# Patient Record
Sex: FEMALE | Race: WHITE | NOT HISPANIC OR LATINO | Employment: OTHER | URBAN - METROPOLITAN AREA
[De-identification: names, ages, dates, MRNs, and addresses within clinical notes are randomized per-mention and may not be internally consistent; named-entity substitution may affect disease eponyms.]

---

## 2017-01-03 DIAGNOSIS — I25.119 CORONARY ARTERY DISEASE INVOLVING NATIVE CORONARY ARTERY WITH ANGINA PECTORIS, UNSPECIFIED WHETHER NATIVE OR TRANSPLANTED HEART (HCC): ICD-10-CM

## 2017-01-03 RX ORDER — DOXEPIN HYDROCHLORIDE 50 MG/1
CAPSULE ORAL
Qty: 90 CAPSULE | Refills: 1 | Status: SHIPPED | OUTPATIENT
Start: 2017-01-03 | End: 2017-06-28 | Stop reason: SDUPTHER

## 2017-01-24 ENCOUNTER — OFFICE VISIT (OUTPATIENT)
Dept: INTERNAL MEDICINE | Facility: CLINIC | Age: 77
End: 2017-01-24

## 2017-01-24 VITALS
HEART RATE: 74 BPM | BODY MASS INDEX: 28 KG/M2 | SYSTOLIC BLOOD PRESSURE: 112 MMHG | HEIGHT: 64 IN | DIASTOLIC BLOOD PRESSURE: 82 MMHG | OXYGEN SATURATION: 95 % | WEIGHT: 164 LBS

## 2017-01-24 DIAGNOSIS — M54.42 ACUTE LEFT-SIDED LOW BACK PAIN WITH LEFT-SIDED SCIATICA: Primary | ICD-10-CM

## 2017-01-24 DIAGNOSIS — M25.552 LEFT HIP PAIN: ICD-10-CM

## 2017-01-24 PROCEDURE — 99213 OFFICE O/P EST LOW 20 MIN: CPT | Performed by: NURSE PRACTITIONER

## 2017-01-24 NOTE — MR AVS SNAPSHOT
Dior Pettit   1/24/2017 2:00 PM   Office Visit    Dept Phone:  [phone]   Encounter #:  [id number]    Provider:  TOSHA Coto   Department:  [facility] INTERNAL MEDICINE                Your Full Care Plan              Today's Medication Changes          These changes are accurate as of: 1/24/17  2:44 PM.  If you have any questions, ask your nurse or doctor.               Stop taking medication(s)listed here:     aspirin 325 MG tablet   Stopped by:  TOSHA Coto                      Your Updated Medication List          This list is accurate as of: 1/24/17  2:44 PM.  Always use your most recent med list.                clonazePAM 2 MG tablet   Commonly known as:  KlonoPIN   Take 1 tablet by mouth At Night As Needed for seizures.       [facility] PO       doxepin 50 MG capsule   Commonly known as:  SINEquan   TAKE ONE CAPSULE BY MOUTH ONCE NIGHTLY       fenofibrate micronized 134 MG capsule   Commonly known as:  LOFIBRA       fish oil 1000 MG capsule capsule       fluticasone-salmeterol 250-50 MCG/DOSE DISKUS   Commonly known as:  ADVAIR DISKUS   Inhale 1 puff 2 (Two) Times a Day. Rinse mouth afterwards       glimepiride 2 MG tablet   Commonly known as:  AMARYL   TAKE ONE TABLET BY MOUTH EVERY MORNING BEFORE BREAKFAST       glucosamine-chondroitin 500-400 MG capsule capsule       * levothyroxine 75 MCG tablet   Commonly known as:  SYNTHROID, LEVOTHROID   Take 1 tablet by mouth every other day.       * levothyroxine 50 MCG tablet   Commonly known as:  SYNTHROID, LEVOTHROID   TAKE ONE TABLET BY MOUTH EVERY OTHER DAY       melatonin 5 MG tablet tablet       metFORMIN  MG 24 hr tablet   Commonly known as:  GLUCOPHAGE-XR   TAKE ONE TABLET BY MOUTH TWICE A DAY       metoprolol tartrate 25 MG tablet   Commonly known as:  LOPRESSOR   TAKE ONE TABLET BY MOUTH TWICE A DAY       montelukast 10 MG tablet   Commonly known as:  SINGULAIR      Take 1 tablet by mouth Every Night.       omeprazole 40 MG capsule   Commonly known as:  priLOSEC   take 1 capsule by mouth once daily       simvastatin 40 MG tablet   Commonly known as:  ZOCOR   TAKE ONE TABLET BY MOUTH EVERY NIGHT       traMADol 50 MG tablet   Commonly known as:  ULTRAM   TAKE ONE TO TWO TABLETS BY MOUTH THREE TIMES A DAY AS NEEDED FOR PAIN       venlafaxine  MG 24 hr capsule   Commonly known as:  EFFEXOR-XR   TAKE ONE CAPSULE BY MOUTH DAILY       * Notice:  This list has 2 medication(s) that are the same as other medications prescribed for you. Read the directions carefully, and ask your doctor or other care provider to review them with you.            You Were Diagnosed With        Codes Comments    Acute left-sided low back pain with left-sided sciatica    -  Primary ICD-10-CM: M54.42  ICD-9-CM: 724.2, 724.3 continue chiro    Left hip pain     ICD-10-CM: M25.552  ICD-9-CM: 719.45       Instructions     None    Patient Instructions History      Upcoming Appointments     Visit Type Date Time Department    ACUTE           2017  2:00 PM [facility]    FOLLOW UP 3/2/2017 10:00 AM [facility]    FOLLOW UP 3/13/2017 10:00 AM [facility] Signup     [facility] [employer] allows you to send messages to your doctor, view your test results, renew your prescriptions, schedule appointments, and more. To sign up, go to [employer] and click on the Sign Up Now link in the New User? box. Enter your [employer] Activation Code exactly as it appears below along with the last four digits of your Social Security Number and your Date of Birth () to complete the sign-up process. If you do not sign up before the expiration date, you must request a new code.    [employer] Activation Code: [email or web address]  Expires: 2017  2:44 PM    If you have questions, you can email [email or web address] or call [phone] to talk to our [employer] staff. Remember, [employer]  "is NOT to be used for urgent needs. For medical emergencies, dial 911.               Other Info from Your Visit           Your Appointments     Mar 02, 2017 10:00 AM EST   Follow Up with Abbi Mitchell MD   [facility] CARDIOLOGY (--)    [address]  [facility] [address] [phone]           Arrive 15 minutes prior to appointment.            Mar 13, 2017 10:00 AM EDT   Follow Up with Magdalena Joy MD   [facility] INTERNAL MEDICINE (--)    [address]  [facility] [address] [phone]           Arrive 15 minutes prior to appointment.              Allergies     Lisinopril      Metoprolol        Reason for Visit     Fall Hit her L hip      Vital Signs     Blood Pressure Pulse Height Weight Oxygen Saturation Body Mass Index    112/82 (BP Location: Left arm) 74 63.75\" (161.9 cm) 164 lb (74.4 kg) 95% 28.37 kg/m2    Smoking Status                   Never Smoker           Problems and Diagnoses Noted     Acute left-sided low back pain with left-sided sciatica    -  Primary    Left hip pain            "

## 2017-01-24 NOTE — PROGRESS NOTES
Subjective   Dior Pettit is a 76 y.o. female who presents for f/u regarding left hip pain.    HPI Comments: She was turning [place] 1 month ago when she lost her balance and fell, landing on her left hip. She c/o pain in the left low back radiating into the left leg since then. She saw her chiro (has had 3 visits), has had xray of lumbar spine. She denies numbness/tingling in lower extremities.     Hip Pain    The incident occurred more than 1 week ago. The injury mechanism was a fall. The pain is present in the left hip. The pain is moderate.        The following portions of the patient's history were reviewed and updated as appropriate: allergies, current medications, past social history and problem list.    Past Medical History   Diagnosis Date   • Allergic rhinitis    • Anxiety    • Asthma    • Atypical chest pain    • Diabetes    • GERD (gastroesophageal reflux disease)    • H/O chest pain    • H/O echocardiogram 02/26/2015   • Health care maintenance    • Heart murmur    • History of EKG 12/01/2015   • Hyperlipidemia    • Hypertension    • Hypothyroidism    • Insomnia    • Mood disorder in conditions classified elsewhere    • Neuropathy    • Pulmonary hypertension    • RLS (restless legs syndrome)    • Sleep apnea          Current Outpatient Prescriptions:   •  clonazePAM (KlonoPIN) 2 MG tablet, Take 1 tablet by mouth At Night As Needed for seizures., Disp: 90 tablet, Rfl: 1  •  doxepin (SINEquan) 50 MG capsule, TAKE ONE CAPSULE BY MOUTH ONCE NIGHTLY, Disp: 90 capsule, Rfl: 1  •  fenofibrate micronized (LOFIBRA) 134 MG capsule, 134 mg every morning before breakfast., Disp: , Rfl:   •  fluticasone-salmeterol (ADVAIR DISKUS) 250-50 MCG/DOSE DISKUS, Inhale 1 puff 2 (Two) Times a Day. Rinse mouth afterwards, Disp: 1 each, Rfl: 12  •  glimepiride (AMARYL) 2 MG tablet, TAKE ONE TABLET BY MOUTH EVERY MORNING BEFORE BREAKFAST, Disp: 30 tablet, Rfl: 2  •  glucosamine-chondroitin 500-400 MG capsule capsule, Take 1  capsule by mouth 2 (Two) Times a Day With Meals., Disp: , Rfl:   •  Lactobacillus-Inulin (CULTURELLPosmetrics ), Take 1 tablet by mouth Daily., Disp: , Rfl:   •  levothyroxine (SYNTHROID, LEVOTHROID) 50 MCG tablet, TAKE ONE TABLET BY MOUTH EVERY OTHER DAY, Disp: 45 tablet, Rfl: 0  •  levothyroxine (SYNTHROID, LEVOTHROID) 75 MCG tablet, Take 1 tablet by mouth every other day., Disp: 30 tablet, Rfl: 3  •  melatonin 5 MG tablet tablet, Take 5 mg by mouth Every Night., Disp: , Rfl:   •  metFORMIN XR (GLUCOPHAGE-XR) 750 MG 24 hr tablet, TAKE ONE TABLET BY MOUTH TWICE A DAY, Disp: 60 tablet, Rfl: 2  •  metoprolol tartrate (LOPRESSOR) 25 MG tablet, TAKE ONE TABLET BY MOUTH TWICE A DAY, Disp: 60 tablet, Rfl: 3  •  montelukast (SINGULAIR) 10 MG tablet, Take 1 tablet by mouth Every Night., Disp: 90 tablet, Rfl: 3  •  Omega-3 Fatty Acids (FISH OIL) 1000 MG capsule capsule, Take 1,000 mg by mouth Daily With Breakfast., Disp: , Rfl:   •  omeprazole (PriLOSEC) 40 MG capsule, take 1 capsule by mouth once daily (Patient taking differently: take 1 capsule by mouth twice a day), Disp: 90 capsule, Rfl: 1  •  simvastatin (ZOCOR) 40 MG tablet, TAKE ONE TABLET BY MOUTH EVERY NIGHT, Disp: 30 tablet, Rfl: 2  •  traMADol (ULTRAM) 50 MG tablet, TAKE ONE TO TWO TABLETS BY MOUTH THREE TIMES A DAY AS NEEDED FOR PAIN, Disp: 120 tablet, Rfl: 1  •  venlafaxine XR (EFFEXOR-XR) 150 MG 24 hr capsule, TAKE ONE CAPSULE BY MOUTH DAILY, Disp: 90 capsule, Rfl: 1    Allergies   Allergen Reactions   • Lisinopril    • Metoprolol        Review of Systems   Constitutional: Positive for activity change. Negative for appetite change, chills, fever and unexpected weight change.   HENT: Negative for ear pain, sinus pressure and sore throat.    Eyes: Negative for visual disturbance.   Respiratory: Negative for cough, shortness of breath and wheezing.    Cardiovascular: Negative for chest pain, palpitations and leg swelling.   Gastrointestinal: Negative  "for abdominal pain (no change in bowel function), blood in stool, constipation, diarrhea, nausea and vomiting.   Genitourinary: Negative for decreased urine volume, difficulty urinating (no change in bladder function), frequency, genital sores, hematuria and urgency.   Musculoskeletal: Positive for arthralgias, back pain and gait problem.   Skin: Negative for rash.   Neurological: Negative for dizziness, syncope, weakness, light-headedness and headaches.   Psychiatric/Behavioral: Negative for dysphoric mood.       Objective   Vitals:    01/24/17 1405   BP: 112/82   BP Location: Left arm   Pulse: 74   SpO2: 95%   Weight: 164 lb (74.4 kg)   Height: 63.75\" (161.9 cm)     Physical Exam   Constitutional: She is oriented to person, place, and time. She appears well-developed and well-nourished. No distress (appears uncomfortable).   HENT:   Head: Normocephalic.   Eyes: Conjunctivae are normal.   Neck: Normal range of motion.   Cardiovascular: Normal rate, regular rhythm and normal heart sounds.    Pulmonary/Chest: Effort normal and breath sounds normal.   Abdominal: Soft. She exhibits no distension. There is no tenderness.   Musculoskeletal:        Lumbar back: She exhibits decreased range of motion, tenderness, pain and spasm. She exhibits no swelling.   Moderate tenderness to palpation of paravertebral muscles of lumbar area     Neurological: She is alert and oriented to person, place, and time. She has normal strength. No sensory deficit. She exhibits normal muscle tone (5/5 normal muscle strength bilateral lower extremities. Strength and tone 5/5 in lower extremities (right quadriceps, left quadriceps, right tibialis anterior, left tibialis anterior)). Gait (Antalgic. Is able to do heel and toe walking bilaterally.) abnormal. Coordination normal.   Reflex Scores:       Patellar reflexes are 2+ on the right side and 2+ on the left side.       Achilles reflexes are 2+ on the right side and 2+ on the left side.  Negative " straight leg raising test   Skin: Skin is warm and dry. No erythema.   Psychiatric: She has a normal mood and affect. Her behavior is normal.   Nursing note and vitals reviewed.      Assessment/Plan   Dior was seen today for fall.    Diagnoses and all orders for this visit:    Acute left-sided low back pain with left-sided sciatica  Comments:  cont chiro treatments but discussed PT referral if sx do not improve, ok to take Tramadol as needed for breakthrough pain (discussed import of avoiding NSAIDs    Left hip pain

## 2017-02-20 DIAGNOSIS — E03.8 OTHER SPECIFIED HYPOTHYROIDISM: ICD-10-CM

## 2017-02-20 DIAGNOSIS — E78.5 HYPERLIPIDEMIA, ACQUIRED: Primary | ICD-10-CM

## 2017-02-20 RX ORDER — LEVOTHYROXINE SODIUM 0.05 MG/1
TABLET ORAL
Qty: 45 TABLET | Refills: 0 | Status: SHIPPED | OUTPATIENT
Start: 2017-02-20 | End: 2017-05-30 | Stop reason: SDUPTHER

## 2017-02-20 RX ORDER — FENOFIBRATE 134 MG/1
134 CAPSULE ORAL
Qty: 90 CAPSULE | Refills: 1 | Status: SHIPPED | OUTPATIENT
Start: 2017-02-20 | End: 2017-08-22 | Stop reason: SDUPTHER

## 2017-02-20 NOTE — PROGRESS NOTES
Fax request received from [facility] for a refill on Fenofibrate capsules 134 mg. A 90 day supply w/ 1 refill was sent in electronically.

## 2017-03-02 ENCOUNTER — OFFICE VISIT (OUTPATIENT)
Dept: CARDIOLOGY | Facility: CLINIC | Age: 77
End: 2017-03-02

## 2017-03-02 VITALS
BODY MASS INDEX: 26.37 KG/M2 | HEIGHT: 67 IN | WEIGHT: 168 LBS | SYSTOLIC BLOOD PRESSURE: 110 MMHG | HEART RATE: 70 BPM | DIASTOLIC BLOOD PRESSURE: 78 MMHG

## 2017-03-02 DIAGNOSIS — Z95.2 S/P AVR: ICD-10-CM

## 2017-03-02 DIAGNOSIS — G47.33 OBSTRUCTIVE SLEEP APNEA SYNDROME: ICD-10-CM

## 2017-03-02 DIAGNOSIS — E11.8 CONTROLLED TYPE 2 DIABETES MELLITUS WITH COMPLICATION, WITHOUT LONG-TERM CURRENT USE OF INSULIN (HCC): ICD-10-CM

## 2017-03-02 DIAGNOSIS — N64.4 BREAST PAIN, RIGHT: ICD-10-CM

## 2017-03-02 DIAGNOSIS — I27.20 PULMONARY HYPERTENSION (HCC): Primary | ICD-10-CM

## 2017-03-02 DIAGNOSIS — I10 ESSENTIAL HYPERTENSION: ICD-10-CM

## 2017-03-02 PROCEDURE — 93000 ELECTROCARDIOGRAM COMPLETE: CPT | Performed by: INTERNAL MEDICINE

## 2017-03-02 PROCEDURE — 99213 OFFICE O/P EST LOW 20 MIN: CPT | Performed by: INTERNAL MEDICINE

## 2017-03-02 NOTE — PROGRESS NOTES
Date of Office Visit: 2017  Encounter Provider: Abbi Mitchell MD  Place of Service: [facility] CARDIOLOGY  Patient Name: Dior Pettit  :1940    Chief complaint  Follow-up of aortic valve replacement, pulmonary hypertension, mitral regurgitation    History of Present Illness  Patient is a 76-year-old female with history of hypertension, hyperlipidemia, diabetes, aortic valve disease and subaortic membrane.  In  she underwent aortic valve replacement with porcine valve as well as subaortic membrane resection. Cardiac catheterization was negative for coronary artery disease.  She was then found to have significant sleep apnea but was been intolerant of therapy for this.  She is also noted to have pulmonary hypertension with an RV systolic pressure 44 monos mercury in  in fiber  she developed chest tightness while shoveling snow in a stress perfusion study was negative for ischemia and echocardiogram revealed normal systolic function with an ejection fraction 53% mild mitral regurgitation moderate tricuspid regurgitation with grade 1A diastolic dysfunction the prosthetic aortic valve was functioning well the aortic root was mildly dilated, RVSP 39mmHg.      Since last visit she fell when handling her puppy in the fall and has suffered some arthralgias in her hip and left side.  She seen a chiropractor regarding this.  She is not exercising regularly as a consequence.  Knisely chest pain, shortness of breath, palpitations, syncope.  On occasion she has mild lightheadedness but should she was to have blood pressure.  She has no interest in treating sleep apnea    Past Medical History   Diagnosis Date   • Allergic rhinitis    • Anxiety    • Asthma    • Atypical chest pain    • Diabetes    • GERD (gastroesophageal reflux disease)    • H/O chest pain    • H/O echocardiogram 2015   • Health care maintenance    • Heart murmur    • History of EKG 2015   •  Hyperlipidemia    • Hypertension    • Hypothyroidism    • Insomnia    • Mood disorder in conditions classified elsewhere    • Neuropathy    • Pulmonary hypertension    • Renal insufficiency    • RLS (restless legs syndrome)    • Sleep apnea      untreated     Past Surgical History   Procedure Laterality Date   • Aortic valve repair/replacement  2007     Dr Perry   • Hysterectomy  1972   • Augmentation mammaplasty  1976   • Cardiac catheterization  2007   • Colonoscopy  2010   • Sigmoidoscopy  2001   • Aortic valve repair/replacement     • Colonoscopy  03/02/2012   • Foot surgery       remove bunion   • Appendectomy     • Breast augmentation  2014     Outpatient Medications Prior to Visit   Medication Sig Dispense Refill   • clonazePAM (KlonoPIN) 2 MG tablet Take 1 tablet by mouth At Night As Needed for seizures. 90 tablet 1   • doxepin (SINEquan) 50 MG capsule TAKE ONE CAPSULE BY MOUTH ONCE NIGHTLY 90 capsule 1   • fenofibrate micronized (LOFIBRA) 134 MG capsule Take 1 capsule by mouth Every Morning Before Breakfast. 90 capsule 1   • fluticasone-salmeterol (ADVAIR DISKUS) 250-50 MCG/DOSE DISKUS Inhale 1 puff 2 (Two) Times a Day. Rinse mouth afterwards (Patient taking differently: Inhale 1 puff Daily. Rinse mouth afterwards) 1 each 12   • glucosamine-chondroitin 500-400 MG capsule capsule Take 2 capsules by mouth Daily.     • Lactobacillus-Inulin ([facility] DIGESTIVE HEALTH PO) Take 1 tablet by mouth Daily. As needed     • levothyroxine (SYNTHROID, LEVOTHROID) 50 MCG tablet TAKE ONE TABLET BY MOUTH EVERY OTHER DAY 45 tablet 0   • levothyroxine (SYNTHROID, LEVOTHROID) 75 MCG tablet Take 1 tablet by mouth every other day. 30 tablet 3   • melatonin 5 MG tablet tablet Take 5 mg by mouth Every Night.     • metoprolol tartrate (LOPRESSOR) 25 MG tablet TAKE ONE TABLET BY MOUTH TWICE A DAY 60 tablet 3   • montelukast (SINGULAIR) 10 MG tablet Take 1 tablet by mouth Every Night. 90 tablet 3   • Omega-3 Fatty Acids (FISH  OIL) 1000 MG capsule capsule Take 1,000 mg by mouth Daily With Breakfast.     • omeprazole (PriLOSEC) 40 MG capsule take 1 capsule by mouth once daily (Patient taking differently: take 1 capsule by mouth twice a day) 90 capsule 1   • traMADol (ULTRAM) 50 MG tablet TAKE ONE TO TWO TABLETS BY MOUTH THREE TIMES A DAY AS NEEDED FOR PAIN 120 tablet 1   • venlafaxine XR (EFFEXOR-XR) 150 MG 24 hr capsule TAKE ONE CAPSULE BY MOUTH DAILY 90 capsule 1   • glimepiride (AMARYL) 2 MG tablet TAKE ONE TABLET BY MOUTH EVERY MORNING BEFORE BREAKFAST 30 tablet 2   • metFORMIN XR (GLUCOPHAGE-XR) 750 MG 24 hr tablet TAKE ONE TABLET BY MOUTH TWICE A DAY 60 tablet 2   • simvastatin (ZOCOR) 40 MG tablet TAKE ONE TABLET BY MOUTH EVERY NIGHT 30 tablet 2     No facility-administered medications prior to visit.      Allergies as of 03/02/2017 - [place] as Reviewed 03/02/2017   Allergen Reaction Noted   • Lisinopril  06/16/2016     Social History     Social History   • Marital status:      Spouse name: N/A   • Number of children: N/A   • Years of education: N/A     Occupational History   • Not on file.     Social History Main Topics   • Smoking status: Never Smoker   • Smokeless tobacco: Never Used      Comment: daily caffeine use   • Alcohol use Yes      Comment: social use   • Drug use: No   • Sexual activity: No     Other Topics Concern   • Not on file     Social History Narrative     Family History   Problem Relation Age of Onset   • Hypertension Mother    • Stroke Mother    • Diabetes Sister    • Coronary artery disease Brother    • Diabetes Brother    • Coronary artery disease Brother    • Skin cancer Neg Hx      Review of Systems   Constitution: Negative for fever, malaise/fatigue, weight gain and weight loss.   HENT: Negative for ear pain, hearing loss, nosebleeds and sore throat.    Eyes: Positive for visual disturbance. Negative for double vision, pain, vision loss in left eye and vision loss in right eye.   Cardiovascular:        " See history of present illness.   Respiratory: Positive for shortness of breath. Negative for cough, sleep disturbances due to breathing, snoring and wheezing.    Endocrine: Negative for cold intolerance, heat intolerance and polyuria.   Skin: Negative for itching, poor wound healing and rash.   Musculoskeletal: Negative for joint pain, joint swelling and myalgias.   Gastrointestinal: Negative for abdominal pain, diarrhea, hematochezia, nausea and vomiting.   Genitourinary: Negative for hematuria and hesitancy.   Neurological: Negative for numbness, paresthesias and seizures.   Psychiatric/Behavioral: Negative for depression. The patient is not nervous/anxious.      Objective:     Vitals:    03/02/17 1011   BP: 110/78   Pulse: 70   Weight: 168 lb (76.2 kg)   Height: 67\" (170.2 cm)     Body mass index is 26.31 kg/(m^2).    Physical Exam   Constitutional: She is oriented to person, place, and time. She appears well-developed and well-nourished.   HENT:   Head: Normocephalic.   Nose: Nose normal.   Mouth/Throat: Oropharynx is clear and moist.   Eyes: Conjunctivae and EOM are normal. Pupils are equal, round, and reactive to light. Right eye exhibits no discharge. No scleral icterus.   Neck: Normal range of motion. Neck supple. No JVD present. No thyromegaly present.   Cardiovascular: Normal rate, regular rhythm and intact distal pulses.  Exam reveals no gallop and no friction rub.    Murmur heard.   Systolic murmur is present with a grade of 2/6  at the upper right sternal border, upper left sternal border  Pulses:       Carotid pulses are 2+ on the right side, and 2+ on the left side.       Radial pulses are 2+ on the right side, and 2+ on the left side.        Femoral pulses are 2+ on the right side, and 2+ on the left side.       Popliteal pulses are 2+ on the right side, and 2+ on the left side.        Dorsalis pedis pulses are 2+ on the right side, and 2+ on the left side.        Posterior tibial pulses are 2+ on " the right side, and 2+ on the left side.   Pulmonary/Chest: Effort normal and breath sounds normal. No respiratory distress. She has no wheezes. She has no rales.   Abdominal: Soft. Bowel sounds are normal. She exhibits no distension. There is no hepatosplenomegaly. There is no tenderness. There is no rebound.   Musculoskeletal: Normal range of motion. She exhibits no edema or tenderness.   Neurological: She is alert and oriented to person, place, and time.   Skin: Skin is warm and dry. No rash noted. No erythema.   Psychiatric: She has a normal mood and affect. Her behavior is normal. Judgment and thought content normal.   Vitals reviewed.    Lab Review:     ECG 12 Lead  Date/Time: 3/2/2017 5:56 PM  Performed by: JOSE ALEJANDRO ASKEW  Authorized by: JOSE ALEJANDRO ASKEW   Comparison: compared with previous ECG   Comparison to previous ECG: Incomplete RBBB is not present  Rhythm: sinus rhythm  Conduction comments: QTc = 477msec  QRS axis: left  Other findings: PRWP  Clinical impression: abnormal ECG          Assessment:       Diagnosis Plan   1. Pulmonary hypertension  ECG 12 Lead   2. Breast pain, right  Mammo Diagnostic Digital Tomosynthesis Bilateral With CAD    ECG 12 Lead   3. Essential hypertension  ECG 12 Lead   4. Controlled type 2 diabetes mellitus with complication, without long-term current use of insulin  ECG 12 Lead   5. Obstructive sleep apnea syndrome  ECG 12 Lead   6. S/P AVR  ECG 12 Lead     Plan:       1.  Aortic valve replacement with a subaortic membrane resection.  Continue this be prophylaxis follow-up in 6 months.  3.  Mildly dilated aortic root we'll need to check an echocardiogram  4.  Hypertension blood pressures controlled  5.  Pulmonary hypertension, with minimal and asymptomatic we'll observe for now  6.  Renal insufficiency felt to be stable  7.  Diabetes  8.  Dyslipidemia     Dior Pettit   Home Medication Instructions ENRICO:    Printed on:03/17/17 1949   Medication Information                       aspirin 81 MG EC tablet  Take 81 mg by mouth Daily.             clonazePAM (KlonoPIN) 2 MG tablet  Take 1 tablet by mouth At Night As Needed for seizures.             doxepin (SINEquan) 50 MG capsule  TAKE ONE CAPSULE BY MOUTH ONCE NIGHTLY             fenofibrate micronized (LOFIBRA) 134 MG capsule  Take 1 capsule by mouth Every Morning Before Breakfast.             fluticasone-salmeterol (ADVAIR DISKUS) 250-50 MCG/DOSE DISKUS  Inhale 1 puff 2 (Two) Times a Day. Rinse mouth afterwards             glucosamine-chondroitin 500-400 MG capsule capsule  Take 2 capsules by mouth Daily.             Lactobacillus-Inulin ([facility] PO)  Take 1 tablet by mouth Daily. As needed             levothyroxine (SYNTHROID, LEVOTHROID) 50 MCG tablet  TAKE ONE TABLET BY MOUTH EVERY OTHER DAY             levothyroxine (SYNTHROID, LEVOTHROID) 75 MCG tablet  Take 1 tablet by mouth every other day.             melatonin 5 MG tablet tablet  Take 5 mg by mouth Every Night.             metFORMIN ER (GLUCOPHAGE-XR) 750 MG 24 hr tablet  TAKE ONE TABLET BY MOUTH TWICE A DAY             metoprolol tartrate (LOPRESSOR) 25 MG tablet  TAKE ONE TABLET BY MOUTH TWICE A DAY             montelukast (SINGULAIR) 10 MG tablet  Take 1 tablet by mouth Every Night.             Multiple Vitamins-Minerals (MULTIVITAL PO)  Take  by mouth.             Omega-3 Fatty Acids (FISH OIL) 1000 MG capsule capsule  Take 1,000 mg by mouth Daily With Breakfast.             omeprazole (PriLOSEC) 40 MG capsule  take 1 capsule by mouth once daily             pantoprazole (PROTONIX) 40 MG EC tablet  Take 40 mg by mouth Daily.             simvastatin (ZOCOR) 40 MG tablet  TAKE ONE TABLET BY MOUTH EVERY NIGHT             traMADol (ULTRAM) 50 MG tablet  TAKE ONE TO TWO TABLETS BY MOUTH THREE TIMES A DAY AS NEEDED FOR PAIN             venlafaxine XR (EFFEXOR-XR) 150 MG 24 hr capsule  TAKE ONE CAPSULE BY MOUTH DAILY                 TOMI Manriquez/Byron  disclaimer:   Much of this encounter note is an electronic transcription/translation of spoken language to printed text. The electronic translation of spoken language may permit erroneous, or at times, nonsensical words or phrases to be inadvertently transcribed; Although I have reviewed the note for such errors, some may still exist.

## 2017-03-05 PROBLEM — Z95.2 S/P AVR: Status: ACTIVE | Noted: 2017-03-05

## 2017-03-06 DIAGNOSIS — E11.8 TYPE 2 DIABETES MELLITUS WITH COMPLICATION (HCC): ICD-10-CM

## 2017-03-06 DIAGNOSIS — IMO0002 UNCONTROLLED TYPE 2 DIABETES MELLITUS WITH OTHER SPECIFIED COMPLICATION: ICD-10-CM

## 2017-03-06 DIAGNOSIS — E78.5 HYPERLIPIDEMIA: ICD-10-CM

## 2017-03-06 RX ORDER — GLIMEPIRIDE 2 MG/1
TABLET ORAL
Qty: 30 TABLET | Refills: 1 | Status: SHIPPED | OUTPATIENT
Start: 2017-03-06 | End: 2017-03-13

## 2017-03-06 RX ORDER — SIMVASTATIN 40 MG
TABLET ORAL
Qty: 30 TABLET | Refills: 1 | Status: SHIPPED | OUTPATIENT
Start: 2017-03-06 | End: 2017-05-03 | Stop reason: SDUPTHER

## 2017-03-06 RX ORDER — METFORMIN HYDROCHLORIDE 750 MG/1
TABLET, EXTENDED RELEASE ORAL
Qty: 60 TABLET | Refills: 1 | Status: SHIPPED | OUTPATIENT
Start: 2017-03-06 | End: 2017-05-06 | Stop reason: SDUPTHER

## 2017-03-07 ENCOUNTER — HOSPITAL ENCOUNTER (EMERGENCY)
Facility: HOSPITAL | Age: 77
Discharge: HOME | End: 2017-03-07
Attending: EMERGENCY MEDICINE | Admitting: EMERGENCY MEDICINE

## 2017-03-07 VITALS
BODY MASS INDEX: 25.16 KG/M2 | OXYGEN SATURATION: 98 % | RESPIRATION RATE: 16 BRPM | HEART RATE: 82 BPM | TEMPERATURE: 98.7 F | HEIGHT: 68 IN | DIASTOLIC BLOOD PRESSURE: 80 MMHG | SYSTOLIC BLOOD PRESSURE: 136 MMHG | WEIGHT: 166 LBS

## 2017-03-07 DIAGNOSIS — R55 NEAR SYNCOPE: Primary | ICD-10-CM

## 2017-03-07 LAB
ALBUMIN SERPL-MCNC: 4.8 G/DL (ref 3.5–5.2)
ALBUMIN/GLOB SERPL: 1.6 G/DL
ALP SERPL-CCNC: 70 U/L (ref 39–117)
ALT SERPL W P-5'-P-CCNC: 32 U/L (ref 1–33)
ANION GAP SERPL CALCULATED.3IONS-SCNC: 15 MMOL/L
AST SERPL-CCNC: 30 U/L (ref 1–32)
BASOPHILS # BLD AUTO: 0.05 10*3/MM3 (ref 0–0.2)
BASOPHILS NFR BLD AUTO: 0.7 % (ref 0–1.5)
BILIRUB SERPL-MCNC: 0.4 MG/DL (ref 0.1–1.2)
BUN BLD-MCNC: 20 MG/DL (ref 8–23)
BUN/CREAT SERPL: 18 (ref 7–25)
CALCIUM SPEC-SCNC: 10 MG/DL (ref 8.6–10.5)
CHLORIDE SERPL-SCNC: 99 MMOL/L (ref 98–107)
CO2 SERPL-SCNC: 26 MMOL/L (ref 22–29)
CREAT BLD-MCNC: 1.11 MG/DL (ref 0.57–1)
DEPRECATED RDW RBC AUTO: 44.8 FL (ref 37–54)
EOSINOPHIL # BLD AUTO: 0.28 10*3/MM3 (ref 0–0.7)
EOSINOPHIL NFR BLD AUTO: 3.9 % (ref 0.3–6.2)
ERYTHROCYTE [DISTWIDTH] IN BLOOD BY AUTOMATED COUNT: 13.3 % (ref 11.7–13)
GFR SERPL CREATININE-BSD FRML MDRD: 48 ML/MIN/1.73
GLOBULIN UR ELPH-MCNC: 3 GM/DL
GLUCOSE BLD-MCNC: 129 MG/DL (ref 65–99)
HCT VFR BLD AUTO: 41.9 % (ref 35.6–45.5)
HGB BLD-MCNC: 13.9 G/DL (ref 11.9–15.5)
IMM GRANULOCYTES # BLD: 0.02 10*3/MM3 (ref 0–0.03)
IMM GRANULOCYTES NFR BLD: 0.3 % (ref 0–0.5)
LYMPHOCYTES # BLD AUTO: 2.61 10*3/MM3 (ref 0.9–4.8)
LYMPHOCYTES NFR BLD AUTO: 36.1 % (ref 19.6–45.3)
MCH RBC QN AUTO: 30.9 PG (ref 26.9–32)
MCHC RBC AUTO-ENTMCNC: 33.2 G/DL (ref 32.4–36.3)
MCV RBC AUTO: 93.1 FL (ref 80.5–98.2)
MONOCYTES # BLD AUTO: 0.64 10*3/MM3 (ref 0.2–1.2)
MONOCYTES NFR BLD AUTO: 8.9 % (ref 5–12)
NEUTROPHILS # BLD AUTO: 3.62 10*3/MM3 (ref 1.9–8.1)
NEUTROPHILS NFR BLD AUTO: 50.1 % (ref 42.7–76)
PLATELET # BLD AUTO: 265 10*3/MM3 (ref 140–500)
PMV BLD AUTO: 10.4 FL (ref 6–12)
POTASSIUM BLD-SCNC: 4.2 MMOL/L (ref 3.5–5.2)
PROT SERPL-MCNC: 7.8 G/DL (ref 6–8.5)
RBC # BLD AUTO: 4.5 10*6/MM3 (ref 3.9–5.2)
SODIUM BLD-SCNC: 140 MMOL/L (ref 136–145)
TROPONIN T SERPL-MCNC: <0.01 NG/ML (ref 0–0.03)
WBC NRBC COR # BLD: 7.22 10*3/MM3 (ref 4.5–10.7)

## 2017-03-07 PROCEDURE — 85025 COMPLETE CBC W/AUTO DIFF WBC: CPT | Performed by: EMERGENCY MEDICINE

## 2017-03-07 PROCEDURE — 80053 COMPREHEN METABOLIC PANEL: CPT | Performed by: EMERGENCY MEDICINE

## 2017-03-07 PROCEDURE — 99284 EMERGENCY DEPT VISIT MOD MDM: CPT

## 2017-03-07 PROCEDURE — 84484 ASSAY OF TROPONIN QUANT: CPT | Performed by: EMERGENCY MEDICINE

## 2017-03-07 PROCEDURE — 93010 ELECTROCARDIOGRAM REPORT: CPT | Performed by: INTERNAL MEDICINE

## 2017-03-07 PROCEDURE — 93005 ELECTROCARDIOGRAM TRACING: CPT | Performed by: EMERGENCY MEDICINE

## 2017-03-07 RX ORDER — ASPIRIN 81 MG/1
81 TABLET ORAL DAILY
COMMUNITY
End: 2019-02-04

## 2017-03-07 RX ORDER — PANTOPRAZOLE SODIUM 40 MG/1
40 TABLET, DELAYED RELEASE ORAL DAILY
COMMUNITY
End: 2017-04-24

## 2017-03-07 RX ORDER — SODIUM CHLORIDE 0.9 % (FLUSH) 0.9 %
10 SYRINGE (ML) INJECTION AS NEEDED
Status: DISCONTINUED | OUTPATIENT
Start: 2017-03-07 | End: 2017-03-08 | Stop reason: HOSPADM

## 2017-03-08 LAB
HOLD SPECIMEN: NORMAL
WHOLE BLOOD HOLD SPECIMEN: NORMAL
WHOLE BLOOD HOLD SPECIMEN: NORMAL

## 2017-03-08 NOTE — ED PROVIDER NOTES
EMERGENCY DEPARTMENT ENCOUNTER    CHIEF COMPLAINT  Chief Complaint: dizziness  History given by: patient, daughter  History limited by: nothing  Room Number: 19/19  PMD: Magdalena Joy MD  Cardiologist- Dr. Mitchell    HPI:  Pt is a 76 y.o. female who presents complaining of dizziness, which began around 1600 today. Pt describes the dizziness as the room spinning. Pt states that she placed her head between her legs w/o relief. Pt states that she then got up to get some water and her dizziness became worse. Pt states that she knew that she was going to fall so she laid on the ground. Pt denies losing consciousness. Pt states that her dizziness is now resolved. Pt denies any light headedness, CP, cough, SOB, dysuria, weight change, LE swelling, palpitations, fever, N/V/D or inner ear issues but states that she has had decreased oral intake over the last 1-2 weeks and complains of a mild HA. Pt states that she has a history of similar symptoms previously secondary to dehydration and hypotension.    Duration:  4-5 hours  Onset: gradual  Timing: constant  Location: generalized  Radiation: none  Quality: room spinning  Intensity/Severity: moderate  Progression: resolved  Associated Symptoms: decreased oral intake, mild HA, near syncope  Aggravating Factors: none  Alleviating Factors: none  Previous Episodes: Pt states that she has a history of similar symptoms previously secondary to dehydration and hypotension.  Treatment before arrival: none    PAST MEDICAL HISTORY  Active Ambulatory Problems     Diagnosis Date Noted   • Hypertension 08/22/2016   • Pulmonary hypertension 08/22/2016   • Sleep apnea 08/22/2016   • Chest pain 08/24/2016   • Gastric reflux 08/24/2016   • Anxiety    • Mood disorder in conditions classified elsewhere    • RLS (restless legs syndrome)    • Controlled diabetes mellitus type 2 with complications    • Hypothyroidism    • Hyperlipidemia    • GERD (gastroesophageal reflux disease)    • Seasonal  allergic rhinitis 11/11/2016   • Mild intermittent asthma without complication 11/11/2016   • S/P AVR 03/05/2017     Resolved Ambulatory Problems     Diagnosis Date Noted   • No Resolved Ambulatory Problems     Past Medical History   Diagnosis Date   • Allergic rhinitis    • Asthma    • Atypical chest pain    • Diabetes    • H/O chest pain    • H/O echocardiogram 02/26/2015   • Health care maintenance    • Heart murmur    • History of EKG 12/01/2015   • Insomnia    • Neuropathy    • Renal insufficiency        PAST SURGICAL HISTORY  Past Surgical History   Procedure Laterality Date   • Aortic valve repair/replacement  2007     Dr Perry   • Hysterectomy  1972   • Augmentation mammaplasty  1976   • Cardiac catheterization  2007   • Colonoscopy  2010   • Sigmoidoscopy  2001   • Aortic valve repair/replacement     • Colonoscopy  03/02/2012   • Foot surgery       remove bunion   • Appendectomy     • Breast augmentation  2014       FAMILY HISTORY  Family History   Problem Relation Age of Onset   • Hypertension Mother    • Stroke Mother    • Diabetes Sister    • Coronary artery disease Brother    • Diabetes Brother    • Coronary artery disease Brother    • Skin cancer Neg Hx        SOCIAL HISTORY  Social History     Social History   • Marital status:      Spouse name: N/A   • Number of children: N/A   • Years of education: N/A     Occupational History   • Not on file.     Social History Main Topics   • Smoking status: Never Smoker   • Smokeless tobacco: Never Used      Comment: daily caffeine use   • Alcohol use Yes      Comment: social use   • Drug use: No   • Sexual activity: Not on file     Other Topics Concern   • Not on file     Social History Narrative       ALLERGIES  Lisinopril    REVIEW OF SYSTEMS  Review of Systems   Constitutional: Positive for appetite change (decreased). Negative for unexpected weight change.   Respiratory: Negative.    Cardiovascular: Negative.  Negative for chest pain and  palpitations.   Gastrointestinal: Negative.  Negative for abdominal pain, diarrhea, nausea and vomiting.   Genitourinary: Negative.    Neurological: Positive for dizziness, syncope (near syncope) and headaches (mild). Negative for light-headedness.   All other systems reviewed and are negative.      PHYSICAL EXAM  ED Triage Vitals   Temp Heart Rate Resp BP SpO2   03/07/17 1921 03/07/17 1921 03/07/17 1921 03/07/17 1921 03/07/17 1921   98.7 °F (37.1 °C) 88 16 152/88 97 %      Temp src Heart Rate Source Patient Position BP Location FiO2 (%)   -- -- -- -- --              Physical Exam   Constitutional: She is oriented to person, place, and time and well-developed, well-nourished, and in no distress. No distress.   HENT:   Head: Normocephalic and atraumatic.   Eyes: EOM are normal. Pupils are equal, round, and reactive to light.   Neck: Normal range of motion. Neck supple.   Cardiovascular: Normal rate and regular rhythm.    Murmur heard.   Systolic murmur is present   Pulmonary/Chest: Effort normal. No respiratory distress. She has decreased breath sounds (mild, bilaterally). She has no wheezes.   Abdominal: Soft. There is no tenderness. There is no rebound and no guarding.   Musculoskeletal: Normal range of motion. She exhibits no edema.   Neurological: She is alert and oriented to person, place, and time. She has normal sensation, normal strength and intact cranial nerves. No cranial nerve deficit. She has a normal Heel to Gee Test. Coordination normal.   Skin: Skin is warm and dry. No rash noted.   Psychiatric: Mood and affect normal.   Nursing note and vitals reviewed.      LAB RESULTS  Lab Results (last 24 hours)     Procedure Component Value Units Date/Time    CBC & Differential [[id number]] Collected:  03/07/17 2037    Specimen:  Blood Updated:  03/07/17 2102    Narrative:       The following orders were created for panel order CBC & Differential.  Procedure                               Abnormality         Status                      ---------                               -----------         ------                     CBC Auto Differential[[id number]]         Abnormal            Final result                 Please view results for these tests on the individual orders.    Comprehensive Metabolic Panel [[id number]]  (Abnormal) Collected:  03/07/17 2037    Specimen:  Blood Updated:  03/07/17 2126     Glucose 129 (H) mg/dL      BUN 20 mg/dL      Creatinine 1.11 (H) mg/dL      Sodium 140 mmol/L      Potassium 4.2 mmol/L      Chloride 99 mmol/L      CO2 26.0 mmol/L      Calcium 10.0 mg/dL      Total Protein 7.8 g/dL      Albumin 4.80 g/dL      ALT (SGPT) 32 U/L      AST (SGOT) 30 U/L      Alkaline Phosphatase 70 U/L      Total Bilirubin 0.4 mg/dL      eGFR Non African Amer 48 (L) mL/min/1.73      Globulin 3.0 gm/dL      A/G Ratio 1.6 g/dL      BUN/Creatinine Ratio 18.0      Anion Gap 15.0 mmol/L     Narrative:       The MDRD GFR formula is only valid for adults with stable renal function between ages 18 and 70.    Troponin [[id number]]  (Normal) Collected:  03/07/17 2037    Specimen:  Blood Updated:  03/07/17 2126     Troponin T <0.010 ng/mL     Narrative:       Troponin T Reference Ranges:  Less than 0.03 ng/mL:    Negative for AMI  0.03 to 0.09 ng/mL:      Indeterminant for AMI  Greater than 0.09 ng/mL: Positive for AMI    CBC Auto Differential [[id number]]  (Abnormal) Collected:  03/07/17 2037    Specimen:  Blood Updated:  03/07/17 2102     WBC 7.22 10*3/mm3      RBC 4.50 10*6/mm3      Hemoglobin 13.9 g/dL      Hematocrit 41.9 %      MCV 93.1 fL      MCH 30.9 pg      MCHC 33.2 g/dL      RDW 13.3 (H) %      RDW-SD 44.8 fl      MPV 10.4 fL      Platelets 265 10*3/mm3      Neutrophil % 50.1 %      Lymphocyte % 36.1 %      Monocyte % 8.9 %      Eosinophil % 3.9 %      Basophil % 0.7 %      Immature Grans % 0.3 %      Neutrophils, Absolute 3.62 10*3/mm3      Lymphocytes, Absolute 2.61 10*3/mm3      Monocytes, Absolute 0.64 10*3/mm3       Eosinophils, Absolute 0.28 10*3/mm3      Basophils, Absolute 0.05 10*3/mm3      Immature Grans, Absolute 0.02 10*3/mm3           I ordered the above labs and reviewed the results    PROCEDURES  Procedures  EKG           EKG time: 1938  Rhythm/Rate: NSR, 87  P waves and HI: normal  QRS, axis: LAD  ST and T waves: normal     Interpreted Contemporaneously by me, independently viewed  unchanged compared to prior 2011    PROGRESS AND CONSULTS  ED Course     2053- Discussed the plan to order lab work and IVF prior to disposition. Pt and family agree with the plan and all questions were addressed.    2200- Rechecked pt. Pt is resting comfortably. Notified pt and family of the pt's lab results, including a negative troponin and slightly elevated creatinine. Discussed the plan to discharge the pt home. RTER warning given for worsening symptoms. Pt and family agree with the plan and all questions were addressed.    10:01 PM  Latest vital signs   BP- 135/80 HR- 84 Temp- 98.7 °F (37.1 °C) O2 sat- 94%    MEDICAL DECISION MAKING  Results were reviewed/discussed with the patient and they were also made aware of online access. Pt also made aware that some labs, such as cultures, will not be resulted during ER visit and follow up with PMD is necessary.     MDM  Number of Diagnoses or Management Options  Near syncope:      Amount and/or Complexity of Data Reviewed  Clinical lab tests: ordered and reviewed (Troponin=<0.010)  Tests in the medicine section of CPT®: ordered and reviewed (See EKG procedure note)  Decide to obtain previous medical records or to obtain history from someone other than the patient: yes  Obtain history from someone other than the patient: yes (daughter)  Independent visualization of images, tracings, or specimens: yes           DIAGNOSIS  Final diagnoses:   Near syncope       DISPOSITION  DISCHARGE    Patient discharged in stable condition.    Reviewed implications of results, diagnosis, meds, responsibility  to follow up, warning signs and symptoms of possible worsening, potential complications and reasons to return to ER, including fever, worsening symptoms or any concerns.    Patient/Family voiced understanding of above instructions.    Discussed plan for discharge, as there is no emergent indication for admission.  Pt/family is agreeable and understands need for follow up and repeat testing.  Pt is aware that discharge does not mean that nothing is wrong but it indicates no emergency is present that requires admission and they must continue care with follow-up as given below or physician of their choice.     FOLLOW-UP  Magdalena Joy MD  [address] [address]  [phone]    Go on 3/13/2017  as scheduled         Medication List      Notice     No changes were made to your prescriptions during this visit.            Latest Documented Vital Signs:  As of 10:03 PM  BP- 135/80 HR- 84 Temp- 98.7 °F (37.1 °C) O2 sat- 94%    --  Documentation assistance provided by jermaine Rodriguez for Dr. Mac.  Information recorded by the scribe was done at my direction and has been verified and validated by me.     Tatiana Rodriguez  03/07/17 2201       Fabio Mac MD  03/07/17 0986

## 2017-03-09 ENCOUNTER — TELEPHONE (OUTPATIENT)
Dept: SOCIAL WORK | Facility: HOSPITAL | Age: 77
End: 2017-03-09

## 2017-03-09 NOTE — TELEPHONE ENCOUNTER
Spoke with pt today in f/u and she states she is feeling just fine. She has a f/u with Dr. Joy on Monday. No other questions or concerns voiced by pt at this time. Dot meza

## 2017-03-13 ENCOUNTER — OFFICE VISIT (OUTPATIENT)
Dept: INTERNAL MEDICINE | Facility: CLINIC | Age: 77
End: 2017-03-13

## 2017-03-13 ENCOUNTER — APPOINTMENT (OUTPATIENT)
Dept: WOMENS IMAGING | Facility: HOSPITAL | Age: 77
End: 2017-03-13

## 2017-03-13 VITALS
DIASTOLIC BLOOD PRESSURE: 90 MMHG | SYSTOLIC BLOOD PRESSURE: 138 MMHG | HEIGHT: 68 IN | BODY MASS INDEX: 25.67 KG/M2 | WEIGHT: 169.4 LBS

## 2017-03-13 DIAGNOSIS — E03.9 ACQUIRED HYPOTHYROIDISM: ICD-10-CM

## 2017-03-13 DIAGNOSIS — R06.02 SHORTNESS OF BREATH: ICD-10-CM

## 2017-03-13 DIAGNOSIS — I10 ESSENTIAL HYPERTENSION: ICD-10-CM

## 2017-03-13 DIAGNOSIS — G25.81 RLS (RESTLESS LEGS SYNDROME): ICD-10-CM

## 2017-03-13 DIAGNOSIS — J45.20 MILD INTERMITTENT ASTHMA WITHOUT COMPLICATION: ICD-10-CM

## 2017-03-13 DIAGNOSIS — E11.8 CONTROLLED TYPE 2 DIABETES MELLITUS WITH COMPLICATION, WITHOUT LONG-TERM CURRENT USE OF INSULIN (HCC): Primary | ICD-10-CM

## 2017-03-13 PROCEDURE — 71020 XR CHEST 2 VW: CPT | Performed by: INTERNAL MEDICINE

## 2017-03-13 PROCEDURE — 99214 OFFICE O/P EST MOD 30 MIN: CPT | Performed by: INTERNAL MEDICINE

## 2017-03-13 PROCEDURE — 71020 CHG CHEST X-RAY 2 VW: CPT | Performed by: RADIOLOGY

## 2017-03-13 NOTE — PROGRESS NOTES
"Subjective   Dior Pettit is a 76 y.o. female here for   Chief Complaint   Patient presents with   • Diabetes Mellitus     4 month follow-up   • Hyperlipidemia     4 month follow-up   • Hypertension     4 month follow-up   • Hypothyroidism     4 month follow-up   .    Vitals:    03/13/17 1019   BP: 138/90   BP Location: Left arm   Patient Position: Sitting   Cuff Size: Adult   Weight: 169 lb 6.4 oz (76.8 kg)   Height: 67.5\" (171.5 cm)       Diabetes   She presents for her follow-up diabetic visit. She has type 2 diabetes mellitus. Her disease course has been stable. There are no hypoglycemic associated symptoms. Pertinent negatives for hypoglycemia include no nervousness/anxiousness. There are no diabetic associated symptoms. Pertinent negatives for diabetes include no chest pain. Symptoms are stable.   Hypertension   This is a chronic problem. The current episode started more than 1 year ago. The problem is unchanged. The problem is controlled. Associated symptoms include shortness of breath (with exertion). Pertinent negatives include no chest pain or palpitations.   Hyperlipidemia   This is a chronic problem. The current episode started more than 1 year ago. The problem is controlled. Recent lipid tests were reviewed and are normal. Exacerbating diseases include diabetes and hypothyroidism. Associated symptoms include shortness of breath (with exertion). Pertinent negatives include no chest pain.   Hypothyroidism   This is a chronic problem. The current episode started more than 1 year ago. The problem occurs constantly. The problem has been unchanged. Associated symptoms include coughing (occ). Pertinent negatives include no chest pain, chills or fever.        Encounter Diagnoses   Name Primary?   • Controlled type 2 diabetes mellitus with complication, without long-term current use of insulin Yes   • Essential hypertension    • Acquired hypothyroidism    • RLS (restless legs syndrome)    • Mild intermittent " asthma without complication    • Shortness of breath        The following portions of the patient's history were reviewed and updated as appropriate: allergies, current medications, past social history and problem list.    Review of Systems   Constitutional: Negative for chills and fever.   Respiratory: Positive for cough (occ), shortness of breath (with exertion) and wheezing (occ).    Cardiovascular: Negative for chest pain, palpitations and leg swelling.   Psychiatric/Behavioral: Positive for sleep disturbance (ok with med). Negative for dysphoric mood. The patient is not nervous/anxious.      Highest sugar=172.    Objective   Physical Exam   Constitutional: She appears well-developed and well-nourished. No distress.   Cardiovascular: Normal rate, regular rhythm and normal heart sounds.    Pulmonary/Chest: No respiratory distress. She has no wheezes. She has no rales. She exhibits no tenderness.   Musculoskeletal: She exhibits no edema.   Psychiatric: She has a normal mood and affect. Her behavior is normal.   Nursing note and vitals reviewed.     Exercise oximetry normal (oxygen=92-95%).   PFT +moderate restriction.   CXR shows no active disease (sternal wires in place) - no previous cxr for comparison.    Assessment/Plan   Problem List Items Addressed This Visit        Unprioritized    Hypertension    RLS (restless legs syndrome)    Controlled diabetes mellitus type 2 with complications - Primary    Hypothyroidism    Mild intermittent asthma without complication    Shortness of breath    Relevant Orders    XR Chest 2 View (Completed)    Full Pulmonary Function Test & ABG Without Bronchodilator    Walking Oximetry       Dizziness at home (not fasting), but no syncope.  She remembers slipping down & hitting floor.  In ER - tests were ok.  Weakness off & on for yrs.  Three episodes in last 3 mos.  She was cleared by cardiologist last wk.   Need to decrease clonazepam in half nightly (from 2mg to 1mg).  Need to  wean down to 0.5mg nightly if needed.   Need to stop glimeperide b/c potential side effects.   Call if any more sx.   Mild FRAZIER - call if any increase in sx.   HTN - call if high or low bp.   DM - call if sugars less than 100 or over 200.

## 2017-03-15 ENCOUNTER — APPOINTMENT (OUTPATIENT)
Dept: WOMENS IMAGING | Facility: HOSPITAL | Age: 77
End: 2017-03-15

## 2017-03-15 PROCEDURE — 71020 CHG CHEST X-RAY 2 VW: CPT | Performed by: RADIOLOGY

## 2017-03-20 DIAGNOSIS — F39 MOOD DISORDER (HCC): ICD-10-CM

## 2017-03-20 DIAGNOSIS — R06.02 SHORTNESS OF BREATH: ICD-10-CM

## 2017-03-20 RX ORDER — VENLAFAXINE HYDROCHLORIDE 150 MG/1
CAPSULE, EXTENDED RELEASE ORAL
Qty: 90 CAPSULE | Refills: 1 | Status: SHIPPED | OUTPATIENT
Start: 2017-03-20 | End: 2017-09-22 | Stop reason: SDUPTHER

## 2017-03-24 DIAGNOSIS — K21.9 GASTROESOPHAGEAL REFLUX DISEASE, ESOPHAGITIS PRESENCE NOT SPECIFIED: ICD-10-CM

## 2017-03-24 RX ORDER — OMEPRAZOLE 40 MG/1
CAPSULE, DELAYED RELEASE ORAL
Qty: 90 CAPSULE | Refills: 1 | OUTPATIENT
Start: 2017-03-24 | End: 2017-04-24 | Stop reason: SDUPTHER

## 2017-03-31 DIAGNOSIS — E03.9 ACQUIRED HYPOTHYROIDISM: ICD-10-CM

## 2017-03-31 RX ORDER — LEVOTHYROXINE SODIUM 0.07 MG/1
TABLET ORAL
Qty: 30 TABLET | Refills: 2 | Status: SHIPPED | OUTPATIENT
Start: 2017-03-31 | End: 2017-09-23 | Stop reason: SDUPTHER

## 2017-04-03 DIAGNOSIS — I10 BENIGN ESSENTIAL HTN: ICD-10-CM

## 2017-04-24 ENCOUNTER — TELEPHONE (OUTPATIENT)
Dept: INTERNAL MEDICINE | Facility: CLINIC | Age: 77
End: 2017-04-24

## 2017-04-24 DIAGNOSIS — K21.9 GASTROESOPHAGEAL REFLUX DISEASE, ESOPHAGITIS PRESENCE NOT SPECIFIED: ICD-10-CM

## 2017-04-24 RX ORDER — OMEPRAZOLE 40 MG/1
40 CAPSULE, DELAYED RELEASE ORAL DAILY
Qty: 90 CAPSULE | Refills: 1 | Status: SHIPPED | OUTPATIENT
Start: 2017-04-24 | End: 2017-10-02 | Stop reason: ALTCHOICE

## 2017-04-24 NOTE — TELEPHONE ENCOUNTER
----- Message from Ashanti Forte sent at 4/24/2017  9:53 AM EDT -----  Pt calling back about a refill on her Omeprazole (priLOSEC) 40 MG capsule #90 .  Chart says refill was phoned in to [facility] on 3/24/2017 but pharmacy does not have it.  Rx was sent for Pantoprazole (PROTONIX) 40 MG EC tablet but pt wants Omeprazole  Please advise    [facility] [address] [place] [address] AT [address] [place] [phone] [place] [phone] FX    Pt#[phone]

## 2017-04-24 NOTE — TELEPHONE ENCOUNTER
Rx for Omeprazole was corrected and sent in to [facility] pharmacy per patient request. Protonix was never sent in to to the patients pharmacy it was in as historical medications.

## 2017-05-03 DIAGNOSIS — E78.5 HYPERLIPIDEMIA: ICD-10-CM

## 2017-05-03 RX ORDER — GLIMEPIRIDE 2 MG/1
TABLET ORAL
Qty: 30 TABLET | Refills: 3 | Status: SHIPPED | OUTPATIENT
Start: 2017-05-03 | End: 2017-12-09 | Stop reason: SDUPTHER

## 2017-05-03 RX ORDER — SIMVASTATIN 40 MG
TABLET ORAL
Qty: 30 TABLET | Refills: 3 | Status: SHIPPED | OUTPATIENT
Start: 2017-05-03 | End: 2017-09-03 | Stop reason: SDUPTHER

## 2017-05-06 DIAGNOSIS — IMO0002 UNCONTROLLED TYPE 2 DIABETES MELLITUS WITH OTHER SPECIFIED COMPLICATION: ICD-10-CM

## 2017-05-08 RX ORDER — METFORMIN HYDROCHLORIDE 750 MG/1
TABLET, EXTENDED RELEASE ORAL
Qty: 60 TABLET | Refills: 0 | Status: SHIPPED | OUTPATIENT
Start: 2017-05-08 | End: 2017-06-06 | Stop reason: SDUPTHER

## 2017-05-09 RX ORDER — TRAMADOL HYDROCHLORIDE 50 MG/1
TABLET ORAL
Qty: 120 TABLET | Refills: 2 | OUTPATIENT
Start: 2017-05-09 | End: 2017-12-12 | Stop reason: SDUPTHER

## 2017-05-30 DIAGNOSIS — E03.8 OTHER SPECIFIED HYPOTHYROIDISM: ICD-10-CM

## 2017-05-31 RX ORDER — LEVOTHYROXINE SODIUM 0.05 MG/1
TABLET ORAL
Qty: 45 TABLET | Refills: 0 | Status: SHIPPED | OUTPATIENT
Start: 2017-05-31 | End: 2017-08-26 | Stop reason: SDUPTHER

## 2017-06-05 ENCOUNTER — TELEPHONE (OUTPATIENT)
Dept: INTERNAL MEDICINE | Facility: CLINIC | Age: 77
End: 2017-06-05

## 2017-06-05 NOTE — TELEPHONE ENCOUNTER
pls see if she can come into acute clinic (not seen here for 3 mos).  She may have short course of muscle relaxer if she can't come in today or tomorrow

## 2017-06-05 NOTE — TELEPHONE ENCOUNTER
----- Message from Grace West sent at 6/5/2017 10:59 AM EDT -----  Contact: pt  Pt calling, she says that she has intense neck pain. Pt says that she thinks it is from the fall she had a couple of months ago. Pt would like to know if you can prescribe a muscle relaxer. Please advise.     #[phone]    [address] [address] [place] [phone] [address] [phone]

## 2017-06-06 DIAGNOSIS — IMO0002 UNCONTROLLED TYPE 2 DIABETES MELLITUS WITH OTHER SPECIFIED COMPLICATION: ICD-10-CM

## 2017-06-06 RX ORDER — METFORMIN HYDROCHLORIDE 750 MG/1
TABLET, EXTENDED RELEASE ORAL
Qty: 60 TABLET | Refills: 3 | Status: SHIPPED | OUTPATIENT
Start: 2017-06-06 | End: 2017-10-06 | Stop reason: SDUPTHER

## 2017-06-07 NOTE — TELEPHONE ENCOUNTER
I was able to speak to Ms. Pettit and she stated that she had not received my voicemail from a few days ago however she would come in to the afternoon acute clinic tomorrow.

## 2017-06-28 DIAGNOSIS — I25.119 CORONARY ARTERY DISEASE INVOLVING NATIVE CORONARY ARTERY WITH ANGINA PECTORIS, UNSPECIFIED WHETHER NATIVE OR TRANSPLANTED HEART (HCC): ICD-10-CM

## 2017-06-29 RX ORDER — DOXEPIN HYDROCHLORIDE 50 MG/1
CAPSULE ORAL
Qty: 90 CAPSULE | Refills: 0 | Status: SHIPPED | OUTPATIENT
Start: 2017-06-29 | End: 2017-06-30 | Stop reason: SDUPTHER

## 2017-06-30 ENCOUNTER — OFFICE VISIT (OUTPATIENT)
Dept: INTERNAL MEDICINE | Facility: CLINIC | Age: 77
End: 2017-06-30

## 2017-06-30 ENCOUNTER — APPOINTMENT (OUTPATIENT)
Dept: WOMENS IMAGING | Facility: HOSPITAL | Age: 77
End: 2017-06-30

## 2017-06-30 VITALS
WEIGHT: 169.6 LBS | BODY MASS INDEX: 25.7 KG/M2 | DIASTOLIC BLOOD PRESSURE: 64 MMHG | SYSTOLIC BLOOD PRESSURE: 124 MMHG | HEIGHT: 68 IN

## 2017-06-30 DIAGNOSIS — E03.9 ACQUIRED HYPOTHYROIDISM: ICD-10-CM

## 2017-06-30 DIAGNOSIS — F41.9 ANXIETY: ICD-10-CM

## 2017-06-30 DIAGNOSIS — K21.9 GASTROESOPHAGEAL REFLUX DISEASE, ESOPHAGITIS PRESENCE NOT SPECIFIED: ICD-10-CM

## 2017-06-30 DIAGNOSIS — E78.49 OTHER HYPERLIPIDEMIA: ICD-10-CM

## 2017-06-30 DIAGNOSIS — K12.1 MOUTH ULCER: ICD-10-CM

## 2017-06-30 DIAGNOSIS — I10 ESSENTIAL HYPERTENSION: ICD-10-CM

## 2017-06-30 DIAGNOSIS — I25.119 CORONARY ARTERY DISEASE INVOLVING NATIVE CORONARY ARTERY WITH ANGINA PECTORIS, UNSPECIFIED WHETHER NATIVE OR TRANSPLANTED HEART (HCC): ICD-10-CM

## 2017-06-30 DIAGNOSIS — E11.8 CONTROLLED TYPE 2 DIABETES MELLITUS WITH COMPLICATION, WITHOUT LONG-TERM CURRENT USE OF INSULIN (HCC): Primary | ICD-10-CM

## 2017-06-30 DIAGNOSIS — F06.30 MOOD DISORDER IN CONDITIONS CLASSIFIED ELSEWHERE: ICD-10-CM

## 2017-06-30 PROCEDURE — 99214 OFFICE O/P EST MOD 30 MIN: CPT | Performed by: INTERNAL MEDICINE

## 2017-06-30 PROCEDURE — 76641 ULTRASOUND BREAST COMPLETE: CPT | Performed by: RADIOLOGY

## 2017-06-30 RX ORDER — VALACYCLOVIR HYDROCHLORIDE 1 G/1
TABLET, FILM COATED ORAL
Qty: 12 TABLET | Refills: 5 | Status: SHIPPED | OUTPATIENT
Start: 2017-06-30 | End: 2017-11-17

## 2017-06-30 RX ORDER — DOXEPIN HYDROCHLORIDE 50 MG/1
50 CAPSULE ORAL NIGHTLY
Qty: 90 CAPSULE | Refills: 3 | Status: SHIPPED | OUTPATIENT
Start: 2017-06-30 | End: 2018-09-24 | Stop reason: SDUPTHER

## 2017-06-30 NOTE — PROGRESS NOTES
"Subjective   Dior Pettit is a 77 y.o. female here for   Chief Complaint   Patient presents with   • Diabetes Mellitus     3 month follow-up   • Hyperlipidemia     3 month follow-up   • Hypertension     3 month follow-up   • Hypothyroidism     3 month follow-up   .    Vitals:    06/30/17 1320   BP: 124/64   BP Location: Left arm   Patient Position: Sitting   Cuff Size: Adult   Weight: 169 lb 9.6 oz (76.9 kg)   Height: 67.5\" (171.5 cm)       Diabetes Mellitus   This is a chronic problem. The current episode started more than 1 year ago. The problem occurs constantly. The problem has been unchanged. Pertinent negatives include no chest pain, chills, coughing, fatigue or fever.   Hyperlipidemia   This is a chronic problem. The current episode started more than 1 year ago. The problem is controlled. Recent lipid tests were reviewed and are normal. Exacerbating diseases include diabetes and hypothyroidism. Pertinent negatives include no chest pain or shortness of breath.   Hypertension   This is a chronic problem. The current episode started more than 1 year ago. The problem is unchanged. The problem is controlled. Pertinent negatives include no chest pain, malaise/fatigue, palpitations, peripheral edema or shortness of breath.   Hypothyroidism   This is a chronic problem. The current episode started more than 1 year ago. The problem occurs constantly. The problem has been unchanged. Pertinent negatives include no chest pain, chills, coughing, fatigue or fever.        Encounter Diagnoses   Name Primary?   • Controlled type 2 diabetes mellitus with complication, without long-term current use of insulin Yes   • Acquired hypothyroidism    • Other hyperlipidemia    • Essential hypertension    • Mouth ulcer    • Coronary artery disease involving native coronary artery with angina pectoris, unspecified whether native or transplanted heart    • Mood disorder in conditions classified elsewhere    • Gastroesophageal reflux " disease, esophagitis presence not specified    • Anxiety        The following portions of the patient's history were reviewed and updated as appropriate: allergies, current medications, past social history and problem list.    Review of Systems   Constitutional: Negative for chills, fatigue, fever and malaise/fatigue.   Respiratory: Negative for cough, shortness of breath and wheezing.    Cardiovascular: Negative for chest pain, palpitations and leg swelling.   Psychiatric/Behavioral: Negative for dysphoric mood and sleep disturbance. The patient is not nervous/anxious.        Objective   Physical Exam   Constitutional: She appears well-developed and well-nourished. No distress.   Cardiovascular: Normal rate, regular rhythm and normal heart sounds.    Pulmonary/Chest: No respiratory distress. She has no wheezes. She has no rales. She exhibits no tenderness.   Musculoskeletal: She exhibits no edema.   Psychiatric: She has a normal mood and affect. Her behavior is normal.   Nursing note and vitals reviewed.    Diabetic foot exam:   Left: Filament test present   Pulses Dorsalis Pedis:  present  Posterior Tibial:  present    Vibratory sensation absent    Skin intact with no lesions     Right: Filament test present   Pulses Dorsalis Pedis:  present  Posterior Tibial:  present    Vibratory sensation absent   Skin intact with no lesions      Assessment/Plan   Problem List Items Addressed This Visit        Unprioritized    Hypertension    Relevant Orders    Comprehensive Metabolic Panel    Lipid Panel    Urinalysis With / Microscopic If Indicated    CBC Auto Differential    Anxiety    Mood disorder in conditions classified elsewhere    Relevant Medications    doxepin (SINEquan) 50 MG capsule    Controlled diabetes mellitus type 2 with complications - Primary    Relevant Orders    Hemoglobin A1c    Microalbumin / Creatinine Urine Ratio    Hypothyroidism    Relevant Orders    TSH    Hyperlipidemia    Relevant Orders     Comprehensive Metabolic Panel    Lipid Panel    GERD (gastroesophageal reflux disease)      Other Visit Diagnoses     Mouth ulcer        Relevant Medications    valACYclovir (VALTREX) 1000 MG tablet    Coronary artery disease 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  Discuss low carb diet & daily exercise.  Sugar=91 today (her machine).  Will decrease metformin from 750mg bid to 500mg bid.   HTN - stable.  Call if bp over 140/90.   High chol - need rechk.   Thyroid - need rechk next wk.   Insomnia - needing doxepin & klonopin.   Neck pain & headache - still needing tramadol 1x daily.       Return for fasting labs.     Return 4 mos for appt & wellness.

## 2017-07-02 DIAGNOSIS — I10 BENIGN ESSENTIAL HTN: ICD-10-CM

## 2017-07-21 DIAGNOSIS — F41.9 ANXIETY: ICD-10-CM

## 2017-07-21 RX ORDER — CLONAZEPAM 2 MG/1
2 TABLET ORAL NIGHTLY PRN
Qty: 90 TABLET | Refills: 1 | OUTPATIENT
Start: 2017-07-21 | End: 2018-07-12 | Stop reason: SDUPTHER

## 2017-07-21 NOTE — TELEPHONE ENCOUNTER
----- Message from Annalee Graff sent at 7/21/2017  3:41 PM EDT -----  Contact: Patient  Patient called stating she needs her refill on     clonazePAM (KlonoPIN) 2 MG tablet    Said she needs this today.  Please advise.     Patient:  [phone]    Pharmacy:  [address] [place] [phone] [place] [phone]

## 2017-07-21 NOTE — TELEPHONE ENCOUNTER
----- Message from Meghna Reddy sent at 7/21/2017  9:43 AM EDT -----  Contact: pt - Dr Joy's pt - RE: Rx refill  Pt calling and would like a refill on Rx        clonazePAM (KlonoPIN) 2 MG tablet 90 tablet      Sig - Route: Take 1 tablet by mouth At Night As Needed for seizures. - Oral    [facility] [address] [place] [address] AT [facility] - [phone]  - [phone] (Phone)  [phone] (Fax)      Pt # [phone]

## 2017-07-21 NOTE — TELEPHONE ENCOUNTER
Please review refill request:    Last OV: 6/30/17    Last prescribed:  11/11/16    ALBAN: Good until 8/8/17    Thank you

## 2017-07-25 ENCOUNTER — LAB (OUTPATIENT)
Dept: INTERNAL MEDICINE | Facility: CLINIC | Age: 77
End: 2017-07-25

## 2017-07-25 DIAGNOSIS — E03.9 ACQUIRED HYPOTHYROIDISM: ICD-10-CM

## 2017-07-25 DIAGNOSIS — E11.8 CONTROLLED TYPE 2 DIABETES MELLITUS WITH COMPLICATION, WITHOUT LONG-TERM CURRENT USE OF INSULIN (HCC): ICD-10-CM

## 2017-07-25 DIAGNOSIS — E78.49 OTHER HYPERLIPIDEMIA: ICD-10-CM

## 2017-07-25 DIAGNOSIS — I10 ESSENTIAL HYPERTENSION: ICD-10-CM

## 2017-07-25 LAB
ALBUMIN SERPL-MCNC: 4.7 G/DL (ref 3.5–5.2)
ALBUMIN UR-MCNC: 12.7 MG/L (ref 1.3–20)
ALBUMIN/GLOB SERPL: 1.7 G/DL
ALP SERPL-CCNC: 65 U/L (ref 39–117)
ALT SERPL-CCNC: 29 U/L (ref 1–33)
AST SERPL-CCNC: 24 U/L (ref 1–32)
BACTERIA UR QL AUTO: ABNORMAL /HPF
BASOPHILS # BLD AUTO: 0.03 10*3/MM3 (ref 0–0.2)
BASOPHILS NFR BLD AUTO: 0.6 % (ref 0–2)
BILIRUB SERPL-MCNC: 0.4 MG/DL (ref 0.1–1.2)
BILIRUB UR QL STRIP: NEGATIVE
BUN SERPL-MCNC: 19 MG/DL (ref 8–23)
BUN/CREAT SERPL: 20 (ref 7–25)
CALCIUM SERPL-MCNC: 9.9 MG/DL (ref 8.6–10.5)
CHLORIDE SERPL-SCNC: 100 MMOL/L (ref 98–107)
CHOLEST SERPL-MCNC: 174 MG/DL (ref 0–200)
CLARITY UR: CLEAR
CO2 SERPL-SCNC: 26.4 MMOL/L (ref 22–29)
COLOR UR: YELLOW
CREAT SERPL-MCNC: 0.95 MG/DL (ref 0.57–1)
CREAT UR-MCNC: 89.3 MG/DL (ref 20–320)
DEPRECATED RDW RBC AUTO: 41.5 FL (ref 37–54)
EOSINOPHIL # BLD AUTO: 0.29 10*3/MM3 (ref 0–0.7)
EOSINOPHIL NFR BLD AUTO: 5.4 % (ref 0–5)
ERYTHROCYTE [DISTWIDTH] IN BLOOD BY AUTOMATED COUNT: 12.6 % (ref 11.5–15)
GLOBULIN SER CALC-MCNC: 2.8 GM/DL
GLUCOSE SERPL-MCNC: 137 MG/DL (ref 65–99)
GLUCOSE UR STRIP-MCNC: NEGATIVE MG/DL
HBA1C MFR BLD: 6 % (ref 4–6)
HCT VFR BLD AUTO: 42.1 % (ref 34.1–44.9)
HDLC SERPL-MCNC: 47 MG/DL (ref 40–60)
HGB BLD-MCNC: 13.6 G/DL (ref 11.2–15.7)
HGB UR QL STRIP.AUTO: ABNORMAL
HYALINE CASTS UR QL AUTO: ABNORMAL /LPF
KETONES UR QL STRIP: NEGATIVE
LDLC SERPL CALC-MCNC: 93 MG/DL (ref 0–100)
LEUKOCYTE ESTERASE UR QL STRIP.AUTO: ABNORMAL
LYMPHOCYTES # BLD AUTO: 1.99 10*3/MM3 (ref 0.8–7)
LYMPHOCYTES NFR BLD AUTO: 36.8 % (ref 10–60)
MCH RBC QN AUTO: 30 PG (ref 26–34)
MCHC RBC AUTO-ENTMCNC: 32.3 G/DL (ref 31–37)
MCV RBC AUTO: 92.7 FL (ref 80–100)
MICROALBUMIN/CREAT UR: 14.2 MG/L (ref 1.3–30)
MONOCYTES # BLD AUTO: 0.59 10*3/MM3 (ref 0–1)
MONOCYTES NFR BLD AUTO: 10.9 % (ref 0–13)
NEUTROPHILS # BLD AUTO: 2.51 10*3/MM3 (ref 1–11)
NEUTROPHILS NFR BLD AUTO: 46.3 % (ref 30–85)
NITRITE UR QL STRIP: NEGATIVE
PH UR STRIP.AUTO: 5.5 [PH] (ref 5–8)
PLATELET # BLD AUTO: 217 10*3/MM3 (ref 150–450)
PMV BLD AUTO: 9.7 FL (ref 6–12)
POTASSIUM SERPL-SCNC: 4.6 MMOL/L (ref 3.5–5.2)
PROT SERPL-MCNC: 7.5 G/DL (ref 6–8.5)
PROT UR QL STRIP: NEGATIVE
RBC # BLD AUTO: 4.54 10*6/MM3 (ref 3.93–5.22)
RBC # UR: ABNORMAL /HPF
REF LAB TEST METHOD: ABNORMAL
SODIUM SERPL-SCNC: 141 MMOL/L (ref 136–145)
SP GR UR STRIP: 1.02 (ref 1–1.03)
SQUAMOUS #/AREA URNS HPF: ABNORMAL /HPF
TRIGL SERPL-MCNC: 169 MG/DL (ref 0–150)
TSH SERPL-ACNC: 2.3 MIU/ML (ref 0.27–4.2)
UROBILINOGEN UR QL STRIP: ABNORMAL
VLDLC SERPL-MCNC: 33.8 MG/DL (ref 5–40)
WBC NRBC COR # BLD: 5.41 10*3/MM3 (ref 5–10)
WBC UR QL AUTO: ABNORMAL /HPF

## 2017-07-25 PROCEDURE — 82570 ASSAY OF URINE CREATININE: CPT | Performed by: INTERNAL MEDICINE

## 2017-07-25 PROCEDURE — 83036 HEMOGLOBIN GLYCOSYLATED A1C: CPT | Performed by: INTERNAL MEDICINE

## 2017-07-25 PROCEDURE — 85025 COMPLETE CBC W/AUTO DIFF WBC: CPT | Performed by: INTERNAL MEDICINE

## 2017-07-25 PROCEDURE — 82043 UR ALBUMIN QUANTITATIVE: CPT | Performed by: INTERNAL MEDICINE

## 2017-07-25 PROCEDURE — 81001 URINALYSIS AUTO W/SCOPE: CPT | Performed by: INTERNAL MEDICINE

## 2017-08-22 DIAGNOSIS — E78.5 HYPERLIPIDEMIA, ACQUIRED: ICD-10-CM

## 2017-08-22 RX ORDER — FENOFIBRATE 134 MG/1
CAPSULE ORAL
Qty: 90 CAPSULE | Refills: 1 | Status: SHIPPED | OUTPATIENT
Start: 2017-08-22 | End: 2018-02-18 | Stop reason: SDUPTHER

## 2017-08-26 DIAGNOSIS — E03.8 OTHER SPECIFIED HYPOTHYROIDISM: ICD-10-CM

## 2017-08-28 RX ORDER — LEVOTHYROXINE SODIUM 0.05 MG/1
TABLET ORAL
Qty: 45 TABLET | Refills: 1 | Status: SHIPPED | OUTPATIENT
Start: 2017-08-28 | End: 2018-02-23 | Stop reason: SDUPTHER

## 2017-08-30 DIAGNOSIS — I10 BENIGN ESSENTIAL HTN: ICD-10-CM

## 2017-09-03 DIAGNOSIS — E78.5 HYPERLIPIDEMIA: ICD-10-CM

## 2017-09-05 RX ORDER — SIMVASTATIN 40 MG
TABLET ORAL
Qty: 30 TABLET | Refills: 2 | Status: SHIPPED | OUTPATIENT
Start: 2017-09-05 | End: 2017-11-09 | Stop reason: SDUPTHER

## 2017-09-22 DIAGNOSIS — F39 MOOD DISORDER (HCC): ICD-10-CM

## 2017-09-22 RX ORDER — VENLAFAXINE HYDROCHLORIDE 150 MG/1
CAPSULE, EXTENDED RELEASE ORAL
Qty: 90 CAPSULE | Refills: 0 | Status: SHIPPED | OUTPATIENT
Start: 2017-09-22 | End: 2017-12-21 | Stop reason: SDUPTHER

## 2017-09-23 DIAGNOSIS — E03.9 ACQUIRED HYPOTHYROIDISM: ICD-10-CM

## 2017-09-25 RX ORDER — LEVOTHYROXINE SODIUM 0.07 MG/1
TABLET ORAL
Qty: 30 TABLET | Refills: 1 | Status: SHIPPED | OUTPATIENT
Start: 2017-09-25 | End: 2018-01-22 | Stop reason: SDUPTHER

## 2017-10-02 ENCOUNTER — OFFICE VISIT (OUTPATIENT)
Dept: CARDIOLOGY | Facility: CLINIC | Age: 77
End: 2017-10-02

## 2017-10-02 ENCOUNTER — HOSPITAL ENCOUNTER (OUTPATIENT)
Dept: CARDIOLOGY | Facility: HOSPITAL | Age: 77
Discharge: HOME | End: 2017-10-02
Attending: INTERNAL MEDICINE | Admitting: INTERNAL MEDICINE

## 2017-10-02 VITALS
WEIGHT: 165 LBS | DIASTOLIC BLOOD PRESSURE: 78 MMHG | BODY MASS INDEX: 25.01 KG/M2 | HEART RATE: 71 BPM | HEIGHT: 68 IN | SYSTOLIC BLOOD PRESSURE: 130 MMHG

## 2017-10-02 VITALS — OXYGEN SATURATION: 93 %

## 2017-10-02 DIAGNOSIS — G47.33 OBSTRUCTIVE SLEEP APNEA SYNDROME: ICD-10-CM

## 2017-10-02 DIAGNOSIS — I77.810 DILATED AORTIC ROOT (HCC): ICD-10-CM

## 2017-10-02 DIAGNOSIS — I71.20 THORACIC AORTIC ANEURYSM WITHOUT RUPTURE (HCC): ICD-10-CM

## 2017-10-02 DIAGNOSIS — Z95.2 S/P AVR: Primary | ICD-10-CM

## 2017-10-02 DIAGNOSIS — I27.20 PULMONARY HYPERTENSION (HCC): ICD-10-CM

## 2017-10-02 DIAGNOSIS — I10 ESSENTIAL HYPERTENSION: ICD-10-CM

## 2017-10-02 LAB
ASCENDING AORTA: 3.9 CM
BH CV ECHO MEAS - ACS: 1.4 CM
BH CV ECHO MEAS - AO MAX PG (FULL): 11.1 MMHG
BH CV ECHO MEAS - AO MAX PG: 15.1 MMHG
BH CV ECHO MEAS - AO MEAN PG (FULL): 5.8 MMHG
BH CV ECHO MEAS - AO MEAN PG: 7.9 MMHG
BH CV ECHO MEAS - AO ROOT AREA (BSA CORRECTED): 1.8
BH CV ECHO MEAS - AO ROOT AREA: 8.8 CM^2
BH CV ECHO MEAS - AO ROOT DIAM: 3.3 CM
BH CV ECHO MEAS - AO V2 MAX: 194.3 CM/SEC
BH CV ECHO MEAS - AO V2 MEAN: 126.2 CM/SEC
BH CV ECHO MEAS - AO V2 VTI: 40.9 CM
BH CV ECHO MEAS - ASC AORTA: 3.9 CM
BH CV ECHO MEAS - AVA(I,A): 1.2 CM^2
BH CV ECHO MEAS - AVA(I,D): 1.2 CM^2
BH CV ECHO MEAS - AVA(V,A): 1.2 CM^2
BH CV ECHO MEAS - AVA(V,D): 1.2 CM^2
BH CV ECHO MEAS - BSA(HAYCOCK): 1.9 M^2
BH CV ECHO MEAS - BSA: 1.9 M^2
BH CV ECHO MEAS - BZI_BMI: 25.1 KILOGRAMS/M^2
BH CV ECHO MEAS - BZI_METRIC_HEIGHT: 172.7 CM
BH CV ECHO MEAS - BZI_METRIC_WEIGHT: 74.8 KG
BH CV ECHO MEAS - CONTRAST EF (2CH): 66.1 ML/M^2
BH CV ECHO MEAS - CONTRAST EF 4CH: 66.1 ML/M^2
BH CV ECHO MEAS - EDV(MOD-SP2): 62 ML
BH CV ECHO MEAS - EDV(MOD-SP4): 62 ML
BH CV ECHO MEAS - EDV(TEICH): 60.3 ML
BH CV ECHO MEAS - EF(CUBED): 81.3 %
BH CV ECHO MEAS - EF(MOD-SP2): 66.1 %
BH CV ECHO MEAS - EF(MOD-SP4): 66.1 %
BH CV ECHO MEAS - EF(TEICH): 74.7 %
BH CV ECHO MEAS - ESV(MOD-SP2): 21 ML
BH CV ECHO MEAS - ESV(MOD-SP4): 21 ML
BH CV ECHO MEAS - ESV(TEICH): 15.2 ML
BH CV ECHO MEAS - FS: 42.9 %
BH CV ECHO MEAS - IVS/LVPW: 1.2
BH CV ECHO MEAS - IVSD: 1.3 CM
BH CV ECHO MEAS - LAT PEAK E' VEL: 10 CM/SEC
BH CV ECHO MEAS - LV DIASTOLIC VOL/BSA (35-75): 32.9 ML/M^2
BH CV ECHO MEAS - LV MASS(C)D: 157.4 GRAMS
BH CV ECHO MEAS - LV MASS(C)DI: 83.6 GRAMS/M^2
BH CV ECHO MEAS - LV MAX PG: 4 MMHG
BH CV ECHO MEAS - LV MEAN PG: 2.1 MMHG
BH CV ECHO MEAS - LV SYSTOLIC VOL/BSA (12-30): 11.1 ML/M^2
BH CV ECHO MEAS - LV V1 MAX: 99.8 CM/SEC
BH CV ECHO MEAS - LV V1 MEAN: 65.6 CM/SEC
BH CV ECHO MEAS - LV V1 VTI: 22.4 CM
BH CV ECHO MEAS - LVIDD: 3.8 CM
BH CV ECHO MEAS - LVIDS: 2.1 CM
BH CV ECHO MEAS - LVLD AP2: 6.7 CM
BH CV ECHO MEAS - LVLD AP4: 6.5 CM
BH CV ECHO MEAS - LVLS AP2: 5.4 CM
BH CV ECHO MEAS - LVLS AP4: 5.3 CM
BH CV ECHO MEAS - LVOT AREA (M): 2.3 CM^2
BH CV ECHO MEAS - LVOT AREA: 2.2 CM^2
BH CV ECHO MEAS - LVOT DIAM: 1.7 CM
BH CV ECHO MEAS - LVPWD: 1.1 CM
BH CV ECHO MEAS - MED PEAK E' VEL: 10 CM/SEC
BH CV ECHO MEAS - MR MAX PG: 109 MMHG
BH CV ECHO MEAS - MR MAX VEL: 522 CM/SEC
BH CV ECHO MEAS - MV A DUR: 0.1 SEC
BH CV ECHO MEAS - MV A MAX VEL: 92.4 CM/SEC
BH CV ECHO MEAS - MV DEC SLOPE: 460.6 CM/SEC^2
BH CV ECHO MEAS - MV DEC TIME: 0.27 SEC
BH CV ECHO MEAS - MV E MAX VEL: 117.5 CM/SEC
BH CV ECHO MEAS - MV E/A: 1.3
BH CV ECHO MEAS - MV MAX PG: 9.7 MMHG
BH CV ECHO MEAS - MV MEAN PG: 4.3 MMHG
BH CV ECHO MEAS - MV P1/2T MAX VEL: 123.6 CM/SEC
BH CV ECHO MEAS - MV P1/2T: 78.6 MSEC
BH CV ECHO MEAS - MV V2 MAX: 156.1 CM/SEC
BH CV ECHO MEAS - MV V2 MEAN: 96.7 CM/SEC
BH CV ECHO MEAS - MV V2 VTI: 38.8 CM
BH CV ECHO MEAS - MVA P1/2T LCG: 1.8 CM^2
BH CV ECHO MEAS - MVA(P1/2T): 2.8 CM^2
BH CV ECHO MEAS - MVA(VTI): 1.3 CM^2
BH CV ECHO MEAS - PA ACC TIME: 0.14 SEC
BH CV ECHO MEAS - PA MAX PG (FULL): 1.6 MMHG
BH CV ECHO MEAS - PA MAX PG: 3.2 MMHG
BH CV ECHO MEAS - PA PR(ACCEL): 17.2 MMHG
BH CV ECHO MEAS - PA V2 MAX: 89.6 CM/SEC
BH CV ECHO MEAS - PULM A REVS DUR: 0.11 SEC
BH CV ECHO MEAS - PULM A REVS VEL: 19.9 CM/SEC
BH CV ECHO MEAS - PULM DIAS VEL: 34 CM/SEC
BH CV ECHO MEAS - PULM S/D: 1.2
BH CV ECHO MEAS - PULM SYS VEL: 41.7 CM/SEC
BH CV ECHO MEAS - PVA(V,A): 1.7 CM^2
BH CV ECHO MEAS - PVA(V,D): 1.7 CM^2
BH CV ECHO MEAS - QP/QS: 0.74
BH CV ECHO MEAS - RAP SYSTOLE: 8 MMHG
BH CV ECHO MEAS - RV MAX PG: 1.6 MMHG
BH CV ECHO MEAS - RV MEAN PG: 0.93 MMHG
BH CV ECHO MEAS - RV V1 MAX: 62.6 CM/SEC
BH CV ECHO MEAS - RV V1 MEAN: 45 CM/SEC
BH CV ECHO MEAS - RV V1 VTI: 15.1 CM
BH CV ECHO MEAS - RVOT AREA: 2.5 CM^2
BH CV ECHO MEAS - RVOT DIAM: 1.8 CM
BH CV ECHO MEAS - RVSP: 52 MMHG
BH CV ECHO MEAS - SI(AO): 190.4 ML/M^2
BH CV ECHO MEAS - SI(CUBED): 22.9 ML/M^2
BH CV ECHO MEAS - SI(LVOT): 26.7 ML/M^2
BH CV ECHO MEAS - SI(MOD-SP2): 21.8 ML/M^2
BH CV ECHO MEAS - SI(MOD-SP4): 21.8 ML/M^2
BH CV ECHO MEAS - SI(TEICH): 23.9 ML/M^2
BH CV ECHO MEAS - SUP REN AO DIAM: 1.5 CM
BH CV ECHO MEAS - SV(AO): 358.6 ML
BH CV ECHO MEAS - SV(CUBED): 43.2 ML
BH CV ECHO MEAS - SV(LVOT): 50.2 ML
BH CV ECHO MEAS - SV(MOD-SP2): 41 ML
BH CV ECHO MEAS - SV(MOD-SP4): 41 ML
BH CV ECHO MEAS - SV(RVOT): 37.1 ML
BH CV ECHO MEAS - SV(TEICH): 45.1 ML
BH CV ECHO MEAS - TAPSE (>1.6): 1.9 CM2
BH CV ECHO MEAS - TR MAX VEL: 331.5 CM/SEC
BH CV XLRA - RV BASE: 1.9 CM
BH CV XLRA - TDI S': 10 CM/SEC
E/E' RATIO: 12
LEFT ATRIUM VOLUME INDEX: 15 ML/M2
SINUS: 3.6 CM
STJ: 3.2 CM

## 2017-10-02 PROCEDURE — 93306 TTE W/DOPPLER COMPLETE: CPT

## 2017-10-02 PROCEDURE — 25010000002 PERFLUTREN (DEFINITY) 8.476 MG IN SODIUM CHLORIDE 0.9 % 10 ML INJECTION: Performed by: INTERNAL MEDICINE

## 2017-10-02 PROCEDURE — 93000 ELECTROCARDIOGRAM COMPLETE: CPT | Performed by: INTERNAL MEDICINE

## 2017-10-02 PROCEDURE — 93306 TTE W/DOPPLER COMPLETE: CPT | Performed by: INTERNAL MEDICINE

## 2017-10-02 PROCEDURE — 99213 OFFICE O/P EST LOW 20 MIN: CPT | Performed by: INTERNAL MEDICINE

## 2017-10-02 RX ORDER — LANSOPRAZOLE 15 MG/1
15 CAPSULE, DELAYED RELEASE ORAL DAILY
COMMUNITY
End: 2017-10-02

## 2017-10-02 RX ORDER — FUROSEMIDE 20 MG/1
20 TABLET ORAL DAILY
COMMUNITY
End: 2017-10-02

## 2017-10-02 RX ORDER — MULTIVIT WITH MINERALS/LUTEIN
1000 TABLET ORAL DAILY
COMMUNITY
End: 2018-09-20

## 2017-10-02 RX ORDER — OMEPRAZOLE 40 MG/1
40 CAPSULE, DELAYED RELEASE ORAL DAILY
COMMUNITY
End: 2017-11-17 | Stop reason: SDUPTHER

## 2017-10-02 RX ADMIN — PERFLUTREN 1.5 ML: 6.52 INJECTION, SUSPENSION INTRAVENOUS at 10:16

## 2017-10-02 NOTE — PROGRESS NOTES
Date of Office Visit: 10/02/2017  Encounter Provider: Abbi Mitchell MD  Place of Service: [facility]  Patient Name: Dior Pettit  :1940    Chief complaint  Follow-up of aortic valve replacement, pulmonary hypertension, mitral regurgitation    History of Present Illness  Patient is a 77-year-old female with history of hypertension, hyperlipidemia, diabetes, aortic valve disease and subaortic membrane.  In  she underwent aortic valve replacement with porcine valve as well as subaortic membrane resection. Cardiac catheterization was negative for coronary artery disease.  She was then found to have significant sleep apnea but was been intolerant of therapy for this.  She is also noted to have pulmonary hypertension with an RV systolic pressure 44 monos mercury in  in fiber  she developed chest tightness while shoveling snow in a stress perfusion study was negative for ischemia and echocardiogram revealed normal systolic function with an ejection fraction 53% mild mitral regurgitation moderate tricuspid regurgitation with grade 1A diastolic dysfunction the prosthetic aortic valve was functioning well the aortic root was mildly dilated, RVSP 39mmHg.      Since last visit she has been active though not exercising due to right knee pain.  She denies any chest pain, shortness of breath, palpitations, syncope near syncope.  Overall she feels well.  She is not treating sleep apnea.  Recent blood work in July showed stable renal function.  Lipid control is reasonable.    Past Medical History:   Diagnosis Date   • Allergic rhinitis    • Anxiety    • Asthma    • Atypical chest pain    • Chest pain    • Diabetes    • GERD (gastroesophageal reflux disease)    • Health care maintenance    • Heart murmur    • Hyperlipidemia    • Hypertension    • Hypothyroidism    • Insomnia    • Mood disorder in conditions classified elsewhere    • Neuropathy    • Pulmonary hypertension     • Renal insufficiency    • RLS (restless legs syndrome)    • Sleep apnea     untreated     Past Surgical History:   Procedure Laterality Date   • AORTIC VALVE REPAIR/REPLACEMENT  2007    Dr Perry   • AORTIC VALVE REPAIR/REPLACEMENT     • APPENDECTOMY     • AUGMENTATION MAMMAPLASTY  1976   • BREAST AUGMENTATION  2014   • CARDIAC CATHETERIZATION  2007   • COLONOSCOPY  2010   • COLONOSCOPY  03/02/2012   • FOOT SURGERY      remove bunion   • HYSTERECTOMY  1972   • SIGMOIDOSCOPY  2001     Outpatient Medications Prior to Visit   Medication Sig Dispense Refill   • aspirin 81 MG EC tablet Take 81 mg by mouth Daily.     • clonazePAM (KlonoPIN) 2 MG tablet Take 1 tablet by mouth At Night As Needed for Anxiety. 90 tablet 1   • doxepin (SINEquan) 50 MG capsule Take 1 capsule by mouth Every Night. 90 capsule 3   • fenofibrate micronized (LOFIBRA) 134 MG capsule TAKE 1 CAPSULE EVERY       MORNING BEFORE BREAKFAST 90 capsule 1   • fluticasone-salmeterol (ADVAIR DISKUS) 250-50 MCG/DOSE DISKUS Inhale 1 puff 2 (Two) Times a Day. Rinse mouth afterwards (Patient taking differently: Inhale 1 puff Daily. Rinse mouth afterwards) 1 each 12   • glimepiride (AMARYL) 2 MG tablet TAKE ONE TABLET BY MOUTH EVERY MORNING BEFORE BREAKFAST 30 tablet 3   • glucosamine-chondroitin 500-400 MG capsule capsule Take 2 capsules by mouth Daily.     • levothyroxine (SYNTHROID, LEVOTHROID) 50 MCG tablet TAKE ONE TABLET BY MOUTH EVERY OTHER DAY 45 tablet 1   • levothyroxine (SYNTHROID, LEVOTHROID) 75 MCG tablet TAKE ONE TABLET BY MOUTH EVERY OTHER DAY 30 tablet 1   • melatonin 5 MG tablet tablet Take 5 mg by mouth Every Night.     • metFORMIN ER (GLUCOPHAGE-XR) 750 MG 24 hr tablet TAKE ONE TABLET BY MOUTH TWICE A DAY 60 tablet 3   • metoprolol tartrate (LOPRESSOR) 25 MG tablet TAKE ONE TABLET BY MOUTH TWICE A DAY 60 tablet 3   • montelukast (SINGULAIR) 10 MG tablet Take 1 tablet by mouth Every Night. 90 tablet 3   • Multiple Vitamins-Minerals (MULTIVITAL  PO) Take  by mouth.     • Omega-3 Fatty Acids (FISH OIL) 1000 MG capsule capsule Take 1,000 mg by mouth Daily With Breakfast.     • simvastatin (ZOCOR) 40 MG tablet TAKE ONE TABLET BY MOUTH EVERY NIGHT 30 tablet 2   • traMADol (ULTRAM) 50 MG tablet TAKE ONE TO TWO TABLETS BY MOUTH THREE TIMES A DAY AS NEEDED FOR PAIN 120 tablet 2   • venlafaxine XR (EFFEXOR-XR) 150 MG 24 hr capsule TAKE ONE CAPSULE BY MOUTH DAILY 90 capsule 0   • omeprazole (priLOSEC) 40 MG capsule Take 1 capsule by mouth Daily. 90 capsule 1   • valACYclovir (VALTREX) 1000 MG tablet 2 pills for mouth ulcer - repeat in 12 hrs. 12 tablet 5   • Lactobacillus-Inulin ([facility] DIGESTIVE HEALTH PO) Take 1 tablet by mouth Daily. As needed       No facility-administered medications prior to visit.      Allergies as of 10/02/2017 - [place] as Reviewed 10/02/2017   Allergen Reaction Noted   • Lisinopril  06/16/2016     Social History     Social History   • Marital status:      Spouse name: N/A   • Number of children: N/A   • Years of education: N/A     Occupational History   • Not on file.     Social History Main Topics   • Smoking status: Never Smoker   • Smokeless tobacco: Never Used      Comment: daily caffeine use   • Alcohol use Yes      Comment: social use   • Drug use: No   • Sexual activity: No     Other Topics Concern   • Not on file     Social History Narrative     Family History   Problem Relation Age of Onset   • Hypertension Mother    • Stroke Mother    • Diabetes Sister    • Coronary artery disease Brother    • Diabetes Brother    • Coronary artery disease Brother    • Skin cancer Neg Hx      Review of Systems   Constitution: Negative for fever, malaise/fatigue, weight gain and weight loss.   HENT: Negative for ear pain, hearing loss, nosebleeds and sore throat.    Eyes: Negative for double vision, pain, vision loss in left eye and vision loss in right eye.   Cardiovascular:        See history of present illness.   Respiratory: Negative  "for cough, shortness of breath, sleep disturbances due to breathing, snoring and wheezing.    Endocrine: Negative for cold intolerance, heat intolerance and polyuria.   Skin: Negative for itching, poor wound healing and rash.   Musculoskeletal: Positive for joint pain. Negative for joint swelling and myalgias.   Gastrointestinal: Negative for abdominal pain, diarrhea, hematochezia, nausea and vomiting.   Genitourinary: Negative for hematuria and hesitancy.   Neurological: Negative for numbness, paresthesias and seizures.   Psychiatric/Behavioral: Negative for depression. The patient is nervous/anxious.      Objective:     Vitals:    10/02/17 0826   BP: 130/78   Pulse: 71   Weight: 165 lb (74.8 kg)   Height: 67.5\" (171.5 cm)     Body mass index is 25.46 kg/(m^2).    Physical Exam   Constitutional: She is oriented to person, place, and time. She appears well-developed and well-nourished.   HENT:   Head: Normocephalic.   Nose: Nose normal.   Mouth/Throat: Oropharynx is clear and moist.   Eyes: Conjunctivae and EOM are normal. Pupils are equal, round, and reactive to light. Right eye exhibits no discharge. No scleral icterus.   Neck: Normal range of motion. Neck supple. No JVD present. No thyromegaly present.   Cardiovascular: Normal rate, regular rhythm, normal heart sounds and intact distal pulses.  Exam reveals no gallop and no friction rub.    No murmur heard.  Pulses:       Carotid pulses are 2+ on the right side, and 2+ on the left side.       Radial pulses are 2+ on the right side, and 2+ on the left side.        Femoral pulses are 2+ on the right side, and 2+ on the left side.       Popliteal pulses are 2+ on the right side, and 2+ on the left side.        Dorsalis pedis pulses are 2+ on the right side, and 2+ on the left side.        Posterior tibial pulses are 2+ on the right side, and 2+ on the left side.   Pulmonary/Chest: Effort normal and breath sounds normal. No respiratory distress. She has no wheezes. " She has no rales.   Abdominal: Soft. Bowel sounds are normal. She exhibits no distension. There is no hepatosplenomegaly. There is no tenderness. There is no rebound.   Musculoskeletal: Normal range of motion. She exhibits no edema or tenderness.   Neurological: She is alert and oriented to person, place, and time.   Skin: Skin is warm and dry. No rash noted. No erythema.   Psychiatric: She has a normal mood and affect. Her behavior is normal. Judgment and thought content normal.   Vitals reviewed.    Lab Review:     ECG 12 Lead  Date/Time: 10/2/2017 9:30 PM  Performed by: JOSE ALEJANDRO ASKEW  Authorized by: JOSE ALEJANDRO ASKEW   Comparison: compared with previous ECG   Similar to previous ECG  Rhythm: sinus rhythm  Conduction comments: QTc = 448 msec  QRS axis: left  Other findings: LVH  Clinical impression: abnormal ECG          Assessment:       Diagnosis Plan   1. S/P AVR     2. Pulmonary hypertension     3. Essential hypertension     4. Obstructive sleep apnea syndrome     5. Dilated aortic root  Adult Transthoracic Echo Complete W/ Cont if Necessary Per Protocol   6. Thoracic aortic aneurysm without rupture   Adult Transthoracic Echo Complete W/ Cont if Necessary Per Protocol     Plan:       1.  Aortic valve replacement with a subaortic membrane resection.  Continue with aspirin and SBE prophylaxis  3.  Mildly dilated aortic root, we'll recheck an echocardiogram.  Avoid contrast exposure with history of renal insufficiency  4.  Hypertension blood, pressures controlled  5.  Pulmonary hypertension, mild  6.  Renal insufficiency, stable  7.  Diabetes  8.  Dyslipidemia     Dior Pettit   Home Medication Instructions ENRICO:    Printed on:10/02/17 5363   Medication Information                      ascorbic acid (EQL VITAMIN C) 1000 MG tablet  Take 1,000 mg by mouth Daily.             aspirin 81 MG EC tablet  Take 81 mg by mouth Daily.             clonazePAM (KlonoPIN) 2 MG tablet  Take 1 tablet by mouth At Night As Needed for  Anxiety.             doxepin (SINEquan) 50 MG capsule  Take 1 capsule by mouth Every Night.             fenofibrate micronized (LOFIBRA) 134 MG capsule  TAKE 1 CAPSULE EVERY       MORNING BEFORE BREAKFAST             fluticasone-salmeterol (ADVAIR DISKUS) 250-50 MCG/DOSE DISKUS  Inhale 1 puff 2 (Two) Times a Day. Rinse mouth afterwards             glimepiride (AMARYL) 2 MG tablet  TAKE ONE TABLET BY MOUTH EVERY MORNING BEFORE BREAKFAST             glucosamine-chondroitin 500-400 MG capsule capsule  Take 2 capsules by mouth Daily.             levothyroxine (SYNTHROID, LEVOTHROID) 50 MCG tablet  TAKE ONE TABLET BY MOUTH EVERY OTHER DAY             levothyroxine (SYNTHROID, LEVOTHROID) 75 MCG tablet  TAKE ONE TABLET BY MOUTH EVERY OTHER DAY             melatonin 5 MG tablet tablet  Take 5 mg by mouth Every Night.             metFORMIN ER (GLUCOPHAGE-XR) 750 MG 24 hr tablet  TAKE ONE TABLET BY MOUTH TWICE A DAY             metoprolol tartrate (LOPRESSOR) 25 MG tablet  TAKE ONE TABLET BY MOUTH TWICE A DAY             montelukast (SINGULAIR) 10 MG tablet  Take 1 tablet by mouth Every Night.             Multiple Vitamins-Minerals (MULTIVITAL PO)  Take  by mouth.             Omega-3 Fatty Acids (FISH OIL) 1000 MG capsule capsule  Take 1,000 mg by mouth Daily With Breakfast.             omeprazole (priLOSEC) 40 MG capsule  Take 40 mg by mouth Daily.             simvastatin (ZOCOR) 40 MG tablet  TAKE ONE TABLET BY MOUTH EVERY NIGHT             traMADol (ULTRAM) 50 MG tablet  TAKE ONE TO TWO TABLETS BY MOUTH THREE TIMES A DAY AS NEEDED FOR PAIN             valACYclovir (VALTREX) 1000 MG tablet  2 pills for mouth ulcer - repeat in 12 hrs.             venlafaxine XR (EFFEXOR-XR) 150 MG 24 hr capsule  TAKE ONE CAPSULE BY MOUTH DAILY             vitamin E 1000 UNIT capsule  Take 1,000 Units by mouth Daily.               Dictated utilizing Dragon dictation

## 2017-10-06 ENCOUNTER — TELEPHONE (OUTPATIENT)
Dept: CARDIOLOGY | Facility: CLINIC | Age: 77
End: 2017-10-06

## 2017-10-06 DIAGNOSIS — E11.9 DIABETES MELLITUS WITHOUT COMPLICATION (HCC): Primary | ICD-10-CM

## 2017-10-06 RX ORDER — METFORMIN HYDROCHLORIDE 750 MG/1
TABLET, EXTENDED RELEASE ORAL
Qty: 60 TABLET | Refills: 2 | Status: SHIPPED | OUTPATIENT
Start: 2017-10-06 | End: 2017-11-09 | Stop reason: SDUPTHER

## 2017-10-06 NOTE — TELEPHONE ENCOUNTER
Called patient's home phone and got voicemail.  Left message to call back.  We'll need to consider further imaging to follow aortic root dilatation and elevated  pulmonary pressures. yessenia

## 2017-10-11 DIAGNOSIS — I27.20 PULMONARY HYPERTENSION (HCC): Primary | ICD-10-CM

## 2017-10-11 NOTE — TELEPHONE ENCOUNTER
I talked to patient regarding echocardiogram results and recommended she see pulmonary docs again to consider further evaluation and treatment of sleep apnea and further evaluation for other etiologies of her hypertension.  She has seen Dr. Rodriguez in the past but it's been several years.  I placed an order for consult.  Please arrange. yessenia

## 2017-10-19 DIAGNOSIS — K21.9 GASTROESOPHAGEAL REFLUX DISEASE, ESOPHAGITIS PRESENCE NOT SPECIFIED: ICD-10-CM

## 2017-10-19 RX ORDER — OMEPRAZOLE 40 MG/1
CAPSULE, DELAYED RELEASE ORAL
Qty: 90 CAPSULE | Refills: 1 | Status: SHIPPED | OUTPATIENT
Start: 2017-10-19 | End: 2018-04-14 | Stop reason: SDUPTHER

## 2017-11-03 DIAGNOSIS — J30.2 SEASONAL ALLERGIC RHINITIS: ICD-10-CM

## 2017-11-03 DIAGNOSIS — J45.20 MILD INTERMITTENT ASTHMA WITHOUT COMPLICATION: ICD-10-CM

## 2017-11-03 RX ORDER — MONTELUKAST SODIUM 10 MG/1
TABLET ORAL
Qty: 90 TABLET | Refills: 2 | Status: SHIPPED | OUTPATIENT
Start: 2017-11-03 | End: 2018-08-01 | Stop reason: SDUPTHER

## 2017-11-09 DIAGNOSIS — E78.5 HYPERLIPIDEMIA: ICD-10-CM

## 2017-11-09 DIAGNOSIS — E11.9 DIABETES MELLITUS WITHOUT COMPLICATION (HCC): ICD-10-CM

## 2017-11-10 RX ORDER — SIMVASTATIN 40 MG
TABLET ORAL
Qty: 30 TABLET | Refills: 1 | Status: SHIPPED | OUTPATIENT
Start: 2017-11-10 | End: 2018-01-06 | Stop reason: SDUPTHER

## 2017-11-10 RX ORDER — METFORMIN HYDROCHLORIDE 750 MG/1
TABLET, EXTENDED RELEASE ORAL
Qty: 60 TABLET | Refills: 1 | Status: SHIPPED | OUTPATIENT
Start: 2017-11-10 | End: 2018-02-11 | Stop reason: SDUPTHER

## 2017-11-17 ENCOUNTER — OFFICE VISIT (OUTPATIENT)
Dept: INTERNAL MEDICINE | Facility: CLINIC | Age: 77
End: 2017-11-17

## 2017-11-17 ENCOUNTER — APPOINTMENT (OUTPATIENT)
Dept: WOMENS IMAGING | Facility: HOSPITAL | Age: 77
End: 2017-11-17

## 2017-11-17 VITALS
OXYGEN SATURATION: 96 % | BODY MASS INDEX: 25.46 KG/M2 | TEMPERATURE: 98.4 F | SYSTOLIC BLOOD PRESSURE: 102 MMHG | HEART RATE: 73 BPM | WEIGHT: 165 LBS | DIASTOLIC BLOOD PRESSURE: 70 MMHG

## 2017-11-17 DIAGNOSIS — G43.109 MIGRAINE WITH AURA AND WITHOUT STATUS MIGRAINOSUS, NOT INTRACTABLE: ICD-10-CM

## 2017-11-17 DIAGNOSIS — M54.2 CERVICALGIA: Primary | ICD-10-CM

## 2017-11-17 PROCEDURE — 72052 X-RAY EXAM NECK SPINE 6/>VWS: CPT | Performed by: RADIOLOGY

## 2017-11-17 PROCEDURE — 72050 X-RAY EXAM NECK SPINE 4/5VWS: CPT | Performed by: NURSE PRACTITIONER

## 2017-11-17 PROCEDURE — 99214 OFFICE O/P EST MOD 30 MIN: CPT | Performed by: NURSE PRACTITIONER

## 2017-11-17 RX ORDER — BACLOFEN 10 MG/1
10 TABLET ORAL EVERY 8 HOURS PRN
Qty: 30 TABLET | Refills: 0 | Status: SHIPPED | OUTPATIENT
Start: 2017-11-17 | End: 2018-01-16 | Stop reason: SDUPTHER

## 2017-11-17 NOTE — PROGRESS NOTES
Subjective   Dior Pettit is a 77 y.o. female.     Neck Pain    This is a new problem. The current episode started more than 1 year ago. The problem occurs intermittently. The problem has been unchanged. The pain is present in the occipital region. The quality of the pain is described as aching. The pain is at a severity of 4/10. Associated symptoms include headaches (chronic ) and photophobia. Pertinent negatives include no chest pain, fever or weakness.   Headache    This is a chronic problem. The current episode started more than 1 year ago. The problem occurs daily. The problem has been unchanged. Pain location: varies  Radiates to: varies  The pain quality is not similar to prior headaches. The quality of the pain is described as sharp. The pain is at a severity of 8/10. The pain is moderate. Associated symptoms include coughing (r/t asthma), neck pain and photophobia. Pertinent negatives include no fever, loss of balance, phonophobia, sinus pressure or weakness. Associated symptoms comments: Aura . Treatments tried: heat, tylenol (ultram) The treatment provided mild relief. Her past medical history is significant for migraines in the family. There is no history of recent head traumas.        The following portions of the patient's history were reviewed and updated as appropriate: allergies, current medications, past family history, past medical history, past social history, past surgical history and problem list.    Review of Systems   Constitutional: Negative for chills, fatigue and fever.   HENT: Negative for sinus pressure.    Eyes: Positive for photophobia. Negative for visual disturbance.   Respiratory: Positive for cough (r/t asthma). Negative for chest tightness, shortness of breath and wheezing.    Cardiovascular: Negative for chest pain, palpitations and leg swelling.   Musculoskeletal: Positive for neck pain.   Neurological: Positive for headaches (chronic ). Negative for tremors, speech  difficulty, weakness and loss of balance.       Objective   Physical Exam   Constitutional: She is oriented to person, place, and time. She appears well-developed and well-nourished.   HENT:   Head: Normocephalic.   Right Ear: Tympanic membrane, external ear and ear canal normal.   Left Ear: Tympanic membrane, external ear and ear canal normal.   Nose: Right sinus exhibits no maxillary sinus tenderness and no frontal sinus tenderness. Left sinus exhibits no maxillary sinus tenderness and no frontal sinus tenderness.   Mouth/Throat: Uvula is midline, oropharynx is clear and moist and mucous membranes are normal.   Eyes: Conjunctivae, EOM and lids are normal. Pupils are equal, round, and reactive to light.   Neck: Muscular tenderness present. Carotid bruit is not present. No edema, no erythema and normal range of motion present. No thyroid mass and no thyromegaly present.   Cardiovascular: Regular rhythm and normal heart sounds.  Exam reveals no S3 and no S4.    No murmur heard.  Pulmonary/Chest: Effort normal and breath sounds normal. She has no decreased breath sounds. She has no wheezes. She has no rhonchi. She has no rales.   Musculoskeletal: She exhibits no edema.   Neurological: She is alert and oriented to person, place, and time. No cranial nerve deficit or sensory deficit. Gait normal.   Reflex Scores:       Brachioradialis reflexes are 2+ on the right side and 2+ on the left side.  Skin: Skin is warm and dry.   Psychiatric: She has a normal mood and affect.       Assessment/Plan   Dior was seen today for neck pain and headache.    Diagnoses and all orders for this visit:    Cervicalgia  Comments:  heat and massage; discussed exercises   Orders:  -     XR Spine Cervical Complete 4 or 5 View  -     baclofen (LIORESAL) 10 MG tablet; Take 1 tablet by mouth Every 8 (Eight) Hours As Needed for Muscle Spasms.    Migraine with aura and without status migrainosus, not intractable      Xray of cervical spine with  degenerative changes. No comparison film available. Sent for final review.

## 2017-12-11 RX ORDER — GLIMEPIRIDE 2 MG/1
TABLET ORAL
Qty: 30 TABLET | Refills: 2 | Status: SHIPPED | OUTPATIENT
Start: 2017-12-11 | End: 2018-03-12 | Stop reason: SDUPTHER

## 2017-12-12 RX ORDER — TRAMADOL HYDROCHLORIDE 50 MG/1
TABLET ORAL
Qty: 120 TABLET | Refills: 1 | OUTPATIENT
Start: 2017-12-12 | End: 2018-03-27 | Stop reason: SDUPTHER

## 2017-12-21 DIAGNOSIS — F39 MOOD DISORDER (HCC): ICD-10-CM

## 2017-12-21 RX ORDER — VENLAFAXINE HYDROCHLORIDE 150 MG/1
CAPSULE, EXTENDED RELEASE ORAL
Qty: 90 CAPSULE | Refills: 1 | Status: SHIPPED | OUTPATIENT
Start: 2017-12-21 | End: 2018-06-17 | Stop reason: SDUPTHER

## 2017-12-28 DIAGNOSIS — I10 BENIGN ESSENTIAL HTN: ICD-10-CM

## 2018-01-06 DIAGNOSIS — E78.5 HYPERLIPIDEMIA: ICD-10-CM

## 2018-01-08 RX ORDER — SIMVASTATIN 40 MG
TABLET ORAL
Qty: 30 TABLET | Refills: 3 | Status: SHIPPED | OUTPATIENT
Start: 2018-01-08 | End: 2018-05-10 | Stop reason: SDUPTHER

## 2018-01-09 ENCOUNTER — APPOINTMENT (OUTPATIENT)
Dept: WOMENS IMAGING | Facility: HOSPITAL | Age: 78
End: 2018-01-09

## 2018-01-09 PROCEDURE — 77063 BREAST TOMOSYNTHESIS BI: CPT | Performed by: RADIOLOGY

## 2018-01-09 PROCEDURE — 77067 SCR MAMMO BI INCL CAD: CPT | Performed by: RADIOLOGY

## 2018-01-16 DIAGNOSIS — M54.2 CERVICALGIA: ICD-10-CM

## 2018-01-16 RX ORDER — BACLOFEN 10 MG/1
TABLET ORAL
Qty: 30 TABLET | Refills: 2 | Status: SHIPPED | OUTPATIENT
Start: 2018-01-16 | End: 2018-09-01 | Stop reason: SDUPTHER

## 2018-01-22 DIAGNOSIS — E03.9 ACQUIRED HYPOTHYROIDISM: ICD-10-CM

## 2018-01-22 RX ORDER — LEVOTHYROXINE SODIUM 0.07 MG/1
TABLET ORAL
Qty: 30 TABLET | Refills: 5 | Status: SHIPPED | OUTPATIENT
Start: 2018-01-22 | End: 2018-10-15 | Stop reason: SDUPTHER

## 2018-02-11 DIAGNOSIS — E11.9 DIABETES MELLITUS WITHOUT COMPLICATION (HCC): ICD-10-CM

## 2018-02-12 RX ORDER — METFORMIN HYDROCHLORIDE 750 MG/1
TABLET, EXTENDED RELEASE ORAL
Qty: 60 TABLET | Refills: 0 | Status: SHIPPED | OUTPATIENT
Start: 2018-02-12 | End: 2018-03-12 | Stop reason: SDUPTHER

## 2018-02-18 DIAGNOSIS — E78.5 HYPERLIPIDEMIA, ACQUIRED: ICD-10-CM

## 2018-02-19 RX ORDER — FENOFIBRATE 134 MG/1
CAPSULE ORAL
Qty: 90 CAPSULE | Refills: 3 | Status: SHIPPED | OUTPATIENT
Start: 2018-02-19 | End: 2018-11-26 | Stop reason: SDUPTHER

## 2018-02-21 ENCOUNTER — OFFICE VISIT (OUTPATIENT)
Dept: INTERNAL MEDICINE | Facility: CLINIC | Age: 78
End: 2018-02-21

## 2018-02-21 VITALS
RESPIRATION RATE: 17 BRPM | OXYGEN SATURATION: 97 % | WEIGHT: 162.2 LBS | HEIGHT: 67 IN | SYSTOLIC BLOOD PRESSURE: 142 MMHG | BODY MASS INDEX: 25.46 KG/M2 | DIASTOLIC BLOOD PRESSURE: 94 MMHG | HEART RATE: 76 BPM | TEMPERATURE: 97.5 F

## 2018-02-21 DIAGNOSIS — J45.20 MILD INTERMITTENT ASTHMA WITHOUT COMPLICATION: ICD-10-CM

## 2018-02-21 DIAGNOSIS — Z78.0 MENOPAUSE: ICD-10-CM

## 2018-02-21 DIAGNOSIS — E03.9 ACQUIRED HYPOTHYROIDISM: ICD-10-CM

## 2018-02-21 DIAGNOSIS — E78.49 OTHER HYPERLIPIDEMIA: ICD-10-CM

## 2018-02-21 DIAGNOSIS — R06.02 SHORTNESS OF BREATH: ICD-10-CM

## 2018-02-21 DIAGNOSIS — E11.8 CONTROLLED TYPE 2 DIABETES MELLITUS WITH COMPLICATION, WITHOUT LONG-TERM CURRENT USE OF INSULIN (HCC): Primary | ICD-10-CM

## 2018-02-21 DIAGNOSIS — I27.20 PULMONARY HYPERTENSION (HCC): ICD-10-CM

## 2018-02-21 DIAGNOSIS — I10 ESSENTIAL HYPERTENSION: ICD-10-CM

## 2018-02-21 DIAGNOSIS — G25.81 RLS (RESTLESS LEGS SYNDROME): ICD-10-CM

## 2018-02-21 DIAGNOSIS — J30.2 SEASONAL ALLERGIC RHINITIS, UNSPECIFIED CHRONICITY, UNSPECIFIED TRIGGER: ICD-10-CM

## 2018-02-21 DIAGNOSIS — F41.9 ANXIETY: ICD-10-CM

## 2018-02-21 DIAGNOSIS — Z00.00 MEDICARE ANNUAL WELLNESS VISIT, SUBSEQUENT: ICD-10-CM

## 2018-02-21 DIAGNOSIS — K21.9 GASTRIC REFLUX: ICD-10-CM

## 2018-02-21 DIAGNOSIS — M25.50 MULTIPLE JOINT PAIN: ICD-10-CM

## 2018-02-21 PROBLEM — H35.30 MACULAR DEGENERATION: Status: ACTIVE | Noted: 2018-02-21

## 2018-02-21 PROCEDURE — 99214 OFFICE O/P EST MOD 30 MIN: CPT | Performed by: INTERNAL MEDICINE

## 2018-02-21 PROCEDURE — G0439 PPPS, SUBSEQ VISIT: HCPCS | Performed by: INTERNAL MEDICINE

## 2018-02-21 RX ORDER — FLUTICASONE PROPIONATE 50 MCG
2 SPRAY, SUSPENSION (ML) NASAL DAILY
Qty: 1 BOTTLE | Refills: 12 | Status: SHIPPED | OUTPATIENT
Start: 2018-02-21 | End: 2018-03-23

## 2018-02-21 NOTE — PATIENT INSTRUCTIONS
Medicare Wellness  Personal Prevention Plan of Service     Date of Office Visit:  2018  Encounter Provider:  Magdalena Joy MD  Place of Service:  [facility]  Patient Name: Dior Pettit  :  1940    As part of the Medicare Wellness portion of your visit today, we are providing you with this personalized preventive plan of services (PPPS). This plan is based upon recommendations of the [place] Preventive Services Task Force (USPSTF) and the Advisory Committee on Immunization Practices (ACIP).    This lists the preventive care services that should be considered, and provides dates of when you are due. Items listed as completed are up-to-date and do not require any further intervention.    Health Maintenance   Topic Date Due   • TDAP/TD VACCINES (1 - Tdap) 1994   • PNEUMOCOCCAL VACCINES (65+ LOW/MEDIUM RISK) (1 of 2 - PCV13) 2003   • MEDICARE ANNUAL WELLNESS  2016   • HEMOGLOBIN A1C  2018   • DIABETIC EYE EXAM  2018   • DIABETIC FOOT EXAM  2018   • LIPID PANEL  2018   • URINE MICROALBUMIN  2018   • MAMMOGRAM  2019   • COLONOSCOPY  2020   • INFLUENZA VACCINE  Completed   • ZOSTER VACCINE  Completed       No orders of the defined types were placed in this encounter.      No Follow-up on file.

## 2018-02-21 NOTE — PROGRESS NOTES
"Subjective   Dior Pettit is a 77 y.o. female here for   Chief Complaint   Patient presents with   • Subsequent Medicare Wellness   • Diabetes Mellitus   • Hyperlipidemia   • Hypertension   • Hypothyroidism   • Pain   • Restless Legs Syndrome   • Cough     x 1 day   .    Vitals:    02/21/18 1034   BP: 142/94   BP Location: Left arm   Patient Position: Sitting   Cuff Size: Adult   Pulse: 76   Resp: 17   Temp: 97.5 °F (36.4 °C)   TempSrc: Temporal Artery    SpO2: 97%   Weight: 73.6 kg (162 lb 3.2 oz)   Height: 169.5 cm (66.75\")       Body mass index is 25.59 kg/(m^2).    Diabetes Mellitus   Associated symptoms include chest pain, congestion, coughing (dry cough off & on for 1 day) and fatigue. Pertinent negatives include no chills, fever or sore throat.   Hyperlipidemia   Exacerbating diseases include hypothyroidism. Associated symptoms include chest pain and shortness of breath (only with extreme exertion).   Hypertension   Associated symptoms include chest pain, palpitations (rarely) and shortness of breath (only with extreme exertion).   Hypothyroidism   Associated symptoms include chest pain, congestion, coughing (dry cough off & on for 1 day) and fatigue. Pertinent negatives include no chills, fever or sore throat.   Pain   Associated symptoms include chest pain, congestion, coughing (dry cough off & on for 1 day) and fatigue. Pertinent negatives include no chills, fever or sore throat.   Cough   Associated symptoms include chest pain, postnasal drip, shortness of breath (only with extreme exertion) and wheezing (occ). Pertinent negatives include no chills, ear pain, fever or sore throat.        The following portions of the patient's history were reviewed and updated as appropriate: allergies, current medications, past social history and problem list.    Review of Systems   Constitutional: Positive for fatigue. Negative for chills and fever.   HENT: Positive for congestion, postnasal drip and sneezing. " Negative for ear pain, sinus pain, sore throat, trouble swallowing and voice change.    Respiratory: Positive for cough (dry cough off & on for 1 day), shortness of breath (only with extreme exertion) and wheezing (occ).    Cardiovascular: Positive for chest pain and palpitations (rarely). Negative for leg swelling.   Psychiatric/Behavioral: Positive for sleep disturbance. Negative for dysphoric mood. The patient is nervous/anxious.        Objective   Physical Exam   Constitutional: She appears well-developed and well-nourished. No distress.   Cardiovascular: Normal rate, regular rhythm and normal heart sounds.    Pulmonary/Chest: No respiratory distress. She has no wheezes. She has no rales. She exhibits no tenderness.   Musculoskeletal: She exhibits no edema.   Psychiatric: She has a normal mood and affect. Her behavior is normal.   Nursing note and vitals reviewed.      Assessment/Plan   Diagnoses and all orders for this visit:    Controlled type 2 diabetes mellitus with complication, without long-term current use of insulin  Comments:  stable (antnrd=[id number])  - call if sugars over 200  Orders:  -     Hemoglobin A1c; Future  -     Microalbumin / Creatinine Urine Ratio - Urine, Clean Catch; Future    Essential hypertension  Comments:  need diet/ex  Orders:  -     Comprehensive Metabolic Panel; Future  -     Lipid Panel; Future    Other hyperlipidemia  Comments:  need diet/ex  Orders:  -     Comprehensive Metabolic Panel; Future  -     Lipid Panel; Future    Acquired hypothyroidism  Comments:  need rechk  Orders:  -     TSH; Future    RLS (restless legs syndrome)  Comments:  stable    Pulmonary hypertension    Shortness of breath  Comments:  x yrs - call if worse (evaluated by cardiology & pulmonary)    Anxiety  Comments:  stable x yrs - call if any worsening    Gastric reflux  Comments:  need wt loss -f/u with GI    Mild intermittent asthma without complication  Comments:  stable -call if worse    Seasonal  allergic rhinitis, unspecified chronicity, unspecified trigger  Comments:  need daily medication  Orders:  -     fluticasone (FLONASE) 50 MCG/ACT nasal spray; 2 sprays into each nostril Daily for 30 days.    Medicare annual wellness visit, subsequent    Menopause  -     DEXA Bone Density Axial; Future    Multiple joint pain  Comments:  & headaches x yrs - she still needs tramadol 2 daily - don't increase dose -call if worse       Wellness today.   Need daily strengthening & balance exercises (shown today).   Need screening for AAA & carotid disease.  Information given today.      Need Tdap, pneu & shingles vaccine (the new one).   Need bone density.

## 2018-02-23 DIAGNOSIS — E03.8 OTHER SPECIFIED HYPOTHYROIDISM: ICD-10-CM

## 2018-02-23 RX ORDER — LEVOTHYROXINE SODIUM 0.05 MG/1
TABLET ORAL
Qty: 45 TABLET | Refills: 0 | Status: SHIPPED | OUTPATIENT
Start: 2018-02-23 | End: 2018-05-25 | Stop reason: SDUPTHER

## 2018-02-27 ENCOUNTER — APPOINTMENT (OUTPATIENT)
Dept: WOMENS IMAGING | Facility: HOSPITAL | Age: 78
End: 2018-02-27

## 2018-02-27 PROCEDURE — 77080 DXA BONE DENSITY AXIAL: CPT | Performed by: RADIOLOGY

## 2018-03-12 DIAGNOSIS — E11.9 DIABETES MELLITUS WITHOUT COMPLICATION (HCC): ICD-10-CM

## 2018-03-12 RX ORDER — METFORMIN HYDROCHLORIDE 750 MG/1
TABLET, EXTENDED RELEASE ORAL
Qty: 60 TABLET | Refills: 5 | Status: SHIPPED | OUTPATIENT
Start: 2018-03-12 | End: 2018-08-06 | Stop reason: SDUPTHER

## 2018-03-12 RX ORDER — GLIMEPIRIDE 2 MG/1
TABLET ORAL
Qty: 30 TABLET | Refills: 5 | Status: SHIPPED | OUTPATIENT
Start: 2018-03-12 | End: 2018-10-23 | Stop reason: SDUPTHER

## 2018-03-16 ENCOUNTER — LAB (OUTPATIENT)
Dept: INTERNAL MEDICINE | Facility: CLINIC | Age: 78
End: 2018-03-16

## 2018-03-16 DIAGNOSIS — E03.9 ACQUIRED HYPOTHYROIDISM: ICD-10-CM

## 2018-03-16 DIAGNOSIS — I10 ESSENTIAL HYPERTENSION: ICD-10-CM

## 2018-03-16 DIAGNOSIS — E78.49 OTHER HYPERLIPIDEMIA: ICD-10-CM

## 2018-03-16 DIAGNOSIS — E11.8 CONTROLLED TYPE 2 DIABETES MELLITUS WITH COMPLICATION, WITHOUT LONG-TERM CURRENT USE OF INSULIN (HCC): ICD-10-CM

## 2018-03-16 LAB
ALBUMIN SERPL-MCNC: 4.8 G/DL (ref 3.5–5.2)
ALBUMIN UR-MCNC: 1.9 MG/L
ALBUMIN/GLOB SERPL: 1.5 G/DL
ALP SERPL-CCNC: 69 U/L (ref 39–117)
ALT SERPL W P-5'-P-CCNC: 21 U/L (ref 1–33)
ANION GAP SERPL CALCULATED.3IONS-SCNC: 13.5 MMOL/L
AST SERPL-CCNC: 20 U/L (ref 1–32)
BILIRUB SERPL-MCNC: 0.5 MG/DL (ref 0.1–1.2)
BUN BLD-MCNC: 25 MG/DL (ref 8–23)
BUN/CREAT SERPL: 23.8 (ref 7–25)
CALCIUM SPEC-SCNC: 10.2 MG/DL (ref 8.6–10.5)
CHLORIDE SERPL-SCNC: 98 MMOL/L (ref 98–107)
CHOLEST SERPL-MCNC: 170 MG/DL (ref 0–200)
CO2 SERPL-SCNC: 28.5 MMOL/L (ref 22–29)
CREAT BLD-MCNC: 1.05 MG/DL (ref 0.57–1)
CREAT UR-MCNC: 97.5 MG/DL
GFR SERPL CREATININE-BSD FRML MDRD: 51 ML/MIN/1.73
GLOBULIN UR ELPH-MCNC: 3.1 GM/DL
GLUCOSE BLD-MCNC: 118 MG/DL (ref 65–99)
HBA1C MFR BLD: 5.64 % (ref 4.8–5.6)
HDLC SERPL-MCNC: 44 MG/DL (ref 40–60)
LDLC SERPL CALC-MCNC: 88 MG/DL (ref 0–100)
LDLC/HDLC SERPL: 2 {RATIO}
MICROALBUMIN/CREAT UR: 19.5 MG/G
POTASSIUM BLD-SCNC: 4.3 MMOL/L (ref 3.5–5.2)
PROT SERPL-MCNC: 7.9 G/DL (ref 6–8.5)
SODIUM BLD-SCNC: 140 MMOL/L (ref 136–145)
TRIGL SERPL-MCNC: 190 MG/DL (ref 0–150)
TSH SERPL DL<=0.05 MIU/L-ACNC: 2.78 MIU/ML (ref 0.27–4.2)
VLDLC SERPL-MCNC: 38 MG/DL (ref 5–40)

## 2018-03-16 PROCEDURE — 80053 COMPREHEN METABOLIC PANEL: CPT | Performed by: INTERNAL MEDICINE

## 2018-03-16 PROCEDURE — 83036 HEMOGLOBIN GLYCOSYLATED A1C: CPT | Performed by: INTERNAL MEDICINE

## 2018-03-16 PROCEDURE — 36415 COLL VENOUS BLD VENIPUNCTURE: CPT | Performed by: INTERNAL MEDICINE

## 2018-03-16 PROCEDURE — 82043 UR ALBUMIN QUANTITATIVE: CPT | Performed by: INTERNAL MEDICINE

## 2018-03-16 PROCEDURE — 82570 ASSAY OF URINE CREATININE: CPT | Performed by: INTERNAL MEDICINE

## 2018-03-16 PROCEDURE — 80061 LIPID PANEL: CPT | Performed by: INTERNAL MEDICINE

## 2018-03-16 PROCEDURE — 84443 ASSAY THYROID STIM HORMONE: CPT | Performed by: INTERNAL MEDICINE

## 2018-03-20 NOTE — PROGRESS NOTES
High sugar/cholesterol/fat - need low fat/sugar diet & more exercise. Normal microalbumin & thyroid.  Need more water intake.

## 2018-03-29 RX ORDER — TRAMADOL HYDROCHLORIDE 50 MG/1
TABLET ORAL
Qty: 120 TABLET | Refills: 0 | OUTPATIENT
Start: 2018-03-29 | End: 2018-05-24 | Stop reason: SDUPTHER

## 2018-04-02 DIAGNOSIS — I10 BENIGN ESSENTIAL HTN: ICD-10-CM

## 2018-04-14 DIAGNOSIS — K21.9 GASTROESOPHAGEAL REFLUX DISEASE, ESOPHAGITIS PRESENCE NOT SPECIFIED: ICD-10-CM

## 2018-04-16 RX ORDER — OMEPRAZOLE 40 MG/1
CAPSULE, DELAYED RELEASE ORAL
Qty: 90 CAPSULE | Refills: 3 | Status: SHIPPED | OUTPATIENT
Start: 2018-04-16 | End: 2018-10-15 | Stop reason: SDUPTHER

## 2018-04-20 ENCOUNTER — OFFICE VISIT (OUTPATIENT)
Dept: CARDIOLOGY | Facility: CLINIC | Age: 78
End: 2018-04-20

## 2018-04-20 VITALS
SYSTOLIC BLOOD PRESSURE: 134 MMHG | HEIGHT: 67 IN | BODY MASS INDEX: 26.06 KG/M2 | HEART RATE: 74 BPM | WEIGHT: 166 LBS | DIASTOLIC BLOOD PRESSURE: 80 MMHG

## 2018-04-20 DIAGNOSIS — I77.810 DILATED AORTIC ROOT (HCC): ICD-10-CM

## 2018-04-20 DIAGNOSIS — I27.20 PULMONARY HYPERTENSION (HCC): ICD-10-CM

## 2018-04-20 DIAGNOSIS — Z95.2 S/P AVR: ICD-10-CM

## 2018-04-20 DIAGNOSIS — I10 ESSENTIAL HYPERTENSION: Primary | ICD-10-CM

## 2018-04-20 DIAGNOSIS — E78.49 OTHER HYPERLIPIDEMIA: ICD-10-CM

## 2018-04-20 PROCEDURE — 93000 ELECTROCARDIOGRAM COMPLETE: CPT | Performed by: NURSE PRACTITIONER

## 2018-04-20 PROCEDURE — 99214 OFFICE O/P EST MOD 30 MIN: CPT | Performed by: NURSE PRACTITIONER

## 2018-04-20 NOTE — PROGRESS NOTES
Date of Office Visit: 2018  Encounter Provider: TOSHA Jain  Place of Service: [facility] CARDIOLOGY  Patient Name: Dior Pettit  :1940    Chief Complaint   Patient presents with   • Hypertension   :     HPI: Dior Pettit is a 77 y.o. female comes in today for 6 month follow up. She is a patient of Dr. Mitchell.     She has a history of aortic valve replacement, pulmonary hypertension, systemic hypertension, sleep apnea, dilated aortic root, thoracic aortic aneurysm without rupture, diabetes and hyperlipidemia.    In , she underwent aortic valve replacement with porcine valve as well as subaortic membrane resection.  Cardiac catheter was negative for coronary disease.    She has sleep apnea but intolerant to therapy.    She is noted to have pulmonary hypertension.  Her last echo in  shows an RVSP is moderately elevated.  EF 66%, mild aortic valve regurg, mild aortic valve stenosis, mild mitral valve regurgitation.    Denies palpitations or tachycardia, edema, dizziness, chest pain, fatigue, orthopnea or PND.She does have some shortness of breath while carrying items but this is not worsened over the past 6 months.      Past Medical History:   Diagnosis Date   • Allergic rhinitis    • Anxiety    • Asthma    • Atypical chest pain    • Chest pain    • Diabetes    • GERD (gastroesophageal reflux disease)    • Health care maintenance    • Heart murmur    • Hyperlipidemia    • Hypertension    • Hypothyroidism    • Insomnia    • Mood disorder in conditions classified elsewhere    • Neuropathy    • Pulmonary hypertension    • Renal insufficiency    • RLS (restless legs syndrome)    • Sleep apnea     untreated       Past Surgical History:   Procedure Laterality Date   • AORTIC VALVE REPAIR/REPLACEMENT      Dr Perry   • AORTIC VALVE REPAIR/REPLACEMENT     • APPENDECTOMY     • AUGMENTATION MAMMAPLASTY     • BREAST AUGMENTATION     • CARDIAC  "CATHETERIZATION  2007   • COLONOSCOPY  2010   • COLONOSCOPY  03/02/2012   • FOOT SURGERY      remove bunion   • HYSTERECTOMY  1972   • SIGMOIDOSCOPY  2001           Review of Systems   Constitution: Negative for fever, malaise/fatigue, weight gain and weight loss.   HENT: Negative for ear pain, hearing loss, nosebleeds and sore throat.    Eyes: Negative for double vision, pain, vision loss in left eye and vision loss in right eye.   Cardiovascular:        See history of present illness.   Respiratory: Negative for cough, shortness of breath, sleep disturbances due to breathing, snoring and wheezing.    Endocrine: Negative for cold intolerance, heat intolerance and polyuria.   Skin: Negative for itching, poor wound healing and rash.   Musculoskeletal: Positive for joint pain. Negative for joint swelling and myalgias.   Gastrointestinal: Negative for abdominal pain, diarrhea, hematochezia, nausea and vomiting.   Genitourinary: Negative for hematuria and hesitancy.   Neurological: Negative for numbness, paresthesias and seizures.   Psychiatric/Behavioral: Negative for depression. The patient is nervous/anxious.      All other systems reviewed and are negative    No Known Allergies    All aspects of family and social history reviewed.           Objective:     Vitals:    04/20/18 0821   BP: 134/80   BP Location: Left arm   Pulse: 74   Weight: 75.3 kg (166 lb)   Height: 170.2 cm (67\")     Body mass index is 26 kg/m².    PHYSICAL EXAM:  Physical Exam   Constitutional: She is oriented to person, place, and time. She appears well-developed and well-nourished.   HENT:   Head: Normocephalic.   Nose: Nose normal.   Mouth/Throat: Oropharynx is clear and moist.   Eyes: Conjunctivae and EOM are normal. Pupils are equal, round, and reactive to light. Right eye exhibits no discharge. No scleral icterus.   Neck: Normal range of motion. Neck supple. No JVD present. No thyromegaly present.   Cardiovascular: Normal rate, regular rhythm " and intact distal pulses.  Exam reveals no gallop and no friction rub.    Murmur (right upper sternal border) heard.  Pulses:       Carotid pulses are 2+ on the right side, and 2+ on the left side.       Radial pulses are 2+ on the right side, and 2+ on the left side.        Femoral pulses are 2+ on the right side, and 2+ on the left side.       Popliteal pulses are 2+ on the right side, and 2+ on the left side.        Dorsalis pedis pulses are 2+ on the right side, and 2+ on the left side.        Posterior tibial pulses are 2+ on the right side, and 2+ on the left side.   Pulmonary/Chest: Effort normal and breath sounds normal. No respiratory distress. She has no wheezes. She has no rales.   Abdominal: Soft. Bowel sounds are normal. She exhibits no distension. There is no hepatosplenomegaly. There is no tenderness. There is no rebound.   Musculoskeletal: Normal range of motion. She exhibits no edema or tenderness.   Neurological: She is alert and oriented to person, place, and time.   Skin: Skin is warm and dry. No rash noted. No erythema.   Psychiatric: She has a normal mood and affect. Her behavior is normal. Judgment and thought content normal.   Vitals reviewed.        ECG 12 Lead  Date/Time: 4/20/2018 8:51 AM  Performed by: GEOVANNI VERMA  Authorized by: GEOVANNI VERMA   Comparison: compared with previous ECG from 10/2/2017  Similar to previous ECG  Rhythm: sinus rhythm  BPM: 74  Conduction: LAFB  ST Segments: ST segments normal  T Waves: T waves normal  Clinical impression: abnormal ECG  Comments: Indication: AVR                Assessment:       Diagnosis Plan   1. Essential hypertension     2. Pulmonary hypertension     3. Other hyperlipidemia     4. S/P AVR     5. Dilated aortic root          Orders Placed This Encounter   Procedures   • ECG 12 Lead     This order was created via procedure documentation       Current Outpatient Prescriptions   Medication Sig Dispense Refill   • ascorbic acid (EQL  VITAMIN C) 1000 MG tablet Take 1,000 mg by mouth Daily.     • aspirin 81 MG EC tablet Take 81 mg by mouth Daily.     • baclofen (LIORESAL) 10 MG tablet TAKE ONE TABLET BY MOUTH EVERY 8 HOURS AS NEEDED FOR MUSCLE SPASMS (Patient taking differently: TAKE ONE TABLET BY MOUTH EVERY 8 HOURS AS NEEDED FOR MUSCLE SPASMS - currently takes half tablet) 30 tablet 2   • clonazePAM (KlonoPIN) 2 MG tablet Take 1 tablet by mouth At Night As Needed for Anxiety. (Patient taking differently: Take 2 mg by mouth At Night As Needed (RLS). Takes 1/2 tablet every night) 90 tablet 1   • doxepin (SINEquan) 50 MG capsule Take 1 capsule by mouth Every Night. 90 capsule 3   • fenofibrate micronized (LOFIBRA) 134 MG capsule TAKE 1 CAPSULE EVERY       MORNING BEFORE BREAKFAST 90 capsule 3   • glimepiride (AMARYL) 2 MG tablet TAKE ONE TABLET BY MOUTH EVERY MORNING BEFORE BREAKFAST (Patient taking differently: TAKE ONE TABLET BY MOUTH EVERY MORNING BEFORE BREAKFAST - currently takes half tablet) 30 tablet 5   • glucosamine-chondroitin 500-400 MG capsule capsule Take 2 capsules by mouth Daily.     • levothyroxine (SYNTHROID, LEVOTHROID) 50 MCG tablet TAKE ONE TABLET BY MOUTH EVERY OTHER DAY 45 tablet 0   • levothyroxine (SYNTHROID, LEVOTHROID) 75 MCG tablet TAKE ONE TABLET BY MOUTH EVERY OTHER DAY 30 tablet 5   • metFORMIN ER (GLUCOPHAGE-XR) 750 MG 24 hr tablet TAKE ONE TABLET BY MOUTH TWICE A DAY 60 tablet 5   • metoprolol tartrate (LOPRESSOR) 25 MG tablet TAKE ONE TABLET BY MOUTH TWICE A DAY 60 tablet 1   • montelukast (SINGULAIR) 10 MG tablet TAKE ONE TABLET BY MOUTH ONCE NIGHTLY 90 tablet 2   • Multiple Vitamins-Minerals (MULTIVITAL PO) Take  by mouth.     • Omega-3 Fatty Acids (FISH OIL) 1000 MG capsule capsule Take 1,000 mg by mouth Daily With Breakfast.     • omeprazole (priLOSEC) 40 MG capsule TAKE ONE CAPSULE BY MOUTH DAILY 90 capsule 3   • simvastatin (ZOCOR) 40 MG tablet TAKE ONE TABLET BY MOUTH EVERY NIGHT 30 tablet 3   • traMADol  (ULTRAM) 50 MG tablet TAKE ONE TO TWO TABLETS BY MOUTH THREE TIMES A DAY AS NEEDED FOR PAIN 120 tablet 0   • venlafaxine XR (EFFEXOR-XR) 150 MG 24 hr capsule TAKE ONE CAPSULE BY MOUTH DAILY 90 capsule 1   • vitamin E 1000 UNIT capsule Take 1,000 Units by mouth Daily.       No current facility-administered medications for this visit.             Plan:       1.  Aortic valve replacement-Mild AR and mild stenosis noted on last echocardiogram.  Continue to monitor no progression of symptoms    2.  Pulmonary hypertension-stable on echocardiogram 6 months ago.    3.  Sleep apnea-untreated    4.Dilated aortic root stable on echo    5.  Hypertension-elevated 2 days last week. Replaced batteries in her home machine. bp stable here today. Continue current therapy    6.  Hyperlipidemia-follows with PCP      Follow up in office in 6 months. No changes    As always, it has been a pleasure to participate in this patient's care.      Sincerely,      TOSHA Jain

## 2018-05-10 DIAGNOSIS — E78.5 HYPERLIPIDEMIA: ICD-10-CM

## 2018-05-10 RX ORDER — SIMVASTATIN 40 MG
TABLET ORAL
Qty: 30 TABLET | Refills: 2 | Status: SHIPPED | OUTPATIENT
Start: 2018-05-10 | End: 2018-08-11 | Stop reason: SDUPTHER

## 2018-05-25 DIAGNOSIS — E03.8 OTHER SPECIFIED HYPOTHYROIDISM: ICD-10-CM

## 2018-05-25 RX ORDER — TRAMADOL HYDROCHLORIDE 50 MG/1
TABLET ORAL
Qty: 120 TABLET | Refills: 1 | OUTPATIENT
Start: 2018-05-25 | End: 2018-10-15

## 2018-05-25 RX ORDER — LEVOTHYROXINE SODIUM 0.05 MG/1
TABLET ORAL
Qty: 45 TABLET | Refills: 3 | Status: SHIPPED | OUTPATIENT
Start: 2018-05-25 | End: 2018-10-15 | Stop reason: SDUPTHER

## 2018-05-30 DIAGNOSIS — I10 BENIGN ESSENTIAL HTN: ICD-10-CM

## 2018-06-12 ENCOUNTER — OFFICE VISIT (OUTPATIENT)
Dept: INTERNAL MEDICINE | Facility: CLINIC | Age: 78
End: 2018-06-12

## 2018-06-12 VITALS
HEIGHT: 67 IN | WEIGHT: 166 LBS | DIASTOLIC BLOOD PRESSURE: 80 MMHG | BODY MASS INDEX: 26.06 KG/M2 | SYSTOLIC BLOOD PRESSURE: 138 MMHG

## 2018-06-12 DIAGNOSIS — E03.9 ACQUIRED HYPOTHYROIDISM: ICD-10-CM

## 2018-06-12 DIAGNOSIS — F41.9 ANXIETY: ICD-10-CM

## 2018-06-12 DIAGNOSIS — E78.49 OTHER HYPERLIPIDEMIA: ICD-10-CM

## 2018-06-12 DIAGNOSIS — I10 ESSENTIAL HYPERTENSION: ICD-10-CM

## 2018-06-12 DIAGNOSIS — R51.9 HEADACHE DISORDER: ICD-10-CM

## 2018-06-12 DIAGNOSIS — J45.20 MILD INTERMITTENT ASTHMA WITHOUT COMPLICATION: ICD-10-CM

## 2018-06-12 DIAGNOSIS — F51.01 PRIMARY INSOMNIA: ICD-10-CM

## 2018-06-12 DIAGNOSIS — E11.8 CONTROLLED TYPE 2 DIABETES MELLITUS WITH COMPLICATION, WITHOUT LONG-TERM CURRENT USE OF INSULIN (HCC): Primary | ICD-10-CM

## 2018-06-12 PROCEDURE — 99214 OFFICE O/P EST MOD 30 MIN: CPT | Performed by: INTERNAL MEDICINE

## 2018-06-12 RX ORDER — TOPIRAMATE 100 MG/1
100 TABLET, FILM COATED ORAL DAILY
Qty: 30 TABLET | Refills: 4 | Status: SHIPPED | OUTPATIENT
Start: 2018-06-12 | End: 2018-10-23

## 2018-06-12 NOTE — PROGRESS NOTES
"Subjective   Dior Pettit is a 78 y.o. female here for   Chief Complaint   Patient presents with   • Diabetes Mellitus     4 month follow-up   • Hyperlipidemia   • Hypertension   • Hypothyroidism   • Pain     Tramadol   • Anxiety   .    Vitals:    06/12/18 1335   BP: 138/80   BP Location: Left arm   Patient Position: Sitting   Cuff Size: Adult   Weight: 75.3 kg (166 lb)   Height: 169.5 cm (66.75\")       Body mass index is 26.19 kg/m².    Diabetes Mellitus   This is a chronic problem. The current episode started more than 1 year ago. The problem occurs constantly. The problem has been unchanged. Associated symptoms include coughing (asthma not worse) and headaches. Pertinent negatives include no chest pain, chills, fatigue or fever.   Hyperlipidemia   This is a chronic problem. The current episode started more than 1 year ago. The problem is controlled. Recent lipid tests were reviewed and are normal. Exacerbating diseases include diabetes and hypothyroidism. Pertinent negatives include no chest pain or shortness of breath.   Hypertension   This is a chronic problem. The current episode started more than 1 year ago. The problem is unchanged. The problem is controlled. Associated symptoms include anxiety and headaches. Pertinent negatives include no chest pain, palpitations or shortness of breath.   Hypothyroidism   This is a chronic problem. The current episode started more than 1 year ago. The problem occurs constantly. The problem has been unchanged. Associated symptoms include coughing (asthma not worse) and headaches. Pertinent negatives include no chest pain, chills, fatigue or fever.   Pain   This is a chronic problem. The current episode started more than 1 year ago. The problem occurs constantly. Associated symptoms include coughing (asthma not worse) and headaches. Pertinent negatives include no chest pain, chills, fatigue or fever.   Anxiety   Presents for follow-up visit. Patient reports no chest pain, " nervous/anxious behavior, palpitations or shortness of breath. Symptoms occur occasionally. The severity of symptoms is mild.            The following portions of the patient's history were reviewed and updated as appropriate: allergies, current medications, past social history and problem list.    Review of Systems   Constitutional: Negative for chills, fatigue and fever.   Respiratory: Positive for cough (asthma not worse). Negative for shortness of breath and wheezing.    Cardiovascular: Negative for chest pain, palpitations and leg swelling.   Neurological: Positive for headaches.   Psychiatric/Behavioral: Positive for sleep disturbance (ok with med). Negative for dysphoric mood. The patient is not nervous/anxious.      She wants to take daily med to prevent daily h/a (chronic h/a for many yrs).  Sugars less than 180 & BP less than 140/90 (see charts).    Objective   Physical Exam   Constitutional: She appears well-developed and well-nourished. No distress.   Cardiovascular: Normal rate, regular rhythm and normal heart sounds.    Pulmonary/Chest: No respiratory distress. She has no wheezes. She has no rales. She exhibits no tenderness.   Musculoskeletal: She exhibits no edema.   Psychiatric: She has a normal mood and affect. Her behavior is normal.   Nursing note and vitals reviewed.      Assessment/Plan   Diagnoses and all orders for this visit:    Controlled type 2 diabetes mellitus with complication, without long-term current use of insulin  Comments:  stable - call if sugar over 200  Orders:  -     glucose blood test strip; Use as instructed    Essential hypertension  Comments:  stable - call if bp over 140/90    Other hyperlipidemia  Comments:  need diet/ex    Mild intermittent asthma without complication  Comments:  call if worse    Acquired hypothyroidism  Comments:  need occ lab    Headache disorder  Comments:  still with daily headache (&needing tramadol 2 daily) - trial of topamax  Orders:  -      topiramate (TOPAMAX) 100 MG tablet; Take 1 tablet by mouth Daily. 1/4 pill daily for 1wk, 1/2 pill daily for 2 wks- incr to 1/d if needed    Anxiety  Comments:  needing 1/2 klonpin nightly    Primary insomnia  Comments:  no change in 55 yrs - ok with doxepin & klonopin    Other orders  -     magnesium oxide (MAGOX) 400 (241.3 Mg) MG tablet tablet; Take 400 mg by mouth Daily.     Need hep A & shingrix vaccines.    Need appt & fasting labs in 2-3 mos.

## 2018-06-17 DIAGNOSIS — F39 MOOD DISORDER (HCC): ICD-10-CM

## 2018-06-18 RX ORDER — VENLAFAXINE HYDROCHLORIDE 150 MG/1
CAPSULE, EXTENDED RELEASE ORAL
Qty: 90 CAPSULE | Refills: 0 | Status: SHIPPED | OUTPATIENT
Start: 2018-06-18 | End: 2018-09-15 | Stop reason: SDUPTHER

## 2018-06-22 ENCOUNTER — TELEPHONE (OUTPATIENT)
Dept: INTERNAL MEDICINE | Facility: CLINIC | Age: 78
End: 2018-06-22

## 2018-06-22 DIAGNOSIS — E11.8 CONTROLLED TYPE 2 DIABETES MELLITUS WITH COMPLICATION, WITHOUT LONG-TERM CURRENT USE OF INSULIN (HCC): ICD-10-CM

## 2018-06-22 NOTE — TELEPHONE ENCOUNTER
A refill for testing strips were sent to [facility] pharmacy on 6/12/18.     Per the pharmacist they did get the refill request but no directions were given.    I have re-sent the original rx from 6/12/18 back over to their pharmacy w/ testing instructions

## 2018-06-22 NOTE — TELEPHONE ENCOUNTER
----- Message from Fozia Barragan sent at 6/22/2018 10:43 AM EDT -----  Contact: Pt  Pt uses [employer] meter for glucose.    Pt needs a refill for nocode test strips.      Patient requests RX SENT TO   [id number] [place] [address] AT [facility] [phone] [facility] [phone] FX      Caller# [phone]    Please and thank you.

## 2018-07-12 DIAGNOSIS — F41.9 ANXIETY: ICD-10-CM

## 2018-07-12 RX ORDER — CLONAZEPAM 2 MG/1
TABLET ORAL
Qty: 90 TABLET | Refills: 0 | OUTPATIENT
Start: 2018-07-12 | End: 2018-10-15 | Stop reason: SDUPTHER

## 2018-08-01 DIAGNOSIS — J30.2 SEASONAL ALLERGIC RHINITIS: ICD-10-CM

## 2018-08-01 DIAGNOSIS — J45.20 MILD INTERMITTENT ASTHMA WITHOUT COMPLICATION: ICD-10-CM

## 2018-08-01 RX ORDER — MONTELUKAST SODIUM 10 MG/1
TABLET ORAL
Qty: 90 TABLET | Refills: 3 | Status: SHIPPED | OUTPATIENT
Start: 2018-08-01 | End: 2018-10-15 | Stop reason: SDUPTHER

## 2018-08-06 DIAGNOSIS — E11.9 DIABETES MELLITUS WITHOUT COMPLICATION (HCC): ICD-10-CM

## 2018-08-07 RX ORDER — METFORMIN HYDROCHLORIDE 750 MG/1
TABLET, EXTENDED RELEASE ORAL
Qty: 180 TABLET | Refills: 4 | Status: SHIPPED | OUTPATIENT
Start: 2018-08-07 | End: 2018-10-15 | Stop reason: SDUPTHER

## 2018-08-11 DIAGNOSIS — E78.5 HYPERLIPIDEMIA: ICD-10-CM

## 2018-08-13 RX ORDER — SIMVASTATIN 40 MG
TABLET ORAL
Qty: 30 TABLET | Refills: 1 | Status: SHIPPED | OUTPATIENT
Start: 2018-08-13 | End: 2018-10-12 | Stop reason: SDUPTHER

## 2018-09-01 DIAGNOSIS — M54.2 CERVICALGIA: ICD-10-CM

## 2018-09-04 RX ORDER — BACLOFEN 10 MG/1
TABLET ORAL
Qty: 30 TABLET | Refills: 1 | Status: SHIPPED | OUTPATIENT
Start: 2018-09-04 | End: 2018-10-15 | Stop reason: SDUPTHER

## 2018-09-15 DIAGNOSIS — F39 MOOD DISORDER (HCC): ICD-10-CM

## 2018-09-17 RX ORDER — VENLAFAXINE HYDROCHLORIDE 150 MG/1
CAPSULE, EXTENDED RELEASE ORAL
Qty: 90 CAPSULE | Refills: 0 | Status: SHIPPED | OUTPATIENT
Start: 2018-09-17 | End: 2018-10-15 | Stop reason: SDUPTHER

## 2018-09-20 ENCOUNTER — OFFICE VISIT (OUTPATIENT)
Dept: INTERNAL MEDICINE | Facility: CLINIC | Age: 78
End: 2018-09-20

## 2018-09-20 VITALS
SYSTOLIC BLOOD PRESSURE: 126 MMHG | HEART RATE: 74 BPM | BODY MASS INDEX: 26.21 KG/M2 | HEIGHT: 67 IN | WEIGHT: 167 LBS | DIASTOLIC BLOOD PRESSURE: 82 MMHG | OXYGEN SATURATION: 98 %

## 2018-09-20 DIAGNOSIS — J45.20 MILD INTERMITTENT ASTHMA WITHOUT COMPLICATION: Primary | ICD-10-CM

## 2018-09-20 DIAGNOSIS — D49.2: ICD-10-CM

## 2018-09-20 DIAGNOSIS — E11.8 CONTROLLED TYPE 2 DIABETES MELLITUS WITH COMPLICATION, WITHOUT LONG-TERM CURRENT USE OF INSULIN (HCC): ICD-10-CM

## 2018-09-20 DIAGNOSIS — E03.9 ACQUIRED HYPOTHYROIDISM: ICD-10-CM

## 2018-09-20 DIAGNOSIS — E78.49 OTHER HYPERLIPIDEMIA: ICD-10-CM

## 2018-09-20 PROCEDURE — 99214 OFFICE O/P EST MOD 30 MIN: CPT | Performed by: NURSE PRACTITIONER

## 2018-09-21 NOTE — PROGRESS NOTES
Subjective   Dior Pettit is a 78 y.o. female who presents for f/u regarding asthma and due to a skin lesion.    She has a history of asthma which has been well-controlled with Advair, takes seasonally and as needed.  She has noticed a new lesion on her right hand and right arm which are not painful, not pruritic.      Hypertension   This is a chronic problem. The current episode started more than 1 year ago. The problem is unchanged. The problem is controlled. Pertinent negatives include no chest pain, headaches, palpitations or shortness of breath. Current antihypertension treatment includes beta blockers. The current treatment provides significant improvement. There are no compliance problems.    Hyperlipidemia   This is a chronic problem. The current episode started more than 1 year ago. The problem is controlled. Recent lipid tests were reviewed and are normal. Exacerbating diseases include diabetes and hypothyroidism. Pertinent negatives include no chest pain or shortness of breath.   Hypothyroidism   This is a chronic problem. The current episode started more than 1 year ago. The problem occurs constantly. The problem has been unchanged. Associated symptoms include arthralgias, congestion and coughing (asthma not worse). Pertinent negatives include no abdominal pain, chest pain, chills, fatigue, fever, headaches, joint swelling, nausea, sore throat, vomiting or weakness.        The following portions of the patient's history were reviewed and updated as appropriate: allergies, current medications, past social history and problem list.    Past Medical History:   Diagnosis Date   • Allergic rhinitis    • Anxiety    • Asthma    • Atypical chest pain    • Chest pain    • Diabetes (CMS/[facility])    • GERD (gastroesophageal reflux disease)    • Health care maintenance    • Heart murmur    • Hyperlipidemia    • Hypertension    • Hypothyroidism    • Insomnia    • Mood disorder in conditions classified elsewhere    •  Neuropathy    • Pulmonary hypertension    • Renal insufficiency    • RLS (restless legs syndrome)    • Sleep apnea     untreated         Current Outpatient Prescriptions:   •  ascorbic acid (EQL VITAMIN C) 1000 MG tablet, Take 1,000 mg by mouth Daily., Disp: , Rfl:   •  aspirin 81 MG EC tablet, Take 81 mg by mouth Daily., Disp: , Rfl:   •  baclofen (LIORESAL) 10 MG tablet, TAKE ONE TABLET BY MOUTH EVERY 8 HOURS AS NEEDED FOR MUSCLE SPASMS, Disp: 30 tablet, Rfl: 1  •  clonazePAM (KlonoPIN) 2 MG tablet, TAKE ONE TABLET BY MOUTH AT BEDTIME AS NEEDED FOR ANXIETY, Disp: 90 tablet, Rfl: 0  •  doxepin (SINEquan) 50 MG capsule, Take 1 capsule by mouth Every Night., Disp: 90 capsule, Rfl: 3  •  fenofibrate micronized (LOFIBRA) 134 MG capsule, TAKE 1 CAPSULE EVERY       MORNING BEFORE BREAKFAST, Disp: 90 capsule, Rfl: 3  •  glimepiride (AMARYL) 2 MG tablet, TAKE ONE TABLET BY MOUTH EVERY MORNING BEFORE BREAKFAST (Patient taking differently: TAKE ONE TABLET BY MOUTH EVERY MORNING BEFORE BREAKFAST - currently takes half tablet), Disp: 30 tablet, Rfl: 5  •  glucosamine-chondroitin 500-400 MG capsule capsule, Take 2 capsules by mouth Daily., Disp: , Rfl:   •  glucose blood test strip, CHECK BLOOD SUGAR ONE TIME PER DAY, Disp: 100 each, Rfl: 5  •  levothyroxine (SYNTHROID, LEVOTHROID) 50 MCG tablet, TAKE ONE TABLET BY MOUTH EVERY OTHER DAY, Disp: 45 tablet, Rfl: 3  •  levothyroxine (SYNTHROID, LEVOTHROID) 75 MCG tablet, TAKE ONE TABLET BY MOUTH EVERY OTHER DAY, Disp: 30 tablet, Rfl: 5  •  magnesium oxide (MAGOX) 400 (241.3 Mg) MG tablet tablet, Take 400 mg by mouth Daily., Disp: , Rfl:   •  metFORMIN ER (GLUCOPHAGE-XR) 750 MG 24 hr tablet, TAKE ONE TABLET BY MOUTH TWICE A DAY, Disp: 180 tablet, Rfl: 4  •  metoprolol tartrate (LOPRESSOR) 25 MG tablet, TAKE ONE TABLET BY MOUTH TWICE A DAY, Disp: 60 tablet, Rfl: 5  •  montelukast (SINGULAIR) 10 MG tablet, TAKE ONE TABLET BY MOUTH ONCE NIGHTLY, Disp: 90 tablet, Rfl: 3  •  Multiple  Vitamins-Minerals (MULTIVITAL PO), Take  by mouth., Disp: , Rfl:   •  omeprazole (priLOSEC) 40 MG capsule, TAKE ONE CAPSULE BY MOUTH DAILY, Disp: 90 capsule, Rfl: 3  •  simvastatin (ZOCOR) 40 MG tablet, TAKE ONE TABLET BY MOUTH EVERY NIGHT, Disp: 30 tablet, Rfl: 1  •  topiramate (TOPAMAX) 100 MG tablet, Take 1 tablet by mouth Daily. 1/4 pill daily for 1wk, 1/2 pill daily for 2 wks- incr to 1/d if needed, Disp: 30 tablet, Rfl: 4  •  traMADol (ULTRAM) 50 MG tablet, TAKE ONE TO TWO TABLETS BY MOUTH THREE TIMES A DAY AS NEEDED FOR PAIN, Disp: 120 tablet, Rfl: 1  •  venlafaxine XR (EFFEXOR-XR) 150 MG 24 hr capsule, TAKE ONE CAPSULE BY MOUTH DAILY, Disp: 90 capsule, Rfl: 0  •  fluticasone-salmeterol (ADVAIR DISKUS) 250-50 MCG/DOSE DISKUS, Inhale 1 puff 2 (Two) Times a Day., Disp: 180 each, Rfl: 1    No Known Allergies    Review of Systems   Constitutional: Negative for chills, fatigue, fever and unexpected weight change.   HENT: Positive for congestion and postnasal drip. Negative for ear pain, sinus pressure, sore throat and trouble swallowing.    Eyes: Negative for visual disturbance.   Respiratory: Positive for cough (asthma not worse). Negative for chest tightness, shortness of breath and wheezing.    Cardiovascular: Negative for chest pain, palpitations and leg swelling.   Gastrointestinal: Negative for abdominal pain, blood in stool, nausea and vomiting.   Genitourinary: Negative for dysuria, frequency and urgency.   Musculoskeletal: Positive for arthralgias. Negative for joint swelling.   Skin: Negative for color change.   Allergic/Immunologic: Positive for environmental allergies.   Neurological: Negative for syncope, weakness and headaches.   Hematological: Does not bruise/bleed easily.   Psychiatric/Behavioral: The patient is not nervous/anxious.        Objective   Vitals:    09/20/18 1322   BP: 126/82   BP Location: Left arm   Patient Position: Sitting   Cuff Size: Adult   Pulse: 74   SpO2: 98%   Weight: 75.8  "kg (167 lb)   Height: 169.5 cm (66.75\")     Physical Exam   Constitutional: She appears well-developed and well-nourished. She is cooperative. She does not have a sickly appearance. She does not appear ill.   HENT:   Head: Normocephalic.   Right Ear: Hearing, tympanic membrane and external ear normal.   Left Ear: Hearing, tympanic membrane and external ear normal.   Nose: Nose normal. No mucosal edema, rhinorrhea, sinus tenderness or nasal deformity. Right sinus exhibits no maxillary sinus tenderness and no frontal sinus tenderness. Left sinus exhibits no maxillary sinus tenderness and no frontal sinus tenderness.   Mouth/Throat: Oropharynx is clear and moist and mucous membranes are normal. Normal dentition.   Eyes: Pupils are equal, round, and reactive to light. Conjunctivae and lids are normal. Right eye exhibits no discharge and no exudate. Left eye exhibits no discharge and no exudate.   Neck: Trachea normal. Carotid bruit is not present. No edema present. No thyroid mass present.   Cardiovascular: Regular rhythm, normal heart sounds and normal pulses.    No murmur heard.  Pulmonary/Chest: Breath sounds normal. No respiratory distress. She has no decreased breath sounds. She has no wheezes. She has no rhonchi. She has no rales.   Abdominal: Soft. There is no tenderness.   Lymphadenopathy:        Head (right side): No submental, no submandibular, no tonsillar, no preauricular, no posterior auricular and no occipital adenopathy present.        Head (left side): No submental, no submandibular, no tonsillar, no preauricular, no posterior auricular and no occipital adenopathy present.   Neurological: She is alert.   Skin: Skin is warm, dry and intact. No cyanosis. Nails show no clubbing.   Small papule on right hand which is scaly and has an uneven border, there is also a scaly patch on her right forearm       Assessment/Plan   Dior was seen today for diabetes, hypertension and hyperlipidemia.    Diagnoses and all " orders for this visit:    Mild intermittent asthma without complication  Comments:  doing well with Advair  Orders:  -     fluticasone-salmeterol (ADVAIR DISKUS) 250-50 MCG/DOSE DISKUS; Inhale 1 puff 2 (Two) Times a Day.    Neoplasm of skin of hand  Comments:  will refer back to derm for further evaluation  Orders:  -     Ambulatory Referral to Dermatology    Controlled type 2 diabetes mellitus with complication, without long-term current use of insulin (CMS/[facility])  Comments:  A1c 5.64 with 3/2018 labs    Acquired hypothyroidism  Comments:  TSH within therapeutic range 3/2018    Other hyperlipidemia  Comments:  LDL 88 on 3/2018    Discussed Shingrix vaccine, she will check with pharmacy regarding coverage and availability.  She will scheduled regular f/u with raul Boo for fasting labs.

## 2018-09-24 DIAGNOSIS — I25.119 CORONARY ARTERY DISEASE INVOLVING NATIVE CORONARY ARTERY WITH ANGINA PECTORIS, UNSPECIFIED WHETHER NATIVE OR TRANSPLANTED HEART (HCC): ICD-10-CM

## 2018-09-24 RX ORDER — DOXEPIN HYDROCHLORIDE 50 MG/1
CAPSULE ORAL
Qty: 90 CAPSULE | Refills: 2 | Status: SHIPPED | OUTPATIENT
Start: 2018-09-24 | End: 2018-10-15 | Stop reason: SDUPTHER

## 2018-10-09 DIAGNOSIS — J45.20 MILD INTERMITTENT ASTHMA WITHOUT COMPLICATION: ICD-10-CM

## 2018-10-09 NOTE — TELEPHONE ENCOUNTER
Patient called stating that she was completely out of her Advair and that she needed it sent to her [facility] pharmacy to get her through until she received the one from her mail order. I sent in a prescription for her Advair to her [facility] pharmacy.

## 2018-10-12 DIAGNOSIS — E78.5 HYPERLIPIDEMIA: ICD-10-CM

## 2018-10-12 RX ORDER — SIMVASTATIN 40 MG
TABLET ORAL
Qty: 30 TABLET | Refills: 0 | Status: SHIPPED | OUTPATIENT
Start: 2018-10-12 | End: 2018-10-15 | Stop reason: SDUPTHER

## 2018-10-15 DIAGNOSIS — E78.5 HYPERLIPIDEMIA, UNSPECIFIED HYPERLIPIDEMIA TYPE: ICD-10-CM

## 2018-10-15 DIAGNOSIS — J45.20 MILD INTERMITTENT ASTHMA WITHOUT COMPLICATION: ICD-10-CM

## 2018-10-15 DIAGNOSIS — M54.2 CERVICALGIA: ICD-10-CM

## 2018-10-15 DIAGNOSIS — F41.9 ANXIETY: ICD-10-CM

## 2018-10-15 DIAGNOSIS — E11.9 DIABETES MELLITUS WITHOUT COMPLICATION (HCC): ICD-10-CM

## 2018-10-15 DIAGNOSIS — E03.9 ACQUIRED HYPOTHYROIDISM: ICD-10-CM

## 2018-10-15 DIAGNOSIS — E03.8 OTHER SPECIFIED HYPOTHYROIDISM: ICD-10-CM

## 2018-10-15 DIAGNOSIS — K21.9 GASTROESOPHAGEAL REFLUX DISEASE, ESOPHAGITIS PRESENCE NOT SPECIFIED: ICD-10-CM

## 2018-10-15 DIAGNOSIS — I25.119 CORONARY ARTERY DISEASE INVOLVING NATIVE CORONARY ARTERY WITH ANGINA PECTORIS, UNSPECIFIED WHETHER NATIVE OR TRANSPLANTED HEART (HCC): ICD-10-CM

## 2018-10-15 DIAGNOSIS — F39 MOOD DISORDER (HCC): ICD-10-CM

## 2018-10-15 DIAGNOSIS — I10 BENIGN ESSENTIAL HTN: ICD-10-CM

## 2018-10-15 DIAGNOSIS — J30.2 SEASONAL ALLERGIC RHINITIS, UNSPECIFIED TRIGGER: ICD-10-CM

## 2018-10-15 RX ORDER — VENLAFAXINE HYDROCHLORIDE 150 MG/1
150 CAPSULE, EXTENDED RELEASE ORAL DAILY
Qty: 90 CAPSULE | Refills: 1 | Status: SHIPPED | OUTPATIENT
Start: 2018-10-15 | End: 2018-12-12 | Stop reason: SDUPTHER

## 2018-10-15 RX ORDER — MONTELUKAST SODIUM 10 MG/1
10 TABLET ORAL NIGHTLY
Qty: 90 TABLET | Refills: 1 | Status: SHIPPED | OUTPATIENT
Start: 2018-10-15 | End: 2019-04-24 | Stop reason: SDUPTHER

## 2018-10-15 RX ORDER — OMEPRAZOLE 40 MG/1
40 CAPSULE, DELAYED RELEASE ORAL DAILY
Qty: 90 CAPSULE | Refills: 1 | Status: SHIPPED | OUTPATIENT
Start: 2018-10-15 | End: 2019-05-25 | Stop reason: SDUPTHER

## 2018-10-15 RX ORDER — LEVOTHYROXINE SODIUM 0.05 MG/1
50 TABLET ORAL EVERY OTHER DAY
Qty: 45 TABLET | Refills: 1 | Status: SHIPPED | OUTPATIENT
Start: 2018-10-15 | End: 2019-04-11 | Stop reason: SDUPTHER

## 2018-10-15 RX ORDER — BACLOFEN 10 MG/1
10 TABLET ORAL EVERY 8 HOURS PRN
Qty: 90 TABLET | Refills: 1 | Status: SHIPPED | OUTPATIENT
Start: 2018-10-15 | End: 2019-04-11 | Stop reason: SDUPTHER

## 2018-10-15 RX ORDER — TRAMADOL HYDROCHLORIDE 50 MG/1
50 TABLET ORAL EVERY 6 HOURS PRN
Qty: 360 TABLET | Refills: 0 | Status: SHIPPED | OUTPATIENT
Start: 2018-10-15 | End: 2018-12-05 | Stop reason: SDUPTHER

## 2018-10-15 RX ORDER — SIMVASTATIN 40 MG
40 TABLET ORAL
Qty: 90 TABLET | Refills: 1 | Status: SHIPPED | OUTPATIENT
Start: 2018-10-15 | End: 2018-11-08 | Stop reason: SDUPTHER

## 2018-10-15 RX ORDER — DOXEPIN HYDROCHLORIDE 50 MG/1
50 CAPSULE ORAL NIGHTLY
Qty: 90 CAPSULE | Refills: 1 | Status: SHIPPED | OUTPATIENT
Start: 2018-10-15 | End: 2019-04-11 | Stop reason: SDUPTHER

## 2018-10-15 RX ORDER — LEVOTHYROXINE SODIUM 0.07 MG/1
75 TABLET ORAL EVERY OTHER DAY
Qty: 45 TABLET | Refills: 1 | Status: SHIPPED | OUTPATIENT
Start: 2018-10-15 | End: 2018-12-16 | Stop reason: SDUPTHER

## 2018-10-15 RX ORDER — CLONAZEPAM 2 MG/1
2 TABLET ORAL NIGHTLY PRN
Qty: 90 TABLET | Refills: 0 | Status: SHIPPED | OUTPATIENT
Start: 2018-10-15 | End: 2018-11-26 | Stop reason: SDUPTHER

## 2018-10-15 RX ORDER — METFORMIN HYDROCHLORIDE 750 MG/1
750 TABLET, EXTENDED RELEASE ORAL 2 TIMES DAILY
Qty: 180 TABLET | Refills: 1 | Status: SHIPPED | OUTPATIENT
Start: 2018-10-15 | End: 2019-04-11 | Stop reason: SDUPTHER

## 2018-10-15 NOTE — TELEPHONE ENCOUNTER
Requests came from [employer] [facility].  The Tramadol is normally for #120 when filled locally. do you want to do #360 for mail order?       Pt last seen 9/20/18 and is scheduled for 1/3/19 (both with Ashanti)..the patient saw you last 6/12/18.  Fidencio ordered.

## 2018-10-15 NOTE — TELEPHONE ENCOUNTER
Omeprazole, Venlafaxine, Fluticasone pro nasal, Glimepiride, Metformin, Montelukast, Levothyroxine 0.075 mg and 0.050 mg, Simvastatin, Metoprolol, Doxepin, Baclofen were requested from [facility].  I sent everything in except for Fluticasone and Glimeperide-needed clarification from pt.  She stated she does not need those two at the moment.

## 2018-10-15 NOTE — TELEPHONE ENCOUNTER
Scripts for Tramadol and Clonazepam faxed to [facility].  Confirmation of fax received at 1:44 p.m.

## 2018-10-23 ENCOUNTER — TELEPHONE (OUTPATIENT)
Dept: INTERNAL MEDICINE | Facility: CLINIC | Age: 78
End: 2018-10-23

## 2018-10-23 ENCOUNTER — APPOINTMENT (OUTPATIENT)
Dept: LAB | Facility: HOSPITAL | Age: 78
End: 2018-10-23

## 2018-10-23 ENCOUNTER — OFFICE VISIT (OUTPATIENT)
Dept: CARDIOLOGY | Facility: CLINIC | Age: 78
End: 2018-10-23

## 2018-10-23 VITALS
DIASTOLIC BLOOD PRESSURE: 90 MMHG | BODY MASS INDEX: 26.3 KG/M2 | HEART RATE: 71 BPM | SYSTOLIC BLOOD PRESSURE: 130 MMHG | WEIGHT: 167.6 LBS | HEIGHT: 67 IN

## 2018-10-23 DIAGNOSIS — J45.20 MILD INTERMITTENT ASTHMA WITHOUT COMPLICATION: ICD-10-CM

## 2018-10-23 DIAGNOSIS — E11.9 DIABETES MELLITUS WITHOUT COMPLICATION (HCC): Primary | ICD-10-CM

## 2018-10-23 DIAGNOSIS — I10 ESSENTIAL HYPERTENSION: ICD-10-CM

## 2018-10-23 DIAGNOSIS — E11.8 CONTROLLED TYPE 2 DIABETES MELLITUS WITH COMPLICATION, WITHOUT LONG-TERM CURRENT USE OF INSULIN (HCC): ICD-10-CM

## 2018-10-23 DIAGNOSIS — Z95.2 S/P AVR: ICD-10-CM

## 2018-10-23 DIAGNOSIS — I71.20 THORACIC AORTIC ANEURYSM WITHOUT RUPTURE (HCC): ICD-10-CM

## 2018-10-23 DIAGNOSIS — I27.20 PULMONARY HYPERTENSION (HCC): ICD-10-CM

## 2018-10-23 DIAGNOSIS — N28.9 RENAL INSUFFICIENCY: Primary | ICD-10-CM

## 2018-10-23 LAB
ANION GAP SERPL CALCULATED.3IONS-SCNC: 12.9 MMOL/L
BUN BLD-MCNC: 20 MG/DL (ref 8–23)
BUN/CREAT SERPL: 19 (ref 7–25)
CALCIUM SPEC-SCNC: 10.5 MG/DL (ref 8.6–10.5)
CHLORIDE SERPL-SCNC: 101 MMOL/L (ref 98–107)
CO2 SERPL-SCNC: 27.1 MMOL/L (ref 22–29)
CREAT BLD-MCNC: 1.05 MG/DL (ref 0.57–1)
GFR SERPL CREATININE-BSD FRML MDRD: 50 ML/MIN/1.73
GLUCOSE BLD-MCNC: 85 MG/DL (ref 65–99)
POTASSIUM BLD-SCNC: 4.6 MMOL/L (ref 3.5–5.2)
SODIUM BLD-SCNC: 141 MMOL/L (ref 136–145)

## 2018-10-23 PROCEDURE — 80048 BASIC METABOLIC PNL TOTAL CA: CPT | Performed by: INTERNAL MEDICINE

## 2018-10-23 PROCEDURE — 99214 OFFICE O/P EST MOD 30 MIN: CPT | Performed by: INTERNAL MEDICINE

## 2018-10-23 PROCEDURE — 36415 COLL VENOUS BLD VENIPUNCTURE: CPT | Performed by: INTERNAL MEDICINE

## 2018-10-23 PROCEDURE — 93000 ELECTROCARDIOGRAM COMPLETE: CPT | Performed by: INTERNAL MEDICINE

## 2018-10-23 RX ORDER — GLIMEPIRIDE 2 MG/1
2 TABLET ORAL
Qty: 90 TABLET | Refills: 0 | Status: SHIPPED | OUTPATIENT
Start: 2018-10-23 | End: 2019-01-11 | Stop reason: SDUPTHER

## 2018-10-23 NOTE — PROGRESS NOTES
Date of Office Visit: 10/23/2018  Encounter Provider: Abbi Mitchell MD  Place of Service: [facility] CARDIOLOGY  Patient Name: Dior Pettit  :1940    Chief complaint  Follow-up of aortic valve replacement, pulmonary hypertension, mitral regurgitation    History of Present Illness  Patient is a 78-year-old female with history of hypertension, hyperlipidemia, diabetes, aortic valve disease and subaortic membrane.  In  she underwent aortic valve replacement with porcine valve as well as subaortic membrane resection. Cardiac catheterization was negative for coronary artery disease.  She was then found to have significant sleep apnea but was been intolerant of therapy for this.  She is also noted to have pulmonary hypertension with an RV systolic pressure 44 monos mercury in  in fiber  she developed chest tightness while shoveling snow in a stress perfusion study was negative for ischemia.  A follow-up echocardiogram in 2017 that showed normal systolic function with mild left ventricular hypertrophy aortic valve calcification with mild stenosis with mild mitral regurgitation moderate tricuspid valve regurgitation with an RV systolic pressure 52 mmHg and a ascending aorta measuring 3.9 cm.    Since last visit she has had no chest pain palpitations syncope near-syncope overall feels well blood pressures been slightly lower than it is today.  She has chronic headaches for which she takes nonsteroidals at least two a day.    Past Medical History:   Diagnosis Date   • Allergic rhinitis    • Anxiety    • Asthma    • Atypical chest pain    • Chest pain    • Diabetes (CMS/HCC)    • GERD (gastroesophageal reflux disease)    • Health care maintenance    • Heart murmur    • Hyperlipidemia    • Hypertension    • Hypothyroidism    • Insomnia    • Mood disorder in conditions classified elsewhere    • Neuropathy    • Pulmonary hypertension (CMS/HCC)    • Renal insufficiency     • RLS (restless legs syndrome)    • Sleep apnea     untreated     Past Surgical History:   Procedure Laterality Date   • AORTIC VALVE REPAIR/REPLACEMENT  2007    Dr Perry   • AORTIC VALVE REPAIR/REPLACEMENT     • APPENDECTOMY     • AUGMENTATION MAMMAPLASTY  1976   • BREAST AUGMENTATION  2014   • CARDIAC CATHETERIZATION  2007   • COLONOSCOPY  2010   • COLONOSCOPY  03/02/2012   • FOOT SURGERY      remove bunion   • HYSTERECTOMY  1972   • SIGMOIDOSCOPY  2001     Outpatient Medications Prior to Visit   Medication Sig Dispense Refill   • ascorbic acid (EQL VITAMIN C) 1000 MG tablet Take 1,000 mg by mouth Daily.     • aspirin 81 MG EC tablet Take 81 mg by mouth Daily.     • baclofen (LIORESAL) 10 MG tablet Take 1 tablet by mouth Every 8 (Eight) Hours As Needed for Muscle Spasms. 90 tablet 1   • clonazePAM (KlonoPIN) 2 MG tablet Take 1 tablet by mouth At Night As Needed for Anxiety. 90 tablet 0   • doxepin (SINEquan) 50 MG capsule Take 1 capsule by mouth Every Night. 90 capsule 1   • fenofibrate micronized (LOFIBRA) 134 MG capsule TAKE 1 CAPSULE EVERY       MORNING BEFORE BREAKFAST 90 capsule 3   • glucosamine-chondroitin 500-400 MG capsule capsule Take 2 capsules by mouth Daily.     • glucose blood test strip CHECK BLOOD SUGAR ONE TIME PER  each 5   • levothyroxine (SYNTHROID, LEVOTHROID) 50 MCG tablet Take 1 tablet by mouth Every Other Day. 45 tablet 1   • levothyroxine (SYNTHROID, LEVOTHROID) 75 MCG tablet Take 1 tablet by mouth Every Other Day. 45 tablet 1   • magnesium oxide (MAGOX) 400 (241.3 Mg) MG tablet tablet Take 400 mg by mouth Daily.     • metFORMIN ER (GLUCOPHAGE-XR) 750 MG 24 hr tablet Take 1 tablet by mouth 2 (Two) Times a Day. 180 tablet 1   • metoprolol tartrate (LOPRESSOR) 25 MG tablet Take 1 tablet by mouth 2 (Two) Times a Day. 180 tablet 1   • montelukast (SINGULAIR) 10 MG tablet Take 1 tablet by mouth Every Night. 90 tablet 1   • Multiple Vitamins-Minerals (MULTIVITAL PO) Take  by mouth.      • omeprazole (priLOSEC) 40 MG capsule Take 1 capsule by mouth Daily. 90 capsule 1   • simvastatin (ZOCOR) 40 MG tablet Take 1 tablet by mouth every night at bedtime. 90 tablet 1   • traMADol (ULTRAM) 50 MG tablet Take 1 tablet by mouth Every 6 (Six) Hours As Needed for Moderate Pain . 360 tablet 0   • venlafaxine XR (EFFEXOR-XR) 150 MG 24 hr capsule Take 1 capsule by mouth Daily. 90 capsule 1   • fluticasone-salmeterol (ADVAIR DISKUS) 250-50 MCG/DOSE DISKUS Inhale 1 puff 2 (Two) Times a Day. 14 each 0   • glimepiride (AMARYL) 2 MG tablet TAKE ONE TABLET BY MOUTH EVERY MORNING BEFORE BREAKFAST (Patient taking differently: TAKE ONE TABLET BY MOUTH EVERY MORNING BEFORE BREAKFAST - currently takes half tablet) 30 tablet 5   • topiramate (TOPAMAX) 100 MG tablet Take 1 tablet by mouth Daily. 1/4 pill daily for 1wk, 1/2 pill daily for 2 wks- incr to 1/d if needed 30 tablet 4     No facility-administered medications prior to visit.        Allergies as of 10/23/2018   • (No Known Allergies)     Social History     Social History   • Marital status:      Spouse name: N/A   • Number of children: N/A   • Years of education: N/A     Occupational History   • Not on file.     Social History Main Topics   • Smoking status: Never Smoker   • Smokeless tobacco: Never Used      Comment: daily caffeine use   • Alcohol use Yes      Comment: social use   • Drug use: Yes     Types: Benzodiazepines      Comment: Clonazepam/Tramadol   • Sexual activity: No     Other Topics Concern   • Not on file     Social History Narrative   • No narrative on file     Family History   Problem Relation Age of Onset   • Hypertension Mother    • Stroke Mother    • Diabetes Sister    • Coronary artery disease Brother    • Diabetes Brother    • Coronary artery disease Brother    • Skin cancer Neg Hx      Review of Systems   Constitution: Negative for fever, malaise/fatigue, weight gain and weight loss.   HENT: Negative for ear pain, hearing loss,  "nosebleeds and sore throat.    Eyes: Negative for double vision, pain, vision loss in left eye and vision loss in right eye.   Cardiovascular:        See history of present illness.   Respiratory: Positive for cough. Negative for shortness of breath, sleep disturbances due to breathing, snoring and wheezing.    Endocrine: Negative for cold intolerance, heat intolerance and polyuria.   Skin: Negative for itching, poor wound healing and rash.   Musculoskeletal: Negative for joint pain, joint swelling and myalgias.   Gastrointestinal: Negative for abdominal pain, diarrhea, hematochezia, nausea and vomiting.   Genitourinary: Negative for hematuria and hesitancy.   Neurological: Positive for excessive daytime sleepiness. Negative for numbness, paresthesias and seizures.   Psychiatric/Behavioral: Negative for depression. The patient is not nervous/anxious.         Objective:     Vitals:    10/23/18 0916   BP: 130/90   Pulse: 71   Weight: 76 kg (167 lb 9.6 oz)   Height: 170.2 cm (67\")     Body mass index is 26.25 kg/m².    Physical Exam   Constitutional: She is oriented to person, place, and time. She appears well-developed and well-nourished.   HENT:   Head: Normocephalic.   Nose: Nose normal.   Mouth/Throat: Oropharynx is clear and moist.   Eyes: Pupils are equal, round, and reactive to light. Conjunctivae and EOM are normal. Right eye exhibits no discharge. No scleral icterus.   Neck: Normal range of motion. Neck supple. No JVD present. No thyromegaly present.   Cardiovascular: Normal rate, regular rhythm and intact distal pulses.  Exam reveals no gallop and no friction rub.    Murmur heard.   Systolic murmur is present with a grade of 1/6  at the upper left sternal border, lower left sternal border  Pulses:       Carotid pulses are 2+ on the right side, and 2+ on the left side.       Radial pulses are 2+ on the right side, and 2+ on the left side.        Femoral pulses are 2+ on the right side, and 2+ on the left " side.       Popliteal pulses are 2+ on the right side, and 2+ on the left side.        Dorsalis pedis pulses are 2+ on the right side, and 2+ on the left side.        Posterior tibial pulses are 2+ on the right side, and 2+ on the left side.   Pulmonary/Chest: Effort normal and breath sounds normal. No respiratory distress. She has no wheezes. She has no rales.   Abdominal: Soft. Bowel sounds are normal. She exhibits no distension. There is no hepatosplenomegaly. There is no tenderness. There is no rebound.   Musculoskeletal: Normal range of motion. She exhibits no edema or tenderness.   Neurological: She is alert and oriented to person, place, and time.   Skin: Skin is warm and dry. No rash noted. No erythema.   Psychiatric: She has a normal mood and affect. Her behavior is normal. Judgment and thought content normal.   Vitals reviewed.    Lab Review:     ECG 12 Lead  Date/Time: 10/23/2018 9:17 AM  Performed by: JOSE ALEJANDRO ASKEW  Authorized by: JOSE ALEJANDRO ASKEW   Comparison: compared with previous ECG   Rhythm: sinus rhythm  Conduction: incomplete LBBB and LAFB  Other findings: LVH  Clinical impression: abnormal ECG          Assessment:       Diagnosis Plan   1. Renal insufficiency  Basic Metabolic Panel   2. Essential hypertension  ECG 12 Lead   3. Thoracic aortic aneurysm without rupture      4. Pulmonary hypertension (CMS/HCC)     5. Controlled type 2 diabetes mellitus with complication, without long-term current use of insulin (CMS/HCC)     6. S/P AVR       Plan:       1.  Aortic valve replacement with a subaortic membrane resection.  Continue with aspirin  3.  Mildly dilated aortic root, stable.  We'll plan on follow-up imaging CT as long as renal function stable  4.  Hypertension blood, pressure lower at home  5.  Pulmonary hypertension, moderate though echocardiogram and persistent pulmonary hypertension.  Observe for now  6.  Renal insufficiency, as above  7.  Diabetes  8.  Dyslipidemia       Your medication list           Accurate as of 10/23/18 11:59 PM. If you have any questions, ask your nurse or doctor.               CHANGE how you take these medications      Instructions Last Dose Given Next Dose Due   glimepiride 2 MG tablet  Commonly known as:  AMARYL  What changed:  See the new instructions.  Changed by:  Magdalena Joy MD      Take 1 tablet by mouth Every Morning Before Breakfast.          CONTINUE taking these medications      Instructions Last Dose Given Next Dose Due   aspirin 81 MG EC tablet      Take 81 mg by mouth Daily.       baclofen 10 MG tablet  Commonly known as:  LIORESAL      Take 1 tablet by mouth Every 8 (Eight) Hours As Needed for Muscle Spasms.       clonazePAM 2 MG tablet  Commonly known as:  KlonoPIN      Take 1 tablet by mouth At Night As Needed for Anxiety.       doxepin 50 MG capsule  Commonly known as:  SINEquan      Take 1 capsule by mouth Every Night.       EQL VITAMIN C 1000 MG tablet  Generic drug:  ascorbic acid      Take 1,000 mg by mouth Daily.       fenofibrate micronized 134 MG capsule  Commonly known as:  LOFIBRA      TAKE 1 CAPSULE EVERY       MORNING BEFORE BREAKFAST       fluticasone-salmeterol 250-50 MCG/DOSE DISKUS  Commonly known as:  ADVAIR DISKUS      Inhale 1 puff 2 (Two) Times a Day.       glucosamine-chondroitin 500-400 MG capsule capsule      Take 2 capsules by mouth Daily.       glucose blood test strip      CHECK BLOOD SUGAR ONE TIME PER DAY       levothyroxine 75 MCG tablet  Commonly known as:  SYNTHROID, LEVOTHROID      Take 1 tablet by mouth Every Other Day.       levothyroxine 50 MCG tablet  Commonly known as:  SYNTHROID, LEVOTHROID      Take 1 tablet by mouth Every Other Day.       magnesium oxide 400 (241.3 Mg) MG tablet tablet  Commonly known as:  MAGOX      Take 400 mg by mouth Daily.       metFORMIN  MG 24 hr tablet  Commonly known as:  GLUCOPHAGE-XR      Take 1 tablet by mouth 2 (Two) Times a Day.       metoprolol tartrate 25 MG tablet  Commonly known  as:  LOPRESSOR      Take 1 tablet by mouth 2 (Two) Times a Day.       montelukast 10 MG tablet  Commonly known as:  SINGULAIR      Take 1 tablet by mouth Every Night.       MULTIVITAL PO      Take  by mouth.       omeprazole 40 MG capsule  Commonly known as:  priLOSEC      Take 1 capsule by mouth Daily.       simvastatin 40 MG tablet  Commonly known as:  ZOCOR      Take 1 tablet by mouth every night at bedtime.       traMADol 50 MG tablet  Commonly known as:  ULTRAM      Take 1 tablet by mouth Every 6 (Six) Hours As Needed for Moderate Pain .       venlafaxine  MG 24 hr capsule  Commonly known as:  EFFEXOR-XR      Take 1 capsule by mouth Daily.          STOP taking these medications    topiramate 100 MG tablet  Commonly known as:  TOPAMAX  Stopped by:  Abbi Mitchell MD              Where to Get Your Medications      These medications were sent to [facility] - [place], [place] - [address] AT Portal to [facility] - [phone] [facility] [phone]   [address], [facility] [address]    Phone:  [phone]   · fluticasone-salmeterol 250-50 MCG/DOSE DISKUS  · glimepiride 2 MG tablet         Patient is no longer taking -.  I corrected the med list to reflect this.  I did not stop these medications.    Dictated utilizing Dragon dictation

## 2018-10-23 NOTE — TELEPHONE ENCOUNTER
----- Message from Meghna ANTUNEZ Maureen sent at 10/23/2018  2:24 PM EDT -----  Contact: EMPERATRIZ Morris tech - Dr Joy's pt - RE: Rx refills  jessica Morris technician calling and would like Rx's to be faxed to pt's mail order pharmacy. Could you please send Rx's to new mail order? Please advise. Thanks      glimepiride (AMARYL) 2 MG tablet 30 tablet 90 day supply   Sig: TAKE ONE TABLET BY MOUTH EVERY MORNING BEFORE BREAKFAST     fluticasone-salmeterol (ADVAIR DISKUS) 250-50 MCG/DOSE DISKUS 90 day supply   Sig - Route: Inhale 1 puff 2 (Two) Times a Day. - Inhalation     [facility] - [place], [place] - [address] AT Portal to [facility] - [phone]  - [phone] URIEL hawley

## 2018-10-26 ENCOUNTER — TELEPHONE (OUTPATIENT)
Dept: CARDIOLOGY | Facility: CLINIC | Age: 78
End: 2018-10-26

## 2018-10-26 DIAGNOSIS — I71.20 THORACIC AORTIC ANEURYSM WITHOUT RUPTURE (HCC): Primary | ICD-10-CM

## 2018-10-28 PROBLEM — N28.9 RENAL INSUFFICIENCY: Status: ACTIVE | Noted: 2018-10-28

## 2018-11-02 ENCOUNTER — HOSPITAL ENCOUNTER (OUTPATIENT)
Dept: CT IMAGING | Facility: HOSPITAL | Age: 78
Discharge: HOME | End: 2018-11-02
Attending: INTERNAL MEDICINE | Admitting: INTERNAL MEDICINE

## 2018-11-02 LAB — CREAT BLDA-MCNC: 1 MG/DL (ref 0.6–1.3)

## 2018-11-02 PROCEDURE — 71275 CT ANGIOGRAPHY CHEST: CPT

## 2018-11-02 PROCEDURE — 82565 ASSAY OF CREATININE: CPT

## 2018-11-02 PROCEDURE — 0 IOPAMIDOL PER 1 ML: Performed by: INTERNAL MEDICINE

## 2018-11-02 RX ADMIN — IOPAMIDOL 85 ML: 755 INJECTION, SOLUTION INTRAVENOUS at 13:57

## 2018-11-05 DIAGNOSIS — N28.9 RENAL INSUFFICIENCY: Primary | ICD-10-CM

## 2018-11-06 ENCOUNTER — APPOINTMENT (OUTPATIENT)
Dept: LAB | Facility: HOSPITAL | Age: 78
End: 2018-11-06

## 2018-11-06 LAB
ANION GAP SERPL CALCULATED.3IONS-SCNC: 11.3 MMOL/L
BUN BLD-MCNC: 25 MG/DL (ref 8–23)
BUN/CREAT SERPL: 19.5 (ref 7–25)
CALCIUM SPEC-SCNC: 10.3 MG/DL (ref 8.6–10.5)
CHLORIDE SERPL-SCNC: 102 MMOL/L (ref 98–107)
CO2 SERPL-SCNC: 26.7 MMOL/L (ref 22–29)
CREAT BLD-MCNC: 1.28 MG/DL (ref 0.57–1)
GFR SERPL CREATININE-BSD FRML MDRD: 40 ML/MIN/1.73
GLUCOSE BLD-MCNC: 134 MG/DL (ref 65–99)
POTASSIUM BLD-SCNC: 4.6 MMOL/L (ref 3.5–5.2)
SODIUM BLD-SCNC: 140 MMOL/L (ref 136–145)

## 2018-11-06 PROCEDURE — 80048 BASIC METABOLIC PNL TOTAL CA: CPT | Performed by: INTERNAL MEDICINE

## 2018-11-06 PROCEDURE — 36415 COLL VENOUS BLD VENIPUNCTURE: CPT | Performed by: INTERNAL MEDICINE

## 2018-11-08 DIAGNOSIS — E78.5 HYPERLIPIDEMIA, UNSPECIFIED HYPERLIPIDEMIA TYPE: ICD-10-CM

## 2018-11-08 RX ORDER — SIMVASTATIN 40 MG
TABLET ORAL
Qty: 30 TABLET | Refills: 0 | Status: SHIPPED | OUTPATIENT
Start: 2018-11-08 | End: 2019-06-26 | Stop reason: SDUPTHER

## 2018-11-12 ENCOUNTER — TELEPHONE (OUTPATIENT)
Dept: CARDIOLOGY | Facility: CLINIC | Age: 78
End: 2018-11-12

## 2018-11-12 DIAGNOSIS — N28.9 RENAL INSUFFICIENCY: Primary | ICD-10-CM

## 2018-11-13 NOTE — TELEPHONE ENCOUNTER
I talked to patient regarding renal labs and CT scan results.  Minimal increase in aortic size since 2007.  We'll plan on repeat imaging in 1 year with noncontrast CT.  She states that she messed up on her instructions and did take metformin on the day of the CT scan.  She has remained hydrated.  I asked her to come in for another BMP this Wednesday also recommend a goal blood pressure around 120/60-70mmHg

## 2018-11-15 NOTE — TELEPHONE ENCOUNTER
Spoke with pt.  She will have this tomorrow.  She did not want to get out yesterday or today with the weather.  Strongly advised this NEEDS to be done ASAP.  Pt stated she will tomorrow.  I will watch for it.

## 2018-11-16 ENCOUNTER — APPOINTMENT (OUTPATIENT)
Dept: LAB | Facility: HOSPITAL | Age: 78
End: 2018-11-16

## 2018-11-16 LAB
ANION GAP SERPL CALCULATED.3IONS-SCNC: 12.3 MMOL/L
BUN BLD-MCNC: 25 MG/DL (ref 8–23)
BUN/CREAT SERPL: 21.7 (ref 7–25)
CALCIUM SPEC-SCNC: 10.8 MG/DL (ref 8.6–10.5)
CHLORIDE SERPL-SCNC: 96 MMOL/L (ref 98–107)
CO2 SERPL-SCNC: 26.7 MMOL/L (ref 22–29)
CREAT BLD-MCNC: 1.15 MG/DL (ref 0.57–1)
GFR SERPL CREATININE-BSD FRML MDRD: 45 ML/MIN/1.73
GLUCOSE BLD-MCNC: 131 MG/DL (ref 65–99)
POTASSIUM BLD-SCNC: 4.7 MMOL/L (ref 3.5–5.2)
SODIUM BLD-SCNC: 135 MMOL/L (ref 136–145)

## 2018-11-16 PROCEDURE — 36415 COLL VENOUS BLD VENIPUNCTURE: CPT | Performed by: INTERNAL MEDICINE

## 2018-11-16 PROCEDURE — 80048 BASIC METABOLIC PNL TOTAL CA: CPT | Performed by: INTERNAL MEDICINE

## 2018-11-16 NOTE — TELEPHONE ENCOUNTER
Per Dr Mitchell- Keep hydrated and recheck in 1 week.      Spoke with pt.  Verbalized understanding.  (done)

## 2018-11-20 ENCOUNTER — LAB (OUTPATIENT)
Dept: LAB | Facility: HOSPITAL | Age: 78
End: 2018-11-20

## 2018-11-20 DIAGNOSIS — N28.9 RENAL INSUFFICIENCY: ICD-10-CM

## 2018-11-20 LAB
ANION GAP SERPL CALCULATED.3IONS-SCNC: 11.5 MMOL/L
BUN BLD-MCNC: 22 MG/DL (ref 8–23)
BUN/CREAT SERPL: 22.2 (ref 7–25)
CALCIUM SPEC-SCNC: 10.7 MG/DL (ref 8.6–10.5)
CHLORIDE SERPL-SCNC: 97 MMOL/L (ref 98–107)
CO2 SERPL-SCNC: 27.5 MMOL/L (ref 22–29)
CREAT BLD-MCNC: 0.99 MG/DL (ref 0.57–1)
GFR SERPL CREATININE-BSD FRML MDRD: 54 ML/MIN/1.73
GLUCOSE BLD-MCNC: 84 MG/DL (ref 65–99)
POTASSIUM BLD-SCNC: 4.2 MMOL/L (ref 3.5–5.2)
SODIUM BLD-SCNC: 136 MMOL/L (ref 136–145)

## 2018-11-20 PROCEDURE — 36415 COLL VENOUS BLD VENIPUNCTURE: CPT

## 2018-11-20 PROCEDURE — 80048 BASIC METABOLIC PNL TOTAL CA: CPT

## 2018-11-26 DIAGNOSIS — E78.5 HYPERLIPIDEMIA, ACQUIRED: ICD-10-CM

## 2018-11-26 DIAGNOSIS — F41.9 ANXIETY: ICD-10-CM

## 2018-11-26 RX ORDER — FLUTICASONE PROPIONATE 50 MCG
2 SPRAY, SUSPENSION (ML) NASAL DAILY
Qty: 3 BOTTLE | Refills: 1 | Status: SHIPPED | OUTPATIENT
Start: 2018-11-26 | End: 2020-02-21 | Stop reason: SDUPTHER

## 2018-11-26 RX ORDER — CLONAZEPAM 2 MG/1
2 TABLET ORAL NIGHTLY PRN
Qty: 90 TABLET | Refills: 0 | Status: SHIPPED | OUTPATIENT
Start: 2018-11-26 | End: 2018-12-05 | Stop reason: SDUPTHER

## 2018-11-26 RX ORDER — FENOFIBRATE 134 MG/1
134 CAPSULE ORAL
Qty: 90 CAPSULE | Refills: 1 | Status: SHIPPED | OUTPATIENT
Start: 2018-11-26 | End: 2019-04-11 | Stop reason: SDUPTHER

## 2018-11-26 NOTE — TELEPHONE ENCOUNTER
----- Message from Nadia Alonso sent at 11/26/2018 10:57 AM EST -----  Contact: Patient  Patient requesting refills on    fenofibrate micronized (LOFIBRA) 134 MG capsule  fluticasone (FLONASE) 50 MCG/ACT nasal spray   clonazePAM (KlonoPIN) 2 MG tablet    Patient:[phone]    Pharmacy:[facility] - [place], [place] - [address] AT Portal to [facility] - [phone] [facility] [phone]

## 2018-11-26 NOTE — TELEPHONE ENCOUNTER
clonazePAM needs to be faxed to mail order.  Last OV   09/20 with NP    Next OV 01/03    ALBAN done 10/15    please review med refill and advise

## 2018-11-27 ENCOUNTER — TELEPHONE (OUTPATIENT)
Dept: INTERNAL MEDICINE | Facility: CLINIC | Age: 78
End: 2018-11-27

## 2018-11-27 NOTE — TELEPHONE ENCOUNTER
Clonazepam was faxed to [facility] this morning by Idalmis BROWN    Tramadol 50 mg was faxed over to [facility] on 10/15/18 for # 360 tablets and per Aly[employer] with [facility] this Rx was placed on hold due to a conflict with trying to contact Ms. Pettit to get her SSN due to Tramadol being a controlled rx. and the last refill of Clonazepam that was sent to them was placed on hold as well due to her not returning their calls.    They will have to cancel out our refill that was faxed to them for the Clonazepam 2 mg as she can't have multiple controlled rx refills on file.    I

## 2018-11-27 NOTE — TELEPHONE ENCOUNTER
----- Message from Nadia Alonso sent at 11/27/2018 11:03 AM EST -----  Contact: Patient  Patient requesting refills on    traMADol (ULTRAM) 50 MG tablet  clonazePAM (KlonoPIN) 2 MG tablet    Patient:[phone]    Pharmacy:[facility] - [place], [place] - [address] AT Portal to [facility] [phone] [facility] [phone]

## 2018-11-27 NOTE — TELEPHONE ENCOUNTER
Tried contacting patient but had to LVM.  Informed patient via voicemail that she needed to contact [facility] [employer] to update information with them, specifically her SSN.  Left [employer] phone # in voicemail I left patient.  Informed her also to contact the office if she had further questions.

## 2018-11-29 ENCOUNTER — TELEPHONE (OUTPATIENT)
Dept: CARDIOLOGY | Facility: CLINIC | Age: 78
End: 2018-11-29

## 2018-11-29 NOTE — TELEPHONE ENCOUNTER
Spoke with pt and BP is running about 130s/70s.  Pt has not been taking her BP like she should.  She will start to watch.

## 2018-12-05 DIAGNOSIS — F41.9 ANXIETY: ICD-10-CM

## 2018-12-06 RX ORDER — TRAMADOL HYDROCHLORIDE 50 MG/1
TABLET ORAL
Qty: 120 TABLET | Refills: 1 | OUTPATIENT
Start: 2018-12-06 | End: 2019-04-11

## 2018-12-06 RX ORDER — CLONAZEPAM 2 MG/1
TABLET ORAL
Qty: 90 TABLET | Refills: 0 | OUTPATIENT
Start: 2018-12-06 | End: 2019-04-11 | Stop reason: SDUPTHER

## 2018-12-10 ENCOUNTER — TELEPHONE (OUTPATIENT)
Dept: CARDIOLOGY | Facility: CLINIC | Age: 78
End: 2018-12-10

## 2018-12-12 DIAGNOSIS — F39 MOOD DISORDER (HCC): ICD-10-CM

## 2018-12-12 RX ORDER — VENLAFAXINE HYDROCHLORIDE 150 MG/1
CAPSULE, EXTENDED RELEASE ORAL
Qty: 90 CAPSULE | Refills: 1 | Status: SHIPPED | OUTPATIENT
Start: 2018-12-12 | End: 2019-04-11 | Stop reason: SDUPTHER

## 2018-12-13 ENCOUNTER — OFFICE VISIT (OUTPATIENT)
Dept: INTERNAL MEDICINE | Facility: CLINIC | Age: 78
End: 2018-12-13

## 2018-12-13 VITALS
BODY MASS INDEX: 26.21 KG/M2 | DIASTOLIC BLOOD PRESSURE: 84 MMHG | WEIGHT: 167 LBS | OXYGEN SATURATION: 97 % | SYSTOLIC BLOOD PRESSURE: 124 MMHG | HEART RATE: 81 BPM | HEIGHT: 67 IN

## 2018-12-13 DIAGNOSIS — I77.810 DILATED AORTIC ROOT (HCC): ICD-10-CM

## 2018-12-13 DIAGNOSIS — I10 BENIGN ESSENTIAL HTN: Primary | ICD-10-CM

## 2018-12-13 DIAGNOSIS — I71.20 THORACIC AORTIC ANEURYSM WITHOUT RUPTURE (HCC): ICD-10-CM

## 2018-12-13 PROCEDURE — 99214 OFFICE O/P EST MOD 30 MIN: CPT | Performed by: NURSE PRACTITIONER

## 2018-12-14 NOTE — PROGRESS NOTES
Subjective   Dior Pettit is a 78 y.o. female who presents due to elevated blood pressure.    She has brought BP readings in which have been consistently 138-150/87-90 despite taking Metoprolol 25mg bid. She is concerned due to a hx of thoracic aneurysm with minimal increase in aortic size since 2007 on recent CT scan.. Her goal blood pressure is around 120/60-70mmHg      Hypertension   This is a chronic problem. The current episode started more than 1 year ago. The problem is unchanged. The problem is controlled. Pertinent negatives include no chest pain, headaches, palpitations or shortness of breath. Current antihypertension treatment includes beta blockers. The current treatment provides significant improvement. There are no compliance problems.    Hyperlipidemia   This is a chronic problem. The current episode started more than 1 year ago. The problem is controlled. Recent lipid tests were reviewed and are normal. Exacerbating diseases include diabetes and hypothyroidism. Pertinent negatives include no chest pain or shortness of breath.        The following portions of the patient's history were reviewed and updated as appropriate: allergies, current medications, past social history and problem list.    Past Medical History:   Diagnosis Date   • Allergic rhinitis    • Anxiety    • Asthma    • Atypical chest pain    • Chest pain    • Diabetes (CMS/HCC)    • GERD (gastroesophageal reflux disease)    • Health care maintenance    • Heart murmur    • Hyperlipidemia    • Hypertension    • Hypothyroidism    • Insomnia    • Mood disorder in conditions classified elsewhere    • Neuropathy    • Pulmonary hypertension (CMS/HCC)    • Renal insufficiency    • RLS (restless legs syndrome)    • Sleep apnea     untreated         Current Outpatient Medications:   •  ascorbic acid (EQL VITAMIN C) 1000 MG tablet, Take 1,000 mg by mouth Daily., Disp: , Rfl:   •  aspirin 81 MG EC tablet, Take 81 mg by mouth Daily., Disp: , Rfl:    •  baclofen (LIORESAL) 10 MG tablet, Take 1 tablet by mouth Every 8 (Eight) Hours As Needed for Muscle Spasms., Disp: 90 tablet, Rfl: 1  •  clonazePAM (KlonoPIN) 2 MG tablet, TAKE ONE TABLET BY MOUTH  AT BEDTIME AS NEEDED FOR ANXIETY, Disp: 90 tablet, Rfl: 0  •  doxepin (SINEquan) 50 MG capsule, Take 1 capsule by mouth Every Night., Disp: 90 capsule, Rfl: 1  •  fenofibrate micronized (LOFIBRA) 134 MG capsule, Take 1 capsule by mouth Every Morning Before Breakfast., Disp: 90 capsule, Rfl: 1  •  fluticasone (FLONASE) 50 MCG/ACT nasal spray, 2 sprays into the nostril(s) as directed by provider Daily., Disp: 3 bottle, Rfl: 1  •  fluticasone-salmeterol (ADVAIR DISKUS) 250-50 MCG/DOSE DISKUS, Inhale 1 puff 2 (Two) Times a Day., Disp: 42 each, Rfl: 0  •  glimepiride (AMARYL) 2 MG tablet, Take 1 tablet by mouth Every Morning Before Breakfast., Disp: 90 tablet, Rfl: 0  •  glucosamine-chondroitin 500-400 MG capsule capsule, Take 2 capsules by mouth Daily., Disp: , Rfl:   •  glucose blood test strip, CHECK BLOOD SUGAR ONE TIME PER DAY, Disp: 100 each, Rfl: 5  •  levothyroxine (SYNTHROID, LEVOTHROID) 50 MCG tablet, Take 1 tablet by mouth Every Other Day., Disp: 45 tablet, Rfl: 1  •  levothyroxine (SYNTHROID, LEVOTHROID) 75 MCG tablet, Take 1 tablet by mouth Every Other Day., Disp: 45 tablet, Rfl: 1  •  magnesium oxide (MAGOX) 400 (241.3 Mg) MG tablet tablet, Take 400 mg by mouth Daily., Disp: , Rfl:   •  metFORMIN ER (GLUCOPHAGE-XR) 750 MG 24 hr tablet, Take 1 tablet by mouth 2 (Two) Times a Day., Disp: 180 tablet, Rfl: 1  •  metoprolol tartrate (LOPRESSOR) 25 MG tablet, 1 tablet each am, 1 1/2 tablet pm, Disp: 180 tablet, Rfl: 1  •  montelukast (SINGULAIR) 10 MG tablet, Take 1 tablet by mouth Every Night., Disp: 90 tablet, Rfl: 1  •  Multiple Vitamins-Minerals (MULTIVITAL PO), Take  by mouth., Disp: , Rfl:   •  omeprazole (priLOSEC) 40 MG capsule, Take 1 capsule by mouth Daily., Disp: 90 capsule, Rfl: 1  •  simvastatin  "(ZOCOR) 40 MG tablet, TAKE ONE TABLET BY MOUTH EVERY NIGHT, Disp: 30 tablet, Rfl: 0  •  traMADol (ULTRAM) 50 MG tablet, TAKE 1-2 TABLETS BY MOUTH THREE TIMES A DAY AS NEEDED FOR PAIN, Disp: 120 tablet, Rfl: 1  •  venlafaxine XR (EFFEXOR-XR) 150 MG 24 hr capsule, TAKE ONE CAPSULE BY MOUTH DAILY, Disp: 90 capsule, Rfl: 1    No Known Allergies    Review of Systems   Constitutional: Negative for chills, fatigue, fever and unexpected weight change.   HENT: Negative for congestion, ear pain, postnasal drip, sinus pressure, sore throat and trouble swallowing.    Eyes: Negative for visual disturbance.   Respiratory: Negative for cough (asthma not worse), chest tightness, shortness of breath and wheezing.    Cardiovascular: Negative for chest pain, palpitations and leg swelling.   Gastrointestinal: Negative for abdominal pain, blood in stool, nausea and vomiting.   Genitourinary: Negative for dysuria, frequency and urgency.   Musculoskeletal: Positive for arthralgias. Negative for joint swelling.   Skin: Negative for color change.   Neurological: Negative for syncope, weakness and headaches.   Hematological: Does not bruise/bleed easily.       Objective   Vitals:    12/13/18 1131 12/13/18 1211   BP: 122/70 124/84   BP Location: Left arm Left arm   Patient Position: Sitting    Cuff Size: Adult    Pulse: 81    SpO2: 97%    Weight: 75.8 kg (167 lb)    Height: 170.2 cm (67\")      Physical Exam   Constitutional: She appears well-developed and well-nourished. She is cooperative. She does not have a sickly appearance. She does not appear ill.   HENT:   Head: Normocephalic.   Right Ear: Hearing, tympanic membrane and external ear normal.   Left Ear: Hearing, tympanic membrane and external ear normal.   Nose: Nose normal. No mucosal edema, rhinorrhea, sinus tenderness or nasal deformity. Right sinus exhibits no maxillary sinus tenderness and no frontal sinus tenderness. Left sinus exhibits no maxillary sinus tenderness and no frontal " sinus tenderness.   Mouth/Throat: Oropharynx is clear and moist and mucous membranes are normal. Normal dentition.   Eyes: Conjunctivae and lids are normal. Right eye exhibits no discharge and no exudate. Left eye exhibits no discharge and no exudate.   Neck: Trachea normal. Carotid bruit is not present. No edema present. No thyroid mass present.   Cardiovascular: Regular rhythm, normal heart sounds and normal pulses.   No murmur heard.  Pulmonary/Chest: Breath sounds normal. No respiratory distress. She has no decreased breath sounds. She has no wheezes. She has no rhonchi. She has no rales.   Abdominal: Soft. There is no tenderness.   Lymphadenopathy:        Head (right side): No submental, no submandibular, no tonsillar, no preauricular, no posterior auricular and no occipital adenopathy present.        Head (left side): No submental, no submandibular, no tonsillar, no preauricular, no posterior auricular and no occipital adenopathy present.   Neurological: She is alert.   Skin: Skin is warm, dry and intact. No cyanosis. Nails show no clubbing.       Assessment/Plan   Dior was seen today for hypertension.    Diagnoses and all orders for this visit:    Benign essential HTN  -     metoprolol tartrate (LOPRESSOR) 25 MG tablet; 1 tablet each am, 1 1/2 tablet pm    Thoracic aortic aneurysm without rupture     Dilated aortic root (CMS/HCC)    Reviewed recent BP readings, will increase Metoprolol dose (she took an extra 1/2 yesterday and BP is improved today) and continue to monitor.

## 2018-12-16 DIAGNOSIS — E03.9 ACQUIRED HYPOTHYROIDISM: ICD-10-CM

## 2018-12-17 DIAGNOSIS — I71.20 THORACIC AORTIC ANEURYSM WITHOUT RUPTURE (HCC): Primary | ICD-10-CM

## 2018-12-17 RX ORDER — LEVOTHYROXINE SODIUM 0.07 MG/1
TABLET ORAL
Qty: 45 TABLET | Refills: 4 | Status: SHIPPED | OUTPATIENT
Start: 2018-12-17 | End: 2019-04-11 | Stop reason: SDUPTHER

## 2019-01-02 DIAGNOSIS — E11.8 CONTROLLED TYPE 2 DIABETES MELLITUS WITH COMPLICATION, WITHOUT LONG-TERM CURRENT USE OF INSULIN (HCC): ICD-10-CM

## 2019-01-03 ENCOUNTER — OFFICE VISIT (OUTPATIENT)
Dept: INTERNAL MEDICINE | Facility: CLINIC | Age: 79
End: 2019-01-03

## 2019-01-03 VITALS
DIASTOLIC BLOOD PRESSURE: 84 MMHG | HEART RATE: 72 BPM | WEIGHT: 166 LBS | BODY MASS INDEX: 26.06 KG/M2 | OXYGEN SATURATION: 99 % | SYSTOLIC BLOOD PRESSURE: 134 MMHG | HEIGHT: 67 IN

## 2019-01-03 DIAGNOSIS — I10 BENIGN ESSENTIAL HTN: ICD-10-CM

## 2019-01-03 DIAGNOSIS — I71.20 THORACIC AORTIC ANEURYSM WITHOUT RUPTURE (HCC): Primary | ICD-10-CM

## 2019-01-03 PROCEDURE — 99214 OFFICE O/P EST MOD 30 MIN: CPT | Performed by: NURSE PRACTITIONER

## 2019-01-04 NOTE — PROGRESS NOTES
Subjective   Dior Pettit is a 78 y.o. female who presents for f/u regarding HTN. She also c/o increased anxiety regarding thoracic aneurysm.    Her CTA done 11/2/18 showed a thoracic aortic aneurysm which has increased from 3.8 x 3.7 cm in 2007 to 4.0 x 3.9. She is very anxious and concerned about surgery and a possible repair.   She has brought BP readings which show an improved since increasing her Metoprolol dose, BP readings still occasionally 137-143/80s.      Hypertension   This is a chronic problem. The current episode started more than 1 year ago. The problem is unchanged. The problem is controlled. Pertinent negatives include no chest pain, headaches, palpitations or shortness of breath. Current antihypertension treatment includes beta blockers. The current treatment provides moderate improvement. There are no compliance problems.         The following portions of the patient's history were reviewed and updated as appropriate: allergies, current medications, past social history and problem list.    Past Medical History:   Diagnosis Date   • Allergic rhinitis    • Anxiety     Controlled w/Meds   • Aortic root dilatation (CMS/HCC) 10/02/2017    Borderline--Noted on Echo   • Aortic valve calcification 10/02/2017    Noted on Echo   • Aortic valve insufficiency Dx in 07    w/AVR    • Aortic valve prosthesis present 11/02/2018    Noted on CTA Chest   • Ascending aorta dilatation (CMS/HCC) 10/02/2017    Borderline--Noted on Echo   • Asthma    • Atypical chest pain    • Breast pain, right Hx   • CAD (coronary artery disease)    • Cardiomegaly 2017   • Cervicalgia    • Chest pain due to CAD    • 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    Borderline--Noted on Echo & Measured @ 2.6CM and Mid Descending @ 2.5CM on CTA Chest-11/2/18   • DM (diabetes mellitus) (CMS/HCC)     T2   • Esophagitis    • GERD (gastroesophageal reflux disease)     Controlled w/Meds   • Health care maintenance    • Heart murmur    •  History of echocardiogram 10/2/17-[facility]    EF 66%; Borderline Concentric Hypertrophy; Mild Calcification in AV; Mild AVS; Mild AVR; Moderate TVR; Borderline Dilation of Aortic Root/Arch & Borderline Dilation of Ascending/Proximal Aorta Present   • Hyperlipidemia     Controlled w/Meds   • Hypertension     Controlled w/Meds   • Hypothyroidism     Controlled w/Synthroid   • Insomnia    • Macular degeneration, left eye    • Mild aortic valve regurgitation 10/02/2017    Noted on Echo   • Mild aortic valve stenosis 10/02/2017    Noted on Echo   • Mild dilation of ascending aorta (CMS/HCC) 10/02/2017    Borderline--Noted on Echo   • Moderate tricuspid valve regurgitation 10/02/2017    Noted on Echo   • Mood disorder in conditions classified elsewhere     Controlled w/Meds   • Near syncope 03/2017-[facility] ER   • Neuropathy    • NNEKA (obstructive sleep apnea)     Untreated   • Osteoarthritis     Ankles/Feet   • Pulmonary hypertension (CMS/HCC) 2017   • Renal insufficiency    • RLS (restless legs syndrome)    • Thoracic ascending aortic aneurysm (CMS/HCC) Dx in 2007 @ 3.8CM & 1/02/2018    Noted @ 4CM on CTA Chest;         Current Outpatient Medications:   •  ascorbic acid (EQL VITAMIN C) 1000 MG tablet, Take 1,000 mg by mouth Daily., Disp: , Rfl:   •  aspirin 81 MG EC tablet, Take 81 mg by mouth Daily., Disp: , Rfl:   •  baclofen (LIORESAL) 10 MG tablet, Take 1 tablet by mouth Every 8 (Eight) Hours As Needed for Muscle Spasms., Disp: 90 tablet, Rfl: 1  •  clonazePAM (KlonoPIN) 2 MG tablet, TAKE ONE TABLET BY MOUTH  AT BEDTIME AS NEEDED FOR ANXIETY, Disp: 90 tablet, Rfl: 0  •  doxepin (SINEquan) 50 MG capsule, Take 1 capsule by mouth Every Night., Disp: 90 capsule, Rfl: 1  •  fenofibrate micronized (LOFIBRA) 134 MG capsule, Take 1 capsule by mouth Every Morning Before Breakfast., Disp: 90 capsule, Rfl: 1  •  fluticasone (FLONASE) 50 MCG/ACT nasal spray, 2 sprays into the nostril(s) as directed by provider Daily., Disp: 3 bottle, Rfl:  1  •  fluticasone-salmeterol (ADVAIR DISKUS) 250-50 MCG/DOSE DISKUS, Inhale 1 puff 2 (Two) Times a Day., Disp: 42 each, Rfl: 0  •  glimepiride (AMARYL) 2 MG tablet, Take 1 tablet by mouth Every Morning Before Breakfast., Disp: 90 tablet, Rfl: 0  •  glucosamine-chondroitin 500-400 MG capsule capsule, Take 2 capsules by mouth Daily., Disp: , Rfl:   •  glucose blood test strip, CHECK BLOOD SUGAR ONE TIME PER DAY, Disp: 100 each, Rfl: 5  •  levothyroxine (SYNTHROID, LEVOTHROID) 50 MCG tablet, Take 1 tablet by mouth Every Other Day., Disp: 45 tablet, Rfl: 1  •  levothyroxine (SYNTHROID, LEVOTHROID) 75 MCG tablet, TAKE ONE TABLET BY MOUTH EVERY OTHER DAY, Disp: 45 tablet, Rfl: 4  •  magnesium oxide (MAGOX) 400 (241.3 Mg) MG tablet tablet, Take 400 mg by mouth Daily., Disp: , Rfl:   •  metFORMIN ER (GLUCOPHAGE-XR) 750 MG 24 hr tablet, Take 1 tablet by mouth 2 (Two) Times a Day., Disp: 180 tablet, Rfl: 1  •  metoprolol tartrate (LOPRESSOR) 25 MG tablet, Take 1 tablet by mouth 3 (Three) Times a Day. 1 tablet each am, 1 1/2 tablet pm, Disp: 180 tablet, Rfl: 1  •  montelukast (SINGULAIR) 10 MG tablet, Take 1 tablet by mouth Every Night., Disp: 90 tablet, Rfl: 1  •  Multiple Vitamins-Minerals (MULTIVITAL PO), Take  by mouth., Disp: , Rfl:   •  omeprazole (priLOSEC) 40 MG capsule, Take 1 capsule by mouth Daily., Disp: 90 capsule, Rfl: 1  •  simvastatin (ZOCOR) 40 MG tablet, TAKE ONE TABLET BY MOUTH EVERY NIGHT, Disp: 30 tablet, Rfl: 0  •  traMADol (ULTRAM) 50 MG tablet, TAKE 1-2 TABLETS BY MOUTH THREE TIMES A DAY AS NEEDED FOR PAIN, Disp: 120 tablet, Rfl: 1  •  venlafaxine XR (EFFEXOR-XR) 150 MG 24 hr capsule, TAKE ONE CAPSULE BY MOUTH DAILY, Disp: 90 capsule, Rfl: 1    No Known Allergies    Review of Systems   Constitutional: Negative for chills, fatigue, fever and unexpected weight change.   HENT: Negative for congestion, ear pain, postnasal drip, sinus pressure, sore throat and trouble swallowing.    Eyes: Negative for visual  "disturbance.   Respiratory: Negative for cough, chest tightness, shortness of breath and wheezing.    Cardiovascular: Negative for chest pain, palpitations and leg swelling.   Gastrointestinal: Negative for abdominal pain, blood in stool, nausea and vomiting.   Genitourinary: Negative for dysuria, frequency and urgency.   Musculoskeletal: Negative for arthralgias and joint swelling.   Skin: Negative for color change.   Neurological: Negative for syncope, weakness and headaches.   Hematological: Does not bruise/bleed easily.   Psychiatric/Behavioral: The patient is nervous/anxious.        Objective   Vitals:    01/03/19 1009   BP: 134/84   BP Location: Left arm   Patient Position: Sitting   Cuff Size: Adult   Pulse: 72   SpO2: 99%   Weight: 75.3 kg (166 lb)   Height: 170.2 cm (67\")     Physical Exam   Constitutional: She appears well-developed and well-nourished. She is cooperative. She does not have a sickly appearance. She does not appear ill.   HENT:   Head: Normocephalic.   Right Ear: Hearing, tympanic membrane and external ear normal.   Left Ear: Hearing, tympanic membrane and external ear normal.   Nose: Nose normal. No mucosal edema, rhinorrhea, sinus tenderness or nasal deformity. Right sinus exhibits no maxillary sinus tenderness and no frontal sinus tenderness. Left sinus exhibits no maxillary sinus tenderness and no frontal sinus tenderness.   Mouth/Throat: Oropharynx is clear and moist and mucous membranes are normal. Normal dentition.   Eyes: Conjunctivae and lids are normal. Right eye exhibits no discharge and no exudate. Left eye exhibits no discharge and no exudate.   Neck: Trachea normal. Carotid bruit is not present. No edema present. No thyroid mass present.   Cardiovascular: Regular rhythm, normal heart sounds and normal pulses.   No murmur heard.  Pulmonary/Chest: Breath sounds normal. No respiratory distress. She has no decreased breath sounds. She has no wheezes. She has no rhonchi. She has no " rales.   Lymphadenopathy:        Head (right side): No submental, no submandibular, no tonsillar, no preauricular, no posterior auricular and no occipital adenopathy present.        Head (left side): No submental, no submandibular, no tonsillar, no preauricular, no posterior auricular and no occipital adenopathy present.   Neurological: She is alert.   Skin: Skin is warm, dry and intact. No cyanosis. Nails show no clubbing.       Assessment/Plan   Dior was seen today for diabetes, hypertension and hyperlipidemia.    Diagnoses and all orders for this visit:    Thoracic aortic aneurysm without rupture   Comments:  has an appt with Dr. Ellis 1/8/19    Benign essential HTN  Comments:  increase Metoprolol for better mgmt of BP  Orders:  -     metoprolol tartrate (LOPRESSOR) 25 MG tablet; Take 1 tablet by mouth 3 (Three) Times a Day. 1 tablet each am, 1 1/2 tablet pm    Reviewed results of CTA chest with patient (copy printed out for patient), discussed that her aneurysm is still small and does not necessarily mean a repair is needed at this time. Consulted with Dr. Joy as well who discussed with patient. Of course, she will f/u with Dr. Ellis next week to get his opinion and further treatment plan. Pt is reassured, however.

## 2019-01-08 ENCOUNTER — OFFICE VISIT (OUTPATIENT)
Dept: CARDIAC SURGERY | Facility: CLINIC | Age: 79
End: 2019-01-08

## 2019-01-08 VITALS
HEIGHT: 68 IN | DIASTOLIC BLOOD PRESSURE: 81 MMHG | BODY MASS INDEX: 25.31 KG/M2 | HEART RATE: 66 BPM | TEMPERATURE: 98.2 F | SYSTOLIC BLOOD PRESSURE: 130 MMHG | OXYGEN SATURATION: 96 % | WEIGHT: 167 LBS | RESPIRATION RATE: 20 BRPM

## 2019-01-08 DIAGNOSIS — I10 ESSENTIAL HYPERTENSION: Primary | ICD-10-CM

## 2019-01-08 DIAGNOSIS — I27.20 PULMONARY HYPERTENSION (HCC): ICD-10-CM

## 2019-01-08 DIAGNOSIS — I77.810 DILATED AORTIC ROOT (HCC): ICD-10-CM

## 2019-01-08 DIAGNOSIS — N28.9 RENAL INSUFFICIENCY: ICD-10-CM

## 2019-01-08 DIAGNOSIS — J45.20 MILD INTERMITTENT ASTHMA WITHOUT COMPLICATION: ICD-10-CM

## 2019-01-08 DIAGNOSIS — Z95.2 S/P AVR: ICD-10-CM

## 2019-01-08 DIAGNOSIS — F41.9 ANXIETY: ICD-10-CM

## 2019-01-08 PROCEDURE — 99203 OFFICE O/P NEW LOW 30 MIN: CPT | Performed by: THORACIC SURGERY (CARDIOTHORACIC VASCULAR SURGERY)

## 2019-01-11 DIAGNOSIS — J45.20 MILD INTERMITTENT ASTHMA WITHOUT COMPLICATION: ICD-10-CM

## 2019-01-11 RX ORDER — GLIMEPIRIDE 2 MG/1
TABLET ORAL
Qty: 90 TABLET | Refills: 1 | Status: SHIPPED | OUTPATIENT
Start: 2019-01-11 | End: 2019-07-10 | Stop reason: SDUPTHER

## 2019-01-12 NOTE — PROGRESS NOTES
1/12/2019    Seen on 1/8/19    Reason for consultation:   Ascending aortic dilatation    Chief Complaint   Same    History of Present Illness:       Dear Abbi Suarez MD and Colleagues,  It was nice to see Dior Pettit in consultation at your request. She is a 78 y.o. female with a history of AS and AVR in the past, HTN GERD who was found mild root dilatation on a follow up echocardiogram. She has mild dyspnea and fatigue. No obvious etiology has been found. The echo showed mild MO, mild root dilatation and normal EF. She denies chest pain, orthopnea, PND or LE edema. Her chest CT showed the ascending aorta at 4 cm (3.8 cm before) and the arch 2.6 cm, no dissection or ulceration. She has no family history of aneurysms or dissection. I don't know if she had a bicuspid aortic valve.     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)   • Hypothyroidism   • Hyperlipidemia   • Seasonal allergic rhinitis   • Mild intermittent asthma without complication   • S/P AVR   • Shortness of breath   • Dilated aortic root (CMS/HCC)   • Thoracic aortic aneurysm without rupture    • Macular degeneration   • Multiple joint pain   • Headache disorder   • Primary insomnia   • Renal insufficiency       Past Medical History:   Diagnosis Date   • Allergic rhinitis    • Anxiety     Controlled w/Meds   • Aortic root dilatation (CMS/HCC) 10/02/2017    Borderline--Noted on Echo   • Aortic valve calcification 10/02/2017    Noted on Echo   • Aortic valve insufficiency Dx in 07    w/AVR    • Aortic valve prosthesis present 11/02/2018    Noted on CTA Chest   • Ascending aorta dilatation (CMS/HCC) 10/02/2017    Borderline--Noted on Echo   • Asthma    • Atypical chest pain    • Breast pain, right Hx   • CAD (coronary artery disease)    • Cardiomegaly 2017   •  Cervicalgia    • Chest pain due to CAD    • 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)     T2   • Esophagitis    • GERD (gastroesophageal reflux disease)     Controlled w/Meds   • Health care maintenance    • Heart murmur    • History of echocardiogram 10/2/17-[facility]    EF 66%; Borderline Concentric Hypertrophy; Mild Calcification in AV; Mild AVS; Mild AVR; Moderate TVR; Borderline Dilation of Aortic Root/Arch & Borderline Dilation of Ascending/Proximal Aorta Present   • Hyperlipidemia     Controlled w/Meds   • Hypertension     Controlled w/Meds   • Hypothyroidism     Controlled w/Synthroid   • Insomnia    • Macular degeneration, left eye    • Mild aortic valve regurgitation 10/02/2017    Noted on Echo   • Mild aortic valve stenosis 10/02/2017    Noted on Echo   • Mild dilation of ascending aorta (CMS/HCC) 10/02/2017    Borderline--Noted on Echo   • Moderate tricuspid valve regurgitation 10/02/2017    Noted on Echo   • Mood disorder in conditions classified elsewhere     Controlled w/Meds   • Near syncope 03/2017-[facility] ER   • Neuropathy    • NNEKA (obstructive sleep apnea)     Untreated   • Osteoarthritis     Ankles/Feet   • Pulmonary hypertension (CMS/HCC) 2017   • Renal insufficiency    • RLS (restless legs syndrome)    • Thoracic ascending aortic aneurysm (CMS/[facility]) Dx in 2007 @ 3.8CM & 1/02/2018    Noted @ 4CM on CTA Chest;       Past Surgical History:   Procedure Laterality Date   • AORTIC VALVE REPAIR/REPLACEMENT  2007    Dr. Perry   • APPENDECTOMY     • AUGMENTATION MAMMAPLASTY  1976   • BREAST AUGMENTATION  2014   • BUNIONECTOMY     • CARDIAC CATHETERIZATION  2007   • COLONOSCOPY  2010   • COLONOSCOPY  03/02/2012   • HYSTERECTOMY  1972   • SIGMOIDOSCOPY  2001       No Known Allergies      Current Outpatient Medications:   •  ascorbic acid (EQL VITAMIN C) 1000 MG tablet, Take 1,000  mg by mouth Daily., Disp: , Rfl:   •  aspirin 81 MG EC tablet, Take 81 mg by mouth Daily., Disp: , Rfl:   •  baclofen (LIORESAL) 10 MG tablet, Take 1 tablet by mouth Every 8 (Eight) Hours As Needed for Muscle Spasms., Disp: 90 tablet, Rfl: 1  •  clonazePAM (KlonoPIN) 2 MG tablet, TAKE ONE TABLET BY MOUTH  AT BEDTIME AS NEEDED FOR ANXIETY, Disp: 90 tablet, Rfl: 0  •  doxepin (SINEquan) 50 MG capsule, Take 1 capsule by mouth Every Night., Disp: 90 capsule, Rfl: 1  •  fenofibrate micronized (LOFIBRA) 134 MG capsule, Take 1 capsule by mouth Every Morning Before Breakfast., Disp: 90 capsule, Rfl: 1  •  fluticasone (FLONASE) 50 MCG/ACT nasal spray, 2 sprays into the nostril(s) as directed by provider Daily., Disp: 3 bottle, Rfl: 1  •  fluticasone-salmeterol (ADVAIR DISKUS) 250-50 MCG/DOSE DISKUS, Inhale 1 puff 2 (Two) Times a Day., Disp: 42 each, Rfl: 0  •  glucosamine-chondroitin 500-400 MG capsule capsule, Take 2 capsules by mouth Daily., Disp: , Rfl:   •  glucose blood test strip, CHECK BLOOD SUGAR ONE TIME PER DAY, Disp: 100 each, Rfl: 5  •  levothyroxine (SYNTHROID, LEVOTHROID) 50 MCG tablet, Take 1 tablet by mouth Every Other Day., Disp: 45 tablet, Rfl: 1  •  levothyroxine (SYNTHROID, LEVOTHROID) 75 MCG tablet, TAKE ONE TABLET BY MOUTH EVERY OTHER DAY, Disp: 45 tablet, Rfl: 4  •  magnesium oxide (MAGOX) 400 (241.3 Mg) MG tablet tablet, Take 400 mg by mouth Daily., Disp: , Rfl:   •  metFORMIN ER (GLUCOPHAGE-XR) 750 MG 24 hr tablet, Take 1 tablet by mouth 2 (Two) Times a Day., Disp: 180 tablet, Rfl: 1  •  metoprolol tartrate (LOPRESSOR) 25 MG tablet, Take 1 tablet by mouth 3 (Three) Times a Day. 1 tablet each am, 1 1/2 tablet pm, Disp: 180 tablet, Rfl: 1  •  montelukast (SINGULAIR) 10 MG tablet, Take 1 tablet by mouth Every Night., Disp: 90 tablet, Rfl: 1  •  Multiple Vitamins-Minerals (MULTIVITAL PO), Take  by mouth., Disp: , Rfl:   •  omeprazole (priLOSEC) 40 MG capsule, Take 1 capsule by mouth Daily., Disp: 90  capsule, Rfl: 1  •  simvastatin (ZOCOR) 40 MG tablet, TAKE ONE TABLET BY MOUTH EVERY NIGHT, Disp: 30 tablet, Rfl: 0  •  traMADol (ULTRAM) 50 MG tablet, TAKE 1-2 TABLETS BY MOUTH THREE TIMES A DAY AS NEEDED FOR PAIN, Disp: 120 tablet, Rfl: 1  •  venlafaxine XR (EFFEXOR-XR) 150 MG 24 hr capsule, TAKE ONE CAPSULE BY MOUTH DAILY, Disp: 90 capsule, Rfl: 1  •  glimepiride (AMARYL) 2 MG tablet, TAKE 1 TABLET EVERY MORNINGBEFORE BREAKFAST, Disp: 90 tablet, Rfl: 1    Social History     Socioeconomic History   • Marital status:      Spouse name: Not on file   • Number of children: Not on file   • Years of education: Not on file   • Highest education level: Not on file   Social Needs   • Financial resource strain: Not on file   • Food insecurity - worry: Not on file   • Food insecurity - inability: Not on file   • Transportation needs - medical: Not on file   • Transportation needs - non-medical: Not on file   Occupational History   • Not on file   Tobacco Use   • Smoking status: Never Smoker   • Smokeless tobacco: Never Used   Substance and Sexual Activity   • Alcohol use: Yes     Comment: Social w/Daily Caffeine Use   • Drug use: Yes     Types: Benzodiazepines     Comment: Clonazepam/Tramadol   • Sexual activity: Defer     Birth control/protection: Surgical     Comment: Hyst   Other Topics Concern   • Not on file   Social History Narrative   • Not on file       Family History   Problem Relation Age of Onset   • Hypertension Mother    • Stroke Mother    • Diabetes Sister    • Coronary artery disease Brother    • Diabetes Brother    • Coronary artery disease Brother    • Skin cancer Neg Hx      Review of Systems   Constitutional: Positive for fatigue.   Respiratory: Positive for shortness of breath. Negative for wheezing.    Cardiovascular: Negative for chest pain, palpitations and leg swelling.   Neurological: Negative for dizziness and syncope.   All other systems reviewed and are negative.      Physical  Exam:    Vital Signs:  Weight: 75.8 kg (167 lb)   Body mass index is 25.39 kg/m².  Temp: 98.2 °F (36.8 °C)   Heart Rate: 66   BP: 130/81     Physical Exam   Constitutional: She is oriented to person, place, and time. She appears well-developed and well-nourished.   HENT:   Head: Normocephalic and atraumatic.   Nose: Nose normal.   Mouth/Throat: Oropharynx is clear and moist.   Eyes: Conjunctivae are normal. Pupils are equal, round, and reactive to light.   Neck: Normal range of motion. Neck supple. No JVD present. No thyromegaly present.   Cardiovascular: Normal rate, regular rhythm, normal heart sounds and intact distal pulses. PMI is not displaced. Exam reveals no gallop and no friction rub.   No murmur heard.  Pulses:       Radial pulses are 2+ on the right side, and 2+ on the left side.        Dorsalis pedis pulses are 2+ on the right side, and 2+ on the left side.   Pulmonary/Chest: Effort normal and breath sounds normal. She has no rales.   Abdominal: Soft. Bowel sounds are normal. She exhibits no distension and no mass. There is no tenderness.   Musculoskeletal: Normal range of motion. She exhibits no tenderness or deformity.   Lymphadenopathy:     She has no cervical adenopathy.   Neurological: She is alert and oriented to person, place, and time. She has normal reflexes.   Skin: Skin is warm and dry. No rash noted. No erythema.   Psychiatric: She has a normal mood and affect. Her behavior is normal.   No groin or neck adenopathy    Relevant studies (other than HPI)        Assessment:     Problem List Items Addressed This Visit        Cardiovascular and Mediastinum    Hypertension - Primary    Pulmonary hypertension (CMS/HCC)    Dilated aortic root (CMS/HCC)       Respiratory    Mild intermittent asthma without complication       Genitourinary    Renal insufficiency       Other    Anxiety    S/P AVR        1. Mild root dilatation  2. S/P AVR, prosthesis is OK  3. Dyspnea of effort  4. CRI  stable    Recommendation/Plan:     She has mild ascending and root aortic ectasia and no residual valve issues. I recommend BP control (Dp/Dt) and chest CT and office visit in 3 years.    Thank you for allowing me to participate in her care.    Regards,    Alphonso Ellis M.D.

## 2019-01-31 ENCOUNTER — HOSPITAL ENCOUNTER (EMERGENCY)
Facility: HOSPITAL | Age: 79
Discharge: HOME | End: 2019-02-01
Attending: EMERGENCY MEDICINE | Admitting: EMERGENCY MEDICINE

## 2019-01-31 ENCOUNTER — APPOINTMENT (OUTPATIENT)
Dept: CT IMAGING | Facility: HOSPITAL | Age: 79
End: 2019-01-31

## 2019-01-31 DIAGNOSIS — R20.2 PARESTHESIA OF LEFT FOOT: Primary | ICD-10-CM

## 2019-01-31 DIAGNOSIS — R20.2 LEFT HAND PARESTHESIA: ICD-10-CM

## 2019-01-31 LAB
BASOPHILS # BLD AUTO: 0.04 10*3/MM3 (ref 0–0.2)
BASOPHILS NFR BLD AUTO: 0.6 % (ref 0–1.5)
DEPRECATED RDW RBC AUTO: 45.3 FL (ref 37–54)
EOSINOPHIL # BLD AUTO: 0.28 10*3/MM3 (ref 0–0.7)
EOSINOPHIL NFR BLD AUTO: 4 % (ref 0.3–6.2)
ERYTHROCYTE [DISTWIDTH] IN BLOOD BY AUTOMATED COUNT: 13 % (ref 11.7–13)
HCT VFR BLD AUTO: 42.3 % (ref 35.6–45.5)
HGB BLD-MCNC: 13.6 G/DL (ref 11.9–15.5)
IMM GRANULOCYTES # BLD AUTO: 0.03 10*3/MM3 (ref 0–0.03)
IMM GRANULOCYTES NFR BLD AUTO: 0.4 % (ref 0–0.5)
LYMPHOCYTES # BLD AUTO: 2.64 10*3/MM3 (ref 0.9–4.8)
LYMPHOCYTES NFR BLD AUTO: 38 % (ref 19.6–45.3)
MCH RBC QN AUTO: 30.9 PG (ref 26.9–32)
MCHC RBC AUTO-ENTMCNC: 32.2 G/DL (ref 32.4–36.3)
MCV RBC AUTO: 96.1 FL (ref 80.5–98.2)
MONOCYTES # BLD AUTO: 0.77 10*3/MM3 (ref 0.2–1.2)
MONOCYTES NFR BLD AUTO: 11.1 % (ref 5–12)
NEUTROPHILS # BLD AUTO: 3.19 10*3/MM3 (ref 1.9–8.1)
NEUTROPHILS NFR BLD AUTO: 45.9 % (ref 42.7–76)
PLATELET # BLD AUTO: 217 10*3/MM3 (ref 140–500)
PMV BLD AUTO: 9.6 FL (ref 6–12)
RBC # BLD AUTO: 4.4 10*6/MM3 (ref 3.9–5.2)
WBC NRBC COR # BLD: 6.95 10*3/MM3 (ref 4.5–10.7)

## 2019-01-31 PROCEDURE — 93005 ELECTROCARDIOGRAM TRACING: CPT | Performed by: EMERGENCY MEDICINE

## 2019-01-31 PROCEDURE — 99284 EMERGENCY DEPT VISIT MOD MDM: CPT

## 2019-01-31 PROCEDURE — 70450 CT HEAD/BRAIN W/O DYE: CPT

## 2019-01-31 PROCEDURE — 93010 ELECTROCARDIOGRAM REPORT: CPT | Performed by: INTERNAL MEDICINE

## 2019-01-31 PROCEDURE — 84484 ASSAY OF TROPONIN QUANT: CPT | Performed by: EMERGENCY MEDICINE

## 2019-01-31 PROCEDURE — 93005 ELECTROCARDIOGRAM TRACING: CPT

## 2019-01-31 PROCEDURE — 85025 COMPLETE CBC W/AUTO DIFF WBC: CPT | Performed by: EMERGENCY MEDICINE

## 2019-01-31 PROCEDURE — 80053 COMPREHEN METABOLIC PANEL: CPT | Performed by: EMERGENCY MEDICINE

## 2019-01-31 RX ORDER — SODIUM CHLORIDE 0.9 % (FLUSH) 0.9 %
10 SYRINGE (ML) INJECTION AS NEEDED
Status: DISCONTINUED | OUTPATIENT
Start: 2019-01-31 | End: 2019-02-01 | Stop reason: HOSPADM

## 2019-02-01 ENCOUNTER — PATIENT OUTREACH (OUTPATIENT)
Dept: CASE MANAGEMENT | Facility: OTHER | Age: 79
End: 2019-02-01

## 2019-02-01 VITALS
WEIGHT: 167.4 LBS | DIASTOLIC BLOOD PRESSURE: 97 MMHG | TEMPERATURE: 96.1 F | OXYGEN SATURATION: 95 % | SYSTOLIC BLOOD PRESSURE: 135 MMHG | RESPIRATION RATE: 17 BRPM | BODY MASS INDEX: 26.27 KG/M2 | HEART RATE: 88 BPM | HEIGHT: 67 IN

## 2019-02-01 LAB
ALBUMIN SERPL-MCNC: 4.5 G/DL (ref 3.5–5.2)
ALBUMIN/GLOB SERPL: 1.6 G/DL
ALP SERPL-CCNC: 75 U/L (ref 39–117)
ALT SERPL W P-5'-P-CCNC: 26 U/L (ref 1–33)
ANION GAP SERPL CALCULATED.3IONS-SCNC: 13 MMOL/L
AST SERPL-CCNC: 23 U/L (ref 1–32)
BILIRUB SERPL-MCNC: 0.3 MG/DL (ref 0.1–1.2)
BUN BLD-MCNC: 23 MG/DL (ref 8–23)
BUN/CREAT SERPL: 22.1 (ref 7–25)
CALCIUM SPEC-SCNC: 10.4 MG/DL (ref 8.6–10.5)
CHLORIDE SERPL-SCNC: 103 MMOL/L (ref 98–107)
CO2 SERPL-SCNC: 24 MMOL/L (ref 22–29)
CREAT BLD-MCNC: 1.04 MG/DL (ref 0.57–1)
GFR SERPL CREATININE-BSD FRML MDRD: 51 ML/MIN/1.73
GLOBULIN UR ELPH-MCNC: 2.8 GM/DL
GLUCOSE BLD-MCNC: 114 MG/DL (ref 65–99)
POTASSIUM BLD-SCNC: 4 MMOL/L (ref 3.5–5.2)
PROT SERPL-MCNC: 7.3 G/DL (ref 6–8.5)
SODIUM BLD-SCNC: 140 MMOL/L (ref 136–145)
TROPONIN T SERPL-MCNC: <0.01 NG/ML (ref 0–0.03)

## 2019-02-01 NOTE — ED TRIAGE NOTES
States that her left hand felt like it was tingling earlier tonight. States that the hand is no longer tingling.   Pt states that she noticed a knot on the back of her left leg around 2030.   States that her left foot will feel like it is about to fall asleep.

## 2019-02-01 NOTE — ED PROVIDER NOTES
EMERGENCY DEPARTMENT ENCOUNTER    CHIEF COMPLAINT  Chief Complaint: Tingling  History given by: patient   History limited by: n/a  Room Number: 16/16  PMD: Magdalena Joy MD      HPI:  Pt is a 78 y.o. female who presents for evaluation of tingling in her left hand and left foot that occurred this evening at 20:30. She describes the sensation as a pins and needles sensation, but states that it has since resolved. She denies any associated weakness, headache, nausea, vomiting, or focal weakness. She also reports that she noticed an area of swelling (described as a knot) to her posterior left calf, this too has resolved.  She states that she has had intermittent sharp pain in the LLE for the past month, but she denies pain currently.    Duration:  Onset 3 hours ago  Onset: graudal  Timing: constant   Location: left hand, left foot   Radiation: none  Quality: pins and needles   Intensity/Severity: moderate  Progression: resolved  Associated Symptoms: knot to LLE  Aggravating Factors: none  Alleviating Factors: none      PAST MEDICAL HISTORY  Active Ambulatory Problems     Diagnosis Date Noted   • Hypertension 08/22/2016   • Pulmonary hypertension (CMS/HCC) 08/22/2016   • Gastric reflux 08/24/2016   • Anxiety    • RLS (restless legs syndrome)    • Controlled diabetes mellitus type 2 with complications (CMS/[facility])    • Hypothyroidism    • Hyperlipidemia    • Seasonal allergic rhinitis 11/11/2016   • Mild intermittent asthma without complication 11/11/2016   • S/P AVR 03/05/2017   • Shortness of breath 03/13/2017   • Dilated aortic root (CMS/HCC) 10/02/2017   • Thoracic aortic aneurysm without rupture  10/02/2017   • Macular degeneration 02/21/2018   • Multiple joint pain 02/21/2018   • Headache disorder 06/12/2018   • Primary insomnia 06/12/2018   • Renal insufficiency 10/28/2018     Resolved Ambulatory Problems     Diagnosis Date Noted   • Sleep apnea 08/22/2016     Past Medical History:   Diagnosis Date   • Allergic  rhinitis    • Anxiety    • Aortic root dilatation (CMS/HCC) 10/02/2017   • Aortic valve calcification 10/02/2017   • Aortic valve insufficiency Dx in 07   • Aortic valve prosthesis present 11/02/2018   • Ascending aorta dilatation (CMS/HCC) 10/02/2017   • Asthma    • Atypical chest pain    • Breast pain, right Hx   • CAD (coronary artery disease)    • Cardiomegaly 2017   • Cervicalgia    • Chest pain due to CAD    • 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   • DM (diabetes mellitus) (CMS/[facility])    • Esophagitis    • GERD (gastroesophageal reflux disease)    • Health care maintenance    • Heart murmur    • History of echocardiogram 10/2/17-[facility]   • Hyperlipidemia    • Hypertension    • Hypothyroidism    • Insomnia    • Macular degeneration, left eye    • Mild aortic valve regurgitation 10/02/2017   • Mild aortic valve stenosis 10/02/2017   • Mild dilation of ascending aorta (CMS/HCC) 10/02/2017   • Moderate tricuspid valve regurgitation 10/02/2017   • Mood disorder in conditions classified elsewhere    • Near syncope 03/2017-[facility] ER   • Neuropathy    • NNEKA (obstructive sleep apnea)    • Osteoarthritis    • Pulmonary hypertension (CMS/HCC) 2017   • Renal insufficiency    • RLS (restless legs syndrome)    • Thoracic ascending aortic aneurysm (CMS/[facility]) Dx in 2007 @ 3.8CM & 1/02/2018       PAST SURGICAL HISTORY  Past Surgical History:   Procedure Laterality Date   • AORTIC VALVE REPAIR/REPLACEMENT  2007    Dr. Perry   • APPENDECTOMY     • AUGMENTATION MAMMAPLASTY  1976   • BREAST AUGMENTATION  2014   • BUNIONECTOMY     • CARDIAC CATHETERIZATION  2007   • COLONOSCOPY  2010   • COLONOSCOPY  03/02/2012   • HYSTERECTOMY  1972   • SIGMOIDOSCOPY  2001       FAMILY HISTORY  Family History   Problem Relation Age of Onset   • Hypertension Mother    • Stroke Mother    • Diabetes Sister    • Coronary artery disease Brother    • Diabetes Brother    • Coronary artery disease Brother    • Skin cancer Neg Hx        SOCIAL  HISTORY  Social History     Socioeconomic History   • Marital status:      Spouse name: Not on file   • Number of children: Not on file   • Years of education: Not on file   • Highest education level: Not on file   Social Needs   • Financial resource strain: Not on file   • Food insecurity - worry: Not on file   • Food insecurity - inability: Not on file   • Transportation needs - medical: Not on file   • Transportation needs - non-medical: Not on file   Occupational History   • Not on file   Tobacco Use   • Smoking status: Never Smoker   • Smokeless tobacco: Never Used   Substance and Sexual Activity   • Alcohol use: Yes     Comment: Social w/Daily Caffeine Use   • Drug use: Yes     Types: Benzodiazepines     Comment: Clonazepam/Tramadol   • Sexual activity: Defer     Birth control/protection: Surgical     Comment: Hyst   Other Topics Concern   • Not on file   Social History Narrative   • Not on file       ALLERGIES  Patient has no known allergies.    REVIEW OF SYSTEMS  Review of Systems   Constitutional: Negative for fever.   HENT: Negative for sore throat.    Eyes: Negative.    Respiratory: Negative for cough and shortness of breath.    Cardiovascular: Positive for leg swelling (Knot to LLE). Negative for chest pain.   Gastrointestinal: Negative for abdominal pain, diarrhea and vomiting.   Genitourinary: Negative for dysuria.   Musculoskeletal: Negative for neck pain.   Skin: Negative for rash.   Neurological: Positive for numbness (tingling in left hand and left foot). Negative for weakness and headaches.   Hematological: Negative.    Psychiatric/Behavioral: Negative.    All other systems reviewed and are negative.      PHYSICAL EXAM  ED Triage Vitals   Temp Heart Rate Resp BP SpO2   01/31/19 2231 01/31/19 2231 01/31/19 2231 01/31/19 2248 01/31/19 2231   96.1 °F (35.6 °C) 96 16 172/98 95 %      Temp src Heart Rate Source Patient Position BP Location FiO2 (%)   01/31/19 2231 -- 01/31/19 2248 01/31/19 2248  --   Tympanic  Lying Right arm        Physical Exam   Constitutional: She is oriented to person, place, and time. No distress.   HENT:   Head: Normocephalic and atraumatic.   Eyes: EOM are normal. Pupils are equal, round, and reactive to light.   Neck: Normal range of motion. Neck supple.   Cardiovascular: Normal rate, regular rhythm and normal heart sounds.   Pulmonary/Chest: Effort normal and breath sounds normal. No respiratory distress.   Abdominal: Soft. There is no tenderness. There is no rebound and no guarding.   Musculoskeletal: Normal range of motion. She exhibits no edema.   Neurological: She is alert and oriented to person, place, and time. She has normal sensation and normal strength.   Skin: Skin is warm and dry. No rash noted.   Psychiatric: Mood and affect normal.   Nursing note and vitals reviewed.      LAB RESULTS  Lab Results (last 24 hours)     Procedure Component Value Units Date/Time    CBC & Differential [[id number]] Collected:  01/31/19 2331    Specimen:  Blood Updated:  01/31/19 2338    Narrative:       The following orders were created for panel order CBC & Differential.  Procedure                               Abnormality         Status                     ---------                               -----------         ------                     CBC Auto Differential[[id number]]        Abnormal            Final result                 Please view results for these tests on the individual orders.    Comprehensive Metabolic Panel [[id number]]  (Abnormal) Collected:  01/31/19 2331    Specimen:  Blood Updated:  02/01/19 0004     Glucose 114 mg/dL      BUN 23 mg/dL      Creatinine 1.04 mg/dL      Sodium 140 mmol/L      Potassium 4.0 mmol/L      Chloride 103 mmol/L      CO2 24.0 mmol/L      Calcium 10.4 mg/dL      Total Protein 7.3 g/dL      Albumin 4.50 g/dL      ALT (SGPT) 26 U/L      AST (SGOT) 23 U/L      Alkaline Phosphatase 75 U/L      Total Bilirubin 0.3 mg/dL      eGFR Non  Amer 51  mL/min/1.73      Globulin 2.8 gm/dL      A/G Ratio 1.6 g/dL      BUN/Creatinine Ratio 22.1     Anion Gap 13.0 mmol/L     Narrative:       The MDRD GFR formula is only valid for adults with stable renal function between ages 18 and 70.    Troponin [[id number]]  (Normal) Collected:  01/31/19 2331    Specimen:  Blood Updated:  02/01/19 0004     Troponin T <0.010 ng/mL     Narrative:       Troponin T Reference Range:  <= 0.03 ng/mL-   Negative for AMI  >0.03 ng/mL-     Abnormal for myocardial necrosis.  Clinicians would have to utilize clinical acumen, EKG, Troponin and serial changes to determine if it is an Acute Myocardial Infarction or myocardial injury due to an underlying chronic condition.     CBC Auto Differential [[id number]]  (Abnormal) Collected:  01/31/19 2331    Specimen:  Blood Updated:  01/31/19 2338     WBC 6.95 10*3/mm3      RBC 4.40 10*6/mm3      Hemoglobin 13.6 g/dL      Hematocrit 42.3 %      MCV 96.1 fL      MCH 30.9 pg      MCHC 32.2 g/dL      RDW 13.0 %      RDW-SD 45.3 fl      MPV 9.6 fL      Platelets 217 10*3/mm3      Neutrophil % 45.9 %      Lymphocyte % 38.0 %      Monocyte % 11.1 %      Eosinophil % 4.0 %      Basophil % 0.6 %      Immature Grans % 0.4 %      Neutrophils, Absolute 3.19 10*3/mm3      Lymphocytes, Absolute 2.64 10*3/mm3      Monocytes, Absolute 0.77 10*3/mm3      Eosinophils, Absolute 0.28 10*3/mm3      Basophils, Absolute 0.04 10*3/mm3      Immature Grans, Absolute 0.03 10*3/mm3           I ordered the above labs and reviewed the results    RADIOLOGY  CT Head Without Contrast   Final Result   1. No acute intracranial abnormality.                           This report was finalized on 2/1/2019 1:10 AM by Luigi Jackson M.D.               I ordered the above noted radiological studies. Interpreted by radiologist. Reviewed by me in PACS.       PROCEDURES  Procedures    EKG          EKG time: 22:54  Rhythm/Rate: NSR 93   P waves and AL: nml  QRS, axis: nml QRS, LAD   ST and T  waves: nml     Interpreted Contemporaneously by me, independently viewed  Unchanged compared to prior 3/7/17    PROGRESS AND CONSULTS       22:48  EKG ordered.     23:02  CMP, CBC, and troponin ordered.     23:29  BP- 141/81 HR- 91 Temp- 96.1 °F (35.6 °C) (Tympanic) O2 sat- 93%  Informed pt and family of the plan for labs and CT head. Pt understands and agrees with the plan, all questions answered.    23:33  CT head ordered.     01:24  BP- 132/83 HR- 88 Temp- 96.1 °F (35.6 °C) (Tympanic) O2 sat- 95%  Rechecked the patient who is in NAD and is resting comfortably. Informed pt that the labs and CT head show NAD. Informed her of the plan for d/c. Pt instructed to f/u with her PMD. Pt understands and agrees with the plan, all questions answered.    MEDICAL DECISION MAKING  Results were reviewed/discussed with the patient and they were also made aware of online access. Pt also made aware that some labs, such as cultures, will not be resulted during ER visit and follow up with PMD is necessary.     MDM  Number of Diagnoses or Management Options  Left hand paresthesia:   Paresthesia of left foot:      Amount and/or Complexity of Data Reviewed  Clinical lab tests: ordered and reviewed (Troponin is <0.010)  Tests in the radiology section of CPT®: ordered and reviewed (CT head shows NAD)  Tests in the medicine section of CPT®: reviewed and ordered (See EKG procedure note. )  Decide to obtain previous medical records or to obtain history from someone other than the patient: yes  Review and summarize past medical records: yes (No prior ED visits. )    Patient Progress  Patient progress: stable         DIAGNOSIS  Final diagnoses:   Paresthesia of left foot   Left hand paresthesia       DISPOSITION  DISCHARGE    Patient discharged in stable condition.    Reviewed implications of results, diagnosis, meds, responsibility to follow up, warning signs and symptoms of possible worsening, potential complications and reasons to return to  ER.    Patient/Family voiced understanding of above instructions.    Discussed plan for discharge, as there is no emergent indication for admission. Patient referred to primary care provider for BP management due to today's BP. Pt/family is agreeable and understands need for follow up and repeat testing.  Pt is aware that discharge does not mean that nothing is wrong but it indicates no emergency is present that requires admission and they must continue care with follow-up as given below or physician of their choice.     FOLLOW-UP  Magdalena Joy MD  [address] [address]  [phone]    Schedule an appointment as soon as possible for a visit            Medication List      No changes were made to your prescriptions during this visit.       Latest Documented Vital Signs:  As of 2:41 AM  BP- 135/97 HR- 88 Temp- 96.1 °F (35.6 °C) (Tympanic) O2 sat- 95%    --  Documentation assistance provided by jermaine Melgar for Dr Mcdaniel.  Information recorded by the scribe was done at my direction and has been verified and validated by me.     Simran Melgar  02/01/19 0126       Geovani Mcdaniel MD  02/01/19 [id number]

## 2019-02-04 ENCOUNTER — OFFICE VISIT (OUTPATIENT)
Dept: INTERNAL MEDICINE | Facility: CLINIC | Age: 79
End: 2019-02-04

## 2019-02-04 ENCOUNTER — HOSPITAL ENCOUNTER (OUTPATIENT)
Dept: CARDIOLOGY | Facility: HOSPITAL | Age: 79
Discharge: HOME | End: 2019-02-04
Admitting: NURSE PRACTITIONER

## 2019-02-04 VITALS
WEIGHT: 166 LBS | HEART RATE: 75 BPM | DIASTOLIC BLOOD PRESSURE: 82 MMHG | SYSTOLIC BLOOD PRESSURE: 100 MMHG | BODY MASS INDEX: 26.06 KG/M2 | OXYGEN SATURATION: 98 % | HEIGHT: 67 IN

## 2019-02-04 DIAGNOSIS — R20.2 PARESTHESIA OF LEFT FOOT: ICD-10-CM

## 2019-02-04 DIAGNOSIS — M79.662 PAIN OF LEFT CALF: Primary | ICD-10-CM

## 2019-02-04 DIAGNOSIS — R20.2 PARESTHESIAS IN LEFT HAND: ICD-10-CM

## 2019-02-04 DIAGNOSIS — M79.662 PAIN OF LEFT CALF: ICD-10-CM

## 2019-02-04 LAB
BH CV LOW VAS LEFT LESSER SAPH VESSEL: 1
BH CV LOW VAS LEFT POPLITEAL SPONT: 1
BH CV LOWER VASCULAR LEFT COMMON FEMORAL AUGMENT: NORMAL
BH CV LOWER VASCULAR LEFT COMMON FEMORAL COMPETENT: NORMAL
BH CV LOWER VASCULAR LEFT COMMON FEMORAL COMPRESS: NORMAL
BH CV LOWER VASCULAR LEFT COMMON FEMORAL PHASIC: NORMAL
BH CV LOWER VASCULAR LEFT COMMON FEMORAL SPONT: NORMAL
BH CV LOWER VASCULAR LEFT DISTAL FEMORAL COMPRESS: NORMAL
BH CV LOWER VASCULAR LEFT GASTRONEMIUS COMPRESS: NORMAL
BH CV LOWER VASCULAR LEFT GREATER SAPH AK COMPRESS: NORMAL
BH CV LOWER VASCULAR LEFT GREATER SAPH BK COMPRESS: NORMAL
BH CV LOWER VASCULAR LEFT LESSER SAPH COMPRESS: NORMAL
BH CV LOWER VASCULAR LEFT LESSER SAPH THROMBUS: NORMAL
BH CV LOWER VASCULAR LEFT MID FEMORAL AUGMENT: NORMAL
BH CV LOWER VASCULAR LEFT MID FEMORAL COMPETENT: NORMAL
BH CV LOWER VASCULAR LEFT MID FEMORAL COMPRESS: NORMAL
BH CV LOWER VASCULAR LEFT MID FEMORAL PHASIC: NORMAL
BH CV LOWER VASCULAR LEFT MID FEMORAL SPONT: NORMAL
BH CV LOWER VASCULAR LEFT PERONEAL COMPRESS: NORMAL
BH CV LOWER VASCULAR LEFT POPLITEAL AUGMENT: NORMAL
BH CV LOWER VASCULAR LEFT POPLITEAL COMPETENT: NORMAL
BH CV LOWER VASCULAR LEFT POPLITEAL COMPRESS: NORMAL
BH CV LOWER VASCULAR LEFT POPLITEAL PHASIC: NORMAL
BH CV LOWER VASCULAR LEFT POPLITEAL SPONT: NORMAL
BH CV LOWER VASCULAR LEFT POSTERIOR TIBIAL COMPRESS: NORMAL
BH CV LOWER VASCULAR LEFT PROXIMAL FEMORAL COMPRESS: NORMAL
BH CV LOWER VASCULAR LEFT SAPHENOFEMORAL JUNCTION AUGMENT: NORMAL
BH CV LOWER VASCULAR LEFT SAPHENOFEMORAL JUNCTION COMPETENT: NORMAL
BH CV LOWER VASCULAR LEFT SAPHENOFEMORAL JUNCTION COMPRESS: NORMAL
BH CV LOWER VASCULAR LEFT SAPHENOFEMORAL JUNCTION PHASIC: NORMAL
BH CV LOWER VASCULAR LEFT SAPHENOFEMORAL JUNCTION SPONT: NORMAL
BH CV LOWER VASCULAR RIGHT COMMON FEMORAL AUGMENT: NORMAL
BH CV LOWER VASCULAR RIGHT COMMON FEMORAL COMPETENT: NORMAL
BH CV LOWER VASCULAR RIGHT COMMON FEMORAL COMPRESS: NORMAL
BH CV LOWER VASCULAR RIGHT COMMON FEMORAL PHASIC: NORMAL
BH CV LOWER VASCULAR RIGHT COMMON FEMORAL SPONT: NORMAL
BH CV POP FLUID COLLECT LEFT: 1

## 2019-02-04 PROCEDURE — 93971 EXTREMITY STUDY: CPT

## 2019-02-04 PROCEDURE — 99213 OFFICE O/P EST LOW 20 MIN: CPT | Performed by: NURSE PRACTITIONER

## 2019-02-04 RX ORDER — ASPIRIN 325 MG
325 TABLET ORAL
COMMUNITY
End: 2019-04-11 | Stop reason: DRUGHIGH

## 2019-02-04 NOTE — PROGRESS NOTES
Subjective   Dior Pettit is a 78 y.o. female.     She reports she has been having left calf pain since last week. She reports last week she had some bulging and swelling to the leg. She denies any history of blood clots. She did go to ER last week secondary to paresthesia of left hand and left foot. She had labs and imaging of brain with no acute findings. Those symptoms have resolved.       Leg Pain    The incident occurred more than 1 week ago. There was no injury mechanism. Pain location: left calf  The quality of the pain is described as aching. The pain is at a severity of 2/10. The pain is mild. Pertinent negatives include no inability to bear weight, loss of motion, numbness (resolved of left hand and left foot) or tingling. The symptoms are aggravated by palpation (walking ). She has tried nothing for the symptoms.        The following portions of the patient's history were reviewed and updated as appropriate: allergies, current medications, past social history and problem list.    Review of Systems   Constitutional: Negative for activity change, appetite change, fatigue and fever.   Respiratory: Negative for cough, shortness of breath and wheezing.    Cardiovascular: Negative for chest pain, palpitations and leg swelling.   Musculoskeletal: Positive for arthralgias (left knee posterior pain).        Left calf pain (remains) and swelling (improved)   Neurological: Negative for tingling and numbness (resolved of left hand and left foot).       Objective   Physical Exam   Constitutional: She is oriented to person, place, and time. She appears well-developed and well-nourished.   HENT:   Head: Normocephalic.   Nose: Nose normal.   Cardiovascular: Regular rhythm and normal heart sounds. Exam reveals no S3 and no S4.   No murmur heard.  Negative homans sign  Left calf circumference 33 cm, pain with palpation in left medial calf, no redness or streaking  Right calf circumference 34 cm    Pulmonary/Chest:  Effort normal and breath sounds normal. She has no decreased breath sounds. She has no wheezes. She has no rhonchi. She has no rales.   Musculoskeletal: She exhibits no edema.        Right knee: She exhibits normal range of motion and no swelling. No tenderness found.        Left knee: She exhibits normal range of motion and no swelling. No tenderness found.   Neurological: She is alert and oriented to person, place, and time. Gait normal.   Reflex Scores:       Patellar reflexes are 2+ on the right side and 2+ on the left side.  Skin: Skin is warm and dry.   Psychiatric: She has a normal mood and affect.       Assessment/Plan   Dior was seen today for leg pain.    Diagnoses and all orders for this visit:    Pain of left calf  -     Duplex Venous Lower Extremity - Left CAR; Future    Paresthesia of left foot  Comments:  resolved     Paresthesias in left hand  Comments:  resolved    I reviewed [facility] ER notes. Will send for doppler to r/o clot.

## 2019-02-07 ENCOUNTER — PATIENT OUTREACH (OUTPATIENT)
Dept: CASE MANAGEMENT | Facility: OTHER | Age: 79
End: 2019-02-07

## 2019-02-10 DIAGNOSIS — E78.5 HYPERLIPIDEMIA, ACQUIRED: ICD-10-CM

## 2019-02-11 RX ORDER — FENOFIBRATE 134 MG/1
CAPSULE ORAL
Qty: 90 CAPSULE | Refills: 3 | Status: SHIPPED | OUTPATIENT
Start: 2019-02-11 | End: 2020-02-20

## 2019-02-20 ENCOUNTER — PATIENT OUTREACH (OUTPATIENT)
Dept: CASE MANAGEMENT | Facility: OTHER | Age: 79
End: 2019-02-20

## 2019-02-21 ENCOUNTER — APPOINTMENT (OUTPATIENT)
Dept: WOMENS IMAGING | Facility: HOSPITAL | Age: 79
End: 2019-02-21

## 2019-02-21 ENCOUNTER — OFFICE VISIT (OUTPATIENT)
Dept: INTERNAL MEDICINE | Facility: CLINIC | Age: 79
End: 2019-02-21

## 2019-02-21 DIAGNOSIS — Z12.31 ENCOUNTER FOR SCREENING MAMMOGRAM FOR BREAST CANCER: ICD-10-CM

## 2019-02-21 DIAGNOSIS — Z12.31 ENCOUNTER FOR SCREENING MAMMOGRAM FOR BREAST CANCER: Primary | ICD-10-CM

## 2019-02-21 PROCEDURE — 77067 SCR MAMMO BI INCL CAD: CPT | Performed by: RADIOLOGY

## 2019-02-21 PROCEDURE — 77067 SCR MAMMO BI INCL CAD: CPT | Performed by: INTERNAL MEDICINE

## 2019-03-21 ENCOUNTER — PATIENT OUTREACH (OUTPATIENT)
Dept: CASE MANAGEMENT | Facility: OTHER | Age: 79
End: 2019-03-21

## 2019-03-25 ENCOUNTER — EPISODE CHANGES (OUTPATIENT)
Dept: CASE MANAGEMENT | Facility: OTHER | Age: 79
End: 2019-03-25

## 2019-04-06 DIAGNOSIS — E11.8 CONTROLLED TYPE 2 DIABETES MELLITUS WITH COMPLICATION, WITHOUT LONG-TERM CURRENT USE OF INSULIN (HCC): ICD-10-CM

## 2019-04-06 DIAGNOSIS — F41.9 ANXIETY: ICD-10-CM

## 2019-04-06 DIAGNOSIS — I10 BENIGN ESSENTIAL HTN: ICD-10-CM

## 2019-04-06 RX ORDER — CLONAZEPAM 2 MG/1
TABLET ORAL
Qty: 90 TABLET | Refills: 0 | Status: CANCELLED | OUTPATIENT
Start: 2019-04-06

## 2019-04-11 ENCOUNTER — OFFICE VISIT (OUTPATIENT)
Dept: INTERNAL MEDICINE | Facility: CLINIC | Age: 79
End: 2019-04-11

## 2019-04-11 VITALS
TEMPERATURE: 97.1 F | HEIGHT: 67 IN | BODY MASS INDEX: 25.8 KG/M2 | SYSTOLIC BLOOD PRESSURE: 126 MMHG | HEART RATE: 68 BPM | RESPIRATION RATE: 16 BRPM | DIASTOLIC BLOOD PRESSURE: 74 MMHG | OXYGEN SATURATION: 96 % | WEIGHT: 164.4 LBS

## 2019-04-11 DIAGNOSIS — E11.9 DIABETES MELLITUS WITHOUT COMPLICATION (HCC): Primary | ICD-10-CM

## 2019-04-11 DIAGNOSIS — H35.30 MACULAR DEGENERATION OF BOTH EYES, UNSPECIFIED TYPE: ICD-10-CM

## 2019-04-11 DIAGNOSIS — F41.9 ANXIETY: ICD-10-CM

## 2019-04-11 DIAGNOSIS — E03.8 OTHER SPECIFIED HYPOTHYROIDISM: ICD-10-CM

## 2019-04-11 DIAGNOSIS — M54.2 CERVICALGIA: ICD-10-CM

## 2019-04-11 DIAGNOSIS — I10 ESSENTIAL HYPERTENSION: ICD-10-CM

## 2019-04-11 DIAGNOSIS — F51.01 PRIMARY INSOMNIA: ICD-10-CM

## 2019-04-11 DIAGNOSIS — E03.9 ACQUIRED HYPOTHYROIDISM: ICD-10-CM

## 2019-04-11 DIAGNOSIS — I25.119 CORONARY ARTERY DISEASE INVOLVING NATIVE CORONARY ARTERY WITH ANGINA PECTORIS, UNSPECIFIED WHETHER NATIVE OR TRANSPLANTED HEART (HCC): ICD-10-CM

## 2019-04-11 PROCEDURE — G0439 PPPS, SUBSEQ VISIT: HCPCS | Performed by: INTERNAL MEDICINE

## 2019-04-11 RX ORDER — BACLOFEN 10 MG/1
10 TABLET ORAL EVERY 8 HOURS PRN
Qty: 90 TABLET | Refills: 1 | Status: SHIPPED | OUTPATIENT
Start: 2019-04-11 | End: 2019-11-13 | Stop reason: SDUPTHER

## 2019-04-11 RX ORDER — DOXEPIN HYDROCHLORIDE 50 MG/1
50 CAPSULE ORAL NIGHTLY
Qty: 90 CAPSULE | Refills: 2 | Status: SHIPPED | OUTPATIENT
Start: 2019-04-11 | End: 2019-09-04 | Stop reason: SDUPTHER

## 2019-04-11 RX ORDER — METFORMIN HYDROCHLORIDE 750 MG/1
750 TABLET, EXTENDED RELEASE ORAL 2 TIMES DAILY
Qty: 180 TABLET | Refills: 3 | Status: SHIPPED | OUTPATIENT
Start: 2019-04-11 | End: 2020-06-02

## 2019-04-11 RX ORDER — CLONAZEPAM 1 MG/1
1 TABLET ORAL NIGHTLY PRN
Qty: 90 TABLET | Refills: 0 | OUTPATIENT
Start: 2019-04-11 | End: 2019-07-16 | Stop reason: SDUPTHER

## 2019-04-11 RX ORDER — VENLAFAXINE HYDROCHLORIDE 150 MG/1
150 CAPSULE, EXTENDED RELEASE ORAL DAILY
Qty: 90 CAPSULE | Refills: 2 | Status: SHIPPED | OUTPATIENT
Start: 2019-04-11 | End: 2020-03-05

## 2019-04-11 RX ORDER — LEVOTHYROXINE SODIUM 0.05 MG/1
50 TABLET ORAL EVERY OTHER DAY
Qty: 45 TABLET | Refills: 3 | Status: SHIPPED | OUTPATIENT
Start: 2019-04-11 | End: 2020-05-14

## 2019-04-11 RX ORDER — CLONAZEPAM 2 MG/1
2 TABLET ORAL NIGHTLY PRN
Qty: 90 TABLET | Refills: 0 | OUTPATIENT
Start: 2019-04-11 | End: 2019-04-11 | Stop reason: SDUPTHER

## 2019-04-11 RX ORDER — ASPIRIN 81 MG/1
81 TABLET, CHEWABLE ORAL DAILY
COMMUNITY
End: 2021-09-15 | Stop reason: HOSPADM

## 2019-04-11 RX ORDER — LEVOTHYROXINE SODIUM 0.07 MG/1
75 TABLET ORAL EVERY OTHER DAY
Qty: 45 TABLET | Refills: 3 | Status: SHIPPED | OUTPATIENT
Start: 2019-04-11 | End: 2020-04-28

## 2019-04-11 NOTE — PROGRESS NOTES
"Subjective   Dior Pettit is a 78 y.o. female here for   Chief Complaint   Patient presents with   • Anxiety     3 month follow-up   • Diabetes Mellitus   • Hyperlipidemia   • Hypertension   • Hypothyroidism   • Insomnia   .    Vitals:    04/11/19 1313   BP: 126/74   BP Location: Right arm   Patient Position: Sitting   Cuff Size: Adult   Pulse: 68   Resp: 16   Temp: 97.1 °F (36.2 °C)   TempSrc: Temporal   SpO2: 96%   Weight: 74.6 kg (164 lb 6.4 oz)   Height: 169.5 cm (66.75\")       Body mass index is 25.94 kg/m².    Anxiety   Presents for follow-up visit. Symptoms include insomnia and nervous/anxious behavior. Patient reports no chest pain, palpitations or shortness of breath.       Diabetes Mellitus   This is a chronic problem. The current episode started more than 1 year ago. The problem occurs constantly. The problem has been unchanged. Pertinent negatives include no chest pain, chills, coughing, fatigue or fever.   Hyperlipidemia   This is a chronic problem. The problem is controlled. Recent lipid tests were reviewed and are normal. Exacerbating diseases include diabetes and hypothyroidism. Pertinent negatives include no chest pain or shortness of breath.   Hypertension   This is a chronic problem. The current episode started more than 1 year ago. The problem is unchanged. The problem is controlled. Associated symptoms include anxiety. Pertinent negatives include no chest pain, palpitations or shortness of breath.   Hypothyroidism   This is a chronic problem. The current episode started more than 1 year ago. The problem occurs constantly. The problem has been unchanged. Pertinent negatives include no chest pain, chills, coughing, fatigue or fever.   Insomnia   This is a chronic problem. The current episode started more than 1 year ago. The problem occurs constantly. The problem has been unchanged. Pertinent negatives include no chest pain, chills, coughing, fatigue or fever.        The following portions of " the patient's history were reviewed and updated as appropriate: allergies, current medications, past social history and problem list.    Review of Systems   Constitutional: Negative for chills, fatigue and fever.   Respiratory: Negative for cough, shortness of breath and wheezing.    Cardiovascular: Negative for chest pain, palpitations and leg swelling.   Psychiatric/Behavioral: Positive for sleep disturbance. Negative for dysphoric mood. The patient is nervous/anxious and has insomnia.      Rintov=[id number] (goes up with carbs).    Objective   Physical Exam   Constitutional: She appears well-developed and well-nourished. No distress.   Cardiovascular: Normal rate, regular rhythm and normal heart sounds.   Pulmonary/Chest: No respiratory distress. She has no wheezes. She has no rales. She exhibits no tenderness.   Musculoskeletal: She exhibits no edema.   Psychiatric: She has a normal mood and affect. Her behavior is normal.   Nursing note and vitals reviewed.      Assessment/Plan   Diagnoses and all orders for this visit:    Diabetes mellitus without complication (CMS/[facility])  Comments:  need diet/ex - need labs  Orders:  -     metFORMIN ER (GLUCOPHAGE-XR) 750 MG 24 hr tablet; Take 1 tablet by mouth 2 (Two) Times a Day.  -     Hemoglobin A1c; Future  -     Microalbumin / Creatinine Urine Ratio - Urine, Clean Catch; Future    Cervicalgia  Comments:  ok with muscle relaxer prn - continue heat and massage; discussed exercises   Orders:  -     baclofen (LIORESAL) 10 MG tablet; Take 1 tablet by mouth Every 8 (Eight) Hours As Needed for Muscle Spasms.    Coronary artery disease involving native coronary artery with angina pectoris, unspecified whether native or transplanted heart (CMS/[facility])  Comments:  f/u with cardiologist  Orders:  -     doxepin (SINEquan) 50 MG capsule; Take 1 capsule by mouth Every Night.    Other specified hypothyroidism  Comments:  need rechk  Orders:  -     levothyroxine (SYNTHROID, LEVOTHROID) 50 MCG  tablet; Take 1 tablet by mouth Every Other Day.    Acquired hypothyroidism  Comments:  need rechk  Orders:  -     levothyroxine (SYNTHROID, LEVOTHROID) 75 MCG tablet; Take 1 tablet by mouth Every Other Day.  -     TSH Rfx On Abnormal To Free T4; Future    Anxiety  -     venlafaxine XR (EFFEXOR-XR) 150 MG 24 hr capsule; Take 1 capsule by mouth Daily.  -     Discontinue: clonazePAM (KlonoPIN) 2 MG tablet; Take 1 tablet by mouth At Night As Needed for Anxiety.  -     clonazePAM (KlonoPIN) 1 MG tablet; Take 1 tablet by mouth At Night As Needed for Anxiety.    Anxiety  Comments:  ok with effexor  Orders:  -     venlafaxine XR (EFFEXOR-XR) 150 MG 24 hr capsule; Take 1 capsule by mouth Daily.  -     Discontinue: clonazePAM (KlonoPIN) 2 MG tablet; Take 1 tablet by mouth At Night As Needed for Anxiety.  -     clonazePAM (KlonoPIN) 1 MG tablet; Take 1 tablet by mouth At Night As Needed for Anxiety.    Macular degeneration of both eyes, unspecified type  Comments:  continue f/u with opthal    Essential hypertension  Comments:  ok with metoprolol tid  Orders:  -     CBC Auto Differential; Future  -     Comprehensive Metabolic Panel; Future  -     Lipid Panel; Future  -     Urinalysis With Microscopic If Indicated (No Culture) - Urine, Clean Catch; Future    Primary insomnia  Comments:  need lower dose clonazepam - 1/2-1 clonazepam qhs (1mg) -she states she cannot sleep without if & she has no side effects    Other orders  -     aspirin 81 MG chewable tablet; Chew 81 mg Daily.      Need hep A, shingrix, Tdap vaccines.

## 2019-04-11 NOTE — PROGRESS NOTES
Subsequent Medicare Wellness Visit   The ABC's of the Annual Wellness Visit    Chief Complaint   Patient presents with   • Anxiety     3 month follow-up   • Diabetes Mellitus   • Hyperlipidemia   • Hypertension   • Hypothyroidism   • Insomnia       HPI:  Dior Pettit, -1940, is a 78 y.o. female who presents for a Subsequent Medicare Wellness Visit.    Recent Hospitalizations:  No hospitalization(s) within the last year..    Current Medical Providers:  Patient Care Team:  Magdalena Joy MD as PCP - General  Magdalena Joy MD as PCP - Family Medicine  Ashanti Hong APRN as PCP - Claims Attributed  Gerardo Rodriguez MD as Consulting Physician (Pulmonary Disease)  Abbi Mitchell MD as Consulting Physician (Cardiology)  Luis Miguel Parker MD as Consulting Physician (Ophthalmology)    Health Habits and Functional and Cognitive Screening and Depression Screening:  Functional & Cognitive Status 2019   Do you have difficulty preparing food and eating? No   Do you have difficulty bathing yourself, getting dressed or grooming yourself? No   Do you have difficulty using the toilet? No   Do you have difficulty moving around from place to place? No   Do you have trouble with steps or getting out of a bed or a chair? No   In the past year have you fallen or experienced a near fall? No   Current Diet Diabetic Diet   Dental Exam Up to date   Eye Exam Up to date   Exercise (times per week) 0 times per week   Current Exercise Activities Include None   Do you need help using the phone?  No   Are you deaf or do you have serious difficulty hearing?  No   Do you need help with transportation? No   Do you need help shopping? No   Do you need help preparing meals?  No   Do you need help with housework?  No   Do you need help with laundry? No   Do you need help taking your medications? No   Do you need help managing money? No   Do you ever drive or ride in a car without wearing a seat belt? No   Have you felt  unusual stress, anger or loneliness in the last month? Yes   Who do you live with? Alone   If you need help, do you have trouble finding someone available to you? No   Have you been bothered in the last four weeks by sexual problems? (No Data)   Do you have difficulty concentrating, remembering or making decisions? No       Compared to one year ago, the patient feels her physical health is the same and her mental health is the same.    Depression Screen:  PHQ-2/PHQ-9 Depression Screening 4/11/2019   Little interest or pleasure in doing things 0   Feeling down, depressed, or hopeless 0   Trouble falling or staying asleep, or sleeping too much 0   Feeling tired or having little energy 0   Poor appetite or overeating 0   Feeling bad about yourself - or that you are a failure or have let yourself or your family down 0   Trouble concentrating on things, such as reading the newspaper or watching television 0   Moving or speaking so slowly that other people could have noticed. Or the opposite - being so fidgety or restless that you have been moving around a lot more than usual 0   Thoughts that you would be better off dead, or of hurting yourself in some way 0   Total Score 0         Past Medical/Family/Social History:  The following portions of the patient's history were reviewed and updated as appropriate: allergies, current medications, past family history, past medical history, past social history, past surgical history and problem list.    No Known Allergies      Current Outpatient Medications:   •  ascorbic acid (EQL VITAMIN C) 1000 MG tablet, Take 1,000 mg by mouth Daily., Disp: , Rfl:   •  aspirin 81 MG chewable tablet, Chew 81 mg Daily., Disp: , Rfl:   •  baclofen (LIORESAL) 10 MG tablet, Take 1 tablet by mouth Every 8 (Eight) Hours As Needed for Muscle Spasms., Disp: 90 tablet, Rfl: 1  •  clonazePAM (KlonoPIN) 2 MG tablet, Take 1 tablet by mouth At Night As Needed for Anxiety., Disp: 90 tablet, Rfl: 0  •  doxepin  (SINEquan) 50 MG capsule, Take 1 capsule by mouth Every Night., Disp: 90 capsule, Rfl: 2  •  fenofibrate micronized (LOFIBRA) 134 MG capsule, TAKE 1 CAPSULE EVERY       MORNING BEFORE BREAKFAST, Disp: 90 capsule, Rfl: 3  •  fluticasone (FLONASE) 50 MCG/ACT nasal spray, 2 sprays into the nostril(s) as directed by provider Daily., Disp: 3 bottle, Rfl: 1  •  fluticasone-salmeterol (ADVAIR DISKUS) 250-50 MCG/DOSE DISKUS, Inhale 1 puff 2 (Two) Times a Day., Disp: 42 each, Rfl: 0  •  glimepiride (AMARYL) 2 MG tablet, TAKE 1 TABLET EVERY MORNINGBEFORE BREAKFAST, Disp: 90 tablet, Rfl: 1  •  glucosamine-chondroitin 500-400 MG capsule capsule, Take 2 capsules by mouth Daily., Disp: , Rfl:   •  glucose blood test strip, CHECK BLOOD SUGAR ONE TIME PER DAY, Disp: 100 each, Rfl: 5  •  levothyroxine (SYNTHROID, LEVOTHROID) 50 MCG tablet, Take 1 tablet by mouth Every Other Day., Disp: 45 tablet, Rfl: 3  •  levothyroxine (SYNTHROID, LEVOTHROID) 75 MCG tablet, Take 1 tablet by mouth Every Other Day., Disp: 45 tablet, Rfl: 3  •  magnesium oxide (MAGOX) 400 (241.3 Mg) MG tablet tablet, Take 400 mg by mouth Daily., Disp: , Rfl:   •  metFORMIN ER (GLUCOPHAGE-XR) 750 MG 24 hr tablet, Take 1 tablet by mouth 2 (Two) Times a Day., Disp: 180 tablet, Rfl: 3  •  metoprolol tartrate (LOPRESSOR) 25 MG tablet, Take 1 tablet by mouth 3 (Three) Times a Day., Disp: 180 tablet, Rfl: 1  •  montelukast (SINGULAIR) 10 MG tablet, Take 1 tablet by mouth Every Night., Disp: 90 tablet, Rfl: 1  •  Multiple Vitamins-Minerals (MULTIVITAL PO), Take  by mouth., Disp: , Rfl:   •  omeprazole (priLOSEC) 40 MG capsule, Take 1 capsule by mouth Daily., Disp: 90 capsule, Rfl: 1  •  simvastatin (ZOCOR) 40 MG tablet, TAKE ONE TABLET BY MOUTH EVERY NIGHT, Disp: 30 tablet, Rfl: 0  •  venlafaxine XR (EFFEXOR-XR) 150 MG 24 hr capsule, Take 1 capsule by mouth Daily., Disp: 90 capsule, Rfl: 2    Aspirin use counseling: Taking ASA appropriately as indicated    Current medication  "list contains high risk medications. No harmful drug interactions have been identified.  Plan of action clonazepam nightly    Family History   Problem Relation Age of Onset   • Hypertension Mother    • Stroke Mother    • Diabetes Sister    • Coronary artery disease Brother    • Diabetes Brother    • Coronary artery disease Brother    • Skin cancer Neg Hx        Social History     Tobacco Use   • Smoking status: Never Smoker   • Smokeless tobacco: Never Used   Substance Use Topics   • Alcohol use: Yes     Comment: Social w/Daily Caffeine Use       Past Surgical History:   Procedure Laterality Date   • AORTIC VALVE REPAIR/REPLACEMENT  2007    Dr. 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)   • Hypothyroidism   • Hyperlipidemia   • Seasonal allergic rhinitis   • Mild intermittent asthma without complication   • S/P AVR   • Shortness of breath   • Dilated aortic root (CMS/HCC)   • Thoracic aortic aneurysm without rupture    • Macular degeneration   • Multiple joint pain   • Headache disorder   • Primary insomnia   • Renal insufficiency       Review of Systems    Objective     Vitals:    04/11/19 1313   BP: 126/74   BP Location: Right arm   Patient Position: Sitting   Cuff Size: Adult   Pulse: 68   Resp: 16   Temp: 97.1 °F (36.2 °C)   TempSrc: Temporal   SpO2: 96%   Weight: 74.6 kg (164 lb 6.4 oz)   Height: 169.5 cm (66.75\")   PainSc: 0-No pain       Patient's Body mass index is 25.94 kg/m². BMI is above normal parameters. Recommendations include: exercise counseling and nutrition " counseling.      No exam data present    The patient has no evidence of cognitve impairment.     Physical Exam    Recent Lab Results:  Lab Results   Component Value Date     (H) 07/25/2017     Lab Results   Component Value Date    CHOL 170 03/16/2018    TRIG 190 (H) 03/16/2018    HDL 44 03/16/2018    VLDL 38 03/16/2018    LDLHDL 2.00 03/16/2018       Assessment/Plan   Age-appropriate Screening Schedule:  Refer to the list below for future screening recommendations based on patient's age, sex and/or medical conditions.      Health Maintenance   Topic Date Due   • TDAP/TD VACCINES (1 - Tdap) 01/02/1994   • HEMOGLOBIN A1C  09/16/2018   • LIPID PANEL  03/16/2019   • URINE MICROALBUMIN  03/16/2019   • ZOSTER VACCINE (2 of 3) 04/19/2020 (Originally 2/26/2012)   • INFLUENZA VACCINE  08/01/2019   • MAMMOGRAM  02/21/2020   • DXA SCAN  02/27/2020   • COLONOSCOPY  06/04/2020   • PNEUMOCOCCAL VACCINES (65+ LOW/MEDIUM RISK)  Completed       Medicare Risks and Personalized Health Plan:  Cardiovascular risk;  Patient advised to follow heart healthy diet to help decrease cardiovascular risk   Abdominal Aortic Aneurysm; Ultrasound Screening is needed; AAA Ultrasound Ordered  Obesity/Overweight condition;  Diet information included in the after visit summary (AVS) and Exercise prescription included in the after visit summary (AVS)  Inactivity/Poor Exercise Habits-patient reported; Inactivity; Follow Exercise Information (additional patient information in the After Visit Summary)      CMS-Preventive Services Quick Reference  Medicare Preventive Services Addressed:  Annual Wellness Visit (AWV)    Advance Care Planning:  Patient has an advance directive - a copy has not been provided. Have asked the patient to send this to us to add to record    Diagnoses and all orders for this visit:    1. Cervicalgia  Comments:  heat and massage; discussed exercises   Orders:  -     baclofen (LIORESAL) 10 MG tablet; Take 1 tablet by mouth  Every 8 (Eight) Hours As Needed for Muscle Spasms.  Dispense: 90 tablet; Refill: 1    2. Coronary artery disease involving native coronary artery with angina pectoris, unspecified whether native or transplanted heart (CMS/[facility])  -     doxepin (SINEquan) 50 MG capsule; Take 1 capsule by mouth Every Night.  Dispense: 90 capsule; Refill: 2    3. Other specified hypothyroidism  -     levothyroxine (SYNTHROID, LEVOTHROID) 50 MCG tablet; Take 1 tablet by mouth Every Other Day.  Dispense: 45 tablet; Refill: 3    4. Acquired hypothyroidism  -     levothyroxine (SYNTHROID, LEVOTHROID) 75 MCG tablet; Take 1 tablet by mouth Every Other Day.  Dispense: 45 tablet; Refill: 3    5. Diabetes mellitus without complication (CMS/[facility])  -     metFORMIN ER (GLUCOPHAGE-XR) 750 MG 24 hr tablet; Take 1 tablet by mouth 2 (Two) Times a Day.  Dispense: 180 tablet; Refill: 3    6. Mood disorder (CMS/[facility])  -     venlafaxine XR (EFFEXOR-XR) 150 MG 24 hr capsule; Take 1 capsule by mouth Daily.  Dispense: 90 capsule; Refill: 2    7. Anxiety  -     clonazePAM (KlonoPIN) 2 MG tablet; Take 1 tablet by mouth At Night As Needed for Anxiety.  Dispense: 90 tablet; Refill: 0        An After Visit Summary and PPPS with all of these plans were given to the patient.      Follow Up:  No Follow-up on file.

## 2019-04-11 NOTE — PATIENT INSTRUCTIONS
Medicare Wellness  Personal Prevention Plan of Service     Date of Office Visit:  2019  Encounter Provider:  Magdalena Joy MD  Place of Service:  [facility] INTER[facility] MED[facility]  Patient Name: Dior Pettit  :  1940    As part of the Medicare Wellness portion of your visit today, we are providing you with this personalized preventive plan of services (PPPS). This plan is based upon recommendations of the [place] Preventive Services Task Force (USPSTF) and the Advisory Committee on Immunization Practices (ACIP).    This lists the preventive care services that should be considered, and provides dates of when you are due. Items listed as completed are up-to-date and do not require any further intervention.    Health Maintenance   Topic Date Due   • TDAP/TD VACCINES (1 - Tdap) 1994   • ZOSTER VACCINE (2 of 3) 2020 (Originally 2012)   • INFLUENZA VACCINE  2019   • HEMOGLOBIN A1C  10/11/2019   • MAMMOGRAM  2020   • DXA SCAN  2020   • MEDICARE ANNUAL WELLNESS  2020   • LIPID PANEL  2020   • URINE MICROALBUMIN  2020   • COLONOSCOPY  2020   • PNEUMOCOCCAL VACCINES (65+ LOW/MEDIUM RISK)  Completed       No orders of the defined types were placed in this encounter.      No Follow-up on file.

## 2019-04-15 ENCOUNTER — LAB (OUTPATIENT)
Dept: INTERNAL MEDICINE | Facility: CLINIC | Age: 79
End: 2019-04-15

## 2019-04-15 DIAGNOSIS — E03.9 ACQUIRED HYPOTHYROIDISM: ICD-10-CM

## 2019-04-15 DIAGNOSIS — E11.9 DIABETES MELLITUS WITHOUT COMPLICATION (HCC): ICD-10-CM

## 2019-04-15 DIAGNOSIS — I10 ESSENTIAL HYPERTENSION: ICD-10-CM

## 2019-04-15 LAB
ALBUMIN SERPL-MCNC: 4.8 G/DL (ref 3.5–5.2)
ALBUMIN UR-MCNC: <1.2 MG/L
ALBUMIN/GLOB SERPL: 1.7 G/DL
ALP SERPL-CCNC: 79 U/L (ref 39–117)
ALT SERPL W P-5'-P-CCNC: 26 U/L (ref 1–33)
ANION GAP SERPL CALCULATED.3IONS-SCNC: 12.5 MMOL/L
AST SERPL-CCNC: 25 U/L (ref 1–32)
BACTERIA UR QL AUTO: ABNORMAL /HPF
BASOPHILS # BLD AUTO: 0.02 10*3/MM3 (ref 0–0.2)
BASOPHILS NFR BLD AUTO: 0.4 % (ref 0–1.5)
BILIRUB SERPL-MCNC: 0.4 MG/DL (ref 0.2–1.2)
BILIRUB UR QL STRIP: NEGATIVE
BUN BLD-MCNC: 20 MG/DL (ref 8–23)
BUN/CREAT SERPL: 19.2 (ref 7–25)
CALCIUM SPEC-SCNC: 9.7 MG/DL (ref 8.6–10.5)
CHLORIDE SERPL-SCNC: 100 MMOL/L (ref 98–107)
CHOLEST SERPL-MCNC: 158 MG/DL (ref 0–200)
CLARITY UR: CLEAR
CO2 SERPL-SCNC: 28.5 MMOL/L (ref 22–29)
COLOR UR: YELLOW
CREAT BLD-MCNC: 1.04 MG/DL (ref 0.57–1)
CREAT UR-MCNC: 91.9 MG/DL
DEPRECATED RDW RBC AUTO: 43.4 FL (ref 37–54)
EOSINOPHIL # BLD AUTO: 0.29 10*3/MM3 (ref 0–0.4)
EOSINOPHIL NFR BLD AUTO: 5.2 % (ref 0.3–6.2)
ERYTHROCYTE [DISTWIDTH] IN BLOOD BY AUTOMATED COUNT: 12.9 % (ref 12.3–15.4)
GFR SERPL CREATININE-BSD FRML MDRD: 51 ML/MIN/1.73
GLOBULIN UR ELPH-MCNC: 2.8 GM/DL
GLUCOSE BLD-MCNC: 119 MG/DL (ref 65–99)
GLUCOSE UR STRIP-MCNC: NEGATIVE MG/DL
HBA1C MFR BLD: 5.89 % (ref 4.8–5.6)
HCT VFR BLD AUTO: 43.3 % (ref 34–46.6)
HDLC SERPL-MCNC: 42 MG/DL (ref 40–60)
HGB BLD-MCNC: 14.1 G/DL (ref 12–15.9)
HGB UR QL STRIP.AUTO: ABNORMAL
HYALINE CASTS UR QL AUTO: ABNORMAL /LPF
KETONES UR QL STRIP: NEGATIVE
LDLC SERPL CALC-MCNC: 84 MG/DL (ref 0–100)
LDLC/HDLC SERPL: 1.99 {RATIO}
LEUKOCYTE ESTERASE UR QL STRIP.AUTO: ABNORMAL
LYMPHOCYTES # BLD AUTO: 1.93 10*3/MM3 (ref 0.7–3.1)
LYMPHOCYTES NFR BLD AUTO: 34.6 % (ref 19.6–45.3)
MCH RBC QN AUTO: 30.8 PG (ref 26.6–33)
MCHC RBC AUTO-ENTMCNC: 32.6 G/DL (ref 31.5–35.7)
MCV RBC AUTO: 94.5 FL (ref 79–97)
MICROALBUMIN/CREAT UR: NORMAL MG/G
MONOCYTES # BLD AUTO: 0.59 10*3/MM3 (ref 0.1–0.9)
MONOCYTES NFR BLD AUTO: 10.6 % (ref 5–12)
MUCOUS THREADS URNS QL MICRO: ABNORMAL /HPF
NEUTROPHILS # BLD AUTO: 2.75 10*3/MM3 (ref 1.4–7)
NEUTROPHILS NFR BLD AUTO: 49.2 % (ref 42.7–76)
NITRITE UR QL STRIP: NEGATIVE
PH UR STRIP.AUTO: 5.5 [PH] (ref 5–8)
PLATELET # BLD AUTO: 234 10*3/MM3 (ref 140–450)
PMV BLD AUTO: 10.7 FL (ref 6–12)
POTASSIUM BLD-SCNC: 4.3 MMOL/L (ref 3.5–5.2)
PROT SERPL-MCNC: 7.6 G/DL (ref 6–8.5)
PROT UR QL STRIP: NEGATIVE
RBC # BLD AUTO: 4.58 10*6/MM3 (ref 3.77–5.28)
RBC # UR: ABNORMAL /HPF
REF LAB TEST METHOD: ABNORMAL
SODIUM BLD-SCNC: 141 MMOL/L (ref 136–145)
SP GR UR STRIP: 1.02 (ref 1–1.03)
SQUAMOUS #/AREA URNS HPF: ABNORMAL /HPF
TRANS CELLS #/AREA URNS HPF: ABNORMAL /HPF
TRIGL SERPL-MCNC: 162 MG/DL (ref 0–150)
TSH SERPL DL<=0.05 MIU/L-ACNC: 0.96 MIU/ML (ref 0.27–4.2)
UROBILINOGEN UR QL STRIP: ABNORMAL
VLDLC SERPL-MCNC: 32.4 MG/DL (ref 5–40)
WBC NRBC COR # BLD: 5.58 10*3/MM3 (ref 3.4–10.8)
WBC UR QL AUTO: ABNORMAL /HPF

## 2019-04-15 PROCEDURE — 81001 URINALYSIS AUTO W/SCOPE: CPT | Performed by: INTERNAL MEDICINE

## 2019-04-15 PROCEDURE — 83036 HEMOGLOBIN GLYCOSYLATED A1C: CPT | Performed by: INTERNAL MEDICINE

## 2019-04-15 PROCEDURE — 80061 LIPID PANEL: CPT | Performed by: INTERNAL MEDICINE

## 2019-04-15 PROCEDURE — 80053 COMPREHEN METABOLIC PANEL: CPT | Performed by: INTERNAL MEDICINE

## 2019-04-15 PROCEDURE — 36415 COLL VENOUS BLD VENIPUNCTURE: CPT | Performed by: INTERNAL MEDICINE

## 2019-04-15 PROCEDURE — 84443 ASSAY THYROID STIM HORMONE: CPT | Performed by: INTERNAL MEDICINE

## 2019-04-15 PROCEDURE — 82570 ASSAY OF URINE CREATININE: CPT | Performed by: INTERNAL MEDICINE

## 2019-04-15 PROCEDURE — 82043 UR ALBUMIN QUANTITATIVE: CPT | Performed by: INTERNAL MEDICINE

## 2019-04-15 PROCEDURE — 85025 COMPLETE CBC W/AUTO DIFF WBC: CPT | Performed by: INTERNAL MEDICINE

## 2019-04-17 ENCOUNTER — DOCUMENTATION (OUTPATIENT)
Dept: INTERNAL MEDICINE | Facility: CLINIC | Age: 79
End: 2019-04-17

## 2019-04-24 DIAGNOSIS — J30.2 SEASONAL ALLERGIC RHINITIS, UNSPECIFIED TRIGGER: ICD-10-CM

## 2019-04-24 DIAGNOSIS — J45.20 MILD INTERMITTENT ASTHMA WITHOUT COMPLICATION: ICD-10-CM

## 2019-04-24 RX ORDER — MONTELUKAST SODIUM 10 MG/1
TABLET ORAL
Qty: 90 TABLET | Refills: 1 | Status: SHIPPED | OUTPATIENT
Start: 2019-04-24 | End: 2020-01-09 | Stop reason: SDUPTHER

## 2019-05-19 DIAGNOSIS — E03.8 OTHER SPECIFIED HYPOTHYROIDISM: ICD-10-CM

## 2019-05-20 RX ORDER — LEVOTHYROXINE SODIUM 0.05 MG/1
TABLET ORAL
Qty: 45 TABLET | Refills: 1 | Status: SHIPPED | OUTPATIENT
Start: 2019-05-20 | End: 2019-06-26 | Stop reason: SDUPTHER

## 2019-05-25 DIAGNOSIS — E78.5 HYPERLIPIDEMIA, UNSPECIFIED HYPERLIPIDEMIA TYPE: ICD-10-CM

## 2019-05-25 DIAGNOSIS — I25.119 CORONARY ARTERY DISEASE INVOLVING NATIVE CORONARY ARTERY WITH ANGINA PECTORIS, UNSPECIFIED WHETHER NATIVE OR TRANSPLANTED HEART (HCC): ICD-10-CM

## 2019-05-25 DIAGNOSIS — E11.9 DIABETES MELLITUS WITHOUT COMPLICATION (HCC): ICD-10-CM

## 2019-05-25 DIAGNOSIS — K21.9 GASTROESOPHAGEAL REFLUX DISEASE, ESOPHAGITIS PRESENCE NOT SPECIFIED: ICD-10-CM

## 2019-05-28 RX ORDER — METFORMIN HYDROCHLORIDE 750 MG/1
TABLET, EXTENDED RELEASE ORAL
Qty: 180 TABLET | Refills: 1 | Status: SHIPPED | OUTPATIENT
Start: 2019-05-28 | End: 2019-09-04 | Stop reason: SDUPTHER

## 2019-05-28 RX ORDER — DOXEPIN HYDROCHLORIDE 50 MG/1
CAPSULE ORAL
Qty: 90 CAPSULE | Refills: 1 | Status: SHIPPED | OUTPATIENT
Start: 2019-05-28 | End: 2019-09-04 | Stop reason: SDUPTHER

## 2019-05-28 RX ORDER — SIMVASTATIN 40 MG
TABLET ORAL
Qty: 90 TABLET | Refills: 1 | Status: SHIPPED | OUTPATIENT
Start: 2019-05-28 | End: 2019-12-19

## 2019-05-28 RX ORDER — OMEPRAZOLE 40 MG/1
CAPSULE, DELAYED RELEASE ORAL
Qty: 90 CAPSULE | Refills: 1 | Status: SHIPPED | OUTPATIENT
Start: 2019-05-28 | End: 2019-12-19

## 2019-06-03 ENCOUNTER — TELEPHONE (OUTPATIENT)
Dept: INTERNAL MEDICINE | Facility: CLINIC | Age: 79
End: 2019-06-03

## 2019-06-03 NOTE — TELEPHONE ENCOUNTER
----- Message from Annalee Graff sent at 6/3/2019  1:29 PM EDT -----  Contact: Patient  Patient called stating mail order did not receive script for    metFORMIN ER (GLUCOPHAGE-XR) 750 MG 24 hr tablet    AND she needs baclofen (LIORESAL) 10 MG tablet also.  Please advise.    Patient:  [phone]    [facility] - [place], [place] - [address] AT Portal to [facility] - [phone] [facility] [phone] FX

## 2019-06-03 NOTE — TELEPHONE ENCOUNTER
Metformin  was sent to [facility] on 5/28/19 for # 180 tablets w/ 1 refill    Baclofen 10 mg was sent into [facility] on 4/11/19 for # 90 tablets w/ 1 refill.    Per [facility] representative Terrence, Ms. Pettit contacted them and asked to have her medication held until she is ready for a refill. All of her medication that has recently been filled by our office including the Metformin  and Baclofen 10 mg is on hold until she herself call to have them shipped out when needed.    I spoke to Ms. Pettit and informed her of this and she stated she remember calling and asking for her medications to be on hold. (which makes her responsible to call and request refills . She is no longer on their automated refill).     She was also told when she call in she has to ask for a refill on her Metformin  mg w/ order # [id number]  and Baclofen 10 mg w/ order # [id number].    She stated she understood and thank you.

## 2019-06-26 ENCOUNTER — TELEPHONE (OUTPATIENT)
Dept: INTERNAL MEDICINE | Facility: CLINIC | Age: 79
End: 2019-06-26

## 2019-06-26 DIAGNOSIS — E78.5 HYPERLIPIDEMIA, UNSPECIFIED HYPERLIPIDEMIA TYPE: ICD-10-CM

## 2019-06-26 RX ORDER — SIMVASTATIN 40 MG
40 TABLET ORAL
Qty: 90 TABLET | Refills: 1 | Status: SHIPPED | OUTPATIENT
Start: 2019-06-26 | End: 2019-09-04 | Stop reason: SDUPTHER

## 2019-06-26 NOTE — TELEPHONE ENCOUNTER
----- Message from Fozia Barragan sent at 6/26/2019 10:00 AM EDT -----  Contact: pt  Pt is calling for a refill     FOR  simvastatin (ZOCOR) 40 MG tablet      [facility]   [facility] - [place], [place] - [address] AT Portal to [facility] - [phone]  - [phone] FX      Caller# [phone]     Please and thank you.

## 2019-07-10 DIAGNOSIS — J45.20 MILD INTERMITTENT ASTHMA WITHOUT COMPLICATION: ICD-10-CM

## 2019-07-10 RX ORDER — GLIMEPIRIDE 2 MG/1
TABLET ORAL
Qty: 90 TABLET | Refills: 1 | Status: SHIPPED | OUTPATIENT
Start: 2019-07-10 | End: 2020-01-31

## 2019-07-16 DIAGNOSIS — F41.9 ANXIETY: ICD-10-CM

## 2019-07-16 RX ORDER — CLONAZEPAM 1 MG/1
1 TABLET ORAL NIGHTLY PRN
Qty: 90 TABLET | Refills: 0 | Status: SHIPPED | OUTPATIENT
Start: 2019-07-16 | End: 2019-08-08 | Stop reason: SDUPTHER

## 2019-07-16 NOTE — TELEPHONE ENCOUNTER
Please review refill request:    Last OV: 4/11/19 Next OV 9/4/19    Last Prescribed: 4/11/19    ALBAN: Ordered    Thank you,    Von

## 2019-07-16 NOTE — TELEPHONE ENCOUNTER
----- Message from Fozia Barragan sent at 7/16/2019 11:03 AM EDT -----  Contact: pt  Pt is calling for a refill     FOR  clonazePAM (KlonoPIN) 1 MG tablet      Patient requests RX SENT TO   [facility] [place], [place] - [address] AT Portal to [facility] - [phone]  - [phone] FX      Please and thank you.

## 2019-08-08 DIAGNOSIS — F41.9 ANXIETY: ICD-10-CM

## 2019-08-08 RX ORDER — CLONAZEPAM 1 MG/1
TABLET ORAL
Qty: 90 TABLET | Refills: 0 | OUTPATIENT
Start: 2019-08-08 | End: 2019-12-12 | Stop reason: SDUPTHER

## 2019-08-13 DIAGNOSIS — I10 BENIGN ESSENTIAL HTN: ICD-10-CM

## 2019-09-04 ENCOUNTER — OFFICE VISIT (OUTPATIENT)
Dept: INTERNAL MEDICINE | Facility: CLINIC | Age: 79
End: 2019-09-04

## 2019-09-04 VITALS
HEIGHT: 67 IN | WEIGHT: 163 LBS | SYSTOLIC BLOOD PRESSURE: 118 MMHG | BODY MASS INDEX: 25.58 KG/M2 | DIASTOLIC BLOOD PRESSURE: 80 MMHG

## 2019-09-04 DIAGNOSIS — F51.01 PRIMARY INSOMNIA: ICD-10-CM

## 2019-09-04 DIAGNOSIS — E78.49 OTHER HYPERLIPIDEMIA: ICD-10-CM

## 2019-09-04 DIAGNOSIS — G25.81 RLS (RESTLESS LEGS SYNDROME): ICD-10-CM

## 2019-09-04 DIAGNOSIS — R06.02 SHORTNESS OF BREATH: ICD-10-CM

## 2019-09-04 DIAGNOSIS — R07.9 CHEST PAIN, UNSPECIFIED TYPE: Primary | ICD-10-CM

## 2019-09-04 DIAGNOSIS — I10 ESSENTIAL HYPERTENSION: ICD-10-CM

## 2019-09-04 DIAGNOSIS — J45.20 MILD INTERMITTENT ASTHMA WITHOUT COMPLICATION: ICD-10-CM

## 2019-09-04 DIAGNOSIS — F43.9 STRESS: ICD-10-CM

## 2019-09-04 DIAGNOSIS — I25.119 CORONARY ARTERY DISEASE INVOLVING NATIVE CORONARY ARTERY WITH ANGINA PECTORIS, UNSPECIFIED WHETHER NATIVE OR TRANSPLANTED HEART (HCC): ICD-10-CM

## 2019-09-04 DIAGNOSIS — E11.8 CONTROLLED TYPE 2 DIABETES MELLITUS WITH COMPLICATION, WITHOUT LONG-TERM CURRENT USE OF INSULIN (HCC): ICD-10-CM

## 2019-09-04 DIAGNOSIS — F41.9 ANXIETY: ICD-10-CM

## 2019-09-04 DIAGNOSIS — G44.89 OTHER HEADACHE SYNDROME: ICD-10-CM

## 2019-09-04 PROCEDURE — 93000 ELECTROCARDIOGRAM COMPLETE: CPT | Performed by: INTERNAL MEDICINE

## 2019-09-04 PROCEDURE — 99214 OFFICE O/P EST MOD 30 MIN: CPT | Performed by: INTERNAL MEDICINE

## 2019-09-04 RX ORDER — DOXEPIN HYDROCHLORIDE 50 MG/1
50 CAPSULE ORAL NIGHTLY
Qty: 90 CAPSULE | Refills: 2 | Status: SHIPPED | OUTPATIENT
Start: 2019-09-04 | End: 2020-01-09 | Stop reason: ALTCHOICE

## 2019-09-04 RX ORDER — TRAMADOL HYDROCHLORIDE 50 MG/1
50 TABLET ORAL EVERY 6 HOURS PRN
COMMUNITY
End: 2020-04-30

## 2019-09-04 NOTE — PROGRESS NOTES
"Subjective   Dior Pettit is a 79 y.o. female here for   Chief Complaint   Patient presents with   • Diabetes   • Hyperlipidemia   • Hypertension   • Hypothyroidism   • Chest Pain   .    Vitals:    09/04/19 1100   BP: 118/80   BP Location: Left arm   Patient Position: Sitting   Cuff Size: Adult   Weight: 73.9 kg (163 lb)   Height: 169.5 cm (66.75\")       Body mass index is 25.72 kg/m².    Diabetes   She presents for her follow-up diabetic visit. She has type 2 diabetes mellitus. Her disease course has been stable. There are no hypoglycemic associated symptoms. Pertinent negatives for hypoglycemia include no nervousness/anxiousness. Associated symptoms include chest pain and fatigue (off/on). There are no hypoglycemic complications. Symptoms are stable.   Hyperlipidemia   This is a chronic problem. The current episode started more than 1 year ago. The problem is controlled. Recent lipid tests were reviewed and are normal. Exacerbating diseases include diabetes and hypothyroidism. Associated symptoms include chest pain and shortness of breath (with exertion - no change).   Hypertension   This is a chronic problem. The current episode started more than 1 year ago. The problem is unchanged. The problem is controlled. Associated symptoms include chest pain and shortness of breath (with exertion - no change). Pertinent negatives include no palpitations.   Hypothyroidism   This is a chronic problem. The current episode started more than 1 year ago. The problem occurs constantly. The problem has been unchanged. Associated symptoms include chest pain, coughing and fatigue (off/on). Pertinent negatives include no chills or fever.   Chest Pain    This is a recurrent problem. The current episode started more than 1 month ago. The problem occurs intermittently. The problem has been waxing and waning. The pain is present in the substernal region and epigastric region. The pain is mild. The pain does not radiate. Associated " symptoms include a cough and shortness of breath (with exertion - no change). Pertinent negatives include no fever or palpitations. The cough has no precipitants. Nothing relieves the cough. Nothing worsens the cough. The pain is aggravated by nothing. She has tried nothing for the symptoms.   Her past medical history is significant for diabetes and hyperlipidemia.        The following portions of the patient's history were reviewed and updated as appropriate: allergies, current medications, past social history and problem list.    Review of Systems   Constitutional: Positive for fatigue (off/on). Negative for chills and fever.   Respiratory: Positive for cough and shortness of breath (with exertion - no change). Negative for wheezing.    Cardiovascular: Positive for chest pain. Negative for palpitations and leg swelling.   Psychiatric/Behavioral: Negative for dysphoric mood and sleep disturbance. The patient is not nervous/anxious.      Fasting sugars=79 - sugars always less than 160.    Objective   Physical Exam   Constitutional: She appears well-developed and well-nourished. No distress.   Cardiovascular: Normal rate, regular rhythm and normal heart sounds.   Pulmonary/Chest: No respiratory distress. She has no wheezes. She has no rales. She exhibits no tenderness.   Musculoskeletal: She exhibits no edema.   Psychiatric: She has a normal mood and affect. Her behavior is normal.   Nursing note and vitals reviewed.      Assessment/Plan   Diagnoses and all orders for this visit:    Chest pain, unspecified type  Comments:  atypical (she thinks it is from stress) - need to see cardiol again  Orders:  -     Ambulatory Referral to Cardiology  -     ECG 12 Lead    Essential hypertension  Comments:  controlled - call if bp over 140/90    Other hyperlipidemia  Comments:  need diet/ex    Coronary artery disease involving native coronary artery with angina pectoris, unspecified whether native or transplanted heart  (CMS/[facility])    Mild intermittent asthma without complication    Shortness of breath  Comments:  with exertion (for sev yrs) - no change    Controlled type 2 diabetes mellitus with complication, without long-term current use of insulin (CMS/[facility])  Comments:  sugars always less than 200 - call if sugars too high or too low    Primary insomnia  -     doxepin (SINEquan) 50 MG capsule; Take 1 capsule by mouth Every Night.    Anxiety  Comments:  ok with 1 qhs    RLS (restless legs syndrome)  Comments:  ok with clonazepam 1qhs    Other headache syndrome  -     traMADol (ULTRAM) 50 MG tablet; Take 50 mg by mouth Every 6 (Six) Hours As Needed for Moderate Pain .    Coronary artery disease involving native coronary artery with angina pectoris, unspecified whether native or transplanted heart (CMS/[facility])  Comments:  f/u with cardiologist    Stress  Comments:  need to work on stress reduction        ECG 12 Lead  Date/Time: 9/4/2019 1:36 PM  Performed by: Magdalena Joy MD  Authorized by: Magdalena Joy MD   Comparison: compared with previous ECG from 1/31/2019  Comparison to previous ECG: Inverted T in 3 & AVF  Rhythm: sinus rhythm  Rate: normal  Conduction: left anterior fascicular block  ST Segments: ST segments normal  T inversion: aVF and III  QRS axis: left  Other findings: non-specific ST-T wave changes    Clinical impression: non-specific ECG

## 2019-09-12 ENCOUNTER — OFFICE VISIT (OUTPATIENT)
Dept: CARDIOLOGY | Facility: CLINIC | Age: 79
End: 2019-09-12

## 2019-09-12 ENCOUNTER — TELEPHONE (OUTPATIENT)
Dept: CARDIOLOGY | Facility: CLINIC | Age: 79
End: 2019-09-12

## 2019-09-12 VITALS
WEIGHT: 161.8 LBS | HEIGHT: 67 IN | HEART RATE: 60 BPM | BODY MASS INDEX: 25.39 KG/M2 | DIASTOLIC BLOOD PRESSURE: 70 MMHG | SYSTOLIC BLOOD PRESSURE: 118 MMHG

## 2019-09-12 DIAGNOSIS — I27.20 PULMONARY HYPERTENSION (HCC): ICD-10-CM

## 2019-09-12 DIAGNOSIS — R07.9 CHEST PAIN, UNSPECIFIED TYPE: ICD-10-CM

## 2019-09-12 DIAGNOSIS — Z95.2 S/P AVR: Primary | ICD-10-CM

## 2019-09-12 DIAGNOSIS — I77.810 DILATED AORTIC ROOT (HCC): ICD-10-CM

## 2019-09-12 DIAGNOSIS — E78.49 OTHER HYPERLIPIDEMIA: ICD-10-CM

## 2019-09-12 DIAGNOSIS — I10 ESSENTIAL HYPERTENSION: ICD-10-CM

## 2019-09-12 PROCEDURE — 99214 OFFICE O/P EST MOD 30 MIN: CPT | Performed by: NURSE PRACTITIONER

## 2019-09-12 NOTE — TELEPHONE ENCOUNTER
Patient stopped by check out to make follow up appointments.  She is refusing to do a stress test, sates she is not having any chest pain, only having some discomfort and thinks it's ridiculous to go through all of this.    Pt is refusing to schedule the stress test.    Thank you  Eva CLEMENTS

## 2019-09-12 NOTE — PROGRESS NOTES
Date of Office Visit: 2019  Encounter Provider: Margarita Archer, NARDA, APRN  Place of Service: [facility] CARDIOLOGY  Patient Name: Dior Pettit  :1940        Subjective:     Chief Complaint:  Follow-up, history of aortic valve replacement and subaortic membrane removal, pulmonary hypertension, hypertension.      History of Present Illness:  Dior Pettit is a 79 y.o. female patient of Dr. Mitchell.  This is my first time seeing this patient in the office today and I reviewed her records.    Patient has a history of aortic valve replacement, pulmonary hypertension, mitral regurgitation, dilated ascending aorta, hypertension, hyperlipidemia diabetes, subaortic membrane, renal insufficiency.    In  patient underwent aortic valve replacement with porcine valve as well as subaortic membrane resection.  Cardiac catheterization was negative for coronary artery disease.  Patient was found to have significant sleep apnea but was intolerant of CPAP therapy.  She was noted to have pulmonary hypertension with RVSP of 44 mmHg in .  In  she developed chest tightness while shoveling the snow and stress perfusion study was negative for ischemia.  Follow-up echo 2017 showed normal systolic function, mild left ventricular hypertrophy, aortic valve calcification with mild stenosis, mild mitral regurgitation, moderate tricuspid regurgitation, RVSP 52 mmHg, and ascending aorta measuring 3.9 cm.  Patient had CTA chest 2018 showing minimal dilation of the ascending thoracic aorta measuring 4 x 3.9.  It was previously noted to be 3.8 x 3.7 cm in 2007.  Patient requested consult with cardiothoracics and referral was placed.  Patient was seen by Dr. Ellis 2019 at which point dilation was felt to be stable.  Dr. Ellis recommended follow-up in 3 years with repeat CT chest in 3 years.      Patient presents to office today for follow-up appointment.  Patient reports she has  been doing well since last visit.  She reports that she is not doing any formal exercise but she does stay active at home and stays active cleaning the house and washing windows.  She sometimes also eb farther away in a parking lot to increase how far she has to walk going places.  She has not noticed any chest discomfort with these activities.  She does sometimes may be get some shortness of breath with heavier exertion, however nothing that is new or worsening.  She does report that she might get some mid to right sided chest discomfort with stressful situations.  She has not noticed any chest discomfort with exertion.  She reports that the chest discomfort occurs when she is feeling emotionally stressed and will last until the stressful situation is passed.  Sometimes it might last up to 1 to 2 days if that is how long it takes for the stressful situation to pass although often it last less time than that.  She reports it feels like an ache.  It does not radiate anywhere.  No associated symptoms.  She is really not able to provide any additional details besides that.  She might get some rare palpitations, maybe once or twice in the last 7 months and reports that they were fleeting.  Not associated with any other symptoms.  She denies any lower extremity swelling other than the one time that she had a Baker's cyst which has now resolved.  She might get some occasional lightheadedness that occurs at rest if she is dehydrated.  She denies any syncope, near syncope, falls, fatigue, or abnormal bleeding.  She does have a history of sleep apnea but was intolerant of CPAP.  Blood pressure and heart rate are well controlled in the office today.  She reports that blood pressure at home is usually 120 systolic and on the higher side can sometimes get up to 133.  She does report a history of some balance issues though denies any falls.  Recommended discussing balance physical therapy with primary care.        Past  Medical History:   Diagnosis Date   • Allergic rhinitis    • Anxiety     Controlled w/Meds   • Aortic root dilatation (CMS/HCC) 10/02/2017    Borderline--Noted on Echo   • Aortic valve calcification 10/02/2017    Noted on Echo   • Aortic valve insufficiency Dx in 07    w/AVR    • Aortic valve prosthesis present 11/02/2018    Noted on CTA Chest   • Ascending aorta dilatation (CMS/HCC) 10/02/2017    Borderline--Noted on Echo   • Asthma    • Atypical chest pain    • Breast pain, right Hx   • CAD (coronary artery disease)    • Cardiomegaly 2017   • Cervicalgia    • Chest pain due to CAD    • 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    Borderline--Noted on Echo & Measured @ 2.6CM and Mid Descending @ 2.5CM on CTA Chest-11/2/18   • DM (diabetes mellitus) (CMS/HCC)     T2   • Esophagitis    • GERD (gastroesophageal reflux disease)     Controlled w/Meds   • Health care maintenance    • Heart murmur    • History of echocardiogram 10/2/17-[facility]    EF 66%; Borderline Concentric Hypertrophy; Mild Calcification in AV; Mild AVS; Mild AVR; Moderate TVR; Borderline Dilation of Aortic Root/Arch & Borderline Dilation of Ascending/Proximal Aorta Present   • Hyperlipidemia     Controlled w/Meds   • Hypertension     Controlled w/Meds   • Hypothyroidism     Controlled w/Synthroid   • Insomnia    • Macular degeneration, left eye    • Mild aortic valve regurgitation 10/02/2017    Noted on Echo   • Mild aortic valve stenosis 10/02/2017    Noted on Echo   • Mild dilation of ascending aorta (CMS/HCC) 10/02/2017    Borderline--Noted on Echo   • Moderate tricuspid valve regurgitation 10/02/2017    Noted on Echo   • Mood disorder in conditions classified elsewhere     Controlled w/Meds   • Near syncope 03/2017-[facility]   • Neuropathy    • NNEKA (obstructive sleep apnea)     Untreated   • Osteoarthritis     Ankles/Feet   • Pulmonary hypertension (CMS/HCC) 2017   • Renal insufficiency    • RLS (restless legs syndrome)    • Thoracic ascending aortic  aneurysm (CMS/HCC) Dx in 2007 @ 3.8CM & 1/02/2018    Noted @ 4CM on CTA Chest;     Past Surgical History:   Procedure Laterality Date   • AORTIC VALVE REPAIR/REPLACEMENT  2007    Dr. Perry   • APPENDECTOMY     • AUGMENTATION MAMMAPLASTY  1976   • BREAST AUGMENTATION  2014   • BUNIONECTOMY     • CARDIAC CATHETERIZATION  2007   • COLONOSCOPY  2010   • COLONOSCOPY  03/02/2012   • HYSTERECTOMY  1972   • SIGMOIDOSCOPY  2001     Outpatient Medications Prior to Visit   Medication Sig Dispense Refill   • Apoaequorin (PREVAGEN) 10 MG capsule Take 1 capsule by mouth Daily.     • ascorbic acid (EQL VITAMIN C) 1000 MG tablet Take 1,000 mg by mouth Daily.     • aspirin 81 MG chewable tablet Chew 81 mg Daily.     • baclofen (LIORESAL) 10 MG tablet Take 1 tablet by mouth Every 8 (Eight) Hours As Needed for Muscle Spasms. 90 tablet 1   • clonazePAM (KlonoPIN) 1 MG tablet TAKE ONE TABLET BY MOUTH ONCE NIGHTLY FOR ANXIETY 90 tablet 0   • doxepin (SINEquan) 50 MG capsule Take 1 capsule by mouth Every Night. 90 capsule 2   • fenofibrate micronized (LOFIBRA) 134 MG capsule TAKE 1 CAPSULE EVERY       MORNING BEFORE BREAKFAST 90 capsule 3   • fluticasone (FLONASE) 50 MCG/ACT nasal spray 2 sprays into the nostril(s) as directed by provider Daily. 3 bottle 1   • fluticasone-salmeterol (ADVAIR DISKUS) 250-50 MCG/DOSE DISKUS Inhale 1 puff 2 (Two) Times a Day. (Patient taking differently: Inhale 1 puff 2 (Two) Times a Day As Needed.) 42 each 0   • glimepiride (AMARYL) 2 MG tablet TAKE 1 TABLET EVERY MORNINGBEFORE BREAKFAST 90 tablet 1   • glucosamine-chondroitin 500-400 MG capsule capsule Take 2 capsules by mouth Daily.     • glucose blood test strip CHECK BLOOD SUGAR ONE TIME PER  each 5   • levothyroxine (SYNTHROID, LEVOTHROID) 50 MCG tablet Take 1 tablet by mouth Every Other Day. 45 tablet 3   • levothyroxine (SYNTHROID, LEVOTHROID) 75 MCG tablet Take 1 tablet by mouth Every Other Day. 45 tablet 3   • magnesium oxide (MAGOX) 400  (241.3 Mg) MG tablet tablet Take 400 mg by mouth Daily.     • metFORMIN ER (GLUCOPHAGE-XR) 750 MG 24 hr tablet Take 1 tablet by mouth 2 (Two) Times a Day. 180 tablet 3   • metoprolol tartrate (LOPRESSOR) 25 MG tablet TAKE ONE TABLET BY MOUTH THREE TIMES A DAY (Patient taking differently: TAKE ONE TABLET BY MOUTH THREE TIMES A DAY - currently) 180 tablet 1   • montelukast (SINGULAIR) 10 MG tablet TAKE 1 TABLET EVERY NIGHT 90 tablet 1   • Multiple Vitamins-Minerals (MULTIVITAL PO) Take  by mouth.     • omeprazole (priLOSEC) 40 MG capsule TAKE 1 CAPSULE DAILY 90 capsule 1   • simvastatin (ZOCOR) 40 MG tablet TAKE 1 TABLET NIGHTLY AT   BEDTIME 90 tablet 1   • traMADol (ULTRAM) 50 MG tablet Take 50 mg by mouth Every 6 (Six) Hours As Needed for Moderate Pain .     • venlafaxine XR (EFFEXOR-XR) 150 MG 24 hr capsule Take 1 capsule by mouth Daily. 90 capsule 2     No facility-administered medications prior to visit.        Allergies as of 09/12/2019   • (No Known Allergies)     Social History     Socioeconomic History   • Marital status:      Spouse name: Not on file   • Number of children: Not on file   • Years of education: Not on file   • Highest education level: Not on file   Tobacco Use   • Smoking status: Never Smoker   • Smokeless tobacco: Never Used   Substance and Sexual Activity   • Alcohol use: Yes     Comment: Social w/Daily Caffeine Use   • Drug use: Yes     Types: Benzodiazepines     Comment: Clonazepam/Tramadol   • Sexual activity: Defer     Birth control/protection: Surgical     Comment: Hyst     Family History   Problem Relation Age of Onset   • Hypertension Mother    • Stroke Mother    • Diabetes Sister    • Coronary artery disease Brother    • Diabetes Brother    • Coronary artery disease Brother    • Skin cancer Neg Hx        Review of Systems   Constitution: Negative for chills, fever, malaise/fatigue, weight gain and weight loss.   HENT: Negative for ear pain, hearing loss, nosebleeds and sore  "throat.    Eyes: Negative for blurred vision, double vision and redness.        Macular degeneration   Cardiovascular:        SEE HPI.    Respiratory: Positive for cough. Negative for shortness of breath, snoring and wheezing.    Endocrine: Positive for heat intolerance. Negative for cold intolerance.   Skin: Negative for itching, rash and suspicious lesions.   Musculoskeletal: Negative for joint pain, joint swelling and myalgias.        \"baker's cyst\"    Gastrointestinal: Negative for abdominal pain, diarrhea, hematemesis, melena, nausea and vomiting.   Genitourinary: Negative for dysuria, frequency and hematuria.   Neurological: Positive for dizziness (\"some\") and paresthesias (\"1 time\"). Negative for headaches, numbness and seizures.   Psychiatric/Behavioral: Positive for depression. Negative for altered mental status. The patient is nervous/anxious.           Objective:     Vitals:    09/12/19 1047   BP: 118/70   BP Location: Right arm   Pulse: 60   Weight: 73.4 kg (161 lb 12.8 oz)   Height: 170.2 cm (67\")     Body mass index is 25.34 kg/m².      PHYSICAL EXAM:  Physical Exam   Constitutional: She is oriented to person, place, and time. She appears well-developed and well-nourished. No distress.   HENT:   Head: Normocephalic and atraumatic.   Eyes: Pupils are equal, round, and reactive to light.   Neck: Neck supple. No JVD present. Carotid bruit is not present.   Cardiovascular: Normal rate, regular rhythm, normal heart sounds and intact distal pulses. Exam reveals no gallop and no friction rub.   No murmur heard.  Pulses:       Radial pulses are 2+ on the right side, and 2+ on the left side.        Posterior tibial pulses are 2+ on the right side, and 2+ on the left side.   Pulmonary/Chest: Effort normal and breath sounds normal. No respiratory distress. She has no wheezes. She has no rales.   Abdominal: Soft. Bowel sounds are normal. She exhibits no distension. There is no tenderness.   Musculoskeletal: She " exhibits no edema, tenderness or deformity.   Neurological: She is alert and oriented to person, place, and time.   Skin: Skin is warm and dry. No rash noted. She is not diaphoretic. No erythema.   Varicose veins to lower legs   Psychiatric: She has a normal mood and affect. Her behavior is normal. Judgment normal.         Procedures  Patient declines ECG in the office today.  She reports that she recently had one done through her primary care provider's office.        Assessment:       Diagnosis Plan   1. S/P AVR     2. Essential hypertension     3. Pulmonary hypertension (CMS/HCC)     4. Dilated aortic root (CMS/HCC)     5. Other hyperlipidemia           Plan:     1. Chest discomfort: Does not occur with exertional situations though does occur with stressful situations.  At this point we discussed proceeding with stress echo to rule out ischemia and to follow-up on pulmonary hypertension and history of valvular issues.  Patient prefers to try stress echo rather than chemical/nuclear stress test.  She is adamant that she can tolerate treadmill.  We also discussed stress management techniques.  If stress echo does not show causes for chest discomfort then would recommend following up with primary care for continued stress management.  2. Aortic valve replacement with subaortic membrane resection: Continue with aspirin therapy.  3. Mildly dilated ascending aorta & aortic root: Followed by Dr. Ellis.  Last seen 1/2019.  At this point Dr. Ellis recommended blood pressure control and follow-up CT chest and office visit in 3 years.  4. Hypertension: Blood pressure well controlled in the office today.  Usually runs 120s at home, sometimes 133 in the higher range.  Patient to continue to monitor.  5. Pulmonary hypertension: Moderate though echo shows persistent pulmonary hypertension.  Patient might get some occasional shortness of breath with heavier exertion, however nothing new or worsening.  She does report a history  of some mild asthma.  She thinks she had pulmonary function test done a couple of years ago through an outside provider and is not interested in seeing pulmonology at this time.  She continues to follow with primary care for her asthma.  We will recheck an echo as above.  6. Dyslipidemia: Managed by outside provider.  7. Diabetes: Managed by outside provider.  8. Hypothyroidism: Managed by outside provider.  9. History of renal insufficiency.    Patient to follow-up with Dr. Mitchell in 6 months or sooner if needed for any new, recurrent, or worsening symptoms or other problems/concerns.           Your medication list           Accurate as of 9/12/19 10:54 AM. If you have any questions, ask your nurse or doctor.               CHANGE how you take these medications      Instructions Last Dose Given Next Dose Due   fluticasone-salmeterol 250-50 MCG/DOSE DISKUS  Commonly known as:  ADVAIR DISKUS  What changed:    · when to take this  · reasons to take this      Inhale 1 puff 2 (Two) Times a Day.       metoprolol tartrate 25 MG tablet  Commonly known as:  LOPRESSOR  What changed:    · how much to take  · how to take this  · when to take this      TAKE ONE TABLET BY MOUTH THREE TIMES A DAY          CONTINUE taking these medications      Instructions Last Dose Given Next Dose Due   aspirin 81 MG chewable tablet      Chew 81 mg Daily.       baclofen 10 MG tablet  Commonly known as:  LIORESAL      Take 1 tablet by mouth Every 8 (Eight) Hours As Needed for Muscle Spasms.       clonazePAM 1 MG tablet  Commonly known as:  KlonoPIN      TAKE ONE TABLET BY MOUTH ONCE NIGHTLY FOR ANXIETY       doxepin 50 MG capsule  Commonly known as:  SINEquan      Take 1 capsule by mouth Every Night.       EQL VITAMIN C 1000 MG tablet  Generic drug:  ascorbic acid      Take 1,000 mg by mouth Daily.       fenofibrate micronized 134 MG capsule  Commonly known as:  LOFIBRA      TAKE 1 CAPSULE EVERY       MORNING BEFORE BREAKFAST       fluticasone 50  MCG/ACT nasal spray  Commonly known as:  FLONASE      2 sprays into the nostril(s) as directed by provider Daily.       glimepiride 2 MG tablet  Commonly known as:  AMARYL      TAKE 1 TABLET EVERY MORNINGBEFORE BREAKFAST       glucosamine-chondroitin 500-400 MG capsule capsule      Take 2 capsules by mouth Daily.       glucose blood test strip      CHECK BLOOD SUGAR ONE TIME PER DAY       levothyroxine 50 MCG tablet  Commonly known as:  SYNTHROID, LEVOTHROID      Take 1 tablet by mouth Every Other Day.       levothyroxine 75 MCG tablet  Commonly known as:  SYNTHROID, LEVOTHROID      Take 1 tablet by mouth Every Other Day.       magnesium oxide 400 (241.3 Mg) MG tablet tablet  Commonly known as:  MAGOX      Take 400 mg by mouth Daily.       metFORMIN  MG 24 hr tablet  Commonly known as:  GLUCOPHAGE-XR      Take 1 tablet by mouth 2 (Two) Times a Day.       montelukast 10 MG tablet  Commonly known as:  SINGULAIR      TAKE 1 TABLET EVERY NIGHT       MULTIVITAL PO      Take  by mouth.       omeprazole 40 MG capsule  Commonly known as:  priLOSEC      TAKE 1 CAPSULE DAILY       PREVAGEN 10 MG capsule  Generic drug:  Apoaequorin      Take 1 capsule by mouth Daily.       simvastatin 40 MG tablet  Commonly known as:  ZOCOR      TAKE 1 TABLET NIGHTLY AT   BEDTIME       traMADol 50 MG tablet  Commonly known as:  ULTRAM      Take 50 mg by mouth Every 6 (Six) Hours As Needed for Moderate Pain .       venlafaxine  MG 24 hr capsule  Commonly known as:  EFFEXOR-XR      Take 1 capsule by mouth Daily.              I did not stop or change the above medications.  Patient's medication list was updated to reflect medications they are currently taking including medication changes made by other providers.        Thanks,    Margarita Archer, NARDA, APRN  09/12/2019         Dictated utilizing Dragon dictation

## 2019-09-12 NOTE — TELEPHONE ENCOUNTER
I attempted to call patient to discuss in more detail as she voiced no concerns or objections to having stress echo done during her office visit with me.  She did not answer the phone.  I left a voicemail asking her to call the office back.

## 2019-09-13 NOTE — TELEPHONE ENCOUNTER
I called patient again but she did not answer.  I left another voicemail asking her to call the office.

## 2019-09-13 NOTE — TELEPHONE ENCOUNTER
Edie--please send patient a letter asking her to call the office when she receives it as further follow-up as needed.    Please let me know if/when she calls back.

## 2019-09-16 NOTE — TELEPHONE ENCOUNTER
Called patient again and she answered.  She is willing to schedule stress echo at this time.  Transferred to scheduling to get this done.

## 2019-09-25 ENCOUNTER — HOSPITAL ENCOUNTER (OUTPATIENT)
Dept: CARDIOLOGY | Facility: HOSPITAL | Age: 79
Discharge: HOME | End: 2019-09-25
Admitting: NURSE PRACTITIONER

## 2019-09-25 VITALS — BODY MASS INDEX: 25.27 KG/M2 | HEIGHT: 67 IN | WEIGHT: 161 LBS

## 2019-09-25 DIAGNOSIS — R07.9 CHEST PAIN, UNSPECIFIED TYPE: ICD-10-CM

## 2019-09-25 DIAGNOSIS — Z95.2 S/P AVR: ICD-10-CM

## 2019-09-25 DIAGNOSIS — I27.20 PULMONARY HYPERTENSION (HCC): ICD-10-CM

## 2019-09-25 PROCEDURE — 93320 DOPPLER ECHO COMPLETE: CPT | Performed by: INTERNAL MEDICINE

## 2019-09-25 PROCEDURE — 25010000002 PERFLUTREN (DEFINITY) 8.476 MG IN SODIUM CHLORIDE 0.9 % 10 ML INJECTION: Performed by: NURSE PRACTITIONER

## 2019-09-25 PROCEDURE — 93325 DOPPLER ECHO COLOR FLOW MAPG: CPT | Performed by: INTERNAL MEDICINE

## 2019-09-25 PROCEDURE — 93352 ADMIN ECG CONTRAST AGENT: CPT | Performed by: INTERNAL MEDICINE

## 2019-09-25 PROCEDURE — 93325 DOPPLER ECHO COLOR FLOW MAPG: CPT

## 2019-09-25 PROCEDURE — 93320 DOPPLER ECHO COMPLETE: CPT

## 2019-09-25 PROCEDURE — 93350 STRESS TTE ONLY: CPT | Performed by: INTERNAL MEDICINE

## 2019-09-25 PROCEDURE — 93350 STRESS TTE ONLY: CPT

## 2019-09-25 PROCEDURE — 93018 CV STRESS TEST I&R ONLY: CPT | Performed by: INTERNAL MEDICINE

## 2019-09-25 PROCEDURE — 93017 CV STRESS TEST TRACING ONLY: CPT

## 2019-09-25 PROCEDURE — 93016 CV STRESS TEST SUPVJ ONLY: CPT | Performed by: INTERNAL MEDICINE

## 2019-09-25 RX ADMIN — PERFLUTREN 3 ML: 6.52 INJECTION, SUSPENSION INTRAVENOUS at 13:49

## 2019-09-26 ENCOUNTER — TELEPHONE (OUTPATIENT)
Dept: CARDIOLOGY | Facility: CLINIC | Age: 79
End: 2019-09-26

## 2019-09-26 LAB
ASCENDING AORTA: 3.9 CM
BH CV ECHO AV AORTIC VALVE AT ACCEL TIME CALCULATED: NORMAL MSEC
BH CV ECHO MEAS - ACS: 1.5 CM
BH CV ECHO MEAS - AO ACC TIME: 0.12 SEC
BH CV ECHO MEAS - AO MAX PG (FULL): 27.3 MMHG
BH CV ECHO MEAS - AO MAX PG: 37.1 MMHG
BH CV ECHO MEAS - AO MEAN PG (FULL): 17.9 MMHG
BH CV ECHO MEAS - AO MEAN PG: 23.5 MMHG
BH CV ECHO MEAS - AO V2 MAX: 304.5 CM/SEC
BH CV ECHO MEAS - AO V2 MEAN: 234.2 CM/SEC
BH CV ECHO MEAS - AO V2 VTI: 52.8 CM
BH CV ECHO MEAS - AT: 120 SEC
BH CV ECHO MEAS - AVA(I,A): 1.4 CM^2
BH CV ECHO MEAS - AVA(I,D): 1.4 CM^2
BH CV ECHO MEAS - AVA(V,A): 1.6 CM^2
BH CV ECHO MEAS - AVA(V,D): 1.6 CM^2
BH CV ECHO MEAS - BSA(HAYCOCK): 1.9 M^2
BH CV ECHO MEAS - BSA: 1.8 M^2
BH CV ECHO MEAS - BZI_BMI: 25.2 KILOGRAMS/M^2
BH CV ECHO MEAS - BZI_METRIC_HEIGHT: 170.2 CM
BH CV ECHO MEAS - BZI_METRIC_WEIGHT: 73 KG
BH CV ECHO MEAS - EDV(MOD-SP4): 59 ML
BH CV ECHO MEAS - EDV(TEICH): 84.6 ML
BH CV ECHO MEAS - EF(CUBED): 62.1 %
BH CV ECHO MEAS - EF(MOD-BP): 69 %
BH CV ECHO MEAS - EF(MOD-SP4): 78 %
BH CV ECHO MEAS - EF(TEICH): 53.9 %
BH CV ECHO MEAS - ESV(MOD-SP4): 13 ML
BH CV ECHO MEAS - ESV(TEICH): 38.9 ML
BH CV ECHO MEAS - FS: 27.7 %
BH CV ECHO MEAS - IVS/LVPW: 1
BH CV ECHO MEAS - IVSD: 0.98 CM
BH CV ECHO MEAS - LAT PEAK E' VEL: 8 CM/SEC
BH CV ECHO MEAS - LV DIASTOLIC VOL/BSA (35-75): 32 ML/M^2
BH CV ECHO MEAS - LV MASS(C)D: 140.9 GRAMS
BH CV ECHO MEAS - LV MASS(C)DI: 76.4 GRAMS/M^2
BH CV ECHO MEAS - LV MAX PG: 9.8 MMHG
BH CV ECHO MEAS - LV MEAN PG: 5.6 MMHG
BH CV ECHO MEAS - LV SYSTOLIC VOL/BSA (12-30): 7 ML/M^2
BH CV ECHO MEAS - LV V1 MAX: 156.7 CM/SEC
BH CV ECHO MEAS - LV V1 MEAN: 110 CM/SEC
BH CV ECHO MEAS - LV V1 VTI: 24 CM
BH CV ECHO MEAS - LVIDD: 4.3 CM
BH CV ECHO MEAS - LVIDS: 3.1 CM
BH CV ECHO MEAS - LVLD AP4: 6.8 CM
BH CV ECHO MEAS - LVLS AP4: 5 CM
BH CV ECHO MEAS - LVOT AREA (M): 3.1 CM^2
BH CV ECHO MEAS - LVOT AREA: 3.1 CM^2
BH CV ECHO MEAS - LVOT DIAM: 2 CM
BH CV ECHO MEAS - LVPWD: 0.98 CM
BH CV ECHO MEAS - MED PEAK E' VEL: 6 CM/SEC
BH CV ECHO MEAS - MV A DUR: 0.11 SEC
BH CV ECHO MEAS - MV A MAX VEL: 153.8 CM/SEC
BH CV ECHO MEAS - MV DEC TIME: 0.2 SEC
BH CV ECHO MEAS - MV E MAX VEL: 91.7 CM/SEC
BH CV ECHO MEAS - MV E/A: 0.6
BH CV ECHO MEAS - MV MAX PG: 10.2 MMHG
BH CV ECHO MEAS - MV MEAN PG: 4.3 MMHG
BH CV ECHO MEAS - MV V2 MAX: 160 CM/SEC
BH CV ECHO MEAS - MV V2 MEAN: 96 CM/SEC
BH CV ECHO MEAS - MV V2 VTI: 25.6 CM
BH CV ECHO MEAS - MVA(VTI): 3 CM^2
BH CV ECHO MEAS - PULM A REVS DUR: 0.08 SEC
BH CV ECHO MEAS - PULM A REVS VEL: 32 CM/SEC
BH CV ECHO MEAS - PULM DIAS VEL: 39.3 CM/SEC
BH CV ECHO MEAS - PULM S/D: 1.8
BH CV ECHO MEAS - PULM SYS VEL: 68.9 CM/SEC
BH CV ECHO MEAS - RVSP: 36 MMHG
BH CV ECHO MEAS - SI(CUBED): 27.4 ML/M^2
BH CV ECHO MEAS - SI(LVOT): 41 ML/M^2
BH CV ECHO MEAS - SI(MOD-SP4): 24.9 ML/M^2
BH CV ECHO MEAS - SI(TEICH): 24.7 ML/M^2
BH CV ECHO MEAS - SV(CUBED): 50.5 ML
BH CV ECHO MEAS - SV(LVOT): 75.6 ML
BH CV ECHO MEAS - SV(MOD-SP4): 46 ML
BH CV ECHO MEAS - SV(TEICH): 45.6 ML
BH CV ECHO MEAS - TR MAX VEL: 298.6 CM/SEC
BH CV ECHO MEASUREMENTS AVERAGE E/E' RATIO: 13.1
BH CV STRESS BP STAGE 1: NORMAL
BH CV STRESS BP STAGE 2: NORMAL
BH CV STRESS DURATION MIN STAGE 1: 3
BH CV STRESS DURATION MIN STAGE 2: 2
BH CV STRESS DURATION SEC STAGE 1: 0
BH CV STRESS DURATION SEC STAGE 2: 0
BH CV STRESS ECHO POST STRESS EJECTION FRACTION EF: 77 %
BH CV STRESS GRADE STAGE 1: 10
BH CV STRESS GRADE STAGE 2: 12
BH CV STRESS HR STAGE 1: 133
BH CV STRESS HR STAGE 2: 150
BH CV STRESS METS STAGE 1: 5
BH CV STRESS METS STAGE 2: 7.5
BH CV STRESS PROTOCOL 1: NORMAL
BH CV STRESS SPEED STAGE 1: 1.7
BH CV STRESS SPEED STAGE 2: 2.5
BH CV STRESS STAGE 1: 1
BH CV STRESS STAGE 2: 2
BH CV XLRA - RV BASE: 2.6 CM
BH CV XLRA - TDI S': 10 CM/SEC
LEFT ATRIUM VOLUME INDEX: 26 ML/M2
MAXIMAL PREDICTED HEART RATE: 139 BPM
PERCENT MAX PREDICTED HR: 67.63 %
SINUS: 3.2 CM
STJ: 2.8 CM
STRESS BASELINE BP: NORMAL MMHG
STRESS BASELINE HR: 112 BPM
STRESS PERCENT HR: 80 %
STRESS POST ESTIMATED WORKLOAD: 6 METS
STRESS POST EXERCISE DUR MIN: 5 MIN
STRESS POST EXERCISE DUR SEC: 0 SEC
STRESS POST PEAK BP: NORMAL MMHG
STRESS POST PEAK HR: 94 BPM
STRESS TARGET HR: 118 BPM

## 2019-09-26 NOTE — TELEPHONE ENCOUNTER
Patient's stress echo showed normal left ventricular systolic function with EF of 69%.  Grade 1 diastolic dysfunction consistent with age was seen.  Mild concentric LVH was seen.  Mild tricuspid regurgitation with borderline elevated RVSP of 36 mmHg was seen.  Mild to moderate aortic valve stenosis was seen.  The stress echo was normal with no significant echocardiographic evidence to suggest myocardial ischemia.  The stress ECG was also normal.    (Pulmonary hypertension has improved from 10/2017 echocardiogram when it was 52 mmHg.  Patient also had borderline concentric hypertrophy at this time.  Aortic stenosis was noted to be mild at that time).      At this point we will have patient keep her March follow-up with Dr. Mitchell as scheduled, work on decreasing stress, and follow-up with primary care provider to further discuss stress management and evaluation of noncardiac causes for history of chest discomfort.  She will monitor blood pressure at home and call if it is staying greater than 130/80.  We did discuss that we could order repeat CT of the chest however patient's chest discomfort sounds atypical and does not radiate to the back and she would like to hold off on this time.  She will notify the office for any new or worsening symptoms.  If she develops any severe chest discomfort radiating to the back she will call 911/go the ER.

## 2019-09-29 DIAGNOSIS — J45.20 MILD INTERMITTENT ASTHMA WITHOUT COMPLICATION: ICD-10-CM

## 2019-09-29 DIAGNOSIS — J30.2 SEASONAL ALLERGIC RHINITIS, UNSPECIFIED TRIGGER: ICD-10-CM

## 2019-09-30 RX ORDER — MONTELUKAST SODIUM 10 MG/1
TABLET ORAL
Qty: 90 TABLET | Refills: 1 | Status: SHIPPED | OUTPATIENT
Start: 2019-09-30 | End: 2020-04-28

## 2019-11-12 ENCOUNTER — TELEPHONE (OUTPATIENT)
Dept: INTERNAL MEDICINE | Facility: CLINIC | Age: 79
End: 2019-11-12

## 2019-11-12 DIAGNOSIS — K12.1 MOUTH ULCER: ICD-10-CM

## 2019-11-12 RX ORDER — VALACYCLOVIR HYDROCHLORIDE 1 G/1
TABLET, FILM COATED ORAL
Qty: 12 TABLET | Refills: 3 | Status: SHIPPED | OUTPATIENT
Start: 2019-11-12 | End: 2021-05-04 | Stop reason: SDUPTHER

## 2019-11-12 NOTE — TELEPHONE ENCOUNTER
"I spoke to Lane with [facility] and he informed me that Ms. Pettit has refills on file of both the glimepride 2 mg and fenofibrate micronized 134 mg. She has to call herself whenever her medication get low and speak to a \"live\" person to request this as she isn't on automatic refill .    Lane stated Ms. Pettit has too been informed of this in the past.    I told her that I can send in for her Valcyclovir 1000 mg  as previously sent by Dr. Joy last year.    She voiced she would contact [facility] to have her medication filled.   "

## 2019-11-12 NOTE — TELEPHONE ENCOUNTER
----- Message from Fozia Barragan sent at 11/12/2019 11:24 AM EST -----  Contact: pt  Pt is calling for a refill     FOR  glimepiride (AMARYL) 2 MG tablet  fenofibrate micronized (LOFIBRA) 134 MG capsule    Patient requests RX SENT TO   [facility] - [place], [place] - [address] AT [facility] to [facility] - [phone] [facility] [phone] FX    Also requesting     VALACYCLOVIR 1 MG   2 TABLETS BY MOUTH   (Last given 1/1/18)    [facility] [address] [place] [address] AT [facility] [phone] [facility] [phone] FX      Caller# [phone]     Please and thank you.

## 2019-11-13 DIAGNOSIS — M54.2 CERVICALGIA: ICD-10-CM

## 2019-11-13 RX ORDER — BACLOFEN 10 MG/1
TABLET ORAL
Qty: 90 TABLET | Refills: 1 | Status: SHIPPED | OUTPATIENT
Start: 2019-11-13 | End: 2020-03-20

## 2019-12-08 DIAGNOSIS — I10 BENIGN ESSENTIAL HTN: ICD-10-CM

## 2019-12-09 ENCOUNTER — TELEPHONE (OUTPATIENT)
Dept: CARDIOLOGY | Facility: CLINIC | Age: 79
End: 2019-12-09

## 2019-12-09 NOTE — TELEPHONE ENCOUNTER
Received a fax from Dr Warner asking for cardiac clearance for Mark Ptosis under general LMA anesthesia.      They also want the pt to hold ASA 2 weeks prior.      Pt was last seen 9/2019.  Surgery is scheduled for 1/29/2020.

## 2019-12-10 NOTE — TELEPHONE ENCOUNTER
As long as blood pressure is controlled entheses less than 130/80), no new chest pain or shortness of breath okay to proceed with surgery though would prefer to not have aspirin held for more than a week.  Please let patient and surgeons know this.yessenia

## 2019-12-10 NOTE — TELEPHONE ENCOUNTER
I called the patient listed number and got voicemail.  Please let the patient know that I prefer she not be off aspirin for more than a week however Dr. Diego's office is really wanting her off for 2 weeks.  There is a small chance of small clot developing on the valve and migrating to other vessels in particular causing a stroke.  This risk is small and she will need to decide if it is worth taking for the eye surgery.yessenia

## 2019-12-10 NOTE — TELEPHONE ENCOUNTER
Spoke with pt.  Verbalized understanding.  She will discuss with her sister and decide.  She will call to let us know.

## 2019-12-12 DIAGNOSIS — F41.9 ANXIETY: ICD-10-CM

## 2019-12-13 RX ORDER — CLONAZEPAM 1 MG/1
TABLET ORAL
Qty: 90 TABLET | Refills: 0 | Status: SHIPPED | OUTPATIENT
Start: 2019-12-13 | End: 2020-04-30 | Stop reason: SDUPTHER

## 2019-12-19 DIAGNOSIS — K21.9 GASTROESOPHAGEAL REFLUX DISEASE, ESOPHAGITIS PRESENCE NOT SPECIFIED: ICD-10-CM

## 2019-12-19 DIAGNOSIS — E78.5 HYPERLIPIDEMIA, UNSPECIFIED HYPERLIPIDEMIA TYPE: ICD-10-CM

## 2019-12-19 RX ORDER — OMEPRAZOLE 40 MG/1
CAPSULE, DELAYED RELEASE ORAL
Qty: 90 CAPSULE | Refills: 1 | Status: SHIPPED | OUTPATIENT
Start: 2019-12-19 | End: 2020-06-08

## 2019-12-19 RX ORDER — SIMVASTATIN 40 MG
TABLET ORAL
Qty: 90 TABLET | Refills: 1 | Status: SHIPPED | OUTPATIENT
Start: 2019-12-19 | End: 2020-06-08

## 2020-01-09 ENCOUNTER — OFFICE VISIT (OUTPATIENT)
Dept: INTERNAL MEDICINE | Facility: CLINIC | Age: 80
End: 2020-01-09

## 2020-01-09 VITALS
WEIGHT: 160 LBS | SYSTOLIC BLOOD PRESSURE: 116 MMHG | HEIGHT: 67 IN | DIASTOLIC BLOOD PRESSURE: 72 MMHG | BODY MASS INDEX: 25.11 KG/M2

## 2020-01-09 DIAGNOSIS — M54.2 CERVICALGIA: ICD-10-CM

## 2020-01-09 DIAGNOSIS — R51.9 HEADACHE DISORDER: ICD-10-CM

## 2020-01-09 DIAGNOSIS — F51.01 PRIMARY INSOMNIA: ICD-10-CM

## 2020-01-09 DIAGNOSIS — E78.49 OTHER HYPERLIPIDEMIA: ICD-10-CM

## 2020-01-09 DIAGNOSIS — E03.9 ACQUIRED HYPOTHYROIDISM: ICD-10-CM

## 2020-01-09 DIAGNOSIS — E11.8 CONTROLLED TYPE 2 DIABETES MELLITUS WITH COMPLICATION, WITHOUT LONG-TERM CURRENT USE OF INSULIN (HCC): Primary | ICD-10-CM

## 2020-01-09 DIAGNOSIS — I10 ESSENTIAL HYPERTENSION: ICD-10-CM

## 2020-01-09 DIAGNOSIS — J30.2 SEASONAL ALLERGIC RHINITIS, UNSPECIFIED TRIGGER: ICD-10-CM

## 2020-01-09 DIAGNOSIS — F41.9 ANXIETY: ICD-10-CM

## 2020-01-09 PROCEDURE — 99214 OFFICE O/P EST MOD 30 MIN: CPT | Performed by: INTERNAL MEDICINE

## 2020-01-09 RX ORDER — TOPIRAMATE 25 MG/1
25 TABLET ORAL 2 TIMES DAILY
Qty: 100 TABLET | Refills: 0 | Status: SHIPPED | OUTPATIENT
Start: 2020-01-09 | End: 2020-02-03

## 2020-01-09 NOTE — PROGRESS NOTES
"Subjective   Dior Pettit is a 79 y.o. female here for   Chief Complaint   Patient presents with   • Diabetes Mellitus     4 month follow-up   • Hyperlipidemia   • Hypertension   • Hypothyroidism   • Anxiety   • Insomnia   .    Vitals:    01/09/20 1035   BP: 116/72   BP Location: Left arm   Patient Position: Sitting   Cuff Size: Adult   Weight: 72.6 kg (160 lb)   Height: 169.5 cm (66.75\")       Body mass index is 25.25 kg/m².    Diabetes Mellitus   This is a chronic problem. The current episode started more than 1 year ago. The problem occurs constantly. The problem has been unchanged. Associated symptoms include chest pain (occ - but ok per cardiologist), coughing (occ dry cough), myalgias (calf pain from varicose veins) and neck pain. Pertinent negatives include no chills, fatigue or fever.   Hyperlipidemia   This is a chronic problem. The current episode started more than 1 year ago. The problem is controlled. Recent lipid tests were reviewed and are normal. Exacerbating diseases include diabetes and hypothyroidism. Associated symptoms include chest pain (occ - but ok per cardiologist) and myalgias (calf pain from varicose veins). Pertinent negatives include no shortness of breath.   Hypertension   This is a chronic problem. The current episode started more than 1 year ago. The problem is unchanged. The problem is controlled. Associated symptoms include anxiety, chest pain (occ - but ok per cardiologist), neck pain and palpitations. Pertinent negatives include no shortness of breath.   Hypothyroidism   This is a chronic problem. The current episode started more than 1 year ago. The problem occurs constantly. The problem has been unchanged. Associated symptoms include chest pain (occ - but ok per cardiologist), coughing (occ dry cough), myalgias (calf pain from varicose veins) and neck pain. Pertinent negatives include no chills, fatigue or fever.   Anxiety   Presents for follow-up visit. Symptoms include " chest pain (occ - but ok per cardiologist), insomnia, nervous/anxious behavior and palpitations. Patient reports no shortness of breath.       Insomnia   This is a chronic problem. The current episode started more than 1 year ago. The problem occurs constantly. The problem has been unchanged. Associated symptoms include chest pain (occ - but ok per cardiologist), coughing (occ dry cough), myalgias (calf pain from varicose veins) and neck pain. Pertinent negatives include no chills, fatigue or fever.        The following portions of the patient's history were reviewed and updated as appropriate: allergies, current medications, past social history and problem list.    Review of Systems   Constitutional: Negative for chills, fatigue and fever.   HENT: Positive for postnasal drip and rhinorrhea.    Respiratory: Positive for cough (occ dry cough). Negative for shortness of breath and wheezing.    Cardiovascular: Positive for chest pain (occ - but ok per cardiologist) and palpitations. Negative for leg swelling.   Musculoskeletal: Positive for myalgias (calf pain from varicose veins), neck pain and neck stiffness.   Allergic/Immunologic: Positive for environmental allergies.   Psychiatric/Behavioral: Negative for dysphoric mood and sleep disturbance. The patient is nervous/anxious and has insomnia.        Objective   Physical Exam   Constitutional: She appears well-developed and well-nourished. No distress.   Cardiovascular: Normal rate, regular rhythm and normal heart sounds.   Pulmonary/Chest: No respiratory distress. She has no wheezes. She has no rales. She exhibits no tenderness.   Musculoskeletal: She exhibits tenderness (tight & tender paracervical muscles). She exhibits no edema or deformity.   Neurological: She is alert. No cranial nerve deficit or sensory deficit. She exhibits normal muscle tone. Coordination normal.   Skin: Skin is warm and dry.   Psychiatric: She has a normal mood and affect. Her behavior is  normal.   Nursing note and vitals reviewed.      Assessment/Plan   Diagnoses and all orders for this visit:    Controlled type 2 diabetes mellitus with complication, without long-term current use of insulin (CMS/[facility])  Comments:  only high after excessive carbs - call if sugars over 200  Orders:  -     Hemoglobin A1c; Future  -     Microalbumin / Creatinine Urine Ratio - Urine, Clean Catch; Future    Essential hypertension  Comments:  controlled - call if bp over 140/90  Orders:  -     CBC Auto Differential; Future  -     Comprehensive Metabolic Panel; Future  -     Lipid Panel; Future  -     Urinalysis With Microscopic If Indicated (No Culture) - Urine, Clean Catch; Future    Anxiety  Comments:  controlled - still needing klonopin qhs    Seasonal allergic rhinitis, unspecified trigger    Primary insomnia  Comments:  still needing klonpin qhs (doxepin made her too sleepy the next day)    Acquired hypothyroidism  Comments:  need rechk lab  Orders:  -     TSH Rfx On Abnormal To Free T4; Future    Other hyperlipidemia  Comments:  need diet/ex  Orders:  -     Comprehensive Metabolic Panel; Future  -     Lipid Panel; Future    Headache disorder  Comments:  every day for many years - she wants to try preventative medication - start low dose topamax & gradually increase if needed - call if worse  Orders:  -     topiramate (TOPAMAX) 25 MG tablet; Take 1 tablet by mouth 2 (Two) Times a Day. Increase to 2 bid in 1 wk.    Cervicalgia  Comments:  since age 28 - usu better with hot packs - call if worse -consider PT

## 2020-01-13 ENCOUNTER — LAB (OUTPATIENT)
Dept: INTERNAL MEDICINE | Facility: CLINIC | Age: 80
End: 2020-01-13

## 2020-01-13 DIAGNOSIS — E11.8 CONTROLLED TYPE 2 DIABETES MELLITUS WITH COMPLICATION, WITHOUT LONG-TERM CURRENT USE OF INSULIN (HCC): ICD-10-CM

## 2020-01-13 DIAGNOSIS — E78.49 OTHER HYPERLIPIDEMIA: ICD-10-CM

## 2020-01-13 DIAGNOSIS — I10 ESSENTIAL HYPERTENSION: ICD-10-CM

## 2020-01-13 DIAGNOSIS — E03.9 ACQUIRED HYPOTHYROIDISM: ICD-10-CM

## 2020-01-13 LAB
ALBUMIN SERPL-MCNC: 4.9 G/DL (ref 3.5–5.2)
ALBUMIN/GLOB SERPL: 1.8 G/DL
ALP SERPL-CCNC: 73 U/L (ref 39–117)
ALT SERPL-CCNC: 16 U/L (ref 1–33)
AST SERPL-CCNC: 21 U/L (ref 1–32)
BASOPHILS # BLD AUTO: 0.06 10*3/MM3 (ref 0–0.2)
BASOPHILS NFR BLD AUTO: 1 % (ref 0–1.5)
BILIRUB SERPL-MCNC: 0.3 MG/DL (ref 0.2–1.2)
BUN SERPL-MCNC: 28 MG/DL (ref 8–23)
BUN/CREAT SERPL: 24.3 (ref 7–25)
CALCIUM SERPL-MCNC: 10.4 MG/DL (ref 8.6–10.5)
CHLORIDE SERPL-SCNC: 102 MMOL/L (ref 98–107)
CHOLEST SERPL-MCNC: 195 MG/DL (ref 0–200)
CO2 SERPL-SCNC: 27 MMOL/L (ref 22–29)
CREAT SERPL-MCNC: 1.15 MG/DL (ref 0.57–1)
EOSINOPHIL # BLD AUTO: 0.29 10*3/MM3 (ref 0–0.4)
EOSINOPHIL # BLD AUTO: 4.8 % (ref 0.3–6.2)
ERYTHROCYTE [DISTWIDTH] IN BLOOD BY AUTOMATED COUNT: 12.6 % (ref 12.3–15.4)
GLOBULIN SER CALC-MCNC: 2.7 GM/DL
GLUCOSE SERPL-MCNC: 98 MG/DL (ref 65–99)
HBA1C MFR BLD: 5.8 % (ref 4.8–5.6)
HCT VFR BLD AUTO: 43.1 % (ref 34–46.6)
HDLC SERPL-MCNC: 48 MG/DL (ref 40–60)
HGB BLD-MCNC: 14.4 G/DL (ref 12–15.9)
IMM GRANULOCYTES # BLD: 0.02 10*3/MM3 (ref 0–0.05)
IMM GRANULOCYTES NFR BLD: 0.3 % (ref 0–0.5)
LDLC SERPL CALC-MCNC: 96 MG/DL (ref 0–100)
LYMPHOCYTES # BLD AUTO: 2.25 10*3/MM3 (ref 0.7–3.1)
LYMPHOCYTES NFR BLD AUTO: 37 % (ref 19.6–45.3)
MCH RBC QN AUTO: 30.1 PG (ref 26.6–33)
MCHC RBC AUTO-ENTMCNC: 33.4 G/DL (ref 31.5–35.7)
MCV RBC AUTO: 90 FL (ref 79–97)
MONOCYTES # BLD AUTO: 0.66 10*3/MM3 (ref 0.1–0.9)
MONOCYTES NFR BLD AUTO: 10.9 % (ref 5–12)
NEUTROPHILS # BLD AUTO: 2.8 10*3/MM3 (ref 1.7–7)
NEUTROPHILS NFR BLD AUTO: 46 % (ref 42.7–76)
NRBC BLD AUTO-RTO: 0 /100 WBC (ref 0–0.2)
PLATELET # BLD AUTO: 281 10*3/MM3 (ref 140–450)
POTASSIUM SERPL-SCNC: 5.3 MMOL/L (ref 3.5–5.2)
PROT SERPL-MCNC: 7.6 G/DL (ref 6–8.5)
RBC # BLD AUTO: 4.79 10*6/MM3 (ref 3.77–5.28)
SODIUM SERPL-SCNC: 143 MMOL/L (ref 136–145)
TRIGL SERPL-MCNC: 256 MG/DL (ref 0–150)
VLDLC SERPL-MCNC: 51.2 MG/DL
WBC # BLD AUTO: 6.08 10*3/MM3 (ref 3.4–10.8)

## 2020-01-14 LAB
APPEARANCE UR: CLEAR
BILIRUB UR QL STRIP: NEGATIVE
CASTS URNS MICRO: NORMAL
COLOR UR: YELLOW
CONV BACTERIA IN URINE MICRO: NORMAL /HPF
CREAT 24H UR-MCNC: 68.9 MG/DL
EPI CELLS #/AREA URNS HPF: NORMAL /HPF
GLUCOSE UR QL: NEGATIVE
HGB UR QL STRIP: NEGATIVE
KETONES UR QL STRIP: NEGATIVE
LEUKOCYTE ESTERASE UR QL STRIP: ABNORMAL
MICROALBUMIN UR-MCNC: 4 UG/ML
MICROALBUMIN/CREAT UR: 5.8 MG/G CREAT (ref 0–30)
NITRITE UR QL STRIP: NEGATIVE
PH UR STRIP.AUTO: <=5 [PH] (ref 5–8)
PROTEIN: NEGATIVE
RBC #/AREA URNS HPF: NORMAL /HPF
SP GR UR: 1.01 (ref 1–1.03)
TSH SERPL-ACNC: 1.66 UIU/ML (ref 0.27–4.2)
UROBILINOGEN UR STRIP-MCNC: ABNORMAL MG/DL
WBC #/AREA URNS HPF: NORMAL /HPF

## 2020-01-22 ENCOUNTER — TELEPHONE (OUTPATIENT)
Dept: INTERNAL MEDICINE | Facility: CLINIC | Age: 80
End: 2020-01-22

## 2020-01-22 NOTE — TELEPHONE ENCOUNTER
Patient called stating that she needs new handicap paperwork filled out with  signature. She informed me that she had picked up a copy yesterday but there was no signature. She would like a call back at [phone] when this is ready to be picked up.

## 2020-01-22 NOTE — TELEPHONE ENCOUNTER
Call made back to patient to see if she was referring to the application for a [place] handicap parking placard. She stated yes, however she was needing about 3 signed w/ Dr. Joy's signature so she can give them to her sisters as they need them.    I asked her were her sisters patients, she said only one of them, but she needed the other to provide for her other sisters as one just had knee surgery and the other one just need one.    I told her that Dr. Joy  Can't provide her signature on a handicap application if her sisters aren't patients of hers. She stated one of them was, I told her that if her sister needed one than she would need to have call and request this for herself.    Ms. Sheikh verbalized she understood.

## 2020-01-31 DIAGNOSIS — J45.20 MILD INTERMITTENT ASTHMA WITHOUT COMPLICATION: ICD-10-CM

## 2020-01-31 RX ORDER — GLIMEPIRIDE 2 MG/1
TABLET ORAL
Qty: 90 TABLET | Refills: 1 | Status: SHIPPED | OUTPATIENT
Start: 2020-01-31 | End: 2020-04-30 | Stop reason: SDUPTHER

## 2020-02-03 DIAGNOSIS — R51.9 HEADACHE DISORDER: ICD-10-CM

## 2020-02-03 RX ORDER — TOPIRAMATE 25 MG/1
TABLET ORAL
Qty: 100 TABLET | Refills: 2 | Status: SHIPPED | OUTPATIENT
Start: 2020-02-03 | End: 2020-03-11 | Stop reason: SDUPTHER

## 2020-02-20 DIAGNOSIS — E78.5 HYPERLIPIDEMIA, ACQUIRED: ICD-10-CM

## 2020-02-20 RX ORDER — FENOFIBRATE 134 MG/1
CAPSULE ORAL
Qty: 90 CAPSULE | Refills: 3 | Status: SHIPPED | OUTPATIENT
Start: 2020-02-20 | End: 2020-11-19

## 2020-02-21 RX ORDER — FLUTICASONE PROPIONATE 50 MCG
2 SPRAY, SUSPENSION (ML) NASAL DAILY
Qty: 3 BOTTLE | Refills: 1 | Status: SHIPPED | OUTPATIENT
Start: 2020-02-21 | End: 2021-05-06 | Stop reason: SDUPTHER

## 2020-02-21 NOTE — TELEPHONE ENCOUNTER
Patient requested a refill for her Flonase, please send to [facility] on [address].     Patient callback [phone]    Pharmacy confirmed.

## 2020-03-05 DIAGNOSIS — F41.9 ANXIETY: ICD-10-CM

## 2020-03-05 RX ORDER — VENLAFAXINE HYDROCHLORIDE 150 MG/1
CAPSULE, EXTENDED RELEASE ORAL
Qty: 90 CAPSULE | Refills: 2 | Status: SHIPPED | OUTPATIENT
Start: 2020-03-05 | End: 2020-12-01

## 2020-03-09 ENCOUNTER — RESULTS ENCOUNTER (OUTPATIENT)
Dept: INTERNAL MEDICINE | Facility: CLINIC | Age: 80
End: 2020-03-09

## 2020-03-09 ENCOUNTER — TELEPHONE (OUTPATIENT)
Dept: INTERNAL MEDICINE | Facility: CLINIC | Age: 80
End: 2020-03-09

## 2020-03-09 DIAGNOSIS — R35.0 FREQUENCY OF URINATION: ICD-10-CM

## 2020-03-09 DIAGNOSIS — R51.9 HEADACHE DISORDER: ICD-10-CM

## 2020-03-09 DIAGNOSIS — R35.0 FREQUENCY OF URINATION: Primary | ICD-10-CM

## 2020-03-09 RX ORDER — TOPIRAMATE 25 MG/1
TABLET ORAL
Qty: 100 TABLET | Refills: 2 | Status: CANCELLED | OUTPATIENT
Start: 2020-03-09

## 2020-03-09 NOTE — TELEPHONE ENCOUNTER
Discussed - the azo (for freq urin/dysuria) may be causing constipation - she needs urine tomorrow (UTI?) - coming in at 11am tomorrow (tues).  She may decrease topamax to 3/d & take miralax daily.

## 2020-03-09 NOTE — TELEPHONE ENCOUNTER
PT requests a refill for the selected medication but she wants to discuss no longer taking this medication any longer with Dr. Joy. Pt states the medication is causing constipation and she would prefer to no longer take it but knows this has to be discussed with Dr. Joy. Pt requests a call back as soon as possible. (pt would like to discuss multiple side effects)      Pt can be contacted at [phone]

## 2020-03-11 DIAGNOSIS — R51.9 HEADACHE DISORDER: ICD-10-CM

## 2020-03-11 RX ORDER — TOPIRAMATE 25 MG/1
TABLET ORAL
Qty: 60 TABLET | Refills: 2 | Status: SHIPPED | OUTPATIENT
Start: 2020-03-11 | End: 2020-03-16 | Stop reason: SDUPTHER

## 2020-03-11 NOTE — TELEPHONE ENCOUNTER
PATIENT REQUESTED A REFILL ON :topiramate (TOPAMAX) 25 MG tablet    PHARMACY PREFERRED:[facility] [address] [place] [address] [facility] [phone]  - [phone] FX     PT CAN BE REACHED AT: [phone]

## 2020-03-16 DIAGNOSIS — R51.9 HEADACHE DISORDER: ICD-10-CM

## 2020-03-16 NOTE — TELEPHONE ENCOUNTER
PT CALLED REQUESTING A REFILL FOR topiramate (TOPAMAX) 25 MG tablet. PT STATES THE MEDICATION WAS STOLEN OFF HER DRESSER FROM PEOPLE REPLACING HER MATTRESS.     [facility] CONFIRMED     PT CALL BACK   [phone]

## 2020-03-17 RX ORDER — TOPIRAMATE 25 MG/1
TABLET ORAL
Qty: 60 TABLET | Refills: 2 | Status: SHIPPED | OUTPATIENT
Start: 2020-03-17 | End: 2020-04-30

## 2020-03-18 ENCOUNTER — TELEPHONE (OUTPATIENT)
Dept: CARDIOLOGY | Facility: CLINIC | Age: 80
End: 2020-03-18

## 2020-03-20 DIAGNOSIS — M54.2 CERVICALGIA: ICD-10-CM

## 2020-03-20 RX ORDER — BACLOFEN 10 MG/1
TABLET ORAL
Qty: 90 TABLET | Refills: 1 | Status: SHIPPED | OUTPATIENT
Start: 2020-03-20 | End: 2020-04-30 | Stop reason: SDUPTHER

## 2020-04-10 DIAGNOSIS — I10 BENIGN ESSENTIAL HTN: ICD-10-CM

## 2020-04-28 DIAGNOSIS — J30.2 SEASONAL ALLERGIC RHINITIS, UNSPECIFIED TRIGGER: ICD-10-CM

## 2020-04-28 DIAGNOSIS — J45.20 MILD INTERMITTENT ASTHMA WITHOUT COMPLICATION: ICD-10-CM

## 2020-04-28 DIAGNOSIS — E03.9 ACQUIRED HYPOTHYROIDISM: ICD-10-CM

## 2020-04-28 RX ORDER — LEVOTHYROXINE SODIUM 0.07 MG/1
TABLET ORAL
Qty: 45 TABLET | Refills: 3 | Status: SHIPPED | OUTPATIENT
Start: 2020-04-28 | End: 2021-04-12

## 2020-04-28 RX ORDER — MONTELUKAST SODIUM 10 MG/1
TABLET ORAL
Qty: 90 TABLET | Refills: 1 | Status: SHIPPED | OUTPATIENT
Start: 2020-04-28 | End: 2020-10-27

## 2020-04-29 NOTE — PROGRESS NOTES
Date of Office Visit: 2020  Encounter Provider: Abbi Mitchell MD  Place of Service: [facility] CARDIOLOGY  Patient Name: Dior Pettit  :1940    This patient has consented to a telehealth visit via phone. The visit was scheduled as a telehealth phone visit to comply with patient safety concerns in accordance with CDC recommendations.  All vitals recorded within this visit are reported by the patient.  I spent 25 minutes in total including but not limited to the 14 minutes spent in direct conversation with this patient.     Chief complaint  Follow-up of aortic valve replacement, pulmonary hypertension, mitral regurgitation    History of Present Illness  Patient is a 79-year-old female with history of hypertension, hyperlipidemia, diabetes, aortic valve disease and subaortic membrane.  In  she underwent aortic valve replacement with porcine valve as well as subaortic membrane resection. Cardiac catheterization was negative for coronary artery disease.  She was then found to have significant sleep apnea but was been intolerant of therapy for this.  She is also noted to have pulmonary hypertension with an RV systolic pressure 44 monos mercury in  in fiber 2015 she developed chest tightness while shoveling snow in a stress perfusion study was negative for ischemia.  A follow-up echocardiogram in 2017 that showed normal systolic function with mild left ventricular hypertrophy aortic valve calcification with mild stenosis with mild mitral regurgitation moderate tricuspid valve regurgitation with an RV systolic pressure 52 mmHg and an ascending aorta measuring 3.9 cm.  In 2019 with complaints of chest pain a stress echocardiogram showed normal systolic function grade 1 diastolic dysfunction, mild concentric left ventricular per trophy.  There is mild to moderate bioprosthetic aortic valve stenosis with mild tricuspid regurgitation and an RV systolic  pressure 36 mmHg.  There was no ischemia at 6 METs    Since last visit she has been active doing yard work and cleaning her home despite COVID restrictions.  She thinks her dyspnea on exertion has worsened and has episodic moments where it is worse in particular when she lays down.  She denies any chest pain or palpitations.  She admits to gaining 5 pounds.  She was to have had follow-up labs with Dr. Joy but has not done so as of yet.  She does complain of her right leg being knotted though has no pain.  It is slightly bluish in discoloration.  She states that her dyspnea occurs    Past Medical History:   Diagnosis Date   • Allergic rhinitis    • Anxiety     Controlled w/Meds   • Aortic root dilatation (CMS/HCC) 10/02/2017    Borderline--Noted on Echo   • Aortic valve calcification 10/02/2017    Noted on Echo   • Aortic valve insufficiency Dx in 07    w/AVR    • Aortic valve prosthesis present 11/02/2018    Noted on CTA Chest   • Ascending aorta dilatation (CMS/HCC) 10/02/2017    Borderline--Noted on Echo   • Asthma    • Atypical chest pain    • Breast pain, right Hx   • CAD (coronary artery disease)    • Cardiomegaly 2017   • Cervicalgia    • Chest pain due to CAD (CMS/HCC)    • 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    Borderline--Noted on Echo & Measured @ 2.6CM and Mid Descending @ 2.5CM on CTA Chest-11/2/18   • DM (diabetes mellitus) (CMS/HCC)     T2   • Esophagitis    • GERD (gastroesophageal reflux disease)     Controlled w/Meds   • Health care maintenance    • Heart murmur    • History of echocardiogram 10/2/17-[facility]    EF 66%; Borderline Concentric Hypertrophy; Mild Calcification in AV; Mild AVS; Mild AVR; Moderate TVR; Borderline Dilation of Aortic Root/Arch & Borderline Dilation of Ascending/Proximal Aorta Present   • Hyperlipidemia     Controlled w/Meds   • Hypertension     Controlled w/Meds   • Hypothyroidism     Controlled w/Synthroid   • Insomnia    • Macular degeneration, left eye    • Mild  aortic valve regurgitation 10/02/2017    Noted on Echo   • Mild aortic valve stenosis 10/02/2017    Noted on Echo   • Mild dilation of ascending aorta (CMS/HCC) 10/02/2017    Borderline--Noted on Echo   • Moderate tricuspid valve regurgitation 10/02/2017    Noted on Echo   • Mood disorder in conditions classified elsewhere     Controlled w/Meds   • Near syncope 03/2017-[facility]   • Neuropathy    • NNEKA (obstructive sleep apnea)     Untreated   • Osteoarthritis     Ankles/Feet   • Pulmonary hypertension (CMS/HCC) 2017   • Renal insufficiency    • RLS (restless legs syndrome)    • Thoracic ascending aortic aneurysm (CMS/HCC) Dx in 2007 @ 3.8CM & 1/02/2018    Noted @ 4CM on CTA Chest;     Past Surgical History:   Procedure Laterality Date   • AORTIC VALVE REPAIR/REPLACEMENT  2007    Dr. Perry   • APPENDECTOMY     • AUGMENTATION MAMMAPLASTY  1976   • BREAST AUGMENTATION  2014   • BUNIONECTOMY     • CARDIAC CATHETERIZATION  2007   • COLONOSCOPY  2010   • COLONOSCOPY  03/02/2012   • HYSTERECTOMY  1972   • SIGMOIDOSCOPY  2001     Outpatient Medications Prior to Visit   Medication Sig Dispense Refill   • ascorbic acid (EQL VITAMIN C) 1000 MG tablet Take 1,000 mg by mouth Daily.     • aspirin 81 MG chewable tablet Chew 81 mg Daily.     • baclofen (LIORESAL) 10 MG tablet Take 10 mg by mouth Every Night.     • clonazePAM (KlonoPIN) 1 MG tablet Take 0.25 mg by mouth Every Night.     • doxepin (SINEquan) 50 MG capsule Take 50 mg by mouth Every Night.     • fenofibrate micronized (LOFIBRA) 134 MG capsule TAKE 1 CAPSULE EVERY       MORNING BEFORE BREAKFAST 90 capsule 3   • fluticasone (FLONASE) 50 MCG/ACT nasal spray 2 sprays into the nostril(s) as directed by provider Daily. (Patient taking differently: 2 sprays into the nostril(s) as directed by provider Daily As Needed.) 3 bottle 1   • fluticasone-salmeterol (Advair Diskus) 250-50 MCG/DOSE DISKUS Inhale As Needed.     • glimepiride (AMARYL) 2 MG tablet Take 2 mg by mouth Every  Night.     • glucosamine-chondroitin 500-400 MG capsule capsule Take 2 capsules by mouth Daily.     • glucose blood test strip CHECK BLOOD SUGAR ONE TIME PER  each 5   • levothyroxine (SYNTHROID, LEVOTHROID) 50 MCG tablet Take 1 tablet by mouth Every Other Day. 45 tablet 3   • levothyroxine (SYNTHROID, LEVOTHROID) 75 MCG tablet TAKE 1 TABLET EVERY OTHER  DAY 45 tablet 3   • magnesium oxide (MAGOX) 400 (241.3 Mg) MG tablet tablet Take 400 mg by mouth Daily.     • metFORMIN ER (GLUCOPHAGE-XR) 750 MG 24 hr tablet Take 1 tablet by mouth 2 (Two) Times a Day. 180 tablet 3   • metoprolol tartrate (LOPRESSOR) 25 MG tablet TAKE ONE TABLET BY MOUTH THREE TIMES A  tablet 1   • montelukast (SINGULAIR) 10 MG tablet TAKE 1 TABLET EVERY NIGHT 90 tablet 1   • Multiple Vitamins-Minerals (MULTIVITAL PO) Take  by mouth Daily.     • omeprazole (priLOSEC) 40 MG capsule TAKE 1 CAPSULE DAILY 90 capsule 1   • simvastatin (ZOCOR) 40 MG tablet TAKE 1 TABLET NIGHTLY AT   BEDTIME 90 tablet 1   • valACYclovir (VALTREX) 1000 MG tablet 2 pills for mouth ulcer - repeat in 12 hrs. (Patient taking differently: As Needed. 2 pills for mouth ulcer - repeat in 12 hrs.) 12 tablet 3   • venlafaxine XR (EFFEXOR-XR) 150 MG 24 hr capsule TAKE 1 CAPSULE DAILY 90 capsule 2   • baclofen (LIORESAL) 10 MG tablet TAKE 1 TABLET EVERY 8 HOURSAS NEEDED FOR MUSCLE SPASM (Patient taking differently: Take 10 mg by mouth Daily.) 90 tablet 1   • clonazePAM (KlonoPIN) 1 MG tablet TAKE 1 TABLET NIGHTLY AS   NEEDED FOR ANXIETY (Patient taking differently: Take 0.25 mg by mouth Every Night.) 90 tablet 0   • fluticasone-salmeterol (ADVAIR DISKUS) 250-50 MCG/DOSE DISKUS Inhale 1 puff 2 (Two) Times a Day. (Patient taking differently: Inhale 1 puff 2 (Two) Times a Day As Needed.) 42 each 0   • glimepiride (AMARYL) 2 MG tablet TAKE 1 TABLET EVERY MORNINGBEFORE BREAKFAST (Patient taking differently: Every Night.) 90 tablet 1   • topiramate (TOPAMAX) 25 MG tablet TAKE  "TWO TABLETS BY MOUTH TWICE A DAY THEREAFTER 60 tablet 2   • traMADol (ULTRAM) 50 MG tablet Take 50 mg by mouth Every 6 (Six) Hours As Needed for Moderate Pain .       No facility-administered medications prior to visit.        Allergies as of 04/30/2020   • (No Known Allergies)     Social History     Socioeconomic History   • Marital status:      Spouse name: Not on file   • Number of children: Not on file   • Years of education: Not on file   • Highest education level: Not on file   Tobacco Use   • Smoking status: Never Smoker   • Smokeless tobacco: Never Used   Substance and Sexual Activity   • Alcohol use: Yes     Comment: Social w/Daily Caffeine Use   • Drug use: Not Currently     Types: Benzodiazepines     Comment: Clonazepam/Tramadol   • Sexual activity: Defer     Birth control/protection: Surgical     Comment: Hyst     Family History   Problem Relation Age of Onset   • Hypertension Mother    • Stroke Mother    • Diabetes Sister    • Coronary artery disease Brother    • Diabetes Brother    • Valvular heart disease Brother         TAVR   • Coronary artery disease Brother    • Skin cancer Neg Hx      ROS     Objective:     Vitals:    04/30/20 1051   BP: 120/67   Weight: 74.8 kg (165 lb)   Height: 167.6 cm (66\")     Body mass index is 26.63 kg/m².    Physical Exam  Lab Review:   Procedures  Assessment:       Diagnosis Plan   1. SOB (shortness of breath)  proBNP    Basic Metabolic Panel    XR Chest 2 View    D-dimer, Quantitative   2. S/P AVR     3. Coronary artery disease involving native coronary artery with angina pectoris, unspecified whether native or transplanted heart (CMS/HCC)     4. Dilated aortic root (CMS/HCC)     5. Essential hypertension     6. Pulmonary hypertension (CMS/HCC)     7. Diabetes mellitus without complication (CMS/HCC)     8. Renal insufficiency       Plan:       1.  Shortness of breath.  She describes worsening symptoms of course she has been less active and has had some weight " "gain over the past several months she believes 5.  She states her shortness of breath progression preceded this.  She states at times it is quite significant.  She also has \"knotting\" right leg though it is not tender.  We will have her come in for a BMP BNP d-dimer and a chest x-ray.  If these are negative we will have her start a regular exercise regimen.  Follow-up with me/aprn in 4 weeks.  And may need further pulmonary evaluation.  Of note stress echocardiogram in September looked quite good.  May need pulmonary evaluation  2.  Aortic valve replacement with a subaortic membrane resection that he valve stenosis.  As above  3.  Mildly dilated aortic root, stable measuring 3.9 cm in 11/2018 by CT imaging.  Also 3.9 cm by echo imaging in 11/2019  4.  Hypertension,  controlled  5.  Pulmonary hypertension, nearly resolved by 2019  6.  Renal insufficiency, as above  7.  Diabetes  8.  Dyslipidemia         Your medication list           Accurate as of April 30, 2020  8:09 PM. If you have any questions, ask your nurse or doctor.               CHANGE how you take these medications      Instructions Last Dose Given Next Dose Due   fluticasone 50 MCG/ACT nasal spray  Commonly known as:  Flonase  What changed:    · when to take this  · reasons to take this      2 sprays into the nostril(s) as directed by provider Daily.       valACYclovir 1000 MG tablet  Commonly known as:  Valtrex  What changed:    · when to take this  · reasons to take this      2 pills for mouth ulcer - repeat in 12 hrs.          CONTINUE taking these medications      Instructions Last Dose Given Next Dose Due   Advair Diskus 250-50 MCG/DOSE DISKUS  Generic drug:  fluticasone-salmeterol      Inhale As Needed.       aspirin 81 MG chewable tablet      Chew 81 mg Daily.       baclofen 10 MG tablet  Commonly known as:  LIORESAL      Take 10 mg by mouth Every Night.       clonazePAM 1 MG tablet  Commonly known as:  KlonoPIN      Take 0.25 mg by mouth Every " Night.       doxepin 50 MG capsule  Commonly known as:  SINEquan      Take 50 mg by mouth Every Night.       EQL Vitamin C 1000 MG tablet  Generic drug:  ascorbic acid      Take 1,000 mg by mouth Daily.       fenofibrate micronized 134 MG capsule  Commonly known as:  LOFIBRA      TAKE 1 CAPSULE EVERY       MORNING BEFORE BREAKFAST       glimepiride 2 MG tablet  Commonly known as:  AMARYL      Take 2 mg by mouth Every Night.       glucosamine-chondroitin 500-400 MG capsule capsule      Take 2 capsules by mouth Daily.       glucose blood test strip      CHECK BLOOD SUGAR ONE TIME PER DAY       levothyroxine 50 MCG tablet  Commonly known as:  SYNTHROID, LEVOTHROID      Take 1 tablet by mouth Every Other Day.       levothyroxine 75 MCG tablet  Commonly known as:  SYNTHROID, LEVOTHROID      TAKE 1 TABLET EVERY OTHER  DAY       magnesium oxide 400 (241.3 Mg) MG tablet tablet  Commonly known as:  MAGOX      Take 400 mg by mouth Daily.       metFORMIN  MG 24 hr tablet  Commonly known as:  GLUCOPHAGE-XR      Take 1 tablet by mouth 2 (Two) Times a Day.       metoprolol tartrate 25 MG tablet  Commonly known as:  LOPRESSOR      TAKE ONE TABLET BY MOUTH THREE TIMES A DAY       montelukast 10 MG tablet  Commonly known as:  SINGULAIR      TAKE 1 TABLET EVERY NIGHT       MULTIVITAL PO      Take  by mouth Daily.       omeprazole 40 MG capsule  Commonly known as:  priLOSEC      TAKE 1 CAPSULE DAILY       simvastatin 40 MG tablet  Commonly known as:  ZOCOR      TAKE 1 TABLET NIGHTLY AT   BEDTIME       venlafaxine  MG 24 hr capsule  Commonly known as:  EFFEXOR-XR      TAKE 1 CAPSULE DAILY          STOP taking these medications    topiramate 25 MG tablet  Commonly known as:  TOPAMAX  Stopped by:  Abbi Mitchell MD        traMADol 50 MG tablet  Commonly known as:  ULTRAM  Stopped by:  Abbi Mitchell MD             Patient is no longer taking ultram, topamax.  I corrected the med list to reflect this.  I did not stop these  medications.    Dictated utilizing Dragon dictation

## 2020-04-30 ENCOUNTER — OFFICE VISIT (OUTPATIENT)
Dept: CARDIOLOGY | Facility: CLINIC | Age: 80
End: 2020-04-30

## 2020-04-30 VITALS
BODY MASS INDEX: 26.52 KG/M2 | DIASTOLIC BLOOD PRESSURE: 67 MMHG | WEIGHT: 165 LBS | HEIGHT: 66 IN | SYSTOLIC BLOOD PRESSURE: 120 MMHG

## 2020-04-30 DIAGNOSIS — I25.119 CORONARY ARTERY DISEASE INVOLVING NATIVE CORONARY ARTERY WITH ANGINA PECTORIS, UNSPECIFIED WHETHER NATIVE OR TRANSPLANTED HEART (HCC): ICD-10-CM

## 2020-04-30 DIAGNOSIS — I27.20 PULMONARY HYPERTENSION (HCC): ICD-10-CM

## 2020-04-30 DIAGNOSIS — E11.9 DIABETES MELLITUS WITHOUT COMPLICATION (HCC): ICD-10-CM

## 2020-04-30 DIAGNOSIS — N28.9 RENAL INSUFFICIENCY: ICD-10-CM

## 2020-04-30 DIAGNOSIS — R06.02 SOB (SHORTNESS OF BREATH): Primary | ICD-10-CM

## 2020-04-30 DIAGNOSIS — I77.810 DILATED AORTIC ROOT (HCC): ICD-10-CM

## 2020-04-30 DIAGNOSIS — Z95.2 S/P AVR: ICD-10-CM

## 2020-04-30 DIAGNOSIS — I10 ESSENTIAL HYPERTENSION: ICD-10-CM

## 2020-04-30 PROCEDURE — 99442 PR PHYS/QHP TELEPHONE EVALUATION 11-20 MIN: CPT | Performed by: INTERNAL MEDICINE

## 2020-04-30 RX ORDER — BACLOFEN 10 MG/1
10 TABLET ORAL NIGHTLY
COMMUNITY
End: 2020-08-24

## 2020-04-30 RX ORDER — CLONAZEPAM 1 MG/1
0.25 TABLET ORAL NIGHTLY
COMMUNITY
End: 2020-10-27

## 2020-04-30 RX ORDER — DOXEPIN HYDROCHLORIDE 50 MG/1
50 CAPSULE ORAL NIGHTLY
COMMUNITY
Start: 2020-03-08 | End: 2020-05-26

## 2020-04-30 RX ORDER — GLIMEPIRIDE 2 MG/1
2 TABLET ORAL NIGHTLY
COMMUNITY
End: 2020-09-18

## 2020-05-05 DIAGNOSIS — E11.8 CONTROLLED TYPE 2 DIABETES MELLITUS WITH COMPLICATION, WITHOUT LONG-TERM CURRENT USE OF INSULIN (HCC): ICD-10-CM

## 2020-05-07 ENCOUNTER — TELEPHONE (OUTPATIENT)
Dept: CARDIOLOGY | Facility: CLINIC | Age: 80
End: 2020-05-07

## 2020-05-07 NOTE — TELEPHONE ENCOUNTER
Called pt to followup on open lab and CXR order.      LMM re: call to discuss and see where she will complete this.

## 2020-05-12 NOTE — TELEPHONE ENCOUNTER
Spoke with pt.  She was unsure if she wanted to complete at this time with the COVID.  Pt said she would think about it.  I set a reminder for another week to followup on this.

## 2020-05-13 DIAGNOSIS — E03.8 OTHER SPECIFIED HYPOTHYROIDISM: ICD-10-CM

## 2020-05-14 RX ORDER — LEVOTHYROXINE SODIUM 0.05 MG/1
TABLET ORAL
Qty: 45 TABLET | Refills: 1 | Status: SHIPPED | OUTPATIENT
Start: 2020-05-14 | End: 2020-10-12

## 2020-05-26 RX ORDER — DOXEPIN HYDROCHLORIDE 50 MG/1
CAPSULE ORAL
Qty: 90 CAPSULE | Refills: 2 | Status: SHIPPED | OUTPATIENT
Start: 2020-05-26 | End: 2021-03-01

## 2020-05-28 NOTE — TELEPHONE ENCOUNTER
I spoke with pt.  She has not had labs or chest x-ray done.  Patient does not think she is going to get this done.  She said she is seeing Dr. Joy next week and will discuss with her if she should do this or not.  Jacey

## 2020-06-02 ENCOUNTER — OFFICE VISIT (OUTPATIENT)
Dept: INTERNAL MEDICINE | Facility: CLINIC | Age: 80
End: 2020-06-02

## 2020-06-02 VITALS
WEIGHT: 157 LBS | HEIGHT: 66 IN | OXYGEN SATURATION: 98 % | HEART RATE: 62 BPM | DIASTOLIC BLOOD PRESSURE: 72 MMHG | SYSTOLIC BLOOD PRESSURE: 134 MMHG | BODY MASS INDEX: 25.23 KG/M2

## 2020-06-02 DIAGNOSIS — E78.49 OTHER HYPERLIPIDEMIA: ICD-10-CM

## 2020-06-02 DIAGNOSIS — I27.20 PULMONARY HYPERTENSION (HCC): ICD-10-CM

## 2020-06-02 DIAGNOSIS — E03.9 ACQUIRED HYPOTHYROIDISM: ICD-10-CM

## 2020-06-02 DIAGNOSIS — I10 ESSENTIAL HYPERTENSION: ICD-10-CM

## 2020-06-02 DIAGNOSIS — E11.9 DIABETES MELLITUS WITHOUT COMPLICATION (HCC): Primary | ICD-10-CM

## 2020-06-02 DIAGNOSIS — E11.9 DIABETES MELLITUS WITHOUT COMPLICATION (HCC): ICD-10-CM

## 2020-06-02 DIAGNOSIS — F51.01 PRIMARY INSOMNIA: ICD-10-CM

## 2020-06-02 DIAGNOSIS — G25.81 RLS (RESTLESS LEGS SYNDROME): ICD-10-CM

## 2020-06-02 DIAGNOSIS — N28.9 RENAL INSUFFICIENCY: ICD-10-CM

## 2020-06-02 DIAGNOSIS — J30.2 SEASONAL ALLERGIC RHINITIS, UNSPECIFIED TRIGGER: ICD-10-CM

## 2020-06-02 DIAGNOSIS — F41.9 ANXIETY: ICD-10-CM

## 2020-06-02 PROCEDURE — 99214 OFFICE O/P EST MOD 30 MIN: CPT | Performed by: INTERNAL MEDICINE

## 2020-06-02 RX ORDER — METFORMIN HYDROCHLORIDE 750 MG/1
TABLET, EXTENDED RELEASE ORAL
Qty: 180 TABLET | Refills: 1 | Status: SHIPPED | OUTPATIENT
Start: 2020-06-02 | End: 2020-06-05

## 2020-06-02 RX ORDER — AZELASTINE 1 MG/ML
2 SPRAY, METERED NASAL 2 TIMES DAILY
Qty: 3 EACH | Refills: 3 | Status: SHIPPED | OUTPATIENT
Start: 2020-06-02 | End: 2022-06-20

## 2020-06-02 NOTE — PROGRESS NOTES
"Subjective   Dior Pettit is a 79 y.o. female here for   Chief Complaint   Patient presents with   • Diabetes     follow up   • Hypertension   • Hyperlipidemia   .    Vitals:    06/02/20 1011   BP: 134/72   Pulse: 62   SpO2: 98%   Weight: 71.2 kg (157 lb)   Height: 167.6 cm (66\")       Body mass index is 25.34 kg/m².    Diabetes   She presents for her follow-up diabetic visit. She has type 2 diabetes mellitus. Her disease course has been stable. Pertinent negatives for hypoglycemia include no nervousness/anxiousness. There are no diabetic associated symptoms. Pertinent negatives for diabetes include no chest pain and no fatigue. Symptoms are stable.   Hypertension   This is a chronic problem. The current episode started more than 1 year ago. The problem is unchanged. The problem is controlled. Pertinent negatives include no chest pain, palpitations or shortness of breath.   Hyperlipidemia   This is a chronic problem. The current episode started more than 1 year ago. The problem is controlled. Recent lipid tests were reviewed and are normal. Exacerbating diseases include diabetes. Pertinent negatives include no chest pain or shortness of breath.        The following portions of the patient's history were reviewed and updated as appropriate: allergies, current medications, past social history and problem list.    Review of Systems   Constitutional: Negative for chills, fatigue and fever.   Respiratory: Positive for cough (dry). Negative for shortness of breath and wheezing.    Cardiovascular: Negative for chest pain, palpitations and leg swelling.   Allergic/Immunologic: Positive for environmental allergies.   Psychiatric/Behavioral: Negative for dysphoric mood and sleep disturbance. The patient is not nervous/anxious.      Sugars always less than 200.   BP less than 145/85.    Objective   Physical Exam   Constitutional: She appears well-developed and well-nourished. No distress.   Cardiovascular: Normal rate, " regular rhythm and normal heart sounds.   Pulmonary/Chest: No respiratory distress. She has no wheezes. She has no rales. She exhibits no tenderness.   Musculoskeletal: She exhibits no edema.   Psychiatric: She has a normal mood and affect. Her behavior is normal.   Nursing note and vitals reviewed.      Assessment/Plan   Diagnoses and all orders for this visit:    Diabetes mellitus without complication (CMS/HCC)  Comments:  controlled - need to stop metformin b/c recalled - trial of januvia instead  Orders:  -     SITagliptin (JANUVIA) 100 MG tablet; Take 1 tablet by mouth Daily. Instead of metformin  -     Hemoglobin A1c; Future    Essential hypertension  Comments:  controlled - call if high or low  Orders:  -     Comprehensive Metabolic Panel; Future  -     Lipid Panel; Future    Acquired hypothyroidism  Comments:  need rechk  Orders:  -     Comprehensive Metabolic Panel; Future  -     Lipid Panel; Future    Pulmonary hypertension (CMS/[facility])    Other hyperlipidemia  Comments:  need diet/ex    Anxiety  Comments:  ok with klonopin 1/4 tab qhs     Renal insufficiency  Comments:  trial of no metformin, increased water intak - rechk labs 6 wks    Primary insomnia    RLS (restless legs syndrome)  Comments:  ok with low dose klonopin qhs    Seasonal allergic rhinitis, unspecified trigger  Comments:  worse - add astelin bid  Orders:  -     azelastine (ASTELIN) 0.1 % nasal spray; 2 sprays into the nostril(s) as directed by provider 2 (Two) Times a Day. Use in each nostril as directed

## 2020-06-07 DIAGNOSIS — K21.9 GASTROESOPHAGEAL REFLUX DISEASE, ESOPHAGITIS PRESENCE NOT SPECIFIED: ICD-10-CM

## 2020-06-07 DIAGNOSIS — E78.5 HYPERLIPIDEMIA, UNSPECIFIED HYPERLIPIDEMIA TYPE: ICD-10-CM

## 2020-06-08 RX ORDER — OMEPRAZOLE 40 MG/1
CAPSULE, DELAYED RELEASE ORAL
Qty: 90 CAPSULE | Refills: 1 | Status: SHIPPED | OUTPATIENT
Start: 2020-06-08 | End: 2020-12-01

## 2020-06-08 RX ORDER — SIMVASTATIN 40 MG
TABLET ORAL
Qty: 90 TABLET | Refills: 1 | Status: SHIPPED | OUTPATIENT
Start: 2020-06-08 | End: 2020-12-20

## 2020-07-14 ENCOUNTER — OFFICE VISIT (OUTPATIENT)
Dept: CARDIOLOGY | Facility: CLINIC | Age: 80
End: 2020-07-14

## 2020-07-14 VITALS
WEIGHT: 158.8 LBS | HEART RATE: 69 BPM | SYSTOLIC BLOOD PRESSURE: 120 MMHG | DIASTOLIC BLOOD PRESSURE: 80 MMHG | BODY MASS INDEX: 25.52 KG/M2 | HEIGHT: 66 IN

## 2020-07-14 DIAGNOSIS — Z95.2 S/P AVR: Primary | ICD-10-CM

## 2020-07-14 DIAGNOSIS — I25.119 CORONARY ARTERY DISEASE INVOLVING NATIVE CORONARY ARTERY WITH ANGINA PECTORIS, UNSPECIFIED WHETHER NATIVE OR TRANSPLANTED HEART (HCC): ICD-10-CM

## 2020-07-14 DIAGNOSIS — I10 ESSENTIAL HYPERTENSION: ICD-10-CM

## 2020-07-14 DIAGNOSIS — I27.20 PULMONARY HYPERTENSION (HCC): ICD-10-CM

## 2020-07-14 DIAGNOSIS — E11.9 DIABETES MELLITUS WITHOUT COMPLICATION (HCC): ICD-10-CM

## 2020-07-14 DIAGNOSIS — I71.20 THORACIC AORTIC ANEURYSM WITHOUT RUPTURE (HCC): ICD-10-CM

## 2020-07-14 PROCEDURE — 99213 OFFICE O/P EST LOW 20 MIN: CPT | Performed by: INTERNAL MEDICINE

## 2020-07-14 PROCEDURE — 93000 ELECTROCARDIOGRAM COMPLETE: CPT | Performed by: INTERNAL MEDICINE

## 2020-07-14 NOTE — PROGRESS NOTES
Date of Office Visit: 2020  Encounter Provider: Abbi Mitchell MD  Place of Service: [facility] CARDIOLOGY  Patient Name: Dior Pettit  :1940    Chief complaint  Follow-up of aortic valve replacement, pulmonary hypertension, mitral regurgitation    History of Present Illness  Patient is a 80-year-old female with history of hypertension, hyperlipidemia, diabetes, aortic valve disease and subaortic membrane.  In  she underwent aortic valve replacement with porcine valve as well as subaortic membrane resection. Cardiac catheterization was negative for coronary artery disease.  She was then found to have significant sleep apnea but was been intolerant of therapy for this.  She is also noted to have pulmonary hypertension with an RV systolic pressure 44 monos mercury in  in 2015 she developed chest tightness while shoveling snow in a stress perfusion study was negative for ischemia.  A follow-up echocardiogram in 2017 that showed normal systolic function with mild left ventricular hypertrophy aortic valve calcification with mild stenosis with mild mitral regurgitation moderate tricuspid valve regurgitation with an RV systolic pressure 52 mmHg and an ascending aorta measuring 3.9 cm.  In 2019 with complaints of chest pain a stress echocardiogram showed normal systolic function grade 1 diastolic dysfunction, mild concentric left ventricular per trophy.  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  There was no ischemia at 6 METs.    Since last visit her dyspnea has resolved she was treated for seasonal allergies.  She denies any chest pain palpitations syncope near syncope.  She has occasional dizziness when she gets up and walks across the room this improves with hydration and water intake.  She is not using her CPAP.  Her blood pressure at home has been as it is  today.  She is walking as the weather permits unfortunately she gave up off her exercise equipment.    Past Medical History:   Diagnosis Date   • Allergic rhinitis    • Anxiety     Controlled w/Meds   • Aortic root dilatation (CMS/HCC) 10/02/2017    Borderline--Noted on Echo   • Aortic valve calcification 10/02/2017    Noted on Echo   • Aortic valve insufficiency Dx in 07    w/AVR    • Aortic valve prosthesis present 11/02/2018    Noted on CTA Chest   • Ascending aorta dilatation (CMS/HCC) 10/02/2017    Borderline--Noted on Echo   • Asthma    • Atypical chest pain    • Breast pain, right Hx   • CAD (coronary artery disease)    • Cardiomegaly 2017   • Cervicalgia    • Chest pain due to CAD (CMS/HCC)    • 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    Borderline--Noted on Echo & Measured @ 2.6CM and Mid Descending @ 2.5CM on CTA Chest-11/2/18   • DM (diabetes mellitus) (CMS/[facility])     T2   • Esophagitis    • GERD (gastroesophageal reflux disease)     Controlled w/Meds   • Health care maintenance    • Heart murmur    • History of echocardiogram 10/2/17-[facility]    EF 66%; Borderline Concentric Hypertrophy; Mild Calcification in AV; Mild AVS; Mild AVR; Moderate TVR; Borderline Dilation of Aortic Root/Arch & Borderline Dilation of Ascending/Proximal Aorta Present   • Hyperlipidemia     Controlled w/Meds   • Hypertension     Controlled w/Meds   • Hypothyroidism     Controlled w/Synthroid   • Insomnia    • Macular degeneration, left eye    • Mild aortic valve regurgitation 10/02/2017    Noted on Echo   • Mild aortic valve stenosis 10/02/2017    Noted on Echo   • Mild dilation of ascending aorta (CMS/HCC) 10/02/2017    Borderline--Noted on Echo   • Moderate tricuspid valve regurgitation 10/02/2017    Noted on Echo   • Mood disorder in conditions classified elsewhere     Controlled w/Meds   • Near syncope 03/2017-[facility] ER   • Neuropathy    • NNEKA (obstructive sleep apnea)     Untreated   • Osteoarthritis     Ankles/Feet   •  Pulmonary hypertension (CMS/[facility]) 2017   • Renal insufficiency    • RLS (restless legs syndrome)    • Thoracic ascending aortic aneurysm (CMS/HCC) Dx in 2007 @ 3.8CM & 1/02/2018    Noted @ 4CM on CTA Chest;     Past Surgical History:   Procedure Laterality Date   • AORTIC VALVE REPAIR/REPLACEMENT  2007    Dr. Perry   • APPENDECTOMY     • AUGMENTATION MAMMAPLASTY  1976   • BREAST AUGMENTATION  2014   • BUNIONECTOMY     • CARDIAC CATHETERIZATION  2007   • COLONOSCOPY  2010   • COLONOSCOPY  03/02/2012   • HYSTERECTOMY  1972   • SIGMOIDOSCOPY  2001     Outpatient Medications Prior to Visit   Medication Sig Dispense Refill   • ascorbic acid (EQL VITAMIN C) 1000 MG tablet Take 1,000 mg by mouth Daily.     • aspirin 81 MG chewable tablet Chew 81 mg Daily.     • azelastine (ASTELIN) 0.1 % nasal spray 2 sprays into the nostril(s) as directed by provider 2 (Two) Times a Day. Use in each nostril as directed 3 each 3   • baclofen (LIORESAL) 10 MG tablet Take 10 mg by mouth Every Night.     • clonazePAM (KlonoPIN) 1 MG tablet Take 0.25 mg by mouth Every Night.     • doxepin (SINEquan) 50 MG capsule TAKE 1 CAPSULE EVERY NIGHT 90 capsule 2   • fenofibrate micronized (LOFIBRA) 134 MG capsule TAKE 1 CAPSULE EVERY       MORNING BEFORE BREAKFAST 90 capsule 3   • fluticasone (FLONASE) 50 MCG/ACT nasal spray 2 sprays into the nostril(s) as directed by provider Daily. (Patient taking differently: 2 sprays into the nostril(s) as directed by provider Daily As Needed.) 3 bottle 1   • fluticasone-salmeterol (Advair Diskus) 250-50 MCG/DOSE DISKUS Inhale As Needed.     • glimepiride (AMARYL) 2 MG tablet Take 2 mg by mouth Every Night.     • glucosamine-chondroitin 500-400 MG capsule capsule Take 2 capsules by mouth Daily.     • glucose blood (Prodigy No Coding Blood Gluc) test strip TEST BLOOD SUGAR ONCE DAILY 50 each 4   • levothyroxine (SYNTHROID, LEVOTHROID) 50 MCG tablet TAKE 1 TABLET EVERY OTHER  DAY 45 tablet 1   • levothyroxine  (SYNTHROID, LEVOTHROID) 75 MCG tablet TAKE 1 TABLET EVERY OTHER  DAY 45 tablet 3   • magnesium oxide (MAGOX) 400 (241.3 Mg) MG tablet tablet Take 400 mg by mouth Daily.     • metFORMIN (Glucophage) 500 MG tablet Take 1 tablet by mouth 2 (Two) Times a Day With Meals. (Patient taking differently: Take 750 mg by mouth 2 (Two) Times a Day With Meals.) 180 tablet 1   • metoprolol tartrate (LOPRESSOR) 25 MG tablet TAKE ONE TABLET BY MOUTH THREE TIMES A  tablet 1   • montelukast (SINGULAIR) 10 MG tablet TAKE 1 TABLET EVERY NIGHT 90 tablet 1   • Multiple Vitamins-Minerals (MULTIVITAL PO) Take  by mouth Daily.     • omeprazole (priLOSEC) 40 MG capsule TAKE 1 CAPSULE DAILY 90 capsule 1   • simvastatin (ZOCOR) 40 MG tablet TAKE 1 TABLET NIGHTLY AT   BEDTIME 90 tablet 1   • valACYclovir (VALTREX) 1000 MG tablet 2 pills for mouth ulcer - repeat in 12 hrs. (Patient taking differently: As Needed. 2 pills for mouth ulcer - repeat in 12 hrs.) 12 tablet 3   • venlafaxine XR (EFFEXOR-XR) 150 MG 24 hr capsule TAKE 1 CAPSULE DAILY 90 capsule 2     No facility-administered medications prior to visit.        Allergies as of 07/14/2020   • (No Known Allergies)     Social History     Socioeconomic History   • Marital status:      Spouse name: Not on file   • Number of children: Not on file   • Years of education: Not on file   • Highest education level: Not on file   Tobacco Use   • Smoking status: Never Smoker   • Smokeless tobacco: Never Used   Substance and Sexual Activity   • Alcohol use: Yes     Comment: Social w/Daily Caffeine Use   • Drug use: Not Currently     Types: Benzodiazepines     Comment: Clonazepam/Tramadol   • Sexual activity: Defer     Birth control/protection: Surgical     Comment: Hyst     Family History   Problem Relation Age of Onset   • Hypertension Mother    • Stroke Mother    • Diabetes Sister    • Coronary artery disease Brother    • Diabetes Brother    • Valvular heart disease Brother         TAVR    "  • Coronary artery disease Brother    • Skin cancer Neg Hx      Review of Systems   Constitution: Negative for fever, malaise/fatigue, weight gain and weight loss.   HENT: Negative for ear pain, hearing loss, nosebleeds and sore throat.    Eyes: Negative for double vision, pain, vision loss in left eye and vision loss in right eye.   Cardiovascular:        See history of present illness.   Respiratory: Negative for cough, shortness of breath, sleep disturbances due to breathing, snoring and wheezing.    Endocrine: Negative for cold intolerance, heat intolerance and polyuria.   Skin: Negative for itching, poor wound healing and rash.   Musculoskeletal: Negative for joint pain, joint swelling and myalgias.   Gastrointestinal: Negative for abdominal pain, diarrhea, hematochezia, nausea and vomiting.   Genitourinary: Negative for hematuria and hesitancy.   Neurological: Positive for dizziness and headaches. Negative for numbness, paresthesias and seizures.   Psychiatric/Behavioral: Positive for depression. The patient is nervous/anxious.         Objective:     Vitals:    07/14/20 1016   BP: 120/80   Pulse: 69   Weight: 72 kg (158 lb 12.8 oz)   Height: 167.6 cm (66\")     Body mass index is 25.63 kg/m².    Physical Exam   Constitutional: She is oriented to person, place, and time. She appears well-developed and well-nourished.   HENT:   Head: Normocephalic.   Nose: Nose normal.   Mouth/Throat: Oropharynx is clear and moist.   Eyes: Pupils are equal, round, and reactive to light. Conjunctivae and EOM are normal. Right eye exhibits no discharge. No scleral icterus.   Neck: Normal range of motion. Neck supple. No JVD present. No thyromegaly present.   Cardiovascular: Normal rate, regular rhythm and intact distal pulses. Exam reveals no gallop and no friction rub.   Murmur heard.   Systolic murmur is present with a grade of 2/6 at the upper left sternal border and apex.  Pulses:       Carotid pulses are 2+ on the right side, " and 2+ on the left side.       Radial pulses are 2+ on the right side, and 2+ on the left side.        Femoral pulses are 2+ on the right side, and 2+ on the left side.       Popliteal pulses are 2+ on the right side, and 2+ on the left side.        Dorsalis pedis pulses are 2+ on the right side, and 2+ on the left side.        Posterior tibial pulses are 2+ on the right side, and 2+ on the left side.   Pulmonary/Chest: Effort normal and breath sounds normal. No respiratory distress. She has no wheezes. She has no rales.   Abdominal: Soft. Bowel sounds are normal. She exhibits no distension. There is no hepatosplenomegaly. There is no tenderness. There is no rebound.   Musculoskeletal: Normal range of motion. She exhibits no edema or tenderness.   Neurological: She is alert and oriented to person, place, and time.   Skin: Skin is warm and dry. No rash noted. No erythema.   Psychiatric: She has a normal mood and affect. Her behavior is normal. Judgment and thought content normal.   Vitals reviewed.    Lab Review:     ECG 12 Lead  Date/Time: 7/14/2020 10:38 AM  Performed by: Abbi Mitchell MD  Authorized by: Abbi Mitchell MD   Comparison: compared with previous ECG   Similar to previous ECG  Rhythm: sinus rhythm  Conduction: left anterior fascicular block  Other findings: left ventricular hypertrophy and poor R wave progression    Clinical impression: abnormal EKG          Assessment:       Diagnosis Plan   1. S/P AVR  ECG 12 Lead   2. Thoracic aortic aneurysm without rupture      3. Pulmonary hypertension (CMS/HCC)     4. Essential hypertension     5. Coronary artery disease involving native coronary artery with angina pectoris, unspecified whether native or transplanted heart (CMS/HCC)     6. Diabetes mellitus without complication (CMS/HCC)       Plan:       1.  Shortness of breath.  Resolved.  2.  Aortic valve replacement with a subaortic membrane resection that he valve stenosis.  As above.  Clinically doing quite  well.  Return for follow-up in 6 months.  3.  Mildly dilated aortic root, stable measuring 3.9 cm in 11/2018 by CT imaging.  Also 3.9 cm by echo imaging in 11/2019.  Plan on repeat noncontrast CT imaging in 6 months  4.  Hypertension,  controlled  5.  Pulmonary hypertension, nearly resolved by 9/2019  6.  Renal insufficiency, as above.  Followed by Dr. Joy with upcoming lab  7.  Diabetes  8.  Dyslipidemia         Your medication list           Accurate as of July 14, 2020 11:59 PM. If you have any questions, ask your nurse or doctor.               CHANGE how you take these medications      Instructions Last Dose Given Next Dose Due   fluticasone 50 MCG/ACT nasal spray  Commonly known as:  Flonase  What changed:    · when to take this  · reasons to take this      2 sprays into the nostril(s) as directed by provider Daily.       metFORMIN 500 MG tablet  Commonly known as:  Glucophage  What changed:  how much to take      Take 1 tablet by mouth 2 (Two) Times a Day With Meals.       valACYclovir 1000 MG tablet  Commonly known as:  Valtrex  What changed:    · when to take this  · reasons to take this      2 pills for mouth ulcer - repeat in 12 hrs.          CONTINUE taking these medications      Instructions Last Dose Given Next Dose Due   Advair Diskus 250-50 MCG/DOSE DISKUS  Generic drug:  fluticasone-salmeterol      Inhale As Needed.       aspirin 81 MG chewable tablet      Chew 81 mg Daily.       azelastine 0.1 % nasal spray  Commonly known as:  ASTELIN      2 sprays into the nostril(s) as directed by provider 2 (Two) Times a Day. Use in each nostril as directed       baclofen 10 MG tablet  Commonly known as:  LIORESAL      Take 10 mg by mouth Every Night.       clonazePAM 1 MG tablet  Commonly known as:  KlonoPIN      Take 0.25 mg by mouth Every Night.       doxepin 50 MG capsule  Commonly known as:  SINEquan      TAKE 1 CAPSULE EVERY NIGHT       EQL Vitamin C 1000 MG tablet  Generic drug:  ascorbic acid       Take 1,000 mg by mouth Daily.       fenofibrate micronized 134 MG capsule  Commonly known as:  LOFIBRA      TAKE 1 CAPSULE EVERY       MORNING BEFORE BREAKFAST       glimepiride 2 MG tablet  Commonly known as:  AMARYL      Take 2 mg by mouth Every Night.       glucosamine-chondroitin 500-400 MG capsule capsule      Take 2 capsules by mouth Daily.       glucose blood test strip  Commonly known as:  Prodigy No Coding Blood Gluc      TEST BLOOD SUGAR ONCE DAILY       levothyroxine 75 MCG tablet  Commonly known as:  SYNTHROID, LEVOTHROID      TAKE 1 TABLET EVERY OTHER  DAY       levothyroxine 50 MCG tablet  Commonly known as:  SYNTHROID, LEVOTHROID      TAKE 1 TABLET EVERY OTHER  DAY       magnesium oxide 400 (241.3 Mg) MG tablet tablet  Commonly known as:  MAGOX      Take 400 mg by mouth Daily.       metoprolol tartrate 25 MG tablet  Commonly known as:  LOPRESSOR      TAKE ONE TABLET BY MOUTH THREE TIMES A DAY       montelukast 10 MG tablet  Commonly known as:  SINGULAIR      TAKE 1 TABLET EVERY NIGHT       MULTIVITAL PO      Take  by mouth Daily.       omeprazole 40 MG capsule  Commonly known as:  priLOSEC      TAKE 1 CAPSULE DAILY       simvastatin 40 MG tablet  Commonly known as:  ZOCOR      TAKE 1 TABLET NIGHTLY AT   BEDTIME       venlafaxine  MG 24 hr capsule  Commonly known as:  EFFEXOR-XR      TAKE 1 CAPSULE DAILY              Dictated utilizing Dragon dictation

## 2020-07-17 ENCOUNTER — RESULTS ENCOUNTER (OUTPATIENT)
Dept: INTERNAL MEDICINE | Facility: CLINIC | Age: 80
End: 2020-07-17

## 2020-07-17 DIAGNOSIS — I10 ESSENTIAL HYPERTENSION: ICD-10-CM

## 2020-07-17 DIAGNOSIS — E03.9 ACQUIRED HYPOTHYROIDISM: ICD-10-CM

## 2020-07-17 DIAGNOSIS — E11.9 DIABETES MELLITUS WITHOUT COMPLICATION (HCC): ICD-10-CM

## 2020-08-06 LAB
ALBUMIN SERPL-MCNC: 4.8 G/DL (ref 3.5–5.2)
ALBUMIN/GLOB SERPL: 2.3 G/DL
ALP SERPL-CCNC: 67 U/L (ref 39–117)
ALT SERPL-CCNC: 16 U/L (ref 1–33)
AST SERPL-CCNC: 19 U/L (ref 1–32)
BILIRUB SERPL-MCNC: 0.4 MG/DL (ref 0–1.2)
BUN SERPL-MCNC: 22 MG/DL (ref 8–23)
BUN/CREAT SERPL: 20.2 (ref 7–25)
CALCIUM SERPL-MCNC: 9.8 MG/DL (ref 8.6–10.5)
CHLORIDE SERPL-SCNC: 101 MMOL/L (ref 98–107)
CHOLEST SERPL-MCNC: 170 MG/DL (ref 0–200)
CO2 SERPL-SCNC: 28.5 MMOL/L (ref 22–29)
CREAT SERPL-MCNC: 1.09 MG/DL (ref 0.57–1)
GLOBULIN SER CALC-MCNC: 2.1 GM/DL
GLUCOSE SERPL-MCNC: 106 MG/DL (ref 65–99)
HBA1C MFR BLD: 6 % (ref 4.8–5.6)
HDLC SERPL-MCNC: 45 MG/DL (ref 40–60)
LDLC SERPL CALC-MCNC: 73 MG/DL (ref 0–100)
POTASSIUM SERPL-SCNC: 5.1 MMOL/L (ref 3.5–5.2)
PROT SERPL-MCNC: 6.9 G/DL (ref 6–8.5)
SODIUM SERPL-SCNC: 140 MMOL/L (ref 136–145)
TRIGL SERPL-MCNC: 260 MG/DL (ref 0–150)
VLDLC SERPL CALC-MCNC: 52 MG/DL

## 2020-08-10 ENCOUNTER — OFFICE VISIT (OUTPATIENT)
Dept: INTERNAL MEDICINE | Facility: CLINIC | Age: 80
End: 2020-08-10

## 2020-08-10 VITALS
DIASTOLIC BLOOD PRESSURE: 80 MMHG | SYSTOLIC BLOOD PRESSURE: 128 MMHG | WEIGHT: 160 LBS | HEIGHT: 66 IN | OXYGEN SATURATION: 98 % | HEART RATE: 74 BPM | BODY MASS INDEX: 25.71 KG/M2

## 2020-08-10 DIAGNOSIS — E11.8 CONTROLLED TYPE 2 DIABETES MELLITUS WITH COMPLICATION, WITHOUT LONG-TERM CURRENT USE OF INSULIN (HCC): ICD-10-CM

## 2020-08-10 DIAGNOSIS — N28.9 RENAL INSUFFICIENCY: ICD-10-CM

## 2020-08-10 DIAGNOSIS — E78.49 OTHER HYPERLIPIDEMIA: ICD-10-CM

## 2020-08-10 DIAGNOSIS — E03.9 ACQUIRED HYPOTHYROIDISM: ICD-10-CM

## 2020-08-10 DIAGNOSIS — I10 BENIGN ESSENTIAL HTN: ICD-10-CM

## 2020-08-10 DIAGNOSIS — F51.01 PRIMARY INSOMNIA: ICD-10-CM

## 2020-08-10 DIAGNOSIS — J45.20 MILD INTERMITTENT ASTHMA WITHOUT COMPLICATION: ICD-10-CM

## 2020-08-10 DIAGNOSIS — G25.81 RESTLESS LEG SYNDROME: Primary | ICD-10-CM

## 2020-08-10 PROCEDURE — 99214 OFFICE O/P EST MOD 30 MIN: CPT | Performed by: INTERNAL MEDICINE

## 2020-08-10 RX ORDER — GABAPENTIN 600 MG/1
TABLET ORAL
Qty: 90 TABLET | Refills: 1 | Status: SHIPPED | OUTPATIENT
Start: 2020-08-10 | End: 2020-11-16

## 2020-08-10 NOTE — PROGRESS NOTES
"Subjective   Dior Pettit is a 80 y.o. female here for   Chief Complaint   Patient presents with   • Diabetes     2 month follow up   • Hypertension   • Hyperlipidemia   .    Vitals:    08/10/20 0854   BP: 128/80   BP Location: Left arm   Patient Position: Sitting   Cuff Size: Adult   Pulse: 74   SpO2: 98%   Weight: 72.6 kg (160 lb)   Height: 167.6 cm (66\")       Body mass index is 25.82 kg/m².    Diabetes   She presents for her follow-up diabetic visit. She has type 2 diabetes mellitus. Her disease course has been stable. Pertinent negatives for hypoglycemia include no nervousness/anxiousness. Pertinent negatives for diabetes include no chest pain and no fatigue.   Hypertension   This is a chronic problem. The current episode started more than 1 year ago. The problem is unchanged. The problem is controlled. Pertinent negatives include no chest pain, palpitations or shortness of breath.   Hyperlipidemia   This is a chronic problem. The current episode started more than 1 year ago. The problem is controlled. Exacerbating diseases include diabetes. Pertinent negatives include no chest pain or shortness of breath.        The following portions of the patient's history were reviewed and updated as appropriate: allergies, current medications, past social history and problem list.    Review of Systems   Constitutional: Negative for chills, fatigue and fever.   Respiratory: Positive for cough (off & on - ok with inhalers). Negative for shortness of breath and wheezing.    Cardiovascular: Negative for chest pain, palpitations and leg swelling.   Psychiatric/Behavioral: Negative for dysphoric mood and sleep disturbance. The patient is not nervous/anxious.      Sugars always less than 200.    Objective   Physical Exam   Constitutional: She appears well-developed and well-nourished. No distress.   Cardiovascular: Normal rate, regular rhythm and normal heart sounds.   Pulmonary/Chest: No respiratory distress. She has no " wheezes. She has no rales. She exhibits no tenderness.   Musculoskeletal: She exhibits no edema.   Psychiatric: She has a normal mood and affect. Her behavior is normal.   Nursing note and vitals reviewed.      Assessment/Plan   Diagnoses and all orders for this visit:    Restless leg syndrome  Comments:  she wants to try gabapentin instead of clonazepam (using 1/4 tab qhs) - sent into [facility] -call if problems  Orders:  -     gabapentin (NEURONTIN) 600 MG tablet; 1/4 tab qhs for 2d,1/2tab qhs for 1wk,then 1qhs if needed    Benign essential HTN  Comments:  controlled with incr Metoprolol -call if bp over 140/90  Orders:  -     metoprolol tartrate (LOPRESSOR) 25 MG tablet; Take 1 tablet by mouth 3 (Three) Times a Day.  -     Comprehensive Metabolic Panel; Future  -     Lipid Panel; Future  -     Urinalysis With Microscopic If Indicated (No Culture) - Urine, Clean Catch; Future    Controlled type 2 diabetes mellitus with complication, without long-term current use of insulin (CMS/[facility])  Comments:  controlled (A1c=6) - call if sugars over 200 - need to bring diary next visit  Orders:  -     Hemoglobin A1c; Future  -     Microalbumin / Creatinine Urine Ratio - Urine, Clean Catch; Future    Other hyperlipidemia  -     Comprehensive Metabolic Panel; Future  -     Lipid Panel; Future    Acquired hypothyroidism  Comments:  normal 1/2020 - recheck next time  Orders:  -     TSH Rfx On Abnormal To Free T4; Future    Mild intermittent asthma without complication  Comments:  controlled -call if problems    Primary insomnia  Comments:  b/c RLS - trial of gabapentin    Renal insufficiency  Comments:  better now (with no NSAID) - continue incr fluids

## 2020-08-11 DIAGNOSIS — I10 BENIGN ESSENTIAL HTN: ICD-10-CM

## 2020-08-24 RX ORDER — BACLOFEN 10 MG/1
TABLET ORAL
Qty: 90 TABLET | Refills: 1 | Status: SHIPPED | OUTPATIENT
Start: 2020-08-24 | End: 2021-01-18

## 2020-09-18 RX ORDER — GLIMEPIRIDE 2 MG/1
TABLET ORAL
Qty: 90 TABLET | Refills: 1 | Status: SHIPPED | OUTPATIENT
Start: 2020-09-18 | End: 2021-03-17

## 2020-10-12 DIAGNOSIS — E03.8 OTHER SPECIFIED HYPOTHYROIDISM: ICD-10-CM

## 2020-10-12 RX ORDER — LEVOTHYROXINE SODIUM 0.05 MG/1
TABLET ORAL
Qty: 45 TABLET | Refills: 1 | Status: SHIPPED | OUTPATIENT
Start: 2020-10-12 | End: 2021-04-26

## 2020-10-27 ENCOUNTER — OFFICE VISIT (OUTPATIENT)
Dept: INTERNAL MEDICINE | Facility: CLINIC | Age: 80
End: 2020-10-27

## 2020-10-27 VITALS
HEIGHT: 66 IN | WEIGHT: 158 LBS | HEART RATE: 64 BPM | OXYGEN SATURATION: 99 % | TEMPERATURE: 97.8 F | SYSTOLIC BLOOD PRESSURE: 124 MMHG | DIASTOLIC BLOOD PRESSURE: 80 MMHG | BODY MASS INDEX: 25.39 KG/M2

## 2020-10-27 DIAGNOSIS — I10 ESSENTIAL HYPERTENSION: ICD-10-CM

## 2020-10-27 DIAGNOSIS — Z00.00 MEDICARE ANNUAL WELLNESS VISIT, SUBSEQUENT: Primary | ICD-10-CM

## 2020-10-27 DIAGNOSIS — J45.20 MILD INTERMITTENT ASTHMA WITHOUT COMPLICATION: ICD-10-CM

## 2020-10-27 DIAGNOSIS — R06.02 SOB (SHORTNESS OF BREATH): ICD-10-CM

## 2020-10-27 DIAGNOSIS — E78.49 OTHER HYPERLIPIDEMIA: ICD-10-CM

## 2020-10-27 DIAGNOSIS — J30.2 SEASONAL ALLERGIC RHINITIS, UNSPECIFIED TRIGGER: ICD-10-CM

## 2020-10-27 DIAGNOSIS — E11.8 CONTROLLED TYPE 2 DIABETES MELLITUS WITH COMPLICATION, WITHOUT LONG-TERM CURRENT USE OF INSULIN (HCC): ICD-10-CM

## 2020-10-27 DIAGNOSIS — F43.9 STRESS: ICD-10-CM

## 2020-10-27 DIAGNOSIS — F41.9 ANXIETY: ICD-10-CM

## 2020-10-27 DIAGNOSIS — E03.9 ACQUIRED HYPOTHYROIDISM: ICD-10-CM

## 2020-10-27 PROCEDURE — G0439 PPPS, SUBSEQ VISIT: HCPCS | Performed by: INTERNAL MEDICINE

## 2020-10-27 PROCEDURE — 99214 OFFICE O/P EST MOD 30 MIN: CPT | Performed by: INTERNAL MEDICINE

## 2020-10-27 RX ORDER — EPINEPHRINE 0.3 MG/.3ML
INJECTION SUBCUTANEOUS
COMMUNITY
Start: 2020-10-14 | End: 2022-11-18 | Stop reason: HOSPADM

## 2020-10-27 RX ORDER — METFORMIN HYDROCHLORIDE 750 MG/1
1 TABLET, EXTENDED RELEASE ORAL 2 TIMES DAILY
COMMUNITY
Start: 2020-09-28 | End: 2020-11-06

## 2020-10-27 RX ORDER — BUSPIRONE HYDROCHLORIDE 10 MG/1
10 TABLET ORAL 3 TIMES DAILY
Qty: 270 TABLET | Refills: 2 | Status: SHIPPED | OUTPATIENT
Start: 2020-10-27 | End: 2021-01-12

## 2020-10-27 RX ORDER — ALBUTEROL SULFATE 90 UG/1
AEROSOL, METERED RESPIRATORY (INHALATION) EVERY 6 HOURS PRN
COMMUNITY
Start: 2020-10-14 | End: 2022-09-29 | Stop reason: ALTCHOICE

## 2020-10-27 NOTE — PATIENT INSTRUCTIONS
Medicare Wellness  Personal Prevention Plan of Service     Date of Office Visit:  10/27/2020  Encounter Provider:  Magdalena Joy MD  Place of Service:  [facility] INTERNAL MEDICINE  Patient Name: Dior Pettit  :  1940    As part of the Medicare Wellness portion of your visit today, we are providing you with this personalized preventive plan of services (PPPS). This plan is based upon recommendations of the [place] Preventive Services Task Force (USPSTF) and the Advisory Committee on Immunization Practices (ACIP).    This lists the preventive care services that should be considered, and provides dates of when you are due. Items listed as completed are up-to-date and do not require any further intervention.    Health Maintenance   Topic Date Due   • TDAP/TD VACCINES (1 - Tdap) 1957   • MAMMOGRAM  2021 (Originally 2020)   • ZOSTER VACCINE (2 of 3) 2021 (Originally 2012)   • URINE MICROALBUMIN  2021   • HEMOGLOBIN A1C  2021   • DIABETIC EYE EXAM  2021   • LIPID PANEL  2021   • ANNUAL WELLNESS VISIT  10/27/2021   • INFLUENZA VACCINE  Completed   • Pneumococcal Vaccine 65+  Completed   • DXA SCAN  Discontinued   • COLONOSCOPY  Discontinued       No orders of the defined types were placed in this encounter.      No follow-ups on file.

## 2020-10-27 NOTE — PROGRESS NOTES
The ABCs of the Annual Wellness Visit  Subsequent Medicare Wellness Visit    Chief Complaint   Patient presents with   • Medicare Wellness-subsequent       Subjective   History of Present Illness:  Dior Pettit is a 80 y.o. female who presents for a Subsequent Medicare Wellness Visit.    HEALTH RISK ASSESSMENT    Recent Hospitalizations:  No hospitalization(s) within the last year.    Current Medical Providers:  Patient Care Team:  Magdalena Joy MD as PCP - General  Magdalena Joy MD as PCP - Family Medicine  Gerardo Rodriguez MD as Consulting Physician (Pulmonary Disease)  Abbi Mitchell MD as Consulting Physician (Cardiology)  Luis Miguel Parker MD as Consulting Physician (Ophthalmology)  Tae Burton MD as Consulting Physician (Dermatology)  Addis Gomes MD as Consulting Physician (Plastic Surgery)    Smoking Status:  Social History     Tobacco Use   Smoking Status Never Smoker   Smokeless Tobacco Never Used       Alcohol Consumption:  Social History     Substance and Sexual Activity   Alcohol Use Yes    Comment: Social w/Daily Caffeine Use       Depression Screen:   PHQ-2/PHQ-9 Depression Screening 10/27/2020   Little interest or pleasure in doing things 0   Feeling down, depressed, or hopeless 1   Trouble falling or staying asleep, or sleeping too much 0   Feeling tired or having little energy 0   Poor appetite or overeating 0   Feeling bad about yourself - or that you are a failure or have let yourself or your family down 0   Trouble concentrating on things, such as reading the newspaper or watching television 0   Moving or speaking so slowly that other people could have noticed. Or the opposite - being so fidgety or restless that you have been moving around a lot more than usual 0   Thoughts that you would be better off dead, or of hurting yourself in some way 0   Total Score 1   If you checked off any problems, how difficult have these problems made it for you to do your  work, take care of things at home, or get along with other people? Not difficult at all       Fall Risk Screen:  SANJANA Fall Risk Assessment was completed, and patient is at LOW risk for falls.Assessment completed on:10/27/2020    Health Habits and Functional and Cognitive Screening:  Functional & Cognitive Status 10/27/2020   Do you have difficulty preparing food and eating? No   Do you have difficulty bathing yourself, getting dressed or grooming yourself? No   Do you have difficulty using the toilet? No   Do you have difficulty moving around from place to place? No   Do you have trouble with steps or getting out of a bed or a chair? No   Current Diet Well Balanced Diet   Dental Exam Up to date   Eye Exam Up to date   Exercise (times per week) 2 times per week   Current Exercises Include Walking   Current Exercise Activities Include -   Do you need help using the phone?  No   Are you deaf or do you have serious difficulty hearing?  No   Do you need help with transportation? No   Do you need help shopping? No   Do you need help preparing meals?  No   Do you need help with housework?  No   Do you need help with laundry? No   Do you need help taking your medications? No   Do you need help managing money? No   Do you ever drive or ride in a car without wearing a seat belt? No   Have you felt unusual stress, anger or loneliness in the last month? No   Who do you live with? Alone   If you need help, do you have trouble finding someone available to you? No   Have you been bothered in the last four weeks by sexual problems? No   Do you have difficulty concentrating, remembering or making decisions? No         Does the patient have evidence of cognitive impairment? No    Asprin use counseling:Taking ASA appropriately as indicated    Age-appropriate Screening Schedule:  Refer to the list below for future screening recommendations based on patient's age, sex and/or medical conditions. Orders for these recommended tests are  listed in the plan section. The patient has been provided with a written plan.    Health Maintenance   Topic Date Due   • TDAP/TD VACCINES (1 - Tdap) 06/12/1959   • DXA SCAN  02/27/2020   • MAMMOGRAM  08/08/2021 (Originally 2/21/2020)   • ZOSTER VACCINE (2 of 3) 08/19/2021 (Originally 2/26/2012)   • URINE MICROALBUMIN  01/13/2021   • HEMOGLOBIN A1C  02/06/2021   • DIABETIC EYE EXAM  06/09/2021   • LIPID PANEL  08/06/2021   • INFLUENZA VACCINE  Completed   • COLONOSCOPY  Discontinued          The following portions of the patient's history were reviewed and updated as appropriate: allergies, current medications, past family history, past medical history, past social history, past surgical history and problem list.    Outpatient Medications Prior to Visit   Medication Sig Dispense Refill   • albuterol sulfate  (90 Base) MCG/ACT inhaler      • ascorbic acid (EQL VITAMIN C) 1000 MG tablet Take 1,000 mg by mouth Daily.     • aspirin 81 MG chewable tablet Chew 81 mg Daily.     • azelastine (ASTELIN) 0.1 % nasal spray 2 sprays into the nostril(s) as directed by provider 2 (Two) Times a Day. Use in each nostril as directed 3 each 3   • baclofen (LIORESAL) 10 MG tablet TAKE 1 TABLET EVERY 8 HOURSAS NEEDED FOR MUSCLE SPASM 90 tablet 1   • clonazePAM (KlonoPIN) 1 MG tablet Take 0.25 mg by mouth Every Night.     • doxepin (SINEquan) 50 MG capsule TAKE 1 CAPSULE EVERY NIGHT 90 capsule 2   • EPINEPHrine (EPIPEN) 0.3 MG/0.3ML solution auto-injector injection      • fenofibrate micronized (LOFIBRA) 134 MG capsule TAKE 1 CAPSULE EVERY       MORNING BEFORE BREAKFAST 90 capsule 3   • fluticasone (FLONASE) 50 MCG/ACT nasal spray 2 sprays into the nostril(s) as directed by provider Daily. (Patient taking differently: 2 sprays into the nostril(s) as directed by provider Daily As Needed.) 3 bottle 1   • fluticasone-salmeterol (Advair Diskus) 250-50 MCG/DOSE DISKUS Inhale As Needed.     • gabapentin (NEURONTIN) 600 MG tablet 1/4 tab  qhs for 2d,1/2tab qhs for 1wk,then 1qhs if needed 90 tablet 1   • glimepiride (AMARYL) 2 MG tablet TAKE 1 TABLET EVERY MORNINGBEFORE BREAKFAST 90 tablet 1   • glucosamine-chondroitin 500-400 MG capsule capsule Take 2 capsules by mouth Daily.     • glucose blood (Prodigy No Coding Blood Gluc) test strip TEST BLOOD SUGAR ONCE DAILY 50 each 4   • levothyroxine (SYNTHROID, LEVOTHROID) 50 MCG tablet TAKE 1 TABLET EVERY OTHER  DAY 45 tablet 1   • levothyroxine (SYNTHROID, LEVOTHROID) 75 MCG tablet TAKE 1 TABLET EVERY OTHER  DAY 45 tablet 3   • magnesium oxide (MAGOX) 400 (241.3 Mg) MG tablet tablet Take 400 mg by mouth Daily.     • metFORMIN (Glucophage) 500 MG tablet Take 1 tablet by mouth 2 (Two) Times a Day With Meals. (Patient taking differently: Take 750 mg by mouth 2 (Two) Times a Day With Meals.) 180 tablet 1   • metFORMIN ER (GLUCOPHAGE-XR) 750 MG 24 hr tablet Take 1 tablet by mouth 2 (two) times a day.     • metoprolol tartrate (LOPRESSOR) 25 MG tablet TAKE ONE TABLET BY MOUTH THREE TIMES A  tablet 0   • montelukast (SINGULAIR) 10 MG tablet TAKE 1 TABLET EVERY NIGHT 90 tablet 1   • Multiple Vitamins-Minerals (MULTIVITAL PO) Take  by mouth Daily.     • omeprazole (priLOSEC) 40 MG capsule TAKE 1 CAPSULE DAILY 90 capsule 1   • simvastatin (ZOCOR) 40 MG tablet TAKE 1 TABLET NIGHTLY AT   BEDTIME 90 tablet 1   • valACYclovir (VALTREX) 1000 MG tablet 2 pills for mouth ulcer - repeat in 12 hrs. (Patient taking differently: As Needed. 2 pills for mouth ulcer - repeat in 12 hrs.) 12 tablet 3   • venlafaxine XR (EFFEXOR-XR) 150 MG 24 hr capsule TAKE 1 CAPSULE DAILY 90 capsule 2     No facility-administered medications prior to visit.        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)   • Hypothyroidism   • Hyperlipidemia   •  "Seasonal allergic rhinitis   • Mild intermittent asthma without complication   • S/P AVR   • SOB (shortness of breath)   • Dilated aortic root (CMS/[facility])   • Thoracic aortic aneurysm without rupture    • Macular degeneration   • Multiple joint pain   • Headache disorder   • Primary insomnia   • Renal insufficiency   • Macular degeneration of both eyes   • Cervicalgia   • Coronary artery disease involving native coronary artery with angina pectoris (CMS/[facility])   • Diabetes mellitus without complication (CMS/[facility])   • Stress       Advanced Care Planning:  ACP discussion was held with the patient during this visit. Patient has an advance directive (not in EMR), copy requested.    Review of Systems    Compared to one year ago, the patient feels her physical health is the same.  Compared to one year ago, the patient feels her mental health is the same.    Reviewed chart for potential of high risk medication in the elderly: yes  Reviewed chart for potential of harmful drug interactions in the elderly:yes    Objective         Vitals:    10/27/20 1104   BP: 124/80   BP Location: Right arm   Patient Position: Sitting   Cuff Size: Adult   Pulse: 64   Temp: 97.8 °F (36.6 °C)   SpO2: 99%   Weight: 71.7 kg (158 lb)   Height: 167.6 cm (66\")       Body mass index is 25.5 kg/m².  Discussed the patient's BMI with her. The BMI is in the acceptable range.    Physical Exam    Lab Results   Component Value Date     (H) 08/06/2020    CHLPL 170 08/06/2020    TRIG 260 (H) 08/06/2020    HDL 45 08/06/2020    LDL 73 08/06/2020    VLDL 52 08/06/2020    HGBA1C 6.00 (H) 08/06/2020        Assessment/Plan   Medicare Risks and Personalized Health Plan  CMS Preventative Services Quick Reference  Abdominal Aortic Aneurysm Screening  Advance Directive Discussion  Cardiovascular risk  Immunizations Discussed/Encouraged (specific immunizations; adacel Tdap )  Inactivity/Sedentary    The above risks/problems have been discussed with the " patient.  Pertinent information has been shared with the patient in the After Visit Summary.  Follow up plans and orders are seen below in the Assessment/Plan Section.    There are no diagnoses linked to this encounter.  Follow Up:  No follow-ups on file.     An After Visit Summary and PPPS were given to the patient.      She has had screening for AAA & carotid disease with cardiologist.   Need daily strengthening & balance exercises (shown today).

## 2020-10-27 NOTE — PROGRESS NOTES
"Subjective   Dior Pettit is a 80 y.o. female here for   Chief Complaint   Patient presents with   • Medicare Wellness-subsequent   • Diabetes   • Hypertension   • Hyperlipidemia   • Hypothyroidism   • Anxiety   .    Vitals:    10/27/20 1104   BP: 124/80   BP Location: Right arm   Patient Position: Sitting   Cuff Size: Adult   Pulse: 64   Temp: 97.8 °F (36.6 °C)   SpO2: 99%   Weight: 71.7 kg (158 lb)   Height: 167.6 cm (66\")       Body mass index is 25.5 kg/m².    Diabetes  She presents for her follow-up diabetic visit. She has type 2 diabetes mellitus. Her disease course has been stable. Hypoglycemia symptoms include nervousness/anxiousness. There are no diabetic associated symptoms. Pertinent negatives for diabetes include no chest pain and no fatigue. Symptoms are stable.   Hypertension  This is a chronic problem. The current episode started more than 1 year ago. The problem is unchanged. The problem is controlled. Associated symptoms include anxiety, palpitations (occ) and shortness of breath. Pertinent negatives include no chest pain.   Hyperlipidemia  This is a chronic problem. The current episode started more than 1 year ago. The problem is controlled. Recent lipid tests were reviewed and are normal. Exacerbating diseases include diabetes and hypothyroidism. Associated symptoms include shortness of breath. Pertinent negatives include no chest pain.   Hypothyroidism  This is a chronic problem. The current episode started more than 1 year ago. The problem occurs constantly. The problem has been unchanged. Associated symptoms include coughing. Pertinent negatives include no chest pain, chills, fatigue or fever.   Anxiety  Presents for follow-up visit. Symptoms include nervous/anxious behavior, palpitations (occ) and shortness of breath. Patient reports no chest pain. Symptoms occur most days.            The following portions of the patient's history were reviewed and updated as appropriate: allergies, " current medications, past social history and problem list.    Review of Systems   Constitutional: Negative for chills, fatigue and fever.   Respiratory: Positive for cough, shortness of breath and wheezing.    Cardiovascular: Positive for palpitations (occ). Negative for chest pain and leg swelling.   Psychiatric/Behavioral: Positive for sleep disturbance. Negative for dysphoric mood. The patient is nervous/anxious.        Objective   Physical Exam  Vitals signs and nursing note reviewed.   Constitutional:       General: She is not in acute distress.     Appearance: She is well-developed.   Cardiovascular:      Rate and Rhythm: Normal rate and regular rhythm.      Heart sounds: Normal heart sounds.   Pulmonary:      Effort: No respiratory distress.      Breath sounds: No wheezing or rales.   Chest:      Chest wall: No tenderness.   Psychiatric:         Behavior: Behavior normal.         Assessment/Plan   Diagnoses and all orders for this visit:    Medicare annual wellness visit, subsequent    Essential hypertension  Comments:  controlled - call if bp over 140/90  Orders:  -     Comprehensive Metabolic Panel; Future  -     Lipid Panel; Future  -     Urinalysis With Microscopic If Indicated (No Culture) - Urine, Clean Catch; Future  -     CBC & Differential; Future    Acquired hypothyroidism  Comments:  need routine chk  Orders:  -     TSH Rfx On Abnormal To Free T4; Future    Controlled type 2 diabetes mellitus with complication, without long-term current use of insulin (CMS/[facility])  Comments:  call if sugars over 200  Orders:  -     Hemoglobin A1c; Future  -     Microalbumin / Creatinine Urine Ratio - Urine, Clean Catch; Future    Other hyperlipidemia  Comments:  need diet/ex    Stress  Comments:  worse (with pandemic) - continue effexor daily - add buspar 3x daily  Orders:  -     busPIRone (BUSPAR) 10 MG tablet; Take 1 tablet by mouth 3 (Three) Times a Day. For anxiety.    Seasonal allergic rhinitis, unspecified  trigger  Comments:  fairly well controlled- call if worse    Mild intermittent asthma without complication  Comments:  use inhalers as needed - f/u with allergist    SOB (shortness of breath)  Comments:  f/u with pulmonary & allergy    Anxiety  Comments:  worse b/c pandemic - add buspar to effexor & call if sx persist

## 2020-11-06 RX ORDER — METFORMIN HYDROCHLORIDE 750 MG/1
TABLET, EXTENDED RELEASE ORAL
Qty: 180 TABLET | Refills: 1 | Status: SHIPPED | OUTPATIENT
Start: 2020-11-06 | End: 2021-05-04

## 2020-11-16 DIAGNOSIS — G25.81 RESTLESS LEG SYNDROME: ICD-10-CM

## 2020-11-16 RX ORDER — GABAPENTIN 600 MG/1
TABLET ORAL
Qty: 90 TABLET | Refills: 1 | Status: SHIPPED | OUTPATIENT
Start: 2020-11-16 | End: 2021-05-06 | Stop reason: SDUPTHER

## 2020-11-17 ENCOUNTER — TELEPHONE (OUTPATIENT)
Dept: INTERNAL MEDICINE | Facility: CLINIC | Age: 80
End: 2020-11-17

## 2020-11-17 NOTE — TELEPHONE ENCOUNTER
PT CALLED TO SEE IF IT IS OKAY WITH DR. SEVERINO FOR HER TO WAIT UNTIL SHE HAS HAD HER SHOT AROUND 10 OR 11 TO TAKE HER MORNING DOSE OF METOPROLOL 25 MG. SHE WILL BE TAKING THE SHOT THREE DAYS A WEEK TO START OUT.    PLEASE ADVISE.    CALLBACK NUMBER: [phone]

## 2020-11-17 NOTE — TELEPHONE ENCOUNTER
Pt is to start allergy injections, but their office wanted her to ask you if okay to wait until 10 or 11 on taking the Metoprolol.

## 2020-11-19 DIAGNOSIS — E78.5 HYPERLIPIDEMIA, ACQUIRED: ICD-10-CM

## 2020-11-19 RX ORDER — FENOFIBRATE 134 MG/1
CAPSULE ORAL
Qty: 90 CAPSULE | Refills: 3 | Status: SHIPPED | OUTPATIENT
Start: 2020-11-19 | End: 2022-03-10

## 2020-12-01 DIAGNOSIS — F41.9 ANXIETY: ICD-10-CM

## 2020-12-01 DIAGNOSIS — K21.9 GASTROESOPHAGEAL REFLUX DISEASE: ICD-10-CM

## 2020-12-01 RX ORDER — OMEPRAZOLE 40 MG/1
CAPSULE, DELAYED RELEASE ORAL
Qty: 90 CAPSULE | Refills: 1 | Status: SHIPPED | OUTPATIENT
Start: 2020-12-01 | End: 2021-05-06 | Stop reason: SDUPTHER

## 2020-12-01 RX ORDER — VENLAFAXINE HYDROCHLORIDE 150 MG/1
CAPSULE, EXTENDED RELEASE ORAL
Qty: 90 CAPSULE | Refills: 2 | Status: SHIPPED | OUTPATIENT
Start: 2020-12-01 | End: 2021-05-06 | Stop reason: SDUPTHER

## 2020-12-18 ENCOUNTER — RESULTS ENCOUNTER (OUTPATIENT)
Dept: INTERNAL MEDICINE | Facility: CLINIC | Age: 80
End: 2020-12-18

## 2020-12-18 DIAGNOSIS — I10 BENIGN ESSENTIAL HTN: ICD-10-CM

## 2020-12-18 DIAGNOSIS — E03.9 ACQUIRED HYPOTHYROIDISM: ICD-10-CM

## 2020-12-18 DIAGNOSIS — E11.8 CONTROLLED TYPE 2 DIABETES MELLITUS WITH COMPLICATION, WITHOUT LONG-TERM CURRENT USE OF INSULIN (HCC): ICD-10-CM

## 2020-12-18 DIAGNOSIS — E78.49 OTHER HYPERLIPIDEMIA: ICD-10-CM

## 2020-12-19 DIAGNOSIS — E78.5 HYPERLIPIDEMIA, UNSPECIFIED HYPERLIPIDEMIA TYPE: ICD-10-CM

## 2020-12-20 RX ORDER — SIMVASTATIN 40 MG
TABLET ORAL
Qty: 90 TABLET | Refills: 1 | Status: SHIPPED | OUTPATIENT
Start: 2020-12-20 | End: 2021-05-06 | Stop reason: SDUPTHER

## 2020-12-21 ENCOUNTER — TELEPHONE (OUTPATIENT)
Dept: INTERNAL MEDICINE | Facility: CLINIC | Age: 80
End: 2020-12-21

## 2020-12-21 NOTE — TELEPHONE ENCOUNTER
"PATIENT BRUISED HER ARM LAST WEEK AND NOW SHE HAS A \"BUBBLE\" ON HER RIGHT ELBOW. IT IS TENDER. SHE WANTED TO KNOW IF SHE SHOULD COME IN.    PLEASE ADVISE  [phone]   "

## 2020-12-22 ENCOUNTER — OFFICE VISIT (OUTPATIENT)
Dept: INTERNAL MEDICINE | Facility: CLINIC | Age: 80
End: 2020-12-22

## 2020-12-22 VITALS
DIASTOLIC BLOOD PRESSURE: 78 MMHG | TEMPERATURE: 97.3 F | OXYGEN SATURATION: 99 % | WEIGHT: 156 LBS | BODY MASS INDEX: 25.07 KG/M2 | HEIGHT: 66 IN | SYSTOLIC BLOOD PRESSURE: 124 MMHG | HEART RATE: 73 BPM

## 2020-12-22 DIAGNOSIS — M25.421 SWELLING OF RIGHT ELBOW: Primary | ICD-10-CM

## 2020-12-22 PROCEDURE — 99213 OFFICE O/P EST LOW 20 MIN: CPT | Performed by: FAMILY MEDICINE

## 2020-12-22 NOTE — PROGRESS NOTES
Subjective   Dior Pettit is a 80 y.o. female.   CC: right elbow swelling  History of Present Illness   Dior is an 80 year old female who comes in today because of swelling in the right elbow.  She denies any known injury.  Last week she noted that the right arm was black and bruised but she has no idea how it got to be that way.  She denies any pain and is able to use the arm without any problems.  Then her sister noted that she had a lump on the elbow.  She states that it doesn't hurt at all---it is just there.        The following portions of the patient's history were reviewed and updated as appropriate: allergies, current medications, past medical history, past social history, past surgical history and problem list.    Review of Systems   Constitutional: Negative for chills, diaphoresis, fatigue and fever.   HENT: Negative for congestion.    Respiratory: Negative for cough, chest tightness and shortness of breath.    Cardiovascular: Negative for chest pain and palpitations.   Musculoskeletal:        Lump of right elbow   Skin: Negative for rash.       Objective   Physical Exam  Vitals signs and nursing note reviewed.   Constitutional:       Appearance: Normal appearance.   Musculoskeletal: Normal range of motion.      Comments: There is a soft lump overlying the right elbow.  Non tender, no heat.  Full range of motion   Neurological:      General: No focal deficit present.      Mental Status: She is alert and oriented to person, place, and time.   Psychiatric:         Mood and Affect: Mood normal.         Behavior: Behavior normal.         Assessment/Plan   Diagnoses and all orders for this visit:    1. Swelling of right elbow (Primary)  -     Ambulatory Referral to Orthopedic Surgery      Will refer to ortho evaluation.

## 2021-01-12 ENCOUNTER — OFFICE VISIT (OUTPATIENT)
Dept: CARDIOLOGY | Facility: CLINIC | Age: 81
End: 2021-01-12

## 2021-01-12 VITALS
DIASTOLIC BLOOD PRESSURE: 70 MMHG | HEART RATE: 70 BPM | BODY MASS INDEX: 24.59 KG/M2 | WEIGHT: 153 LBS | HEIGHT: 66 IN | SYSTOLIC BLOOD PRESSURE: 110 MMHG

## 2021-01-12 DIAGNOSIS — N28.9 RENAL INSUFFICIENCY: ICD-10-CM

## 2021-01-12 DIAGNOSIS — E11.8 CONTROLLED TYPE 2 DIABETES MELLITUS WITH COMPLICATION, WITHOUT LONG-TERM CURRENT USE OF INSULIN (HCC): ICD-10-CM

## 2021-01-12 DIAGNOSIS — I71.20 THORACIC AORTIC ANEURYSM WITHOUT RUPTURE (HCC): Primary | ICD-10-CM

## 2021-01-12 DIAGNOSIS — R00.2 PALPITATIONS: ICD-10-CM

## 2021-01-12 DIAGNOSIS — Z95.2 S/P AVR: ICD-10-CM

## 2021-01-12 DIAGNOSIS — I10 ESSENTIAL HYPERTENSION: ICD-10-CM

## 2021-01-12 DIAGNOSIS — I27.20 PULMONARY HYPERTENSION (HCC): ICD-10-CM

## 2021-01-12 PROCEDURE — 93000 ELECTROCARDIOGRAM COMPLETE: CPT | Performed by: INTERNAL MEDICINE

## 2021-01-12 PROCEDURE — 99214 OFFICE O/P EST MOD 30 MIN: CPT | Performed by: INTERNAL MEDICINE

## 2021-01-12 NOTE — PROGRESS NOTES
Date of Office Visit: 2021  Encounter Provider: Abbi Mitchell MD  Place of Service: [facility] CARDIOLOGY  Patient Name: Dior Pettit  :1940    Chief complaint  Aortic valve replacement, pulmonary hypertension, borderline dilated a sending aorta    History of Present Illness  Patient is a 80-year-old female with history of hypertension, hyperlipidemia, diabetes, aortic valve disease and subaortic membrane.  In  she underwent aortic valve replacement with porcine valve as well as subaortic membrane resection. Cardiac catheterization was negative for coronary artery disease.  She was then found to have significant sleep apnea but was been intolerant of therapy for this.  She is also noted to have pulmonary hypertension with an RV systolic pressure 44 mmHg in .  By 2015 she developed chest tightness while shoveling snow in a stress perfusion study was negative for ischemia.  A follow-up echocardiogram in 2017 that showed normal systolic function with mild left ventricular hypertrophy aortic valve calcification with mild stenosis with mild mitral regurgitation moderate tricuspid valve regurgitation with an RV systolic pressure 52 mmHg and an ascending aorta measuring 3.9 cm.  In 2019 with complaints of chest pain a stress echocardiogram showed normal systolic function grade 1 diastolic dysfunction, mild concentric left ventricular per trophy.  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  There was no ischemia at 6 METs.    Since the last visit she denies any chest pain shortness of breath edema.  She has palpitations that occurs about once a month lasting for few seconds when she is upset as she was 2 weeks ago when watching President Trump on TV.  She has also had dizziness that occurs when she feels she is dehydrated this is improved as she is increased her  hydration.  Blood work in August/2020 showed triglyceride of 260, LDL 75, HDL 40, creatinine 1.09 with GFR 48    Past Medical History:   Diagnosis Date   • Allergic rhinitis    • Anxiety     Controlled w/Meds   • Aortic root dilatation (CMS/HCC) 10/02/2017    Borderline--Noted on Echo   • Aortic valve calcification 10/02/2017    Noted on Echo   • Aortic valve insufficiency Dx in 07    w/AVR    • Aortic valve prosthesis present 11/02/2018    Noted on CTA Chest   • Ascending aorta dilatation (CMS/HCC) 10/02/2017    Borderline--Noted on Echo   • Asthma    • Atypical chest pain    • Breast pain, right Hx   • CAD (coronary artery disease)    • Cardiomegaly 2017   • Cervicalgia    • Chest pain due to CAD (CMS/HCC)    • 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    Borderline--Noted on Echo & Measured @ 2.6CM and Mid Descending @ 2.5CM on CTA Chest-11/2/18   • DM (diabetes mellitus) (CMS/[facility])     T2   • Esophagitis    • GERD (gastroesophageal reflux disease)     Controlled w/Meds   • Health care maintenance    • Heart murmur    • History of echocardiogram 10/2/17-[facility]    EF 66%; Borderline Concentric Hypertrophy; Mild Calcification in AV; Mild AVS; Mild AVR; Moderate TVR; Borderline Dilation of Aortic Root/Arch & Borderline Dilation of Ascending/Proximal Aorta Present   • Hyperlipidemia     Controlled w/Meds   • Hypertension     Controlled w/Meds   • Hypothyroidism     Controlled w/Synthroid   • Insomnia    • Macular degeneration, left eye    • Mild aortic valve regurgitation 10/02/2017    Noted on Echo   • Mild aortic valve stenosis 10/02/2017    Noted on Echo   • Mild dilation of ascending aorta (CMS/HCC) 10/02/2017    Borderline--Noted on Echo   • Moderate tricuspid valve regurgitation 10/02/2017    Noted on Echo   • Mood disorder in conditions classified elsewhere     Controlled w/Meds   • Near syncope 03/2017-[facility] ER   • Neuropathy    • NNEKA (obstructive sleep apnea)     Untreated   • Osteoarthritis     Ankles/Feet    • Pulmonary hypertension (CMS/[facility]) 2017   • Renal insufficiency    • RLS (restless legs syndrome)    • Thoracic ascending aortic aneurysm (CMS/[facility]) Dx in 2007 @ 3.8CM & 1/02/2018    Noted @ 4CM on CTA Chest;     Past Surgical History:   Procedure Laterality Date   • AORTIC VALVE REPAIR/REPLACEMENT  2007    Dr. Perry   • APPENDECTOMY     • AUGMENTATION MAMMAPLASTY  1976   • BREAST AUGMENTATION  2014   • BUNIONECTOMY     • CARDIAC CATHETERIZATION  2007   • COLONOSCOPY  2010   • COLONOSCOPY  03/02/2012   • HYSTERECTOMY  1972   • SIGMOIDOSCOPY  2001     Outpatient Medications Prior to Visit   Medication Sig Dispense Refill   • albuterol sulfate  (90 Base) MCG/ACT inhaler Every 6 (Six) Hours As Needed.     • ascorbic acid (EQL VITAMIN C) 1000 MG tablet Take 1,000 mg by mouth Daily.     • aspirin 81 MG chewable tablet Chew 81 mg Daily.     • azelastine (ASTELIN) 0.1 % nasal spray 2 sprays into the nostril(s) as directed by provider 2 (Two) Times a Day. Use in each nostril as directed 3 each 3   • baclofen (LIORESAL) 10 MG tablet TAKE 1 TABLET EVERY 8 HOURSAS NEEDED FOR MUSCLE SPASM 90 tablet 1   • doxepin (SINEquan) 50 MG capsule TAKE 1 CAPSULE EVERY NIGHT 90 capsule 2   • EPINEPHrine (EPIPEN) 0.3 MG/0.3ML solution auto-injector injection      • fenofibrate micronized (LOFIBRA) 134 MG capsule TAKE 1 CAPSULE EVERY       MORNING BEFORE BREAKFAST 90 capsule 3   • fluticasone (FLONASE) 50 MCG/ACT nasal spray 2 sprays into the nostril(s) as directed by provider Daily. (Patient taking differently: 2 sprays into the nostril(s) as directed by provider Daily As Needed.) 3 bottle 1   • fluticasone-salmeterol (Advair Diskus) 250-50 MCG/DOSE DISKUS Inhale As Needed.     • gabapentin (NEURONTIN) 600 MG tablet TAKE 1/4 TABLET AT BEDTIME FOR 2 DAYS, THEN TAKE 1/2 TABLET AT BEDTIME FOR 1 WEEK, THEN TAKE 1 TABLET AT BEDTIME IF NEEDED 90 tablet 1   • glimepiride (AMARYL) 2 MG tablet TAKE 1 TABLET EVERY MORNINGBEFORE BREAKFAST 90  tablet 1   • glucosamine-chondroitin 500-400 MG capsule capsule Take 2 capsules by mouth Daily.     • glucose blood (Prodigy No Coding Blood Gluc) test strip TEST BLOOD SUGAR ONCE DAILY 50 each 4   • levothyroxine (SYNTHROID, LEVOTHROID) 50 MCG tablet TAKE 1 TABLET EVERY OTHER  DAY 45 tablet 1   • levothyroxine (SYNTHROID, LEVOTHROID) 75 MCG tablet TAKE 1 TABLET EVERY OTHER  DAY 45 tablet 3   • magnesium oxide (MAGOX) 400 (241.3 Mg) MG tablet tablet Take 400 mg by mouth Daily.     • metFORMIN ER (GLUCOPHAGE-XR) 750 MG 24 hr tablet TAKE 1 TABLET TWICE A  tablet 1   • metoprolol tartrate (LOPRESSOR) 25 MG tablet TAKE ONE TABLET BY MOUTH THREE TIMES A  tablet 0   • Multiple Vitamins-Minerals (MULTIVITAL PO) Take  by mouth Daily.     • omeprazole (priLOSEC) 40 MG capsule TAKE 1 CAPSULE DAILY 90 capsule 1   • simvastatin (ZOCOR) 40 MG tablet TAKE 1 TABLET NIGHTLY AT   BEDTIME 90 tablet 1   • valACYclovir (VALTREX) 1000 MG tablet 2 pills for mouth ulcer - repeat in 12 hrs. (Patient taking differently: As Needed. 2 pills for mouth ulcer - repeat in 12 hrs.) 12 tablet 3   • venlafaxine XR (EFFEXOR-XR) 150 MG 24 hr capsule TAKE 1 CAPSULE DAILY 90 capsule 2   • busPIRone (BUSPAR) 10 MG tablet Take 1 tablet by mouth 3 (Three) Times a Day. For anxiety. 270 tablet 2     No facility-administered medications prior to visit.        Allergies as of 01/12/2021   • (No Known Allergies)     Social History     Socioeconomic History   • Marital status:      Spouse name: Not on file   • Number of children: Not on file   • Years of education: Not on file   • Highest education level: Not on file   Tobacco Use   • Smoking status: Never Smoker   • Smokeless tobacco: Never Used   Substance and Sexual Activity   • Alcohol use: Yes     Comment: Social w/Daily Caffeine Use   • Drug use: Not Currently     Types: Benzodiazepines     Comment: Clonazepam/Tramadol   • Sexual activity: Defer     Birth control/protection: Surgical  "    Comment: Hyst     Family History   Problem Relation Age of Onset   • Hypertension Mother    • Stroke Mother    • Diabetes Sister    • Coronary artery disease Brother    • Diabetes Brother    • Valvular heart disease Brother         TAVR   • Coronary artery disease Brother    • Skin cancer Neg Hx      Review of Systems   Constitution: Negative for fever, malaise/fatigue, weight gain and weight loss.   HENT: Negative for ear pain, hearing loss, nosebleeds and sore throat.    Eyes: Negative for double vision, pain, vision loss in left eye and vision loss in right eye.   Cardiovascular:        See history of present illness.   Respiratory: Negative for cough, shortness of breath, sleep disturbances due to breathing, snoring and wheezing.    Endocrine: Positive for polyuria. Negative for cold intolerance and heat intolerance.   Skin: Negative for itching, poor wound healing and rash.   Musculoskeletal: Negative for joint pain, joint swelling and myalgias.   Gastrointestinal: Negative for abdominal pain, diarrhea, hematochezia, nausea and vomiting.   Genitourinary: Negative for hematuria and hesitancy.   Neurological: Negative for numbness, paresthesias and seizures.   Psychiatric/Behavioral: Negative for depression. The patient is not nervous/anxious.         Objective:     Vitals:    01/12/21 1105   BP: 110/70   Pulse: 70   Weight: 69.4 kg (153 lb)   Height: 167.6 cm (66\")     Body mass index is 24.69 kg/m².    Vitals signs reviewed.   Constitutional:       Appearance: Well-developed.   Eyes:      General: No scleral icterus.        Right eye: No discharge.      Conjunctiva/sclera: Conjunctivae normal.      Pupils: Pupils are equal, round, and reactive to light.   HENT:      Head: Normocephalic.      Nose: Nose normal.   Neck:      Musculoskeletal: Normal range of motion and neck supple.      Thyroid: No thyromegaly.      Vascular: No JVD.   Pulmonary:      Effort: Pulmonary effort is normal. No respiratory " distress.      Breath sounds: Normal breath sounds. No wheezing. No rales.   Cardiovascular:      Normal rate. Regular rhythm. Normal S1. Normal S2.      Murmurs: There is a grade 2/6 systolic murmur at the URSB and ULSB, radiating to the neck.      No gallop.   Pulses:     Intact distal pulses.   Edema:     Peripheral edema absent.   Abdominal:      General: Bowel sounds are normal. There is no distension.      Palpations: Abdomen is soft.      Tenderness: There is no abdominal tenderness. There is no rebound.   Musculoskeletal: Normal range of motion.         General: No tenderness.   Skin:     General: Skin is warm and dry.      Findings: No erythema or rash.   Neurological:      Mental Status: Alert and oriented to person, place, and time.   Psychiatric:         Behavior: Behavior normal.         Thought Content: Thought content normal.         Judgment: Judgment normal.       Lab Review:     ECG 12 Lead    Date/Time: 1/12/2021 11:06 AM  Performed by: Abbi Mitchell MD  Authorized by: Abbi Mitchell MD   Comparison: compared with previous ECG   Similar to previous ECG  Rhythm: sinus rhythm  Conduction: left anterior fascicular block and non-specific intraventricular conduction delay  Other findings: left ventricular hypertrophy    Clinical impression: abnormal EKG          Assessment:       Diagnosis Plan   1. Thoracic aortic aneurysm without rupture   CT Chest Without Contrast    Adult Transthoracic Echo Complete W/ Cont if Necessary Per Protocol   2. S/P AVR  Adult Transthoracic Echo Complete W/ Cont if Necessary Per Protocol   3. Palpitations  ECG 12 Lead   4. Essential hypertension     5. Pulmonary hypertension (CMS/HCC)     6. Controlled type 2 diabetes mellitus with complication, without long-term current use of insulin (CMS/HCC)     7. Renal insufficiency       Plan:       1.  Aortic valve replacement with a subaortic membrane resection that he valve stenosis 2007.  This valve is now somewhat old (13 years).   Gradient was increased last time approximately year and a half ago in 2019.  We will recheck an echocardiogram at this time.  I did discuss with her that eventually this might need to be replaced.  Her brother had TAVR and she may consider this though overall she is not keen on another valve replacement.  3.  Mildly dilated aortic root, stable measuring 3.9 cm in 11/2018 by CT imaging.  Also 3.9 cm by echo imaging in 11/2019.  CT scan of the chest without contrast given renal insufficiency   4.  Hypertension,  controlled  5.  Pulmonary hypertension, nearly resolved by 9/2019  6.  Renal insufficiency, as above.  Followed by Dr. Joy and appears to be stable  7.  Diabetes  8.  Dyslipidemia      I spent 40 minutes spent on reviewing records, labs and 25minutes of face to face time with this patient record and the patient.       Your medication list          Accurate as of January 12, 2021 11:59 PM. If you have any questions, ask your nurse or doctor.            CHANGE how you take these medications      Instructions Last Dose Given Next Dose Due   fluticasone 50 MCG/ACT nasal spray  Commonly known as: Flonase  What changed:   · when to take this  · reasons to take this      2 sprays into the nostril(s) as directed by provider Daily.       valACYclovir 1000 MG tablet  Commonly known as: Valtrex  What changed:   · when to take this  · reasons to take this      2 pills for mouth ulcer - repeat in 12 hrs.          CONTINUE taking these medications      Instructions Last Dose Given Next Dose Due   Advair Diskus 250-50 MCG/DOSE DISKUS  Generic drug: fluticasone-salmeterol      Inhale As Needed.       albuterol sulfate  (90 Base) MCG/ACT inhaler  Commonly known as: PROVENTIL HFA;VENTOLIN HFA;PROAIR HFA      Every 6 (Six) Hours As Needed.       aspirin 81 MG chewable tablet      Chew 81 mg Daily.       azelastine 0.1 % nasal spray  Commonly known as: ASTELIN      2 sprays into the nostril(s) as directed by provider 2  (Two) Times a Day. Use in each nostril as directed       baclofen 10 MG tablet  Commonly known as: LIORESAL      TAKE 1 TABLET EVERY 8 HOURSAS NEEDED FOR MUSCLE SPASM       doxepin 50 MG capsule  Commonly known as: SINEquan      TAKE 1 CAPSULE EVERY NIGHT       EPINEPHrine 0.3 MG/0.3ML solution auto-injector injection  Commonly known as: EPIPEN           EQL Vitamin C 1000 MG tablet  Generic drug: ascorbic acid      Take 1,000 mg by mouth Daily.       fenofibrate micronized 134 MG capsule  Commonly known as: LOFIBRA      TAKE 1 CAPSULE EVERY       MORNING BEFORE BREAKFAST       gabapentin 600 MG tablet  Commonly known as: NEURONTIN      TAKE 1/4 TABLET AT BEDTIME FOR 2 DAYS, THEN TAKE 1/2 TABLET AT BEDTIME FOR 1 WEEK, THEN TAKE 1 TABLET AT BEDTIME IF NEEDED       glimepiride 2 MG tablet  Commonly known as: AMARYL      TAKE 1 TABLET EVERY MORNINGBEFORE BREAKFAST       glucosamine-chondroitin 500-400 MG capsule capsule      Take 2 capsules by mouth Daily.       glucose blood test strip  Commonly known as: Prodigy No Coding Blood Gluc      TEST BLOOD SUGAR ONCE DAILY       levothyroxine 75 MCG tablet  Commonly known as: SYNTHROID, LEVOTHROID      TAKE 1 TABLET EVERY OTHER  DAY       levothyroxine 50 MCG tablet  Commonly known as: SYNTHROID, LEVOTHROID      TAKE 1 TABLET EVERY OTHER  DAY       magnesium oxide 400 (241.3 Mg) MG tablet tablet  Commonly known as: MAGOX      Take 400 mg by mouth Daily.       metFORMIN  MG 24 hr tablet  Commonly known as: GLUCOPHAGE-XR      TAKE 1 TABLET TWICE A DAY       metoprolol tartrate 25 MG tablet  Commonly known as: LOPRESSOR      TAKE ONE TABLET BY MOUTH THREE TIMES A DAY       multivitamin with minerals tablet tablet      Take  by mouth Daily.       omeprazole 40 MG capsule  Commonly known as: priLOSEC      TAKE 1 CAPSULE DAILY       simvastatin 40 MG tablet  Commonly known as: ZOCOR      TAKE 1 TABLET NIGHTLY AT   BEDTIME       venlafaxine  MG 24 hr capsule  Commonly  known as: EFFEXOR-XR      TAKE 1 CAPSULE DAILY          STOP taking these medications    busPIRone 10 MG tablet  Commonly known as: BUSPAR  Stopped by: Abbi Mitchell MD               Patient is no longer taking buspar.  I corrected the med list to reflect this.  I did not stop these medications.    Dictated utilizing Dragon dictation

## 2021-01-16 PROBLEM — R00.2 PALPITATIONS: Status: ACTIVE | Noted: 2021-01-16

## 2021-01-18 RX ORDER — BACLOFEN 10 MG/1
TABLET ORAL
Qty: 90 TABLET | Refills: 1 | Status: SHIPPED | OUTPATIENT
Start: 2021-01-18 | End: 2021-05-04

## 2021-03-01 RX ORDER — DOXEPIN HYDROCHLORIDE 50 MG/1
CAPSULE ORAL
Qty: 90 CAPSULE | Refills: 2 | Status: SHIPPED | OUTPATIENT
Start: 2021-03-01 | End: 2021-11-29

## 2021-03-02 DIAGNOSIS — Z23 IMMUNIZATION DUE: ICD-10-CM

## 2021-03-17 RX ORDER — GLIMEPIRIDE 2 MG/1
TABLET ORAL
Qty: 90 TABLET | Refills: 1 | Status: SHIPPED | OUTPATIENT
Start: 2021-03-17 | End: 2021-09-27

## 2021-04-11 DIAGNOSIS — E03.9 ACQUIRED HYPOTHYROIDISM: ICD-10-CM

## 2021-04-12 RX ORDER — LEVOTHYROXINE SODIUM 0.07 MG/1
TABLET ORAL
Qty: 45 TABLET | Refills: 0 | Status: SHIPPED | OUTPATIENT
Start: 2021-04-12 | End: 2021-05-06 | Stop reason: SDUPTHER

## 2021-04-21 ENCOUNTER — TELEPHONE (OUTPATIENT)
Dept: INTERNAL MEDICINE | Facility: CLINIC | Age: 81
End: 2021-04-21

## 2021-04-21 NOTE — TELEPHONE ENCOUNTER
I spoke w/ the pt and she is waiting on the mail order RX for the 75mcg-she has some 50mcg's she will take 1 1/2 of til she gets her RX from mail order.

## 2021-04-25 DIAGNOSIS — E03.8 OTHER SPECIFIED HYPOTHYROIDISM: ICD-10-CM

## 2021-04-26 RX ORDER — LEVOTHYROXINE SODIUM 0.05 MG/1
TABLET ORAL
Qty: 45 TABLET | Refills: 1 | Status: SHIPPED | OUTPATIENT
Start: 2021-04-26 | End: 2021-09-27

## 2021-05-02 DIAGNOSIS — E11.9 DIABETES MELLITUS WITHOUT COMPLICATION (HCC): Primary | ICD-10-CM

## 2021-05-02 DIAGNOSIS — M25.50 MULTIPLE JOINT PAIN: ICD-10-CM

## 2021-05-04 DIAGNOSIS — K12.1 MOUTH ULCER: ICD-10-CM

## 2021-05-04 RX ORDER — VALACYCLOVIR HYDROCHLORIDE 1 G/1
TABLET, FILM COATED ORAL
Qty: 12 TABLET | Refills: 4 | Status: SHIPPED | OUTPATIENT
Start: 2021-05-04 | End: 2021-05-06 | Stop reason: SDUPTHER

## 2021-05-04 RX ORDER — METFORMIN HYDROCHLORIDE 750 MG/1
TABLET, EXTENDED RELEASE ORAL
Qty: 180 TABLET | Refills: 0 | Status: SHIPPED | OUTPATIENT
Start: 2021-05-04 | End: 2021-05-06 | Stop reason: SDUPTHER

## 2021-05-04 RX ORDER — BACLOFEN 10 MG/1
TABLET ORAL
Qty: 90 TABLET | Refills: 0 | Status: SHIPPED | OUTPATIENT
Start: 2021-05-04 | End: 2021-05-06 | Stop reason: SDUPTHER

## 2021-05-04 NOTE — TELEPHONE ENCOUNTER
Caller: Dior Pettit    Relationship: Self    Best call back number: [phone]    Medication needed:   Requested Prescriptions     Pending Prescriptions Disp Refills   • valACYclovir (Valtrex) 1000 MG tablet 12 tablet 3     Si pills for mouth ulcer - repeat in 12 hrs.       When do you need the refill by: ASAP      Does the patient have less than a 3 day supply:  [x] Yes  [] No    What is the patient's preferred pharmacy: [facility] [address] [place] [address] [phone] [facility] [phone] FX

## 2021-05-06 ENCOUNTER — OFFICE VISIT (OUTPATIENT)
Dept: INTERNAL MEDICINE | Facility: CLINIC | Age: 81
End: 2021-05-06

## 2021-05-06 VITALS
RESPIRATION RATE: 18 BRPM | DIASTOLIC BLOOD PRESSURE: 80 MMHG | WEIGHT: 154 LBS | BODY MASS INDEX: 24.75 KG/M2 | HEART RATE: 67 BPM | HEIGHT: 66 IN | SYSTOLIC BLOOD PRESSURE: 144 MMHG | OXYGEN SATURATION: 96 % | TEMPERATURE: 98.2 F

## 2021-05-06 DIAGNOSIS — I10 BENIGN ESSENTIAL HTN: ICD-10-CM

## 2021-05-06 DIAGNOSIS — G25.81 RESTLESS LEG SYNDROME: ICD-10-CM

## 2021-05-06 DIAGNOSIS — E03.9 ACQUIRED HYPOTHYROIDISM: ICD-10-CM

## 2021-05-06 DIAGNOSIS — G25.81 RLS (RESTLESS LEGS SYNDROME): ICD-10-CM

## 2021-05-06 DIAGNOSIS — E78.5 HYPERLIPIDEMIA, UNSPECIFIED HYPERLIPIDEMIA TYPE: ICD-10-CM

## 2021-05-06 DIAGNOSIS — K12.1 MOUTH ULCER: ICD-10-CM

## 2021-05-06 DIAGNOSIS — E78.49 OTHER HYPERLIPIDEMIA: ICD-10-CM

## 2021-05-06 DIAGNOSIS — E11.9 DIABETES MELLITUS WITHOUT COMPLICATION (HCC): ICD-10-CM

## 2021-05-06 DIAGNOSIS — F41.9 ANXIETY: ICD-10-CM

## 2021-05-06 DIAGNOSIS — K21.9 GASTROESOPHAGEAL REFLUX DISEASE: ICD-10-CM

## 2021-05-06 DIAGNOSIS — M25.50 MULTIPLE JOINT PAIN: ICD-10-CM

## 2021-05-06 DIAGNOSIS — F51.01 PRIMARY INSOMNIA: ICD-10-CM

## 2021-05-06 DIAGNOSIS — I10 ESSENTIAL HYPERTENSION: Primary | ICD-10-CM

## 2021-05-06 LAB
BASOPHILS # BLD AUTO: 0.06 10*3/MM3 (ref 0–0.2)
BASOPHILS NFR BLD AUTO: 1 % (ref 0–1.5)
BILIRUB UR QL STRIP: NEGATIVE
CLARITY UR: CLEAR
COLOR UR: YELLOW
DEPRECATED RDW RBC AUTO: 43.7 FL (ref 37–54)
EOSINOPHIL # BLD AUTO: 0.42 10*3/MM3 (ref 0–0.4)
EOSINOPHIL NFR BLD AUTO: 7.3 % (ref 0.3–6.2)
ERYTHROCYTE [DISTWIDTH] IN BLOOD BY AUTOMATED COUNT: 13 % (ref 12.3–15.4)
GLUCOSE UR STRIP-MCNC: NEGATIVE MG/DL
HCT VFR BLD AUTO: 41.2 % (ref 34–46.6)
HGB BLD-MCNC: 13.4 G/DL (ref 12–15.9)
HGB UR QL STRIP.AUTO: NEGATIVE
KETONES UR QL STRIP: NEGATIVE
LEUKOCYTE ESTERASE UR QL STRIP.AUTO: NEGATIVE
LYMPHOCYTES # BLD AUTO: 1.96 10*3/MM3 (ref 0.7–3.1)
LYMPHOCYTES NFR BLD AUTO: 34.1 % (ref 19.6–45.3)
MCH RBC QN AUTO: 30.7 PG (ref 26.6–33)
MCHC RBC AUTO-ENTMCNC: 32.5 G/DL (ref 31.5–35.7)
MCV RBC AUTO: 94.5 FL (ref 79–97)
MONOCYTES # BLD AUTO: 0.62 10*3/MM3 (ref 0.1–0.9)
MONOCYTES NFR BLD AUTO: 10.8 % (ref 5–12)
NEUTROPHILS NFR BLD AUTO: 2.68 10*3/MM3 (ref 1.7–7)
NEUTROPHILS NFR BLD AUTO: 46.8 % (ref 42.7–76)
NITRITE UR QL STRIP: NEGATIVE
PH UR STRIP.AUTO: 5.5 [PH] (ref 5–8)
PLATELET # BLD AUTO: 246 10*3/MM3 (ref 140–450)
PMV BLD AUTO: 10.4 FL (ref 6–12)
PROT UR QL STRIP: NEGATIVE
RBC # BLD AUTO: 4.36 10*6/MM3 (ref 3.77–5.28)
SP GR UR STRIP: 1.01 (ref 1–1.03)
UROBILINOGEN UR QL STRIP: NORMAL
WBC # BLD AUTO: 5.74 10*3/MM3 (ref 3.4–10.8)

## 2021-05-06 PROCEDURE — 85025 COMPLETE CBC W/AUTO DIFF WBC: CPT | Performed by: INTERNAL MEDICINE

## 2021-05-06 PROCEDURE — 81003 URINALYSIS AUTO W/O SCOPE: CPT | Performed by: INTERNAL MEDICINE

## 2021-05-06 PROCEDURE — 99214 OFFICE O/P EST MOD 30 MIN: CPT | Performed by: INTERNAL MEDICINE

## 2021-05-06 PROCEDURE — 36415 COLL VENOUS BLD VENIPUNCTURE: CPT | Performed by: INTERNAL MEDICINE

## 2021-05-06 RX ORDER — VENLAFAXINE HYDROCHLORIDE 150 MG/1
150 CAPSULE, EXTENDED RELEASE ORAL DAILY
Qty: 90 CAPSULE | Refills: 3 | Status: SHIPPED | OUTPATIENT
Start: 2021-05-06 | End: 2022-05-23

## 2021-05-06 RX ORDER — VALACYCLOVIR HYDROCHLORIDE 1 G/1
TABLET, FILM COATED ORAL
Qty: 16 TABLET | Refills: 4 | Status: SHIPPED | OUTPATIENT
Start: 2021-05-06 | End: 2021-05-06 | Stop reason: SDUPTHER

## 2021-05-06 RX ORDER — VALACYCLOVIR HYDROCHLORIDE 1 G/1
TABLET, FILM COATED ORAL
Qty: 16 TABLET | Refills: 4 | Status: SHIPPED | OUTPATIENT
Start: 2021-05-06 | End: 2022-09-28

## 2021-05-06 RX ORDER — BACLOFEN 10 MG/1
10 TABLET ORAL 3 TIMES DAILY
Qty: 90 TABLET | Refills: 3 | Status: ON HOLD | OUTPATIENT
Start: 2021-05-06 | End: 2021-09-15 | Stop reason: SDUPTHER

## 2021-05-06 RX ORDER — LEVOTHYROXINE SODIUM 0.07 MG/1
75 TABLET ORAL EVERY OTHER DAY
Qty: 45 TABLET | Refills: 1 | Status: SHIPPED | OUTPATIENT
Start: 2021-05-06 | End: 2022-01-03

## 2021-05-06 RX ORDER — SIMVASTATIN 40 MG
40 TABLET ORAL NIGHTLY
Qty: 90 TABLET | Refills: 1 | Status: SHIPPED | OUTPATIENT
Start: 2021-05-06 | End: 2021-06-01

## 2021-05-06 RX ORDER — GABAPENTIN 600 MG/1
TABLET ORAL
Qty: 90 TABLET | Refills: 1 | Status: SHIPPED | OUTPATIENT
Start: 2021-05-06 | End: 2021-11-09

## 2021-05-06 RX ORDER — FLUTICASONE PROPIONATE 50 MCG
2 SPRAY, SUSPENSION (ML) NASAL DAILY PRN
Qty: 16 G | Refills: 3 | Status: SHIPPED | OUTPATIENT
Start: 2021-05-06 | End: 2022-06-20

## 2021-05-06 RX ORDER — METFORMIN HYDROCHLORIDE 750 MG/1
750 TABLET, EXTENDED RELEASE ORAL 2 TIMES DAILY
Qty: 180 TABLET | Refills: 3 | Status: SHIPPED | OUTPATIENT
Start: 2021-05-06 | End: 2022-07-05

## 2021-05-06 RX ORDER — OMEGA-3 FATTY ACIDS/FISH OIL 300-1000MG
CAPSULE ORAL
Status: ON HOLD | COMMUNITY
End: 2021-09-13

## 2021-05-06 RX ORDER — OMEPRAZOLE 40 MG/1
40 CAPSULE, DELAYED RELEASE ORAL DAILY
Qty: 90 CAPSULE | Refills: 1 | Status: SHIPPED | OUTPATIENT
Start: 2021-05-06 | End: 2021-06-01

## 2021-05-06 NOTE — PATIENT INSTRUCTIONS
Mediterranean Diet  A Mediterranean diet refers to food and lifestyle choices that are based on the traditions of countries located on the [place]. This way of eating has been shown to help prevent certain conditions and improve outcomes for people who have chronic diseases, like kidney disease and heart disease.  What are tips for following this plan?  Lifestyle  · Cook and eat meals together with your family, when possible.  · Drink enough fluid to keep your urine clear or pale yellow.  · Be physically active every day. This includes:  ? Aerobic exercise like running or swimming.  ? Leisure activities like gardening, walking, or housework.  · Get 7-8 hours of sleep each night.  · If recommended by your health care provider, drink red wine in moderation. This means 1 glass a day for nonpregnant women and 2 glasses a day for men. A glass of wine equals 5 oz (150 mL).  Reading food labels    · Check the serving size of packaged foods. For foods such as rice and pasta, the serving size refers to the amount of cooked product, not dry.  · Check the total fat in packaged foods. Avoid foods that have saturated fat or trans fats.  · Check the ingredients list for added sugars, such as corn syrup.  Shopping  · At the grocery store, buy most of your food from the areas near the walls of the store. This includes:  ? Fresh fruits and vegetables (produce).  ? Grains, beans, nuts, and seeds. Some of these may be available in unpackaged forms or large amounts (in bulk).  ? Fresh seafood.  ? Poultry and eggs.  ? Low-fat dairy products.  · Buy whole ingredients instead of prepackaged foods.  · Buy fresh fruits and vegetables in-season from local farmers markets.  · Buy frozen fruits and vegetables in resealable bags.  · If you do not have access to quality fresh seafood, buy precooked frozen shrimp or canned fish, such as tuna, salmon, or sardines.  · Buy small amounts of raw or cooked vegetables, salads, or olives from  the deli or salad bar at your store.  · Stock your pantry so you always have certain foods on hand, such as olive oil, canned tuna, canned tomatoes, rice, pasta, and beans.  Cooking  · Cook foods with extra-virgin olive oil instead of using butter or other vegetable oils.  · Have meat as a side dish, and have vegetables or grains as your main dish. This means having meat in small portions or adding small amounts of meat to foods like pasta or stew.  · Use beans or vegetables instead of meat in common dishes like chili or lasagna.  · [place] with different cooking methods. Try roasting or broiling vegetables instead of steaming or sautéeing them.  · Add frozen vegetables to soups, stews, pasta, or rice.  · Add nuts or seeds for added healthy fat at each meal. You can add these to yogurt, salads, or vegetable dishes.  · Marinate fish or vegetables using olive oil, lemon juice, garlic, and fresh herbs.  Meal planning    · Plan to eat 1 vegetarian meal one day each week. Try to work up to 2 vegetarian meals, if possible.  · Eat seafood 2 or more times a week.  · Have healthy snacks readily available, such as:  ? Vegetable sticks with hummus.  ? Greek yogurt.  ? Fruit and nut trail mix.  · Eat balanced meals throughout the week. This includes:  ? Fruit: 2-3 servings a day  ? Vegetables: 4-5 servings a day  ? Low-fat dairy: 2 servings a day  ? Fish, poultry, or lean meat: 1 serving a day  ? Beans and legumes: 2 or more servings a week  ? Nuts and seeds: 1-2 servings a day  ? Whole grains: 6-8 servings a day  ? Extra-virgin olive oil: 3-4 servings a day  · Limit red meat and sweets to only a few servings a month  What are my food choices?  · Mediterranean diet  ? Recommended  § Grains: Whole-grain pasta. Brown rice. Bulgar wheat. Polenta. Couscous. Whole-wheat bread. Oatmeal. Quinoa.  § Vegetables: Artichokes. Beets. Broccoli. Cabbage. Carrots. Eggplant. Green beans. Chard. Kale. Spinach. Onions. Leeks. Peas. Squash.  Tomatoes. Peppers. Radishes.  § Fruits: Apples. Apricots. Avocado. Berries. Bananas. Cherries. Dates. Figs. Grapes. Eduard. Melon. Oranges. Peaches. Plums. Pomegranate.  § Meats and other protein foods: Beans. Almonds. Sunflower seeds. Pine nuts. Peanuts. Cod. [place]. Scallops. Shrimp. Tuna. Tilapia. Clams. Oysters. Eggs.  § Dairy: Low-fat milk. Cheese. Greek yogurt.  § Beverages: Water. Red wine. Herbal tea.  § Fats and oils: Extra virgin olive oil. Avocado oil. Grape seed oil.  § Sweets and desserts: Greek yogurt with honey. Baked apples. Poached pears. Trail mix.  § Seasoning and other foods: Basil. Cilantro. Coriander. Cumin. Mint. Parsley. Ruddy. Rosemary. Tarragon. Garlic. Oregano. Thyme. Pepper. Balsalmic vinegar. Tahini. Hummus. Tomato sauce. Olives. Mushrooms.  ? Limit these  § Grains: Prepackaged pasta or rice dishes. Prepackaged cereal with added sugar.  § Vegetables: Deep fried potatoes (french fries).  § Fruits: Fruit canned in syrup.  § Meats and other protein foods: Beef. Pork. Lamb. Poultry with skin. Hot dogs. [place].  § Dairy: Ice cream. Sour cream. Whole milk.  § Beverages: Juice. Sugar-sweetened soft drinks. Beer. Liquor and spirits.  § Fats and oils: Butter. Canola oil. Vegetable oil. Beef fat (tallow). Lard.  § Sweets and desserts: Cookies. Cakes. Pies. Candy.  § Seasoning and other foods: Mayonnaise. Premade sauces and marinades.  The items listed may not be a complete list. Talk with your dietitian about what dietary choices are right for you.  Summary  · The Mediterranean diet includes both food and lifestyle choices.  · Eat a variety of fresh fruits and vegetables, beans, nuts, seeds, and whole grains.  · Limit the amount of red meat and sweets that you eat.  · Talk with your health care provider about whether it is safe for you to drink red wine in moderation. This means 1 glass a day for nonpregnant women and 2 glasses a day for men. A glass of wine equals 5 oz (150 mL).  This information  is not intended to replace advice given to you by your health care provider. Make sure you discuss any questions you have with your health care provider.  Document Revised: 08/17/2017 Document Reviewed: 08/10/2017  Elsevier Patient Education © 2020 [employer]

## 2021-05-06 NOTE — PROGRESS NOTES
"Chief Complaint  Hypertension, Hyperlipidemia, Diabetes, and Anxiety    Subjective          Dior Pettit presents to [facility] for   Hypertension  This is a chronic problem. The current episode started more than 1 year ago. The problem is unchanged. The problem is controlled. Associated symptoms include anxiety.   Hyperlipidemia  This is a chronic problem. The current episode started more than 1 year ago. The problem is controlled. Recent lipid tests were reviewed and are normal. Exacerbating diseases include diabetes.   Diabetes  She presents for her follow-up diabetic visit. She has type 2 diabetes mellitus. Her disease course has been stable. Hypoglycemia symptoms include nervousness/anxiousness. There are no diabetic associated symptoms. There are no hypoglycemic complications. Symptoms are stable.   Anxiety  Presents for follow-up visit. Symptoms include excessive worry, irritability and nervous/anxious behavior. Patient reports no depressed mood. Symptoms occur occasionally.           Review of Systems   Constitutional: Positive for irritability.   Psychiatric/Behavioral: The patient is nervous/anxious.       She has severe mouth ulcers - needing 8 valtrex in 24 hrs to resolve-she requests incr # of tabs.    Objective   Vital Signs:   /80 (BP Location: Left arm, Patient Position: Sitting, Cuff Size: Adult)   Pulse 67   Temp 98.2 °F (36.8 °C)   Resp 18   Ht 167.6 cm (66\")   Wt 69.9 kg (154 lb)   SpO2 96%   BMI 24.86 kg/m²     Physical Exam  Vitals and nursing note reviewed.   Constitutional:       General: She is not in acute distress.     Appearance: She is well-developed.   Cardiovascular:      Rate and Rhythm: Normal rate and regular rhythm.      Heart sounds: Normal heart sounds.   Pulmonary:      Effort: No respiratory distress.      Breath sounds: No wheezing or rales.   Chest:      Chest wall: No tenderness.   Psychiatric:         Behavior: Behavior " normal.        Result Review :                 Assessment and Plan    Problem List Items Addressed This Visit        Unprioritized    Essential hypertension - Primary    Current Assessment & Plan     Hypertension is improving with treatment.  Continue current treatment regimen.  Dietary sodium restriction.  Weight loss.  Regular aerobic exercise.  Continue current medications.  Blood pressure will be reassessed at the next regular appointment.         Relevant Medications    metoprolol tartrate (LOPRESSOR) 25 MG tablet    Other Relevant Orders    CBC & Differential (Completed)    Comprehensive Metabolic Panel    Lipid Panel    Urinalysis With Microscopic If Indicated (No Culture) - Urine, Clean Catch (Completed)    Anxiety    Relevant Medications    venlafaxine XR (EFFEXOR-XR) 150 MG 24 hr capsule    RLS (restless legs syndrome)    Current Assessment & Plan     Ok with gabapentin         Hypothyroidism    Current Assessment & Plan     Continue synthroid 50 mcg alternating with 75 mcg qod.         Relevant Medications    levothyroxine (SYNTHROID, LEVOTHROID) 75 MCG tablet    metoprolol tartrate (LOPRESSOR) 25 MG tablet    Other Relevant Orders    TSH Rfx On Abnormal To Free T4    Hyperlipidemia    Current Assessment & Plan     Lipid abnormalities are unchanged.  Nutritional counseling was provided.  Lipids will be reassessed in 3 months.         Relevant Medications    simvastatin (ZOCOR) 40 MG tablet    Other Relevant Orders    Comprehensive Metabolic Panel    Lipid Panel    Multiple joint pain    Relevant Medications    baclofen (LIORESAL) 10 MG tablet    Primary insomnia    Current Assessment & Plan     B/c RLS - ok with gabapentin         Diabetes mellitus without complication (CMS/[facility])    Overview     need diet/ex - need labs         Relevant Medications    metFORMIN ER (GLUCOPHAGE-XR) 750 MG 24 hr tablet    Other Relevant Orders    Hemoglobin A1c    Microalbumin / Creatinine Urine Ratio - Urine, Clean Catch       Other Visit Diagnoses     Restless leg syndrome        ok with gabapentin    Relevant Medications    gabapentin (NEURONTIN) 600 MG tablet    Mouth ulcer        Relevant Medications    valACYclovir (Valtrex) 1000 MG tablet    Benign essential HTN        controlled with incr Metoprolol -call if bp over 140/90    Relevant Medications    metoprolol tartrate (LOPRESSOR) 25 MG tablet    Gastroesophageal reflux disease        Relevant Medications    omeprazole (priLOSEC) 40 MG capsule          Follow Up   Return in about 4 months (around 9/6/2021) for Recheck.  Patient was given instructions and counseling regarding her condition or for health maintenance advice. Please see specific information pulled into the AVS if appropriate.

## 2021-05-07 LAB
ALBUMIN SERPL-MCNC: 5 G/DL (ref 3.5–5.2)
ALBUMIN/GLOB SERPL: 2.3 G/DL
ALP SERPL-CCNC: 66 U/L (ref 39–117)
ALT SERPL-CCNC: 13 U/L (ref 1–33)
AST SERPL-CCNC: 21 U/L (ref 1–32)
BILIRUB SERPL-MCNC: 0.5 MG/DL (ref 0–1.2)
BUN SERPL-MCNC: 20 MG/DL (ref 8–23)
BUN/CREAT SERPL: 19.6 (ref 7–25)
CALCIUM SERPL-MCNC: 10.5 MG/DL (ref 8.6–10.5)
CHLORIDE SERPL-SCNC: 102 MMOL/L (ref 98–107)
CHOLEST SERPL-MCNC: 170 MG/DL (ref 0–200)
CO2 SERPL-SCNC: 27.9 MMOL/L (ref 22–29)
CREAT SERPL-MCNC: 1.02 MG/DL (ref 0.57–1)
GLOBULIN SER CALC-MCNC: 2.2 GM/DL
GLUCOSE SERPL-MCNC: 83 MG/DL (ref 65–99)
HBA1C MFR BLD: 5.5 % (ref 4.8–5.6)
HDLC SERPL-MCNC: 50 MG/DL (ref 40–60)
LDLC SERPL CALC-MCNC: 94 MG/DL (ref 0–100)
POTASSIUM SERPL-SCNC: 5.3 MMOL/L (ref 3.5–5.2)
PROT SERPL-MCNC: 7.2 G/DL (ref 6–8.5)
SODIUM SERPL-SCNC: 138 MMOL/L (ref 136–145)
TRIGL SERPL-MCNC: 146 MG/DL (ref 0–150)
TSH SERPL DL<=0.005 MIU/L-ACNC: 0.69 UIU/ML (ref 0.27–4.2)
VLDLC SERPL CALC-MCNC: 26 MG/DL (ref 5–40)

## 2021-05-08 DIAGNOSIS — J45.20 MILD INTERMITTENT ASTHMA WITHOUT COMPLICATION: ICD-10-CM

## 2021-05-08 DIAGNOSIS — J30.2 SEASONAL ALLERGIC RHINITIS, UNSPECIFIED TRIGGER: ICD-10-CM

## 2021-05-10 RX ORDER — MONTELUKAST SODIUM 10 MG/1
TABLET ORAL
Qty: 90 TABLET | Refills: 1 | Status: SHIPPED | OUTPATIENT
Start: 2021-05-10 | End: 2021-09-08 | Stop reason: ALTCHOICE

## 2021-05-25 DIAGNOSIS — E11.8 CONTROLLED TYPE 2 DIABETES MELLITUS WITH COMPLICATION, WITHOUT LONG-TERM CURRENT USE OF INSULIN (HCC): ICD-10-CM

## 2021-05-25 DIAGNOSIS — E11.69 TYPE 2 DIABETES MELLITUS WITH OTHER SPECIFIED COMPLICATION, UNSPECIFIED WHETHER LONG TERM INSULIN USE (HCC): Primary | ICD-10-CM

## 2021-05-25 RX ORDER — LANCETS 26 GAUGE
EACH MISCELLANEOUS
Qty: 100 EACH | Refills: 1 | Status: SHIPPED | OUTPATIENT
Start: 2021-05-25

## 2021-05-25 RX ORDER — BLOOD SUGAR DIAGNOSTIC
STRIP MISCELLANEOUS
Qty: 100 EACH | Refills: 1 | Status: SHIPPED | OUTPATIENT
Start: 2021-05-25 | End: 2021-08-02

## 2021-05-25 RX ORDER — BLOOD-GLUCOSE METER
1 KIT MISCELLANEOUS DAILY
Qty: 1 KIT | Refills: 0 | Status: SHIPPED | OUTPATIENT
Start: 2021-05-25

## 2021-05-25 RX ORDER — BLOOD-GLUCOSE METER
1 EACH MISCELLANEOUS DAILY
Qty: 1 EACH | Refills: 0 | Status: CANCELLED | OUTPATIENT
Start: 2021-05-25

## 2021-05-25 NOTE — TELEPHONE ENCOUNTER
Caller: Dior Pettit    Relationship: Self    Best call back number: [phone] (H)    What medication are you requesting: NEW GLUCOSE MONITOR (PRODIGY)    What are your current symptoms:     How long have you been experiencing symptoms:     Have you had these symptoms before:    [x] Yes  [] No    Have you been treated for these symptoms before:   [x] Yes  [] No    If a prescription is needed, what is your preferred pharmacy and phone number:      Additional notes: PATIENT STATES THAT HER PRODIGY MONITOR. PATIENT STATES THAT THE ONE SHE HAS FROZE UP Saturday. PATIENT STATES THAT IF SHE CAN NOT GET ANOTHER PRODIGY SHE WILL NEED TEST STRIPS FOR WHATEVER IS GIVEN TO HER.     PLEASE CONTACT PATIENT TO ADVISE.         THANKS

## 2021-05-29 DIAGNOSIS — K21.9 GASTROESOPHAGEAL REFLUX DISEASE: ICD-10-CM

## 2021-05-29 DIAGNOSIS — E78.5 HYPERLIPIDEMIA, UNSPECIFIED HYPERLIPIDEMIA TYPE: ICD-10-CM

## 2021-06-01 RX ORDER — SIMVASTATIN 40 MG
TABLET ORAL
Qty: 90 TABLET | Refills: 1 | Status: SHIPPED | OUTPATIENT
Start: 2021-06-01 | End: 2021-09-15 | Stop reason: HOSPADM

## 2021-06-01 RX ORDER — OMEPRAZOLE 40 MG/1
CAPSULE, DELAYED RELEASE ORAL
Qty: 90 CAPSULE | Refills: 1 | Status: SHIPPED | OUTPATIENT
Start: 2021-06-01 | End: 2021-12-28

## 2021-06-11 ENCOUNTER — TELEPHONE (OUTPATIENT)
Dept: INTERNAL MEDICINE | Facility: CLINIC | Age: 81
End: 2021-06-11

## 2021-06-11 RX ORDER — FLUCONAZOLE 150 MG/1
150 TABLET ORAL ONCE
Qty: 1 TABLET | Refills: 0 | Status: SHIPPED | OUTPATIENT
Start: 2021-06-11 | End: 2021-06-11 | Stop reason: SDUPTHER

## 2021-06-11 RX ORDER — FLUCONAZOLE 150 MG/1
150 TABLET ORAL ONCE
Qty: 1 TABLET | Refills: 0 | Status: SHIPPED | OUTPATIENT
Start: 2021-06-11 | End: 2021-06-11

## 2021-06-11 NOTE — TELEPHONE ENCOUNTER
Caller: Dior Pettit    Relationship: Self    Best call back number: [phone]     What medication are you requesting: SOMETHING FOR YEAST INFECTION     What are your current symptoms: LOT OF ITCHING    How long have you been experiencing symptoms: ABOUT A WEEK    Have you had these symptoms before:    [x] Yes  [] No    Have you been treated for these symptoms before:   [x] Yes  [] No    If a prescription is needed, what is your preferred pharmacy and phone number: [facility] [address] [place] [address] AT [facility] [phone] [facility] [phone]      Additional notes:  STATES SHE HAS TRIED USING THE MONASTAT AND IT IS NOT HELPING SHE IS GOING OUT OF TOWN LATER ON TODAY AND REALLY NEEDS ASAP.

## 2021-06-17 ENCOUNTER — HOSPITAL ENCOUNTER (EMERGENCY)
Facility: HOSPITAL | Age: 81
Discharge: HOME | End: 2021-06-17
Attending: EMERGENCY MEDICINE | Admitting: EMERGENCY MEDICINE

## 2021-06-17 ENCOUNTER — TELEPHONE (OUTPATIENT)
Dept: INTERNAL MEDICINE | Facility: CLINIC | Age: 81
End: 2021-06-17

## 2021-06-17 VITALS
OXYGEN SATURATION: 95 % | TEMPERATURE: 97.2 F | HEART RATE: 76 BPM | RESPIRATION RATE: 17 BRPM | SYSTOLIC BLOOD PRESSURE: 148 MMHG | WEIGHT: 155 LBS | HEIGHT: 67 IN | DIASTOLIC BLOOD PRESSURE: 84 MMHG | BODY MASS INDEX: 24.33 KG/M2

## 2021-06-17 DIAGNOSIS — N81.6 RECTOCELE: Primary | ICD-10-CM

## 2021-06-17 LAB
ALBUMIN SERPL-MCNC: 4.8 G/DL (ref 3.5–5.2)
ALBUMIN/GLOB SERPL: 1.7 G/DL
ALP SERPL-CCNC: 70 U/L (ref 39–117)
ALT SERPL W P-5'-P-CCNC: 17 U/L (ref 1–33)
ANION GAP SERPL CALCULATED.3IONS-SCNC: 11.9 MMOL/L (ref 5–15)
AST SERPL-CCNC: 25 U/L (ref 1–32)
BACTERIA UR QL AUTO: NORMAL /HPF
BASOPHILS # BLD AUTO: 0.06 10*3/MM3 (ref 0–0.2)
BASOPHILS NFR BLD AUTO: 1 % (ref 0–1.5)
BILIRUB SERPL-MCNC: 0.5 MG/DL (ref 0–1.2)
BILIRUB UR QL STRIP: NEGATIVE
BUN SERPL-MCNC: 24 MG/DL (ref 8–23)
BUN/CREAT SERPL: 22.9 (ref 7–25)
CALCIUM SPEC-SCNC: 9.9 MG/DL (ref 8.6–10.5)
CHLORIDE SERPL-SCNC: 101 MMOL/L (ref 98–107)
CLARITY UR: CLEAR
CO2 SERPL-SCNC: 26.1 MMOL/L (ref 22–29)
COLOR UR: YELLOW
CREAT SERPL-MCNC: 1.05 MG/DL (ref 0.57–1)
DEPRECATED RDW RBC AUTO: 45.2 FL (ref 37–54)
EOSINOPHIL # BLD AUTO: 0.31 10*3/MM3 (ref 0–0.4)
EOSINOPHIL NFR BLD AUTO: 5.2 % (ref 0.3–6.2)
ERYTHROCYTE [DISTWIDTH] IN BLOOD BY AUTOMATED COUNT: 13.2 % (ref 12.3–15.4)
GFR SERPL CREATININE-BSD FRML MDRD: 50 ML/MIN/1.73
GLOBULIN UR ELPH-MCNC: 2.8 GM/DL
GLUCOSE SERPL-MCNC: 122 MG/DL (ref 65–99)
GLUCOSE UR STRIP-MCNC: NEGATIVE MG/DL
HCT VFR BLD AUTO: 43.3 % (ref 34–46.6)
HGB BLD-MCNC: 14.3 G/DL (ref 12–15.9)
HGB UR QL STRIP.AUTO: NEGATIVE
HOLD SPECIMEN: NORMAL
HOLD SPECIMEN: NORMAL
HYALINE CASTS UR QL AUTO: NORMAL /LPF
IMM GRANULOCYTES # BLD AUTO: 0.03 10*3/MM3 (ref 0–0.05)
IMM GRANULOCYTES NFR BLD AUTO: 0.5 % (ref 0–0.5)
KETONES UR QL STRIP: NEGATIVE
LEUKOCYTE ESTERASE UR QL STRIP.AUTO: ABNORMAL
LYMPHOCYTES # BLD AUTO: 2.27 10*3/MM3 (ref 0.7–3.1)
LYMPHOCYTES NFR BLD AUTO: 38 % (ref 19.6–45.3)
MCH RBC QN AUTO: 30.6 PG (ref 26.6–33)
MCHC RBC AUTO-ENTMCNC: 33 G/DL (ref 31.5–35.7)
MCV RBC AUTO: 92.7 FL (ref 79–97)
MONOCYTES # BLD AUTO: 0.65 10*3/MM3 (ref 0.1–0.9)
MONOCYTES NFR BLD AUTO: 10.9 % (ref 5–12)
NEUTROPHILS NFR BLD AUTO: 2.66 10*3/MM3 (ref 1.7–7)
NEUTROPHILS NFR BLD AUTO: 44.4 % (ref 42.7–76)
NITRITE UR QL STRIP: NEGATIVE
NRBC BLD AUTO-RTO: 0 /100 WBC (ref 0–0.2)
PH UR STRIP.AUTO: 6 [PH] (ref 5–8)
PLATELET # BLD AUTO: 290 10*3/MM3 (ref 140–450)
PMV BLD AUTO: 9.9 FL (ref 6–12)
POTASSIUM SERPL-SCNC: 5.6 MMOL/L (ref 3.5–5.2)
PROT SERPL-MCNC: 7.6 G/DL (ref 6–8.5)
PROT UR QL STRIP: NEGATIVE
RBC # BLD AUTO: 4.67 10*6/MM3 (ref 3.77–5.28)
RBC # UR: NORMAL /HPF
REF LAB TEST METHOD: NORMAL
SODIUM SERPL-SCNC: 139 MMOL/L (ref 136–145)
SP GR UR STRIP: 1.01 (ref 1–1.03)
SQUAMOUS #/AREA URNS HPF: NORMAL /HPF
UROBILINOGEN UR QL STRIP: ABNORMAL
WBC # BLD AUTO: 5.98 10*3/MM3 (ref 3.4–10.8)
WBC UR QL AUTO: NORMAL /HPF
WHOLE BLOOD HOLD SPECIMEN: NORMAL

## 2021-06-17 PROCEDURE — 85025 COMPLETE CBC W/AUTO DIFF WBC: CPT

## 2021-06-17 PROCEDURE — 99283 EMERGENCY DEPT VISIT LOW MDM: CPT

## 2021-06-17 PROCEDURE — 81001 URINALYSIS AUTO W/SCOPE: CPT

## 2021-06-17 PROCEDURE — 80053 COMPREHEN METABOLIC PANEL: CPT

## 2021-06-17 PROCEDURE — 36415 COLL VENOUS BLD VENIPUNCTURE: CPT

## 2021-06-17 NOTE — ED NOTES
Pt presents to ED with complaints of being seen at her PCP with complaints of rectal pain, and swelling. Pt is A&OX4, able to ambulate, and in a mask at this time.     Patient was placed in face mask during first look triage.  Patient was wearing a face mask throughout encounter.  I wore personal protective equipment throughout the encounter.  Hand hygiene was performed before and after patient encounter.        Bethany Joseph, RN  06/17/21 7256

## 2021-06-17 NOTE — ED PROVIDER NOTES
I supervised care provided by the midlevel provider.   We have discussed this patient's history, physical exam, and treatment plan.  I have reviewed the note and personally saw and examined the patient and agree with the plan of care.   Seen and examined this patient.  Discussed with patient and daughter in the room.  She has a long history of rectocele.  She has noticed some increased pressure in her vagina and rectum over the past several weeks.'s concern for follow-up.  She denies any nausea or vomiting.  She has been having normal bowel movements.  Denies being constipated.  Denies any abdominal pain.  Denies any dysuria.  Denies any fevers or chills.    GENERAL: Elderly female not distressed  HENT: nares patent  Head/neck/ face are symmetric without gross deformity or swelling  EYES: no scleral icterus  CV: regular rhythm, regular rate with intact distal pulses  RESPIRATORY: normal effort and no respiratory distress  ABDOMEN: soft and non-tender.  Normal bowel sounds  MUSCULOSKELETAL: no deformity  NEURO: alert and appropriate, moves all extremities, follows commands  SKIN: warm, dry    Vital signs and nursing notes reviewed.    Plan I discussed the case with the physician assistant Armando.  On exam patient has a rectocele but she is very stable.  Lab work is unremarkable.  She does not need any emergent imaging or any emergent procedure done at this time.  We will consult general surgery to see if they can see her in follow-up or recommend another physician for follow-up.  She is very stable and I discussed this with the patient and family and they agree with this plan.  She has not seen anyone concerning this in years.  And she feels that this is gradually getting bigger and wants to know what her options are.    We are currently under a pandemic from the COVID19 infection.  The patient presented to the emergency department by ambulance or personal vehicle. I followed the current protocols required by  Infection Control at [facility] in my evaluation and treatment of the patient. The patient was wearing a face mask during my evaluation and throughout my encounter. During my whole encounter with this patient I used appropriate personal protective equipment.  This equipment consisted of eye protection, facemask, gown, and gloves.  I applied this equipment before entering the room.           Kelby Alford MD  06/17/21 3191

## 2021-06-17 NOTE — TELEPHONE ENCOUNTER
Patients rectocele (that she's had for sometime) has protruded further through her vagina and she reports that its very uncomfortable and she has now noticed a sore.      She was wanting to be seen today, I informed her that we have no providers available  to see her today or tomorrow for this issue, and with her noticing a sore she really need to go to the ER for further evaluation/treatment as she could pose a risk of infection due to the open sore.    She reports taking a stool softener last night and had a bowel movement this morning so she would not have to strain. She than requested to just be referred out to a specialist as she's never seen a specialist in the past for this issue.    She also indicated she did not want to tell her family of this who would have to take her to the ER.    I strongly advised her it's important that she inform her family of her health condition so they be aware and provide support.    She verbalized she understood and would go to the ER.

## 2021-06-17 NOTE — DISCHARGE INSTRUCTIONS
Although you are being discharged from the ED today, I encourage you to return for worsening symptoms. Things can, and do, change such that treatment at home with medication may not be adequate. Specifically I recommend returning for persistent vomiting or difficulty holding down liquids or medications, fever > 102.0 F or any other worsening or alarming symptoms.     Rest. Drink plenty of fluids.  Follow up with colorectal surgery for further evaluation and management.  Follow up with primary care provider for further management and to have blood pressure rechecked.  Take all medications as prescribed.

## 2021-06-17 NOTE — ED PROVIDER NOTES
EMERGENCY DEPARTMENT ENCOUNTER    Room Number:  05/05  Date of encounter:  6/17/2021  PCP: Magdalena Joy MD  Historian: Patient  Full history not obtainable due to: None    HPI:  Chief Complaint: Rectal discomfort    Context: Dior Pettit is a 81 y.o. female who presents to the ED c/o rectal and vaginal discomfort over the past 1 week.  The patient has a known history of rectocele and saw specialist many years ago who she does not remember the name of.  States that over the past 1 week she has felt increased pressure to the vaginal canal and to the rectum.  She has not noticed any bleeding or discharge.  She denies pain.  States that her bowel movements are normal for her and fairly loose.        PAST MEDICAL HISTORY    Active Ambulatory Problems     Diagnosis Date Noted   • Essential hypertension 08/22/2016   • Pulmonary hypertension (CMS/[facility]) 08/22/2016   • Gastric reflux 08/24/2016   • Anxiety    • RLS (restless legs syndrome)    • Controlled diabetes mellitus type 2 with complications (CMS/[facility])    • Hypothyroidism    • Hyperlipidemia    • Seasonal allergic rhinitis 11/11/2016   • Mild intermittent asthma without complication 11/11/2016   • S/P AVR 03/05/2017   • SOB (shortness of breath) 03/13/2017   • Dilated aortic root (CMS/[facility]) 10/02/2017   • Thoracic aortic aneurysm without rupture  10/02/2017   • Macular degeneration 02/21/2018   • Multiple joint pain 02/21/2018   • Headache disorder 06/12/2018   • Primary insomnia 06/12/2018   • Renal insufficiency 10/28/2018   • Macular degeneration of both eyes 04/11/2019   • Cervicalgia 04/11/2019   • Coronary artery disease involving native coronary artery with angina pectoris (CMS/[facility]) 04/11/2019   • Diabetes mellitus without complication (CMS/[facility]) 04/11/2019   • Stress 09/04/2019   • Palpitations 01/16/2021     Resolved Ambulatory Problems     Diagnosis Date Noted   • Sleep apnea 08/22/2016     Past Medical History:   Diagnosis Date   • Allergic rhinitis     • Aortic root dilatation (CMS/HCC) 10/02/2017   • Aortic valve calcification 10/02/2017   • Aortic valve insufficiency Dx in 07   • Aortic valve prosthesis present 11/02/2018   • Ascending aorta dilatation (CMS/HCC) 10/02/2017   • Asthma    • Atypical chest pain    • Breast pain, right Hx   • CAD (coronary artery disease)    • Cardiomegaly 2017   • Chest pain due to CAD (CMS/HCC)    • 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   • DM (diabetes mellitus) (CMS/HCC)    • Esophagitis    • GERD (gastroesophageal reflux disease)    • Health care maintenance    • Heart murmur    • History of echocardiogram 10/2/17-[facility]   • Hypertension    • Insomnia    • Macular degeneration, left eye    • Mild aortic valve regurgitation 10/02/2017   • Mild aortic valve stenosis 10/02/2017   • Mild dilation of ascending aorta (CMS/HCC) 10/02/2017   • Moderate tricuspid valve regurgitation 10/02/2017   • Mood disorder in conditions classified elsewhere    • Near syncope 03/2017-[facility] ER   • Neuropathy    • NNEKA (obstructive sleep apnea)    • Osteoarthritis    • Thoracic ascending aortic aneurysm (CMS/HCC) Dx in 2007 @ 3.8CM & 1/02/2018         PAST SURGICAL HISTORY  Past Surgical History:   Procedure Laterality Date   • AORTIC VALVE REPAIR/REPLACEMENT  2007    Dr. Perry   • APPENDECTOMY     • AUGMENTATION MAMMAPLASTY  1976   • BREAST AUGMENTATION  2014   • BUNIONECTOMY     • CARDIAC CATHETERIZATION  2007   • COLONOSCOPY  2010   • COLONOSCOPY  03/02/2012   • HYSTERECTOMY  1972   • SIGMOIDOSCOPY  2001         FAMILY HISTORY  Family History   Problem Relation Age of Onset   • Hypertension Mother    • Stroke Mother    • Diabetes Sister    • Coronary artery disease Brother    • Diabetes Brother    • Valvular heart disease Brother         TAVR   • Coronary artery disease Brother    • Skin cancer Neg Hx          SOCIAL HISTORY  Social History     Socioeconomic History   • Marital status:      Spouse name: Not on file   • Number of  children: Not on file   • Years of education: Not on file   • Highest education level: Not on file   Tobacco Use   • Smoking status: Never Smoker   • Smokeless tobacco: Never Used   Substance and Sexual Activity   • Alcohol use: Yes     Comment: Social w/Daily Caffeine Use   • Drug use: Not Currently     Types: Benzodiazepines     Comment: Clonazepam/Tramadol   • Sexual activity: Defer     Birth control/protection: Surgical     Comment: Hyst         ALLERGIES  Patient has no known allergies.        REVIEW OF SYSTEMS  Review of Systems   Constitutional: Negative for chills and fever.   HENT: Negative for congestion.    Eyes: Negative for visual disturbance.   Respiratory: Negative for shortness of breath.    Cardiovascular: Negative for chest pain.   Gastrointestinal: Negative for abdominal pain, anal bleeding, blood in stool, nausea and vomiting.        Rectal pressure   Genitourinary: Negative for difficulty urinating.   Musculoskeletal: Negative for gait problem.   Skin: Negative for wound.   Neurological: Negative for syncope.   Psychiatric/Behavioral: Negative for suicidal ideas.      All systems reviewed and marked as negative except as listed in HPI       PHYSICAL EXAM    I have reviewed the triage vital signs and nursing notes.    ED Triage Vitals   Temp Heart Rate Resp BP SpO2   06/17/21 1254 06/17/21 1254 06/17/21 1254 06/17/21 1528 06/17/21 1254   97.2 °F (36.2 °C) 78 18 169/90 94 %      Temp src Heart Rate Source Patient Position BP Location FiO2 (%)   06/17/21 1254 06/17/21 1254 -- -- --   Tympanic Monitor          Physical Exam  Exam conducted with a chaperone present (JAREK Gaines).   Constitutional:       General: She is not in acute distress.     Appearance: She is well-developed.   HENT:      Head: Normocephalic and atraumatic.   Eyes:      General: No scleral icterus.     Conjunctiva/sclera: Conjunctivae normal.   Neck:      Trachea: No tracheal deviation.   Cardiovascular:      Rate and Rhythm:  Normal rate and regular rhythm.   Pulmonary:      Effort: Pulmonary effort is normal.      Breath sounds: Normal breath sounds.   Abdominal:      Palpations: Abdomen is soft.      Tenderness: There is no abdominal tenderness.   Genitourinary:     Comments: Brief external rectal exam is positive for multiple small nonthrombosed external hemorrhoids.  No obvious erythema or warmth or tenderness.    Brief vaginal exam performed which reveals a rectocele protruding into the vaginal canal which is soft without erythema or tenderness.  Musculoskeletal:         General: No deformity.      Cervical back: Normal range of motion.      Comments: Moving all extremities spontaneously   Lymphadenopathy:      Cervical: No cervical adenopathy.   Skin:     General: Skin is warm and dry.   Neurological:      Mental Status: She is alert and oriented to person, place, and time.   Psychiatric:         Behavior: Behavior normal.         Vital signs and nursing notes reviewed.            LAB RESULTS  Recent Results (from the past 24 hour(s))   Comprehensive Metabolic Panel    Collection Time: 06/17/21  1:04 PM    Specimen: Blood   Result Value Ref Range    Glucose 122 (H) 65 - 99 mg/dL    BUN 24 (H) 8 - 23 mg/dL    Creatinine 1.05 (H) 0.57 - 1.00 mg/dL    Sodium 139 136 - 145 mmol/L    Potassium 5.6 (H) 3.5 - 5.2 mmol/L    Chloride 101 98 - 107 mmol/L    CO2 26.1 22.0 - 29.0 mmol/L    Calcium 9.9 8.6 - 10.5 mg/dL    Total Protein 7.6 6.0 - 8.5 g/dL    Albumin 4.80 3.50 - 5.20 g/dL    ALT (SGPT) 17 1 - 33 U/L    AST (SGOT) 25 1 - 32 U/L    Alkaline Phosphatase 70 39 - 117 U/L    Total Bilirubin 0.5 0.0 - 1.2 mg/dL    eGFR Non African Amer 50 (L) >60 mL/min/1.73    Globulin 2.8 gm/dL    A/G Ratio 1.7 g/dL    BUN/Creatinine Ratio 22.9 7.0 - 25.0    Anion Gap 11.9 5.0 - 15.0 mmol/L   Green Top (Gel)    Collection Time: 06/17/21  1:04 PM   Result Value Ref Range    Extra Tube Hold for add-ons.    Lavender Top    Collection Time: 06/17/21  1:04  PM   Result Value Ref Range    Extra Tube hold for add-on    Gold Top - SST    Collection Time: 06/17/21  1:04 PM   Result Value Ref Range    Extra Tube Hold for add-ons.    CBC Auto Differential    Collection Time: 06/17/21  1:04 PM    Specimen: Blood   Result Value Ref Range    WBC 5.98 3.40 - 10.80 10*3/mm3    RBC 4.67 3.77 - 5.28 10*6/mm3    Hemoglobin 14.3 12.0 - 15.9 g/dL    Hematocrit 43.3 34.0 - 46.6 %    MCV 92.7 79.0 - 97.0 fL    MCH 30.6 26.6 - 33.0 pg    MCHC 33.0 31.5 - 35.7 g/dL    RDW 13.2 12.3 - 15.4 %    RDW-SD 45.2 37.0 - 54.0 fl    MPV 9.9 6.0 - 12.0 fL    Platelets 290 140 - 450 10*3/mm3    Neutrophil % 44.4 42.7 - 76.0 %    Lymphocyte % 38.0 19.6 - 45.3 %    Monocyte % 10.9 5.0 - 12.0 %    Eosinophil % 5.2 0.3 - 6.2 %    Basophil % 1.0 0.0 - 1.5 %    Immature Grans % 0.5 0.0 - 0.5 %    Neutrophils, Absolute 2.66 1.70 - 7.00 10*3/mm3    Lymphocytes, Absolute 2.27 0.70 - 3.10 10*3/mm3    Monocytes, Absolute 0.65 0.10 - 0.90 10*3/mm3    Eosinophils, Absolute 0.31 0.00 - 0.40 10*3/mm3    Basophils, Absolute 0.06 0.00 - 0.20 10*3/mm3    Immature Grans, Absolute 0.03 0.00 - 0.05 10*3/mm3    nRBC 0.0 0.0 - 0.2 /100 WBC   Urinalysis With Microscopic If Indicated (No Culture) - Urine, Clean Catch    Collection Time: 06/17/21  1:33 PM    Specimen: Urine, Clean Catch   Result Value Ref Range    Color, UA Yellow Yellow, Straw    Appearance, UA Clear Clear    pH, UA 6.0 5.0 - 8.0    Specific Gravity, UA 1.013 1.005 - 1.030    Glucose, UA Negative Negative    Ketones, UA Negative Negative    Bilirubin, UA Negative Negative    Blood, UA Negative Negative    Protein, UA Negative Negative    Leuk Esterase, UA Trace (A) Negative    Nitrite, UA Negative Negative    Urobilinogen, UA 0.2 E.U./dL 0.2 - 1.0 E.U./dL   Urinalysis, Microscopic Only - Urine, Clean Catch    Collection Time: 06/17/21  1:33 PM    Specimen: Urine, Clean Catch   Result Value Ref Range    RBC, UA 0-2 None Seen, 0-2 /HPF    WBC, UA 0-2 None  Seen, 0-2 /HPF    Bacteria, UA None Seen None Seen /HPF    Squamous Epithelial Cells, UA 0-2 None Seen, 0-2 /HPF    Hyaline Casts, UA None Seen None Seen /LPF    Methodology Automated Microscopy        Ordered the above labs and independently reviewed the results.        RADIOLOGY  No Radiology Exams Resulted Within Past 24 Hours    I ordered the above noted radiological studies. Independently reviewed by me and discussed with radiologist.  See dictation above for official radiology interpretation.      PROCEDURES    Procedures        MEDICATIONS GIVEN IN ER    Medications - No data to display      PROGRESS, DATA ANALYSIS, CONSULTS, AND MEDICAL DECISION MAKING    All labs have been independently reviewed by me.  All radiology studies have been reviewed by me.   EKG's independently reviewed by me.  Discussion below represents my analysis of pertinent findings related to patient's condition, differential diagnosis, treatment plan and final disposition.    DIFFERENTIAL DIAGNOSIS INCLUDE BUT NOT LIMITED TO:     Rectal prolapse, rectocele, pelvic floor dysfunction         AS OF 17:44 EDT VITALS:    BP - 169/90  HR - 80  TEMP - 97.2 °F (36.2 °C) (Tympanic)  02 SATS - 94%    1745 I rechecked the patient.  I discussed the patient's diagnosis, and plan for discharge.  A repeat exam reveals no new worrisome changes from my initial exam findings.  The patient understands that the fact that they are being discharged does not denote that nothing is abnormal, it indicates that no clinical emergency is present and that they must follow-up as directed in order to properly maintain their health.  Follow-up instructions (specifically listed below) and return to ER precautions were given at this time.  I specifically instructed the patient to follow-up with their PCP.  The patient understands and agrees with the plan, and is ready for discharge.  All questions answered.    DIAGNOSIS  Final diagnoses:   Rectocele          DISPOSITION  Discharge    Pt masked in first look. I wore a surgical mask throughout my encounters with the pt. I performed hand hygiene on entry into the pt room and upon exit.      Fabio Winter PA  06/17/21 9081

## 2021-06-17 NOTE — TELEPHONE ENCOUNTER
"    Caller: Dior Pettit    Relationship to patient: Self    Best call back number: [phone]  Chief complaint: PATIENT STATED SHE HAS A CONDITION WHERE \" HER INTESTINE IS COMING OUT OF HER VAGINA \"    Type of visit: SAME DAY/OFFICE VISIT     Requested date: ASAP WITH ANY DOCTOR      Additional notes:NO AVAILABLE APPOINTMENTS UNTIL JULY , PLEASE ADVISE ASAP        "

## 2021-06-21 ENCOUNTER — TELEPHONE (OUTPATIENT)
Dept: INTERNAL MEDICINE | Facility: CLINIC | Age: 81
End: 2021-06-21

## 2021-06-21 DIAGNOSIS — N81.6 RECTOCELE: Primary | ICD-10-CM

## 2021-06-21 NOTE — TELEPHONE ENCOUNTER
Caller: Dior Pettit    Relationship: Self    Best call back number: [phone]     What is the medical concern/diagnosis:  RECTOCELE    What specialty or service is being requested:     What is the provider, practice or medical service name: [facility]     What is the office location:     What is the office phone number:[phone]    Any additional details: PATIENT WENT TO TH ER Friday AND THEY DIAGNOSED HER RECTOCELE AND TOLD HER SHE NEEDS A REFERRAL FROM HER PCP.

## 2021-07-07 ENCOUNTER — HOSPITAL ENCOUNTER (OUTPATIENT)
Dept: CARDIOLOGY | Facility: HOSPITAL | Age: 81
Discharge: HOME | End: 2021-07-07

## 2021-07-07 ENCOUNTER — HOSPITAL ENCOUNTER (OUTPATIENT)
Dept: CT IMAGING | Facility: HOSPITAL | Age: 81
Discharge: HOME | End: 2021-07-07

## 2021-07-07 ENCOUNTER — TELEPHONE (OUTPATIENT)
Dept: CARDIOLOGY | Facility: CLINIC | Age: 81
End: 2021-07-07

## 2021-07-07 VITALS
BODY MASS INDEX: 24.33 KG/M2 | WEIGHT: 155 LBS | HEIGHT: 67 IN | HEART RATE: 76 BPM | OXYGEN SATURATION: 95 % | SYSTOLIC BLOOD PRESSURE: 100 MMHG | DIASTOLIC BLOOD PRESSURE: 60 MMHG

## 2021-07-07 DIAGNOSIS — I71.20 THORACIC AORTIC ANEURYSM WITHOUT RUPTURE (HCC): ICD-10-CM

## 2021-07-07 DIAGNOSIS — Z95.2 S/P AVR: ICD-10-CM

## 2021-07-07 PROCEDURE — 93306 TTE W/DOPPLER COMPLETE: CPT

## 2021-07-07 PROCEDURE — 93306 TTE W/DOPPLER COMPLETE: CPT | Performed by: INTERNAL MEDICINE

## 2021-07-07 PROCEDURE — 71250 CT THORAX DX C-: CPT

## 2021-07-07 PROCEDURE — 25010000002 PERFLUTREN (DEFINITY) 8.476 MG IN SODIUM CHLORIDE (PF) 0.9 % 10 ML INJECTION: Performed by: INTERNAL MEDICINE

## 2021-07-07 RX ADMIN — PERFLUTREN 1.5 ML: 6.52 INJECTION, SUSPENSION INTRAVENOUS at 12:39

## 2021-07-07 NOTE — TELEPHONE ENCOUNTER
Called and discussed CT results with patient in detail.  She will follow up with PCP on noncardiac findings.  She will keep her September follow-up as scheduled or call sooner for any issues or concerns prior to that time.

## 2021-07-07 NOTE — TELEPHONE ENCOUNTER
Also called and discussed echo results with patient.  She believes blood pressure at home well controlled but has not been checking regularly recently.  I asked her to check blood pressure daily for the next week and call with some updated readings.  She does believe that she has been having a little bit of shortness of breath with heavier exertion recently.  She has been having some increased salt intake recently.  Recommended checking proBNP and BMP however she declines at this time.  She really does not feel like she is holding onto extra fluid.  She is going to cut back on the salt intake blood pressure readings as above and will call with readings.  Based on readings will consider additional evaluation as indicated based on recent echo results.  Patient reports she is not treating sleep apnea and is unwilling to consider sleep apnea treatment, she would rather die.  She is aware that this could affect her heart and could lead to worsening heart function or fluid overload or increased pressures in the heart or other cardiac issues.  Has had a recent stress echo in 2019 without evidence of ischemia, considered low risk.      DR. ASKEW--- just wanted to verify that there is not an LVOT gradient on this echo.  Looks like her RVSP is up again and diastolic dysfunction slightly worse with some increased gradients from valve.  Has been having a little bit of shortness of breath with heavier exertion recently.  She declines proBNP and BMP though I recommended them.  She is going to call with some updated blood pressure readings.  Please let me know about the LVOT gradient (elevated or no) and if you have anything further to add.

## 2021-07-08 LAB
AORTIC ARCH: 2.7 CM
AORTIC DIMENSIONLESS INDEX: 0.5 (DI)
ASCENDING AORTA: 4 CM
BH CV ECHO AV AORTIC VALVE AT ACCEL TIME CALCULATED: NORMAL MSEC
BH CV ECHO MEAS - ACS: 1.2 CM
BH CV ECHO MEAS - AO ACC SLOPE: 72.9 CM/SEC^2
BH CV ECHO MEAS - AO ACC TIME: 0.12 SEC
BH CV ECHO MEAS - AO ARCH DIAM (PROXIMAL TRANS.): 2.7 CM
BH CV ECHO MEAS - AO MAX PG (FULL): 33.6 MMHG
BH CV ECHO MEAS - AO MAX PG: 43.4 MMHG
BH CV ECHO MEAS - AO MEAN PG (FULL): 20.4 MMHG
BH CV ECHO MEAS - AO MEAN PG: 25.4 MMHG
BH CV ECHO MEAS - AO ROOT AREA (BSA CORRECTED): 1.9
BH CV ECHO MEAS - AO ROOT AREA: 9 CM^2
BH CV ECHO MEAS - AO ROOT DIAM: 3.4 CM
BH CV ECHO MEAS - AO V2 MAX: 329.4 CM/SEC
BH CV ECHO MEAS - AO V2 MEAN: 238.5 CM/SEC
BH CV ECHO MEAS - AO V2 VTI: 65.6 CM
BH CV ECHO MEAS - ASC AORTA: 4 CM
BH CV ECHO MEAS - AT: 120 SEC
BH CV ECHO MEAS - AVA(I,A): 1.6 CM^2
BH CV ECHO MEAS - AVA(I,D): 1.6 CM^2
BH CV ECHO MEAS - AVA(V,A): 1.4 CM^2
BH CV ECHO MEAS - AVA(V,D): 1.4 CM^2
BH CV ECHO MEAS - BSA(HAYCOCK): 1.8 M^2
BH CV ECHO MEAS - BSA: 1.8 M^2
BH CV ECHO MEAS - BZI_BMI: 24.3 KILOGRAMS/M^2
BH CV ECHO MEAS - BZI_METRIC_HEIGHT: 170.2 CM
BH CV ECHO MEAS - BZI_METRIC_WEIGHT: 70.3 KG
BH CV ECHO MEAS - EDV(MOD-SP2): 64 ML
BH CV ECHO MEAS - EDV(MOD-SP4): 65 ML
BH CV ECHO MEAS - EDV(TEICH): 49.4 ML
BH CV ECHO MEAS - EF(CUBED): 79 %
BH CV ECHO MEAS - EF(MOD-BP): 75.5 %
BH CV ECHO MEAS - EF(MOD-SP2): 78.1 %
BH CV ECHO MEAS - EF(MOD-SP4): 75.4 %
BH CV ECHO MEAS - EF(TEICH): 72.4 %
BH CV ECHO MEAS - ESV(MOD-SP2): 14 ML
BH CV ECHO MEAS - ESV(MOD-SP4): 16 ML
BH CV ECHO MEAS - ESV(TEICH): 13.6 ML
BH CV ECHO MEAS - FS: 40.6 %
BH CV ECHO MEAS - IVS/LVPW: 1.1
BH CV ECHO MEAS - IVSD: 1.3 CM
BH CV ECHO MEAS - LAT PEAK E' VEL: 7.7 CM/SEC
BH CV ECHO MEAS - LV DIASTOLIC VOL/BSA (35-75): 35.8 ML/M^2
BH CV ECHO MEAS - LV MASS(C)D: 144.1 GRAMS
BH CV ECHO MEAS - LV MASS(C)DI: 79.4 GRAMS/M^2
BH CV ECHO MEAS - LV MAX PG: 9.8 MMHG
BH CV ECHO MEAS - LV MEAN PG: 5 MMHG
BH CV ECHO MEAS - LV SYSTOLIC VOL/BSA (12-30): 8.8 ML/M^2
BH CV ECHO MEAS - LV V1 MAX: 156.4 CM/SEC
BH CV ECHO MEAS - LV V1 MEAN: 104 CM/SEC
BH CV ECHO MEAS - LV V1 VTI: 35.2 CM
BH CV ECHO MEAS - LVIDD: 3.5 CM
BH CV ECHO MEAS - LVIDS: 2.1 CM
BH CV ECHO MEAS - LVLD AP2: 6.8 CM
BH CV ECHO MEAS - LVLD AP4: 6.6 CM
BH CV ECHO MEAS - LVLS AP2: 5.4 CM
BH CV ECHO MEAS - LVLS AP4: 4.6 CM
BH CV ECHO MEAS - LVOT AREA (M): 3.1 CM^2
BH CV ECHO MEAS - LVOT AREA: 3 CM^2
BH CV ECHO MEAS - LVOT DIAM: 2 CM
BH CV ECHO MEAS - LVPWD: 1.2 CM
BH CV ECHO MEAS - MED PEAK E' VEL: 7.3 CM/SEC
BH CV ECHO MEAS - MR MAX PG: 108.3 MMHG
BH CV ECHO MEAS - MR MAX VEL: 520.3 CM/SEC
BH CV ECHO MEAS - MV A DUR: 0.11 SEC
BH CV ECHO MEAS - MV A MAX VEL: 117 CM/SEC
BH CV ECHO MEAS - MV DEC SLOPE: 523.7 CM/SEC^2
BH CV ECHO MEAS - MV DEC TIME: 0.22 SEC
BH CV ECHO MEAS - MV E MAX VEL: 126 CM/SEC
BH CV ECHO MEAS - MV E/A: 1.1
BH CV ECHO MEAS - MV MAX PG: 8.7 MMHG
BH CV ECHO MEAS - MV MEAN PG: 4.7 MMHG
BH CV ECHO MEAS - MV P1/2T MAX VEL: 144.2 CM/SEC
BH CV ECHO MEAS - MV P1/2T: 80.7 MSEC
BH CV ECHO MEAS - MV V2 MAX: 147.7 CM/SEC
BH CV ECHO MEAS - MV V2 MEAN: 103.4 CM/SEC
BH CV ECHO MEAS - MV V2 VTI: 42.6 CM
BH CV ECHO MEAS - MVA P1/2T LCG: 1.5 CM^2
BH CV ECHO MEAS - MVA(P1/2T): 2.7 CM^2
BH CV ECHO MEAS - MVA(VTI): 2.5 CM^2
BH CV ECHO MEAS - PA ACC TIME: 0.13 SEC
BH CV ECHO MEAS - PA MAX PG (FULL): 1.3 MMHG
BH CV ECHO MEAS - PA MAX PG: 2.8 MMHG
BH CV ECHO MEAS - PA PR(ACCEL): 22 MMHG
BH CV ECHO MEAS - PA V2 MAX: 84.3 CM/SEC
BH CV ECHO MEAS - PULM A REVS DUR: 0.13 SEC
BH CV ECHO MEAS - PULM A REVS VEL: 20.6 CM/SEC
BH CV ECHO MEAS - PULM DIAS VEL: 34.3 CM/SEC
BH CV ECHO MEAS - PULM S/D: 1.3
BH CV ECHO MEAS - PULM SYS VEL: 44.1 CM/SEC
BH CV ECHO MEAS - PVA(V,A): 2.4 CM^2
BH CV ECHO MEAS - PVA(V,D): 2.4 CM^2
BH CV ECHO MEAS - QP/QS: 0.43
BH CV ECHO MEAS - RAP SYSTOLE: 3 MMHG
BH CV ECHO MEAS - RV MAX PG: 1.6 MMHG
BH CV ECHO MEAS - RV MEAN PG: 1.1 MMHG
BH CV ECHO MEAS - RV V1 MAX: 63 CM/SEC
BH CV ECHO MEAS - RV V1 MEAN: 49.8 CM/SEC
BH CV ECHO MEAS - RV V1 VTI: 14.3 CM
BH CV ECHO MEAS - RVOT AREA: 3.2 CM^2
BH CV ECHO MEAS - RVOT DIAM: 2 CM
BH CV ECHO MEAS - RVSP: 43 MMHG
BH CV ECHO MEAS - SI(AO): 326.1 ML/M^2
BH CV ECHO MEAS - SI(CUBED): 18 ML/M^2
BH CV ECHO MEAS - SI(LVOT): 58.1 ML/M^2
BH CV ECHO MEAS - SI(MOD-SP2): 27.6 ML/M^2
BH CV ECHO MEAS - SI(MOD-SP4): 27 ML/M^2
BH CV ECHO MEAS - SI(TEICH): 19.7 ML/M^2
BH CV ECHO MEAS - SUP REN AO DIAM: 1.9 CM
BH CV ECHO MEAS - SV(AO): 591.6 ML
BH CV ECHO MEAS - SV(CUBED): 32.6 ML
BH CV ECHO MEAS - SV(LVOT): 105.5 ML
BH CV ECHO MEAS - SV(MOD-SP2): 50 ML
BH CV ECHO MEAS - SV(MOD-SP4): 49 ML
BH CV ECHO MEAS - SV(RVOT): 45.2 ML
BH CV ECHO MEAS - SV(TEICH): 35.8 ML
BH CV ECHO MEAS - TAPSE (>1.6): 1.5 CM
BH CV ECHO MEAS - TR MAX VEL: 316.7 CM/SEC
BH CV ECHO MEASUREMENTS AVERAGE E/E' RATIO: 16.8
BH CV XLRA - RV BASE: 3.2 CM
BH CV XLRA - RV LENGTH: 5.5 CM
BH CV XLRA - RV MID: 2.1 CM
BH CV XLRA - TDI S': 10.2 CM/SEC
LEFT ATRIUM VOLUME INDEX: 30 ML/M2
LV EF 2D ECHO EST: 70 %
MAXIMAL PREDICTED HEART RATE: 137 BPM
SINUS: 3.7 CM
STJ: 3.5 CM
STRESS TARGET HR: 116 BPM

## 2021-07-15 ENCOUNTER — OFFICE VISIT (OUTPATIENT)
Dept: INTERNAL MEDICINE | Facility: CLINIC | Age: 81
End: 2021-07-15

## 2021-07-15 VITALS
WEIGHT: 152 LBS | BODY MASS INDEX: 23.86 KG/M2 | TEMPERATURE: 97.5 F | HEIGHT: 67 IN | HEART RATE: 68 BPM | SYSTOLIC BLOOD PRESSURE: 118 MMHG | DIASTOLIC BLOOD PRESSURE: 78 MMHG | OXYGEN SATURATION: 98 %

## 2021-07-15 DIAGNOSIS — N90.89 VULVAR LESION: ICD-10-CM

## 2021-07-15 DIAGNOSIS — E11.8 CONTROLLED TYPE 2 DIABETES MELLITUS WITH COMPLICATION, WITHOUT LONG-TERM CURRENT USE OF INSULIN (HCC): ICD-10-CM

## 2021-07-15 DIAGNOSIS — I10 ESSENTIAL HYPERTENSION: ICD-10-CM

## 2021-07-15 DIAGNOSIS — N81.6 RECTOCELE: ICD-10-CM

## 2021-07-15 DIAGNOSIS — L29.9 ITCHING WITH IRRITATION: Primary | ICD-10-CM

## 2021-07-15 PROCEDURE — 99214 OFFICE O/P EST MOD 30 MIN: CPT | Performed by: INTERNAL MEDICINE

## 2021-07-15 NOTE — PROGRESS NOTES
"Chief Complaint  Vaginal Itching    Subjective          Dior Pettit presents to [facility] for   Vaginal Itching  The patient's primary symptoms include genital itching. The patient's pertinent negatives include no genital lesions, genital odor, genital rash, pelvic pain, vaginal bleeding or vaginal discharge. This is a new problem. The current episode started more than 1 month ago. The problem occurs constantly. The problem has been unchanged. The patient is experiencing no pain. She is not pregnant. Pertinent negatives include no abdominal pain. Nothing aggravates the symptoms. She has tried antifungals for the symptoms.       Review of Systems   Gastrointestinal: Negative for abdominal pain.   Genitourinary: Negative for pelvic pain and vaginal discharge.      She has persistent external vaginal itch in last month, cassia since wearing pad.  It is better after washing sev x daily with soap & water.    Objective   Vital Signs:   /78 (BP Location: Left arm, Patient Position: Sitting, Cuff Size: Adult)   Pulse 68   Temp 97.5 °F (36.4 °C)   Ht 170.2 cm (67\")   Wt 68.9 kg (152 lb)   SpO2 98%   BMI 23.81 kg/m²     Physical Exam  Vitals and nursing note reviewed. Exam conducted with a chaperone present.   Constitutional:       General: She is not in acute distress.     Appearance: She is well-developed.   Cardiovascular:      Rate and Rhythm: Normal rate and regular rhythm.      Heart sounds: Normal heart sounds.   Pulmonary:      Effort: No respiratory distress.      Breath sounds: No wheezing or rales.   Chest:      Chest wall: No tenderness.   Abdominal:      General: There is no distension.      Tenderness: There is no abdominal tenderness. There is no guarding.      Hernia: There is no hernia in the right inguinal area.   Genitourinary:     Labia:         Right: Lesion (white lesion right labia) present. No rash, tenderness or injury.         Left: No rash, tenderness, " lesion or injury.        Lymphadenopathy:      Lower Body: No right inguinal adenopathy. No left inguinal adenopathy.   Psychiatric:         Behavior: Behavior normal.      +slight prolapse of rectum    Result Review :                 Assessment and Plan    Problem List Items Addressed This Visit        Unprioritized    Essential hypertension    Current Assessment & Plan     Hypertension is improving with treatment.  Continue current treatment regimen.  Dietary sodium restriction.  Weight loss.  Regular aerobic exercise.  Continue current medications.  Blood pressure will be reassessed at the next regular appointment.         Controlled diabetes mellitus type 2 with complications (CMS/[facility])    Current Assessment & Plan     Diabetes is improving with treatment.   Continue current treatment regimen.  Reminded to bring in blood sugar diary at next visit.  Dietary recommendations for ADA diet.  Regular aerobic exercise.  Discussed ways to avoid symptomatic hypoglycemia.  Diabetes will be reassessed in 3 months.         Rectocele    Current Assessment & Plan     Keep appt with colon surgeon         Itching with irritation - Primary    Current Assessment & Plan     Normal exam today except +skin lesion on right (white discoloration) & atrophy - refer to GYN         Relevant Orders    Ambulatory Referral to Gynecology    Vulvar lesion    Relevant Orders    Ambulatory Referral to Gynecology          Follow Up   Return if symptoms worsen or fail to improve, for Next scheduled follow up.  Patient was given instructions and counseling regarding her condition or for health maintenance advice. Please see specific information pulled into the AVS if appropriate.

## 2021-07-21 NOTE — TELEPHONE ENCOUNTER
Please let patient know that Dr. Mitchell is recommending that she work on a low-salt diet.  Would recommend that she keep her upcoming 6 month follow-up appointment with Dr. Mitchell 7/26 as scheduled for further discussion as well

## 2021-08-02 DIAGNOSIS — E11.8 CONTROLLED TYPE 2 DIABETES MELLITUS WITH COMPLICATION, WITHOUT LONG-TERM CURRENT USE OF INSULIN (HCC): ICD-10-CM

## 2021-08-02 RX ORDER — BLOOD SUGAR DIAGNOSTIC
STRIP MISCELLANEOUS
Qty: 50 EACH | Refills: 3 | Status: SHIPPED | OUTPATIENT
Start: 2021-08-02 | End: 2022-09-13 | Stop reason: SDUPTHER

## 2021-09-08 ENCOUNTER — TELEPHONE (OUTPATIENT)
Dept: INTERNAL MEDICINE | Facility: CLINIC | Age: 81
End: 2021-09-08

## 2021-09-08 ENCOUNTER — OFFICE VISIT (OUTPATIENT)
Dept: CARDIOLOGY | Facility: CLINIC | Age: 81
End: 2021-09-08

## 2021-09-08 VITALS
HEIGHT: 67 IN | SYSTOLIC BLOOD PRESSURE: 128 MMHG | HEART RATE: 64 BPM | DIASTOLIC BLOOD PRESSURE: 70 MMHG | WEIGHT: 150 LBS | BODY MASS INDEX: 23.54 KG/M2

## 2021-09-08 DIAGNOSIS — I10 ESSENTIAL HYPERTENSION: Primary | ICD-10-CM

## 2021-09-08 DIAGNOSIS — Z95.2 S/P AVR: ICD-10-CM

## 2021-09-08 DIAGNOSIS — I10 BENIGN ESSENTIAL HTN: ICD-10-CM

## 2021-09-08 DIAGNOSIS — I34.2 NONRHEUMATIC MITRAL VALVE STENOSIS: ICD-10-CM

## 2021-09-08 DIAGNOSIS — I71.20 THORACIC AORTIC ANEURYSM WITHOUT RUPTURE (HCC): ICD-10-CM

## 2021-09-08 DIAGNOSIS — I27.20 PULMONARY HYPERTENSION (HCC): ICD-10-CM

## 2021-09-08 PROCEDURE — 99214 OFFICE O/P EST MOD 30 MIN: CPT | Performed by: NURSE PRACTITIONER

## 2021-09-08 PROCEDURE — 93000 ELECTROCARDIOGRAM COMPLETE: CPT | Performed by: NURSE PRACTITIONER

## 2021-09-08 NOTE — PROGRESS NOTES
Date of Office Visit: 2021  Encounter Provider: Margarita Archer, NARDA, APRN  Place of Service: [facility] CARDIOLOGY  Patient Name: Dior Pettit  :1940        Subjective:     Chief Complaint:  Aortic valve replacement, thoracic aortic ectasia, hypertension      History of Present Illness:  Dior Pettit is a 81 y.o. female patient of Dr. Mitchell.  I am seeing this patient in the office today and I have reviewed her records.    Patient has a history of aortic valve replacement, pulmonary hypertension, mitral regurgitation, dilated ascending aorta, hypertension, hyperlipidemia diabetes, subaortic membrane, renal insufficiency.     In  patient underwent aortic valve replacement with porcine valve as well as subaortic membrane resection.  Cardiac catheterization was negative for coronary artery disease.  Patient was found to have significant sleep apnea but was intolerant of CPAP therapy.  She was noted to have pulmonary hypertension with RVSP of 44 mmHg in .  In  she developed chest tightness while shoveling the snow and stress perfusion study was negative for ischemia.  Follow-up echo 2017 showed normal systolic function, mild left ventricular hypertrophy, aortic valve calcification with mild stenosis, mild mitral regurgitation, moderate tricuspid regurgitation, RVSP 52 mmHg, and ascending aorta measuring 3.9 cm.  Patient had CTA chest 2018 showing minimal dilation of the ascending thoracic aorta measuring 4 x 3.9.  It was previously noted to be 3.8 x 3.7 cm in .  Patient requested consult with cardiothoracics and referral was placed.  Patient was seen by Dr. Ellis 2019 at which point dilation was felt to be stable.  Dr. Ellis recommended follow-up in 3 years with repeat CT chest in 3 years.  In 2019 with complaints of chest discomfort she had a stress echo showing normal LV systolic function with grade 1 diastolic dysfunction and  mild concentric LVH.  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  No ischemia at 6 METS.    She was last seen in the office by Dr. Mitchell 1/12/2021 at which point she denied chest pain or shortness of breath symptoms.  She continued to get some occasional brief palpitations lasting a few seconds.  Follow-up echo 1/2021 showed normal LV systolic function with EF of 70%, grade 2 diastolic dysfunction, mild concentric LVH, mild mitral stenosis, elevated aortic valve gradients, moderate tricuspid regurgitation with RVSP of 43 mmHg (was 52 mmHg in 2017 and then 36 in 2019).   proBNP and BMP were recommended but she declined.  She preferred to cut back on salt and call with some blood pressure readings to ensure well controlled.  She reported she was not treating sleep apnea and was unwilling to consider it, reported that she would rather die than treat it.  She verbalized understanding of potential risks.  CT chest showed stable mildly dilated ascending aorta.  It was recommended that she discuss noncardiac findings with primary care.        Patient presents to office today for follow-up appointment.  Patient reports she is doing well overall since last visit.  She is not doing formal exercise.  She does stay active doing all of her own housework and vacuuming.  She denies any exertional symptoms or concerns.  Denies any chest pain or discomfort, shortness of breath, shortness of breath with exertion, lower extremity edema, syncope, near syncope, falls, fatigue, or abnormal bleeding.  Does have a history of some occasional mild lightheadedness but thinks it may be it is from dehydration.  Continues to get a rare couple second palpitation maybe once a month but nothing sustained or new or worsening.        Past Medical History:   Diagnosis Date   • Allergic rhinitis    • Anxiety     Controlled w/Meds   • Aortic root dilatation (CMS/[facility]) 10/02/2017     Borderline--Noted on Echo   • Aortic valve calcification 10/02/2017    Noted on Echo   • Aortic valve insufficiency Dx in 07    w/AVR    • Aortic valve prosthesis present 11/02/2018    Noted on CTA Chest   • Ascending aorta dilatation (CMS/HCC) 10/02/2017    Borderline--Noted on Echo   • Asthma    • Atypical chest pain    • Breast pain, right Hx   • CAD (coronary artery disease)    • Cardiomegaly 2017   • Cervicalgia    • Chest pain due to CAD (CMS/HCC)    • 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    Borderline--Noted on Echo & Measured @ 2.6CM and Mid Descending @ 2.5CM on CTA Chest-11/2/18   • DM (diabetes mellitus) (CMS/[facility])     T2   • Esophagitis    • GERD (gastroesophageal reflux disease)     Controlled w/Meds   • Health care maintenance    • Heart murmur    • History of echocardiogram 10/2/17-[facility]    EF 66%; Borderline Concentric Hypertrophy; Mild Calcification in AV; Mild AVS; Mild AVR; Moderate TVR; Borderline Dilation of Aortic Root/Arch & Borderline Dilation of Ascending/Proximal Aorta Present   • Hyperlipidemia     Controlled w/Meds   • Hypertension     Controlled w/Meds   • Hypothyroidism     Controlled w/Synthroid   • Insomnia    • Macular degeneration, left eye    • Mild aortic valve regurgitation 10/02/2017    Noted on Echo   • Mild aortic valve stenosis 10/02/2017    Noted on Echo   • Mild dilation of ascending aorta (CMS/HCC) 10/02/2017    Borderline--Noted on Echo   • Moderate tricuspid valve regurgitation 10/02/2017    Noted on Echo   • Mood disorder in conditions classified elsewhere     Controlled w/Meds   • Near syncope 03/2017-[facility] ER   • Neuropathy    • NNEKA (obstructive sleep apnea)     Untreated   • Osteoarthritis     Ankles/Feet   • Pulmonary hypertension (CMS/HCC) 2017   • Renal insufficiency    • RLS (restless legs syndrome)    • Thoracic ascending aortic aneurysm (CMS/HCC) Dx in 2007 @ 3.8CM & 1/02/2018    Noted @ 4CM on CTA Chest;     Past Surgical History:   Procedure Laterality  Date   • AORTIC VALVE REPAIR/REPLACEMENT  2007    Dr. Perry   • APPENDECTOMY     • AUGMENTATION MAMMAPLASTY  1976   • BREAST AUGMENTATION  2014   • BUNIONECTOMY     • CARDIAC CATHETERIZATION  2007   • COLONOSCOPY  2010   • COLONOSCOPY  03/02/2012   • HYSTERECTOMY  1972   • SIGMOIDOSCOPY  2001     Outpatient Medications Prior to Visit   Medication Sig Dispense Refill   • albuterol sulfate  (90 Base) MCG/ACT inhaler Every 6 (Six) Hours As Needed.     • ascorbic acid (EQL VITAMIN C) 1000 MG tablet Take 1,000 mg by mouth Daily.     • aspirin 81 MG chewable tablet Chew 81 mg Daily.     • azelastine (ASTELIN) 0.1 % nasal spray 2 sprays into the nostril(s) as directed by provider 2 (Two) Times a Day. Use in each nostril as directed 3 each 3   • baclofen (LIORESAL) 10 MG tablet Take 1 tablet by mouth 3 (Three) Times a Day. 90 tablet 3   • Blood Glucose Monitoring Suppl (Prodigy No Coding Blood Gluc) w/Device kit 1 each Daily. 1 kit 0   • doxepin (SINEquan) 50 MG capsule TAKE 1 CAPSULE EVERY NIGHT 90 capsule 2   • EPINEPHrine (EPIPEN) 0.3 MG/0.3ML solution auto-injector injection      • fenofibrate micronized (LOFIBRA) 134 MG capsule TAKE 1 CAPSULE EVERY       MORNING BEFORE BREAKFAST 90 capsule 3   • fluticasone (Flonase) 50 MCG/ACT nasal spray 2 sprays into the nostril(s) as directed by provider Daily As Needed for Rhinitis or Allergies. 16 g 3   • fluticasone-salmeterol (Advair Diskus) 250-50 MCG/DOSE DISKUS Inhale As Needed.     • gabapentin (NEURONTIN) 600 MG tablet 1 qhs 90 tablet 1   • glimepiride (AMARYL) 2 MG tablet TAKE 1 TABLET EVERY MORNINGBEFORE BREAKFAST 90 tablet 1   • glucosamine-chondroitin 500-400 MG capsule capsule Take 2 capsules by mouth Daily.     • glucose blood (Prodigy No Coding Blood Gluc) test strip TEST BLOOD SUGAR DAILY 50 each 3   • Ibuprofen 200 MG capsule Take  by mouth.     • levothyroxine (SYNTHROID, LEVOTHROID) 50 MCG tablet TAKE 1 TABLET EVERY OTHER  DAY 45 tablet 1   •  levothyroxine (SYNTHROID, LEVOTHROID) 75 MCG tablet Take 1 tablet by mouth Every Other Day. 45 tablet 1   • magnesium oxide (MAGOX) 400 (241.3 Mg) MG tablet tablet Take 400 mg by mouth Daily.     • metFORMIN ER (GLUCOPHAGE-XR) 750 MG 24 hr tablet Take 1 tablet by mouth 2 (Two) Times a Day. 180 tablet 3   • metoprolol tartrate (LOPRESSOR) 25 MG tablet Take 1 tablet by mouth 3 (Three) Times a Day. 270 tablet 3   • Multiple Vitamins-Minerals (MULTIVITAL PO) Take  by mouth Daily.     • omeprazole (priLOSEC) 40 MG capsule TAKE 1 CAPSULE DAILY 90 capsule 1   • Prodigy Safety Lancets 26G misc CHECK BLOOD SUGAR ONE TIME PER  each 1   • simvastatin (ZOCOR) 40 MG tablet TAKE 1 TABLET NIGHTLY AT   BEDTIME 90 tablet 1   • valACYclovir (Valtrex) 1000 MG tablet 2 pills qid for mouth ulcer 16 tablet 4   • venlafaxine XR (EFFEXOR-XR) 150 MG 24 hr capsule Take 1 capsule by mouth Daily. 90 capsule 3   • montelukast (SINGULAIR) 10 MG tablet TAKE 1 TABLET EVERY NIGHT 90 tablet 1     No facility-administered medications prior to visit.       Allergies as of 09/08/2021   • (No Known Allergies)     Social History     Socioeconomic History   • Marital status:      Spouse name: Not on file   • Number of children: Not on file   • Years of education: Not on file   • Highest education level: Not on file   Tobacco Use   • Smoking status: Never Smoker   • Smokeless tobacco: Never Used   • Tobacco comment: caffeine use - 1 cup coffee daily    Substance and Sexual Activity   • Alcohol use: Yes     Comment: 1 glass of wine a week    • Sexual activity: Defer     Birth control/protection: Surgical     Comment: Hyst     Family History   Problem Relation Age of Onset   • Hypertension Mother    • Stroke Mother    • Diabetes Sister    • Coronary artery disease Brother    • Diabetes Brother    • Valvular heart disease Brother         TAVR   • Coronary artery disease Brother    • Skin cancer Neg Hx        Review of Systems   Constitutional:  "Negative for malaise/fatigue.   Cardiovascular:        SEE HPI   Respiratory: Negative for shortness of breath.    Hematologic/Lymphatic: Negative for bleeding problem.   Musculoskeletal: Negative for falls.   Gastrointestinal: Negative for melena.   Genitourinary: Negative for hematuria.   Psychiatric/Behavioral: Negative for altered mental status.          Objective:     Vitals:    09/08/21 1039   BP: 128/70   BP Location: Left arm   Patient Position: Sitting   Pulse: 64   Weight: 68 kg (150 lb)   Height: 170.2 cm (67\")     Body mass index is 23.49 kg/m².      PHYSICAL EXAM:  Constitutional:       General: Not in acute distress.     Appearance: Well-developed. Not diaphoretic.   Eyes:      Pupils: Pupils are equal, round, and reactive to light.   HENT:      Head: Normocephalic and atraumatic.   Neck:      Vascular: No carotid bruit or JVD.   Pulmonary:      Effort: Pulmonary effort is normal. No respiratory distress.      Breath sounds: Normal breath sounds. No wheezing. No rales.   Cardiovascular:      Normal rate. Regular rhythm.      Murmurs: There is a grade 1/6 systolic murmur.      No gallop. No click. No rub.   Edema:     Peripheral edema absent.   Abdominal:      General: Bowel sounds are normal. There is no distension.      Palpations: Abdomen is soft.   Musculoskeletal:         General: No tenderness or deformity.      Cervical back: Neck supple. Skin:     General: Skin is warm and dry.      Findings: No erythema or rash.   Neurological:      Mental Status: Alert and oriented to person, place, and time.   Psychiatric:         Behavior: Behavior normal.         Judgment: Judgment normal.             ECG 12 Lead    Date/Time: 9/8/2021 11:26 AM  Performed by: Margarita Archer DNP, APRN  Authorized by: Margarita Archer DNP, TOSHA   Comparison: compared with previous ECG from 1/12/2021  Rhythm: sinus rhythm  BPM: 64  QRS axis: left  Other findings: non-specific ST-T wave changes  Comments: No significant " changes from previous EKGs              Assessment:       Diagnosis Plan   1. Essential hypertension     2. S/P AVR     3. Thoracic aortic aneurysm without rupture            Plan:     1. EKG: Hard to tell if there is some possible lead changes in lead III versus lead placement issues.  Does look similar to 3/2017 EKG in that lead.  Recommended staying for repeat EKG before leaving to ensure no concerning changes however patient refuses, aware of potential risks.  She really does not want anything further at this time.   2. Aortic valve replacement with subaortic membrane resection in 2007: Gradients remain elevated on recent echo.  She is asymptomatic at this time.  Would like to ensure blood pressure well controlled.  We will have Dr. Micthell review as well.  3. Thoracic aortic dilation: Stable mildly dilated ascending aorta seen on 7/2021 CT chest at 4 cm.  Continue with blood pressure control  4. Hypertension: Blood pressure well controlled in the office today.  She has a blood pressure log from a month and a half ago showing blood pressure running a little high but she thinks it is since improved more like 120s over 60s however she does not have any more recent readings.  I asked her to check blood pressure daily for the next week and call if staying greater than 130/80 on average.  Discussed importance of blood pressure control.  5. Mitral stenosis: Mild on recent echo.  Asymptomatic at this time.  6. Pulmonary hypertension: She does have history of untreated sleep apnea.  Strongly recommended looking into treating this and discussed risks of untreated sleep apnea however she declined, reports she would rather die than treat sleep apnea.  7. Chronic renal insufficiency  8. Dyslipidemia: PCP managing  9. Diabetes: PCP managing    Patient to follow-up with Dr. Mitchell in 6 months or sooner if needed for any new, recurrent, or worsening symptoms or other issues or concerns.  Discussed in detail signs/symptoms that  warrant sooner call or follow-up to the office or ER visit.        Records reviewed including not limited to 7/2021 echo, 9/2019 stress echo, 2017 echo, 7/2021 CT chest.           Your medication list          Accurate as of September 8, 2021 11:29 AM. If you have any questions, ask your nurse or doctor.            CONTINUE taking these medications      Instructions Last Dose Given Next Dose Due   Advair Diskus 250-50 MCG/DOSE DISKUS  Generic drug: fluticasone-salmeterol      Inhale As Needed.       albuterol sulfate  (90 Base) MCG/ACT inhaler  Commonly known as: PROVENTIL HFA;VENTOLIN HFA;PROAIR HFA      Every 6 (Six) Hours As Needed.       aspirin 81 MG chewable tablet      Chew 81 mg Daily.       azelastine 0.1 % nasal spray  Commonly known as: ASTELIN      2 sprays into the nostril(s) as directed by provider 2 (Two) Times a Day. Use in each nostril as directed       baclofen 10 MG tablet  Commonly known as: LIORESAL      Take 1 tablet by mouth 3 (Three) Times a Day.       doxepin 50 MG capsule  Commonly known as: SINEquan      TAKE 1 CAPSULE EVERY NIGHT       EPINEPHrine 0.3 MG/0.3ML solution auto-injector injection  Commonly known as: EPIPEN           EQL Vitamin C 1000 MG tablet  Generic drug: ascorbic acid      Take 1,000 mg by mouth Daily.       fenofibrate micronized 134 MG capsule  Commonly known as: LOFIBRA      TAKE 1 CAPSULE EVERY       MORNING BEFORE BREAKFAST       fluticasone 50 MCG/ACT nasal spray  Commonly known as: Flonase      2 sprays into the nostril(s) as directed by provider Daily As Needed for Rhinitis or Allergies.       gabapentin 600 MG tablet  Commonly known as: NEURONTIN      1 qhs       glimepiride 2 MG tablet  Commonly known as: AMARYL      TAKE 1 TABLET EVERY MORNINGBEFORE BREAKFAST       glucosamine-chondroitin 500-400 MG capsule capsule      Take 2 capsules by mouth Daily.       Ibuprofen 200 MG capsule      Take  by mouth.       levothyroxine 50 MCG tablet  Commonly known  as: SYNTHROID, LEVOTHROID      TAKE 1 TABLET EVERY OTHER  DAY       levothyroxine 75 MCG tablet  Commonly known as: SYNTHROID, LEVOTHROID      Take 1 tablet by mouth Every Other Day.       magnesium oxide 400 (241.3 Mg) MG tablet tablet  Commonly known as: MAGOX      Take 400 mg by mouth Daily.       metFORMIN  MG 24 hr tablet  Commonly known as: GLUCOPHAGE-XR      Take 1 tablet by mouth 2 (Two) Times a Day.       metoprolol tartrate 25 MG tablet  Commonly known as: LOPRESSOR      Take 1 tablet by mouth 3 (Three) Times a Day.       multivitamin with minerals tablet tablet      Take  by mouth Daily.       omeprazole 40 MG capsule  Commonly known as: priLOSEC      TAKE 1 CAPSULE DAILY       Prodigy No Coding Blood Gluc test strip  Generic drug: glucose blood      TEST BLOOD SUGAR DAILY       Prodigy No Coding Blood Gluc w/Device kit      1 each Daily.       Prodigy Safety Lancets 26G misc      CHECK BLOOD SUGAR ONE TIME PER DAY       simvastatin 40 MG tablet  Commonly known as: ZOCOR      TAKE 1 TABLET NIGHTLY AT   BEDTIME       valACYclovir 1000 MG tablet  Commonly known as: Valtrex      2 pills qid for mouth ulcer       venlafaxine  MG 24 hr capsule  Commonly known as: EFFEXOR-XR      Take 1 capsule by mouth Daily.              The above medication changes may not have been made by this provider.  Patient's medication list was updated to reflect medications they are currently taking including medication changes made by other providers.            Thanks,    Margarita Archer, NARDA, APRN  09/08/2021         Dictated utilizing Dragon dictation

## 2021-09-08 NOTE — TELEPHONE ENCOUNTER
Caller: Dior Pettit    Relationship: Self    Best call back number: [phone]     What medication are you requesting:metoprolol tartrate (LOPRESSOR) 25 MG tablet. PATIENT STATES HER MAIL ORDER IS LATE DUE TO THE HOLIDAY AND WOULD LIKE A QUANTITY OF 15 SENT TO LOCAL PHARMACY     Have you had these symptoms before:    [x] Yes  [] No    Have you been treated for these symptoms before:   [x] Yes  [] No    If a prescription is needed, what is your preferred pharmacy and phone number: [facility] [address] [place] [address] [phone] [facility] [phone] FX

## 2021-09-11 ENCOUNTER — TELEPHONE (OUTPATIENT)
Dept: CARDIOLOGY | Facility: CLINIC | Age: 81
End: 2021-09-11

## 2021-09-11 DIAGNOSIS — R00.0 TACHYCARDIA: Primary | ICD-10-CM

## 2021-09-11 NOTE — TELEPHONE ENCOUNTER
Patient called to report that last night she felt her heart racing- her heart rate ranged from 70s-120s. Her BP at the time was 145/88. She took an extra 1/2 dose of metoprolol where she said she felt better. BP this morning of 95/63 with heart rates transiently getting into the 140s.     She denies having any chest pain or shortness of breath. She did say that she was in a rush at her last office visit and had refused to get an EKG then.    Discussed with Dr. Marie- since she felt okay- she could continue to monitor and reach out to our office for a [facility] appt Monday or if her symptoms became worse- she was instructed to go to the ER for further evaluation.     Patient was agreeable to plan.

## 2021-09-12 DIAGNOSIS — R00.2 PALPITATIONS: Primary | ICD-10-CM

## 2021-09-12 NOTE — TELEPHONE ENCOUNTER
Tamara, would have her come in for an EKG and a 1 week Zio patch.    Mary, please arrange. [email or web address]

## 2021-09-13 ENCOUNTER — HOSPITAL ENCOUNTER (OUTPATIENT)
Dept: CARDIOLOGY | Facility: HOSPITAL | Age: 81
Discharge: HOME | End: 2021-09-13

## 2021-09-13 ENCOUNTER — TELEPHONE (OUTPATIENT)
Dept: CARDIOLOGY | Facility: CLINIC | Age: 81
End: 2021-09-13

## 2021-09-13 ENCOUNTER — OFFICE VISIT (OUTPATIENT)
Dept: CARDIOLOGY | Facility: CLINIC | Age: 81
End: 2021-09-13

## 2021-09-13 ENCOUNTER — HOSPITAL ENCOUNTER (INPATIENT)
Facility: HOSPITAL | Age: 81
LOS: 1 days | Discharge: HOME | End: 2021-09-15
Attending: INTERNAL MEDICINE | Admitting: INTERNAL MEDICINE

## 2021-09-13 VITALS
HEIGHT: 67 IN | DIASTOLIC BLOOD PRESSURE: 80 MMHG | HEART RATE: 140 BPM | BODY MASS INDEX: 23.49 KG/M2 | SYSTOLIC BLOOD PRESSURE: 102 MMHG

## 2021-09-13 VITALS — SYSTOLIC BLOOD PRESSURE: 117 MMHG | DIASTOLIC BLOOD PRESSURE: 78 MMHG | HEART RATE: 128 BPM

## 2021-09-13 DIAGNOSIS — M25.50 MULTIPLE JOINT PAIN: ICD-10-CM

## 2021-09-13 DIAGNOSIS — I10 ESSENTIAL HYPERTENSION: ICD-10-CM

## 2021-09-13 DIAGNOSIS — R06.09 DOE (DYSPNEA ON EXERTION): Primary | ICD-10-CM

## 2021-09-13 DIAGNOSIS — R00.2 PALPITATIONS: Primary | ICD-10-CM

## 2021-09-13 DIAGNOSIS — I48.91 ATRIAL FIBRILLATION WITH RVR (HCC): Primary | ICD-10-CM

## 2021-09-13 DIAGNOSIS — R06.09 DOE (DYSPNEA ON EXERTION): ICD-10-CM

## 2021-09-13 LAB
ANION GAP SERPL CALCULATED.3IONS-SCNC: 12.2 MMOL/L (ref 5–15)
BASOPHILS # BLD AUTO: 0.06 10*3/MM3 (ref 0–0.2)
BASOPHILS NFR BLD AUTO: 0.6 % (ref 0–1.5)
BUN SERPL-MCNC: 28 MG/DL (ref 8–23)
BUN/CREAT SERPL: 16.8 (ref 7–25)
CALCIUM SPEC-SCNC: 9.9 MG/DL (ref 8.6–10.5)
CHLORIDE SERPL-SCNC: 101 MMOL/L (ref 98–107)
CO2 SERPL-SCNC: 22.8 MMOL/L (ref 22–29)
CREAT SERPL-MCNC: 1.67 MG/DL (ref 0.57–1)
D DIMER PPP FEU-MCNC: 0.39 MCGFEU/ML (ref 0–0.49)
DEPRECATED RDW RBC AUTO: 43.5 FL (ref 37–54)
EOSINOPHIL # BLD AUTO: 0.3 10*3/MM3 (ref 0–0.4)
EOSINOPHIL NFR BLD AUTO: 3.1 % (ref 0.3–6.2)
ERYTHROCYTE [DISTWIDTH] IN BLOOD BY AUTOMATED COUNT: 12.6 % (ref 12.3–15.4)
GFR SERPL CREATININE-BSD FRML MDRD: 29 ML/MIN/1.73
GLUCOSE BLDC GLUCOMTR-MCNC: 223 MG/DL (ref 70–130)
GLUCOSE SERPL-MCNC: 136 MG/DL (ref 65–99)
HCT VFR BLD AUTO: 42 % (ref 34–46.6)
HGB BLD-MCNC: 13.3 G/DL (ref 12–15.9)
IMM GRANULOCYTES # BLD AUTO: 0.03 10*3/MM3 (ref 0–0.05)
IMM GRANULOCYTES NFR BLD AUTO: 0.3 % (ref 0–0.5)
LYMPHOCYTES # BLD AUTO: 3.18 10*3/MM3 (ref 0.7–3.1)
LYMPHOCYTES NFR BLD AUTO: 33.3 % (ref 19.6–45.3)
MAGNESIUM SERPL-MCNC: 1.9 MG/DL (ref 1.6–2.4)
MCH RBC QN AUTO: 29.6 PG (ref 26.6–33)
MCHC RBC AUTO-ENTMCNC: 31.7 G/DL (ref 31.5–35.7)
MCV RBC AUTO: 93.5 FL (ref 79–97)
MONOCYTES # BLD AUTO: 0.97 10*3/MM3 (ref 0.1–0.9)
MONOCYTES NFR BLD AUTO: 10.1 % (ref 5–12)
NEUTROPHILS NFR BLD AUTO: 5.02 10*3/MM3 (ref 1.7–7)
NEUTROPHILS NFR BLD AUTO: 52.6 % (ref 42.7–76)
NRBC BLD AUTO-RTO: 0 /100 WBC (ref 0–0.2)
NT-PROBNP SERPL-MCNC: ABNORMAL PG/ML (ref 0–1800)
PLATELET # BLD AUTO: 279 10*3/MM3 (ref 140–450)
PMV BLD AUTO: 9.8 FL (ref 6–12)
POTASSIUM SERPL-SCNC: 5 MMOL/L (ref 3.5–5.2)
RBC # BLD AUTO: 4.49 10*6/MM3 (ref 3.77–5.28)
SARS-COV-2 ORF1AB RESP QL NAA+PROBE: NOT DETECTED
SODIUM SERPL-SCNC: 136 MMOL/L (ref 136–145)
TSH SERPL DL<=0.05 MIU/L-ACNC: 1.06 UIU/ML (ref 0.27–4.2)
WBC # BLD AUTO: 9.56 10*3/MM3 (ref 3.4–10.8)

## 2021-09-13 PROCEDURE — 94799 UNLISTED PULMONARY SVC/PX: CPT

## 2021-09-13 PROCEDURE — 85379 FIBRIN DEGRADATION QUANT: CPT | Performed by: NURSE PRACTITIONER

## 2021-09-13 PROCEDURE — 96374 THER/PROPH/DIAG INJ IV PUSH: CPT

## 2021-09-13 PROCEDURE — 93000 ELECTROCARDIOGRAM COMPLETE: CPT | Performed by: NURSE PRACTITIONER

## 2021-09-13 PROCEDURE — U0005 INFEC AGEN DETEC AMPLI PROBE: HCPCS | Performed by: INTERNAL MEDICINE

## 2021-09-13 PROCEDURE — 84443 ASSAY THYROID STIM HORMONE: CPT | Performed by: NURSE PRACTITIONER

## 2021-09-13 PROCEDURE — 85025 COMPLETE CBC W/AUTO DIFF WBC: CPT | Performed by: NURSE PRACTITIONER

## 2021-09-13 PROCEDURE — 63710000001 INSULIN LISPRO (HUMAN) PER 5 UNITS: Performed by: INTERNAL MEDICINE

## 2021-09-13 PROCEDURE — 80048 BASIC METABOLIC PNL TOTAL CA: CPT | Performed by: NURSE PRACTITIONER

## 2021-09-13 PROCEDURE — 96376 TX/PRO/DX INJ SAME DRUG ADON: CPT

## 2021-09-13 PROCEDURE — 99215 OFFICE O/P EST HI 40 MIN: CPT | Performed by: NURSE PRACTITIONER

## 2021-09-13 PROCEDURE — 83880 ASSAY OF NATRIURETIC PEPTIDE: CPT | Performed by: NURSE PRACTITIONER

## 2021-09-13 PROCEDURE — 83735 ASSAY OF MAGNESIUM: CPT | Performed by: NURSE PRACTITIONER

## 2021-09-13 PROCEDURE — 36415 COLL VENOUS BLD VENIPUNCTURE: CPT

## 2021-09-13 PROCEDURE — 82962 GLUCOSE BLOOD TEST: CPT

## 2021-09-13 PROCEDURE — U0004 COV-19 TEST NON-CDC HGH THRU: HCPCS | Performed by: INTERNAL MEDICINE

## 2021-09-13 RX ORDER — DILTIAZEM HCL IN NACL,ISO-OSM 125 MG/125
2.5-15 PLASTIC BAG, INJECTION (ML) INTRAVENOUS
Status: DISCONTINUED | OUTPATIENT
Start: 2021-09-13 | End: 2021-09-14

## 2021-09-13 RX ORDER — LEVOTHYROXINE SODIUM 0.05 MG/1
50 TABLET ORAL EVERY OTHER DAY
Status: DISCONTINUED | OUTPATIENT
Start: 2021-09-14 | End: 2021-09-15 | Stop reason: HOSPADM

## 2021-09-13 RX ORDER — METOPROLOL TARTRATE 5 MG/5ML
5 INJECTION INTRAVENOUS ONCE
Status: COMPLETED | OUTPATIENT
Start: 2021-09-13 | End: 2021-09-13

## 2021-09-13 RX ORDER — SODIUM CHLORIDE 0.9 % (FLUSH) 0.9 %
10 SYRINGE (ML) INJECTION EVERY 12 HOURS SCHEDULED
Status: DISCONTINUED | OUTPATIENT
Start: 2021-09-13 | End: 2021-09-15 | Stop reason: HOSPADM

## 2021-09-13 RX ORDER — BUDESONIDE AND FORMOTEROL FUMARATE DIHYDRATE 160; 4.5 UG/1; UG/1
2 AEROSOL RESPIRATORY (INHALATION)
Status: DISCONTINUED | OUTPATIENT
Start: 2021-09-13 | End: 2021-09-15 | Stop reason: HOSPADM

## 2021-09-13 RX ORDER — VENLAFAXINE HYDROCHLORIDE 150 MG/1
150 CAPSULE, EXTENDED RELEASE ORAL
Status: DISCONTINUED | OUTPATIENT
Start: 2021-09-14 | End: 2021-09-15 | Stop reason: HOSPADM

## 2021-09-13 RX ORDER — INSULIN LISPRO 100 [IU]/ML
0-9 INJECTION, SOLUTION INTRAVENOUS; SUBCUTANEOUS
Status: DISCONTINUED | OUTPATIENT
Start: 2021-09-13 | End: 2021-09-15 | Stop reason: HOSPADM

## 2021-09-13 RX ORDER — ASPIRIN 81 MG/1
81 TABLET ORAL DAILY
Status: DISCONTINUED | OUTPATIENT
Start: 2021-09-14 | End: 2021-09-14

## 2021-09-13 RX ORDER — FENOFIBRATE 145 MG/1
145 TABLET, COATED ORAL DAILY
Status: DISCONTINUED | OUTPATIENT
Start: 2021-09-14 | End: 2021-09-15 | Stop reason: HOSPADM

## 2021-09-13 RX ORDER — LEVOTHYROXINE SODIUM 0.07 MG/1
75 TABLET ORAL EVERY OTHER DAY
Status: DISCONTINUED | OUTPATIENT
Start: 2021-09-15 | End: 2021-09-15 | Stop reason: HOSPADM

## 2021-09-13 RX ORDER — METOPROLOL TARTRATE 5 MG/5ML
2.5 INJECTION INTRAVENOUS ONCE
Status: DISCONTINUED | OUTPATIENT
Start: 2021-09-13 | End: 2021-09-13 | Stop reason: HOSPADM

## 2021-09-13 RX ORDER — NICOTINE POLACRILEX 4 MG
15 LOZENGE BUCCAL
Status: DISCONTINUED | OUTPATIENT
Start: 2021-09-13 | End: 2021-09-15 | Stop reason: HOSPADM

## 2021-09-13 RX ORDER — SODIUM CHLORIDE 0.9 % (FLUSH) 0.9 %
10 SYRINGE (ML) INJECTION AS NEEDED
Status: DISCONTINUED | OUTPATIENT
Start: 2021-09-13 | End: 2021-09-15 | Stop reason: HOSPADM

## 2021-09-13 RX ORDER — PANTOPRAZOLE SODIUM 40 MG/1
40 TABLET, DELAYED RELEASE ORAL
Status: DISCONTINUED | OUTPATIENT
Start: 2021-09-14 | End: 2021-09-15 | Stop reason: HOSPADM

## 2021-09-13 RX ORDER — FLUOROMETHOLONE 0.1 %
SUSPENSION, DROPS(FINAL DOSAGE FORM)(ML) OPHTHALMIC (EYE)
COMMUNITY
Start: 2021-09-01 | End: 2021-12-22

## 2021-09-13 RX ORDER — GABAPENTIN 300 MG/1
600 CAPSULE ORAL NIGHTLY
Status: DISCONTINUED | OUTPATIENT
Start: 2021-09-13 | End: 2021-09-15 | Stop reason: HOSPADM

## 2021-09-13 RX ORDER — DOXEPIN HYDROCHLORIDE 25 MG/1
50 CAPSULE ORAL NIGHTLY
Status: DISCONTINUED | OUTPATIENT
Start: 2021-09-13 | End: 2021-09-15 | Stop reason: HOSPADM

## 2021-09-13 RX ORDER — INSULIN GLARGINE 100 [IU]/ML
10 INJECTION, SOLUTION SUBCUTANEOUS NIGHTLY
Status: DISCONTINUED | OUTPATIENT
Start: 2021-09-13 | End: 2021-09-14

## 2021-09-13 RX ORDER — DEXTROSE MONOHYDRATE 25 G/50ML
25 INJECTION, SOLUTION INTRAVENOUS
Status: DISCONTINUED | OUTPATIENT
Start: 2021-09-13 | End: 2021-09-15 | Stop reason: HOSPADM

## 2021-09-13 RX ORDER — ATORVASTATIN CALCIUM 20 MG/1
40 TABLET, FILM COATED ORAL NIGHTLY
Status: DISCONTINUED | OUTPATIENT
Start: 2021-09-13 | End: 2021-09-15 | Stop reason: HOSPADM

## 2021-09-13 RX ORDER — MULTIPLE VITAMINS W/ MINERALS TAB 9MG-400MCG
1 TAB ORAL DAILY
Status: DISCONTINUED | OUTPATIENT
Start: 2021-09-14 | End: 2021-09-15 | Stop reason: HOSPADM

## 2021-09-13 RX ORDER — FLUOROMETHOLONE 0.1 %
2 SUSPENSION, DROPS(FINAL DOSAGE FORM)(ML) OPHTHALMIC (EYE) DAILY
Status: DISCONTINUED | OUTPATIENT
Start: 2021-09-14 | End: 2021-09-15 | Stop reason: HOSPADM

## 2021-09-13 RX ORDER — ASCORBIC ACID 500 MG
1000 TABLET ORAL DAILY
Status: DISCONTINUED | OUTPATIENT
Start: 2021-09-14 | End: 2021-09-15 | Stop reason: HOSPADM

## 2021-09-13 RX ORDER — AZELASTINE 1 MG/ML
2 SPRAY, METERED NASAL 2 TIMES DAILY PRN
Status: DISCONTINUED | OUTPATIENT
Start: 2021-09-13 | End: 2021-09-15 | Stop reason: HOSPADM

## 2021-09-13 RX ORDER — ACETAMINOPHEN 325 MG/1
650 TABLET ORAL EVERY 6 HOURS PRN
Status: DISCONTINUED | OUTPATIENT
Start: 2021-09-13 | End: 2021-09-15 | Stop reason: HOSPADM

## 2021-09-13 RX ORDER — NITROGLYCERIN 0.4 MG/1
0.4 TABLET SUBLINGUAL
Status: DISCONTINUED | OUTPATIENT
Start: 2021-09-13 | End: 2021-09-15 | Stop reason: HOSPADM

## 2021-09-13 RX ORDER — BACLOFEN 10 MG/1
10 TABLET ORAL 3 TIMES DAILY
Status: DISCONTINUED | OUTPATIENT
Start: 2021-09-13 | End: 2021-09-15 | Stop reason: HOSPADM

## 2021-09-13 RX ADMIN — APIXABAN 2.5 MG: 2.5 TABLET, FILM COATED ORAL at 20:08

## 2021-09-13 RX ADMIN — METOPROLOL TARTRATE 25 MG: 25 TABLET, FILM COATED ORAL at 20:12

## 2021-09-13 RX ADMIN — METOROPROLOL TARTRATE 2.5 MG: 5 INJECTION, SOLUTION INTRAVENOUS at 13:21

## 2021-09-13 RX ADMIN — INSULIN LISPRO 4 UNITS: 100 INJECTION, SOLUTION INTRAVENOUS; SUBCUTANEOUS at 20:14

## 2021-09-13 RX ADMIN — ACETAMINOPHEN 650 MG: 325 TABLET, FILM COATED ORAL at 20:07

## 2021-09-13 RX ADMIN — BUDESONIDE AND FORMOTEROL FUMARATE DIHYDRATE 2 PUFF: 160; 4.5 AEROSOL RESPIRATORY (INHALATION) at 21:07

## 2021-09-13 RX ADMIN — ATORVASTATIN CALCIUM 40 MG: 20 TABLET, FILM COATED ORAL at 20:08

## 2021-09-13 RX ADMIN — Medication 2.5 MG/HR: at 17:45

## 2021-09-13 RX ADMIN — METOROPROLOL TARTRATE 5 MG: 5 INJECTION, SOLUTION INTRAVENOUS at 13:48

## 2021-09-13 NOTE — PROGRESS NOTES
Date of Office Visit: 2021  Encounter Provider: Margarita Archer, NARDA, APRN  Place of Service: [facility] CARDIOLOGY  Patient Name: Dior Pettit  :1940        Subjective:     Chief Complaint:  New onset atrial fibrillation with RVR, shortness of breath with exertion, hypertension with recent hypotension      History of Present Illness:  Dior Pettit is a 81 y.o. female patient of Dr. Mitchell.  I am seeing this patient in the office today and I reviewed her records.    Patient has a history of aortic valve replacement, pulmonary hypertension, mitral regurgitation, dilated ascending aorta, hypertension, hyperlipidemia diabetes, subaortic membrane, renal insufficiency.     In  patient underwent aortic valve replacement with porcine valve as well as subaortic membrane resection.  Cardiac catheterization was negative for coronary artery disease.  Patient was found to have significant sleep apnea but was intolerant of CPAP therapy.  She was noted to have pulmonary hypertension with RVSP of 44 mmHg in .  In  she developed chest tightness while shoveling the snow and stress perfusion study was negative for ischemia.  Follow-up echo 2017 showed normal systolic function, mild left ventricular hypertrophy, aortic valve calcification with mild stenosis, mild mitral regurgitation, moderate tricuspid regurgitation, RVSP 52 mmHg, and ascending aorta measuring 3.9 cm.  Patient had CTA chest 2018 showing minimal dilation of the ascending thoracic aorta measuring 4 x 3.9.  It was previously noted to be 3.8 x 3.7 cm in .  Patient requested consult with cardiothoracics and referral was placed.  Patient was seen by Dr. Ellis 2019 at which point dilation was felt to be stable.  Dr. Ellis recommended follow-up in 3 years with repeat CT chest in 3 years.  In 2019 with complaints of chest discomfort she had a stress echo showing normal LV systolic  function with grade 1 diastolic dysfunction and mild concentric LVH.  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  No ischemia at 6 METS. She was seen in the office by Dr. Mitchell 1/12/2021 at which point she denied chest pain or shortness of breath symptoms.  She continued to get some occasional brief palpitations lasting a few seconds.  Follow-up echo 1/2021 showed normal LV systolic function with EF of 70%, grade 2 diastolic dysfunction, mild concentric LVH, mild mitral stenosis, elevated aortic valve gradients, moderate tricuspid regurgitation with RVSP of 43 mmHg (was 52 mmHg in 2017 and then 36 in 2019).   proBNP and BMP were recommended but she declined.  She preferred to cut back on salt and call with some blood pressure readings to ensure well controlled.  She reported she was not treating sleep apnea and was unwilling to consider it, reported that she would rather die than treat it.  She verbalized understanding of potential risks.  CT chest showed stable mildly dilated ascending aorta.  It was recommended that she discuss noncardiac findings with primary care.      Patient was seen in the office last week at which point he was in normal sinus rhythm with some nonspecific ST-T wave changes.  She declined repeat EKG though it was recommended. She would get a rare palpitation maybe once a month but nothing sustained or new or worsening.    Patient called the triage nurse over the weekend and reported some high heart rates.  She was asymptomatic so was advised to continue to monitor and go the ER if she became symptomatic versus possibly [place] [facility] [place] not asymptomatic but heart rate still elevated.        Patient presents to office today for follow-up appointment.  Patient's daughter is with her in the office today, per patient preference.  Patient reports she has started noticing rapid heart rates over the weekend.  She  feels a racing heartbeat sensation.  She is also been having some associated shortness of breath with a high heart rate readings.  Denies any chest pain or discomfort or lower extremity edema.  Denies syncope or near syncope or abnormal bleeding.  Does continue to have some chronic fatigue, unchanged.  Does get some lightheadedness when heart rate gets very elevated.  Blood pressure at home has been somewhat labile, occasionally into the 120s to 140s but also getting low readings at times in the 90 systolic with the lowest of 76/55 though not sure this is accurate.          Past Medical History:   Diagnosis Date   • Allergic rhinitis    • Anxiety     Controlled w/Meds   • Aortic root dilatation (CMS/HCC) 10/02/2017    Borderline--Noted on Echo   • Aortic valve calcification 10/02/2017    Noted on Echo   • Aortic valve insufficiency Dx in 07    w/AVR    • Aortic valve prosthesis present 11/02/2018    Noted on CTA Chest   • Ascending aorta dilatation (CMS/HCC) 10/02/2017    Borderline--Noted on Echo   • Asthma    • Atypical chest pain    • Breast pain, right Hx   • CAD (coronary artery disease)    • Cardiomegaly 2017   • Cervicalgia    • Chest pain due to CAD (CMS/HCC)    • 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    Borderline--Noted on Echo & Measured @ 2.6CM and Mid Descending @ 2.5CM on CTA Chest-11/2/18   • DM (diabetes mellitus) (CMS/HCC)     T2   • Esophagitis    • GERD (gastroesophageal reflux disease)     Controlled w/Meds   • Health care maintenance    • Heart murmur    • History of echocardiogram 10/2/17-[facility]    EF 66%; Borderline Concentric Hypertrophy; Mild Calcification in AV; Mild AVS; Mild AVR; Moderate TVR; Borderline Dilation of Aortic Root/Arch & Borderline Dilation of Ascending/Proximal Aorta Present   • Hyperlipidemia     Controlled w/Meds   • Hypertension     Controlled w/Meds   • Hypothyroidism     Controlled w/Synthroid   • Insomnia    • Macular degeneration, left eye    • Mild aortic valve  regurgitation 10/02/2017    Noted on Echo   • Mild aortic valve stenosis 10/02/2017    Noted on Echo   • Mild dilation of ascending aorta (CMS/HCC) 10/02/2017    Borderline--Noted on Echo   • Moderate tricuspid valve regurgitation 10/02/2017    Noted on Echo   • Mood disorder in conditions classified elsewhere     Controlled w/Meds   • Near syncope 03/2017-[facility] ER   • Neuropathy    • NNEKA (obstructive sleep apnea)     Untreated   • Osteoarthritis     Ankles/Feet   • Pulmonary hypertension (CMS/HCC) 2017   • Renal insufficiency    • RLS (restless legs syndrome)    • Thoracic ascending aortic aneurysm (CMS/HCC) Dx in 2007 @ 3.8CM & 1/02/2018    Noted @ 4CM on CTA Chest;     Past Surgical History:   Procedure Laterality Date   • AORTIC VALVE REPAIR/REPLACEMENT  2007    Dr. Perry   • APPENDECTOMY     • AUGMENTATION MAMMAPLASTY  1976   • BREAST AUGMENTATION  2014   • BUNIONECTOMY     • CARDIAC CATHETERIZATION  2007   • COLONOSCOPY  2010   • COLONOSCOPY  03/02/2012   • HYSTERECTOMY  1972   • SIGMOIDOSCOPY  2001     Outpatient Medications Prior to Visit   Medication Sig Dispense Refill   • albuterol sulfate  (90 Base) MCG/ACT inhaler Every 6 (Six) Hours As Needed.     • ascorbic acid (EQL VITAMIN C) 1000 MG tablet Take 1,000 mg by mouth Daily.     • aspirin 81 MG chewable tablet Chew 81 mg Daily.     • azelastine (ASTELIN) 0.1 % nasal spray 2 sprays into the nostril(s) as directed by provider 2 (Two) Times a Day. Use in each nostril as directed 3 each 3   • baclofen (LIORESAL) 10 MG tablet Take 1 tablet by mouth 3 (Three) Times a Day. 90 tablet 3   • Blood Glucose Monitoring Suppl (Prodigy No Coding Blood Gluc) w/Device kit 1 each Daily. 1 kit 0   • doxepin (SINEquan) 50 MG capsule TAKE 1 CAPSULE EVERY NIGHT 90 capsule 2   • EPINEPHrine (EPIPEN) 0.3 MG/0.3ML solution auto-injector injection      • fenofibrate micronized (LOFIBRA) 134 MG capsule TAKE 1 CAPSULE EVERY       MORNING BEFORE BREAKFAST 90 capsule 3   •  fluorometholone (FML) 0.1 % ophthalmic suspension      • fluticasone (Flonase) 50 MCG/ACT nasal spray 2 sprays into the nostril(s) as directed by provider Daily As Needed for Rhinitis or Allergies. 16 g 3   • fluticasone-salmeterol (Advair Diskus) 250-50 MCG/DOSE DISKUS Inhale As Needed.     • gabapentin (NEURONTIN) 600 MG tablet 1 qhs 90 tablet 1   • glimepiride (AMARYL) 2 MG tablet TAKE 1 TABLET EVERY MORNINGBEFORE BREAKFAST 90 tablet 1   • glucosamine-chondroitin 500-400 MG capsule capsule Take 2 capsules by mouth Daily.     • glucose blood (Prodigy No Coding Blood Gluc) test strip TEST BLOOD SUGAR DAILY 50 each 3   • Ibuprofen 200 MG capsule Take  by mouth.     • levothyroxine (SYNTHROID, LEVOTHROID) 50 MCG tablet TAKE 1 TABLET EVERY OTHER  DAY (Patient taking differently: 0.5 mcg.) 45 tablet 1   • levothyroxine (SYNTHROID, LEVOTHROID) 75 MCG tablet Take 1 tablet by mouth Every Other Day. 45 tablet 1   • magnesium oxide (MAGOX) 400 (241.3 Mg) MG tablet tablet Take 400 mg by mouth Daily.     • metFORMIN ER (GLUCOPHAGE-XR) 750 MG 24 hr tablet Take 1 tablet by mouth 2 (Two) Times a Day. 180 tablet 3   • metoprolol tartrate (LOPRESSOR) 25 MG tablet Take 1 tablet by mouth 3 (Three) Times a Day. 45 tablet 0   • Multiple Vitamins-Minerals (MULTIVITAL PO) Take  by mouth Daily.     • omeprazole (priLOSEC) 40 MG capsule TAKE 1 CAPSULE DAILY 90 capsule 1   • Prodigy Safety Lancets 26G misc CHECK BLOOD SUGAR ONE TIME PER  each 1   • simvastatin (ZOCOR) 40 MG tablet TAKE 1 TABLET NIGHTLY AT   BEDTIME 90 tablet 1   • valACYclovir (Valtrex) 1000 MG tablet 2 pills qid for mouth ulcer 16 tablet 4   • venlafaxine XR (EFFEXOR-XR) 150 MG 24 hr capsule Take 1 capsule by mouth Daily. 90 capsule 3     No facility-administered medications prior to visit.       Allergies as of 09/13/2021   • (No Known Allergies)     Social History     Socioeconomic History   • Marital status:      Spouse name: Not on file   • Number of  "children: Not on file   • Years of education: Not on file   • Highest education level: Not on file   Tobacco Use   • Smoking status: Never Smoker   • Smokeless tobacco: Never Used   • Tobacco comment: caffeine use - 1 cup coffee daily    Substance and Sexual Activity   • Alcohol use: Yes     Comment: 1 glass of wine a week    • Sexual activity: Defer     Birth control/protection: Surgical     Comment: Hyst     Family History   Problem Relation Age of Onset   • Hypertension Mother    • Stroke Mother    • Diabetes Sister    • Coronary artery disease Brother    • Diabetes Brother    • Valvular heart disease Brother         TAVR   • Coronary artery disease Brother    • Skin cancer Neg Hx        Review of Systems   Constitutional: Positive for malaise/fatigue (Chronic).   Cardiovascular:        SEE HPI   Respiratory: Positive for shortness of breath (When HR elevated).    Hematologic/Lymphatic: Negative for bleeding problem.   Musculoskeletal: Negative for falls.   Gastrointestinal: Negative for melena.   Genitourinary: Negative for hematuria.   Psychiatric/Behavioral: Negative for altered mental status.          Objective:     Vitals:    09/13/21 1216   BP: 102/80   Pulse: (!) 140   Height: 170.2 cm (67\")   WEIGHT: 150lb, 68 kg    Body mass index is 23.49 kg/m².        PHYSICAL EXAM:  Constitutional:       General: Not in acute distress.     Appearance: Well-developed. Not diaphoretic.   Eyes:      Pupils: Pupils are equal, round, and reactive to light.   HENT:      Head: Normocephalic and atraumatic.   Neck:      Vascular: No carotid bruit or JVD.   Pulmonary:      Effort: Pulmonary effort is normal. No respiratory distress.      Breath sounds: Normal breath sounds. No wheezing. No rales.   Cardiovascular:      Tachycardia present. Irregularly irregular rhythm.      Murmurs: There is no murmur.      No gallop. No click. No rub.   Edema:     Peripheral edema absent.   Abdominal:      General: Bowel sounds are normal. " There is no distension.      Palpations: Abdomen is soft.   Musculoskeletal:         General: No tenderness or deformity.      Cervical back: Neck supple. Skin:     General: Skin is warm and dry.      Findings: No erythema or rash.   Neurological:      Mental Status: Alert and oriented to person, place, and time.   Psychiatric:         Behavior: Behavior normal.         Judgment: Judgment normal.             ECG 12 Lead    Date/Time: 9/13/2021 12:52 PM  Performed by: Margarita Archer DNP, APRN  Authorized by: Margarita Archer DNP, APRN   Comparison: compared with previous ECG from 9/8/2021  Rhythm: atrial fibrillation  BPM: 140  Comments: Patient now in rapid atrial fibrillation              Assessment:       Diagnosis Plan   1. Atrial fibrillation with RVR (CMS/HCC)  Basic Metabolic Panel    TSH Rfx On Abnormal To Free T4    metoprolol tartrate (LOPRESSOR) injection 2.5 mg   2. Essential hypertension           Plan:     1. New onset atrial fibrillation with RVR: We will give her some IV metoprolol over in the CEC with careful monitoring of blood pressure.  We will look at starting her on Eliquis.  We will also look at checking BMP and TSH with reflex.  plan of care discussed with Dr. Stephen MD. discussed benefits versus risks of anticoagulation in great detail with patient.  She is aware of potential risk of stroke of not being on anticoagulant.  She still thinks she might be leaning towards not being on a blood thinner.  Denies any history of bleeding issues or history of ulcers or GI issues or abdominal pain discomfort.  She is aware that if she were to go on a blood thinner she would need to avoid NSAIDs.  Denies history of falls.  2. Hyperkalemia: We will recheck BMP, as above  3. Aortic valve replacement with subaortic membrane resection 2007: Gradients remained elevated on recent echo.  4. Thoracic aortic dilation: Stable mild dilation on 7/2021 CT chest.  Denies chest pain or discomfort  symptoms.  5. Hypertension: With recent hypotension.  Monitor blood pressure carefully, as above  6. Mitral stenosis: Mild on recent echo  7. Pulmonary hypertension: With history of untreated sleep apnea.  Has declined multiple times considering treatment of sleep apnea, reports that she is aware of potential risks and would rather die than consider this.  8. HARSHA on CKD: We will look at admitting patient and consult nephrology for further recommendations.  Medications limited for rate control given worsening renal function.  9. Dyslipidemia: PCP managing  10. Diabetes: PCP managing    ADDENDUM: She remains tachycardic despite IV metoprolol.  Blood pressure still on the lower side making rate control difficult.  She is short of breath with higher heart rates when trying to walk.  BMP shows potassium improved to the higher side of normal however appears to have HARSHA on CKD.  Patient case and plan of care reviewed in detail with Dr. Stephen MD. We will look at admitting patient and hopefully can get rate better controlled overnight with careful monitoring of blood pressure and consult nephrology for further recommendations.  Patient is okay with Eliquis at this point after detailed discussion of benefits versus risks but will need low-dose at this point given renal insufficiency.  Patient and daughter on board with plan.    ADDENDUM 2: Discussed plan of care with Dr. Mitchell.  Will start diltiazem dose at low dose of 2.5mg without bolus with careful monitoring BP.         Records reviewed including but not limited to 6/2021 metabolic panel, 6/2021 CBC, BMP from today, TSH from today, 7/2021 echo, 2019 stress echo.           Your medication list          Accurate as of September 13, 2021  3:09 PM. If you have any questions, ask your nurse or doctor.            CHANGE how you take these medications      Instructions Last Dose Given Next Dose Due   levothyroxine 50 MCG tablet  Commonly known as: SYNTHROID, LEVOTHROID  What  changed:   · how much to take  · how to take this  · when to take this      TAKE 1 TABLET EVERY OTHER  DAY       levothyroxine 75 MCG tablet  Commonly known as: SYNTHROID, LEVOTHROID  What changed: Another medication with the same name was changed. Make sure you understand how and when to take each.      Take 1 tablet by mouth Every Other Day.          CONTINUE taking these medications      Instructions Last Dose Given Next Dose Due   Advair Diskus 250-50 MCG/DOSE DISKUS  Generic drug: fluticasone-salmeterol      Inhale As Needed.       albuterol sulfate  (90 Base) MCG/ACT inhaler  Commonly known as: PROVENTIL HFA;VENTOLIN HFA;PROAIR HFA      Every 6 (Six) Hours As Needed.       aspirin 81 MG chewable tablet      Chew 81 mg Daily.       azelastine 0.1 % nasal spray  Commonly known as: ASTELIN      2 sprays into the nostril(s) as directed by provider 2 (Two) Times a Day. Use in each nostril as directed       baclofen 10 MG tablet  Commonly known as: LIORESAL      Take 1 tablet by mouth 3 (Three) Times a Day.       doxepin 50 MG capsule  Commonly known as: SINEquan      TAKE 1 CAPSULE EVERY NIGHT       EPINEPHrine 0.3 MG/0.3ML solution auto-injector injection  Commonly known as: EPIPEN           EQL Vitamin C 1000 MG tablet  Generic drug: ascorbic acid      Take 1,000 mg by mouth Daily.       fenofibrate micronized 134 MG capsule  Commonly known as: LOFIBRA      TAKE 1 CAPSULE EVERY       MORNING BEFORE BREAKFAST       fluorometholone 0.1 % ophthalmic suspension  Commonly known as: FML           fluticasone 50 MCG/ACT nasal spray  Commonly known as: Flonase      2 sprays into the nostril(s) as directed by provider Daily As Needed for Rhinitis or Allergies.       gabapentin 600 MG tablet  Commonly known as: NEURONTIN      1 qhs       glimepiride 2 MG tablet  Commonly known as: AMARYL      TAKE 1 TABLET EVERY MORNINGBEFORE BREAKFAST       glucosamine-chondroitin 500-400 MG capsule capsule      Take 2 capsules by  mouth Daily.       Ibuprofen 200 MG capsule      Take  by mouth.       magnesium oxide 400 (241.3 Mg) MG tablet tablet  Commonly known as: MAGOX      Take 400 mg by mouth Daily.       metFORMIN  MG 24 hr tablet  Commonly known as: GLUCOPHAGE-XR      Take 1 tablet by mouth 2 (Two) Times a Day.       metoprolol tartrate 25 MG tablet  Commonly known as: LOPRESSOR      Take 1 tablet by mouth 3 (Three) Times a Day.       multivitamin with minerals tablet tablet      Take  by mouth Daily.       omeprazole 40 MG capsule  Commonly known as: priLOSEC      TAKE 1 CAPSULE DAILY       Prodigy No Coding Blood Gluc test strip  Generic drug: glucose blood      TEST BLOOD SUGAR DAILY       Prodigy No Coding Blood Gluc w/Device kit      1 each Daily.       Prodigy Safety Lancets 26G misc      CHECK BLOOD SUGAR ONE TIME PER DAY       simvastatin 40 MG tablet  Commonly known as: ZOCOR      TAKE 1 TABLET NIGHTLY AT   BEDTIME       valACYclovir 1000 MG tablet  Commonly known as: Valtrex      2 pills qid for mouth ulcer       venlafaxine  MG 24 hr capsule  Commonly known as: EFFEXOR-XR      Take 1 capsule by mouth Daily.              The above medication changes may not have been made by this provider.  Patient's medication list was updated to reflect medications they are currently taking including medication changes made by other providers.            Thanks,    Margarita Archer, NARDA, APRN  09/13/2021         Dictated utilizing Dragon dictation

## 2021-09-13 NOTE — TELEPHONE ENCOUNTER
Patient called this morning with elevated HR that has been ongoing since Friday. Her HR at this time is 124. It will go up to 140 but it isn't sustaining as high as that. She is anxious but denies any CP, lightheadedness, dizziness, SOA.  Her BP today was 96/62.

## 2021-09-13 NOTE — PLAN OF CARE
Goal Outcome Evaluation:  Plan of Care Reviewed With: patient        Progress: improving  Outcome Summary: Cardizem gtt started. Denies any pain. PO metoprolol continued. Nephrology and Internal Med consulted.

## 2021-09-13 NOTE — TELEPHONE ENCOUNTER
Spoke with pre-CERT who says that we can do a Zio patch monitor today if needed, insurance will cover.  Called and spoke with patient.  Heart rate still quite high.  Would like for her to be seen as medications likely need to be adjusted.

## 2021-09-14 PROBLEM — K59.01 SLOW TRANSIT CONSTIPATION: Status: ACTIVE | Noted: 2021-09-14

## 2021-09-14 PROBLEM — I48.91 NEW ONSET A-FIB: Status: ACTIVE | Noted: 2021-09-14

## 2021-09-14 PROBLEM — E11.65 TYPE 2 DIABETES MELLITUS WITH HYPERGLYCEMIA (HCC): Status: ACTIVE | Noted: 2021-09-14

## 2021-09-14 PROBLEM — N17.9 AKI (ACUTE KIDNEY INJURY): Status: ACTIVE | Noted: 2021-09-14

## 2021-09-14 PROBLEM — I48.91 ATRIAL FIBRILLATION WITH RVR (HCC): Status: ACTIVE | Noted: 2021-09-14

## 2021-09-14 LAB
ALBUMIN SERPL-MCNC: 4.1 G/DL (ref 3.5–5.2)
ALBUMIN/GLOB SERPL: 1.6 G/DL
ALP SERPL-CCNC: 59 U/L (ref 39–117)
ALT SERPL W P-5'-P-CCNC: 14 U/L (ref 1–33)
ANION GAP SERPL CALCULATED.3IONS-SCNC: 10.6 MMOL/L (ref 5–15)
AST SERPL-CCNC: 17 U/L (ref 1–32)
BILIRUB SERPL-MCNC: 0.3 MG/DL (ref 0–1.2)
BUN SERPL-MCNC: 22 MG/DL (ref 8–23)
BUN/CREAT SERPL: 22 (ref 7–25)
CALCIUM SPEC-SCNC: 9.1 MG/DL (ref 8.6–10.5)
CHLORIDE SERPL-SCNC: 103 MMOL/L (ref 98–107)
CO2 SERPL-SCNC: 24.4 MMOL/L (ref 22–29)
CREAT SERPL-MCNC: 1 MG/DL (ref 0.57–1)
GFR SERPL CREATININE-BSD FRML MDRD: 53 ML/MIN/1.73
GLOBULIN UR ELPH-MCNC: 2.5 GM/DL
GLUCOSE BLDC GLUCOMTR-MCNC: 113 MG/DL (ref 70–130)
GLUCOSE BLDC GLUCOMTR-MCNC: 134 MG/DL (ref 70–130)
GLUCOSE BLDC GLUCOMTR-MCNC: 243 MG/DL (ref 70–130)
GLUCOSE BLDC GLUCOMTR-MCNC: 89 MG/DL (ref 70–130)
GLUCOSE SERPL-MCNC: 101 MG/DL (ref 65–99)
HBA1C MFR BLD: 5.4 % (ref 4.8–5.6)
POTASSIUM SERPL-SCNC: 4.3 MMOL/L (ref 3.5–5.2)
PROT SERPL-MCNC: 6.6 G/DL (ref 6–8.5)
SODIUM SERPL-SCNC: 138 MMOL/L (ref 136–145)

## 2021-09-14 PROCEDURE — 63710000001 INSULIN LISPRO (HUMAN) PER 5 UNITS: Performed by: INTERNAL MEDICINE

## 2021-09-14 PROCEDURE — 63710000001 INSULIN GLARGINE PER 5 UNITS: Performed by: INTERNAL MEDICINE

## 2021-09-14 PROCEDURE — 25010000002 DIGOXIN PER 500 MCG: Performed by: INTERNAL MEDICINE

## 2021-09-14 PROCEDURE — 94799 UNLISTED PULMONARY SVC/PX: CPT

## 2021-09-14 PROCEDURE — 80053 COMPREHEN METABOLIC PANEL: CPT | Performed by: NURSE PRACTITIONER

## 2021-09-14 PROCEDURE — 82962 GLUCOSE BLOOD TEST: CPT

## 2021-09-14 PROCEDURE — 83036 HEMOGLOBIN GLYCOSYLATED A1C: CPT | Performed by: INTERNAL MEDICINE

## 2021-09-14 PROCEDURE — 99233 SBSQ HOSP IP/OBS HIGH 50: CPT | Performed by: INTERNAL MEDICINE

## 2021-09-14 RX ORDER — AMOXICILLIN 250 MG
1 CAPSULE ORAL 2 TIMES DAILY
Status: DISCONTINUED | OUTPATIENT
Start: 2021-09-14 | End: 2021-09-15 | Stop reason: HOSPADM

## 2021-09-14 RX ORDER — DIGOXIN 0.25 MG/ML
500 INJECTION INTRAMUSCULAR; INTRAVENOUS ONCE
Status: COMPLETED | OUTPATIENT
Start: 2021-09-14 | End: 2021-09-14

## 2021-09-14 RX ORDER — POLYETHYLENE GLYCOL 3350 17 G/17G
17 POWDER, FOR SOLUTION ORAL DAILY
Status: DISCONTINUED | OUTPATIENT
Start: 2021-09-14 | End: 2021-09-15 | Stop reason: HOSPADM

## 2021-09-14 RX ADMIN — OXYCODONE HYDROCHLORIDE AND ACETAMINOPHEN 1000 MG: 500 TABLET ORAL at 08:07

## 2021-09-14 RX ADMIN — ACETAMINOPHEN 650 MG: 325 TABLET, FILM COATED ORAL at 23:40

## 2021-09-14 RX ADMIN — BACLOFEN 10 MG: 10 TABLET ORAL at 15:35

## 2021-09-14 RX ADMIN — GABAPENTIN 600 MG: 300 CAPSULE ORAL at 23:19

## 2021-09-14 RX ADMIN — DOXEPIN HYDROCHLORIDE 50 MG: 25 CAPSULE ORAL at 00:02

## 2021-09-14 RX ADMIN — DIGOXIN 500 MCG: 0.25 INJECTION INTRAMUSCULAR; INTRAVENOUS at 10:23

## 2021-09-14 RX ADMIN — POLYETHYLENE GLYCOL 3350 17 G: 17 POWDER, FOR SOLUTION ORAL at 15:05

## 2021-09-14 RX ADMIN — BUDESONIDE AND FORMOTEROL FUMARATE DIHYDRATE 2 PUFF: 160; 4.5 AEROSOL RESPIRATORY (INHALATION) at 07:27

## 2021-09-14 RX ADMIN — INSULIN LISPRO 4 UNITS: 100 INJECTION, SOLUTION INTRAVENOUS; SUBCUTANEOUS at 17:35

## 2021-09-14 RX ADMIN — BUDESONIDE AND FORMOTEROL FUMARATE DIHYDRATE 2 PUFF: 160; 4.5 AEROSOL RESPIRATORY (INHALATION) at 19:23

## 2021-09-14 RX ADMIN — FLUOROMETHOLONE 2 DROP: 1 SOLUTION/ DROPS OPHTHALMIC at 08:07

## 2021-09-14 RX ADMIN — ACETAMINOPHEN 650 MG: 325 TABLET, FILM COATED ORAL at 10:23

## 2021-09-14 RX ADMIN — SODIUM CHLORIDE, PRESERVATIVE FREE 10 ML: 5 INJECTION INTRAVENOUS at 20:12

## 2021-09-14 RX ADMIN — ATORVASTATIN CALCIUM 40 MG: 20 TABLET, FILM COATED ORAL at 20:10

## 2021-09-14 RX ADMIN — METOPROLOL TARTRATE 25 MG: 25 TABLET, FILM COATED ORAL at 15:06

## 2021-09-14 RX ADMIN — METOPROLOL TARTRATE 25 MG: 25 TABLET, FILM COATED ORAL at 20:10

## 2021-09-14 RX ADMIN — BACLOFEN 10 MG: 10 TABLET ORAL at 00:03

## 2021-09-14 RX ADMIN — PANTOPRAZOLE SODIUM 40 MG: 40 TABLET, DELAYED RELEASE ORAL at 06:42

## 2021-09-14 RX ADMIN — DOXEPIN HYDROCHLORIDE 50 MG: 25 CAPSULE ORAL at 23:17

## 2021-09-14 RX ADMIN — APIXABAN 5 MG: 5 TABLET, FILM COATED ORAL at 08:07

## 2021-09-14 RX ADMIN — GABAPENTIN 600 MG: 300 CAPSULE ORAL at 00:03

## 2021-09-14 RX ADMIN — SODIUM CHLORIDE, PRESERVATIVE FREE 10 ML: 5 INJECTION INTRAVENOUS at 08:09

## 2021-09-14 RX ADMIN — BACLOFEN 10 MG: 10 TABLET ORAL at 23:20

## 2021-09-14 RX ADMIN — INSULIN GLARGINE 10 UNITS: 100 INJECTION, SOLUTION SUBCUTANEOUS at 00:12

## 2021-09-14 RX ADMIN — ACETAMINOPHEN 650 MG: 325 TABLET, FILM COATED ORAL at 03:54

## 2021-09-14 RX ADMIN — METOPROLOL TARTRATE 25 MG: 25 TABLET, FILM COATED ORAL at 08:08

## 2021-09-14 RX ADMIN — FENOFIBRATE 145 MG: 145 TABLET ORAL at 08:07

## 2021-09-14 RX ADMIN — VENLAFAXINE HYDROCHLORIDE 150 MG: 150 CAPSULE, EXTENDED RELEASE ORAL at 08:08

## 2021-09-14 RX ADMIN — MAGNESIUM OXIDE 400 MG (241.3 MG MAGNESIUM) TABLET 400 MG: TABLET at 08:08

## 2021-09-14 RX ADMIN — APIXABAN 5 MG: 5 TABLET, FILM COATED ORAL at 20:10

## 2021-09-14 RX ADMIN — LEVOTHYROXINE SODIUM 50 MCG: 0.05 TABLET ORAL at 08:08

## 2021-09-14 NOTE — PROGRESS NOTES
LOS: 1 day   Patient Care Team:  Magdalena Joy MD as PCP - General  Magdalena Joy MD as PCP - Family Medicine  MichaelGerardo mantilla MD as Consulting Physician (Pulmonary Disease)  Abbi Mitchell MD as Consulting Physician (Cardiology)  Luis Miguel Parker MD as Consulting Physician (Ophthalmology)  Tae Burton MD as Consulting Physician (Dermatology)  Addis Gomes MD as Consulting Physician (Plastic Surgery)    Chief Complaint:     F/u a fib    Interval History:     She denies nausea or vomiting.  She denies chest pain.  She does not feel short winded at rest but somewhat short winded with activity.  She does feel her heart irregularity and does sense that it is fast.  She has had no dizziness or falls.    Her atrial fibrillation started Friday night.    Objective   Vital Signs  Temp:  [97.5 °F (36.4 °C)-98 °F (36.7 °C)] 97.5 °F (36.4 °C)  Heart Rate:  [] 98  Resp:  [16-20] 16  BP: ()/(61-96) 103/70  No intake or output data in the 24 hours ending 09/14/21 0719    Comfortable NAD  Neck supple, no JVD or thyromegaly appreciated  S1/S2 irregular with tachycardia no m/r/g  Lungs CTA B, normal effort  Abdomen S/NT/ND (+) BS, no HSM appreciated  Extremities warm, no clubbing, cyanosis, or edema  No visible or palpable skin lesions  A/Ox4, mood and affect appropriate    Results Review:      Results from last 7 days   Lab Units 09/14/21  0431 09/13/21  1311 09/13/21  1311   SODIUM mmol/L 138  --  136   POTASSIUM mmol/L 4.3  --  5.0   CHLORIDE mmol/L 103  --  101   CO2 mmol/L 24.4  --  22.8   BUN mg/dL 22  --  28*   CREATININE mg/dL 1.00  --  1.67*   GLUCOSE mg/dL 101*   < > 136*   CALCIUM mg/dL 9.1  --  9.9    < > = values in this interval not displayed.         Results from last 7 days   Lab Units 09/13/21  1630   WBC 10*3/mm3 9.56   HEMOGLOBIN g/dL 13.3   HEMATOCRIT % 42.0   PLATELETS 10*3/mm3 279             Results from last 7 days   Lab Units 09/13/21  1311   MAGNESIUM  mg/dL 1.9           I reviewed the patient's new clinical results.  I personally viewed and interpreted the patient's EKG/Telemetry data        Medication Review:   apixaban, 2.5 mg, Oral, Q12H  vitamin C, 1,000 mg, Oral, Daily  aspirin, 81 mg, Oral, Daily  atorvastatin, 40 mg, Oral, Nightly  baclofen, 10 mg, Oral, TID  budesonide-formoterol, 2 puff, Inhalation, BID - RT  doxepin, 50 mg, Oral, Nightly  fenofibrate, 145 mg, Oral, Daily  fluorometholone, 2 drop, Both Eyes, Daily  gabapentin, 600 mg, Oral, Nightly  insulin glargine, 10 Units, Subcutaneous, Nightly  insulin lispro, 0-9 Units, Subcutaneous, TID With Meals  levothyroxine, 50 mcg, Oral, Every Other Day  [START ON 9/15/2021] levothyroxine, 75 mcg, Oral, Every Other Day  magnesium oxide, 400 mg, Oral, Daily  metoprolol tartrate, 25 mg, Oral, TID  multivitamin with minerals, 1 tablet, Oral, Daily  pantoprazole, 40 mg, Oral, Q AM  sodium chloride, 10 mL, Intravenous, Q12H  venlafaxine XR, 150 mg, Oral, Daily With Breakfast        dilTIAZem, 2.5-15 mg/hr, Last Rate: Stopped (09/14/21 0359)        Assessment/Plan       * No active hospital problems. *    1. New onset atrial fibrillation with RVR: eliquis started.   2. Aortic valve replacement with subaortic membrane resection 2007: Gradients remained elevated on recent echo.  3. Thoracic aortic dilation: Stable mild dilation on 7/2021 CT chest.  Denies chest pain or discomfort symptoms.  4. Hypertension: With recent hypotension.  Monitor blood pressure carefully, as above  5. Mitral stenosis: Mild on recent echo  6. Pulmonary hypertension: With history of untreated sleep apnea.  Has declined multiple times considering treatment of sleep apnea, reports that she is aware of potential risks and would rather die than consider this.  7. HARSHA on CKD: resolved.   8. Dyslipidemia: On atorvastatin.  9. Diabetes: On insulin. Consult IM.     Stop ASA, Eliquis 5 mg twice daily as her creatinine is usually under 1.5 and her  weight is greater than 60 kg.  Digoxin today, BRAULIO and cardioversion in the morning, risks and benefits discussed with the patient and she is amenable.    Daphnie Brand MD  09/14/21  07:19 EDT

## 2021-09-14 NOTE — PROGRESS NOTES
Clinical Pharmacy Services: Patient Counseling New Start Direct Oral Anticoagulation    Dior Pettit is a new start on Eliquis for Atrial Fibrillation Stroke Prevention.   Counseling points included the followin) Eliquis' indication, patient's need for the medication, and dosing/frequency of Eliquis.  Dose: Eliquis 5 mg BID   The following parameters were assessed:   Scr: 1.00 ()  Wt: 67.6 kg ()   Age: > 80 years old   1/3 parameters met - dose reduction is not indicated at this time.  2) Enforced the importance of taking Eliquis as instructed every day.  Explained that it is a twice a day medication due to elimination half life.  3) Explained possible side effects of Eliquis therapy, including increased risk of bleeding, s/sx of bleeding, and increase in cold intolerance. Also talked about ways to control bleeding for minor cuts and scrapes.  4) Emphasized the importance of going to the emergency room if any of the following occur: Falling and hitting your head; noticing bright red blood in urine or dark/tarry stools; vomiting up blood or vomit has a coffee-ground like texture; coughing up blood.  5) Discussed the importance of informing any physician or dentist that they have been started on Eliquis, in case they need to be taken off for a procedure.  6) Discussed all important drug interactions, including over-the-counter medications and supplements.   Patient reported taking the following OTC/natural supplements:   Vitamin E and ibuprofen daily   Patient was counseled to no longer take both due to the increased risk of bleeding associated with the medications.   7) Instructed the patient not to begin or discontinue any medications without informing his/her physician/pharmacist.     I also explained to the patient that this medication may have a high copay associated with it and to let the cardiologist know if it is unaffordable.     Per [facility] pharmacist, patient's insurance will require a $40  copay. Patient has not opted to use Meds to Beds at this time.       I provided patient with an [facility] patient information handout. Patient expressed understanding and had no further questions.       Samara Purdy, PharmD   PGY-1 Pharmacy Resident    Phone #: [phone]

## 2021-09-14 NOTE — CONSULTS
CONSULT NOTE    INTERNAL MEDICINE   [facility]       Patient Identification:  Name: Dior Pettit  Age: 81 y.o.  Sex: female  :  1940  MRN: [id number]             Date of Consultation:  21          Primary Care Physician: Mgadalena Joy MD                               Requesting Physician: dr up  Reason for Consultation:diabetes management    Chief Complaint:  81 year old female who presented to the hospital as a direct admission from the cardiology office; she was seen there today and had shortness of breath; she had atrial fibrillation with rvr; she had been seen last week and was in sinus rhythm at that time; over the weekend she developed palpitations and felt like her heart was racing; she also had intermittently low blood pressure readings in the 90s and 76/55 per cardiology; we are asked to see her regarding management of her diabetes    History of Present Illness:   As above      Past Medical History:  Past Medical History:   Diagnosis Date   • Allergic rhinitis    • Anxiety     Controlled w/Meds   • Aortic root dilatation (CMS/HCC) 10/02/2017    Borderline--Noted on Echo   • Aortic valve calcification 10/02/2017    Noted on Echo   • Aortic valve insufficiency Dx in     w/AVR    • Aortic valve prosthesis present 2018    Noted on CTA Chest   • Ascending aorta dilatation (CMS/HCC) 10/02/2017    Borderline--Noted on Echo   • Asthma    • Atypical chest pain    • Breast pain, right Hx   • CAD (coronary artery disease)    • Cardiomegaly    • Cervicalgia    • Chest pain due to CAD (CMS/HCC)    • 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    Borderline--Noted on Echo & Measured @ 2.6CM and Mid Descending @ 2.5CM on CTA Chest-18   • DM (diabetes mellitus) (CMS/HCC)     T2   • Esophagitis    • GERD (gastroesophageal reflux disease)     Controlled w/Meds   • Health care maintenance    • Heart murmur    • History of echocardiogram 10/2/17-[facility]    EF 66%;  Borderline Concentric Hypertrophy; Mild Calcification in AV; Mild AVS; Mild AVR; Moderate TVR; Borderline Dilation of Aortic Root/Arch & Borderline Dilation of Ascending/Proximal Aorta Present   • Hyperlipidemia     Controlled w/Meds   • Hypertension     Controlled w/Meds   • Hypothyroidism     Controlled w/Synthroid   • Insomnia    • Macular degeneration, left eye    • Mild aortic valve regurgitation 10/02/2017    Noted on Echo   • Mild aortic valve stenosis 10/02/2017    Noted on Echo   • Mild dilation of ascending aorta (CMS/HCC) 10/02/2017    Borderline--Noted on Echo   • Moderate tricuspid valve regurgitation 10/02/2017    Noted on Echo   • Mood disorder in conditions classified elsewhere     Controlled w/Meds   • Near syncope 03/2017-[facility] ER   • Neuropathy    • NNEKA (obstructive sleep apnea)     Untreated   • Osteoarthritis     Ankles/Feet   • Pulmonary hypertension (CMS/HCC) 2017   • Renal insufficiency    • RLS (restless legs syndrome)    • Thoracic ascending aortic aneurysm (CMS/HCC) Dx in 2007 @ 3.8CM & 1/02/2018    Noted @ 4CM on CTA Chest;     Past Surgical History:  Past Surgical History:   Procedure Laterality Date   • AORTIC VALVE REPAIR/REPLACEMENT  2007    Dr. Perry   • APPENDECTOMY     • AUGMENTATION MAMMAPLASTY  1976   • BREAST AUGMENTATION  2014   • BUNIONECTOMY     • CARDIAC CATHETERIZATION  2007   • COLONOSCOPY  2010   • COLONOSCOPY  03/02/2012   • HYSTERECTOMY  1972   • SIGMOIDOSCOPY  2001      Home Meds:  Facility-Administered Medications Prior to Admission   Medication Dose Route Frequency Provider Last Rate Last Admin   • [COMPLETED] metoprolol tartrate (LOPRESSOR) injection 2.5 mg  2.5 mg Intravenous Once Margarita Archer, DNP, APRN   2.5 mg at 09/13/21 1321     Medications Prior to Admission   Medication Sig Dispense Refill Last Dose   • albuterol sulfate  (90 Base) MCG/ACT inhaler Every 6 (Six) Hours As Needed.   Past Month at Unknown time   • ascorbic acid (EQL VITAMIN C) 1000  MG tablet Take 1,000 mg by mouth Daily.   9/13/2021 at Unknown time   • aspirin 81 MG chewable tablet Chew 81 mg Daily.   9/12/2021 at Unknown time   • azelastine (ASTELIN) 0.1 % nasal spray 2 sprays into the nostril(s) as directed by provider 2 (Two) Times a Day. Use in each nostril as directed 3 each 3 Past Week at Unknown time   • baclofen (LIORESAL) 10 MG tablet Take 1 tablet by mouth 3 (Three) Times a Day. 90 tablet 3 9/12/2021 at Unknown time   • Blood Glucose Monitoring Suppl (Prodigy No Coding Blood Gluc) w/Device kit 1 each Daily. 1 kit 0 9/12/2021 at Unknown time   • doxepin (SINEquan) 50 MG capsule TAKE 1 CAPSULE EVERY NIGHT 90 capsule 2 9/12/2021 at Unknown time   • EPINEPHrine (EPIPEN) 0.3 MG/0.3ML solution auto-injector injection    Past Month at Unknown time   • fenofibrate micronized (LOFIBRA) 134 MG capsule TAKE 1 CAPSULE EVERY       MORNING BEFORE BREAKFAST 90 capsule 3 9/13/2021 at Unknown time   • fluorometholone (FML) 0.1 % ophthalmic suspension    9/13/2021 at Unknown time   • fluticasone (Flonase) 50 MCG/ACT nasal spray 2 sprays into the nostril(s) as directed by provider Daily As Needed for Rhinitis or Allergies. 16 g 3 Past Month at Unknown time   • fluticasone-salmeterol (Advair Diskus) 250-50 MCG/DOSE DISKUS Inhale As Needed.   9/13/2021 at Unknown time   • gabapentin (NEURONTIN) 600 MG tablet 1 qhs 90 tablet 1 9/12/2021 at Unknown time   • glimepiride (AMARYL) 2 MG tablet TAKE 1 TABLET EVERY MORNINGBEFORE BREAKFAST 90 tablet 1 9/12/2021 at Unknown time   • glucosamine-chondroitin 500-400 MG capsule capsule Take 2 capsules by mouth Daily.   9/13/2021 at Unknown time   • glucose blood (Prodigy No Coding Blood Gluc) test strip TEST BLOOD SUGAR DAILY 50 each 3 Past Week at Unknown time   • levothyroxine (SYNTHROID, LEVOTHROID) 50 MCG tablet TAKE 1 TABLET EVERY OTHER  DAY (Patient taking differently: 0.5 mcg.) 45 tablet 1 9/12/2021 at Unknown time   • levothyroxine (SYNTHROID, LEVOTHROID) 75  MCG tablet Take 1 tablet by mouth Every Other Day. 45 tablet 1 9/13/2021 at Unknown time   • magnesium oxide (MAGOX) 400 (241.3 Mg) MG tablet tablet Take 400 mg by mouth Daily.   9/13/2021 at Unknown time   • metFORMIN ER (GLUCOPHAGE-XR) 750 MG 24 hr tablet Take 1 tablet by mouth 2 (Two) Times a Day. 180 tablet 3 9/13/2021 at Unknown time   • metoprolol tartrate (LOPRESSOR) 25 MG tablet Take 1 tablet by mouth 3 (Three) Times a Day. 45 tablet 0 9/13/2021 at Unknown time   • Multiple Vitamins-Minerals (MULTIVITAL PO) Take  by mouth Daily.   9/13/2021 at Unknown time   • omeprazole (priLOSEC) 40 MG capsule TAKE 1 CAPSULE DAILY 90 capsule 1 9/13/2021 at Unknown time   • Prodigy Safety Lancets 26G misc CHECK BLOOD SUGAR ONE TIME PER  each 1 Past Month at Unknown time   • simvastatin (ZOCOR) 40 MG tablet TAKE 1 TABLET NIGHTLY AT   BEDTIME 90 tablet 1 9/12/2021 at Unknown time   • valACYclovir (Valtrex) 1000 MG tablet 2 pills qid for mouth ulcer 16 tablet 4 Past Month at Unknown time   • venlafaxine XR (EFFEXOR-XR) 150 MG 24 hr capsule Take 1 capsule by mouth Daily. 90 capsule 3 9/13/2021 at Unknown time     Current Meds:     Current Facility-Administered Medications:   •  acetaminophen (TYLENOL) tablet 650 mg, 650 mg, Oral, Q6H PRN, Edwin Laboy MD, 650 mg at 09/13/21 2007  •  apixaban (ELIQUIS) tablet 2.5 mg, 2.5 mg, Oral, Q12H, Margarita Archer, DNP, APRN, 2.5 mg at 09/13/21 2008  •  [START ON 9/14/2021] ascorbic acid (VITAMIN C) tablet 1,000 mg, 1,000 mg, Oral, Daily, Edwin Laboy MD  •  [START ON 9/14/2021] aspirin EC tablet 81 mg, 81 mg, Oral, Daily, Edwin Laboy MD  •  atorvastatin (LIPITOR) tablet 40 mg, 40 mg, Oral, Nightly, Edwin Laboy MD, 40 mg at 09/13/21 2008  •  azelastine (ASTELIN) nasal spray 2 spray, 2 spray, Each Nare, BID PRN, Edwin Laboy MD  •  baclofen (LIORESAL) tablet 10 mg, 10 mg, Oral, TID, Edwin Laboy MD  •  budesonide-formoterol  (SYMBICORT) 160-4.5 MCG/ACT inhaler 2 puff, 2 puff, Inhalation, BID - RT, Edwin Laboy MD  •  dextrose (D50W) 25 g/ 50mL Intravenous Solution 25 g, 25 g, Intravenous, Q15 Min PRN, Ching Yuen MD  •  dextrose (GLUTOSE) oral gel 15 g, 15 g, Oral, Q15 Min PRN, Ching Yuen MD  •  dilTIAZem (CARDIZEM) 125 mg in 125 mL 0.7% sodium chloride  infusion, 2.5-15 mg/hr, Intravenous, Titrated, Margarita Archer, DNP, APRN, Last Rate: 5 mL/hr at 09/13/21 1920, 5 mg/hr at 09/13/21 1920  •  doxepin (SINEquan) capsule 50 mg, 50 mg, Oral, Nightly, Edwin Laboy MD  •  [START ON 9/14/2021] fenofibrate (TRICOR) tablet 145 mg, 145 mg, Oral, Daily, Edwin Laboy MD  •  [START ON 9/14/2021] fluorometholone (FML) 0.1 % ophthalmic suspension 2 drop, 2 drop, Both Eyes, Daily, Edwin Laboy MD  •  gabapentin (NEURONTIN) capsule 600 mg, 600 mg, Oral, Nightly, Edwin Laboy MD  •  glucagon (human recombinant) (GLUCAGEN DIAGNOSTIC) injection 1 mg, 1 mg, Subcutaneous, Q15 Min PRN, Ching Yuen MD  •  insulin lispro (ADMELOG) injection 0-9 Units, 0-9 Units, Subcutaneous, TID With Meals, Ching Yuen MD, 4 Units at 09/13/21 2014  •  [START ON 9/14/2021] levothyroxine (SYNTHROID, LEVOTHROID) tablet 50 mcg, 50 mcg, Oral, Every Other Day, Edwin Laboy MD  •  [START ON 9/15/2021] levothyroxine (SYNTHROID, LEVOTHROID) tablet 75 mcg, 75 mcg, Oral, Every Other Day, Edwin Laboy MD  •  [START ON 9/14/2021] magnesium oxide (MAG-OX) tablet 400 mg, 400 mg, Oral, Daily, Edwin Laboy MD  •  metoprolol tartrate (LOPRESSOR) tablet 25 mg, 25 mg, Oral, TID, MitchellAbbi MD, 25 mg at 09/13/21 2012  •  [START ON 9/14/2021] multivitamin with minerals 1 tablet, 1 tablet, Oral, Daily, Edwin Laboy MD  •  nitroglycerin (NITROSTAT) SL tablet 0.4 mg, 0.4 mg, Sublingual, Q5 Min PRN, Margarita Archer, DNP, APRN  •  [START ON 9/14/2021] pantoprazole  (PROTONIX) EC tablet 40 mg, 40 mg, Oral, Q AM, Edwin Laboy MD  •  sodium chloride 0.9 % flush 10 mL, 10 mL, Intravenous, Q12H, Margarita Archer DNP, APRSANG  •  sodium chloride 0.9 % flush 10 mL, 10 mL, Intravenous, PRN, Margarita Archer DNP APRSANG  •  [START ON 9/14/2021] venlafaxine XR (EFFEXOR-XR) 24 hr capsule 150 mg, 150 mg, Oral, Daily With Breakfast, Edwin Laboy MD  Allergies:  No Known Allergies  Social History:   Social History     Socioeconomic History   • Marital status:      Spouse name: Not on file   • Number of children: Not on file   • Years of education: Not on file   • Highest education level: Not on file   Tobacco Use   • Smoking status: Never Smoker   • Smokeless tobacco: Never Used   • Tobacco comment: caffeine use - 1 cup coffee daily    Substance and Sexual Activity   • Alcohol use: Yes     Comment: 1 glass of wine a week    • Sexual activity: Defer     Birth control/protection: Surgical     Comment: Hyst     Family History:  Family History   Problem Relation Age of Onset   • Hypertension Mother    • Stroke Mother    • Diabetes Sister    • Coronary artery disease Brother    • Diabetes Brother    • Valvular heart disease Brother         TAVR   • Coronary artery disease Brother    • Skin cancer Neg Hx           Review of Systems  See history of present illness and past medical history.  Patient denies headache, dizziness, syncope, falls, trauma, change in vision, change in hearing, change in taste, changes in weight, changes in appetite, focal weakness, numbness, or paresthesia.  Patient denies chest pain,  orthopnea, PND, cough, sinus pressure, rhinorrhea, epistaxis, hemoptysis, nausea, vomiting,hematemesis, diarrhea, constipation or hematchezia.  Denies cold or heat intolerance, polydipsia, polyuria, polyphagia. Denies hematuria, pyuria, dysuria, hesitancy, frequency or urgency. Denies consumption of raw and under cooked meats foods or change in water source.  Denies  "fever, chills, sweats, night sweats.  Denies missing any routine medications. Remainder of ROS is negative.      Vitals:   /85   Pulse (!) 133   Temp 98 °F (36.7 °C) (Oral)   Resp 16   Ht 170.2 cm (67.01\")   Wt 68 kg (149 lb 14.6 oz)   LMP  (LMP Unknown)   SpO2 91%   BMI 23.47 kg/m²   I/O: No intake or output data in the 24 hours ending 09/13/21 2039  Exam:  General Appearance:    Alert, cooperative, no distress, appears stated age   Head:    Normocephalic, without obvious abnormality, atraumatic   Eyes:    PERRL, conjunctivae/corneas clear, EOM's intact, both eyes   Ears:    Normal external ear canals, both ears   Nose:   Nares normal, septum midline, mucosa normal, no drainage    or sinus tenderness   Throat:   Lips, tongue, gums normal; oral mucosa pink and moist   Neck:   Supple, symmetrical, trachea midline, no adenopathy;     thyroid:  no enlargement/tenderness/nodules; no carotid    bruit or JVD   Back:     Symmetric, no curvature, ROM normal, no CVA tenderness   Lungs:     Decreased breath sounds bilaterally, respirations unlabored   Chest Wall:    No tenderness or deformity    Heart:    irregular rate and rhythm, S1 and S2 normal, no murmur, rub   or gallop   Abdomen:     Soft, nontender, bowel sounds active all four quadrants,     no masses, no hepatomegaly, no splenomegaly   Extremities:   Extremities normal, atraumatic, no cyanosis or edema   Pulses:   Pulses palpable in all extremities; symmetric all extremities   Skin:   Skin color normal, Skin is warm and dry,  no rashes or palpable lesions   Neurologic:   CNII-XII intact, motor strength grossly intact, sensation grossly intact to light touch, no focal deficits noted       Data Review:  Labs in chart were reviewed.  Results from last 7 days   Lab Units 09/13/21  1311   TSH uIU/mL 1.060           Imaging Results (Last 7 Days)     ** No results found for the last 168 hours. **        Past Medical History:   Diagnosis Date   • Allergic " rhinitis    • Anxiety     Controlled w/Meds   • Aortic root dilatation (CMS/HCC) 10/02/2017    Borderline--Noted on Echo   • Aortic valve calcification 10/02/2017    Noted on Echo   • Aortic valve insufficiency Dx in 07    w/AVR    • Aortic valve prosthesis present 11/02/2018    Noted on CTA Chest   • Ascending aorta dilatation (CMS/HCC) 10/02/2017    Borderline--Noted on Echo   • Asthma    • Atypical chest pain    • Breast pain, right Hx   • CAD (coronary artery disease)    • Cardiomegaly 2017   • Cervicalgia    • Chest pain due to CAD (CMS/formerly [facility])    • 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    Borderline--Noted on Echo & Measured @ 2.6CM and Mid Descending @ 2.5CM on CTA Chest-11/2/18   • DM (diabetes mellitus) (CMS/formerly [facility])     T2   • Esophagitis    • GERD (gastroesophageal reflux disease)     Controlled w/Meds   • Health care maintenance    • Heart murmur    • History of echocardiogram 10/2/17-[facility]    EF 66%; Borderline Concentric Hypertrophy; Mild Calcification in AV; Mild AVS; Mild AVR; Moderate TVR; Borderline Dilation of Aortic Root/Arch & Borderline Dilation of Ascending/Proximal Aorta Present   • Hyperlipidemia     Controlled w/Meds   • Hypertension     Controlled w/Meds   • Hypothyroidism     Controlled w/Synthroid   • Insomnia    • Macular degeneration, left eye    • Mild aortic valve regurgitation 10/02/2017    Noted on Echo   • Mild aortic valve stenosis 10/02/2017    Noted on Echo   • Mild dilation of ascending aorta (CMS/HCC) 10/02/2017    Borderline--Noted on Echo   • Moderate tricuspid valve regurgitation 10/02/2017    Noted on Echo   • Mood disorder in conditions classified elsewhere     Controlled w/Meds   • Near syncope 03/2017-[facility] ER   • Neuropathy    • NNEKA (obstructive sleep apnea)     Untreated   • Osteoarthritis     Ankles/Feet   • Pulmonary hypertension (CMS/formerly [facility]) 2017   • Renal insufficiency    • RLS (restless legs syndrome)    • Thoracic ascending aortic aneurysm (CMS/formerly [facility]) Dx in 2007 @  3.8CM & 1/02/2018    Noted @ 4CM on CTA Chest;       Assessment:  A fib with rvr  Diabetes  Hypertension  Pulmonary hypertension  ckd3      Plan:  Will start a small dose of lantus  eliquis to be started by cardiology  A cardizem drip was initiated  accu checks, insulin sliding scale  Diabetes educator to see  Trend creatinine  Thanks for involving us in her care  Will follow with you    Ching Yuen MD   9/13/2021  20:39 EDT

## 2021-09-14 NOTE — PROGRESS NOTES
Name: Dior Pettit ADMIT: 2021   : 1940  PCP: Magdalena Joy MD    MRN: [id number] LOS: 1 days   AGE/SEX: 81 y.o. female  ROOM: [facility]   Subjective   No chief complaint on file.  cc- palpitations    +palp  Having Afib with RVR  On rate control agents with only partial improvement    H/o DM  Well controlled  On oral agents as outpatient  On insulin here    ROS  No f/c  No n/v  +cp/palp  No soa/cough    Objective   Vital Signs  Temp:  [97.4 °F (36.3 °C)-98 °F (36.7 °C)] 97.6 °F (36.4 °C)  Heart Rate:  [] 108  Resp:  [16-20] 16  BP: ()/(61-96) 111/71  SpO2:  [87 %-99 %] 92 %  on  Flow (L/min):  [4] 4;   Device (Oxygen Therapy): room air  Body mass index is 23.35 kg/m².    Physical Exam  Constitutional:       General: She is not in acute distress.     Appearance: She is well-developed.   HENT:      Head: Normocephalic and atraumatic.   Eyes:      General: No scleral icterus.  Cardiovascular:      Rate and Rhythm: Tachycardia present. Rhythm irregular.      Heart sounds: Normal heart sounds.   Pulmonary:      Effort: Pulmonary effort is normal. No respiratory distress.   Abdominal:      General: There is no distension.      Palpations: Abdomen is soft.   Musculoskeletal:      Cervical back: Neck supple.   Neurological:      Mental Status: She is alert.   Psychiatric:         Behavior: Behavior normal.         Results Review:       I reviewed the patient's new clinical results.  Results from last 7 days   Lab Units 21  1630   WBC 10*3/mm3 9.56   HEMOGLOBIN g/dL 13.3   PLATELETS 10*3/mm3 279     Results from last 7 days   Lab Units 21  0431 21  1311   SODIUM mmol/L 138 136   POTASSIUM mmol/L 4.3 5.0   CHLORIDE mmol/L 103 101   CO2 mmol/L 24.4 22.8   BUN mg/dL 22 28*   CREATININE mg/dL 1.00 1.67*   GLUCOSE mg/dL 101* 136*   Estimated Creatinine Clearance: 45.5 mL/min (by C-G formula based on SCr of 1 mg/dL).  Results from last 7 days   Lab Units 21  2469    ALBUMIN g/dL 4.10   BILIRUBIN mg/dL 0.3   ALK PHOS U/L 59   AST (SGOT) U/L 17   ALT (SGPT) U/L 14     Results from last 7 days   Lab Units 09/14/21  0431 09/13/21  1311   CALCIUM mg/dL 9.1 9.9   ALBUMIN g/dL 4.10  --    MAGNESIUM mg/dL  --  1.9         Coag     HbA1C   Lab Results   Component Value Date    HGBA1C 5.40 09/14/2021    HGBA1C 5.50 05/06/2021    HGBA1C 6.00 (H) 08/06/2020     Infection     Radiology(recent) No radiology results for the last day  No results found for: TROPONINT, TROPONINI, BNP  No components found for: TSH;2    apixaban, 5 mg, Oral, Q12H  vitamin C, 1,000 mg, Oral, Daily  atorvastatin, 40 mg, Oral, Nightly  baclofen, 10 mg, Oral, TID  budesonide-formoterol, 2 puff, Inhalation, BID - RT  doxepin, 50 mg, Oral, Nightly  fenofibrate, 145 mg, Oral, Daily  fluorometholone, 2 drop, Both Eyes, Daily  gabapentin, 600 mg, Oral, Nightly  insulin lispro, 0-9 Units, Subcutaneous, TID With Meals  levothyroxine, 50 mcg, Oral, Every Other Day  [START ON 9/15/2021] levothyroxine, 75 mcg, Oral, Every Other Day  magnesium oxide, 400 mg, Oral, Daily  metoprolol tartrate, 25 mg, Oral, TID  multivitamin with minerals, 1 tablet, Oral, Daily  pantoprazole, 40 mg, Oral, Q AM  polyethylene glycol, 17 g, Oral, Daily  senna-docusate sodium, 1 tablet, Oral, BID  sodium chloride, 10 mL, Intravenous, Q12H  venlafaxine XR, 150 mg, Oral, Daily With Breakfast       Diet Regular; Cardiac, Consistent Carbohydrate, Renal  NPO Diet  NPO Diet      Assessment/Plan      Active Hospital Problems    Diagnosis  POA   • Atrial fibrillation with RVR (CMS/HCC) [I48.91]  Yes   • New onset a-fib (CMS/HCC) [I48.91]  Yes   • Type 2 diabetes mellitus with hyperglycemia (CMS/HCC) [E11.65]  Yes   • Slow transit constipation [K59.01]  Yes   • HARSHA (acute kidney injury) (CMS/HCC) [N17.9]  Unknown   • Hypothyroidism [E03.9]  Yes   • Gastric reflux [K21.9]  Yes   • Essential hypertension [I10]  Yes      Resolved Hospital Problems   No resolved  problems to display.       · Afib with RVR with low BP. Currently being treated by Cardiology. On rate control agents and a/c  · DM- well controlled on current oral agents with actually low A1C. Stop lantus, continue SSI. Hold oral agents for now in hospital  · Start agents for constipation  · On synthroid for hypothyroidism. TSH normal  · HARSHA-improved today, monitor closely  · On PPI for gerd      DW family  Reviewed records      Anthony Farfan MD  [facility]  09/14/21  13:24 EDT

## 2021-09-15 ENCOUNTER — ANESTHESIA EVENT (OUTPATIENT)
Dept: POSTOP/PACU | Facility: HOSPITAL | Age: 81
End: 2021-09-15

## 2021-09-15 ENCOUNTER — ANESTHESIA (OUTPATIENT)
Dept: POSTOP/PACU | Facility: HOSPITAL | Age: 81
End: 2021-09-15

## 2021-09-15 ENCOUNTER — TELEPHONE (OUTPATIENT)
Dept: INTERNAL MEDICINE | Facility: CLINIC | Age: 81
End: 2021-09-15

## 2021-09-15 ENCOUNTER — APPOINTMENT (OUTPATIENT)
Dept: POSTOP/PACU | Facility: HOSPITAL | Age: 81
End: 2021-09-15

## 2021-09-15 ENCOUNTER — READMISSION MANAGEMENT (OUTPATIENT)
Dept: CALL CENTER | Facility: HOSPITAL | Age: 81
End: 2021-09-15

## 2021-09-15 VITALS
SYSTOLIC BLOOD PRESSURE: 114 MMHG | TEMPERATURE: 97.7 F | WEIGHT: 146.8 LBS | OXYGEN SATURATION: 97 % | HEART RATE: 84 BPM | RESPIRATION RATE: 16 BRPM | BODY MASS INDEX: 23.04 KG/M2 | DIASTOLIC BLOOD PRESSURE: 70 MMHG | HEIGHT: 67 IN

## 2021-09-15 VITALS — SYSTOLIC BLOOD PRESSURE: 104 MMHG | DIASTOLIC BLOOD PRESSURE: 63 MMHG | OXYGEN SATURATION: 100 % | HEART RATE: 79 BPM

## 2021-09-15 LAB
ANION GAP SERPL CALCULATED.3IONS-SCNC: 11.4 MMOL/L (ref 5–15)
ASCENDING AORTA: 3.7 CM
BH CV ECHO MEAS - BSA(HAYCOCK): 1.8 M^2
BH CV ECHO MEAS - BSA: 1.8 M^2
BH CV ECHO MEAS - BZI_BMI: 24 KILOGRAMS/M^2
BH CV ECHO MEAS - BZI_METRIC_HEIGHT: 170.2 CM
BH CV ECHO MEAS - BZI_METRIC_WEIGHT: 69.4 KG
BH CV ECHO MEAS - RAP SYSTOLE: 3 MMHG
BH CV ECHO MEAS - RVSP: 36 MMHG
BUN SERPL-MCNC: 20 MG/DL (ref 8–23)
BUN/CREAT SERPL: 22 (ref 7–25)
CALCIUM SPEC-SCNC: 9.6 MG/DL (ref 8.6–10.5)
CHLORIDE SERPL-SCNC: 104 MMOL/L (ref 98–107)
CO2 SERPL-SCNC: 22.6 MMOL/L (ref 22–29)
CREAT SERPL-MCNC: 0.91 MG/DL (ref 0.57–1)
GFR SERPL CREATININE-BSD FRML MDRD: 59 ML/MIN/1.73
GLUCOSE BLDC GLUCOMTR-MCNC: 100 MG/DL (ref 70–130)
GLUCOSE BLDC GLUCOMTR-MCNC: 182 MG/DL (ref 70–130)
GLUCOSE SERPL-MCNC: 104 MG/DL (ref 65–99)
POTASSIUM SERPL-SCNC: 4.7 MMOL/L (ref 3.5–5.2)
QT INTERVAL: 398 MS
QT INTERVAL: 409 MS
SODIUM SERPL-SCNC: 138 MMOL/L (ref 136–145)

## 2021-09-15 PROCEDURE — 93320 DOPPLER ECHO COMPLETE: CPT | Performed by: INTERNAL MEDICINE

## 2021-09-15 PROCEDURE — 93312 ECHO TRANSESOPHAGEAL: CPT | Performed by: INTERNAL MEDICINE

## 2021-09-15 PROCEDURE — 99239 HOSP IP/OBS DSCHRG MGMT >30: CPT | Performed by: INTERNAL MEDICINE

## 2021-09-15 PROCEDURE — 93320 DOPPLER ECHO COMPLETE: CPT

## 2021-09-15 PROCEDURE — 82962 GLUCOSE BLOOD TEST: CPT

## 2021-09-15 PROCEDURE — B245ZZ4 ULTRASONOGRAPHY OF LEFT HEART, TRANSESOPHAGEAL: ICD-10-PCS | Performed by: INTERNAL MEDICINE

## 2021-09-15 PROCEDURE — 92960 CARDIOVERSION ELECTRIC EXT: CPT | Performed by: INTERNAL MEDICINE

## 2021-09-15 PROCEDURE — 93325 DOPPLER ECHO COLOR FLOW MAPG: CPT

## 2021-09-15 PROCEDURE — 93325 DOPPLER ECHO COLOR FLOW MAPG: CPT | Performed by: INTERNAL MEDICINE

## 2021-09-15 PROCEDURE — 63710000001 INSULIN LISPRO (HUMAN) PER 5 UNITS: Performed by: INTERNAL MEDICINE

## 2021-09-15 PROCEDURE — 93312 ECHO TRANSESOPHAGEAL: CPT

## 2021-09-15 PROCEDURE — 80048 BASIC METABOLIC PNL TOTAL CA: CPT | Performed by: INTERNAL MEDICINE

## 2021-09-15 PROCEDURE — 92960 CARDIOVERSION ELECTRIC EXT: CPT

## 2021-09-15 PROCEDURE — 93005 ELECTROCARDIOGRAM TRACING: CPT | Performed by: INTERNAL MEDICINE

## 2021-09-15 PROCEDURE — 25010000003 LIDOCAINE 1 % SOLUTION: Performed by: NURSE ANESTHETIST, CERTIFIED REGISTERED

## 2021-09-15 PROCEDURE — 25010000002 PROPOFOL 10 MG/ML EMULSION: Performed by: NURSE ANESTHETIST, CERTIFIED REGISTERED

## 2021-09-15 PROCEDURE — 93010 ELECTROCARDIOGRAM REPORT: CPT | Performed by: INTERNAL MEDICINE

## 2021-09-15 RX ORDER — EPHEDRINE SULFATE 50 MG/ML
INJECTION, SOLUTION INTRAVENOUS AS NEEDED
Status: DISCONTINUED | OUTPATIENT
Start: 2021-09-15 | End: 2021-09-15 | Stop reason: SURG

## 2021-09-15 RX ORDER — NALOXONE HCL 0.4 MG/ML
0.2 VIAL (ML) INJECTION AS NEEDED
Status: DISCONTINUED | OUTPATIENT
Start: 2021-09-15 | End: 2021-09-15 | Stop reason: HOSPADM

## 2021-09-15 RX ORDER — SODIUM CHLORIDE 0.9 % (FLUSH) 0.9 %
3-10 SYRINGE (ML) INJECTION AS NEEDED
Status: DISCONTINUED | OUTPATIENT
Start: 2021-09-15 | End: 2021-09-15 | Stop reason: HOSPADM

## 2021-09-15 RX ORDER — OXYCODONE AND ACETAMINOPHEN 10; 325 MG/1; MG/1
1 TABLET ORAL EVERY 4 HOURS PRN
Status: DISCONTINUED | OUTPATIENT
Start: 2021-09-15 | End: 2021-09-15 | Stop reason: HOSPADM

## 2021-09-15 RX ORDER — EPHEDRINE SULFATE 50 MG/ML
5 INJECTION, SOLUTION INTRAVENOUS ONCE AS NEEDED
Status: DISCONTINUED | OUTPATIENT
Start: 2021-09-15 | End: 2021-09-15 | Stop reason: HOSPADM

## 2021-09-15 RX ORDER — HYDRALAZINE HYDROCHLORIDE 20 MG/ML
5 INJECTION INTRAMUSCULAR; INTRAVENOUS
Status: DISCONTINUED | OUTPATIENT
Start: 2021-09-15 | End: 2021-09-15 | Stop reason: HOSPADM

## 2021-09-15 RX ORDER — SODIUM CHLORIDE 0.9 % (FLUSH) 0.9 %
3 SYRINGE (ML) INJECTION EVERY 12 HOURS SCHEDULED
Status: DISCONTINUED | OUTPATIENT
Start: 2021-09-15 | End: 2021-09-15 | Stop reason: HOSPADM

## 2021-09-15 RX ORDER — PROMETHAZINE HYDROCHLORIDE 25 MG/1
25 TABLET ORAL ONCE AS NEEDED
Status: DISCONTINUED | OUTPATIENT
Start: 2021-09-15 | End: 2021-09-15 | Stop reason: HOSPADM

## 2021-09-15 RX ORDER — HYDROCODONE BITARTRATE AND ACETAMINOPHEN 7.5; 325 MG/1; MG/1
1 TABLET ORAL ONCE AS NEEDED
Status: DISCONTINUED | OUTPATIENT
Start: 2021-09-15 | End: 2021-09-15 | Stop reason: HOSPADM

## 2021-09-15 RX ORDER — DIPHENHYDRAMINE HYDROCHLORIDE 50 MG/ML
12.5 INJECTION INTRAMUSCULAR; INTRAVENOUS
Status: DISCONTINUED | OUTPATIENT
Start: 2021-09-15 | End: 2021-09-15 | Stop reason: HOSPADM

## 2021-09-15 RX ORDER — FLUMAZENIL 0.1 MG/ML
0.2 INJECTION INTRAVENOUS AS NEEDED
Status: DISCONTINUED | OUTPATIENT
Start: 2021-09-15 | End: 2021-09-15 | Stop reason: HOSPADM

## 2021-09-15 RX ORDER — ONDANSETRON 2 MG/ML
4 INJECTION INTRAMUSCULAR; INTRAVENOUS ONCE AS NEEDED
Status: DISCONTINUED | OUTPATIENT
Start: 2021-09-15 | End: 2021-09-15 | Stop reason: HOSPADM

## 2021-09-15 RX ORDER — SODIUM CHLORIDE 9 MG/ML
INJECTION, SOLUTION INTRAVENOUS CONTINUOUS PRN
Status: DISCONTINUED | OUTPATIENT
Start: 2021-09-15 | End: 2021-09-15 | Stop reason: SURG

## 2021-09-15 RX ORDER — HYDROMORPHONE HYDROCHLORIDE 1 MG/ML
0.5 INJECTION, SOLUTION INTRAMUSCULAR; INTRAVENOUS; SUBCUTANEOUS
Status: DISCONTINUED | OUTPATIENT
Start: 2021-09-15 | End: 2021-09-15 | Stop reason: HOSPADM

## 2021-09-15 RX ORDER — AMIODARONE HYDROCHLORIDE 200 MG/1
200 TABLET ORAL
Qty: 30 TABLET | Refills: 1 | Status: SHIPPED | OUTPATIENT
Start: 2021-09-15 | End: 2021-11-08

## 2021-09-15 RX ORDER — LIDOCAINE HYDROCHLORIDE 10 MG/ML
0.5 INJECTION, SOLUTION EPIDURAL; INFILTRATION; INTRACAUDAL; PERINEURAL ONCE AS NEEDED
Status: DISCONTINUED | OUTPATIENT
Start: 2021-09-15 | End: 2021-09-15 | Stop reason: HOSPADM

## 2021-09-15 RX ORDER — LABETALOL HYDROCHLORIDE 5 MG/ML
5 INJECTION, SOLUTION INTRAVENOUS
Status: DISCONTINUED | OUTPATIENT
Start: 2021-09-15 | End: 2021-09-15 | Stop reason: HOSPADM

## 2021-09-15 RX ORDER — ATORVASTATIN CALCIUM 40 MG/1
40 TABLET, FILM COATED ORAL NIGHTLY
Qty: 30 TABLET | Refills: 1 | Status: SHIPPED | OUTPATIENT
Start: 2021-09-15 | End: 2021-10-05 | Stop reason: SDUPTHER

## 2021-09-15 RX ORDER — BACLOFEN 10 MG/1
5 TABLET ORAL 3 TIMES DAILY
Qty: 90 TABLET | Refills: 3 | Status: SHIPPED | OUTPATIENT
Start: 2021-09-15 | End: 2022-07-05

## 2021-09-15 RX ORDER — FENTANYL CITRATE 50 UG/ML
50 INJECTION, SOLUTION INTRAMUSCULAR; INTRAVENOUS
Status: DISCONTINUED | OUTPATIENT
Start: 2021-09-15 | End: 2021-09-15 | Stop reason: HOSPADM

## 2021-09-15 RX ORDER — FAMOTIDINE 10 MG/ML
20 INJECTION, SOLUTION INTRAVENOUS ONCE
Status: DISCONTINUED | OUTPATIENT
Start: 2021-09-15 | End: 2021-09-15 | Stop reason: HOSPADM

## 2021-09-15 RX ORDER — IBUPROFEN 600 MG/1
600 TABLET ORAL ONCE AS NEEDED
Status: DISCONTINUED | OUTPATIENT
Start: 2021-09-15 | End: 2021-09-15 | Stop reason: HOSPADM

## 2021-09-15 RX ORDER — PROPOFOL 10 MG/ML
VIAL (ML) INTRAVENOUS AS NEEDED
Status: DISCONTINUED | OUTPATIENT
Start: 2021-09-15 | End: 2021-09-15 | Stop reason: SURG

## 2021-09-15 RX ORDER — SODIUM CHLORIDE, SODIUM LACTATE, POTASSIUM CHLORIDE, CALCIUM CHLORIDE 600; 310; 30; 20 MG/100ML; MG/100ML; MG/100ML; MG/100ML
9 INJECTION, SOLUTION INTRAVENOUS CONTINUOUS
Status: DISCONTINUED | OUTPATIENT
Start: 2021-09-15 | End: 2021-09-15 | Stop reason: HOSPADM

## 2021-09-15 RX ORDER — MIDAZOLAM HYDROCHLORIDE 1 MG/ML
0.5 INJECTION INTRAMUSCULAR; INTRAVENOUS
Status: DISCONTINUED | OUTPATIENT
Start: 2021-09-15 | End: 2021-09-15 | Stop reason: HOSPADM

## 2021-09-15 RX ORDER — PROMETHAZINE HYDROCHLORIDE 25 MG/1
25 SUPPOSITORY RECTAL ONCE AS NEEDED
Status: DISCONTINUED | OUTPATIENT
Start: 2021-09-15 | End: 2021-09-15 | Stop reason: HOSPADM

## 2021-09-15 RX ORDER — AMIODARONE HYDROCHLORIDE 200 MG/1
200 TABLET ORAL
Status: DISCONTINUED | OUTPATIENT
Start: 2021-09-15 | End: 2021-09-15 | Stop reason: HOSPADM

## 2021-09-15 RX ORDER — LIDOCAINE HYDROCHLORIDE 10 MG/ML
INJECTION, SOLUTION INFILTRATION; PERINEURAL AS NEEDED
Status: DISCONTINUED | OUTPATIENT
Start: 2021-09-15 | End: 2021-09-15 | Stop reason: SURG

## 2021-09-15 RX ORDER — DIPHENHYDRAMINE HCL 25 MG
25 CAPSULE ORAL
Status: DISCONTINUED | OUTPATIENT
Start: 2021-09-15 | End: 2021-09-15 | Stop reason: HOSPADM

## 2021-09-15 RX ADMIN — OXYCODONE HYDROCHLORIDE AND ACETAMINOPHEN 1000 MG: 500 TABLET ORAL at 09:33

## 2021-09-15 RX ADMIN — FENOFIBRATE 145 MG: 145 TABLET ORAL at 09:33

## 2021-09-15 RX ADMIN — PROPOFOL 100 MG: 10 INJECTION, EMULSION INTRAVENOUS at 07:26

## 2021-09-15 RX ADMIN — SODIUM CHLORIDE, PRESERVATIVE FREE 10 ML: 5 INJECTION INTRAVENOUS at 09:36

## 2021-09-15 RX ADMIN — FLUOROMETHOLONE 2 DROP: 1 SOLUTION/ DROPS OPHTHALMIC at 09:35

## 2021-09-15 RX ADMIN — MAGNESIUM OXIDE 400 MG (241.3 MG MAGNESIUM) TABLET 400 MG: TABLET at 09:33

## 2021-09-15 RX ADMIN — LEVOTHYROXINE SODIUM 75 MCG: 0.07 TABLET ORAL at 09:33

## 2021-09-15 RX ADMIN — SODIUM CHLORIDE: 9 INJECTION, SOLUTION INTRAVENOUS at 07:21

## 2021-09-15 RX ADMIN — EPHEDRINE SULFATE 10 MG: 50 INJECTION INTRAVENOUS at 07:38

## 2021-09-15 RX ADMIN — PROPOFOL 50 MG: 10 INJECTION, EMULSION INTRAVENOUS at 07:30

## 2021-09-15 RX ADMIN — AMIODARONE HYDROCHLORIDE 200 MG: 200 TABLET ORAL at 09:33

## 2021-09-15 RX ADMIN — LIDOCAINE HYDROCHLORIDE 60 ML: 10 INJECTION, SOLUTION INFILTRATION; PERINEURAL at 07:26

## 2021-09-15 RX ADMIN — MULTIPLE VITAMINS W/ MINERALS TAB 1 TABLET: TAB at 09:33

## 2021-09-15 RX ADMIN — BACLOFEN 10 MG: 10 TABLET ORAL at 09:34

## 2021-09-15 RX ADMIN — APIXABAN 5 MG: 5 TABLET, FILM COATED ORAL at 09:33

## 2021-09-15 RX ADMIN — INSULIN LISPRO 2 UNITS: 100 INJECTION, SOLUTION INTRAVENOUS; SUBCUTANEOUS at 11:24

## 2021-09-15 RX ADMIN — METOPROLOL TARTRATE 25 MG: 25 TABLET, FILM COATED ORAL at 09:33

## 2021-09-15 RX ADMIN — PROPOFOL 50 MG: 10 INJECTION, EMULSION INTRAVENOUS at 07:39

## 2021-09-15 RX ADMIN — VENLAFAXINE HYDROCHLORIDE 150 MG: 150 CAPSULE, EXTENDED RELEASE ORAL at 09:33

## 2021-09-15 NOTE — OUTREACH NOTE
Prep Survey      Responses   [facility] patient discharged from?  [place]   Is LACE score < 7 ?  No   Emergency Room discharge w/ pulse ox?  No   Eligibility  [facility]   Date of Admission  09/13/21   Date of Discharge  09/15/21   Discharge Disposition  Home or Self Care   Discharge diagnosis  Essential hypertension,   Atrial fibrillation with RVR,   BRAULIO and cardioversion    Does the patient have one of the following disease processes/diagnoses(primary or secondary)?  Other   Does the patient have Home health ordered?  No   Is there a DME ordered?  No   Prep survey completed?  Yes          Cassy Hickman RN

## 2021-09-15 NOTE — PROCEDURES
Patient came to the PACU after an overnight fast.  Informed written consent was obtained.  She under moderate conscious sedation propofol per anesthesia.  She then had BRAULIO showing no evidence of left atrial thrombus or left atrial appendage thrombus.  She then had cardioversion biphasic 200 J with one attempt converting back to normal sinus rhythm from atrial fibrillation.  She tolerated procedure well.  There were no complications.

## 2021-09-15 NOTE — TELEPHONE ENCOUNTER
Caller: APOORVA     Relationship to patient: Other    Best call back number: [phone]    Patient is needing: APOORVA FROM [facility] CALLED TO SCHEDULE A HOSPITAL FOLLOW UP FOR THE PATIENT, BUT THERE WAS NOTHING AVAILABLE UNTIL 2022.   HOSPITAL DETAILS: ADMITTED TO [facility] FOR AFIB CONVERSION 9/13/2021- 9/15/2021

## 2021-09-15 NOTE — PROGRESS NOTES
LOS: 2 days   Patient Care Team:  Magdalena Joy MD as PCP - General  Magdalena Joy MD as PCP - Family Medicine  MichaelGerardo mantilla MD as Consulting Physician (Pulmonary Disease)  Abbi Mitchell MD as Consulting Physician (Cardiology)  Luis Miguel Parker MD as Consulting Physician (Ophthalmology)  Tae Burton MD as Consulting Physician (Dermatology)  Addis Gomes MD as Consulting Physician (Plastic Surgery)    Chief Complaint:     F/u a fib    Interval History:     She denies chest pain or difficulty breathing.  She has no dizziness or nausea.  Overall she is feeling fairly well this morning.    Objective   Vital Signs  Temp:  [97.6 °F (36.4 °C)-98.5 °F (36.9 °C)] 97.7 °F (36.5 °C)  Heart Rate:  [] 78  Resp:  [16] 16  BP: ()/(58-97) 93/58    Intake/Output Summary (Last 24 hours) at 9/15/2021 0802  Last data filed at 9/15/2021 0751  Gross per 24 hour   Intake 980 ml   Output --   Net 980 ml       Comfortable NAD  Neck supple, no JVD or thyromegaly appreciated  S1/S2 irregular, no m/r/g  Lungs CTA B, normal effort  Abdomen S/NT/ND (+) BS, no HSM appreciated  Extremities warm, no clubbing, cyanosis, or edema  No visible or palpable skin lesions  A/Ox4, mood and affect appropriate    Results Review:      Results from last 7 days   Lab Units 09/15/21  0425 09/14/21  0431 09/14/21  0431 09/13/21  1311 09/13/21  1311   SODIUM mmol/L 138  --  138  --  136   POTASSIUM mmol/L 4.7  --  4.3  --  5.0   CHLORIDE mmol/L 104  --  103  --  101   CO2 mmol/L 22.6  --  24.4  --  22.8   BUN mg/dL 20  --  22  --  28*   CREATININE mg/dL 0.91  --  1.00  --  1.67*   GLUCOSE mg/dL 104*   < > 101*   < > 136*   CALCIUM mg/dL 9.6  --  9.1  --  9.9    < > = values in this interval not displayed.         Results from last 7 days   Lab Units 09/13/21  1630   WBC 10*3/mm3 9.56   HEMOGLOBIN g/dL 13.3   HEMATOCRIT % 42.0   PLATELETS 10*3/mm3 279             Results from last 7 days   Lab Units  09/13/21  1311   MAGNESIUM mg/dL 1.9           I reviewed the patient's new clinical results.  I personally viewed and interpreted the patient's EKG/Telemetry data        Medication Review:   apixaban, 5 mg, Oral, Q12H  vitamin C, 1,000 mg, Oral, Daily  atorvastatin, 40 mg, Oral, Nightly  baclofen, 10 mg, Oral, TID  budesonide-formoterol, 2 puff, Inhalation, BID - RT  doxepin, 50 mg, Oral, Nightly  fenofibrate, 145 mg, Oral, Daily  fluorometholone, 2 drop, Both Eyes, Daily  gabapentin, 600 mg, Oral, Nightly  insulin lispro, 0-9 Units, Subcutaneous, TID With Meals  levothyroxine, 50 mcg, Oral, Every Other Day  levothyroxine, 75 mcg, Oral, Every Other Day  magnesium oxide, 400 mg, Oral, Daily  metoprolol tartrate, 25 mg, Oral, TID  multivitamin with minerals, 1 tablet, Oral, Daily  pantoprazole, 40 mg, Oral, Q AM  polyethylene glycol, 17 g, Oral, Daily  senna-docusate sodium, 1 tablet, Oral, BID  sodium chloride, 10 mL, Intravenous, Q12H  venlafaxine XR, 150 mg, Oral, Daily With Breakfast             Assessment/Plan       Essential hypertension    Gastric reflux    Hypothyroidism    Atrial fibrillation with RVR (CMS/HCC)    New onset a-fib (CMS/HCC)    Type 2 diabetes mellitus with hyperglycemia (CMS/HCC)    Slow transit constipation    HARSHA (acute kidney injury) (CMS/HCC)    1. New onset atrial fibrillation with RVR: eliquis started.   2. Aortic valve replacement with subaortic membrane resection 2007: Gradients remained elevated on recent echo.  3. Thoracic aortic dilation: Stable mild dilation on 7/2021 CT chest.  Denies chest pain or discomfort symptoms.  4. Hypertension: With recent hypotension.  Monitor blood pressure carefully, as above  5. Mitral stenosis: Mild on recent echo  6. Pulmonary hypertension: With history of untreated sleep apnea.  Has declined multiple times considering treatment of sleep apnea, reports that she is aware of potential risks and would rather die than consider this.  7. HARSHA on CKD:  resolved.   8. Dyslipidemia: On atorvastatin.  9. Diabetes: On insulin. Consult IM.     Had BRAULIO and cardioversion today - now back in NSR.      Use amiodarone po for now to maintain NSR - hopefully able to wean off and remain just on metoprolol.  Remain on eliquis - hopefully d/c later today.     Daphnie Brand MD  09/15/21  08:02 EDT

## 2021-09-15 NOTE — PROGRESS NOTES
Clinical Pharmacy Services-Discharge Medication Education    Dior Pettit was started on amiodarone, atorvastatin, and a reduced dose of metoprolol for new onset atrial fibrillation. Counseling points included the followin) Explained indication of each new medication, and patient's need for these medications.  2) Went over dosing and frequency of each new medication.  3) Discussed any special administration, storage, or monitoring instructions with medications.  4) Discussed all important drug interactions, including over-the-counter medications and supplements.  5) Explained possible side effects for each new medication.  6) Instructed the patient not to begin or discontinue any medications without informing his/her physician/pharmacist.    Patient expressed understanding and had no further questions.      Sandra Nichols, Pharm.D., [facility]   Clinical Staff Pharmacist   Phone Extension #[phone]

## 2021-09-15 NOTE — ANESTHESIA POSTPROCEDURE EVALUATION
"Patient: Dior Pettit    Procedure Summary     Date: 09/15/21 Room / Location: [facility] PACU    Anesthesia Start: 0721 Anesthesia Stop: 0755    Procedures:       ADULT TRANSESOPHAGEAL ECHO (BRAULIO) W/ CONT IF NECESSARY PER PROTOCOL      CARDIOVERSION EXTERNAL IN CARDIOLOGY DEPARTMENT Diagnosis:       (Cardiac Source of Emboli)      (Afib)    Scheduled Providers: Daphnie Brand MD Provider: Silvano Vicente DO    Anesthesia Type: MAC ASA Status: 3          Anesthesia Type: MAC    Vitals  Vitals Value Taken Time   /81 09/15/21 0841   Temp     Pulse 79 09/15/21 0843   Resp 16 09/15/21 0825   SpO2 100 % 09/15/21 0844   Vitals shown include unvalidated device data.        Post Anesthesia Care and Evaluation    Patient location during evaluation: bedside  Patient participation: complete - patient participated  Level of consciousness: awake and alert  Pain management: adequate  Airway patency: patent  Anesthetic complications: No anesthetic complications    Cardiovascular status: acceptable  Respiratory status: acceptable  Hydration status: acceptable    Comments: /70 (BP Location: Left arm, Patient Position: Lying)   Pulse 84   Temp 36.5 °C (97.7 °F) (Oral)   Resp 16   Ht 170.2 cm (67.01\")   Wt 66.6 kg (146 lb 12.8 oz)   LMP  (LMP Unknown)   SpO2 97%   BMI 22.99 kg/m²       "

## 2021-09-15 NOTE — ANESTHESIA PREPROCEDURE EVALUATION
Anesthesia Evaluation     Patient summary reviewed   no history of anesthetic complications:  NPO Solid Status: > 8 hours  NPO Liquid Status: > 2 hours           Airway   Mallampati: II  TM distance: >3 FB  Neck ROM: full  No difficulty expected  Dental      Pulmonary     breath sounds clear to auscultation  (+) shortness of breath, sleep apnea (NO CPAP),   (-) recent URI  Cardiovascular     Rhythm: regular  Rate: normal    (+) hypertension, valvular problems/murmurs (AVR 2007, mild aortic root dialation) MR, CAD, dysrhythmias Atrial Fib, angina, hyperlipidemia,       Neuro/Psych  (+) headaches,     GI/Hepatic/Renal/Endo    (+)  GERD,  renal disease CRI, diabetes mellitus type 2, thyroid problem hypothyroidism    Musculoskeletal     (+) neck pain,   Abdominal    Substance History      OB/GYN          Other   arthritis,                      Anesthesia Plan    ASA 3     MAC       Anesthetic plan, all risks, benefits, and alternatives have been provided, discussed and informed consent has been obtained with: patient.    Plan discussed with CRNA.

## 2021-09-15 NOTE — DISCHARGE SUMMARY
Date of Discharge:  9/15/2021  Date of Admit: 9/13/2021    Discharge Diagnosis:  Essential hypertension    Gastric reflux    Hypothyroidism    Atrial fibrillation with RVR (CMS/[facility])    New onset a-fib (CMS/[facility])    Type 2 diabetes mellitus with hyperglycemia (CMS/[facility])    Slow transit constipation    HARSHA (acute kidney injury) (CMS/[facility])      Hospital Course:     Patient presented with A. fib RVR, not tolerating this well.  She underwent BRAULIO and cardioversion done 9/15/2021 and converted back into normal sinus rhythm after one attempt at 200 J biphasic.  She is otherwise stable and will be discharged home.    Procedures Performed    09/15 0833 Cardioversion External in Cardiology Department    Consults     Date and Time Order Name Status Description    9/13/2021  6:30 PM Inpatient Internal Medicine Consult Completed           Pertinent Test Results:  .      Discharge Physical Exam:    General Appearance No acute distress   Neck No adenopathy, supple, trachea midline, no thyromegaly, no carotid bruit, no JVD   Lungs Clear to auscultation,respirations regular, even and unlabored   Heart Regular rhythm and normal rate, normal S1 and S2, no murmur, no gallop, no rub, no click   Chest wall No abnormalities observed   Abdomen Normal bowel sounds, no masses, no hepatomegaly, soft   Extremities Moves all extremities well, no edema, no cyanosis, no redness   Neurological Alert and oriented x 3     Discharge Medications     Discharge Medications      New Medications      Instructions Start Date   amiodarone 200 MG tablet  Commonly known as: PACERONE   200 mg, Oral, Every 24 Hours Scheduled      apixaban 5 MG tablet tablet  Commonly known as: ELIQUIS   5 mg, Oral, Every 12 Hours Scheduled      atorvastatin 40 MG tablet  Commonly known as: LIPITOR   40 mg, Oral, Nightly         Changes to Medications      Instructions Start Date   baclofen 10 MG tablet  Commonly known as: LIORESAL  What changed: how much to take   5 mg, Oral, 3  Times Daily      levothyroxine 50 MCG tablet  Commonly known as: SYNTHROID, LEVOTHROID  What changed:   · how much to take  · how to take this  · when to take this   TAKE 1 TABLET EVERY OTHER  DAY      levothyroxine 75 MCG tablet  Commonly known as: SYNTHROID, LEVOTHROID  What changed: Another medication with the same name was changed. Make sure you understand how and when to take each.   75 mcg, Oral, Every Other Day      metoprolol tartrate 25 MG tablet  Commonly known as: LOPRESSOR  What changed: when to take this   25 mg, Oral, Every 12 Hours Scheduled         Continue These Medications      Instructions Start Date   Advair Diskus 250-50 MCG/DOSE DISKUS  Generic drug: fluticasone-salmeterol   Inhalation, As Needed      albuterol sulfate  (90 Base) MCG/ACT inhaler  Commonly known as: PROVENTIL HFA;VENTOLIN HFA;PROAIR HFA   Every 6 Hours PRN      azelastine 0.1 % nasal spray  Commonly known as: ASTELIN   2 sprays, Nasal, 2 Times Daily, Use in each nostril as directed      doxepin 50 MG capsule  Commonly known as: SINEquan   TAKE 1 CAPSULE EVERY NIGHT      EPINEPHrine 0.3 MG/0.3ML solution auto-injector injection  Commonly known as: EPIPEN   No dose, route, or frequency recorded.      EQL Vitamin C 1000 MG tablet  Generic drug: ascorbic acid   1,000 mg, Oral, Daily      fenofibrate micronized 134 MG capsule  Commonly known as: LOFIBRA   TAKE 1 CAPSULE EVERY       MORNING BEFORE BREAKFAST      fluorometholone 0.1 % ophthalmic suspension  Commonly known as: FML   No dose, route, or frequency recorded.      fluticasone 50 MCG/ACT nasal spray  Commonly known as: Flonase   2 sprays, Nasal, Daily PRN      gabapentin 600 MG tablet  Commonly known as: NEURONTIN   1 qhs      glimepiride 2 MG tablet  Commonly known as: AMARYL   TAKE 1 TABLET EVERY MORNINGBEFORE BREAKFAST      glucosamine-chondroitin 500-400 MG capsule capsule   2 capsules, Oral, Daily      magnesium oxide 400 (241.3 Mg) MG tablet tablet  Commonly  known as: MAGOX   400 mg, Oral, Daily      metFORMIN  MG 24 hr tablet  Commonly known as: GLUCOPHAGE-XR   750 mg, Oral, 2 Times Daily      multivitamin with minerals tablet tablet   Oral, Daily      omeprazole 40 MG capsule  Commonly known as: priLOSEC   TAKE 1 CAPSULE DAILY      Prodigy No Coding Blood Gluc test strip  Generic drug: glucose blood   TEST BLOOD SUGAR DAILY      Prodigy No Coding Blood Gluc w/Device kit   1 each, Does not apply, Daily      Prodigy Safety Lancets 26G misc   CHECK BLOOD SUGAR ONE TIME PER DAY      valACYclovir 1000 MG tablet  Commonly known as: Valtrex   2 pills qid for mouth ulcer      venlafaxine  MG 24 hr capsule  Commonly known as: EFFEXOR-XR   150 mg, Oral, Daily         Stop These Medications    aspirin 81 MG chewable tablet     simvastatin 40 MG tablet  Commonly known as: ZOCOR            Discharge Diet:     Activity at Discharge:     Discharge disposition: home    Condition on Discharge: stable    Follow-up Appointments  Future Appointments   Date Time Provider Department Center   3/10/2022  9:40 AM Abbi Mitchell MD [facility]         Test Results Pending at Discharge     See Tamara turner next week.      Daphnie Brand MD  09/15/21  09:09 EDT    35min spent in reviewing records, discussion and examination of the patient and discussion with other members of the patient's medical team.     Dictated utilizing Dragon dictation

## 2021-09-15 NOTE — PLAN OF CARE
Goal Outcome Evaluation:  Plan of Care Reviewed With: patient        Progress: no change  Outcome Summary: A&O. RA. Afib. Tylenol for headache. Up ad zuhair in room. NPO for Cardioversion/BRAULIO. Consents signed in chart. Will continue to monitor.

## 2021-09-15 NOTE — PROGRESS NOTES
Name: Dior Pettit ADMIT: 2021   : 1940  PCP: Magdalena Joy MD    MRN: [id number] LOS: 2 days   AGE/SEX: 81 y.o. female  ROOM: [facility]   Subjective   No chief complaint on file.  cc- palpitations    +palp  Having Afib with RVR    S/p BRAULIO Cardioversion today  Now in sinus    H/o DM  Well controlled  On oral agents as outpatient  On insulin here    ROS  No f/c  No n/v  -cp/palp  No soa/cough    Objective   Vital Signs  Temp:  [97.7 °F (36.5 °C)-98.5 °F (36.9 °C)] 97.7 °F (36.5 °C)  Heart Rate:  [] 84  Resp:  [16-18] 16  BP: ()/(58-97) 114/70  SpO2:  [94 %-100 %] 97 %  on   ;   Device (Oxygen Therapy): room air  Body mass index is 22.99 kg/m².    Physical Exam  Constitutional:       General: She is not in acute distress.     Appearance: She is well-developed.   HENT:      Head: Normocephalic and atraumatic.   Eyes:      General: No scleral icterus.  Cardiovascular:      Rate and Rhythm: Normal rate and regular rhythm.      Heart sounds: Normal heart sounds.   Pulmonary:      Effort: Pulmonary effort is normal. No respiratory distress.   Abdominal:      General: There is no distension.      Palpations: Abdomen is soft.   Musculoskeletal:      Cervical back: Neck supple.   Neurological:      Mental Status: She is alert.   Psychiatric:         Behavior: Behavior normal.         Results Review:       I reviewed the patient's new clinical results.  Results from last 7 days   Lab Units 21  1630   WBC 10*3/mm3 9.56   HEMOGLOBIN g/dL 13.3   PLATELETS 10*3/mm3 279     Results from last 7 days   Lab Units 09/15/21  0425 21  0431 21  1311   SODIUM mmol/L 138 138 136   POTASSIUM mmol/L 4.7 4.3 5.0   CHLORIDE mmol/L 104 103 101   CO2 mmol/L 22.6 24.4 22.8   BUN mg/dL 20 22 28*   CREATININE mg/dL 0.91 1.00 1.67*   GLUCOSE mg/dL 104* 101* 136*   Estimated Creatinine Clearance: 49.2 mL/min (by C-G formula based on SCr of 0.91 mg/dL).  Results from last 7 days   Lab Units  09/14/21 0431   ALBUMIN g/dL 4.10   BILIRUBIN mg/dL 0.3   ALK PHOS U/L 59   AST (SGOT) U/L 17   ALT (SGPT) U/L 14     Results from last 7 days   Lab Units 09/15/21  0425 09/14/21 0431 09/13/21  1311   CALCIUM mg/dL 9.6 9.1 9.9   ALBUMIN g/dL  --  4.10  --    MAGNESIUM mg/dL  --   --  1.9         Coag     HbA1C   Lab Results   Component Value Date    HGBA1C 5.40 09/14/2021    HGBA1C 5.50 05/06/2021    HGBA1C 6.00 (H) 08/06/2020     Infection     Radiology(recent) No radiology results for the last day  No results found for: TROPONINT, TROPONINI, BNP  No components found for: TSH;2    amiodarone, 200 mg, Oral, Q24H  apixaban, 5 mg, Oral, Q12H  vitamin C, 1,000 mg, Oral, Daily  atorvastatin, 40 mg, Oral, Nightly  baclofen, 10 mg, Oral, TID  budesonide-formoterol, 2 puff, Inhalation, BID - RT  doxepin, 50 mg, Oral, Nightly  famotidine, 20 mg, Intravenous, Once  fenofibrate, 145 mg, Oral, Daily  fluorometholone, 2 drop, Both Eyes, Daily  gabapentin, 600 mg, Oral, Nightly  insulin lispro, 0-9 Units, Subcutaneous, TID With Meals  levothyroxine, 50 mcg, Oral, Every Other Day  levothyroxine, 75 mcg, Oral, Every Other Day  magnesium oxide, 400 mg, Oral, Daily  metoprolol tartrate, 25 mg, Oral, Q12H  multivitamin with minerals, 1 tablet, Oral, Daily  pantoprazole, 40 mg, Oral, Q AM  polyethylene glycol, 17 g, Oral, Daily  senna-docusate sodium, 1 tablet, Oral, BID  sodium chloride, 10 mL, Intravenous, Q12H  sodium chloride, 3 mL, Intravenous, Q12H  venlafaxine XR, 150 mg, Oral, Daily With Breakfast      lactated ringers, 9 mL/hr    Diet Regular; Cardiac, Consistent Carbohydrate, Renal      Assessment/Plan      Active Hospital Problems    Diagnosis  POA   • Atrial fibrillation with RVR (CMS/HCC) [I48.91]  Yes   • New onset a-fib (CMS/HCC) [I48.91]  Yes   • Type 2 diabetes mellitus with hyperglycemia (CMS/HCC) [E11.65]  Yes   • Slow transit constipation [K59.01]  Yes   • HARSHA (acute kidney injury) (CMS/HCC) [N17.9]  Unknown   •  Hypothyroidism [E03.9]  Yes   • Gastric reflux [K21.9]  Yes   • Essential hypertension [I10]  Yes      Resolved Hospital Problems   No resolved problems to display.       · S/p Cardioversion and in sinus  · DM- well controlled on current oral agents with actually low A1C. Stop lantus, continue SSI. Hold oral agents for now in hospital  · Start agents for constipation  · On synthroid for hypothyroidism. TSH normal  · HARSHA-improved today, monitor closely  · On PPI for gerd      DW family  Reviewed records  Ok for dc from medical perspective with follow up with Magdalena Joy MD.       Anthony Farfan MD  [facility]  09/15/21  13:24 EDT

## 2021-09-16 ENCOUNTER — TRANSITIONAL CARE MANAGEMENT TELEPHONE ENCOUNTER (OUTPATIENT)
Dept: CALL CENTER | Facility: HOSPITAL | Age: 81
End: 2021-09-16

## 2021-09-16 ENCOUNTER — TELEPHONE (OUTPATIENT)
Dept: CARDIOLOGY | Facility: CLINIC | Age: 81
End: 2021-09-16

## 2021-09-16 NOTE — OUTREACH NOTE
Call Center TCM Note      Responses   [facility] patient discharged from?  [place]   Does the patient have one of the following disease processes/diagnoses(primary or secondary)?  Other   TCM attempt successful?  Yes   Discharge diagnosis  Essential hypertension,   Atrial fibrillation with RVR,   BRAULIO and cardioversion    Meds reviewed with patient/caregiver?  Yes   Is the patient having any side effects they believe may be caused by any medication additions or changes?  No   Does the patient have all medications ordered at discharge?  Yes   Prescription comments  Pt states her pharm ([facility] [phone]) will have all meds ready by 2pm today    Does the patient have a primary care provider?   Yes   Does the patient have an appointment with their PCP within 7 days of discharge?  No   Comments regarding PCP  PCP Magdalena Joy MD (per note in chart) has no TCM FWP avail until 2022. Ofc, I believe is reviewing sched. Pt aware. Pt is sched for fwp with Cardio MD on 09/22/2021.   Has the patient kept scheduled appointments due by today?  N/A   Has home health visited the patient within 72 hours of discharge?  N/A   Psychosocial issues?  No   Did the patient receive a copy of their discharge instructions?  Yes   Nursing interventions  Reviewed instructions with patient   What is the patient's perception of their health status since discharge?  Same   Is the patient/caregiver able to teach back signs and symptoms related to disease process for when to call PCP?  Yes   Is the patient/caregiver able to teach back signs and symptoms related to disease process for when to call 911?  Yes   Is the patient/caregiver able to teach back the hierarchy of who to call/visit for symptoms/problems? PCP, Specialist, Home health nurse, Urgent Care, ED, 911  Yes   If the patient is a current smoker, are they able to teach back resources for cessation?  Not a smoker   TCM call completed?  Yes   Wrap up additional comments  Pt  feels ok, but has concerns as her BP is running a bit high, with systolic in 140's. She cannot get her new meds (Pacerone, Eliquis and LIpitor) until this afternooon. Pt is calling Cardio MD this morning. PCP Magdalena Joy MD (per note in chart) has no TCM FWP avail until 2022. Ofc, I believe is reviewing sched. Pt aware. Pt is sched for fwp with Kira CAMERON on 09/22/2021.          Areli Aleman MA    9/16/2021, 09:47 EDT

## 2021-09-16 NOTE — OUTREACH NOTE
Pt would like to see cardiology and does not feel she needs to be seen by primary care to this time also

## 2021-09-16 NOTE — TELEPHONE ENCOUNTER
Ms. Pettit called the triage line.  When we spoke, she is concerned about her BP being elevated. She was discharged from the hospital post cardioversion yesterday. On discharge, her metoprolol was ordered 25mg BID instead of the TID she had been taking for years.  Her BP last evening was 142/81 HR76 and then this AM she was 151/75 HR78 . She took this BP really close to when she took her medications around 0830.  I asked her to check again while we were on the phone as it is now about 2 hours since her meds.  Her BP was 133/82 HR 77.    She did note that she was dizzy this morning but no other complaints.      Thank you,  Susan Weeks RN  [facility]  Triage

## 2021-09-16 NOTE — TELEPHONE ENCOUNTER
There is no 2.5 mg of diltiazem.  Please see hospital discharge summary, she is not on diltiazem.    Please call patient to verify which medication she is needing a refill of.    Looks like prescriptions yesterday were sent to [facility].  Is she needing these sent to a different pharmacy?

## 2021-09-16 NOTE — TELEPHONE ENCOUNTER
Pt called requesting her Diltiazem 2.5mg, I don't see the order for it in her chart.     Pt wants it to be sent to [facility].  Pharmacy number [phone]

## 2021-09-16 NOTE — TELEPHONE ENCOUNTER
Recommendations reviewed with the patient and she verbalized understanding.    Thank you,  Suasn Weeks RN  [facility]  Triage

## 2021-09-17 RX ORDER — METOPROLOL TARTRATE 50 MG/1
50 TABLET, FILM COATED ORAL EVERY 12 HOURS SCHEDULED
Qty: 60 TABLET | Refills: 0 | Status: SHIPPED | OUTPATIENT
Start: 2021-09-17 | End: 2021-12-28

## 2021-09-17 NOTE — TELEPHONE ENCOUNTER
Spoke with the patient, she is taking 25 mg of her metoprolol 3 times a day. After waiting an hour of taking her medication her BP was at 136/79 HR:69. I also informed the patient you were ok with her taking 50 mg twice daily  and watch for low blood pressure readings, again waiting an hour to two after taking her medication to take her BP     Patient also wanted to let you know earlier in the day she had light palpations, SOA, light head some dizziness, Patient states it went away when she started drinking water.

## 2021-09-17 NOTE — TELEPHONE ENCOUNTER
Called and spoke with her. She is going to go up to the 50 mg twice a day, the previous readings were after medicine. She will however watch for any low heart rate or blood pressure readings, will go back down on dose if she sees heart rate below 50 or systolic blood pressure less than 110 or she has any other issues or concerns over the weekend she will call. Otherwise she will provide updated readings next week.

## 2021-09-17 NOTE — TELEPHONE ENCOUNTER
Pt called today about her high BP today she is concerned.    161/88  HR:67  151/81  HR:67  She is going to take her next BP at noon, I advised her to take her metoprolol and record her BP in 1 to 2 hours. Patient will call me back.

## 2021-09-17 NOTE — TELEPHONE ENCOUNTER
Is she taking the 25 mg of metoprolol 3 times a day?  If not then would have her increase this back to 3 times a day.    If she is already taking 25 mg 3 times a day and blood pressure still elevated after medications then okay to increase to 50 mg twice daily but watch for low blood pressure readings.      Call with some updates next week or sooner for issues or concerns.    Would again make sure she is checking blood pressure at least an hour or 2 after morning medication after sitting calmly 5 to 10 minutes.

## 2021-09-22 ENCOUNTER — TELEPHONE (OUTPATIENT)
Dept: CARDIOLOGY | Facility: CLINIC | Age: 81
End: 2021-09-22

## 2021-09-22 ENCOUNTER — OFFICE VISIT (OUTPATIENT)
Dept: CARDIOLOGY | Facility: CLINIC | Age: 81
End: 2021-09-22

## 2021-09-22 ENCOUNTER — HOSPITAL ENCOUNTER (OUTPATIENT)
Dept: CARDIOLOGY | Facility: HOSPITAL | Age: 81
Discharge: HOME | End: 2021-09-22
Admitting: NURSE PRACTITIONER

## 2021-09-22 VITALS
DIASTOLIC BLOOD PRESSURE: 88 MMHG | HEART RATE: 60 BPM | HEIGHT: 67 IN | SYSTOLIC BLOOD PRESSURE: 150 MMHG | BODY MASS INDEX: 23.95 KG/M2 | WEIGHT: 152.6 LBS

## 2021-09-22 DIAGNOSIS — I48.0 PAF (PAROXYSMAL ATRIAL FIBRILLATION) (HCC): Primary | ICD-10-CM

## 2021-09-22 DIAGNOSIS — R42 LIGHTHEADEDNESS: ICD-10-CM

## 2021-09-22 DIAGNOSIS — R06.09 DOE (DYSPNEA ON EXERTION): Primary | ICD-10-CM

## 2021-09-22 DIAGNOSIS — I10 ESSENTIAL HYPERTENSION: ICD-10-CM

## 2021-09-22 LAB
ANION GAP SERPL CALCULATED.3IONS-SCNC: 9.5 MMOL/L (ref 5–15)
BUN SERPL-MCNC: 31 MG/DL (ref 8–23)
BUN/CREAT SERPL: 27.2 (ref 7–25)
CALCIUM SPEC-SCNC: 10.3 MG/DL (ref 8.6–10.5)
CHLORIDE SERPL-SCNC: 100 MMOL/L (ref 98–107)
CO2 SERPL-SCNC: 25.5 MMOL/L (ref 22–29)
CREAT SERPL-MCNC: 1.14 MG/DL (ref 0.57–1)
DEPRECATED RDW RBC AUTO: 43.8 FL (ref 37–54)
ERYTHROCYTE [DISTWIDTH] IN BLOOD BY AUTOMATED COUNT: 12.7 % (ref 12.3–15.4)
GFR SERPL CREATININE-BSD FRML MDRD: 46 ML/MIN/1.73
GLUCOSE SERPL-MCNC: 94 MG/DL (ref 65–99)
HCT VFR BLD AUTO: 40.9 % (ref 34–46.6)
HGB BLD-MCNC: 13 G/DL (ref 12–15.9)
MCH RBC QN AUTO: 29.8 PG (ref 26.6–33)
MCHC RBC AUTO-ENTMCNC: 31.8 G/DL (ref 31.5–35.7)
MCV RBC AUTO: 93.8 FL (ref 79–97)
NT-PROBNP SERPL-MCNC: 1148 PG/ML (ref 0–1800)
PLATELET # BLD AUTO: 292 10*3/MM3 (ref 140–450)
PMV BLD AUTO: 10 FL (ref 6–12)
POTASSIUM SERPL-SCNC: 5.1 MMOL/L (ref 3.5–5.2)
RBC # BLD AUTO: 4.36 10*6/MM3 (ref 3.77–5.28)
SODIUM SERPL-SCNC: 135 MMOL/L (ref 136–145)
WBC # BLD AUTO: 7.62 10*3/MM3 (ref 3.4–10.8)

## 2021-09-22 PROCEDURE — 36415 COLL VENOUS BLD VENIPUNCTURE: CPT

## 2021-09-22 PROCEDURE — 99214 OFFICE O/P EST MOD 30 MIN: CPT | Performed by: NURSE PRACTITIONER

## 2021-09-22 PROCEDURE — 93000 ELECTROCARDIOGRAM COMPLETE: CPT | Performed by: NURSE PRACTITIONER

## 2021-09-22 PROCEDURE — 83880 ASSAY OF NATRIURETIC PEPTIDE: CPT | Performed by: NURSE PRACTITIONER

## 2021-09-22 PROCEDURE — 85027 COMPLETE CBC AUTOMATED: CPT | Performed by: NURSE PRACTITIONER

## 2021-09-22 PROCEDURE — 80048 BASIC METABOLIC PNL TOTAL CA: CPT | Performed by: NURSE PRACTITIONER

## 2021-09-22 NOTE — PROGRESS NOTES
DACIA c/o Dr. DELGADO patient she transferred her cardiology [facility]/ [place], patient had a triple by pass and will be continue her care with the performing  cardiologist.   Date of Office Visit: 2021  Encounter Provider: Margarita Archer, NARDA, APRN  Place of Service: [facility]ARD[facility]  Patient Name: Dior Pettit  :1940        Subjective:     Chief Complaint:  Paroxysmal atrial fibrillation, hypertension      History of Present Illness:  Dior Pettit is a 81 y.o. female patient of Dr. Mitchell.  I am seeing this patient in the office today and I have reviewed her records.    Patient has a history of aortic valve replacement, pulmonary hypertension, mitral regurgitation, dilated ascending aorta, hypertension, hyperlipidemia diabetes, subaortic membrane, renal insufficiency.     In  patient underwent aortic valve replacement with porcine valve as well as subaortic membrane resection.  Cardiac catheterization was negative for coronary artery disease.  Patient was found to have significant sleep apnea but was intolerant of CPAP therapy.  She was noted to have pulmonary hypertension with RVSP of 44 mmHg in .  In  she developed chest tightness while shoveling the snow and stress perfusion study was negative for ischemia.  Follow-up echo 2017 showed normal systolic function, mild left ventricular hypertrophy, aortic valve calcification with mild stenosis, mild mitral regurgitation, moderate tricuspid regurgitation, RVSP 52 mmHg, and ascending aorta measuring 3.9 cm.  Patient had CTA chest 2018 showing minimal dilation of the ascending thoracic aorta measuring 4 x 3.9.  It was previously noted to be 3.8 x 3.7 cm in .  Patient requested consult with cardiothoracics and referral was placed.  Patient was seen by Dr. Ellis 2019 at which point dilation was felt to be stable.  Dr. Ellis recommended follow-up in 3 years with repeat CT chest in 3 years.  In 2019 with complaints of chest discomfort she had a stress echo showing normal LV systolic function with grade 1 diastolic dysfunction and mild concentric LVH.   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  No ischemia at 6 METS. She was seen in the office by Dr. Mitchell 1/12/2021 at which point she denied chest pain or shortness of breath symptoms.  She continued to get some occasional brief palpitations lasting a few seconds.  Follow-up echo 1/2021 showed normal LV systolic function with EF of 70%, grade 2 diastolic dysfunction, mild concentric LVH, mild mitral stenosis, elevated aortic valve gradients, moderate tricuspid regurgitation with RVSP of 43 mmHg (was 52 mmHg in 2017 and then 36 in 2019).   proBNP and BMP were recommended but she declined.  She preferred to cut back on salt and call with some blood pressure readings to ensure well controlled.  She reported she was not treating sleep apnea and was unwilling to consider it, reported that she would rather die than treat it.  She verbalized understanding of potential risks.  CT chest showed stable mildly dilated ascending aorta.  It was recommended that she discuss noncardiac findings with primary care.      Patient called the triage RN reporting some high heart rates and symptoms and was advised that she come in for EKG and appointment.  She was found to be in new onset rapid A. fib and was symptomatic.  She remained tachycardic despite IV metoprolol.  Blood pressure was little low making medical management tough.  She was admitted for further evaluation.  She ended up undergoing cardioversion during hospitalization.  She did have HARSHA that resolved.  Dimer was negative.      Patient presents to office today for follow-up appointment.  Patient reports she is doing okay overall since hospital discharge.  She still gets some occasional shortness of breath with exertion but overall seems better since she is in sinus rhythm.  She had one brief palpitation since hospital discharge but no recurrence.  She denies any chest pain or discomfort, edema,  syncope, near syncope, falls, fatigue, or abnormal bleeding.  She does get some intermittent lightheadedness that she thinks has improved.  Blood pressure at home has been around 130s over 70s after medications.  Heart rate appears well controlled.          Past Medical History:   Diagnosis Date   • Allergic rhinitis    • Anxiety     Controlled w/Meds   • Aortic root dilatation (CMS/HCC) 10/02/2017    Borderline--Noted on Echo   • Aortic valve calcification 10/02/2017    Noted on Echo   • Aortic valve insufficiency Dx in 07    w/AVR    • Aortic valve prosthesis present 11/02/2018    Noted on CTA Chest   • Ascending aorta dilatation (CMS/HCC) 10/02/2017    Borderline--Noted on Echo   • Asthma    • Atypical chest pain    • Breast pain, right Hx   • CAD (coronary artery disease)    • Cardiomegaly 2017   • Cervicalgia    • Chest pain due to CAD ([facility])    • 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    Borderline--Noted on Echo & Measured @ 2.6CM and Mid Descending @ 2.5CM on CTA Chest-11/2/18   • DM (diabetes mellitus) (CMS/HCC)     T2   • Esophagitis    • GERD (gastroesophageal reflux disease)     Controlled w/Meds   • Health care maintenance    • Heart murmur    • History of echocardiogram 10/2/17-[facility]    EF 66%; Borderline Concentric Hypertrophy; Mild Calcification in AV; Mild AVS; Mild AVR; Moderate TVR; Borderline Dilation of Aortic Root/Arch & Borderline Dilation of Ascending/Proximal Aorta Present   • Hyperlipidemia     Controlled w/Meds   • Hypertension     Controlled w/Meds   • Hypothyroidism     Controlled w/Synthroid   • Insomnia    • Macular degeneration, left eye    • Mild aortic valve regurgitation 10/02/2017    Noted on Echo   • Mild aortic valve stenosis 10/02/2017    Noted on Echo   • Mild dilation of ascending aorta (CMS/HCC) 10/02/2017    Borderline--Noted on Echo   • Moderate tricuspid valve regurgitation 10/02/2017    Noted on Echo   • Mood disorder in conditions classified elsewhere     Controlled  w/Meds   • Near syncope 03/2017-[facility] ER   • Neuropathy    • NNEKA (obstructive sleep apnea)     Untreated   • Osteoarthritis     Ankles/Feet   • Pulmonary hypertension (CMS/HCC) 2017   • Renal insufficiency    • RLS (restless legs syndrome)    • Thoracic ascending aortic aneurysm (CMS/HCC) Dx in 2007 @ 3.8CM & 1/02/2018    Noted @ 4CM on CTA Chest;     Past Surgical History:   Procedure Laterality Date   • AORTIC VALVE REPAIR/REPLACEMENT  2007    Dr. Perry   • APPENDECTOMY     • AUGMENTATION MAMMAPLASTY  1976   • BREAST AUGMENTATION  2014   • BUNIONECTOMY     • CARDIAC CATHETERIZATION  2007   • COLONOSCOPY  2010   • COLONOSCOPY  03/02/2012   • HYSTERECTOMY  1972   • SIGMOIDOSCOPY  2001     Outpatient Medications Prior to Visit   Medication Sig Dispense Refill   • albuterol sulfate  (90 Base) MCG/ACT inhaler Every 6 (Six) Hours As Needed.     • amiodarone (PACERONE) 200 MG tablet Take 1 tablet by mouth Daily. 30 tablet 1   • apixaban (ELIQUIS) 5 MG tablet tablet Take 1 tablet by mouth Every 12 (Twelve) Hours. Indications: Atrial Fibrillation 60 tablet 1   • ascorbic acid (EQL VITAMIN C) 1000 MG tablet Take 1,000 mg by mouth Daily.     • atorvastatin (LIPITOR) 40 MG tablet Take 1 tablet by mouth Every Night. 30 tablet 1   • azelastine (ASTELIN) 0.1 % nasal spray 2 sprays into the nostril(s) as directed by provider 2 (Two) Times a Day. Use in each nostril as directed 3 each 3   • baclofen (LIORESAL) 10 MG tablet Take 0.5 tablets by mouth 3 (Three) Times a Day. 90 tablet 3   • Blood Glucose Monitoring Suppl (Prodigy No Coding Blood Gluc) w/Device kit 1 each Daily. 1 kit 0   • doxepin (SINEquan) 50 MG capsule TAKE 1 CAPSULE EVERY NIGHT 90 capsule 2   • EPINEPHrine (EPIPEN) 0.3 MG/0.3ML solution auto-injector injection      • fenofibrate micronized (LOFIBRA) 134 MG capsule TAKE 1 CAPSULE EVERY       MORNING BEFORE BREAKFAST 90 capsule 3   • fluorometholone (FML) 0.1 % ophthalmic suspension      • fluticasone  (Flonase) 50 MCG/ACT nasal spray 2 sprays into the nostril(s) as directed by provider Daily As Needed for Rhinitis or Allergies. 16 g 3   • fluticasone-salmeterol (Advair Diskus) 250-50 MCG/DOSE DISKUS Inhale As Needed.     • gabapentin (NEURONTIN) 600 MG tablet 1 qhs 90 tablet 1   • glimepiride (AMARYL) 2 MG tablet TAKE 1 TABLET EVERY MORNINGBEFORE BREAKFAST 90 tablet 1   • glucosamine-chondroitin 500-400 MG capsule capsule Take 2 capsules by mouth Daily.     • glucose blood (Prodigy No Coding Blood Gluc) test strip TEST BLOOD SUGAR DAILY 50 each 3   • levothyroxine (SYNTHROID, LEVOTHROID) 50 MCG tablet TAKE 1 TABLET EVERY OTHER  DAY (Patient taking differently: 0.5 mcg.) 45 tablet 1   • levothyroxine (SYNTHROID, LEVOTHROID) 75 MCG tablet Take 1 tablet by mouth Every Other Day. 45 tablet 1   • magnesium oxide (MAGOX) 400 (241.3 Mg) MG tablet tablet Take 400 mg by mouth Daily.     • metFORMIN ER (GLUCOPHAGE-XR) 750 MG 24 hr tablet Take 1 tablet by mouth 2 (Two) Times a Day. 180 tablet 3   • metoprolol tartrate (LOPRESSOR) 50 MG tablet Take 1 tablet by mouth Every 12 (Twelve) Hours. 60 tablet 0   • Multiple Vitamins-Minerals (MULTIVITAL PO) Take  by mouth Daily.     • omeprazole (priLOSEC) 40 MG capsule TAKE 1 CAPSULE DAILY 90 capsule 1   • Prodigy Safety Lancets 26G misc CHECK BLOOD SUGAR ONE TIME PER  each 1   • valACYclovir (Valtrex) 1000 MG tablet 2 pills qid for mouth ulcer 16 tablet 4   • venlafaxine XR (EFFEXOR-XR) 150 MG 24 hr capsule Take 1 capsule by mouth Daily. 90 capsule 3     No facility-administered medications prior to visit.       Allergies as of 09/22/2021   • (No Known Allergies)     Social History     Socioeconomic History   • Marital status:      Spouse name: Not on file   • Number of children: Not on file   • Years of education: Not on file   • Highest education level: Not on file   Tobacco Use   • Smoking status: Never Smoker   • Smokeless tobacco: Never Used   • Tobacco comment:  "caffeine use - 1 cup coffee daily    Substance and Sexual Activity   • Alcohol use: Yes     Comment: 1 glass of wine a week    • Sexual activity: Defer     Birth control/protection: Surgical     Comment: Hyst     Family History   Problem Relation Age of Onset   • Hypertension Mother    • Stroke Mother    • Diabetes Sister    • Coronary artery disease Brother    • Diabetes Brother    • Valvular heart disease Brother         TAVR   • Coronary artery disease Brother    • Skin cancer Neg Hx        Review of Systems   Constitutional: Negative for malaise/fatigue.   Cardiovascular:        SEE HPI   Respiratory: Positive for shortness of breath.    Hematologic/Lymphatic: Negative for bleeding problem.   Musculoskeletal: Negative for falls.   Gastrointestinal: Negative for melena.   Genitourinary: Negative for hematuria.   Neurological: Negative for dizziness.   Psychiatric/Behavioral: Negative for altered mental status.          Objective:     Vitals:    09/22/21 1027   BP: 150/88   BP Location: Left arm   Patient Position: Sitting   Pulse: 60   Weight: 69.2 kg (152 lb 9.6 oz)   Height: 170.2 cm (67\")     Body mass index is 23.9 kg/m².      PHYSICAL EXAM:  Constitutional:       General: Not in acute distress.     Appearance: Well-developed. Not diaphoretic.   Eyes:      Pupils: Pupils are equal, round, and reactive to light.   HENT:      Head: Normocephalic and atraumatic.   Neck:      Vascular: No carotid bruit or JVD.   Pulmonary:      Effort: Pulmonary effort is normal. No respiratory distress.      Breath sounds: Normal breath sounds. No wheezing. No rales.   Cardiovascular:      Normal rate. Regular rhythm.      Murmurs: There is a grade 1 to 2/6 systolic murmur at the URSB.      No gallop. No click. No rub.   Abdominal:      General: Bowel sounds are normal. There is no distension.      Palpations: Abdomen is soft.   Musculoskeletal:         General: No tenderness or deformity.      Cervical back: Neck supple. " Skin:     General: Skin is warm and dry.      Findings: No erythema or rash.   Neurological:      Mental Status: Alert and oriented to person, place, and time.   Psychiatric:         Behavior: Behavior normal.         Judgment: Judgment normal.             ECG 12 Lead    Date/Time: 9/22/2021 10:41 AM  Performed by: Margarita Archer DNP, APRN  Authorized by: Margarita Archer DNP, TOSHA   Comparison: compared with previous ECG from 9/15/2021  Rhythm: sinus rhythm  BPM: 60  QRS axis: left  Other findings: left ventricular hypertrophy  Comments: No significant changes from previous EKG              Assessment:       Diagnosis Plan   1. PAF (paroxysmal atrial fibrillation) (CMS/[facility])     2. Essential hypertension     3. Lightheadedness  Holter Monitor - 24 Hour         Plan:     1. Paroxysmal atrial fibrillation: She is maintaining sinus rhythm in the office today.  On amiodarone and metoprolol.  QTc within normal limits.  Anticoagulated with Eliquis.  Denies falls or abnormal bleeding.  2. Shortness of breath with exertion: Improved but still present.  Denies cough, fever, chills.  She has had a recent BRAULIO showing normal LV systolic function with mild pulmonary hypertension with RVSP of 36 mmHg and increased left atrial volume and mild concentric LVH without aortic stenosis visualized.  proBNP was elevated during recent hospitalization in the setting of tachycardia and renal insufficiency.  Would like to recheck BMP and proBNP now that she is maintaining sinus.  If normal then would consider additional testing.  3. Hypertension: Blood pressure remains elevated in the office today but lower at home.  I will have her continue to monitor as we recently went up on metoprolol dose and call with some updated readings in a few days or sooner for high or low readings.  4. Lightheadedness: Not with position changes or standing.  Does not sound exertional either.  Would like to place a 24-hour Holter to ensure she is not having  any arrhythmia genic cause for this.  If normal then would consider possible bilateral carotid ultrasound and if that is normal then would have her discuss further with PCP for possible noncardiac causes.  5. Pulmonary hypertension: Mild on recent BRAULIO with RVSP of 36 mmHg.  She has previously declined treating sleep apnea, verbalized understanding of potential risks but reports she would rather die.  6. Aortic valve replacement with subaortic membrane resection 2007: Gradients were elevated on recent echo  7. Thoracic aortic dilation: Stable and mild on 7/2021 CT chest  8. Mitral stenosis: Mild on TTE and not noted on BRAULIO.  9. Chronic renal insufficiency: PCP following  10. Dyslipidemia: PCP following  11. Diabetes: PCP following    Patient to schedule 6-week hospital follow-up with Dr. Mitchell or follow-up sooner if needed for any new, recurrent, or worsening symptoms or other issues or concerns.  Discussed in detail signs/symptoms that warrant sooner call or follow-up the office or ER visit.        Records reviewed including but not limited to 7/2021 echo, 9/2021 BRAULIO, 9/2021 EKG, 9/2021 metabolic panel, proBNP, magnesium level, D-dimer.           Your medication list          Accurate as of September 22, 2021 11:01 AM. If you have any questions, ask your nurse or doctor.            CHANGE how you take these medications      Instructions Last Dose Given Next Dose Due   levothyroxine 50 MCG tablet  Commonly known as: SYNTHROID, LEVOTHROID  What changed:   · how much to take  · how to take this  · when to take this      TAKE 1 TABLET EVERY OTHER  DAY       levothyroxine 75 MCG tablet  Commonly known as: SYNTHROID, LEVOTHROID  What changed: Another medication with the same name was changed. Make sure you understand how and when to take each.      Take 1 tablet by mouth Every Other Day.          CONTINUE taking these medications      Instructions Last Dose Given Next Dose Due   Advair Diskus 250-50 MCG/DOSE DISKUS  Generic  drug: fluticasone-salmeterol      Inhale As Needed.       albuterol sulfate  (90 Base) MCG/ACT inhaler  Commonly known as: PROVENTIL HFA;VENTOLIN HFA;PROAIR HFA      Every 6 (Six) Hours As Needed.       amiodarone 200 MG tablet  Commonly known as: PACERONE      Take 1 tablet by mouth Daily.       apixaban 5 MG tablet tablet  Commonly known as: ELIQUIS      Take 1 tablet by mouth Every 12 (Twelve) Hours. Indications: Atrial Fibrillation       atorvastatin 40 MG tablet  Commonly known as: LIPITOR      Take 1 tablet by mouth Every Night.       azelastine 0.1 % nasal spray  Commonly known as: ASTELIN      2 sprays into the nostril(s) as directed by provider 2 (Two) Times a Day. Use in each nostril as directed       baclofen 10 MG tablet  Commonly known as: LIORESAL      Take 0.5 tablets by mouth 3 (Three) Times a Day.       doxepin 50 MG capsule  Commonly known as: SINEquan      TAKE 1 CAPSULE EVERY NIGHT       EPINEPHrine 0.3 MG/0.3ML solution auto-injector injection  Commonly known as: EPIPEN           EQL Vitamin C 1000 MG tablet  Generic drug: ascorbic acid      Take 1,000 mg by mouth Daily.       fenofibrate micronized 134 MG capsule  Commonly known as: LOFIBRA      TAKE 1 CAPSULE EVERY       MORNING BEFORE BREAKFAST       fluorometholone 0.1 % ophthalmic suspension  Commonly known as: FML           fluticasone 50 MCG/ACT nasal spray  Commonly known as: Flonase      2 sprays into the nostril(s) as directed by provider Daily As Needed for Rhinitis or Allergies.       gabapentin 600 MG tablet  Commonly known as: NEURONTIN      1 qhs       glimepiride 2 MG tablet  Commonly known as: AMARYL      TAKE 1 TABLET EVERY MORNINGBEFORE BREAKFAST       glucosamine-chondroitin 500-400 MG capsule capsule      Take 2 capsules by mouth Daily.       magnesium oxide 400 (241.3 Mg) MG tablet tablet  Commonly known as: MAGOX      Take 400 mg by mouth Daily.       metFORMIN  MG 24 hr tablet  Commonly known as:  GLUCOPHAGE-XR      Take 1 tablet by mouth 2 (Two) Times a Day.       metoprolol tartrate 50 MG tablet  Commonly known as: LOPRESSOR      Take 1 tablet by mouth Every 12 (Twelve) Hours.       multivitamin with minerals tablet tablet      Take  by mouth Daily.       omeprazole 40 MG capsule  Commonly known as: priLOSEC      TAKE 1 CAPSULE DAILY       Prodigy No Coding Blood Gluc test strip  Generic drug: glucose blood      TEST BLOOD SUGAR DAILY       Prodigy No Coding Blood Gluc w/Device kit      1 each Daily.       Prodigy Safety Lancets 26G misc      CHECK BLOOD SUGAR ONE TIME PER DAY       valACYclovir 1000 MG tablet  Commonly known as: Valtrex      2 pills qid for mouth ulcer       venlafaxine  MG 24 hr capsule  Commonly known as: EFFEXOR-XR      Take 1 capsule by mouth Daily.              I did NOT stop or change the above medications.  Patient's medication list was updated to reflect medications they are currently taking including medication changes made by other providers.            Thanks,    Margarita Archer, NARDA, APRN  09/22/2021         Dictated utilizing Dragon dictation

## 2021-09-23 ENCOUNTER — TELEPHONE (OUTPATIENT)
Dept: INTERNAL MEDICINE | Facility: CLINIC | Age: 81
End: 2021-09-23

## 2021-09-24 ENCOUNTER — READMISSION MANAGEMENT (OUTPATIENT)
Dept: CALL CENTER | Facility: HOSPITAL | Age: 81
End: 2021-09-24

## 2021-09-24 LAB
MAXIMAL PREDICTED HEART RATE: 137 BPM
STRESS TARGET HR: 116 BPM

## 2021-09-24 NOTE — OUTREACH NOTE
Medical Week 2 Survey      Responses   [facility] patient discharged from?  [place]   Does the patient have one of the following disease processes/diagnoses(primary or secondary)?  Other   Week 2 attempt successful?  Yes   Call start time  1524   Discharge diagnosis  Essential hypertension,   Atrial fibrillation with RVR,   BRAULIO and cardioversion    Call end time  1527   Meds reviewed with patient/caregiver?  Yes   Is the patient taking all medications as directed (includes completed medication regime)?  Yes   Comments regarding appointments  Cards is on 9/22/21   Has the patient kept scheduled appointments due by today?  Yes   DME comments  Pt turned in Holter Monitor on 9/22/21 to cards   What is the patient's perception of their health status since discharge?  Same [Pt states she's still dizzy and she believes it may be in part from not drinking enough water-providers are aware. ]   Week 2 Call Completed?  Yes          Yasmine Portillo RN

## 2021-09-27 ENCOUNTER — OFFICE VISIT (OUTPATIENT)
Dept: INTERNAL MEDICINE | Facility: CLINIC | Age: 81
End: 2021-09-27

## 2021-09-27 VITALS
WEIGHT: 151.8 LBS | BODY MASS INDEX: 23.83 KG/M2 | TEMPERATURE: 97.3 F | RESPIRATION RATE: 19 BRPM | SYSTOLIC BLOOD PRESSURE: 124 MMHG | OXYGEN SATURATION: 95 % | HEART RATE: 50 BPM | DIASTOLIC BLOOD PRESSURE: 62 MMHG | HEIGHT: 67 IN

## 2021-09-27 DIAGNOSIS — I48.91 ATRIAL FIBRILLATION WITH RVR (HCC): ICD-10-CM

## 2021-09-27 DIAGNOSIS — F51.01 PRIMARY INSOMNIA: ICD-10-CM

## 2021-09-27 DIAGNOSIS — E03.9 ACQUIRED HYPOTHYROIDISM: ICD-10-CM

## 2021-09-27 DIAGNOSIS — R42 LIGHTHEADEDNESS: ICD-10-CM

## 2021-09-27 DIAGNOSIS — R06.02 SOB (SHORTNESS OF BREATH): Primary | ICD-10-CM

## 2021-09-27 DIAGNOSIS — I10 ESSENTIAL HYPERTENSION: ICD-10-CM

## 2021-09-27 DIAGNOSIS — E03.8 OTHER SPECIFIED HYPOTHYROIDISM: ICD-10-CM

## 2021-09-27 DIAGNOSIS — J30.2 SEASONAL ALLERGIC RHINITIS, UNSPECIFIED TRIGGER: ICD-10-CM

## 2021-09-27 DIAGNOSIS — E11.9 DIABETES MELLITUS WITHOUT COMPLICATION (HCC): ICD-10-CM

## 2021-09-27 PROCEDURE — 99495 TRANSJ CARE MGMT MOD F2F 14D: CPT | Performed by: INTERNAL MEDICINE

## 2021-09-27 PROCEDURE — 1111F DSCHRG MED/CURRENT MED MERGE: CPT | Performed by: INTERNAL MEDICINE

## 2021-09-27 RX ORDER — LEVOTHYROXINE SODIUM 0.05 MG/1
TABLET ORAL
Qty: 45 TABLET | Refills: 1 | Status: SHIPPED | OUTPATIENT
Start: 2021-09-27 | End: 2022-04-13

## 2021-09-27 RX ORDER — GLIMEPIRIDE 2 MG/1
TABLET ORAL
Qty: 90 TABLET | Refills: 1 | Status: SHIPPED | OUTPATIENT
Start: 2021-09-27 | End: 2021-09-27

## 2021-09-27 NOTE — PROGRESS NOTES
Transitional Care Follow Up Visit  Subjective     Dior Pettit is a 81 y.o. female who presents for a transitional care management visit.    Within 48 business hours after discharge our office contacted her via telephone to coordinate her care and needs.      I reviewed and discussed the details of that call along with the discharge summary, hospital problems, inpatient lab results, inpatient diagnostic studies, and consultation reports with Dior.     Current outpatient and discharge medications have been reconciled for the patient.  Reviewed by: Magdalena Joy MD      Date of TCM Phone Call 9/15/2021   [facility]   Date of Admission 9/13/2021   Date of Discharge 9/15/2021   Discharge Disposition Home or Self Care     Risk for Readmission (LACE) Score: 12 (9/15/2021  6:01 AM)       History of Present Illness   Course During Hospital Stay:  Still with shortness of breath with exertion & severe lightheadedness (even sitting), but better since in hospital 9/13-9/15 - where she had cardioversion & 2 meds started - amiodarone & atorvastatin.      The following portions of the patient's history were reviewed and updated as appropriate: allergies, current medications, past family history, past medical history, past social history, past surgical history and problem list.    Review of Systems   Constitutional: Negative for chills, fatigue and fever.   Respiratory: Positive for shortness of breath. Negative for cough and wheezing.    Cardiovascular: Negative for chest pain, palpitations and leg swelling.   Psychiatric/Behavioral: Positive for sleep disturbance. Negative for dysphoric mood. The patient is not nervous/anxious.        Objective   Physical Exam  Vitals and nursing note reviewed.   Constitutional:       General: She is not in acute distress.     Appearance: She is well-developed.   Cardiovascular:      Rate and Rhythm: Normal rate and regular rhythm.      Heart sounds: Normal heart  sounds.   Pulmonary:      Effort: No respiratory distress.      Breath sounds: No wheezing or rales.   Chest:      Chest wall: No tenderness.   Psychiatric:         Behavior: Behavior normal.         Assessment/Plan   Problems Addressed this Visit        Unprioritized    Essential hypertension     Hypertension is improving with treatment.  Continue current treatment regimen.  Dietary sodium restriction.  Weight loss.  Regular aerobic exercise.  Blood pressure will be reassessed in 3 months.         Hypothyroidism     Continue alternating 50 with 75 mcg qod         Seasonal allergic rhinitis    SOB (shortness of breath) - Primary    Primary insomnia     Continue gabapentin & doxepin         Diabetes mellitus without complication (CMS/HCC)     Diabetes is improving with treatment.   Reminded to bring in blood sugar diary at next visit.  Dietary recommendations for ADA diet.  Regular aerobic exercise.  Discussed ways to avoid symptomatic hypoglycemia.  Diabetes will be reassessed in 3 months.    Sugars always low - ok to stop glimeperide. Call if sugars over 200.         Atrial fibrillation with RVR (CMS/HCC)     NSR today.         Lightheadedness     From bradycardia?  Need f/u with cardiologist.           Diagnoses       Codes Comments    SOB (shortness of breath)    -  Primary ICD-10-CM: R06.02  ICD-9-CM: 786.05     Atrial fibrillation with RVR (CMS/HCC)     ICD-10-CM: I48.91  ICD-9-CM: 427.31     Essential hypertension     ICD-10-CM: I10  ICD-9-CM: 401.9     Diabetes mellitus without complication (CMS/HCC)     ICD-10-CM: E11.9  ICD-9-CM: 250.00     Primary insomnia     ICD-10-CM: F51.01  ICD-9-CM: 307.42     Lightheadedness     ICD-10-CM: R42  ICD-9-CM: 780.4     Seasonal allergic rhinitis, unspecified trigger     ICD-10-CM: J30.2  ICD-9-CM: 477.9     Acquired hypothyroidism     ICD-10-CM: E03.9  ICD-9-CM: 244.9

## 2021-09-27 NOTE — ASSESSMENT & PLAN NOTE
Diabetes is improving with treatment.   Reminded to bring in blood sugar diary at next visit.  Dietary recommendations for ADA diet.  Regular aerobic exercise.  Discussed ways to avoid symptomatic hypoglycemia.  Diabetes will be reassessed in 3 months.    Sugars always low - ok to stop glimeperide. Call if sugars over 200.

## 2021-09-28 ENCOUNTER — TELEPHONE (OUTPATIENT)
Dept: CARDIOLOGY | Facility: CLINIC | Age: 81
End: 2021-09-28

## 2021-09-28 DIAGNOSIS — I48.0 PAF (PAROXYSMAL ATRIAL FIBRILLATION) (HCC): ICD-10-CM

## 2021-09-28 DIAGNOSIS — E78.5 DYSLIPIDEMIA: ICD-10-CM

## 2021-09-28 DIAGNOSIS — E87.8 ALTERATION IN ELECTROLYTE AND FLUID BALANCE: ICD-10-CM

## 2021-09-28 DIAGNOSIS — R26.89 BALANCE PROBLEM: ICD-10-CM

## 2021-09-28 DIAGNOSIS — R06.09 DOE (DYSPNEA ON EXERTION): ICD-10-CM

## 2021-09-28 DIAGNOSIS — R42 LIGHTHEADEDNESS: Primary | ICD-10-CM

## 2021-09-30 ENCOUNTER — READMISSION MANAGEMENT (OUTPATIENT)
Dept: CALL CENTER | Facility: HOSPITAL | Age: 81
End: 2021-09-30

## 2021-09-30 NOTE — OUTREACH NOTE
Medical Week 3 Survey      Responses   [facility] patient discharged from?  [place]   Does the patient have one of the following disease processes/diagnoses(primary or secondary)?  Other   Week 3 attempt successful?  Yes   Call start time  1457   Call end time  1458   Discharge diagnosis  Essential hypertension,   Atrial fibrillation with RVR,   BRAULIO and cardioversion    Meds reviewed with patient/caregiver?  Yes   Is the patient taking all medications as directed (includes completed medication regime)?  Yes   Has the patient kept scheduled appointments due by today?  Yes   What is the patient's perception of their health status since discharge?  Improving   Week 3 Call Completed?  Yes   Graduated  Yes   Is the patient interested in additional calls from an ambulatory ?  NOTE:  applies to high risk patients requiring additional follow-up.  No   Did the patient feel the follow up calls were helpful during their recovery period?  Yes   Was the number of calls appropriate?  Yes   Graduated/Revoked comments  Returned to baseline, goals met.          Sandy Henderson RN

## 2021-10-01 NOTE — TELEPHONE ENCOUNTER
Called patient back but no answer.  Left a voicemail asking her to give me a call back when she has a chance, nothing urgent.

## 2021-10-05 RX ORDER — ATORVASTATIN CALCIUM 40 MG/1
40 TABLET, FILM COATED ORAL NIGHTLY
Qty: 90 TABLET | Refills: 0 | Status: SHIPPED | OUTPATIENT
Start: 2021-10-05 | End: 2021-10-28 | Stop reason: SDUPTHER

## 2021-10-05 NOTE — TELEPHONE ENCOUNTER
Spoke with patient. She is feeling improved.  Stamina improved, did a lot of walking yesterday at [facility] without any real SOA issues.     She will keep upcoming testing appointments as scheduled.  She is needing 90-day supply of Eliquis and atorvastatin sent to mail order.  We will look at checking CMP and lipid panel prior to her appointment with Dr. Mitchell in November.  Call sooner for issues or concerns.

## 2021-10-19 ENCOUNTER — HOSPITAL ENCOUNTER (OUTPATIENT)
Dept: CARDIOLOGY | Facility: HOSPITAL | Age: 81
End: 2021-10-19

## 2021-10-19 ENCOUNTER — APPOINTMENT (OUTPATIENT)
Dept: CARDIOLOGY | Facility: HOSPITAL | Age: 81
End: 2021-10-19

## 2021-10-20 ENCOUNTER — HOSPITAL ENCOUNTER (OUTPATIENT)
Dept: CARDIOLOGY | Facility: HOSPITAL | Age: 81
Discharge: HOME | End: 2021-10-20
Admitting: NURSE PRACTITIONER

## 2021-10-20 ENCOUNTER — TELEPHONE (OUTPATIENT)
Dept: CARDIOLOGY | Facility: CLINIC | Age: 81
End: 2021-10-20

## 2021-10-20 VITALS — HEIGHT: 67 IN | WEIGHT: 151.9 LBS | BODY MASS INDEX: 23.84 KG/M2

## 2021-10-20 DIAGNOSIS — R06.09 DOE (DYSPNEA ON EXERTION): ICD-10-CM

## 2021-10-20 LAB
BH CV NUCLEAR PRIOR STUDY: 3
BH CV REST NUCLEAR ISOTOPE DOSE: 59.9 MCI
BH CV STRESS BP STAGE 1: NORMAL
BH CV STRESS COMMENTS STAGE 1: NORMAL
BH CV STRESS DOSE REGADENOSON STAGE 1: 0.4
BH CV STRESS DURATION MIN STAGE 1: 0
BH CV STRESS DURATION SEC STAGE 1: 10
BH CV STRESS HR STAGE 1: 64
BH CV STRESS NUCLEAR ISOTOPE DOSE: 59.9 MCI
BH CV STRESS PROTOCOL 1: NORMAL
BH CV STRESS RECOVERY BP: NORMAL MMHG
BH CV STRESS RECOVERY HR: 62 BPM
BH CV STRESS STAGE 1: 1
LV EF NUC BP: 85 %
MAXIMAL PREDICTED HEART RATE: 137 BPM
PERCENT MAX PREDICTED HR: 46.72 %
STRESS BASELINE BP: NORMAL MMHG
STRESS BASELINE HR: 60 BPM
STRESS PERCENT HR: 55 %
STRESS POST EXERCISE DUR SEC: 10 SEC
STRESS POST PEAK BP: NORMAL MMHG
STRESS POST PEAK HR: 64 BPM
STRESS TARGET HR: 116 BPM

## 2021-10-20 PROCEDURE — 78492 MYOCRD IMG PET MLT RST&STRS: CPT

## 2021-10-20 PROCEDURE — A9555 RB82 RUBIDIUM: HCPCS | Performed by: NURSE PRACTITIONER

## 2021-10-20 PROCEDURE — 25010000002 REGADENOSON 0.4 MG/5ML SOLUTION: Performed by: NURSE PRACTITIONER

## 2021-10-20 PROCEDURE — 0 RUBIDIUM CHLORIDE: Performed by: NURSE PRACTITIONER

## 2021-10-20 PROCEDURE — 93018 CV STRESS TEST I&R ONLY: CPT | Performed by: INTERNAL MEDICINE

## 2021-10-20 PROCEDURE — 93016 CV STRESS TEST SUPVJ ONLY: CPT | Performed by: INTERNAL MEDICINE

## 2021-10-20 PROCEDURE — 78492 MYOCRD IMG PET MLT RST&STRS: CPT | Performed by: INTERNAL MEDICINE

## 2021-10-20 PROCEDURE — 93017 CV STRESS TEST TRACING ONLY: CPT

## 2021-10-20 RX ADMIN — REGADENOSON 0.4 MG: 0.08 INJECTION, SOLUTION INTRAVENOUS at 12:45

## 2021-10-26 ENCOUNTER — TELEPHONE (OUTPATIENT)
Dept: CARDIOLOGY | Facility: CLINIC | Age: 81
End: 2021-10-26

## 2021-10-26 NOTE — TELEPHONE ENCOUNTER
10/276/21  Pt left vmsg - states she thinks the lipitor is making her nauseous and asked if she could change to a different medication.  Ph [phone]/jean claude

## 2021-10-28 RX ORDER — ATORVASTATIN CALCIUM 20 MG/1
20 TABLET, FILM COATED ORAL NIGHTLY
Qty: 30 TABLET | Refills: 0
Start: 2021-10-28 | End: 2021-12-28 | Stop reason: SDUPTHER

## 2021-10-28 NOTE — TELEPHONE ENCOUNTER
Called and spoke with patient.  We discussed possibly holding the statin or decreasing the dose.  She would like to try decreased dose of 20 mg nightly.  She will call if symptoms worsen or fail to improve.

## 2021-11-04 ENCOUNTER — TELEPHONE (OUTPATIENT)
Dept: INTERNAL MEDICINE | Facility: CLINIC | Age: 81
End: 2021-11-04

## 2021-11-04 NOTE — TELEPHONE ENCOUNTER
Caller: Dior Pettit    Relationship: Self    Best call back number: [phone] (H)    What is the best time to reach you: ANY TIME     Who are you requesting to speak with (clinical staff, provider,  specific staff member):      What was the call regarding: PATIENT IS OFFBOARDING DR SEVERINO AND WOULD LIKE DANTE MYERS TO BE HER PRIMARY CARE DOCTOR.    SHE IS ASKING IF SHE NEEDS TO HAVE A NEW PATIENT APPT TO ESTABLISH CARE WITH DANTE MYERS, OR CAN SHE JUST START SEEING THAT PROVIDER FROM HERE ON OUT AS NEEDED FOR OFFICE VISITS, ETC.    PLEASE CALL AND ADVISE

## 2021-11-08 DIAGNOSIS — G25.81 RESTLESS LEG SYNDROME: ICD-10-CM

## 2021-11-08 RX ORDER — AMIODARONE HYDROCHLORIDE 200 MG/1
TABLET ORAL
Qty: 30 TABLET | Refills: 0 | Status: SHIPPED | OUTPATIENT
Start: 2021-11-08 | End: 2021-12-22

## 2021-11-09 RX ORDER — GABAPENTIN 600 MG/1
TABLET ORAL
Qty: 90 TABLET | Refills: 2 | Status: SHIPPED | OUTPATIENT
Start: 2021-11-09 | End: 2021-12-28

## 2021-11-11 ENCOUNTER — HOSPITAL ENCOUNTER (OUTPATIENT)
Dept: CARDIOLOGY | Facility: HOSPITAL | Age: 81
Discharge: HOME | End: 2021-11-11
Admitting: NURSE PRACTITIONER

## 2021-11-11 DIAGNOSIS — R26.89 BALANCE PROBLEM: ICD-10-CM

## 2021-11-11 DIAGNOSIS — R42 LIGHTHEADEDNESS: ICD-10-CM

## 2021-11-11 PROCEDURE — 93880 EXTRACRANIAL BILAT STUDY: CPT

## 2021-11-11 PROCEDURE — 93880 EXTRACRANIAL BILAT STUDY: CPT | Performed by: INTERNAL MEDICINE

## 2021-11-12 LAB
BH CV XLRA MEAS LEFT DIST CCA EDV: 21.1 CM/SEC
BH CV XLRA MEAS LEFT DIST CCA PSV: 74.6 CM/SEC
BH CV XLRA MEAS LEFT DIST ICA EDV: -15.9 CM/SEC
BH CV XLRA MEAS LEFT DIST ICA PSV: -60.8 CM/SEC
BH CV XLRA MEAS LEFT ICA/CCA RATIO: 0.8
BH CV XLRA MEAS LEFT MID CCA EDV: 14.3 CM/SEC
BH CV XLRA MEAS LEFT MID CCA PSV: 57.2 CM/SEC
BH CV XLRA MEAS LEFT MID ICA EDV: -21.1 CM/SEC
BH CV XLRA MEAS LEFT MID ICA PSV: -58.1 CM/SEC
BH CV XLRA MEAS LEFT PROX ECA PSV: -49.1 CM/SEC
BH CV XLRA MEAS LEFT PROX ICA EDV: -20.1 CM/SEC
BH CV XLRA MEAS LEFT PROX ICA PSV: -48.6 CM/SEC
BH CV XLRA MEAS LEFT PROX SCLA PSV: 62 CM/SEC
BH CV XLRA MEAS LEFT VERTEBRAL A PSV: -32.2 CM/SEC
BH CV XLRA MEAS RIGHT DIST CCA EDV: 17.4 CM/SEC
BH CV XLRA MEAS RIGHT DIST CCA PSV: 53.2 CM/SEC
BH CV XLRA MEAS RIGHT DIST ICA EDV: -25.7 CM/SEC
BH CV XLRA MEAS RIGHT DIST ICA PSV: -67.2 CM/SEC
BH CV XLRA MEAS RIGHT ICA/CCA RATIO: 1.6
BH CV XLRA MEAS RIGHT MID CCA EDV: 16.5 CM/SEC
BH CV XLRA MEAS RIGHT MID CCA PSV: 58.1 CM/SEC
BH CV XLRA MEAS RIGHT MID ICA EDV: -17.4 CM/SEC
BH CV XLRA MEAS RIGHT MID ICA PSV: -59.7 CM/SEC
BH CV XLRA MEAS RIGHT PROX ECA PSV: -62.2 CM/SEC
BH CV XLRA MEAS RIGHT PROX ICA EDV: -19.9 CM/SEC
BH CV XLRA MEAS RIGHT PROX ICA PSV: -85.4 CM/SEC
BH CV XLRA MEAS RIGHT PROX SCLA PSV: 114 CM/SEC
BH CV XLRA MEAS RIGHT VERTEBRAL A PSV: -61.4 CM/SEC
MAXIMAL PREDICTED HEART RATE: 137 BPM
STRESS TARGET HR: 116 BPM

## 2021-11-18 ENCOUNTER — OFFICE VISIT (OUTPATIENT)
Dept: CARDIOLOGY | Facility: CLINIC | Age: 81
End: 2021-11-18

## 2021-11-18 VITALS
DIASTOLIC BLOOD PRESSURE: 88 MMHG | SYSTOLIC BLOOD PRESSURE: 146 MMHG | BODY MASS INDEX: 23.86 KG/M2 | WEIGHT: 152 LBS | HEIGHT: 67 IN | HEART RATE: 57 BPM

## 2021-11-18 DIAGNOSIS — I48.0 PAF (PAROXYSMAL ATRIAL FIBRILLATION) (HCC): Primary | ICD-10-CM

## 2021-11-18 PROCEDURE — 93000 ELECTROCARDIOGRAM COMPLETE: CPT | Performed by: INTERNAL MEDICINE

## 2021-11-18 PROCEDURE — 99214 OFFICE O/P EST MOD 30 MIN: CPT | Performed by: INTERNAL MEDICINE

## 2021-11-18 RX ORDER — GLIMEPIRIDE 2 MG/1
2 TABLET ORAL DAILY
COMMUNITY
Start: 2021-10-03 | End: 2022-01-12

## 2021-11-18 NOTE — PROGRESS NOTES
Date of Office Visit: 2021  Encounter Provider: Lazaro Jj MD  Place of Service: [facility] CARDIOLOGY  Patient Name: Dior Pettit  :1940    Chief Complaint   Patient presents with   • paroxysmal AFIB     Tamara ref. Dr. Mitchell patient   :     HPI: Dior Pettit is a 81 y.o. female who presents today for paroxysmal atrial fibrillation.     She has symptoms of shortness of breath with exertion.   She is curious if it could be the medication(amiodarone)    I asked her to compare symptoms now vs when she was in atrial fibrillation.     She thinks she had pulmonary function testing a couple of years ago, but I don't see these results.     She was first diagnosed with atrial fibrillation in September.  She thinks she may have had atrial fibrillation previously, but does not seem to have been diagnosed.           Past Medical History:   Diagnosis Date   • Allergic rhinitis    • Anxiety     Controlled w/Meds   • Aortic root dilatation (HCC) 10/02/2017    Borderline--Noted on Echo   • Aortic valve calcification 10/02/2017    Noted on Echo   • Aortic valve insufficiency Dx in     w/AVR    • Aortic valve prosthesis present 2018    Noted on CTA Chest   • Ascending aorta dilatation (HCC) 10/02/2017    Borderline--Noted on Echo   • Asthma    • Atypical chest pain    • Breast pain, right Hx   • CAD (coronary artery disease)    • Cardiomegaly    • Cervicalgia    • Chest pain due to CAD ([facility])    • 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    EF 66%; Borderline Concentric Hypertrophy; Mild Calcification in AV; Mild AVS; Mild AVR; Moderate TVR; Borderline Dilation  of Aortic Root/Arch & Borderline Dilation of Ascending/Proximal Aorta Present   • Hyperlipidemia     Controlled w/Meds   • Hypertension     Controlled w/Meds   • Hypothyroidism     Controlled w/Synthroid   • Insomnia    • Macular degeneration, left eye    • Mild aortic valve regurgitation 10/02/2017    Noted on Echo   • Mild aortic valve stenosis 10/02/2017    Noted on Echo   • Mild dilation of ascending aorta (HCC) 10/02/2017    Borderline--Noted on Echo   • Moderate tricuspid valve regurgitation 10/02/2017    Noted on Echo   • Mood disorder in conditions classified elsewhere     Controlled w/Meds   • Near syncope 03/2017-[facility] ER   • Neuropathy    • NNEKA (obstructive sleep apnea)     Untreated   • Osteoarthritis     Ankles/Feet   • Pulmonary hypertension (HCC) 2017   • Renal insufficiency    • RLS (restless legs syndrome)    • Thoracic ascending aortic aneurysm (HCC) Dx in 2007 @ 3.8CM & 1/02/2018    Noted @ 4CM on CTA Chest;       Past Surgical History:   Procedure Laterality Date   • AORTIC VALVE REPAIR/REPLACEMENT  2007    Dr. Perry   • APPENDECTOMY     • AUGMENTATION MAMMAPLASTY  1976   • BREAST AUGMENTATION  2014   • BUNIONECTOMY     • CARDIAC CATHETERIZATION  2007   • COLONOSCOPY  2010   • COLONOSCOPY  03/02/2012   • HYSTERECTOMY  1972   • SIGMOIDOSCOPY  2001       Social History     Socioeconomic History   • Marital status:    Tobacco Use   • Smoking status: Never Smoker   • Smokeless tobacco: Never Used   • Tobacco comment: caffeine use - 1 cup coffee daily    Vaping Use   • Vaping Use: Never used   Substance and Sexual Activity   • Alcohol use: Yes     Comment: 1 glass of wine a week    • Sexual activity: Defer     Birth control/protection: Surgical     Comment: Hyst       Family History   Problem Relation Age of Onset   • Hypertension Mother    • Stroke Mother    • Diabetes Sister    • Coronary artery disease Brother    • Diabetes Brother    • Valvular heart disease Brother         TAVR   •  Coronary artery disease Brother    • Skin cancer Neg Hx        Review of Systems   Constitutional: Negative for malaise/fatigue.   HENT: Negative.    Eyes: Negative.    Cardiovascular: Negative for chest pain, dyspnea on exertion, leg swelling and near-syncope.   Respiratory: Negative for cough and shortness of breath.    Endocrine: Negative.    Hematologic/Lymphatic: Negative.    Skin: Negative.    Musculoskeletal: Negative.    Gastrointestinal: Negative.    Genitourinary: Negative.    Neurological: Negative.  Negative for weakness.   Psychiatric/Behavioral: Negative.    Allergic/Immunologic: Negative.        No Known Allergies      Current Outpatient Medications:   •  albuterol sulfate  (90 Base) MCG/ACT inhaler, Every 6 (Six) Hours As Needed., Disp: , Rfl:   •  amiodarone (PACERONE) 200 MG tablet, TAKE 1 TABLET BY MOUTH EVERY DAY, Disp: 30 tablet, Rfl: 0  •  apixaban (ELIQUIS) 5 MG tablet tablet, Take 1 tablet by mouth Every 12 (Twelve) Hours. Indications: Atrial Fibrillation, Disp: 180 tablet, Rfl: 1  •  ascorbic acid (EQL VITAMIN C) 1000 MG tablet, Take 1,000 mg by mouth Daily., Disp: , Rfl:   •  atorvastatin (LIPITOR) 20 MG tablet, Take 1 tablet by mouth Every Night., Disp: 30 tablet, Rfl: 0  •  azelastine (ASTELIN) 0.1 % nasal spray, 2 sprays into the nostril(s) as directed by provider 2 (Two) Times a Day. Use in each nostril as directed, Disp: 3 each, Rfl: 3  •  baclofen (LIORESAL) 10 MG tablet, Take 0.5 tablets by mouth 3 (Three) Times a Day., Disp: 90 tablet, Rfl: 3  •  Blood Glucose Monitoring Suppl (Prodigy No Coding Blood Gluc) w/Device kit, 1 each Daily., Disp: 1 kit, Rfl: 0  •  doxepin (SINEquan) 50 MG capsule, TAKE 1 CAPSULE EVERY NIGHT, Disp: 90 capsule, Rfl: 2  •  EPINEPHrine (EPIPEN) 0.3 MG/0.3ML solution auto-injector injection, , Disp: , Rfl:   •  fenofibrate micronized (LOFIBRA) 134 MG capsule, TAKE 1 CAPSULE EVERY       MORNING BEFORE BREAKFAST, Disp: 90 capsule, Rfl: 3  •   "fluorometholone (FML) 0.1 % ophthalmic suspension, , Disp: , Rfl:   •  fluticasone (Flonase) 50 MCG/ACT nasal spray, 2 sprays into the nostril(s) as directed by provider Daily As Needed for Rhinitis or Allergies., Disp: 16 g, Rfl: 3  •  fluticasone-salmeterol (Advair Diskus) 250-50 MCG/DOSE DISKUS, Inhale As Needed., Disp: , Rfl:   •  gabapentin (NEURONTIN) 600 MG tablet, TAKE 1 TABLET AT BEDTIME, Disp: 90 tablet, Rfl: 2  •  glimepiride (AMARYL) 2 MG tablet, Take 2 mg by mouth Daily., Disp: , Rfl:   •  glucosamine-chondroitin 500-400 MG capsule capsule, Take 2 capsules by mouth Daily., Disp: , Rfl:   •  glucose blood (Prodigy No Coding Blood Gluc) test strip, TEST BLOOD SUGAR DAILY, Disp: 50 each, Rfl: 3  •  levothyroxine (SYNTHROID, LEVOTHROID) 50 MCG tablet, TAKE 1 TABLET EVERY OTHER  DAY, Disp: 45 tablet, Rfl: 1  •  levothyroxine (SYNTHROID, LEVOTHROID) 75 MCG tablet, Take 1 tablet by mouth Every Other Day., Disp: 45 tablet, Rfl: 1  •  metFORMIN ER (GLUCOPHAGE-XR) 750 MG 24 hr tablet, Take 1 tablet by mouth 2 (Two) Times a Day., Disp: 180 tablet, Rfl: 3  •  metoprolol tartrate (LOPRESSOR) 50 MG tablet, Take 1 tablet by mouth Every 12 (Twelve) Hours., Disp: 60 tablet, Rfl: 0  •  Multiple Vitamins-Minerals (MULTIVITAL PO), Take  by mouth Daily., Disp: , Rfl:   •  omeprazole (priLOSEC) 40 MG capsule, TAKE 1 CAPSULE DAILY, Disp: 90 capsule, Rfl: 1  •  Prodigy Safety Lancets 26G misc, CHECK BLOOD SUGAR ONE TIME PER DAY, Disp: 100 each, Rfl: 1  •  valACYclovir (Valtrex) 1000 MG tablet, 2 pills qid for mouth ulcer, Disp: 16 tablet, Rfl: 4  •  venlafaxine XR (EFFEXOR-XR) 150 MG 24 hr capsule, Take 1 capsule by mouth Daily., Disp: 90 capsule, Rfl: 3      Objective:     Vitals:    11/18/21 1057   BP: 146/88   Pulse: 57   Weight: 68.9 kg (152 lb)   Height: 170.2 cm (67\")     Body mass index is 23.81 kg/m².    PHYSICAL EXAM:    Vitals and nursing note reviewed.   Constitutional:       Appearance: Normal and healthy " appearance.   HENT:      Head: Normocephalic.    Mouth/Throat:      Pharynx: Oropharynx is clear.   Pulmonary:      Effort: Pulmonary effort is normal.   Cardiovascular:      Normal rate. Regular rhythm.   Edema:     Peripheral edema absent.   Skin:     General: Skin is warm.   Neurological:      General: No focal deficit present.      Mental Status: Alert, oriented to person, place, and time and oriented to person, place and time.   Psychiatric:         Attention and Perception: Attention and perception normal.         Mood and Affect: Mood and affect normal.         Behavior: Behavior is cooperative.             ECG 12 Lead    Date/Time: 11/18/2021 12:04 PM  Performed by: Lazaro Jj MD  Authorized by: Lazaro Jj MD   Comparison: compared with previous ECG from 9/22/2021  Similar to previous ECG  Rhythm: sinus rhythm              Assessment:       Diagnosis Plan   1. PAF (paroxysmal atrial fibrillation) (Formerly [facility])            Plan:       Her main complaint is of shortness of breath.  Not clearly related to A. fib or amiodarone therapy.  After discussion, I think the most reasonable strategy is just to stop amiodarone.  Patient was counseled on risk of recurrence of atrial fibrillation.  If it does recur we can consider alternative treatment although there are not any great options.  I will see her back in 3 months to reassess symptoms in A. fib.    As always, it has been a pleasure to participate in your patient's care.      Sincerely,         Lazaro Jj MD

## 2021-11-29 RX ORDER — DOXEPIN HYDROCHLORIDE 50 MG/1
CAPSULE ORAL
Qty: 90 CAPSULE | Refills: 2 | Status: SHIPPED | OUTPATIENT
Start: 2021-11-29 | End: 2022-08-16

## 2021-12-02 ENCOUNTER — TELEMEDICINE (OUTPATIENT)
Dept: INTERNAL MEDICINE | Facility: CLINIC | Age: 81
End: 2021-12-02

## 2021-12-02 ENCOUNTER — LAB (OUTPATIENT)
Dept: INTERNAL MEDICINE | Facility: CLINIC | Age: 81
End: 2021-12-02

## 2021-12-02 DIAGNOSIS — R05.9 COUGH: Primary | ICD-10-CM

## 2021-12-02 PROCEDURE — 99213 OFFICE O/P EST LOW 20 MIN: CPT | Performed by: NURSE PRACTITIONER

## 2021-12-02 NOTE — PROGRESS NOTES
Chief Complaint  URI symptoms     Subjective          Dior Pettit presents to [facility] PRIMARY CARE  History of Present Illness  You have chosen to receive care through a telehealth visit.  Do you consent to use a video/audio connection for your medical care today? Yes    Patient is a pleasant 81 year old female who typically sees Dr. Joy.   Patient c/o sneezing, coughing, and fatigue. She has been around her granddaughter who has had Covid. She denies fever, SOB, or chills.    She is taking mucinex OTC and her O2 was at 92% last night and today she is 96%. She has had her Covid 19 vaccinations.        Objective   Vital Signs:   There were no vitals taken for this visit.    Physical Exam  Constitutional:       Appearance: Normal appearance.   HENT:      Nose:      Comments: She c/o cough, fatigue, and congestion.   Musculoskeletal:      Cervical back: Normal range of motion.   Neurological:      Mental Status: She is alert and oriented to person, place, and time.   Psychiatric:         Behavior: Behavior normal.        Result Review :            Office Visit with Magdalena Joy MD (09/27/2021)  CBC (No Diff) (09/22/2021 11:05 AM)  Basic Metabolic Panel (09/22/2021 11:05 AM)  proBNP (09/22/2021 11:05 AM)       Assessment and Plan    Diagnoses and all orders for this visit:    1. Cough (Primary)  -     COVID-19,[facility] ROUTINE, NP/OP SWAB IN TRANSPORT MEDIA OR ESWAB 72 HR TAT - Swab, Oropharynx    Patient will continue her Mucinex over-the-counter and she will come in today into the parking lot and receive a COVID-19 swab and we will send her off for testing.  Patient is to stay home and quarantine until we get her COVID-19 results back.  Patient agrees with treatment plan at this time she will go to the hospital if she has increasing shortness of breath or high fever that does not come down with Tylenol or ibuprofen.    Follow Up   Return if symptoms worsen or fail to  improve.  Patient was given instructions and counseling regarding her condition or for health maintenance advice. Please see specific information pulled into the AVS if appropriate.

## 2021-12-03 LAB
LABCORP SARS-COV-2, NAA 2 DAY TAT: NORMAL
SARS-COV-2 RNA RESP QL NAA+PROBE: NOT DETECTED

## 2021-12-11 ENCOUNTER — HOSPITAL ENCOUNTER (EMERGENCY)
Facility: HOSPITAL | Age: 81
Discharge: HOME | End: 2021-12-11
Attending: EMERGENCY MEDICINE | Admitting: EMERGENCY MEDICINE

## 2021-12-11 VITALS
SYSTOLIC BLOOD PRESSURE: 150 MMHG | HEART RATE: 74 BPM | OXYGEN SATURATION: 97 % | TEMPERATURE: 96.9 F | RESPIRATION RATE: 17 BRPM | DIASTOLIC BLOOD PRESSURE: 90 MMHG

## 2021-12-11 DIAGNOSIS — R42 INTERMITTENT LIGHTHEADEDNESS: Primary | ICD-10-CM

## 2021-12-11 LAB
ALBUMIN SERPL-MCNC: 4.6 G/DL (ref 3.5–5.2)
ALBUMIN/GLOB SERPL: 1.4 G/DL
ALP SERPL-CCNC: 73 U/L (ref 39–117)
ALT SERPL W P-5'-P-CCNC: 21 U/L (ref 1–33)
ANION GAP SERPL CALCULATED.3IONS-SCNC: 9.9 MMOL/L (ref 5–15)
AST SERPL-CCNC: 26 U/L (ref 1–32)
BASOPHILS # BLD AUTO: 0.06 10*3/MM3 (ref 0–0.2)
BASOPHILS NFR BLD AUTO: 0.8 % (ref 0–1.5)
BILIRUB SERPL-MCNC: 0.4 MG/DL (ref 0–1.2)
BUN SERPL-MCNC: 21 MG/DL (ref 8–23)
BUN/CREAT SERPL: 24.4 (ref 7–25)
CALCIUM SPEC-SCNC: 10.2 MG/DL (ref 8.6–10.5)
CHLORIDE SERPL-SCNC: 100 MMOL/L (ref 98–107)
CO2 SERPL-SCNC: 25.1 MMOL/L (ref 22–29)
CREAT SERPL-MCNC: 0.86 MG/DL (ref 0.57–1)
DEPRECATED RDW RBC AUTO: 43 FL (ref 37–54)
EOSINOPHIL # BLD AUTO: 0.34 10*3/MM3 (ref 0–0.4)
EOSINOPHIL NFR BLD AUTO: 4.5 % (ref 0.3–6.2)
ERYTHROCYTE [DISTWIDTH] IN BLOOD BY AUTOMATED COUNT: 13.2 % (ref 12.3–15.4)
GFR SERPL CREATININE-BSD FRML MDRD: 63 ML/MIN/1.73
GLOBULIN UR ELPH-MCNC: 3.2 GM/DL
GLUCOSE SERPL-MCNC: 104 MG/DL (ref 65–99)
HCT VFR BLD AUTO: 44.5 % (ref 34–46.6)
HGB BLD-MCNC: 14.2 G/DL (ref 12–15.9)
IMM GRANULOCYTES # BLD AUTO: 0.04 10*3/MM3 (ref 0–0.05)
IMM GRANULOCYTES NFR BLD AUTO: 0.5 % (ref 0–0.5)
LYMPHOCYTES # BLD AUTO: 1.62 10*3/MM3 (ref 0.7–3.1)
LYMPHOCYTES NFR BLD AUTO: 21.6 % (ref 19.6–45.3)
MCH RBC QN AUTO: 28.4 PG (ref 26.6–33)
MCHC RBC AUTO-ENTMCNC: 31.9 G/DL (ref 31.5–35.7)
MCV RBC AUTO: 89 FL (ref 79–97)
MONOCYTES # BLD AUTO: 0.81 10*3/MM3 (ref 0.1–0.9)
MONOCYTES NFR BLD AUTO: 10.8 % (ref 5–12)
NEUTROPHILS NFR BLD AUTO: 4.64 10*3/MM3 (ref 1.7–7)
NEUTROPHILS NFR BLD AUTO: 61.8 % (ref 42.7–76)
NRBC BLD AUTO-RTO: 0 /100 WBC (ref 0–0.2)
NT-PROBNP SERPL-MCNC: 436 PG/ML (ref 0–1800)
PLATELET # BLD AUTO: 279 10*3/MM3 (ref 140–450)
PMV BLD AUTO: 9.4 FL (ref 6–12)
POTASSIUM SERPL-SCNC: 4.8 MMOL/L (ref 3.5–5.2)
PROT SERPL-MCNC: 7.8 G/DL (ref 6–8.5)
RBC # BLD AUTO: 5 10*6/MM3 (ref 3.77–5.28)
SODIUM SERPL-SCNC: 135 MMOL/L (ref 136–145)
TROPONIN T SERPL-MCNC: <0.01 NG/ML (ref 0–0.03)
WBC NRBC COR # BLD: 7.51 10*3/MM3 (ref 3.4–10.8)

## 2021-12-11 PROCEDURE — 83880 ASSAY OF NATRIURETIC PEPTIDE: CPT | Performed by: EMERGENCY MEDICINE

## 2021-12-11 PROCEDURE — 80053 COMPREHEN METABOLIC PANEL: CPT | Performed by: EMERGENCY MEDICINE

## 2021-12-11 PROCEDURE — 93005 ELECTROCARDIOGRAM TRACING: CPT | Performed by: EMERGENCY MEDICINE

## 2021-12-11 PROCEDURE — 93010 ELECTROCARDIOGRAM REPORT: CPT | Performed by: INTERNAL MEDICINE

## 2021-12-11 PROCEDURE — 85025 COMPLETE CBC W/AUTO DIFF WBC: CPT | Performed by: EMERGENCY MEDICINE

## 2021-12-11 PROCEDURE — 99283 EMERGENCY DEPT VISIT LOW MDM: CPT

## 2021-12-11 PROCEDURE — 84484 ASSAY OF TROPONIN QUANT: CPT | Performed by: EMERGENCY MEDICINE

## 2021-12-11 RX ORDER — SODIUM CHLORIDE 0.9 % (FLUSH) 0.9 %
10 SYRINGE (ML) INJECTION AS NEEDED
Status: DISCONTINUED | OUTPATIENT
Start: 2021-12-11 | End: 2021-12-11 | Stop reason: HOSPADM

## 2021-12-11 NOTE — ED PROVIDER NOTES
EMERGENCY DEPARTMENT ENCOUNTER    Room Number:  32/32  Date of encounter:  12/11/2021  PCP: Magdalena Joy MD  Patient Care Team:  Magdalena Joy MD as PCP - General  Magdalena Joy MD as PCP - Family Medicine  Gerardo Rodriguez Jr., MD as Consulting Physician (Pulmonary Disease)  Abbi Mitchell MD as Consulting Physician (Cardiology)  Luis Miguel Parker MD as Consulting Physician (Ophthalmology)  Tae Burton MD as Consulting Physician (Dermatology)  Addis Gomes MD as Consulting Physician (Plastic Surgery)   Historian: Patient, family    HPI:  Chief Complaint: Lightheadedness  A complete HPI/ROS/PMH/PSH/SH/FH are unobtainable due to: Nothing    Context: Dior Pettit is a 81 y.o. female who presents to the ED c/o having episode of lightheadedness this morning.  She reports that she was responding to emails and she got anxious.  She reports she then got lightheaded.  She states she broke out into a cold sweat.  She put her head between her knees and after about 20 minutes in total it resolved.  She has not had any symptoms since then.  She denies any room spinning dizziness.  She states she has had lightheaded feelings like this in the past sometimes related to her atrial fibrillation.  She states she has never broken out into a cold sweat with it in the past.  She had no chest pain or shortness of breath.  She had no weakness or numbness.  Nothing seemed to make it better.  Nothing made it worse.  She states she called her family who came over and then drove her here.    Prior record review: Cardiology visit 11/18/2021 for paroxysmal atrial fibrillation that was previously diagnosed in September    PAST MEDICAL HISTORY  Active Ambulatory Problems     Diagnosis Date Noted   • Essential hypertension 08/22/2016   • Pulmonary hypertension (HCC) 08/22/2016   • Gastric reflux 08/24/2016   • Anxiety    • RLS (restless legs syndrome)    • Controlled diabetes mellitus type 2 with  complications ([facility])    • Hypothyroidism    • Hyperlipidemia    • Seasonal allergic rhinitis 11/11/2016   • Mild intermittent asthma without complication 11/11/2016   • S/P AVR 03/05/2017   • SOB (shortness of breath) 03/13/2017   • Dilated aortic root ([facility]) 10/02/2017   • Thoracic aortic aneurysm without rupture  10/02/2017   • Macular degeneration 02/21/2018   • Multiple joint pain 02/21/2018   • Headache disorder 06/12/2018   • Primary insomnia 06/12/2018   • Renal insufficiency 10/28/2018   • Macular degeneration of both eyes 04/11/2019   • Cervicalgia 04/11/2019   • Coronary artery disease involving native coronary artery with angina pectoris ([facility]) 04/11/2019   • Diabetes mellitus without complication ([facility]) 04/11/2019   • Stress 09/04/2019   • Palpitations 01/16/2021   • Rectocele 07/15/2021   • Itching with irritation 07/15/2021   • Vulvar lesion 07/15/2021   • Nonrheumatic mitral valve stenosis 09/08/2021   • Atrial fibrillation with RVR ([facility]) 09/14/2021   • PAF (paroxysmal atrial fibrillation) ([facility]) 09/14/2021   • Type 2 diabetes mellitus with hyperglycemia ([facility]) 09/14/2021   • Slow transit constipation 09/14/2021   • HARSHA (acute kidney injury) ([facility]) 09/14/2021   • Lightheadedness 09/27/2021     Resolved Ambulatory Problems     Diagnosis Date Noted   • Sleep apnea 08/22/2016     Past Medical History:   Diagnosis Date   • Allergic rhinitis    • Aortic root dilatation ([facility]) 10/02/2017   • Aortic valve calcification 10/02/2017   • Aortic valve insufficiency Dx in 07   • Aortic valve prosthesis present 11/02/2018   • Ascending aorta dilatation ([facility]) 10/02/2017   • Asthma    • Atypical chest pain    • Breast pain, right Hx   • CAD (coronary artery disease)    • Cardiomegaly 2017   • Chest pain due to CAD ([facility])    • 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   • DM (diabetes mellitus) ([facility])    • Esophagitis    • GERD (gastroesophageal reflux disease)    • Health care maintenance    • Heart murmur    • History of  echocardiogram 10/2/17-[facility]   • Hypertension    • Insomnia    • Macular degeneration, left eye    • Mild aortic valve regurgitation 10/02/2017   • Mild aortic valve stenosis 10/02/2017   • Mild dilation of ascending aorta (HCC) 10/02/2017   • Moderate tricuspid valve regurgitation 10/02/2017   • Mood disorder in conditions classified elsewhere    • Near syncope 03/2017-[facility] ER   • Neuropathy    • NNEKA (obstructive sleep apnea)    • Osteoarthritis    • Thoracic ascending aortic aneurysm (HCC) Dx in 2007 @ 3.8CM & 1/02/2018       The patient has a COVID HM Topic on their chart, and they are fully vaccinated.    PAST SURGICAL HISTORY  Past Surgical History:   Procedure Laterality Date   • AORTIC VALVE REPAIR/REPLACEMENT  2007    Dr. Perry   • APPENDECTOMY     • AUGMENTATION MAMMAPLASTY  1976   • BREAST AUGMENTATION  2014   • BUNIONECTOMY     • CARDIAC CATHETERIZATION  2007   • COLONOSCOPY  2010   • COLONOSCOPY  03/02/2012   • HYSTERECTOMY  1972   • SIGMOIDOSCOPY  2001         FAMILY HISTORY  Family History   Problem Relation Age of Onset   • Hypertension Mother    • Stroke Mother    • Diabetes Sister    • Coronary artery disease Brother    • Diabetes Brother    • Valvular heart disease Brother         TAVR   • Coronary artery disease Brother    • Skin cancer Neg Hx          SOCIAL HISTORY  Social History     Socioeconomic History   • Marital status:    Tobacco Use   • Smoking status: Never Smoker   • Smokeless tobacco: Never Used   • Tobacco comment: caffeine use - 1 cup coffee daily    Vaping Use   • Vaping Use: Never used   Substance and Sexual Activity   • Alcohol use: Yes     Comment: 1 glass of wine a week    • Sexual activity: Defer     Birth control/protection: Surgical     Comment: Hyst         ALLERGIES  Patient has no known allergies.        REVIEW OF SYSTEMS  Review of Systems   No chest pain, no shortness of breath, no nausea, no vomiting, no fever, no chills, no weakness, no numbness  All systems  reviewed and negative except for those discussed in HPI.       PHYSICAL EXAM    I have reviewed the triage vital signs and nursing notes.    ED Triage Vitals [12/11/21 1419]   Temp Heart Rate Resp BP SpO2   96.9 °F (36.1 °C) 75 17 -- 95 %      Temp src Heart Rate Source Patient Position BP Location FiO2 (%)   Temporal -- -- -- --       Physical Exam  GENERAL: Awake, alert, no acute distress  SKIN: Warm, dry  HENT: Normocephalic, atraumatic  EYES: no scleral icterus  CV: regular rhythm, regular rate  RESPIRATORY: normal effort, lungs clear  ABDOMEN: soft, nontender, nondistended  MUSCULOSKELETAL: no deformity  NEURO: alert, moves all extremities, follows commands.  Normal upper and lower extremity strength.  No facial asymmetry.          LAB RESULTS  Recent Results (from the past 24 hour(s))   CBC Auto Differential    Collection Time: 12/11/21  3:04 PM    Specimen: Blood   Result Value Ref Range    WBC 7.51 3.40 - 10.80 10*3/mm3    RBC 5.00 3.77 - 5.28 10*6/mm3    Hemoglobin 14.2 12.0 - 15.9 g/dL    Hematocrit 44.5 34.0 - 46.6 %    MCV 89.0 79.0 - 97.0 fL    MCH 28.4 26.6 - 33.0 pg    MCHC 31.9 31.5 - 35.7 g/dL    RDW 13.2 12.3 - 15.4 %    RDW-SD 43.0 37.0 - 54.0 fl    MPV 9.4 6.0 - 12.0 fL    Platelets 279 140 - 450 10*3/mm3    Neutrophil % 61.8 42.7 - 76.0 %    Lymphocyte % 21.6 19.6 - 45.3 %    Monocyte % 10.8 5.0 - 12.0 %    Eosinophil % 4.5 0.3 - 6.2 %    Basophil % 0.8 0.0 - 1.5 %    Immature Grans % 0.5 0.0 - 0.5 %    Neutrophils, Absolute 4.64 1.70 - 7.00 10*3/mm3    Lymphocytes, Absolute 1.62 0.70 - 3.10 10*3/mm3    Monocytes, Absolute 0.81 0.10 - 0.90 10*3/mm3    Eosinophils, Absolute 0.34 0.00 - 0.40 10*3/mm3    Basophils, Absolute 0.06 0.00 - 0.20 10*3/mm3    Immature Grans, Absolute 0.04 0.00 - 0.05 10*3/mm3    nRBC 0.0 0.0 - 0.2 /100 WBC   Comprehensive Metabolic Panel    Collection Time: 12/11/21  3:05 PM    Specimen: Blood   Result Value Ref Range    Glucose 104 (H) 65 - 99 mg/dL    BUN 21 8 - 23  mg/dL    Creatinine 0.86 0.57 - 1.00 mg/dL    Sodium 135 (L) 136 - 145 mmol/L    Potassium 4.8 3.5 - 5.2 mmol/L    Chloride 100 98 - 107 mmol/L    CO2 25.1 22.0 - 29.0 mmol/L    Calcium 10.2 8.6 - 10.5 mg/dL    Total Protein 7.8 6.0 - 8.5 g/dL    Albumin 4.60 3.50 - 5.20 g/dL    ALT (SGPT) 21 1 - 33 U/L    AST (SGOT) 26 1 - 32 U/L    Alkaline Phosphatase 73 39 - 117 U/L    Total Bilirubin 0.4 0.0 - 1.2 mg/dL    eGFR Non African Amer 63 >60 mL/min/1.73    Globulin 3.2 gm/dL    A/G Ratio 1.4 g/dL    BUN/Creatinine Ratio 24.4 7.0 - 25.0    Anion Gap 9.9 5.0 - 15.0 mmol/L   BNP    Collection Time: 12/11/21  3:05 PM    Specimen: Blood   Result Value Ref Range    proBNP 436.0 0.0-1,800.0 pg/mL   Troponin    Collection Time: 12/11/21  3:05 PM    Specimen: Blood   Result Value Ref Range    Troponin T <0.010 0.000 - 0.030 ng/mL   ECG 12 Lead    Collection Time: 12/11/21  3:10 PM   Result Value Ref Range    QT Interval 429 ms       Ordered the above labs and independently reviewed the results.        RADIOLOGY  No Radiology Exams Resulted Within Past 24 Hours    I ordered the above noted radiological studies. Reviewed by me and discussed with radiologist.  See dictation for official radiology interpretation.      PROCEDURES    Procedures      MEDICATIONS GIVEN IN ER    Medications   sodium chloride 0.9 % flush 10 mL (has no administration in time range)         PROGRESS, DATA ANALYSIS, CONSULTS, AND MEDICAL DECISION MAKING    All labs have been independently reviewed by me.  All radiology studies have been reviewed by me and discussed with radiologist dictating the report.   EKG's independently viewed and interpreted by me.  Discussion below represents my analysis of pertinent findings related to patient's condition, differential diagnosis, treatment plan and final disposition.    Differential diagnosis includes but is not limited to atrial fibrillation, stroke, dehydration, TIA, aortic dissection, aortic aneurysm,  anxiety.    ED Course as of 12/11/21 1545   Sat Dec 11, 2021   1529 EKG          EKG time: 1510  Rhythm/Rate: Normal sinus, rate 75  P waves and MI: Normal P, normal MI  QRS, axis: Narrow QRS, left axis  ST and T waves: Normal ST and T waves    Interpreted Contemporaneously by me, independently viewed  Not significantly changed compared to prior 9 /15/21   [TR]   1543 I reviewed work-up and findings with the patient and family at the bedside.  Answered all questions.  She remains asymptomatic here.  I suspect that she became anxious and went into atrial fibrillation became symptomatic from it.  She has now not in A. fib and is back to normal.  Her orthostatics are unremarkable.  She is asking to go home.  Plan discharge.  I advised her to return for any recurrent similar episodes. [TR]      ED Course User Index  [TR] Gerald Lopes MD           PPE: The patient wore a mask and I wore an N95 mask throughout the entire patient encounter.       AS OF 15:45 EST VITALS:    BP - 158/89  HR - 75  TEMP - 96.9 °F (36.1 °C) (Temporal)  O2 SATS - 95%        DIAGNOSIS  Final diagnoses:   Intermittent lightheadedness         DISPOSITION  ED Disposition     ED Disposition Condition Comment    Discharge Stable                 Gerald Lopes MD  12/11/21 [id number]

## 2021-12-11 NOTE — ED TRIAGE NOTES
"C/O \"really bad dizzy spell\" approx 1 hour PTA. Denies dizziness at this time.    Mask placed on patient in triage. Triage staff wore appropriate PPE during interaction with patient.     "

## 2021-12-11 NOTE — DISCHARGE INSTRUCTIONS
Return here immediately for any new or recurrent symptoms.  Follow-up with your cardiologist this week as scheduled.

## 2021-12-12 LAB — QT INTERVAL: 429 MS

## 2021-12-22 ENCOUNTER — OFFICE VISIT (OUTPATIENT)
Dept: CARDIOLOGY | Facility: CLINIC | Age: 81
End: 2021-12-22

## 2021-12-22 VITALS
SYSTOLIC BLOOD PRESSURE: 140 MMHG | WEIGHT: 147 LBS | BODY MASS INDEX: 23.07 KG/M2 | HEIGHT: 67 IN | HEART RATE: 73 BPM | DIASTOLIC BLOOD PRESSURE: 80 MMHG

## 2021-12-22 DIAGNOSIS — R42 DIZZINESS: ICD-10-CM

## 2021-12-22 DIAGNOSIS — Z95.2 S/P AVR: ICD-10-CM

## 2021-12-22 DIAGNOSIS — I48.91 ATRIAL FIBRILLATION WITH RVR (HCC): ICD-10-CM

## 2021-12-22 DIAGNOSIS — E11.8 CONTROLLED TYPE 2 DIABETES MELLITUS WITH COMPLICATION, WITHOUT LONG-TERM CURRENT USE OF INSULIN (HCC): ICD-10-CM

## 2021-12-22 DIAGNOSIS — G47.33 OSA (OBSTRUCTIVE SLEEP APNEA): ICD-10-CM

## 2021-12-22 DIAGNOSIS — I10 ESSENTIAL HYPERTENSION: ICD-10-CM

## 2021-12-22 DIAGNOSIS — E78.49 OTHER HYPERLIPIDEMIA: ICD-10-CM

## 2021-12-22 DIAGNOSIS — I71.20 THORACIC AORTIC ANEURYSM WITHOUT RUPTURE (HCC): ICD-10-CM

## 2021-12-22 DIAGNOSIS — I27.20 PULMONARY HYPERTENSION (HCC): ICD-10-CM

## 2021-12-22 DIAGNOSIS — I34.2 NONRHEUMATIC MITRAL VALVE STENOSIS: ICD-10-CM

## 2021-12-22 DIAGNOSIS — I25.119 CORONARY ARTERY DISEASE INVOLVING NATIVE CORONARY ARTERY WITH ANGINA PECTORIS, UNSPECIFIED WHETHER NATIVE OR TRANSPLANTED HEART (HCC): ICD-10-CM

## 2021-12-22 DIAGNOSIS — I48.0 PAF (PAROXYSMAL ATRIAL FIBRILLATION) (HCC): Primary | ICD-10-CM

## 2021-12-22 PROCEDURE — 93000 ELECTROCARDIOGRAM COMPLETE: CPT | Performed by: INTERNAL MEDICINE

## 2021-12-22 PROCEDURE — 99214 OFFICE O/P EST MOD 30 MIN: CPT | Performed by: INTERNAL MEDICINE

## 2021-12-22 NOTE — PROGRESS NOTES
Date of Office Visit: 2021  Encounter Provider: Abbi Mitchell MD  Place of Service: [facility] CARDIOLOGY  Patient Name: Dior Pettit  :1940    Chief complaint  Aortic valve replacement, pulmonary hypertension, borderline dilated a sending aorta, paroxysmal atrial fibrillation    History of Present Illness  Patient is a 81-year-old female with history of hypertension, hyperlipidemia, diabetes, aortic valve disease and subaortic membrane.  In  she underwent aortic valve replacement with porcine valve as well as subaortic membrane resection. Cardiac catheterization was negative for coronary artery disease.  She was then found to have significant sleep apnea but was been intolerant of therapy for this.  She is also noted to have pulmonary hypertension with an RV systolic pressure 44 mmHg in .  By 2015 she developed chest tightness while shoveling snow in a stress perfusion study was negative for ischemia.  A follow-up echocardiogram in 2017 that showed normal systolic function with mild left ventricular hypertrophy aortic valve calcification with mild stenosis with mild mitral regurgitation moderate tricuspid valve regurgitation with an RV systolic pressure 52 mmHg and an ascending aorta measuring 3.9 cm.  In 2019 with complaints of chest pain a stress echocardiogram showed normal systolic function grade 1 diastolic dysfunction, mild concentric left ventricular per trophy.  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  There was no ischemia at 6 METs.  By 2021 she was noted to have atrial fibrillation with rapid rates with dyspnea and dizziness.  She was admitted to hospital and underwent BRAULIO as subsequent electrical cardioversion to sinus rhythm.  BRAULIO showed normal sinus rhythm with left ventricular hypertrophy with mild pulmonary hypertension with  RVSP pressure 36 mmHg, left atrial volume was increased.  There was no significant aortic or subaortic valve disease noted.  She was discharged on amiodarone and Eliquis.  She continued to have symptoms of fatigue dizziness.  proBNP was normal.  Amiodarone was decreased.  A stress perfusion study was negative for ischemia.  Carotid Doppler showed mild right carotid artery stenosis.  A 24-hour Holter showed sinus rhythm with rare PACs PVCs with no significant tachycardia or bradycardia arrhythmia noted.  She then saw Dr Jj on 11/2021.  After further discussion amiodarone was discontinued.  He wanted to see her back in 3 months.    Patient states dyspnea has improved, she had one episode of palpitations last night lastin she continues to struggle with dizziness g for few seconds..  She has had no chest pain.  That most the time occurs in the morning hours on occasion in the evening.  They can occur when she is sitting or standing.  She at times breaks out in a cold sweat and feels near syncopal this occurred on 1 occasion.  She is not sure if she had an episode when she wore the 24 Holter earlier this summer.  Blood work 12/2021 includes normal CMP troponin proBNP glucose of 104, CBC unremarkable.      Past Medical History:   Diagnosis Date   • Allergic rhinitis    • Anxiety     Controlled w/Meds   • Aortic root dilatation (HCC) 10/02/2017    Borderline--Noted on Echo   • Aortic valve calcification 10/02/2017    Noted on Echo   • Aortic valve insufficiency Dx in 07    w/AVR    • Aortic valve prosthesis present 11/02/2018    Noted on CTA Chest   • Ascending aorta dilatation (HCC) 10/02/2017    Borderline--Noted on Echo   • Asthma    • Atypical chest pain    • Breast pain, right Hx   • CAD (coronary artery disease)    • Cardiomegaly 2017   • Cervicalgia    • Chest pain due to CAD (HCC)    • 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    Borderline--Noted on Echo & Measured @ 2.6CM and Mid Descending @ 2.5CM on  CTA Chest-11/2/18   • DM (diabetes mellitus) ([facility])     T2   • Esophagitis    • GERD (gastroesophageal reflux disease)     Controlled w/Meds   • Health care maintenance    • Heart murmur    • History of echocardiogram 10/2/17-[facility]    EF 66%; Borderline Concentric Hypertrophy; Mild Calcification in AV; Mild AVS; Mild AVR; Moderate TVR; Borderline Dilation of Aortic Root/Arch & Borderline Dilation of Ascending/Proximal Aorta Present   • Hyperlipidemia     Controlled w/Meds   • Hypertension     Controlled w/Meds   • Hypothyroidism     Controlled w/Synthroid   • Insomnia    • Macular degeneration, left eye    • Mild aortic valve regurgitation 10/02/2017    Noted on Echo   • Mild aortic valve stenosis 10/02/2017    Noted on Echo   • Mild dilation of ascending aorta (HCC) 10/02/2017    Borderline--Noted on Echo   • Moderate tricuspid valve regurgitation 10/02/2017    Noted on Echo   • Mood disorder in conditions classified elsewhere     Controlled w/Meds   • Near syncope 03/2017-[facility] ER   • Neuropathy    • NNEKA (obstructive sleep apnea)     Untreated   • Osteoarthritis     Ankles/Feet   • Pulmonary hypertension ([facility]) 2017   • Renal insufficiency    • RLS (restless legs syndrome)    • Thoracic ascending aortic aneurysm (HCC) Dx in 2007 @ 3.8CM & 1/02/2018    Noted @ 4CM on CTA Chest;     Past Surgical History:   Procedure Laterality Date   • AORTIC VALVE REPAIR/REPLACEMENT  2007    Dr. Perry   • APPENDECTOMY     • AUGMENTATION MAMMAPLASTY  1976   • BREAST AUGMENTATION  2014   • BUNIONECTOMY     • CARDIAC CATHETERIZATION  2007   • COLONOSCOPY  2010   • COLONOSCOPY  03/02/2012   • HYSTERECTOMY  1972   • SIGMOIDOSCOPY  2001     Outpatient Medications Prior to Visit   Medication Sig Dispense Refill   • albuterol sulfate  (90 Base) MCG/ACT inhaler Every 6 (Six) Hours As Needed.     • apixaban (ELIQUIS) 5 MG tablet tablet Take 1 tablet by mouth Every 12 (Twelve) Hours. Indications: Atrial Fibrillation 180 tablet 1   •  ascorbic acid (EQL VITAMIN C) 1000 MG tablet Take 1,000 mg by mouth Daily.     • atorvastatin (LIPITOR) 20 MG tablet Take 1 tablet by mouth Every Night. 30 tablet 0   • azelastine (ASTELIN) 0.1 % nasal spray 2 sprays into the nostril(s) as directed by provider 2 (Two) Times a Day. Use in each nostril as directed 3 each 3   • baclofen (LIORESAL) 10 MG tablet Take 0.5 tablets by mouth 3 (Three) Times a Day. 90 tablet 3   • Blood Glucose Monitoring Suppl (Prodigy No Coding Blood Gluc) w/Device kit 1 each Daily. 1 kit 0   • doxepin (SINEquan) 50 MG capsule TAKE 1 CAPSULE EVERY NIGHT 90 capsule 2   • EPINEPHrine (EPIPEN) 0.3 MG/0.3ML solution auto-injector injection      • fenofibrate micronized (LOFIBRA) 134 MG capsule TAKE 1 CAPSULE EVERY       MORNING BEFORE BREAKFAST 90 capsule 3   • fluticasone (Flonase) 50 MCG/ACT nasal spray 2 sprays into the nostril(s) as directed by provider Daily As Needed for Rhinitis or Allergies. 16 g 3   • fluticasone-salmeterol (Advair Diskus) 250-50 MCG/DOSE DISKUS Inhale 1 puff 2 (Two) Times a Day. 14 each 0   • gabapentin (NEURONTIN) 600 MG tablet TAKE 1 TABLET AT BEDTIME 90 tablet 2   • glimepiride (AMARYL) 2 MG tablet Take 2 mg by mouth Daily.     • glucosamine-chondroitin 500-400 MG capsule capsule Take 2 capsules by mouth Daily.     • glucose blood (Prodigy No Coding Blood Gluc) test strip TEST BLOOD SUGAR DAILY 50 each 3   • levothyroxine (SYNTHROID, LEVOTHROID) 50 MCG tablet TAKE 1 TABLET EVERY OTHER  DAY 45 tablet 1   • levothyroxine (SYNTHROID, LEVOTHROID) 75 MCG tablet Take 1 tablet by mouth Every Other Day. 45 tablet 1   • metFORMIN ER (GLUCOPHAGE-XR) 750 MG 24 hr tablet Take 1 tablet by mouth 2 (Two) Times a Day. 180 tablet 3   • metoprolol tartrate (LOPRESSOR) 50 MG tablet Take 1 tablet by mouth Every 12 (Twelve) Hours. 60 tablet 0   • Multiple Vitamins-Minerals (MULTIVITAL PO) Take  by mouth Daily.     • omeprazole (priLOSEC) 40 MG capsule TAKE 1 CAPSULE DAILY 90 capsule 1    • Prodigy Safety Lancets 26G misc CHECK BLOOD SUGAR ONE TIME PER  each 1   • valACYclovir (Valtrex) 1000 MG tablet 2 pills qid for mouth ulcer 16 tablet 4   • venlafaxine XR (EFFEXOR-XR) 150 MG 24 hr capsule Take 1 capsule by mouth Daily. 90 capsule 3   • amiodarone (PACERONE) 200 MG tablet TAKE 1 TABLET BY MOUTH EVERY DAY 30 tablet 0   • fluorometholone (FML) 0.1 % ophthalmic suspension        No facility-administered medications prior to visit.       Allergies as of 12/22/2021   • (No Known Allergies)     Social History     Socioeconomic History   • Marital status:    Tobacco Use   • Smoking status: Never Smoker   • Smokeless tobacco: Never Used   • Tobacco comment: caffeine use - 1 cup coffee daily    Vaping Use   • Vaping Use: Never used   Substance and Sexual Activity   • Alcohol use: Yes     Comment: 1 glass of wine a week    • Sexual activity: Defer     Birth control/protection: Surgical     Comment: Hyst     Family History   Problem Relation Age of Onset   • Hypertension Mother    • Stroke Mother    • Diabetes Sister    • Coronary artery disease Brother    • Diabetes Brother    • Valvular heart disease Brother         TAVR   • Coronary artery disease Brother    • Skin cancer Neg Hx      Review of Systems   Constitutional: Negative for chills, fever, weight gain and weight loss.   Cardiovascular: Negative for leg swelling.   Respiratory: Negative for cough, snoring and wheezing.    Hematologic/Lymphatic: Negative for bleeding problem. Does not bruise/bleed easily.   Skin: Negative for color change.   Musculoskeletal: Negative for falls, joint pain and myalgias.   Gastrointestinal: Negative for melena.   Genitourinary: Negative for hematuria.   Neurological: Negative for excessive daytime sleepiness.   Psychiatric/Behavioral: Negative for depression. The patient is nervous/anxious.         Objective:     Vitals:    12/22/21 1120   BP: 140/80   Pulse: 73   Weight: 66.7 kg (147 lb)   Height:  "170.2 cm (67\")     Body mass index is 23.02 kg/m².    Vitals reviewed.   Constitutional:       Appearance: Well-developed.   Eyes:      General: No scleral icterus.        Right eye: No discharge.      Conjunctiva/sclera: Conjunctivae normal.      Pupils: Pupils are equal, round, and reactive to light.   HENT:      Head: Normocephalic.      Nose: Nose normal.   Neck:      Thyroid: No thyromegaly.      Vascular: No JVD.   Pulmonary:      Effort: Pulmonary effort is normal. No respiratory distress.      Breath sounds: Normal breath sounds. No wheezing. No rales.   Cardiovascular:      Normal rate. Regular rhythm. Normal S1. Normal S2.      Murmurs: There is no murmur.      No gallop.   Pulses:     Intact distal pulses.   Edema:     Peripheral edema absent.   Abdominal:      General: Bowel sounds are normal. There is no distension.      Palpations: Abdomen is soft.      Tenderness: There is no abdominal tenderness. There is no rebound.   Musculoskeletal: Normal range of motion.         General: No tenderness.      Cervical back: Normal range of motion and neck supple. Skin:     General: Skin is warm and dry.      Findings: No erythema or rash.   Neurological:      Mental Status: Alert and oriented to person, place, and time.   Psychiatric:         Behavior: Behavior normal.         Thought Content: Thought content normal.         Judgment: Judgment normal.       Lab Review:     ECG 12 Lead    Date/Time: 12/22/2021 11:30 AM  Performed by: Abbi Mitchell MD  Authorized by: Abbi Mitchell MD   Comparison: compared with previous ECG   Similar to previous ECG  Rhythm: sinus rhythm  Conduction: left anterior fascicular block and non-specific intraventricular conduction delay  Other findings: left ventricular hypertrophy          Assessment:       Diagnosis Plan   1. PAF (paroxysmal atrial fibrillation) ([facility])  ECG 12 Lead   2. Dizziness  Holter Monitor - 72 Hour Up To 15 Days    Adult Transthoracic Echo Limited W/ Cont if " Necessary Per Protocol   3. S/P AVR  Adult Transthoracic Echo Limited W/ Cont if Necessary Per Protocol   4. Coronary artery disease involving native coronary artery with angina pectoris, unspecified whether native or transplanted heart ([facility])     5. Essential hypertension     6. Nonrheumatic mitral valve stenosis     7. Atrial fibrillation with RVR ([facility])     8. Other hyperlipidemia     9. Thoracic aortic aneurysm without rupture      10. Controlled type 2 diabetes mellitus with complication, without long-term current use of insulin ([facility])     11. Pulmonary hypertension ([facility])     12. NNEKA (obstructive sleep apnea)       Plan:       1.  Paroxysmal atrial fibrillation, status post electrical cardioversion to sinus rhythm 9/2021.  Stopped amiodarone approximately month ago.  Continues to have episodes of dizziness.  Will place a 2-week Zio patch though not convinced this is due to atrial fibrillation.  I am a little concerned about bradycardia arrhythmia.  2.  Dizziness.  We will check orthostatic pressures in the office and will have family try to check this at home.  In addition we will place a 2-week Zio patch.  Depending on these results may need neurology and ENT appointment though it does not sound clearly vertiginous either.  3.  Hypertension.  Blood pressure today elevated at times when checked sporadically at home has been higher than this today.  They will take the blood pressure cuff in for comparison next 1 in the meanwhile we will get additional readings from home on a consistent manner.  4.  Aortic valve replacement with bioprosthetic valve and subaortic membrane resection in 2007.  Recent BRAULIO leaflet calcification.  Doppler assessment limited.  We will recheck a limited transthoracic echo to assess further.  Especially as the valve is old and she has dizziness.  5.  Dilated aortic root.  Stable measuring 4 cm and a sending aorta by CT 7/2021.  7.  Pulmonary hypertension, RV systolic pressure 30 mmHg on  transesophageal imaging as above  9.  Renal insufficiency, as above.  Followed by Dr. Joy and appears to be stable  10.  Diabetes  11.  Dyslipidemia  12.  Chronic anticoagulation    Addendum: Supine blood pressure 148/80 with a pulse of 81 beats minute with sitting was 160/90 with a pulse of 78 beats minute with standing was 152/80 pulse of 70.      Time Spent: I spent 35 minutes caring for Dior on this date of service. This time includes time spent by me in the following activities: preparing for the visit, reviewing tests, obtaining and/or reviewing a separately obtained history, performing a medically appropriate examination and/or evaluation, counseling and educating the patient/family/caregiver, ordering medications, tests, or procedures, documenting information in the medical record and independently interpreting results and communicating that information with the patient/family/caregiver.   I spent 1 minutes on the separately reported service of ECG. This time is not included in the time used to support the E/M service also reported today.        Your medication list          Accurate as of December 22, 2021 11:59 PM. If you have any questions, ask your nurse or doctor.            CONTINUE taking these medications      Instructions Last Dose Given Next Dose Due   albuterol sulfate  (90 Base) MCG/ACT inhaler  Commonly known as: PROVENTIL HFA;VENTOLIN HFA;PROAIR HFA      Every 6 (Six) Hours As Needed.       apixaban 5 MG tablet tablet  Commonly known as: ELIQUIS      Take 1 tablet by mouth Every 12 (Twelve) Hours. Indications: Atrial Fibrillation       atorvastatin 20 MG tablet  Commonly known as: LIPITOR      Take 1 tablet by mouth Every Night.       azelastine 0.1 % nasal spray  Commonly known as: ASTELIN      2 sprays into the nostril(s) as directed by provider 2 (Two) Times a Day. Use in each nostril as directed       baclofen 10 MG tablet  Commonly known as: LIORESAL      Take 0.5 tablets by  mouth 3 (Three) Times a Day.       doxepin 50 MG capsule  Commonly known as: SINEquan      TAKE 1 CAPSULE EVERY NIGHT       EPINEPHrine 0.3 MG/0.3ML solution auto-injector injection  Commonly known as: EPIPEN           EQL Vitamin C 1000 MG tablet  Generic drug: ascorbic acid      Take 1,000 mg by mouth Daily.       fenofibrate micronized 134 MG capsule  Commonly known as: LOFIBRA      TAKE 1 CAPSULE EVERY       MORNING BEFORE BREAKFAST       fluticasone 50 MCG/ACT nasal spray  Commonly known as: Flonase      2 sprays into the nostril(s) as directed by provider Daily As Needed for Rhinitis or Allergies.       fluticasone-salmeterol 250-50 MCG/DOSE DISKUS  Commonly known as: Advair Diskus      Inhale 1 puff 2 (Two) Times a Day.       gabapentin 600 MG tablet  Commonly known as: NEURONTIN      TAKE 1 TABLET AT BEDTIME       glimepiride 2 MG tablet  Commonly known as: AMARYL      Take 2 mg by mouth Daily.       glucosamine-chondroitin 500-400 MG capsule capsule      Take 2 capsules by mouth Daily.       levothyroxine 75 MCG tablet  Commonly known as: SYNTHROID, LEVOTHROID      Take 1 tablet by mouth Every Other Day.       levothyroxine 50 MCG tablet  Commonly known as: SYNTHROID, LEVOTHROID      TAKE 1 TABLET EVERY OTHER  DAY       metFORMIN  MG 24 hr tablet  Commonly known as: GLUCOPHAGE-XR      Take 1 tablet by mouth 2 (Two) Times a Day.       metoprolol tartrate 50 MG tablet  Commonly known as: LOPRESSOR      Take 1 tablet by mouth Every 12 (Twelve) Hours.       multivitamin with minerals tablet tablet      Take  by mouth Daily.       omeprazole 40 MG capsule  Commonly known as: priLOSEC      TAKE 1 CAPSULE DAILY       Prodigy No Coding Blood Gluc test strip  Generic drug: glucose blood      TEST BLOOD SUGAR DAILY       Prodigy No Coding Blood Gluc w/Device kit      1 each Daily.       Prodigy Safety Lancets 26G misc      CHECK BLOOD SUGAR ONE TIME PER DAY       valACYclovir 1000 MG tablet  Commonly known as:  Valtrex      2 pills qid for mouth ulcer       venlafaxine  MG 24 hr capsule  Commonly known as: EFFEXOR-XR      Take 1 capsule by mouth Daily.          STOP taking these medications    amiodarone 200 MG tablet  Commonly known as: PACERONE  Stopped by: Abbi Mitchell MD        fluorometholone 0.1 % ophthalmic suspension  Commonly known as: FML  Stopped by: Abbi Mitchell MD               Patient is no longer taking -.  I corrected the med list to reflect this.  I did not stop these medications.      Dictated utilizing Dragon dictation

## 2021-12-23 RX ORDER — ATORVASTATIN CALCIUM 40 MG/1
TABLET, FILM COATED ORAL
Qty: 90 TABLET | Refills: 0 | OUTPATIENT
Start: 2021-12-23

## 2021-12-28 DIAGNOSIS — K21.9 GASTROESOPHAGEAL REFLUX DISEASE: ICD-10-CM

## 2021-12-28 DIAGNOSIS — G25.81 RESTLESS LEG SYNDROME: ICD-10-CM

## 2021-12-28 RX ORDER — METOPROLOL TARTRATE 50 MG/1
TABLET, FILM COATED ORAL
Qty: 180 TABLET | Refills: 1 | Status: SHIPPED | OUTPATIENT
Start: 2021-12-28 | End: 2021-12-29 | Stop reason: SDUPTHER

## 2021-12-28 RX ORDER — OMEPRAZOLE 40 MG/1
CAPSULE, DELAYED RELEASE ORAL
Qty: 90 CAPSULE | Refills: 1 | Status: SHIPPED | OUTPATIENT
Start: 2021-12-28 | End: 2022-01-12

## 2021-12-28 RX ORDER — GABAPENTIN 600 MG/1
TABLET ORAL
Qty: 90 TABLET | Refills: 1 | Status: SHIPPED | OUTPATIENT
Start: 2021-12-28 | End: 2022-04-14

## 2021-12-28 RX ORDER — ATORVASTATIN CALCIUM 20 MG/1
20 TABLET, FILM COATED ORAL NIGHTLY
Qty: 90 TABLET | Refills: 1 | Status: SHIPPED | OUTPATIENT
Start: 2021-12-28 | End: 2022-04-14

## 2021-12-28 RX ORDER — APIXABAN 5 MG/1
TABLET, FILM COATED ORAL
Qty: 180 TABLET | Refills: 1 | Status: SHIPPED | OUTPATIENT
Start: 2021-12-28 | End: 2022-09-27

## 2021-12-29 RX ORDER — METOPROLOL TARTRATE 50 MG/1
50 TABLET, FILM COATED ORAL EVERY 12 HOURS
Qty: 180 TABLET | Refills: 1 | Status: SHIPPED | OUTPATIENT
Start: 2021-12-29 | End: 2022-03-18 | Stop reason: SDUPTHER

## 2021-12-30 RX ORDER — METOPROLOL TARTRATE 50 MG/1
50 TABLET, FILM COATED ORAL EVERY 12 HOURS
Qty: 180 TABLET | Refills: 1 | Status: CANCELLED | OUTPATIENT
Start: 2021-12-30

## 2022-01-03 DIAGNOSIS — E03.9 ACQUIRED HYPOTHYROIDISM: ICD-10-CM

## 2022-01-03 RX ORDER — LEVOTHYROXINE SODIUM 0.07 MG/1
TABLET ORAL
Qty: 45 TABLET | Refills: 3 | Status: SHIPPED | OUTPATIENT
Start: 2022-01-03 | End: 2023-03-01 | Stop reason: SDUPTHER

## 2022-01-06 ENCOUNTER — TELEPHONE (OUTPATIENT)
Dept: CARDIOLOGY | Facility: CLINIC | Age: 82
End: 2022-01-06

## 2022-01-06 NOTE — TELEPHONE ENCOUNTER
Pt called re: BP Readings.      She never did increase the metoprolol to 50mg BID.  She kept refilling her Metoprolol 25mg TID and finally ran out of refills.  The pharmacy told her that she has a 50mg BID on file.    Advised her the importance of reviewing meds in full when in for appts as she was here twice with an ER visit where 50mg BID was confirmed.  Pt agreed.    BP has been running  137/75 76  146/77 70  148/82 80  162/92 81  136/71 78  147/83 70  143/86 81    She did just turn in her monitor and will start the 50mg BID.    Advised her of Tamara's instructions to watch for SBP below 110 or any sx.  She will call in 1 week.    Dr Mitchell: ROBERT AVALOS

## 2022-01-12 ENCOUNTER — OFFICE VISIT (OUTPATIENT)
Dept: INTERNAL MEDICINE | Facility: CLINIC | Age: 82
End: 2022-01-12

## 2022-01-12 VITALS
BODY MASS INDEX: 23.17 KG/M2 | WEIGHT: 147.6 LBS | SYSTOLIC BLOOD PRESSURE: 139 MMHG | TEMPERATURE: 98.4 F | DIASTOLIC BLOOD PRESSURE: 80 MMHG | OXYGEN SATURATION: 99 % | HEART RATE: 65 BPM | HEIGHT: 67 IN

## 2022-01-12 DIAGNOSIS — H61.23 BILATERAL IMPACTED CERUMEN: ICD-10-CM

## 2022-01-12 DIAGNOSIS — N28.9 RENAL INSUFFICIENCY: ICD-10-CM

## 2022-01-12 DIAGNOSIS — I48.0 PAF (PAROXYSMAL ATRIAL FIBRILLATION): Primary | ICD-10-CM

## 2022-01-12 DIAGNOSIS — E78.00 PURE HYPERCHOLESTEROLEMIA: Chronic | ICD-10-CM

## 2022-01-12 DIAGNOSIS — R42 LIGHTHEADEDNESS: ICD-10-CM

## 2022-01-12 DIAGNOSIS — F41.9 ANXIETY: Chronic | ICD-10-CM

## 2022-01-12 DIAGNOSIS — E11.8 CONTROLLED TYPE 2 DIABETES MELLITUS WITH COMPLICATION, WITHOUT LONG-TERM CURRENT USE OF INSULIN: ICD-10-CM

## 2022-01-12 DIAGNOSIS — I10 ESSENTIAL HYPERTENSION: Chronic | ICD-10-CM

## 2022-01-12 DIAGNOSIS — G25.81 RLS (RESTLESS LEGS SYNDROME): Chronic | ICD-10-CM

## 2022-01-12 DIAGNOSIS — J45.20 MILD INTERMITTENT ASTHMA WITHOUT COMPLICATION: ICD-10-CM

## 2022-01-12 DIAGNOSIS — E03.9 ACQUIRED HYPOTHYROIDISM: ICD-10-CM

## 2022-01-12 DIAGNOSIS — H35.3211 EXUDATIVE AGE-RELATED MACULAR DEGENERATION, RIGHT EYE, WITH ACTIVE CHOROIDAL NEOVASCULARIZATION: ICD-10-CM

## 2022-01-12 PROCEDURE — 69210 REMOVE IMPACTED EAR WAX UNI: CPT | Performed by: NURSE PRACTITIONER

## 2022-01-12 PROCEDURE — 99214 OFFICE O/P EST MOD 30 MIN: CPT | Performed by: NURSE PRACTITIONER

## 2022-01-13 ENCOUNTER — TELEPHONE (OUTPATIENT)
Dept: CARDIOLOGY | Facility: CLINIC | Age: 82
End: 2022-01-13

## 2022-01-13 NOTE — TELEPHONE ENCOUNTER
Attempted to contact patient to review monitor results. No answer, left  requesting return call.  Noted to have symptomatic PAC/PVCs. Patient has had metoprolol increased since turning in cardiac monitor.

## 2022-01-13 NOTE — TELEPHONE ENCOUNTER
Discussed heart monitor with patient.  Patient has had only one significant episode since increasing metoprolol. Encouraged patient to stay hydrated and avoid stimulants. Patient verbalized understanding.         Dr. Mitchell:  Patient is unsure if she wants to repeat limited echo. Discussed your recommendations with her. She would like to know if you think it is absolutely necessary? I told her I would discuss it with you. Any further recommendations?

## 2022-01-14 NOTE — TELEPHONE ENCOUNTER
No more dizziness or neurologic symptoms, no chest pain or shortness of breath, can wait 6 months to follow-up and repeat an echo at that time

## 2022-01-15 PROBLEM — I71.20 THORACIC AORTIC ANEURYSM WITHOUT RUPTURE: Chronic | Status: ACTIVE | Noted: 2017-10-02

## 2022-01-15 PROBLEM — I48.0 PAF (PAROXYSMAL ATRIAL FIBRILLATION): Chronic | Status: ACTIVE | Noted: 2021-09-14

## 2022-01-15 PROBLEM — I25.119 CORONARY ARTERY DISEASE INVOLVING NATIVE CORONARY ARTERY WITH ANGINA PECTORIS: Chronic | Status: ACTIVE | Noted: 2019-04-11

## 2022-01-15 PROBLEM — H35.30 MACULAR DEGENERATION OF BOTH EYES: Chronic | Status: ACTIVE | Noted: 2019-04-11

## 2022-01-15 PROBLEM — N28.9 RENAL INSUFFICIENCY: Chronic | Status: ACTIVE | Noted: 2018-10-28

## 2022-01-15 PROBLEM — N17.9 AKI (ACUTE KIDNEY INJURY): Status: RESOLVED | Noted: 2021-09-14 | Resolved: 2022-01-15

## 2022-01-15 PROBLEM — I34.2 NONRHEUMATIC MITRAL VALVE STENOSIS: Chronic | Status: ACTIVE | Noted: 2021-09-08

## 2022-01-15 PROBLEM — H35.3211 EXUDATIVE AGE-RELATED MACULAR DEGENERATION, RIGHT EYE, WITH ACTIVE CHOROIDAL NEOVASCULARIZATION: Status: ACTIVE | Noted: 2022-01-15

## 2022-01-15 PROBLEM — H35.3211 EXUDATIVE AGE-RELATED MACULAR DEGENERATION, RIGHT EYE, WITH ACTIVE CHOROIDAL NEOVASCULARIZATION (HCC): Chronic | Status: ACTIVE | Noted: 2022-01-15

## 2022-01-15 NOTE — ASSESSMENT & PLAN NOTE
She is currently awaiting results of recent Zio patch to evaluate dizziness; currently managed on Eliquis (denies bleeding episodes) and Metoprolol

## 2022-01-15 NOTE — PROGRESS NOTES
"Chief Complaint  Establish Care (new patient ), Diabetes, Hypertension, and Dizziness    Subjective          Dior Pettit presents to [facility] to establish care and to f/u on DM2, HTN and c/o vertigo.    Patient presents to establish care. Previous medical history discussed with patient in details and reflected in problem list.  She has restarted Glimepiride on her own due to elevated glucose readings through the Holidays which she is tolerating well, denies hypoglycemic episodes. Her recent (9/2021)  labs show an A1c of 5.4.  She was seen in the ER 12/2021 for lightheadedness, undergoing evaluation with Zio patch and further workup through cardiology.  She c/o bilateral ear pain and pressure.      Objective   Vital Signs:   /80 (BP Location: Left arm, Patient Position: Sitting, Cuff Size: Adult)   Pulse 65   Temp 98.4 °F (36.9 °C) (Temporal)   Ht 170.2 cm (67.01\")   Wt 67 kg (147 lb 9.6 oz)   SpO2 99%   BMI 23.11 kg/m²     Physical Exam  Constitutional:       Appearance: She is well-developed. She is not ill-appearing.   HENT:      Head: Normocephalic.      Right Ear: There is impacted cerumen.      Left Ear: Hearing normal. There is impacted cerumen.      Nose: Nose normal. No nasal deformity, mucosal edema or rhinorrhea.      Right Sinus: No maxillary sinus tenderness or frontal sinus tenderness.      Left Sinus: No maxillary sinus tenderness or frontal sinus tenderness.      Mouth/Throat:      Dentition: Normal dentition.   Eyes:      General: Lids are normal.         Right eye: No discharge.         Left eye: No discharge.      Conjunctiva/sclera: Conjunctivae normal.      Right eye: No exudate.     Left eye: No exudate.  Neck:      Thyroid: No thyroid mass or thyromegaly.      Vascular: No carotid bruit.      Trachea: Trachea normal.   Cardiovascular:      Rate and Rhythm: Regular rhythm.      Pulses: Normal pulses.      Heart sounds: Normal heart sounds. No " murmur heard.      Pulmonary:      Effort: No respiratory distress.      Breath sounds: Normal breath sounds. No decreased breath sounds, wheezing, rhonchi or rales.   Abdominal:      General: Bowel sounds are normal.      Palpations: Abdomen is soft.      Tenderness: There is no abdominal tenderness.   Musculoskeletal:      Cervical back: Normal range of motion. No edema.   Lymphadenopathy:      Head:      Right side of head: No submental, submandibular, tonsillar, preauricular, posterior auricular or occipital adenopathy.      Left side of head: No submental, submandibular, tonsillar, preauricular, posterior auricular or occipital adenopathy.   Skin:     General: Skin is warm and dry.      Nails: There is no clubbing.   Neurological:      Mental Status: She is alert.   Psychiatric:         Behavior: Behavior is cooperative.        Result Review :          Ear Cerumen Removal    Date/Time: 1/12/2022 1:33 AM  Performed by: Ashanti Hong APRN  Authorized by: Ashanti Hong APRN   Consent: Verbal consent obtained.  Consent given by: patient  Ceruminolytics applied: Ceruminolytics applied prior to the procedure.  Location details: left ear and right ear  Patient tolerance: patient tolerated the procedure well with no immediate complications  Procedure type: instrumentation, irrigation           Assessment and Plan    Diagnoses and all orders for this visit:    1. PAF (paroxysmal atrial fibrillation) (HCC) (Primary)  Assessment & Plan:  She is currently awaiting results of recent Zio patch to evaluate dizziness; currently managed on Eliquis (denies bleeding episodes) and Metoprolol      2. Pure hypercholesterolemia  Assessment & Plan:  She denies myalgias with atorvastatin and fenofibrate which she will continue along with a low-fat, low-cholesterol diet.      3. Essential hypertension  Assessment & Plan:  Hypertension is mildly elevated in the office today.  Continue current treatment regimen.  Dietary sodium  restriction.  Continue current medications.  Ambulatory blood pressure monitoring.  Blood pressure will be reassessed at the next regular appointment.  She will continue Metoprolol along with close monitoring (elevated today).      4. Acquired hypothyroidism  Assessment & Plan:  She is currently managed on Synthroid, TSH within therapeutic range with most recent labs.      5. Controlled type 2 diabetes mellitus with complication, without long-term current use of insulin (HCC)  Assessment & Plan:  A1c 5.4 with 9/2021 labs, I would like her to discontinue Glimepiride due to safety concerns. Continue Metformin along with a low-carb, low-sugar diet.      6. Renal insufficiency  Assessment & Plan:  Renal condition is unchanged.  Continue current treatment regimen.  Renal condition will be reassessed with next labs.      7. Anxiety  Assessment & Plan:  Sx stable with Effexor XR, has been managed on medication for many years      8. RLS (restless legs syndrome)  Assessment & Plan:  Sx managed with nightly Gabapentin, tolerating well.      9. Mild intermittent asthma without complication  Assessment & Plan:  She is currently managed on Advair daily with infrequent Albuterol inh use        10. Lightheadedness  Assessment & Plan:  Completing Zio patch as ordered by cardiology to further evaluate      11. Exudative age-related macular degeneration, right eye, with active choroidal neovascularization (HCC)  Assessment & Plan:  She is followed by opth with close monitoring      12. Bilateral impacted cerumen  Comments:  Ears irrigated & using curette, excessive cerumen removed. Pt tolerated procedure well & reported improvement in sx.    Other orders  -     Cerumen Removal      Follow Up   Return in about 3 months (around 4/12/2022).  Patient was given instructions and counseling regarding her condition or for health maintenance advice. Please see specific information pulled into the AVS if appropriate.

## 2022-01-15 NOTE — ASSESSMENT & PLAN NOTE
Renal condition is unchanged.  Continue current treatment regimen.  Renal condition will be reassessed with next labs.

## 2022-01-15 NOTE — ASSESSMENT & PLAN NOTE
She denies myalgias with atorvastatin and fenofibrate which she will continue along with a low-fat, low-cholesterol diet.

## 2022-01-15 NOTE — PROGRESS NOTES
Subjective   Dior Pettit is a 81 y.o. female.         History of Present Illness     The following portions of the patient's history were reviewed and updated as appropriate: allergies, current medications, past social history and problem list.    Past Medical History:   Diagnosis Date   • Allergic rhinitis    • Anemia    • Anxiety     Controlled w/Meds   • Aortic root dilatation (HCC) 10/02/2017    Borderline--Noted on Echo   • Aortic valve calcification 10/02/2017    Noted on Echo   • Aortic valve insufficiency Dx in 07    w/AVR    • Aortic valve prosthesis present 11/02/2018    Noted on CTA Chest   • Ascending aorta dilatation (HCC) 10/02/2017    Borderline--Noted on Echo   • Asthma    • Atypical chest pain    • Breast pain, right Hx   • CAD (coronary artery disease)    • Cardiomegaly 2017   • Cervicalgia    • Chest pain due to CAD ([facility])    • 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    Borderline--Noted on Echo & Measured @ 2.6CM and Mid Descending @ 2.5CM on CTA Chest-11/2/18   • DM (diabetes mellitus) ([facility])     T2   • Esophagitis    • GERD (gastroesophageal reflux disease)     Controlled w/Meds   • Headache    • Health care maintenance    • Heart murmur    • History of echocardiogram 10/2/17-[facility]    EF 66%; Borderline Concentric Hypertrophy; Mild Calcification in AV; Mild AVS; Mild AVR; Moderate TVR; Borderline Dilation of Aortic Root/Arch & Borderline Dilation of Ascending/Proximal Aorta Present   • Hyperlipidemia     Controlled w/Meds   • Hypertension     Controlled w/Meds   • Hypothyroidism     Controlled w/Synthroid   • Insomnia    • Macular degeneration, left eye    • Mild aortic valve regurgitation 10/02/2017    Noted on Echo   • Mild aortic valve stenosis 10/02/2017    Noted on Echo   • Mild dilation of ascending aorta (HCC) 10/02/2017    Borderline--Noted on Echo   • Moderate tricuspid valve regurgitation 10/02/2017    Noted on Echo   • Mood disorder in conditions classified elsewhere      Controlled w/Meds   • Near syncope 03/2017-[facility] ER   • Neuropathy    • NNEKA (obstructive sleep apnea)     Untreated   • Osteoarthritis     Ankles/Feet   • Pulmonary hypertension (HCC) 2017   • Renal insufficiency    • RLS (restless legs syndrome)    • Thoracic ascending aortic aneurysm (HCC) Dx in 2007 @ 3.8CM & 1/02/2018    Noted @ 4CM on CTA Chest;   • Visual impairment     macular degeneratuin         Current Outpatient Medications:   •  albuterol sulfate  (90 Base) MCG/ACT inhaler, Every 6 (Six) Hours As Needed., Disp: , Rfl:   •  ascorbic acid (EQL VITAMIN C) 1000 MG tablet, Take 1,000 mg by mouth Daily., Disp: , Rfl:   •  atorvastatin (LIPITOR) 20 MG tablet, Take 1 tablet by mouth Every Night., Disp: 90 tablet, Rfl: 1  •  azelastine (ASTELIN) 0.1 % nasal spray, 2 sprays into the nostril(s) as directed by provider 2 (Two) Times a Day. Use in each nostril as directed, Disp: 3 each, Rfl: 3  •  baclofen (LIORESAL) 10 MG tablet, Take 0.5 tablets by mouth 3 (Three) Times a Day., Disp: 90 tablet, Rfl: 3  •  Blood Glucose Monitoring Suppl (Prodigy No Coding Blood Gluc) w/Device kit, 1 each Daily., Disp: 1 kit, Rfl: 0  •  doxepin (SINEquan) 50 MG capsule, TAKE 1 CAPSULE EVERY NIGHT, Disp: 90 capsule, Rfl: 2  •  Eliquis 5 MG tablet tablet, TAKE 1 TABLET EVERY 12     HOURS FOR ATRIAL           FIBRILLATION, Disp: 180 tablet, Rfl: 1  •  EPINEPHrine (EPIPEN) 0.3 MG/0.3ML solution auto-injector injection, , Disp: , Rfl:   •  fenofibrate micronized (LOFIBRA) 134 MG capsule, TAKE 1 CAPSULE EVERY       MORNING BEFORE BREAKFAST, Disp: 90 capsule, Rfl: 3  •  fluticasone (Flonase) 50 MCG/ACT nasal spray, 2 sprays into the nostril(s) as directed by provider Daily As Needed for Rhinitis or Allergies., Disp: 16 g, Rfl: 3  •  fluticasone-salmeterol (Advair Diskus) 250-50 MCG/DOSE DISKUS, Inhale 1 puff 2 (Two) Times a Day., Disp: 14 each, Rfl: 0  •  gabapentin (NEURONTIN) 600 MG tablet, TAKE 1/4 TABLET AT BEDTIME FOR 2 DAYS, THEN  "TAKE 1/2 TABLET AT BEDTIME FOR 1 WEEK, THEN TAKE 1 TABLET AT BEDTIME IF NEEDED, Disp: 90 tablet, Rfl: 1  •  glucosamine-chondroitin 500-400 MG capsule capsule, Take 2 capsules by mouth Daily., Disp: , Rfl:   •  glucose blood (Prodigy No Coding Blood Gluc) test strip, TEST BLOOD SUGAR DAILY, Disp: 50 each, Rfl: 3  •  levothyroxine (SYNTHROID, LEVOTHROID) 50 MCG tablet, TAKE 1 TABLET EVERY OTHER  DAY, Disp: 45 tablet, Rfl: 1  •  levothyroxine (SYNTHROID, LEVOTHROID) 75 MCG tablet, TAKE 1 TABLET EVERY OTHER  DAY. OVERDUE FOR           APPOINTMENT AND FASTING    LABS., Disp: 45 tablet, Rfl: 3  •  metFORMIN ER (GLUCOPHAGE-XR) 750 MG 24 hr tablet, Take 1 tablet by mouth 2 (Two) Times a Day., Disp: 180 tablet, Rfl: 3  •  metoprolol tartrate (LOPRESSOR) 50 MG tablet, Take 1 tablet by mouth Every 12 (Twelve) Hours., Disp: 180 tablet, Rfl: 1  •  Multiple Vitamins-Minerals (MULTIVITAL PO), Take  by mouth Daily., Disp: , Rfl:   •  Prodigy Safety Lancets 26G misc, CHECK BLOOD SUGAR ONE TIME PER DAY, Disp: 100 each, Rfl: 1  •  valACYclovir (Valtrex) 1000 MG tablet, 2 pills qid for mouth ulcer, Disp: 16 tablet, Rfl: 4  •  venlafaxine XR (EFFEXOR-XR) 150 MG 24 hr capsule, Take 1 capsule by mouth Daily., Disp: 90 capsule, Rfl: 3    No Known Allergies    Review of Systems    Objective   Vitals:    01/12/22 1314   BP: 139/80   BP Location: Left arm   Patient Position: Sitting   Cuff Size: Adult   Pulse: 65   Temp: 98.4 °F (36.9 °C)   TempSrc: Temporal   SpO2: 99%   Weight: 67 kg (147 lb 9.6 oz)   Height: 170.2 cm (67.01\")     Body mass index is 23.11 kg/m².  Physical Exam    Assessment/Plan   There are no diagnoses linked to this encounter.           "

## 2022-01-15 NOTE — ASSESSMENT & PLAN NOTE
A1c 5.4 with 9/2021 labs, I would like her to discontinue Glimepiride due to safety concerns. Continue Metformin along with a low-carb, low-sugar diet.

## 2022-01-15 NOTE — ASSESSMENT & PLAN NOTE
Hypertension is mildly elevated in the office today.  Continue current treatment regimen.  Dietary sodium restriction.  Continue current medications.  Ambulatory blood pressure monitoring.  Blood pressure will be reassessed at the next regular appointment.  She will continue Metoprolol along with close monitoring (elevated today).

## 2022-01-18 NOTE — TELEPHONE ENCOUNTER
Is pt needing ECHO on the 25th?  Or 6months?  Pt said she was returning someone's call and I want to clarify.

## 2022-01-25 ENCOUNTER — HOSPITAL ENCOUNTER (OUTPATIENT)
Dept: CARDIOLOGY | Facility: HOSPITAL | Age: 82
Discharge: HOME | End: 2022-01-25
Admitting: INTERNAL MEDICINE

## 2022-01-25 VITALS
HEIGHT: 67 IN | HEART RATE: 84 BPM | WEIGHT: 147 LBS | DIASTOLIC BLOOD PRESSURE: 60 MMHG | BODY MASS INDEX: 23.07 KG/M2 | SYSTOLIC BLOOD PRESSURE: 140 MMHG

## 2022-01-25 DIAGNOSIS — Z95.2 S/P AVR: ICD-10-CM

## 2022-01-25 DIAGNOSIS — R42 DIZZINESS: ICD-10-CM

## 2022-01-25 PROCEDURE — 93356 MYOCRD STRAIN IMG SPCKL TRCK: CPT

## 2022-01-25 PROCEDURE — 93325 DOPPLER ECHO COLOR FLOW MAPG: CPT

## 2022-01-25 PROCEDURE — 93356 MYOCRD STRAIN IMG SPCKL TRCK: CPT | Performed by: INTERNAL MEDICINE

## 2022-01-25 PROCEDURE — 93308 TTE F-UP OR LMTD: CPT

## 2022-01-25 PROCEDURE — 93321 DOPPLER ECHO F-UP/LMTD STD: CPT | Performed by: INTERNAL MEDICINE

## 2022-01-25 PROCEDURE — 93321 DOPPLER ECHO F-UP/LMTD STD: CPT

## 2022-01-25 PROCEDURE — 93308 TTE F-UP OR LMTD: CPT | Performed by: INTERNAL MEDICINE

## 2022-01-25 PROCEDURE — 93325 DOPPLER ECHO COLOR FLOW MAPG: CPT | Performed by: INTERNAL MEDICINE

## 2022-01-25 PROCEDURE — 25010000002 PERFLUTREN (DEFINITY) 8.476 MG IN SODIUM CHLORIDE (PF) 0.9 % 10 ML INJECTION: Performed by: INTERNAL MEDICINE

## 2022-01-25 RX ADMIN — PERFLUTREN 1.5 ML: 6.52 INJECTION, SUSPENSION INTRAVENOUS at 15:41

## 2022-01-26 ENCOUNTER — TELEPHONE (OUTPATIENT)
Dept: CARDIOLOGY | Facility: CLINIC | Age: 82
End: 2022-01-26

## 2022-01-26 LAB
ASCENDING AORTA: 3.8 CM
BH CV ECHO MEAS - ACS: 1.2 CM
BH CV ECHO MEAS - AO MAX PG (FULL): 34.7 MMHG
BH CV ECHO MEAS - AO MAX PG: 48.1 MMHG
BH CV ECHO MEAS - AO MEAN PG (FULL): 20.9 MMHG
BH CV ECHO MEAS - AO MEAN PG: 28.7 MMHG
BH CV ECHO MEAS - AO ROOT AREA (BSA CORRECTED): 1.8
BH CV ECHO MEAS - AO ROOT AREA: 8.2 CM^2
BH CV ECHO MEAS - AO ROOT DIAM: 3.2 CM
BH CV ECHO MEAS - AO V2 MAX: 346.8 CM/SEC
BH CV ECHO MEAS - AO V2 MEAN: 251.6 CM/SEC
BH CV ECHO MEAS - AO V2 VTI: 81.1 CM
BH CV ECHO MEAS - ASC AORTA: 3.8 CM
BH CV ECHO MEAS - AVA(I,A): 1.6 CM^2
BH CV ECHO MEAS - AVA(I,D): 1.6 CM^2
BH CV ECHO MEAS - AVA(V,A): 1.6 CM^2
BH CV ECHO MEAS - AVA(V,D): 1.6 CM^2
BH CV ECHO MEAS - BSA(HAYCOCK): 1.8 M^2
BH CV ECHO MEAS - BSA: 1.8 M^2
BH CV ECHO MEAS - BZI_BMI: 23 KILOGRAMS/M^2
BH CV ECHO MEAS - BZI_METRIC_HEIGHT: 170.2 CM
BH CV ECHO MEAS - BZI_METRIC_WEIGHT: 66.7 KG
BH CV ECHO MEAS - EDV(MOD-SP2): 70 ML
BH CV ECHO MEAS - EDV(MOD-SP4): 66 ML
BH CV ECHO MEAS - EDV(TEICH): 55.7 ML
BH CV ECHO MEAS - EF(CUBED): 79.3 %
BH CV ECHO MEAS - EF(MOD-BP): 73.7 %
BH CV ECHO MEAS - EF(MOD-SP2): 72.9 %
BH CV ECHO MEAS - EF(MOD-SP4): 71.2 %
BH CV ECHO MEAS - EF(TEICH): 72.5 %
BH CV ECHO MEAS - ESV(MOD-SP2): 19 ML
BH CV ECHO MEAS - ESV(MOD-SP4): 19 ML
BH CV ECHO MEAS - ESV(TEICH): 15.3 ML
BH CV ECHO MEAS - FS: 40.8 %
BH CV ECHO MEAS - IVS/LVPW: 1.1
BH CV ECHO MEAS - IVSD: 1.2 CM
BH CV ECHO MEAS - LAT PEAK E' VEL: 8.5 CM/SEC
BH CV ECHO MEAS - LV DIASTOLIC VOL/BSA (35-75): 37.2 ML/M^2
BH CV ECHO MEAS - LV MASS(C)D: 135.9 GRAMS
BH CV ECHO MEAS - LV MASS(C)DI: 76.6 GRAMS/M^2
BH CV ECHO MEAS - LV MAX PG: 13.4 MMHG
BH CV ECHO MEAS - LV MEAN PG: 7.8 MMHG
BH CV ECHO MEAS - LV SYSTOLIC VOL/BSA (12-30): 10.7 ML/M^2
BH CV ECHO MEAS - LV V1 MAX: 183.3 CM/SEC
BH CV ECHO MEAS - LV V1 MEAN: 130.8 CM/SEC
BH CV ECHO MEAS - LV V1 VTI: 42.1 CM
BH CV ECHO MEAS - LVIDD: 3.6 CM
BH CV ECHO MEAS - LVIDS: 2.1 CM
BH CV ECHO MEAS - LVLD AP2: 7.4 CM
BH CV ECHO MEAS - LVLD AP4: 6.6 CM
BH CV ECHO MEAS - LVLS AP2: 5.4 CM
BH CV ECHO MEAS - LVLS AP4: 5.4 CM
BH CV ECHO MEAS - LVOT AREA (M): 3.1 CM^2
BH CV ECHO MEAS - LVOT AREA: 3.1 CM^2
BH CV ECHO MEAS - LVOT DIAM: 2 CM
BH CV ECHO MEAS - LVPWD: 1.1 CM
BH CV ECHO MEAS - MED PEAK E' VEL: 6 CM/SEC
BH CV ECHO MEAS - MR MAX PG: 120.4 MMHG
BH CV ECHO MEAS - MR MAX VEL: 548.7 CM/SEC
BH CV ECHO MEAS - MV A DUR: 0.14 SEC
BH CV ECHO MEAS - MV A MAX VEL: 129.2 CM/SEC
BH CV ECHO MEAS - MV DEC SLOPE: 547.1 CM/SEC^2
BH CV ECHO MEAS - MV DEC TIME: 0.29 SEC
BH CV ECHO MEAS - MV E MAX VEL: 142 CM/SEC
BH CV ECHO MEAS - MV E/A: 1.1
BH CV ECHO MEAS - MV MAX PG: 9.4 MMHG
BH CV ECHO MEAS - MV MEAN PG: 4.9 MMHG
BH CV ECHO MEAS - MV P1/2T MAX VEL: 153.2 CM/SEC
BH CV ECHO MEAS - MV P1/2T: 82 MSEC
BH CV ECHO MEAS - MV V2 MAX: 153.2 CM/SEC
BH CV ECHO MEAS - MV V2 MEAN: 106.3 CM/SEC
BH CV ECHO MEAS - MV V2 VTI: 38.5 CM
BH CV ECHO MEAS - MVA P1/2T LCG: 1.4 CM^2
BH CV ECHO MEAS - MVA(P1/2T): 2.7 CM^2
BH CV ECHO MEAS - MVA(VTI): 3.4 CM^2
BH CV ECHO MEAS - PULM DIAS VEL: 43.6 CM/SEC
BH CV ECHO MEAS - PULM S/D: 1.4
BH CV ECHO MEAS - PULM SYS VEL: 60.9 CM/SEC
BH CV ECHO MEAS - RAP SYSTOLE: 8 MMHG
BH CV ECHO MEAS - RVSP: 46.2 MMHG
BH CV ECHO MEAS - SI(AO): 374 ML/M^2
BH CV ECHO MEAS - SI(CUBED): 21.5 ML/M^2
BH CV ECHO MEAS - SI(LVOT): 73.8 ML/M^2
BH CV ECHO MEAS - SI(MOD-SP2): 28.7 ML/M^2
BH CV ECHO MEAS - SI(MOD-SP4): 26.5 ML/M^2
BH CV ECHO MEAS - SI(TEICH): 22.8 ML/M^2
BH CV ECHO MEAS - SV(AO): 663.5 ML
BH CV ECHO MEAS - SV(CUBED): 38.1 ML
BH CV ECHO MEAS - SV(LVOT): 130.9 ML
BH CV ECHO MEAS - SV(MOD-SP2): 51 ML
BH CV ECHO MEAS - SV(MOD-SP4): 47 ML
BH CV ECHO MEAS - SV(TEICH): 40.4 ML
BH CV ECHO MEAS - TAPSE (>1.6): 1.4 CM
BH CV ECHO MEAS - TR MAX VEL: 309.1 CM/SEC
BH CV ECHO MEASUREMENTS AVERAGE E/E' RATIO: 19.59
BH CV XLRA - TDI S': 9.9 CM/SEC
LV EF 2D ECHO EST: 74 %
MAXIMAL PREDICTED HEART RATE: 137 BPM
SINUS: 3.1 CM
STJ: 3.2 CM
STRESS TARGET HR: 116 BPM

## 2022-01-27 NOTE — TELEPHONE ENCOUNTER
Please let the patient know echo shows heart strong the prosthetic valve is unchanged with slight increase gradient across the aortic valve as before.  Mitral valve function is also unchanged with slight troubles opening as before.  Pressures in the lungs unchanged.  Overall it is unchanged.  As long she is not having any new symptoms keep follow-up with me in March as scheduled

## 2022-03-10 ENCOUNTER — OFFICE VISIT (OUTPATIENT)
Dept: CARDIOLOGY | Facility: CLINIC | Age: 82
End: 2022-03-10

## 2022-03-10 VITALS
SYSTOLIC BLOOD PRESSURE: 124 MMHG | HEIGHT: 67 IN | BODY MASS INDEX: 23.17 KG/M2 | DIASTOLIC BLOOD PRESSURE: 60 MMHG | HEART RATE: 69 BPM | WEIGHT: 147.6 LBS

## 2022-03-10 DIAGNOSIS — I27.20 PULMONARY HYPERTENSION: ICD-10-CM

## 2022-03-10 DIAGNOSIS — I10 ESSENTIAL HYPERTENSION: ICD-10-CM

## 2022-03-10 DIAGNOSIS — Z95.2 S/P AVR: ICD-10-CM

## 2022-03-10 DIAGNOSIS — I48.0 PAF (PAROXYSMAL ATRIAL FIBRILLATION): ICD-10-CM

## 2022-03-10 DIAGNOSIS — I71.20 THORACIC AORTIC ANEURYSM WITHOUT RUPTURE: ICD-10-CM

## 2022-03-10 DIAGNOSIS — I34.2 NONRHEUMATIC MITRAL VALVE STENOSIS: ICD-10-CM

## 2022-03-10 DIAGNOSIS — R42 DIZZINESS: Primary | ICD-10-CM

## 2022-03-10 PROCEDURE — 93000 ELECTROCARDIOGRAM COMPLETE: CPT | Performed by: NURSE PRACTITIONER

## 2022-03-10 PROCEDURE — 99214 OFFICE O/P EST MOD 30 MIN: CPT | Performed by: NURSE PRACTITIONER

## 2022-03-10 RX ORDER — GLIMEPIRIDE 2 MG/1
TABLET ORAL
Qty: 90 TABLET | Refills: 1 | OUTPATIENT
Start: 2022-03-10

## 2022-03-18 ENCOUNTER — TELEPHONE (OUTPATIENT)
Dept: CARDIOLOGY | Facility: CLINIC | Age: 82
End: 2022-03-18

## 2022-03-18 RX ORDER — METOPROLOL TARTRATE 50 MG/1
75 TABLET, FILM COATED ORAL EVERY 12 HOURS
Qty: 270 TABLET | Refills: 0 | Status: SHIPPED | OUTPATIENT
Start: 2022-03-18 | End: 2022-08-19

## 2022-03-18 NOTE — TELEPHONE ENCOUNTER
Spoke with pt.  Verbalized understanding.  Pt also wrote down instructions and will call in 1 week.  (Done)

## 2022-03-18 NOTE — TELEPHONE ENCOUNTER
Pt called re: for about 1 week she had noticed increase is palpitations and BP running at 150s/80s with 70s pulse.      Pt denied caffeine or stimulants.  She does have sleep apnea but not using the machine and not willing to retry.    No CP  No SOA  No edema  No dizziness  No fatigue

## 2022-03-18 NOTE — TELEPHONE ENCOUNTER
Increase metoprolol tartrate to 75 mg twice a day.  This should help with blood pressure and palpitations.  Untreated sleep apnea can contribute to symptoms.  Unfortunately looks like she is on willing to reconsider treatments.  Avoid stimulants.  Stay hydrated.  Needs to avoid high salt foods.  Would have her call with some updated heart rate and blood pressure readings in 1 week or call sooner for new, recurrent, or worsening symptoms.

## 2022-04-05 ENCOUNTER — TELEPHONE (OUTPATIENT)
Dept: CARDIOLOGY | Facility: CLINIC | Age: 82
End: 2022-04-05

## 2022-04-05 NOTE — TELEPHONE ENCOUNTER
Can you have her decrease her dose of metoprolol to 50 mg twice daily and see if this helps?  Would have her do this over the next week and call next week with an update.  Would also have her keep her blood pressure and heart rate log.

## 2022-04-05 NOTE — TELEPHONE ENCOUNTER
Pt called re: increased dizziness.  She is not noticing this in the Afternoon.    BP is running about 120/70s, she is drinking 1 cup of coffee a day, she is drinking plenty of water, and she will not use a CPAP machine.    She has NOT checked her BP when she was dizzy.  Advised her to check this.      She cancelled her appointment with Dr Jj according to [facility] but pt said she did not know about it.

## 2022-04-11 ENCOUNTER — TELEPHONE (OUTPATIENT)
Dept: INTERNAL MEDICINE | Facility: CLINIC | Age: 82
End: 2022-04-11

## 2022-04-11 NOTE — TELEPHONE ENCOUNTER
Caller: Dior Pettit    Relationship: Self    Best call back number: [phone]    What medication are you requesting: SOMETHING FOR RESTLESSNESS AT NIGHT    What are your current symptoms: DIZZINESS AND RESTLESS LEG     How long have you been experiencing symptoms: 6 MONTHS OR LONGER    Have you had these symptoms before:    [x] Yes  [] No    Have you been treated for these symptoms before:   [x] Yes  [] No    If a prescription is needed, what is your preferred pharmacy and phone number:   [id number] - [facility] [address] - [phone]  - [phone] FX    Additional notes: PATIENT HAS BEEN EXPERIENCING DIZZINESS  AND SHE HAS RESTLESS LEG ISSUES AT NIGHT. SHE WAS GIVEN gabapentin (NEURONTIN) 600 MG tablet TO TAKE AT NIGHT TO HELP WITH THE RESTLESS LEG.     PATIENT STATES THAT SHE HAS STOPPED THE GABAPENTIN AND WAS NOT AS DIZZY AS BEFORE BUT SHE DOES ADMIT TO HAVING THE DIZZINESS TOWARDS EVENING. LAST NIGHT 04/10/22 PATIENT TOOK ABOUT 1/3 OF A 600 MG TABLET AND SLEPT LIKE A BABY.     PATIENT EXPRESS THAT SHE HAS TO HAVE SOMETHING TO TAKE CARE OF THE DIZZINESS.     PLEASE CALL PATIENT TO ADVISE ON IF THIS MEDICATION CAN BE DECREASED IN DOSAGE TO SEE IF THAT WILL WORK FOR HER AND NOT CAUSE THE DIZZINESS, OR CHANGE THE MEDICATION ALL TOGETHER.

## 2022-04-13 DIAGNOSIS — E03.8 OTHER SPECIFIED HYPOTHYROIDISM: ICD-10-CM

## 2022-04-13 RX ORDER — LEVOTHYROXINE SODIUM 0.05 MG/1
TABLET ORAL
Qty: 45 TABLET | Refills: 1 | Status: ON HOLD | OUTPATIENT
Start: 2022-04-13 | End: 2022-11-14

## 2022-04-14 ENCOUNTER — OFFICE VISIT (OUTPATIENT)
Dept: INTERNAL MEDICINE | Facility: CLINIC | Age: 82
End: 2022-04-14

## 2022-04-14 VITALS
OXYGEN SATURATION: 97 % | DIASTOLIC BLOOD PRESSURE: 76 MMHG | SYSTOLIC BLOOD PRESSURE: 118 MMHG | TEMPERATURE: 98.4 F | BODY MASS INDEX: 22.91 KG/M2 | WEIGHT: 146 LBS | HEART RATE: 65 BPM | HEIGHT: 67 IN

## 2022-04-14 DIAGNOSIS — G25.81 RESTLESS LEG SYNDROME: ICD-10-CM

## 2022-04-14 DIAGNOSIS — E11.8 CONTROLLED TYPE 2 DIABETES MELLITUS WITH COMPLICATION, WITHOUT LONG-TERM CURRENT USE OF INSULIN: Chronic | ICD-10-CM

## 2022-04-14 DIAGNOSIS — R42 DIZZINESS: Primary | ICD-10-CM

## 2022-04-14 DIAGNOSIS — R51.9 DAILY HEADACHE: ICD-10-CM

## 2022-04-14 DIAGNOSIS — E78.00 PURE HYPERCHOLESTEROLEMIA: Chronic | ICD-10-CM

## 2022-04-14 DIAGNOSIS — I10 ESSENTIAL HYPERTENSION: Chronic | ICD-10-CM

## 2022-04-14 PROBLEM — H35.3211 EXUDATIVE AGE-RELATED MACULAR DEGENERATION, RIGHT EYE, WITH ACTIVE CHOROIDAL NEOVASCULARIZATION: Chronic | Status: RESOLVED | Noted: 2022-01-15 | Resolved: 2022-04-14

## 2022-04-14 PROCEDURE — 36415 COLL VENOUS BLD VENIPUNCTURE: CPT | Performed by: NURSE PRACTITIONER

## 2022-04-14 PROCEDURE — 99214 OFFICE O/P EST MOD 30 MIN: CPT | Performed by: NURSE PRACTITIONER

## 2022-04-14 PROCEDURE — G0439 PPPS, SUBSEQ VISIT: HCPCS | Performed by: NURSE PRACTITIONER

## 2022-04-14 PROCEDURE — 1126F AMNT PAIN NOTED NONE PRSNT: CPT | Performed by: NURSE PRACTITIONER

## 2022-04-14 PROCEDURE — 1159F MED LIST DOCD IN RCRD: CPT | Performed by: NURSE PRACTITIONER

## 2022-04-14 PROCEDURE — 1170F FXNL STATUS ASSESSED: CPT | Performed by: NURSE PRACTITIONER

## 2022-04-14 RX ORDER — GABAPENTIN 600 MG/1
300 TABLET ORAL NIGHTLY
Qty: 90 TABLET | Refills: 1
Start: 2022-04-14 | End: 2022-09-16

## 2022-04-14 RX ORDER — ATORVASTATIN CALCIUM 20 MG/1
TABLET, FILM COATED ORAL
Qty: 90 TABLET | Refills: 1 | Status: SHIPPED | OUTPATIENT
Start: 2022-04-14 | End: 2022-05-11 | Stop reason: SDUPTHER

## 2022-04-14 NOTE — PROGRESS NOTES
The ABCs of the Annual Wellness Visit  Subsequent Medicare Wellness Visit    Chief Complaint   Patient presents with   • Medicare Wellness-subsequent          • Dizziness   • Headache   • Hypertension      Subjective    History of Present Illness:  Dior Pettit is a 81 y.o. female who presents for a Subsequent Medicare Wellness Visit.    The following portions of the patient's history were reviewed and   updated as appropriate: allergies, current medications, past family history, past medical history, past social history, past surgical history and problem list.    Compared to one year ago, the patient feels her physical health is the same.    Compared to one year ago, the patient feels her mental health is the same.    Recent Hospitalizations:  This patient has had a [facility] admission record on file within the last 365 days.    Current Medical Providers:  Patient Care Team:  Ashanti Hong APRN as PCP - General (Internal Medicine)  Gerardo Rodriguez Jr., MD as Consulting Physician (Pulmonary Disease)  Abbi Mitchell MD as Consulting Physician (Cardiology)  Luis Miguel Parker MD as Consulting Physician (Ophthalmology)  Tae Burton MD as Consulting Physician (Dermatology)  Addis Gomes MD as Consulting Physician (Plastic Surgery)    Outpatient Medications Prior to Visit   Medication Sig Dispense Refill   • albuterol sulfate  (90 Base) MCG/ACT inhaler Every 6 (Six) Hours As Needed.     • ascorbic acid (VITAMIN C) 1000 MG tablet Take 1,000 mg by mouth Daily.     • azelastine (ASTELIN) 0.1 % nasal spray 2 sprays into the nostril(s) as directed by provider 2 (Two) Times a Day. Use in each nostril as directed 3 each 3   • baclofen (LIORESAL) 10 MG tablet Take 0.5 tablets by mouth 3 (Three) Times a Day. 90 tablet 3   • Blood Glucose Monitoring Suppl (Prodigy No Coding Blood Gluc) w/Device kit 1 each Daily. 1 kit 0   • doxepin (SINEquan) 50 MG capsule TAKE 1 CAPSULE EVERY NIGHT 90  capsule 2   • Eliquis 5 MG tablet tablet TAKE 1 TABLET EVERY 12     HOURS FOR ATRIAL           FIBRILLATION 180 tablet 1   • EPINEPHrine (EPIPEN) 0.3 MG/0.3ML solution auto-injector injection      • fluticasone (Flonase) 50 MCG/ACT nasal spray 2 sprays into the nostril(s) as directed by provider Daily As Needed for Rhinitis or Allergies. 16 g 3   • fluticasone-salmeterol (Advair Diskus) 250-50 MCG/DOSE DISKUS Inhale 1 puff 2 (Two) Times a Day. 14 each 0   • glucosamine-chondroitin 500-400 MG capsule capsule Take 2 capsules by mouth Daily.     • glucose blood (Prodigy No Coding Blood Gluc) test strip TEST BLOOD SUGAR DAILY 50 each 3   • levothyroxine (SYNTHROID, LEVOTHROID) 50 MCG tablet TAKE 1 TABLET EVERY OTHER  DAY 45 tablet 1   • levothyroxine (SYNTHROID, LEVOTHROID) 75 MCG tablet TAKE 1 TABLET EVERY OTHER  DAY. OVERDUE FOR           APPOINTMENT AND FASTING    LABS. 45 tablet 3   • metFORMIN ER (GLUCOPHAGE-XR) 750 MG 24 hr tablet Take 1 tablet by mouth 2 (Two) Times a Day. 180 tablet 3   • metoprolol tartrate (LOPRESSOR) 50 MG tablet Take 1.5 tablets by mouth Every 12 (Twelve) Hours. (Patient taking differently: Take 75 mg by mouth Every 12 (Twelve) Hours. 1 tablet twice daily) 270 tablet 0   • Multiple Vitamins-Minerals (MULTIVITAL PO) Take  by mouth Daily.     • Prodigy Safety Lancets 26G misc CHECK BLOOD SUGAR ONE TIME PER  each 1   • valACYclovir (Valtrex) 1000 MG tablet 2 pills qid for mouth ulcer 16 tablet 4   • venlafaxine XR (EFFEXOR-XR) 150 MG 24 hr capsule Take 1 capsule by mouth Daily. 90 capsule 3   • atorvastatin (LIPITOR) 20 MG tablet Take 1 tablet by mouth Every Night. 90 tablet 1   • gabapentin (NEURONTIN) 600 MG tablet TAKE 1/4 TABLET AT BEDTIME FOR 2 DAYS, THEN TAKE 1/2 TABLET AT BEDTIME FOR 1 WEEK, THEN TAKE 1 TABLET AT BEDTIME IF NEEDED 90 tablet 1     No facility-administered medications prior to visit.       No opioid medication identified on active medication list. I have reviewed  "chart for other potential  high risk medication/s and harmful drug interactions in the elderly.          Aspirin is not on active medication list.  Aspirin use is not indicated based on review of current medical condition/s. Risk of harm outweighs potential benefits.  .    Patient Active Problem List   Diagnosis   • Essential hypertension   • Pulmonary hypertension (HCC)   • NNEKA (obstructive sleep apnea)   • Gastric reflux   • Anxiety   • Restless leg syndrome   • Controlled diabetes mellitus type 2 with complications ([facility])   • Hypothyroidism   • Hyperlipidemia   • Seasonal allergic rhinitis   • Mild intermittent asthma without complication   • S/P AVR   • Dilated aortic root ([facility])   • Thoracic aortic aneurysm without rupture    • Primary insomnia   • Renal insufficiency   • Macular degeneration of both eyes   • Coronary artery disease involving native coronary artery with angina pectoris ([facility])   • Rectocele   • Nonrheumatic mitral valve stenosis   • PAF (paroxysmal atrial fibrillation) ([facility])   • Slow transit constipation   • Dizziness   • Daily headache     Advance Care Planning  Advance Directive is on file.  ACP discussion was held with the patient during this visit. Patient has an advance directive in EMR which is still valid.           Objective    Vitals:    04/14/22 0812   BP: 118/76   BP Location: Left arm   Patient Position: Sitting   Cuff Size: Adult   Pulse: 65   Temp: 98.4 °F (36.9 °C)   TempSrc: Temporal   SpO2: 97%   Weight: 66.2 kg (146 lb)   Height: 170.2 cm (67\")   PainSc: 0-No pain     BMI Readings from Last 1 Encounters:   04/14/22 22.87 kg/m²   BMI is within normal parameters. No follow-up required.    Does the patient have evidence of cognitive impairment? No    Physical Exam  Lab Results   Component Value Date    CHLPL 151 04/14/2022    TRIG 233 (H) 04/14/2022    HDL 44 04/14/2022    LDL 69 04/14/2022    VLDL 38 04/14/2022            HEALTH RISK ASSESSMENT    Smoking Status:  Social History "     Tobacco Use   Smoking Status Never Smoker   Smokeless Tobacco Never Used   Tobacco Comment    caffeine use - 1 cup coffee daily      Alcohol Consumption:  Social History     Substance and Sexual Activity   Alcohol Use Yes    Comment: less than 1 glass of wine     Fall Risk Screen:    SANJANA Fall Risk Assessment was completed, and patient is at LOW risk for falls.Assessment completed on:4/14/2022    Depression Screening:  PHQ-2/PHQ-9 Depression Screening 4/14/2022   Retired Total Score -   Little Interest or Pleasure in Doing Things 0-->not at all   Feeling Down, Depressed or Hopeless 0-->not at all   PHQ-9: Brief Depression Severity Measure Score 0       Health Habits and Functional and Cognitive Screening:  Functional & Cognitive Status 4/14/2022   Do you have difficulty preparing food and eating? No   Do you have difficulty bathing yourself, getting dressed or grooming yourself? No   Do you have difficulty using the toilet? No   Do you have difficulty moving around from place to place? No   Do you have trouble with steps or getting out of a bed or a chair? No   Current Diet Well Balanced Diet   Dental Exam Up to date   Eye Exam Up to date   Exercise (times per week) 0 times per week   Current Exercises Include No Regular Exercise   Current Exercise Activities Include -   Do you need help using the phone?  No   Are you deaf or do you have serious difficulty hearing?  No   Do you need help with transportation? No   Do you need help shopping? No   Do you need help preparing meals?  No   Do you need help with housework?  No   Do you need help with laundry? No   Do you need help taking your medications? No   Do you need help managing money? No   Do you ever drive or ride in a car without wearing a seat belt? No   Have you felt unusual stress, anger or loneliness in the last month? No   Who do you live with? Alone   If you need help, do you have trouble finding someone available to you? No   Have you been bothered  in the last four weeks by sexual problems? No   Do you have difficulty concentrating, remembering or making decisions? No       Age-appropriate Screening Schedule:  Refer to the list below for future screening recommendations based on patient's age, sex and/or medical conditions. Orders for these recommended tests are listed in the plan section. The patient has been provided with a written plan.    Health Maintenance   Topic Date Due   • TDAP/TD VACCINES (2 - Tdap) 01/01/2004   • ZOSTER VACCINE (2 of 3) 02/26/2012   • URINE MICROALBUMIN  01/13/2021   • DIABETIC EYE EXAM  11/17/2021   • HEMOGLOBIN A1C  03/14/2022   • INFLUENZA VACCINE  08/01/2022   • LIPID PANEL  04/14/2023   • DXA SCAN  Discontinued              Assessment/Plan   CMS Preventative Services Quick Reference  Risk Factors Identified During Encounter  Immunizations Discussed/Encouraged (specific Immunizations; Tdap and Shingrix  The above risks/problems have been discussed with the patient.  Follow up actions/plans if indicated are seen below in the Assessment/Plan Section.  Pertinent information has been shared with the patient in the After Visit Summary.    Diagnoses and all orders for this visit:    1. Dizziness (Primary)  Assessment & Plan:  She c/o persistent episodes of feeling lightheaded and off balance, will obtain MRI to further evaluate. Evaluated by cardiology, atrial fibrillation symptoms stable.    Orders:  -     MRI Brain With & Without Contrast; Future  -     Ambulatory Referral to Physical Therapy Evaluate and treat, Vestibular    2. Daily headache  Assessment & Plan:  She c/o a persistent occipital headache with lightheadedness, check las and send for MRI to further evaluate      Orders:  -     MRI Brain With & Without Contrast; Future    3. Restless leg syndrome  Assessment & Plan:  She has a longstanding history of restless legs, improved with nightly Gabapentin which she is tolerating well.    Orders:  -     gabapentin (NEURONTIN)  600 MG tablet; Take 0.5 tablets by mouth Every Night.  Dispense: 90 tablet; Refill: 1    4. Essential hypertension  Assessment & Plan:  BP is well-controlled (sx do not suggest orthostasis) on Metoprolol which she will continue along with a low sodium diet.    Orders:  -     CBC & Differential  -     Comprehensive Metabolic Panel  -     TSH    5. Pure hypercholesterolemia  Assessment & Plan:  She denies myalgias with atorvastatin which she will continue along with a low-fat, low-cholesterol diet.      Orders:  -     Lipid Panel    6. Controlled type 2 diabetes mellitus with complication, without long-term current use of insulin (HCC)  Assessment & Plan:  She is currently managed on Metformin along with a low-carb, low-sugar diet; recheck A1c.    Orders:  -     Hemoglobin A1c      Follow Up:   Return if symptoms worsen or fail to improve, for Next scheduled follow up.     An After Visit Summary and PPPS were made available to the patient.

## 2022-04-15 LAB
ALBUMIN SERPL-MCNC: 4.4 G/DL (ref 3.6–4.6)
ALBUMIN/GLOB SERPL: 1.8 {RATIO} (ref 1.2–2.2)
ALP SERPL-CCNC: 81 IU/L (ref 44–121)
ALT SERPL-CCNC: 24 IU/L (ref 0–32)
AST SERPL-CCNC: 30 IU/L (ref 0–40)
BASOPHILS # BLD AUTO: 0.1 X10E3/UL (ref 0–0.2)
BASOPHILS NFR BLD AUTO: 1 %
BILIRUB SERPL-MCNC: 0.5 MG/DL (ref 0–1.2)
BUN SERPL-MCNC: 26 MG/DL (ref 8–27)
BUN/CREAT SERPL: 26 (ref 12–28)
CALCIUM SERPL-MCNC: 10.3 MG/DL (ref 8.7–10.3)
CHLORIDE SERPL-SCNC: 99 MMOL/L (ref 96–106)
CHOLEST SERPL-MCNC: 151 MG/DL (ref 100–199)
CO2 SERPL-SCNC: 22 MMOL/L (ref 20–29)
CREAT SERPL-MCNC: 0.99 MG/DL (ref 0.57–1)
EGFRCR SERPLBLD CKD-EPI 2021: 57 ML/MIN/1.73
EOSINOPHIL # BLD AUTO: 0.3 X10E3/UL (ref 0–0.4)
EOSINOPHIL NFR BLD AUTO: 5 %
ERYTHROCYTE [DISTWIDTH] IN BLOOD BY AUTOMATED COUNT: 12.6 % (ref 11.7–15.4)
GLOBULIN SER CALC-MCNC: 2.5 G/DL (ref 1.5–4.5)
GLUCOSE SERPL-MCNC: 100 MG/DL (ref 65–99)
HCT VFR BLD AUTO: 41.4 % (ref 34–46.6)
HDLC SERPL-MCNC: 44 MG/DL
HGB BLD-MCNC: 13.7 G/DL (ref 11.1–15.9)
IMM GRANULOCYTES # BLD AUTO: 0 X10E3/UL (ref 0–0.1)
IMM GRANULOCYTES NFR BLD AUTO: 0 %
LDLC SERPL CALC-MCNC: 69 MG/DL (ref 0–99)
LYMPHOCYTES # BLD AUTO: 2.5 X10E3/UL (ref 0.7–3.1)
LYMPHOCYTES NFR BLD AUTO: 37 %
MCH RBC QN AUTO: 31 PG (ref 26.6–33)
MCHC RBC AUTO-ENTMCNC: 33.1 G/DL (ref 31.5–35.7)
MCV RBC AUTO: 94 FL (ref 79–97)
MONOCYTES # BLD AUTO: 0.8 X10E3/UL (ref 0.1–0.9)
MONOCYTES NFR BLD AUTO: 12 %
NEUTROPHILS # BLD AUTO: 3.1 X10E3/UL (ref 1.4–7)
NEUTROPHILS NFR BLD AUTO: 45 %
PLATELET # BLD AUTO: 223 X10E3/UL (ref 150–450)
POTASSIUM SERPL-SCNC: 4.8 MMOL/L (ref 3.5–5.2)
PROT SERPL-MCNC: 6.9 G/DL (ref 6–8.5)
RBC # BLD AUTO: 4.42 X10E6/UL (ref 3.77–5.28)
SODIUM SERPL-SCNC: 136 MMOL/L (ref 134–144)
TRIGL SERPL-MCNC: 233 MG/DL (ref 0–149)
TSH SERPL DL<=0.005 MIU/L-ACNC: 0.73 UIU/ML (ref 0.45–4.5)
VLDLC SERPL CALC-MCNC: 38 MG/DL (ref 5–40)
WBC # BLD AUTO: 6.8 X10E3/UL (ref 3.4–10.8)

## 2022-04-17 PROBLEM — R51.9 DAILY HEADACHE: Status: ACTIVE | Noted: 2022-04-17

## 2022-04-17 NOTE — ASSESSMENT & PLAN NOTE
She has a longstanding history of restless legs, improved with nightly Gabapentin which she is tolerating well.

## 2022-04-17 NOTE — ASSESSMENT & PLAN NOTE
She c/o a persistent occipital headache with lightheadedness, check las and send for MRI to further evaluate

## 2022-04-17 NOTE — ASSESSMENT & PLAN NOTE
BP is well-controlled (sx do not suggest orthostasis) on Metoprolol which she will continue along with a low sodium diet.

## 2022-04-17 NOTE — PROGRESS NOTES
"Chief Complaint  Medicare Wellness-subsequent (/), Dizziness, Headache, and Hypertension    Subjective          Dior Pettit presents to [facility] for f/u regarding dizziness, persistent headache and HTN.    She presents due to persistent dizziness with feeling lightheaded, feels as though she \"is in a body of water.\" She states sx are persistent throughout the day but more severe in the morning. Sx have been recurrent for the past year, has seen cardiology with hx of atrial fibrillation.  She also c/o a persistent dull occipital headache without visual changes,no nausea and/or vomiting.    Objective   Vital Signs:   /76 (BP Location: Left arm, Patient Position: Sitting, Cuff Size: Adult)   Pulse 65   Temp 98.4 °F (36.9 °C) (Temporal)   Ht 170.2 cm (67\")   Wt 66.2 kg (146 lb)   SpO2 97%   BMI 22.87 kg/m²     BMI is within normal parameters. No follow-up required.      Physical Exam  Constitutional:       Appearance: She is well-developed. She is not ill-appearing.   HENT:      Head: Normocephalic.      Right Ear: Hearing, tympanic membrane and external ear normal.      Left Ear: Hearing, tympanic membrane and external ear normal.      Nose: Nose normal. No nasal deformity, mucosal edema or rhinorrhea.      Right Sinus: No maxillary sinus tenderness or frontal sinus tenderness.      Left Sinus: No maxillary sinus tenderness or frontal sinus tenderness.      Mouth/Throat:      Dentition: Normal dentition.   Eyes:      General: Lids are normal.         Right eye: No discharge.         Left eye: No discharge.      Conjunctiva/sclera: Conjunctivae normal.      Right eye: No exudate.     Left eye: No exudate.  Neck:      Thyroid: No thyroid mass or thyromegaly.      Vascular: No carotid bruit.      Trachea: Trachea normal.   Cardiovascular:      Rate and Rhythm: Regular rhythm.      Pulses: Normal pulses.      Heart sounds: Normal heart sounds. No murmur heard.  Pulmonary: "      Effort: No respiratory distress.      Breath sounds: Normal breath sounds. No decreased breath sounds, wheezing, rhonchi or rales.   Abdominal:      General: Bowel sounds are normal.      Palpations: Abdomen is soft.      Tenderness: There is no abdominal tenderness.   Musculoskeletal:      Cervical back: Normal range of motion. No edema.   Lymphadenopathy:      Head:      Right side of head: No submental, submandibular, tonsillar, preauricular, posterior auricular or occipital adenopathy.      Left side of head: No submental, submandibular, tonsillar, preauricular, posterior auricular or occipital adenopathy.   Skin:     General: Skin is warm and dry.      Nails: There is no clubbing.   Neurological:      Mental Status: She is alert.      Sensory: Sensation is intact.      Motor: Motor function is intact.   Psychiatric:         Behavior: Behavior is cooperative.        Result Review :   The following data was reviewed by: TOSHA Houser on 04/14/2022:  Common labs    Common Labsle 9/22/21 9/22/21 12/11/21 12/11/21 4/14/22 4/14/22 4/14/22    1105 1105 1504 1505 0855 0855 0855   Glucose  94  104 (A)  100 (A)    BUN  31 (A)  21  26    Creatinine  1.14 (A)  0.86  0.99    eGFR Non African Am  46 (A)  63      Sodium  135 (A)  135 (A)  136    Potassium  5.1  4.8  4.8    Chloride  100  100  99    Calcium  10.3  10.2  10.3    Total Protein      6.9    Albumin    4.60  4.4    Total Bilirubin    0.4  0.5    Alkaline Phosphatase    73  81    AST (SGOT)    26  30    ALT (SGPT)    21  24    WBC 7.62  7.51  6.8     Hemoglobin 13.0  14.2  13.7     Hematocrit 40.9  44.5  41.4     Platelets 292  279  223     Total Cholesterol       151   Triglycerides       233 (A)   HDL Cholesterol       44   LDL Cholesterol        69   (A) Abnormal value                      Assessment and Plan    Diagnoses and all orders for this visit:    1. Dizziness (Primary)  Assessment & Plan:  She c/o persistent episodes of feeling lightheaded  and off balance, will obtain MRI to further evaluate. Evaluated by cardiology, atrial fibrillation symptoms stable.    Orders:  -     MRI Brain With & Without Contrast; Future  -     Ambulatory Referral to Physical Therapy Evaluate and treat, Vestibular    2. Daily headache  Assessment & Plan:  She c/o a persistent occipital headache with lightheadedness, check las and send for MRI to further evaluate      Orders:  -     MRI Brain With & Without Contrast; Future    3. Restless leg syndrome  Assessment & Plan:  She has a longstanding history of restless legs, improved with nightly Gabapentin which she is tolerating well.    Orders:  -     gabapentin (NEURONTIN) 600 MG tablet; Take 0.5 tablets by mouth Every Night.  Dispense: 90 tablet; Refill: 1    4. Essential hypertension  Assessment & Plan:  BP is well-controlled (sx do not suggest orthostasis) on Metoprolol which she will continue along with a low sodium diet.    Orders:  -     CBC & Differential  -     Comprehensive Metabolic Panel  -     TSH    5. Pure hypercholesterolemia  Assessment & Plan:  She denies myalgias with atorvastatin which she will continue along with a low-fat, low-cholesterol diet.      Orders:  -     Lipid Panel    6. Controlled type 2 diabetes mellitus with complication, without long-term current use of insulin (HCC)  Assessment & Plan:  She is currently managed on Metformin along with a low-carb, low-sugar diet; recheck A1c.    Orders:  -     Hemoglobin A1c    ALBAN query complete. Treatment plan to include limited course of prescribed  controlled substance. Risks including addiction, benefits, and alternatives presented to patient.       Follow Up   Return if symptoms worsen or fail to improve, for Next scheduled follow up.  Patient was given instructions and counseling regarding her condition or for health maintenance advice. Please see specific information pulled into the AVS if appropriate.

## 2022-04-17 NOTE — ASSESSMENT & PLAN NOTE
She c/o persistent episodes of feeling lightheaded and off balance, will obtain MRI to further evaluate. Evaluated by cardiology, atrial fibrillation symptoms stable.

## 2022-04-17 NOTE — ASSESSMENT & PLAN NOTE
She denies myalgias with atorvastatin which she will continue along with a low-fat, low-cholesterol diet.

## 2022-04-17 NOTE — PATIENT INSTRUCTIONS
Medicare Wellness  Personal Prevention Plan of Service     Date of Office Visit:    Encounter Provider:  TOSHA Houser  Place of Service:  [facility]  Patient Name: Dior Pettit  :  1940    As part of the Medicare Wellness portion of your visit today, we are providing you with this personalized preventive plan of services (PPPS). This plan is based upon recommendations of the [place] Preventive Services Task Force (USPSTF) and the Advisory Committee on Immunization Practices (ACIP).    This lists the preventive care services that should be considered, and provides dates of when you are due. Items listed as completed are up-to-date and do not require any further intervention.    Health Maintenance   Topic Date Due    TDAP/TD VACCINES (2 - Tdap) 2004    ZOSTER VACCINE (2 of 3) 2012    URINE MICROALBUMIN  2021    ANNUAL WELLNESS VISIT  10/27/2021    DIABETIC EYE EXAM  2021    HEMOGLOBIN A1C  2022    INFLUENZA VACCINE  2022    LIPID PANEL  2023    COVID-19 Vaccine  Completed    Pneumococcal Vaccine 65+  Completed    DXA SCAN  Discontinued    COLORECTAL CANCER SCREENING  Discontinued       Orders Placed This Encounter   Procedures    MRI Brain With & Without Contrast     Standing Status:   Future     Standing Expiration Date:   2023    Comprehensive Metabolic Panel     Order Specific Question:   Release to patient     Answer:   Immediate     Order Specific Question:   [facility] Has the patient fasted?     Answer:   No    Lipid Panel     Order Specific Question:   [facility] Has the patient fasted?     Answer:   No    TSH     Order Specific Question:   Release to patient     Answer:   Immediate     Order Specific Question:   [facility] Has the patient fasted?     Answer:   No    Hemoglobin A1c     Order Specific Question:   Release to patient     Answer:   Immediate    Ambulatory Referral to Physical Therapy Evaluate and  treat, Vestibular     Referral Priority:   Routine     Referral Type:   Physical Therapy     Referral Reason:   Specialty Services Required     Requested Specialty:   Physical Therapy     Number of Visits Requested:   1    CBC & Differential     Order Specific Question:   Manual Differential     Answer:   No     Order Specific Question:   [facility] Has the patient fasted?     Answer:   No       Return if symptoms worsen or fail to improve, for Next scheduled follow up.           CARDIOLOGY NP PROGRESS NOTE    Subjective: Pt seen and examined at bedside. Reports feeling well, still has mild dry cough. Denies chest pain, sob, lightheadedness, dizziness, palpitations.  Remainder ROS otherwise negative.    Overnight Events: None    TELEMETRY: SR 80-100s        VITAL SIGNS:  T(C): 36.8 (08-14-18 @ 05:13), Max: 37.2 (08-13-18 @ 14:12)  HR: 100 (08-14-18 @ 06:36) (94 - 106)  BP: 99/66 (08-14-18 @ 06:36) (90/57 - 127/81)  RR: 16 (08-14-18 @ 06:36) (16 - 19)  SpO2: 95% (08-13-18 @ 20:56) (95% - 100%)  Wt(kg): --    I&O's Summary    13 Aug 2018 07:01  -  14 Aug 2018 07:00  --------------------------------------------------------  IN: 390 mL / OUT: 200 mL / NET: 190 mL          PHYSICAL EXAM:    General: A/ox 3, No acute Distress. wearing Lifevest   Neck: Supple, NO JVD  Cardiac: S1 S2, No M/R/G  Pulmonary: R basilar wheezing, Breathing unlabored, No Rhonchi/Rales   Abdomen: Soft, Non -tender, +BS x 4 quads  Extremities: No Rashes, No edema  Neuro: A/o x 3, No focal deficits              LABS:                          8.5    4.5   )-----------( 133      ( 13 Aug 2018 06:12 )             27.5                              08-13    141  |  101  |  21  ----------------------------<  136<H>  4.2   |  24  |  1.85<H>    Ca    9.2      13 Aug 2018 06:12  Mg     1.7     08-13                                CAPILLARY BLOOD GLUCOSE      POCT Blood Glucose.: 119 mg/dL (14 Aug 2018 07:19)  POCT Blood Glucose.: 172 mg/dL (13 Aug 2018 22:22)  POCT Blood Glucose.: 148 mg/dL (13 Aug 2018 15:59)  POCT Blood Glucose.: 198 mg/dL (13 Aug 2018 11:10)            Allergies:  No Known Allergies    MEDICATIONS  (STANDING):  ALBUTerol/ipratropium for Nebulization. 3 milliLiter(s) Nebulizer once  aspirin  chewable 81 milliGRAM(s) Oral daily  atorvastatin 80 milliGRAM(s) Oral at bedtime  cefTRIAXone   IVPB 1 Gram(s) IV Intermittent every 24 hours  cefTRIAXone   IVPB      dextrose 5%. 1000 milliLiter(s) (50 mL/Hr) IV Continuous <Continuous>  dextrose 50% Injectable 12.5 Gram(s) IV Push once  dextrose 50% Injectable 25 Gram(s) IV Push once  dextrose 50% Injectable 25 Gram(s) IV Push once  docusate sodium 100 milliGRAM(s) Oral daily  escitalopram 5 milliGRAM(s) Oral daily  furosemide    Tablet 20 milliGRAM(s) Oral daily  insulin lispro (HumaLOG) corrective regimen sliding scale   SubCutaneous three times a day before meals  insulin lispro (HumaLOG) corrective regimen sliding scale   SubCutaneous at bedtime  metoprolol tartrate 50 milliGRAM(s) Oral two times a day  pantoprazole   Suspension 40 milliGRAM(s) Oral two times a day before meals  polyethylene glycol 3350 17 Gram(s) Oral daily  senna 1 Tablet(s) Oral daily  sodium chloride 0.9% lock flush 20 milliLiter(s) IV Push once  sucralfate 1 Gram(s) Oral two times a day    MEDICATIONS  (PRN):  dextrose 40% Gel 15 Gram(s) Oral once PRN Blood Glucose LESS THAN 70 milliGRAM(s)/deciliter  glucagon  Injectable 1 milliGRAM(s) IntraMuscular once PRN Glucose LESS THAN 70 milligrams/deciliter  guaiFENesin    Syrup 100 milliGRAM(s) Oral every 6 hours PRN Cough  ondansetron Injectable 4 milliGRAM(s) IV Push every 6 hours PRN Nausea and/or Vomiting  sodium chloride 0.9% lock flush 10 milliLiter(s) IV Push every 1 hour PRN After each medication administration  sodium chloride 0.9% lock flush 10 milliLiter(s) IV Push every 12 hours PRN Lumen of catheter NOT used        DIAGNOSTIC TESTS:

## 2022-04-19 LAB
HBA1C MFR BLD: 5.4 % (ref 4.8–5.6)
Lab: NORMAL
WRITTEN AUTHORIZATION: NORMAL

## 2022-04-21 LAB — REF LAB TEST METHOD: NORMAL

## 2022-05-11 ENCOUNTER — TELEPHONE (OUTPATIENT)
Dept: INTERNAL MEDICINE | Facility: CLINIC | Age: 82
End: 2022-05-11

## 2022-05-11 RX ORDER — ATORVASTATIN CALCIUM 20 MG/1
20 TABLET, FILM COATED ORAL NIGHTLY
Qty: 90 TABLET | Refills: 1 | Status: SHIPPED | OUTPATIENT
Start: 2022-05-11 | End: 2023-01-06

## 2022-05-11 NOTE — TELEPHONE ENCOUNTER
Caller: Dior Pettit    Relationship to patient: Self    Best call back number: [phone]    Type of visit: OFFICE    Requested date: 6/20 AROUND 1045 WHEN HER SISTER IS COMING IN (DENISE PARKER)    Additional notes: PATIENT NEEDS TO COME IN AT THE SAME TIME AS HER SISTER, SHE HAD TO CANCEL HER APPT ON 5/16 AND WANTS TO WAIT UNTIL AFTER SHE GETS HER MRI. PLEASE ADVISE.

## 2022-05-11 NOTE — TELEPHONE ENCOUNTER
Caller: Dior Pettit    Relationship: Self    Best call back number: [phone]    What medication are you requesting: TRIGLICERIDES    Have you had these symptoms before:    [x] Yes  [] No    Have you been treated for these symptoms before:   [x] Yes  [] No    If a prescription is needed, what is your preferred pharmacy and phone number: [facility] - [place], [place] - [address] AT PORTAL TO [facility] [phone] [facility] [phone] FX     Additional notes: PLEASE ADVISE, NOT ON MED LIST

## 2022-05-12 ENCOUNTER — TELEPHONE (OUTPATIENT)
Dept: INTERNAL MEDICINE | Facility: CLINIC | Age: 82
End: 2022-05-12

## 2022-05-12 NOTE — TELEPHONE ENCOUNTER
Patient called into the hub asking to try fenofibrate. She said she wanted to replace the atorvastatin because of the dizziness she has.

## 2022-05-13 NOTE — TELEPHONE ENCOUNTER
Atorvastatin and Fenofibrate are not similar medications (atorvastatin lowers LDL and fenofibrate lowers triglycerides). If she thinks atorvastatin is contributing to her dizziness please ask her to hold the medication for 2 weeks and see if her symptoms improve. Please ask her to call us with an update of her symptoms. Thanks.

## 2022-05-23 DIAGNOSIS — F41.9 ANXIETY: ICD-10-CM

## 2022-05-23 RX ORDER — VENLAFAXINE HYDROCHLORIDE 150 MG/1
CAPSULE, EXTENDED RELEASE ORAL
Qty: 90 CAPSULE | Refills: 1 | Status: ON HOLD | OUTPATIENT
Start: 2022-05-23 | End: 2022-11-14

## 2022-05-25 ENCOUNTER — HOSPITAL ENCOUNTER (OUTPATIENT)
Dept: MRI IMAGING | Facility: HOSPITAL | Age: 82
Discharge: HOME | End: 2022-05-25
Admitting: NURSE PRACTITIONER

## 2022-05-25 DIAGNOSIS — R51.9 DAILY HEADACHE: ICD-10-CM

## 2022-05-25 DIAGNOSIS — R42 DIZZINESS: ICD-10-CM

## 2022-05-25 PROCEDURE — A9577 INJ MULTIHANCE: HCPCS | Performed by: NURSE PRACTITIONER

## 2022-05-25 PROCEDURE — 0 GADOBENATE DIMEGLUMINE 529 MG/ML SOLUTION: Performed by: NURSE PRACTITIONER

## 2022-05-25 PROCEDURE — 70553 MRI BRAIN STEM W/O & W/DYE: CPT

## 2022-05-25 RX ADMIN — GADOBENATE DIMEGLUMINE 14 ML: 529 INJECTION, SOLUTION INTRAVENOUS at 16:35

## 2022-05-31 ENCOUNTER — TELEPHONE (OUTPATIENT)
Dept: INTERNAL MEDICINE | Facility: CLINIC | Age: 82
End: 2022-05-31

## 2022-05-31 NOTE — TELEPHONE ENCOUNTER
Caller: Dior Pettit    Relationship: Self    Best call back number: [phone]      Caller requesting test results: PATIENT    What test was performed: MRI    When was the test performed: LAST WEEK OR SO        Additional notes: PATIENT WOULD LIKE CALLBACK TO DISCUSS RESULTS.

## 2022-06-03 NOTE — TELEPHONE ENCOUNTER
Caller: Dior Pettit    Relationship: Self    Best call back number: [phone]    Caller requesting test results: SELF    What test was performed: MRI    When was the test performed: 5/25/2022    Where was the test performed: [facility]    Additional notes: PATIENT STATES SHE IS CALLING BACK TO GET THE RESULTS FROM THE MRI

## 2022-06-20 ENCOUNTER — OFFICE VISIT (OUTPATIENT)
Dept: INTERNAL MEDICINE | Facility: CLINIC | Age: 82
End: 2022-06-20

## 2022-06-20 VITALS
HEART RATE: 67 BPM | BODY MASS INDEX: 23.21 KG/M2 | TEMPERATURE: 98 F | DIASTOLIC BLOOD PRESSURE: 82 MMHG | WEIGHT: 148.2 LBS | SYSTOLIC BLOOD PRESSURE: 134 MMHG | OXYGEN SATURATION: 97 %

## 2022-06-20 DIAGNOSIS — E78.00 HYPERCHOLESTEREMIA: ICD-10-CM

## 2022-06-20 DIAGNOSIS — I10 ESSENTIAL HYPERTENSION: Chronic | ICD-10-CM

## 2022-06-20 DIAGNOSIS — E11.8 CONTROLLED TYPE 2 DIABETES MELLITUS WITH COMPLICATION, WITHOUT LONG-TERM CURRENT USE OF INSULIN: Primary | Chronic | ICD-10-CM

## 2022-06-20 DIAGNOSIS — E03.9 ACQUIRED HYPOTHYROIDISM: Chronic | ICD-10-CM

## 2022-06-20 DIAGNOSIS — E78.00 PURE HYPERCHOLESTEROLEMIA: Chronic | ICD-10-CM

## 2022-06-20 PROCEDURE — 99214 OFFICE O/P EST MOD 30 MIN: CPT | Performed by: NURSE PRACTITIONER

## 2022-06-20 RX ORDER — FENOFIBRATE 134 MG/1
134 CAPSULE ORAL
Qty: 90 CAPSULE | Refills: 3 | Status: SHIPPED | OUTPATIENT
Start: 2022-06-20

## 2022-06-20 RX ORDER — IPRATROPIUM BROMIDE 42 UG/1
SPRAY, METERED NASAL
Status: ON HOLD | COMMUNITY
Start: 2022-06-07 | End: 2022-09-11

## 2022-06-20 NOTE — PROGRESS NOTES
"Chief Complaint  Diabetes, Hypertension, and Hypothyroidism    Subjective        Dior Pettit presents to [facility] for f/u regarding DM2, HTN and hypothyroidism.    Her metformin was decreased from 2 to 1 tablet daily in April due to elevated A1c of 5.4.  She has brought her glucose readings which range from  in the morning.  Her postprandial glucose readings are 140-160.  She does try to remain active and follow a low-carb, low sugar diet.  Her recent MRI of the brain performed due to headaches and dizziness did not show any acute abnormalities other than chronic small infarcts.      Objective   Vital Signs:  /82 (BP Location: Left arm, Patient Position: Sitting, Cuff Size: Adult)   Pulse 67   Temp 98 °F (36.7 °C) (Temporal)   Wt 67.2 kg (148 lb 3.2 oz)   SpO2 97%   BMI 23.21 kg/m²   Estimated body mass index is 23.21 kg/m² as calculated from the following:    Height as of 4/14/22: 170.2 cm (67\").    Weight as of this encounter: 67.2 kg (148 lb 3.2 oz).    BMI is within normal parameters. No other follow-up for BMI required.      Physical Exam  Constitutional:       Appearance: She is well-developed. She is not ill-appearing.   HENT:      Head: Normocephalic.      Right Ear: Hearing, tympanic membrane and external ear normal.      Left Ear: Hearing, tympanic membrane and external ear normal.      Nose: Nose normal. No nasal deformity, mucosal edema or rhinorrhea.      Right Sinus: No maxillary sinus tenderness or frontal sinus tenderness.      Left Sinus: No maxillary sinus tenderness or frontal sinus tenderness.      Mouth/Throat:      Dentition: Normal dentition.   Eyes:      General: Lids are normal.         Right eye: No discharge.         Left eye: No discharge.      Conjunctiva/sclera: Conjunctivae normal.      Right eye: No exudate.     Left eye: No exudate.  Neck:      Thyroid: No thyroid mass or thyromegaly.      Vascular: No carotid bruit.      " Trachea: Trachea normal.   Cardiovascular:      Rate and Rhythm: Regular rhythm.      Pulses: Normal pulses.      Heart sounds: Normal heart sounds. No murmur heard.  Pulmonary:      Effort: No respiratory distress.      Breath sounds: Normal breath sounds. No decreased breath sounds, wheezing, rhonchi or rales.   Abdominal:      General: Bowel sounds are normal.      Palpations: Abdomen is soft.      Tenderness: There is no abdominal tenderness.   Musculoskeletal:      Cervical back: Normal range of motion. No edema.   Lymphadenopathy:      Head:      Right side of head: No submental, submandibular, tonsillar, preauricular, posterior auricular or occipital adenopathy.      Left side of head: No submental, submandibular, tonsillar, preauricular, posterior auricular or occipital adenopathy.   Skin:     General: Skin is warm and dry.      Nails: There is no clubbing.   Neurological:      Mental Status: She is alert.   Psychiatric:         Behavior: Behavior is cooperative.        Result Review :  The following data was reviewed by: TOSHA Houser on 06/20/2022:  Common labs    Common Labsle 9/22/21 9/22/21 12/11/21 12/11/21 4/14/22 4/14/22 4/14/22 4/14/22    1105 1105 1504 1505 0855 0855 0855 0855   Glucose  94  104 (A)  100 (A)     BUN  31 (A)  21  26     Creatinine  1.14 (A)  0.86  0.99     eGFR Non African Am  46 (A)  63       Sodium  135 (A)  135 (A)  136     Potassium  5.1  4.8  4.8     Chloride  100  100  99     Calcium  10.3  10.2  10.3     Total Protein      6.9     Albumin    4.60  4.4     Total Bilirubin    0.4  0.5     Alkaline Phosphatase    73  81     AST (SGOT)    26  30     ALT (SGPT)    21  24     WBC 7.62  7.51  6.8      Hemoglobin 13.0  14.2  13.7      Hematocrit 40.9  44.5  41.4      Platelets 292  279  223      Total Cholesterol       151    Triglycerides       233 (A)    HDL Cholesterol       44    LDL Cholesterol        69    Hemoglobin A1C        5.4   (A) Abnormal value       Comments  are available for some flowsheets but are not being displayed.                     Assessment and Plan   Diagnoses and all orders for this visit:    1. Controlled type 2 diabetes mellitus with complication, without long-term current use of insulin (HCC) (Primary)  Assessment & Plan:  Recheck A1c since lowering Metformin dose; recheck A1c today.    Orders:  -     Hemoglobin A1c    2. Hypercholesteremia  Assessment & Plan:  She denies myalgias with atorvastatin which she will continue along with a low-fat, low-cholesterol diet.      Orders:  -     fenofibrate micronized (LOFIBRA) 134 MG capsule; Take 1 capsule by mouth Every Morning Before Breakfast.  Dispense: 90 capsule; Refill: 3  -     Comprehensive Metabolic Panel  -     Lipid Panel    3. Essential hypertension  Assessment & Plan:  Blood pressure is well controlled on metoprolol which she will continue along with a low-sodium diet.    Orders:  -     CBC & Differential    4. Pure hypercholesterolemia  Assessment & Plan:  She denies myalgias with atorvastatin which she will continue along with a low-fat, low-cholesterol diet.        5. Acquired hypothyroidism  Assessment & Plan:  TSH 0.728 with recent labs, continue current Synthroid dose           Follow Up   Return in about 4 months (around 10/20/2022).  Patient was given instructions and counseling regarding her condition or for health maintenance advice. Please see specific information pulled into the AVS if appropriate.

## 2022-06-21 NOTE — ASSESSMENT & PLAN NOTE
Blood pressure is well controlled on metoprolol which she will continue along with a low-sodium diet.

## 2022-07-04 DIAGNOSIS — E11.9 DIABETES MELLITUS WITHOUT COMPLICATION: ICD-10-CM

## 2022-07-04 DIAGNOSIS — M25.50 MULTIPLE JOINT PAIN: ICD-10-CM

## 2022-07-05 RX ORDER — BACLOFEN 10 MG/1
TABLET ORAL
Qty: 90 TABLET | Refills: 3 | Status: SHIPPED | OUTPATIENT
Start: 2022-07-05 | End: 2022-07-11 | Stop reason: SDUPTHER

## 2022-07-05 RX ORDER — METFORMIN HYDROCHLORIDE 750 MG/1
TABLET, EXTENDED RELEASE ORAL
Qty: 180 TABLET | Refills: 3 | Status: SHIPPED | OUTPATIENT
Start: 2022-07-05 | End: 2022-07-11 | Stop reason: SDUPTHER

## 2022-07-11 ENCOUNTER — TELEPHONE (OUTPATIENT)
Dept: INTERNAL MEDICINE | Facility: CLINIC | Age: 82
End: 2022-07-11

## 2022-07-11 DIAGNOSIS — E11.9 DIABETES MELLITUS WITHOUT COMPLICATION: ICD-10-CM

## 2022-07-11 DIAGNOSIS — M25.50 MULTIPLE JOINT PAIN: ICD-10-CM

## 2022-07-11 RX ORDER — BACLOFEN 10 MG/1
10 TABLET ORAL 3 TIMES DAILY
Qty: 90 TABLET | Refills: 3 | Status: SHIPPED | OUTPATIENT
Start: 2022-07-11 | End: 2023-03-31 | Stop reason: ALTCHOICE

## 2022-07-11 RX ORDER — METFORMIN HYDROCHLORIDE 750 MG/1
750 TABLET, EXTENDED RELEASE ORAL 2 TIMES DAILY
Qty: 180 TABLET | Refills: 3 | Status: SHIPPED | OUTPATIENT
Start: 2022-07-11

## 2022-07-11 NOTE — TELEPHONE ENCOUNTER
Caller: Dior Pettit    Relationship: Self    Best call back number:[phone]    What is the best time to reach you: ANYTIME    Who are you requesting to speak with (clinical staff, provider,  specific staff member): CLINICAL STAFF    Do you know the name of the person who called: SELF      What was the call regarding: PATIENT CALLED AND STATED SHE MISPLACED HER PRESCRIPTIONS AND WOULD LIKE NEW ONES RESENT. PATIENT STATED SHE IS OUT OF MEDICATION.  PATIENT STATED THE MEDICINE IS METFORMIN AND BACLOFEN.     Do you require a callback: YES

## 2022-07-27 ENCOUNTER — TELEPHONE (OUTPATIENT)
Dept: INTERNAL MEDICINE | Facility: CLINIC | Age: 82
End: 2022-07-27

## 2022-07-27 NOTE — TELEPHONE ENCOUNTER
Patient was calling in to see if there was a [facility] closer to her home. I could not find one. Patient will come in office.

## 2022-08-16 RX ORDER — DOXEPIN HYDROCHLORIDE 50 MG/1
CAPSULE ORAL
Qty: 90 CAPSULE | Refills: 0 | Status: SHIPPED | OUTPATIENT
Start: 2022-08-16 | End: 2022-09-20

## 2022-08-19 ENCOUNTER — TELEPHONE (OUTPATIENT)
Dept: CARDIOLOGY | Facility: CLINIC | Age: 82
End: 2022-08-19

## 2022-08-19 RX ORDER — METOPROLOL TARTRATE 50 MG/1
100 TABLET, FILM COATED ORAL EVERY 12 HOURS
Qty: 270 TABLET | Refills: 0
Start: 2022-08-19 | End: 2022-09-12 | Stop reason: HOSPADM

## 2022-08-19 NOTE — TELEPHONE ENCOUNTER
----- Message from TOSHA Jenkins sent at 8/19/2022  3:43 PM EDT -----  Please get patient an appointment with the nurse practitioner next week

## 2022-08-19 NOTE — TELEPHONE ENCOUNTER
I called patient about her blood pressure readings.      Pt left a VM stating that for the last couple of days she has been SOA.  She states last night she walked from her bedroom to the kitchen and she was out of breath.  She states that on the 15th her BP was 175/89, on the 18th in the am it was 132/93  128 and this morning it was 141/106  98.  She states she took an extra BP pill this morning @0200.    Please advise

## 2022-08-26 ENCOUNTER — TELEPHONE (OUTPATIENT)
Dept: CARDIOLOGY | Facility: CLINIC | Age: 82
End: 2022-08-26

## 2022-08-26 NOTE — TELEPHONE ENCOUNTER
Pt called re: SOA for 1 week.    Palpitations-slightly better since increased Metoprolol   SOA-same  No edema  Dizziness-same  No chest pain    BP was 131/96 with 107    Last night pulse was 123.    Current Meds:  Metoprolol Tartrate 50mg simg BID  Eliquis 5mg BID    Question is can she see APRN for EP sooner vs waiting another week for Tamara? She really feels this is more afib than BP that is causing SOA.

## 2022-08-29 ENCOUNTER — OFFICE VISIT (OUTPATIENT)
Dept: CARDIOLOGY | Facility: CLINIC | Age: 82
End: 2022-08-29

## 2022-08-29 VITALS
HEART RATE: 94 BPM | BODY MASS INDEX: 22.91 KG/M2 | DIASTOLIC BLOOD PRESSURE: 82 MMHG | WEIGHT: 146 LBS | HEIGHT: 67 IN | SYSTOLIC BLOOD PRESSURE: 144 MMHG | OXYGEN SATURATION: 94 %

## 2022-08-29 DIAGNOSIS — I34.2 NONRHEUMATIC MITRAL VALVE STENOSIS: Chronic | ICD-10-CM

## 2022-08-29 DIAGNOSIS — E11.8 CONTROLLED TYPE 2 DIABETES MELLITUS WITH COMPLICATION, WITHOUT LONG-TERM CURRENT USE OF INSULIN: Chronic | ICD-10-CM

## 2022-08-29 DIAGNOSIS — G47.33 OSA (OBSTRUCTIVE SLEEP APNEA): Chronic | ICD-10-CM

## 2022-08-29 DIAGNOSIS — I48.0 PAF (PAROXYSMAL ATRIAL FIBRILLATION): Chronic | ICD-10-CM

## 2022-08-29 DIAGNOSIS — I25.119 CORONARY ARTERY DISEASE INVOLVING NATIVE CORONARY ARTERY WITH ANGINA PECTORIS, UNSPECIFIED WHETHER NATIVE OR TRANSPLANTED HEART: Primary | Chronic | ICD-10-CM

## 2022-08-29 DIAGNOSIS — I10 ESSENTIAL HYPERTENSION: Chronic | ICD-10-CM

## 2022-08-29 DIAGNOSIS — E78.00 HYPERCHOLESTEREMIA: Chronic | ICD-10-CM

## 2022-08-29 DIAGNOSIS — I27.20 PULMONARY HYPERTENSION: ICD-10-CM

## 2022-08-29 DIAGNOSIS — I71.20 THORACIC AORTIC ANEURYSM WITHOUT RUPTURE: Chronic | ICD-10-CM

## 2022-08-29 DIAGNOSIS — Z95.2 S/P AVR: ICD-10-CM

## 2022-08-29 PROCEDURE — 93000 ELECTROCARDIOGRAM COMPLETE: CPT | Performed by: NURSE PRACTITIONER

## 2022-08-29 PROCEDURE — 99214 OFFICE O/P EST MOD 30 MIN: CPT | Performed by: NURSE PRACTITIONER

## 2022-08-29 RX ORDER — DIGOXIN 125 MCG
125 TABLET ORAL DAILY
Qty: 30 TABLET | Refills: 11 | Status: SHIPPED | OUTPATIENT
Start: 2022-08-29 | End: 2022-09-12 | Stop reason: HOSPADM

## 2022-08-29 NOTE — PROGRESS NOTES
Date of Office Visit: 2022  Encounter Provider: TOSHA Jenkins  Place of Service: [facility]  Patient Name: Dior Pettit  :1940    Chief Complaint   Patient presents with   • Follow-up   • Coronary Artery Disease   :     HPI: Dior Pettit is a 82 y.o. female who is a patient of  Dr. Mitchell and is new to me today.  She has a history of aortic valve replacement  at that time she had a cath that showed no significant coronary disease.  EF was hyperdynamic at 74%.  In  she had some increased gradient across the prosthetic valve with prosthetic valve stenosis.  There is severe mitral annular calcification with trace MR.  She had a stress test last in  that was unremarkable.  Her acing aorta has measured 3.9 cm in the past the last contrast CT done in 2021 was 4 cm.  She also has a history of paroxysmal A. fib and underwent BRAULIO guided cardioversion in 2021 and was started on amiodarone and Eliquis.  Amiodarone had to be stopped due to ongoing dizziness.  She was last in the office in March she was having continued dizziness that had returned.  She had also been having fleeting palpitations.  We referred her for possible ENT evaluation.  And she is here for follow-up today she was in sinus rhythm at her last visit.    For the last week she has been having palpitations.  Her heart has been racing and she is not feeling well.  Her dizziness is improving since she has been off the amiodarone.  Previous testing and notes have been reviewed by me.   Past Medical History:   Diagnosis Date   • Allergic rhinitis    • Anemia    • Anxiety     Controlled w/Meds   • Aortic root dilatation (HCC) 10/02/2017    Borderline--Noted on Echo   • Aortic valve calcification 10/02/2017    Noted on Echo   • Aortic valve insufficiency Dx in 07    w/AVR    • Aortic valve prosthesis present 2018    Noted on CTA Chest   • Ascending aorta dilatation  (HCC) 10/02/2017    Borderline--Noted on Echo   • Asthma    • Atypical chest pain    • Breast pain, right Hx   • CAD (coronary artery disease)    • Cardiomegaly 2017   • Cervicalgia    • Chest pain due to CAD ([facility])    • 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    Borderline--Noted on Echo & Measured @ 2.6CM and Mid Descending @ 2.5CM on CTA Chest-11/2/18   • DM (diabetes mellitus) ([facility])     T2   • Esophagitis    • GERD (gastroesophageal reflux disease)     Controlled w/Meds   • Headache    • Health care maintenance    • Heart murmur    • History of echocardiogram 10/2/17-[facility]    EF 66%; Borderline Concentric Hypertrophy; Mild Calcification in AV; Mild AVS; Mild AVR; Moderate TVR; Borderline Dilation of Aortic Root/Arch & Borderline Dilation of Ascending/Proximal Aorta Present   • Hyperlipidemia     Controlled w/Meds   • Hypertension     Controlled w/Meds   • Hypothyroidism     Controlled w/Synthroid   • Insomnia    • Macular degeneration, left eye    • Mild aortic valve regurgitation 10/02/2017    Noted on Echo   • Mild aortic valve stenosis 10/02/2017    Noted on Echo   • Mild dilation of ascending aorta ([facility]) 10/02/2017    Borderline--Noted on Echo   • Moderate tricuspid valve regurgitation 10/02/2017    Noted on Echo   • Mood disorder in conditions classified elsewhere     Controlled w/Meds   • Near syncope 03/2017-[facility] ER   • Neuropathy    • NNEKA (obstructive sleep apnea)     Untreated   • Osteoarthritis     Ankles/Feet   • Pulmonary hypertension ([facility]) 2017   • Renal insufficiency    • RLS (restless legs syndrome)    • Thoracic ascending aortic aneurysm ([facility]) Dx in 2007 @ 3.8CM & 1/02/2018    Noted @ 4CM on CTA Chest;   • Visual impairment     macular degeneratuin       Past Surgical History:   Procedure Laterality Date   • AORTIC VALVE REPAIR/REPLACEMENT  2007    Dr. Perry   • APPENDECTOMY     • AUGMENTATION MAMMAPLASTY  1976   • BREAST AUGMENTATION  2014   • BUNIONECTOMY     • CARDIAC CATHETERIZATION  2007    • CARDIAC VALVE REPLACEMENT  2007    porcine   • COLONOSCOPY  2010   • COLONOSCOPY  03/02/2012   • EYE SURGERY      cataracts and ens implants   • HYSTERECTOMY  1972   • SIGMOIDOSCOPY  2001       Social History     Socioeconomic History   • Marital status:    Tobacco Use   • Smoking status: Never Smoker   • Smokeless tobacco: Never Used   • Tobacco comment: caffeine use - 1 cup coffee daily    Vaping Use   • Vaping Use: Never used   Substance and Sexual Activity   • Alcohol use: Yes     Comment: less than 1 glass of wine   • Drug use: Never   • Sexual activity: Not Currently     Partners: Male     Birth control/protection: Surgical     Comment: Hyst       Family History   Problem Relation Age of Onset   • Hypertension Mother    • Stroke Mother    • Cancer Mother    • Diabetes Sister    • Coronary artery disease Brother    • Diabetes Brother    • Valvular heart disease Brother         TAVR   • Coronary artery disease Brother    • Cancer Brother    • Birth defects Daughter    • Skin cancer Neg Hx        Review of Systems   Constitutional: Negative for diaphoresis and malaise/fatigue.   Cardiovascular: Positive for irregular heartbeat and palpitations. Negative for chest pain, claudication, dyspnea on exertion, leg swelling, near-syncope, orthopnea, paroxysmal nocturnal dyspnea and syncope.   Respiratory: Negative for cough, shortness of breath and sleep disturbances due to breathing.    Musculoskeletal: Negative for falls.   Neurological: Negative for dizziness and weakness.   Psychiatric/Behavioral: Negative for altered mental status and substance abuse.       No Known Allergies      Current Outpatient Medications:   •  albuterol sulfate  (90 Base) MCG/ACT inhaler, Every 6 (Six) Hours As Needed., Disp: , Rfl:   •  ascorbic acid (VITAMIN C) 1000 MG tablet, Take 1,000 mg by mouth Daily., Disp: , Rfl:   •  atorvastatin (LIPITOR) 20 MG tablet, Take 1 tablet by mouth Every Night., Disp: 90 tablet, Rfl:  1  •  baclofen (LIORESAL) 10 MG tablet, Take 1 tablet by mouth 3 (Three) Times a Day., Disp: 90 tablet, Rfl: 3  •  Blood Glucose Monitoring Suppl (Prodigy No Coding Blood Gluc) w/Device kit, 1 each Daily., Disp: 1 kit, Rfl: 0  •  doxepin (SINEquan) 50 MG capsule, TAKE 1 CAPSULE EVERY NIGHT, Disp: 90 capsule, Rfl: 0  •  Eliquis 5 MG tablet tablet, TAKE 1 TABLET EVERY 12     HOURS FOR ATRIAL           FIBRILLATION, Disp: 180 tablet, Rfl: 1  •  EPINEPHrine (EPIPEN) 0.3 MG/0.3ML solution auto-injector injection, , Disp: , Rfl:   •  fenofibrate micronized (LOFIBRA) 134 MG capsule, Take 1 capsule by mouth Every Morning Before Breakfast., Disp: 90 capsule, Rfl: 3  •  gabapentin (NEURONTIN) 600 MG tablet, Take 0.5 tablets by mouth Every Night., Disp: 90 tablet, Rfl: 1  •  glucosamine-chondroitin 500-400 MG capsule capsule, Take 2 capsules by mouth Daily., Disp: , Rfl:   •  glucose blood (Prodigy No Coding Blood Gluc) test strip, TEST BLOOD SUGAR DAILY, Disp: 50 each, Rfl: 3  •  ipratropium (ATROVENT) 0.06 % nasal spray, INSTILL 2 SPRAYS IN EACH NOSTRIL EVERY SIX HOURS AS NEEDED, Disp: , Rfl:   •  levothyroxine (SYNTHROID, LEVOTHROID) 50 MCG tablet, TAKE 1 TABLET EVERY OTHER  DAY, Disp: 45 tablet, Rfl: 1  •  levothyroxine (SYNTHROID, LEVOTHROID) 75 MCG tablet, TAKE 1 TABLET EVERY OTHER  DAY. OVERDUE FOR           APPOINTMENT AND FASTING    LABS., Disp: 45 tablet, Rfl: 3  •  metFORMIN ER (GLUCOPHAGE-XR) 750 MG 24 hr tablet, Take 1 tablet by mouth 2 (Two) Times a Day., Disp: 180 tablet, Rfl: 3  •  metoprolol tartrate (LOPRESSOR) 50 MG tablet, Take 2 tablets by mouth Every 12 (Twelve) Hours., Disp: 270 tablet, Rfl: 0  •  Multiple Vitamins-Minerals (MULTIVITAL PO), Take  by mouth Daily., Disp: , Rfl:   •  Prodigy Safety Lancets 26G misc, CHECK BLOOD SUGAR ONE TIME PER DAY, Disp: 100 each, Rfl: 1  •  valACYclovir (Valtrex) 1000 MG tablet, 2 pills qid for mouth ulcer, Disp: 16 tablet, Rfl: 4  •  venlafaxine XR (EFFEXOR-XR) 150 MG  "24 hr capsule, TAKE 1 CAPSULE DAILY, Disp: 90 capsule, Rfl: 1      Objective:     Vitals:    08/29/22 1515   BP: 144/82   BP Location: Right arm   Patient Position: Sitting   Cuff Size: Adult   Pulse: 94   SpO2: 94%   Weight: 66.2 kg (146 lb)   Height: 170.2 cm (67\")     Body mass index is 22.87 kg/m².    PHYSICAL EXAM:    Constitutional:       General: Not in acute distress.     Appearance: Normal appearance. Well-developed.   Eyes:      Pupils: Pupils are equal, round, and reactive to light.   HENT:      Head: Normocephalic.   Neck:      Vascular: No carotid bruit or JVD.   Pulmonary:      Effort: Pulmonary effort is normal. No tachypnea.      Breath sounds: Normal breath sounds. No wheezing. No rales.   Cardiovascular:      Normal rate. Irregularly irregular rhythm.      No gallop.   Pulses:     Intact distal pulses.   Edema:     Peripheral edema absent.   Abdominal:      General: Bowel sounds are normal.      Palpations: Abdomen is soft.      Tenderness: There is no abdominal tenderness.   Musculoskeletal: Normal range of motion.      Cervical back: Normal range of motion and neck supple. No edema. Skin:     General: Skin is warm and dry.   Neurological:      Mental Status: Alert and oriented to person, place, and time.           ECG 12 Lead    Date/Time: 8/29/2022 3:50 PM  Performed by: Mary Romero APRN  Authorized by: Mary Romero APRN   Rhythm: atrial fibrillation  Rate: tachycardic  ST Depression: II, III, aVF, V5 and V6  T inversion: II, III, aVF, V5 and V6  QRS axis: normal  Other findings: left ventricular hypertrophy              Assessment:       Diagnosis Plan   1. Coronary artery disease involving native coronary artery with angina pectoris, unspecified whether native or transplanted heart ([facility])     2. Essential hypertension     3. Hypercholesteremia     4. PAF (paroxysmal atrial fibrillation) ([facility])     5. S/P AVR     6. Thoracic aortic aneurysm without rupture      7. Controlled type 2 " diabetes mellitus with complication, without long-term current use of insulin (HCC)     8. Nonrheumatic mitral valve stenosis     9. NNEKA (obstructive sleep apnea)     10. Pulmonary hypertension (HCC)       No orders of the defined types were placed in this encounter.         Plan:       She is in A. fib with RVR today.  I spoke with Dr. Weathers , she is already on a pretty high dose of metoprolol at 100 mg twice a day.  Some of her blood pressure readings are in the 100s to 120s.  I hate to add more beta-blockade given her age.  I am going to put her on digoxin she is to take 250 mcg today and 125 mcg starting tomorrow daily.  We will get her in with Justin Ordaz in a week and I will bring her back on Friday for an EKG.         Your medication list          Accurate as of August 29, 2022  3:25 PM. If you have any questions, ask your nurse or doctor.            CONTINUE taking these medications      Instructions Last Dose Given Next Dose Due   albuterol sulfate  (90 Base) MCG/ACT inhaler  Commonly known as: PROVENTIL HFA;VENTOLIN HFA;PROAIR HFA      Every 6 (Six) Hours As Needed.       ascorbic acid 1000 MG tablet  Commonly known as: VITAMIN C      Take 1,000 mg by mouth Daily.       atorvastatin 20 MG tablet  Commonly known as: LIPITOR      Take 1 tablet by mouth Every Night.       baclofen 10 MG tablet  Commonly known as: LIORESAL      Take 1 tablet by mouth 3 (Three) Times a Day.       doxepin 50 MG capsule  Commonly known as: SINEquan      TAKE 1 CAPSULE EVERY NIGHT       Eliquis 5 MG tablet tablet  Generic drug: apixaban      TAKE 1 TABLET EVERY 12     HOURS FOR ATRIAL           FIBRILLATION       EPINEPHrine 0.3 MG/0.3ML solution auto-injector injection  Commonly known as: EPIPEN           fenofibrate micronized 134 MG capsule  Commonly known as: LOFIBRA      Take 1 capsule by mouth Every Morning Before Breakfast.       gabapentin 600 MG tablet  Commonly known as: NEURONTIN      Take 0.5 tablets by  mouth Every Night.       glucosamine-chondroitin 500-400 MG capsule capsule      Take 2 capsules by mouth Daily.       ipratropium 0.06 % nasal spray  Commonly known as: ATROVENT      INSTILL 2 SPRAYS IN EACH NOSTRIL EVERY SIX HOURS AS NEEDED       levothyroxine 75 MCG tablet  Commonly known as: SYNTHROID, LEVOTHROID      TAKE 1 TABLET EVERY OTHER  DAY. OVERDUE FOR           APPOINTMENT AND FASTING    LABS.       levothyroxine 50 MCG tablet  Commonly known as: SYNTHROID, LEVOTHROID      TAKE 1 TABLET EVERY OTHER  DAY       metFORMIN  MG 24 hr tablet  Commonly known as: GLUCOPHAGE-XR      Take 1 tablet by mouth 2 (Two) Times a Day.       metoprolol tartrate 50 MG tablet  Commonly known as: LOPRESSOR      Take 2 tablets by mouth Every 12 (Twelve) Hours.       multivitamin with minerals tablet tablet      Take  by mouth Daily.       Prodigy No Coding Blood Gluc test strip  Generic drug: glucose blood      TEST BLOOD SUGAR DAILY       Prodigy No Coding Blood Gluc w/Device kit      1 each Daily.       Prodigy Safety Lancets 26G misc      CHECK BLOOD SUGAR ONE TIME PER DAY       valACYclovir 1000 MG tablet  Commonly known as: Valtrex      2 pills qid for mouth ulcer       venlafaxine  MG 24 hr capsule  Commonly known as: EFFEXOR-XR      TAKE 1 CAPSULE DAILY                As always, it has been a pleasure to participate in your patient's care.      Sincerely,     Mary GIVENS

## 2022-09-02 ENCOUNTER — CLINICAL SUPPORT (OUTPATIENT)
Dept: CARDIOLOGY | Facility: CLINIC | Age: 82
End: 2022-09-02

## 2022-09-02 DIAGNOSIS — I48.91 ATRIAL FIBRILLATION, CONTROLLED: Primary | ICD-10-CM

## 2022-09-02 PROCEDURE — 93000 ELECTROCARDIOGRAM COMPLETE: CPT | Performed by: NURSE PRACTITIONER

## 2022-09-02 NOTE — PROGRESS NOTES
Procedure     ECG 12 Lead    Date/Time: 9/2/2022 9:45 AM  Performed by: Yun Rivero APRN  Authorized by: Yun Rivero APRN   Comparison: compared with previous ECG from 8/29/2022  Comparison to previous ECG: Rate has improved  Rhythm: atrial fibrillation  BPM: 91

## 2022-09-02 NOTE — PROGRESS NOTES
Pt came in today for an EKG per Mary Romero/TOSHA.  She was put on Digoxin on 8/29/22, was to take 250mcg on 8/29/22, and then titrate down to 125mcg on 8/30/22, and continue that dose qd.    EKG was given to Yun Rivero/TOSHA to read and advise pt.      vishnu whitney  9/2/22

## 2022-09-06 ENCOUNTER — TRANSCRIBE ORDERS (OUTPATIENT)
Dept: CARDIOLOGY | Facility: CLINIC | Age: 82
End: 2022-09-06

## 2022-09-06 ENCOUNTER — LAB (OUTPATIENT)
Dept: LAB | Facility: HOSPITAL | Age: 82
End: 2022-09-06

## 2022-09-06 ENCOUNTER — OFFICE VISIT (OUTPATIENT)
Dept: CARDIOLOGY | Facility: CLINIC | Age: 82
End: 2022-09-06

## 2022-09-06 VITALS
SYSTOLIC BLOOD PRESSURE: 102 MMHG | WEIGHT: 149 LBS | DIASTOLIC BLOOD PRESSURE: 92 MMHG | HEIGHT: 67 IN | BODY MASS INDEX: 23.39 KG/M2 | HEART RATE: 83 BPM

## 2022-09-06 DIAGNOSIS — Z01.810 PRE-OPERATIVE CARDIOVASCULAR EXAMINATION: ICD-10-CM

## 2022-09-06 DIAGNOSIS — I48.19 ATRIAL FIBRILLATION, PERSISTENT: Primary | ICD-10-CM

## 2022-09-06 DIAGNOSIS — Z13.6 SCREENING FOR ISCHEMIC HEART DISEASE: ICD-10-CM

## 2022-09-06 DIAGNOSIS — I25.119 CORONARY ARTERY DISEASE INVOLVING NATIVE CORONARY ARTERY WITH ANGINA PECTORIS, UNSPECIFIED WHETHER NATIVE OR TRANSPLANTED HEART: Chronic | ICD-10-CM

## 2022-09-06 DIAGNOSIS — Z01.810 PRE-OPERATIVE CARDIOVASCULAR EXAMINATION: Primary | ICD-10-CM

## 2022-09-06 DIAGNOSIS — E11.8 CONTROLLED TYPE 2 DIABETES MELLITUS WITH COMPLICATION, WITHOUT LONG-TERM CURRENT USE OF INSULIN: Chronic | ICD-10-CM

## 2022-09-06 LAB
ANION GAP SERPL CALCULATED.3IONS-SCNC: 8.7 MMOL/L (ref 5–15)
BASOPHILS # BLD AUTO: 0.05 10*3/MM3 (ref 0–0.2)
BASOPHILS NFR BLD AUTO: 0.9 % (ref 0–1.5)
BUN SERPL-MCNC: 22 MG/DL (ref 8–23)
BUN/CREAT SERPL: 17.1 (ref 7–25)
CALCIUM SPEC-SCNC: 10 MG/DL (ref 8.6–10.5)
CHLORIDE SERPL-SCNC: 102 MMOL/L (ref 98–107)
CO2 SERPL-SCNC: 27.3 MMOL/L (ref 22–29)
CREAT SERPL-MCNC: 1.29 MG/DL (ref 0.57–1)
DEPRECATED RDW RBC AUTO: 40.4 FL (ref 37–54)
EGFRCR SERPLBLD CKD-EPI 2021: 41 ML/MIN/1.73
EOSINOPHIL # BLD AUTO: 0.17 10*3/MM3 (ref 0–0.4)
EOSINOPHIL NFR BLD AUTO: 2.9 % (ref 0.3–6.2)
ERYTHROCYTE [DISTWIDTH] IN BLOOD BY AUTOMATED COUNT: 12.6 % (ref 12.3–15.4)
GLUCOSE SERPL-MCNC: 72 MG/DL (ref 65–99)
HCT VFR BLD AUTO: 38.1 % (ref 34–46.6)
HGB BLD-MCNC: 12.6 G/DL (ref 12–15.9)
IMM GRANULOCYTES # BLD AUTO: 0.02 10*3/MM3 (ref 0–0.05)
IMM GRANULOCYTES NFR BLD AUTO: 0.3 % (ref 0–0.5)
LYMPHOCYTES # BLD AUTO: 1.44 10*3/MM3 (ref 0.7–3.1)
LYMPHOCYTES NFR BLD AUTO: 24.5 % (ref 19.6–45.3)
MCH RBC QN AUTO: 30 PG (ref 26.6–33)
MCHC RBC AUTO-ENTMCNC: 33.1 G/DL (ref 31.5–35.7)
MCV RBC AUTO: 90.7 FL (ref 79–97)
MONOCYTES # BLD AUTO: 0.62 10*3/MM3 (ref 0.1–0.9)
MONOCYTES NFR BLD AUTO: 10.5 % (ref 5–12)
NEUTROPHILS NFR BLD AUTO: 3.58 10*3/MM3 (ref 1.7–7)
NEUTROPHILS NFR BLD AUTO: 60.9 % (ref 42.7–76)
NRBC BLD AUTO-RTO: 0.2 /100 WBC (ref 0–0.2)
PLATELET # BLD AUTO: 215 10*3/MM3 (ref 140–450)
PMV BLD AUTO: 10 FL (ref 6–12)
POTASSIUM SERPL-SCNC: 4.9 MMOL/L (ref 3.5–5.2)
RBC # BLD AUTO: 4.2 10*6/MM3 (ref 3.77–5.28)
SODIUM SERPL-SCNC: 138 MMOL/L (ref 136–145)
WBC NRBC COR # BLD: 5.88 10*3/MM3 (ref 3.4–10.8)

## 2022-09-06 PROCEDURE — 36415 COLL VENOUS BLD VENIPUNCTURE: CPT

## 2022-09-06 PROCEDURE — 80048 BASIC METABOLIC PNL TOTAL CA: CPT

## 2022-09-06 PROCEDURE — 85025 COMPLETE CBC W/AUTO DIFF WBC: CPT

## 2022-09-06 PROCEDURE — 99214 OFFICE O/P EST MOD 30 MIN: CPT | Performed by: INTERNAL MEDICINE

## 2022-09-06 PROCEDURE — 93000 ELECTROCARDIOGRAM COMPLETE: CPT | Performed by: INTERNAL MEDICINE

## 2022-09-06 NOTE — PROGRESS NOTES
Date of Office Visit: 2022  Encounter Provider: Lazaro Jj MD  Place of Service: [facility] CARDIOLOGY  Patient Name: Dior Pettit  :1940    Chief Complaint   Patient presents with   • AFIB w/RVR   :     HPI: Dior Pettit is a 82 y.o. female who presents today for atrial fibrillation.    She has a history of atrial fibrillation with prior cardioversion.    She feels she went back into atrial fibrillation approximately 1 week ago.    She associates it with increased shortness of breath, dyspnea on exertion, and fatigue.    She would like to continue to attempt sinus rhythm.    She was cardioverted in 2021 when she presented with atrial fibrillation.  She was maintained on amiodarone for some time after that, but felt it caused dizziness and it was discontinued          Past Medical History:   Diagnosis Date   • Allergic rhinitis    • Anemia    • Anxiety     Controlled w/Meds   • Aortic root dilatation (HCC) 10/02/2017    Borderline--Noted on Echo   • Aortic valve calcification 10/02/2017    Noted on Echo   • Aortic valve insufficiency Dx in     w/AVR    • Aortic valve prosthesis present 2018    Noted on CTA Chest   • Ascending aorta dilatation (HCC) 10/02/2017    Borderline--Noted on Echo   • Asthma    • Atypical chest pain    • Breast pain, right Hx   • CAD (coronary artery disease)    • Cardiomegaly    • Cervicalgia    • Chest pain due to CAD ([facility])    • 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    Borderline--Noted on Echo & Measured @ 2.6CM and Mid Descending @ 2.5CM on CTA Chest-18   • DM (diabetes mellitus) ([facility])     T2   • Esophagitis    • GERD (gastroesophageal reflux disease)     Controlled w/Meds   • Headache    • Health care maintenance    • Heart murmur    • History of echocardiogram 10/2/17-[facility]    EF 66%; Borderline Concentric Hypertrophy; Mild Calcification in AV; Mild AVS; Mild AVR; Moderate TVR; Borderline Dilation  of Aortic Root/Arch & Borderline Dilation of Ascending/Proximal Aorta Present   • Hyperlipidemia     Controlled w/Meds   • Hypertension     Controlled w/Meds   • Hypothyroidism     Controlled w/Synthroid   • Insomnia    • Macular degeneration, left eye    • Mild aortic valve regurgitation 10/02/2017    Noted on Echo   • Mild aortic valve stenosis 10/02/2017    Noted on Echo   • Mild dilation of ascending aorta (HCC) 10/02/2017    Borderline--Noted on Echo   • Moderate tricuspid valve regurgitation 10/02/2017    Noted on Echo   • Mood disorder in conditions classified elsewhere     Controlled w/Meds   • Near syncope 03/2017-[facility] ER   • Neuropathy    • NNEKA (obstructive sleep apnea)     Untreated   • Osteoarthritis     Ankles/Feet   • Pulmonary hypertension (HCC) 2017   • Renal insufficiency    • RLS (restless legs syndrome)    • Thoracic ascending aortic aneurysm (HCC) Dx in 2007 @ 3.8CM & 1/02/2018    Noted @ 4CM on CTA Chest;   • Visual impairment     macular degeneratuin       Past Surgical History:   Procedure Laterality Date   • AORTIC VALVE REPAIR/REPLACEMENT  2007    Dr. Perry   • APPENDECTOMY     • AUGMENTATION MAMMAPLASTY  1976   • BREAST AUGMENTATION  2014   • BUNIONECTOMY     • CARDIAC CATHETERIZATION  2007   • CARDIAC VALVE REPLACEMENT  2007    porcine   • COLONOSCOPY  2010   • COLONOSCOPY  03/02/2012   • EYE SURGERY      cataracts and ens implants   • HYSTERECTOMY  1972   • SIGMOIDOSCOPY  2001       Social History     Socioeconomic History   • Marital status:    Tobacco Use   • Smoking status: Never Smoker   • Smokeless tobacco: Never Used   • Tobacco comment: caffeine use - 1 cup coffee daily    Vaping Use   • Vaping Use: Never used   Substance and Sexual Activity   • Alcohol use: Yes     Comment: less than 1 glass of wine   • Drug use: Never   • Sexual activity: Not Currently     Partners: Male     Birth control/protection: Surgical     Comment: Hyst       Family History   Problem Relation Age  of Onset   • Hypertension Mother    • Stroke Mother    • Cancer Mother    • Diabetes Sister    • Coronary artery disease Brother    • Diabetes Brother    • Valvular heart disease Brother         TAVR   • Coronary artery disease Brother    • Cancer Brother    • Birth defects Daughter    • Skin cancer Neg Hx        Review of Systems   Constitutional: Positive for malaise/fatigue.   Cardiovascular: Positive for palpitations.   Respiratory: Positive for shortness of breath.    Gastrointestinal: Negative.        No Known Allergies      Current Outpatient Medications:   •  albuterol sulfate  (90 Base) MCG/ACT inhaler, Every 6 (Six) Hours As Needed., Disp: , Rfl:   •  ascorbic acid (VITAMIN C) 1000 MG tablet, Take 1,000 mg by mouth Daily., Disp: , Rfl:   •  atorvastatin (LIPITOR) 20 MG tablet, Take 1 tablet by mouth Every Night., Disp: 90 tablet, Rfl: 1  •  baclofen (LIORESAL) 10 MG tablet, Take 1 tablet by mouth 3 (Three) Times a Day. (Patient taking differently: Take 10 mg by mouth Daily.), Disp: 90 tablet, Rfl: 3  •  Blood Glucose Monitoring Suppl (Prodigy No Coding Blood Gluc) w/Device kit, 1 each Daily., Disp: 1 kit, Rfl: 0  •  digoxin (LANOXIN) 125 MCG tablet, Take 1 tablet by mouth Daily., Disp: 30 tablet, Rfl: 11  •  doxepin (SINEquan) 50 MG capsule, TAKE 1 CAPSULE EVERY NIGHT, Disp: 90 capsule, Rfl: 0  •  Eliquis 5 MG tablet tablet, TAKE 1 TABLET EVERY 12     HOURS FOR ATRIAL           FIBRILLATION, Disp: 180 tablet, Rfl: 1  •  EPINEPHrine (EPIPEN) 0.3 MG/0.3ML solution auto-injector injection, , Disp: , Rfl:   •  fenofibrate micronized (LOFIBRA) 134 MG capsule, Take 1 capsule by mouth Every Morning Before Breakfast., Disp: 90 capsule, Rfl: 3  •  gabapentin (NEURONTIN) 600 MG tablet, Take 0.5 tablets by mouth Every Night., Disp: 90 tablet, Rfl: 1  •  glucosamine-chondroitin 500-400 MG capsule capsule, Take 2 capsules by mouth Daily., Disp: , Rfl:   •  glucose blood (Prodigy No Coding Blood Gluc) test strip,  "TEST BLOOD SUGAR DAILY, Disp: 50 each, Rfl: 3  •  ipratropium (ATROVENT) 0.06 % nasal spray, INSTILL 2 SPRAYS IN EACH NOSTRIL EVERY SIX HOURS AS NEEDED, Disp: , Rfl:   •  levothyroxine (SYNTHROID, LEVOTHROID) 50 MCG tablet, TAKE 1 TABLET EVERY OTHER  DAY, Disp: 45 tablet, Rfl: 1  •  levothyroxine (SYNTHROID, LEVOTHROID) 75 MCG tablet, TAKE 1 TABLET EVERY OTHER  DAY. OVERDUE FOR           APPOINTMENT AND FASTING    LABS., Disp: 45 tablet, Rfl: 3  •  metFORMIN ER (GLUCOPHAGE-XR) 750 MG 24 hr tablet, Take 1 tablet by mouth 2 (Two) Times a Day., Disp: 180 tablet, Rfl: 3  •  metoprolol tartrate (LOPRESSOR) 50 MG tablet, Take 2 tablets by mouth Every 12 (Twelve) Hours., Disp: 270 tablet, Rfl: 0  •  Multiple Vitamins-Minerals (MULTIVITAL PO), Take  by mouth Daily., Disp: , Rfl:   •  Prodigy Safety Lancets 26G misc, CHECK BLOOD SUGAR ONE TIME PER DAY, Disp: 100 each, Rfl: 1  •  valACYclovir (Valtrex) 1000 MG tablet, 2 pills qid for mouth ulcer, Disp: 16 tablet, Rfl: 4  •  venlafaxine XR (EFFEXOR-XR) 150 MG 24 hr capsule, TAKE 1 CAPSULE DAILY, Disp: 90 capsule, Rfl: 1      Objective:     Vitals:    09/06/22 1344   BP: 102/92   Pulse: 83   Weight: 67.6 kg (149 lb)   Height: 170.2 cm (67\")     Body mass index is 23.34 kg/m².    PHYSICAL EXAM:    Vitals and nursing note reviewed.   Constitutional:       General: Not in acute distress.  Pulmonary:      Effort: Pulmonary effort is normal. No respiratory distress.   Cardiovascular:      Normal rate. Irregular rhythm.   Edema:     Peripheral edema absent.   Skin:     General: Skin is warm and dry.   Neurological:      Mental Status: Alert and oriented to person, place, and time.   Psychiatric:         Behavior: Behavior normal.         Thought Content: Thought content normal.         Judgment: Judgment normal.             ECG 12 Lead    Date/Time: 9/6/2022 6:27 PM  Performed by: Lazaro Jj MD  Authorized by: Lazaro Jj MD   Comparison: compared with previous " ECG from 9/2/2022  Rhythm: atrial fibrillation              Assessment:       Diagnosis Plan   1. Atrial fibrillation, persistent (HCC)  Cardioversion External in Cardiology Department    ECG 12 Lead   2. Coronary artery disease involving native coronary artery with angina pectoris, unspecified whether native or transplanted heart (HCC)     3. Controlled type 2 diabetes mellitus with complication, without long-term current use of insulin (HCC)            Plan:       We are going to continue to pursue rhythm control due to worsening symptoms.    Most immediate action is to repeat cardioversion.  We discussed the probability of recurrence of atrial fibrillation.    We began discussions regarding ablation, or possibly an alternative antiarrhythmic drug.    She has been on uninterrupted anticoagulation.    As always, it has been a pleasure to participate in your patient's care.      Sincerely,         Lazaro Jj MD

## 2022-09-06 NOTE — H&P (VIEW-ONLY)
Date of Office Visit: 2022  Encounter Provider: Lazaro Jj MD  Place of Service: [facility] CARDIOLOGY  Patient Name: Dior Pettit  :1940    Chief Complaint   Patient presents with   • AFIB w/RVR   :     HPI: Dior Pettit is a 82 y.o. female who presents today for atrial fibrillation.    She has a history of atrial fibrillation with prior cardioversion.    She feels she went back into atrial fibrillation approximately 1 week ago.    She associates it with increased shortness of breath, dyspnea on exertion, and fatigue.    She would like to continue to attempt sinus rhythm.    She was cardioverted in 2021 when she presented with atrial fibrillation.  She was maintained on amiodarone for some time after that, but felt it caused dizziness and it was discontinued          Past Medical History:   Diagnosis Date   • Allergic rhinitis    • Anemia    • Anxiety     Controlled w/Meds   • Aortic root dilatation (HCC) 10/02/2017    Borderline--Noted on Echo   • Aortic valve calcification 10/02/2017    Noted on Echo   • Aortic valve insufficiency Dx in     w/AVR    • Aortic valve prosthesis present 2018    Noted on CTA Chest   • Ascending aorta dilatation (HCC) 10/02/2017    Borderline--Noted on Echo   • Asthma    • Atypical chest pain    • Breast pain, right Hx   • CAD (coronary artery disease)    • Cardiomegaly    • Cervicalgia    • Chest pain due to CAD ([facility])    • 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    Borderline--Noted on Echo & Measured @ 2.6CM and Mid Descending @ 2.5CM on CTA Chest-18   • DM (diabetes mellitus) ([facility])     T2   • Esophagitis    • GERD (gastroesophageal reflux disease)     Controlled w/Meds   • Headache    • Health care maintenance    • Heart murmur    • History of echocardiogram 10/2/17-[facility]    EF 66%; Borderline Concentric Hypertrophy; Mild Calcification in AV; Mild AVS; Mild AVR; Moderate TVR; Borderline Dilation  of Aortic Root/Arch & Borderline Dilation of Ascending/Proximal Aorta Present   • Hyperlipidemia     Controlled w/Meds   • Hypertension     Controlled w/Meds   • Hypothyroidism     Controlled w/Synthroid   • Insomnia    • Macular degeneration, left eye    • Mild aortic valve regurgitation 10/02/2017    Noted on Echo   • Mild aortic valve stenosis 10/02/2017    Noted on Echo   • Mild dilation of ascending aorta (HCC) 10/02/2017    Borderline--Noted on Echo   • Moderate tricuspid valve regurgitation 10/02/2017    Noted on Echo   • Mood disorder in conditions classified elsewhere     Controlled w/Meds   • Near syncope 03/2017-[facility] ER   • Neuropathy    • NNEKA (obstructive sleep apnea)     Untreated   • Osteoarthritis     Ankles/Feet   • Pulmonary hypertension (HCC) 2017   • Renal insufficiency    • RLS (restless legs syndrome)    • Thoracic ascending aortic aneurysm (HCC) Dx in 2007 @ 3.8CM & 1/02/2018    Noted @ 4CM on CTA Chest;   • Visual impairment     macular degeneratuin       Past Surgical History:   Procedure Laterality Date   • AORTIC VALVE REPAIR/REPLACEMENT  2007    Dr. Perry   • APPENDECTOMY     • AUGMENTATION MAMMAPLASTY  1976   • BREAST AUGMENTATION  2014   • BUNIONECTOMY     • CARDIAC CATHETERIZATION  2007   • CARDIAC VALVE REPLACEMENT  2007    porcine   • COLONOSCOPY  2010   • COLONOSCOPY  03/02/2012   • EYE SURGERY      cataracts and ens implants   • HYSTERECTOMY  1972   • SIGMOIDOSCOPY  2001       Social History     Socioeconomic History   • Marital status:    Tobacco Use   • Smoking status: Never Smoker   • Smokeless tobacco: Never Used   • Tobacco comment: caffeine use - 1 cup coffee daily    Vaping Use   • Vaping Use: Never used   Substance and Sexual Activity   • Alcohol use: Yes     Comment: less than 1 glass of wine   • Drug use: Never   • Sexual activity: Not Currently     Partners: Male     Birth control/protection: Surgical     Comment: Hyst       Family History   Problem Relation Age  of Onset   • Hypertension Mother    • Stroke Mother    • Cancer Mother    • Diabetes Sister    • Coronary artery disease Brother    • Diabetes Brother    • Valvular heart disease Brother         TAVR   • Coronary artery disease Brother    • Cancer Brother    • Birth defects Daughter    • Skin cancer Neg Hx        Review of Systems   Constitutional: Positive for malaise/fatigue.   Cardiovascular: Positive for palpitations.   Respiratory: Positive for shortness of breath.    Gastrointestinal: Negative.        No Known Allergies      Current Outpatient Medications:   •  albuterol sulfate  (90 Base) MCG/ACT inhaler, Every 6 (Six) Hours As Needed., Disp: , Rfl:   •  ascorbic acid (VITAMIN C) 1000 MG tablet, Take 1,000 mg by mouth Daily., Disp: , Rfl:   •  atorvastatin (LIPITOR) 20 MG tablet, Take 1 tablet by mouth Every Night., Disp: 90 tablet, Rfl: 1  •  baclofen (LIORESAL) 10 MG tablet, Take 1 tablet by mouth 3 (Three) Times a Day. (Patient taking differently: Take 10 mg by mouth Daily.), Disp: 90 tablet, Rfl: 3  •  Blood Glucose Monitoring Suppl (Prodigy No Coding Blood Gluc) w/Device kit, 1 each Daily., Disp: 1 kit, Rfl: 0  •  digoxin (LANOXIN) 125 MCG tablet, Take 1 tablet by mouth Daily., Disp: 30 tablet, Rfl: 11  •  doxepin (SINEquan) 50 MG capsule, TAKE 1 CAPSULE EVERY NIGHT, Disp: 90 capsule, Rfl: 0  •  Eliquis 5 MG tablet tablet, TAKE 1 TABLET EVERY 12     HOURS FOR ATRIAL           FIBRILLATION, Disp: 180 tablet, Rfl: 1  •  EPINEPHrine (EPIPEN) 0.3 MG/0.3ML solution auto-injector injection, , Disp: , Rfl:   •  fenofibrate micronized (LOFIBRA) 134 MG capsule, Take 1 capsule by mouth Every Morning Before Breakfast., Disp: 90 capsule, Rfl: 3  •  gabapentin (NEURONTIN) 600 MG tablet, Take 0.5 tablets by mouth Every Night., Disp: 90 tablet, Rfl: 1  •  glucosamine-chondroitin 500-400 MG capsule capsule, Take 2 capsules by mouth Daily., Disp: , Rfl:   •  glucose blood (Prodigy No Coding Blood Gluc) test strip,  "TEST BLOOD SUGAR DAILY, Disp: 50 each, Rfl: 3  •  ipratropium (ATROVENT) 0.06 % nasal spray, INSTILL 2 SPRAYS IN EACH NOSTRIL EVERY SIX HOURS AS NEEDED, Disp: , Rfl:   •  levothyroxine (SYNTHROID, LEVOTHROID) 50 MCG tablet, TAKE 1 TABLET EVERY OTHER  DAY, Disp: 45 tablet, Rfl: 1  •  levothyroxine (SYNTHROID, LEVOTHROID) 75 MCG tablet, TAKE 1 TABLET EVERY OTHER  DAY. OVERDUE FOR           APPOINTMENT AND FASTING    LABS., Disp: 45 tablet, Rfl: 3  •  metFORMIN ER (GLUCOPHAGE-XR) 750 MG 24 hr tablet, Take 1 tablet by mouth 2 (Two) Times a Day., Disp: 180 tablet, Rfl: 3  •  metoprolol tartrate (LOPRESSOR) 50 MG tablet, Take 2 tablets by mouth Every 12 (Twelve) Hours., Disp: 270 tablet, Rfl: 0  •  Multiple Vitamins-Minerals (MULTIVITAL PO), Take  by mouth Daily., Disp: , Rfl:   •  Prodigy Safety Lancets 26G misc, CHECK BLOOD SUGAR ONE TIME PER DAY, Disp: 100 each, Rfl: 1  •  valACYclovir (Valtrex) 1000 MG tablet, 2 pills qid for mouth ulcer, Disp: 16 tablet, Rfl: 4  •  venlafaxine XR (EFFEXOR-XR) 150 MG 24 hr capsule, TAKE 1 CAPSULE DAILY, Disp: 90 capsule, Rfl: 1      Objective:     Vitals:    09/06/22 1344   BP: 102/92   Pulse: 83   Weight: 67.6 kg (149 lb)   Height: 170.2 cm (67\")     Body mass index is 23.34 kg/m².    PHYSICAL EXAM:    Vitals and nursing note reviewed.   Constitutional:       General: Not in acute distress.  Pulmonary:      Effort: Pulmonary effort is normal. No respiratory distress.   Cardiovascular:      Normal rate. Irregular rhythm.   Edema:     Peripheral edema absent.   Skin:     General: Skin is warm and dry.   Neurological:      Mental Status: Alert and oriented to person, place, and time.   Psychiatric:         Behavior: Behavior normal.         Thought Content: Thought content normal.         Judgment: Judgment normal.             ECG 12 Lead    Date/Time: 9/6/2022 6:27 PM  Performed by: Lazaro Jj MD  Authorized by: Lazaro Jj MD   Comparison: compared with previous " ECG from 9/2/2022  Rhythm: atrial fibrillation              Assessment:       Diagnosis Plan   1. Atrial fibrillation, persistent (HCC)  Cardioversion External in Cardiology Department    ECG 12 Lead   2. Coronary artery disease involving native coronary artery with angina pectoris, unspecified whether native or transplanted heart (HCC)     3. Controlled type 2 diabetes mellitus with complication, without long-term current use of insulin (HCC)            Plan:       We are going to continue to pursue rhythm control due to worsening symptoms.    Most immediate action is to repeat cardioversion.  We discussed the probability of recurrence of atrial fibrillation.    We began discussions regarding ablation, or possibly an alternative antiarrhythmic drug.    She has been on uninterrupted anticoagulation.    As always, it has been a pleasure to participate in your patient's care.      Sincerely,         Lazaro Jj MD

## 2022-09-09 ENCOUNTER — HOSPITAL ENCOUNTER (OUTPATIENT)
Dept: CARDIOLOGY | Facility: HOSPITAL | Age: 82
Discharge: HOME | End: 2022-09-09
Admitting: INTERNAL MEDICINE

## 2022-09-09 VITALS
SYSTOLIC BLOOD PRESSURE: 135 MMHG | OXYGEN SATURATION: 98 % | RESPIRATION RATE: 16 BRPM | WEIGHT: 150 LBS | HEIGHT: 67 IN | DIASTOLIC BLOOD PRESSURE: 72 MMHG | HEART RATE: 52 BPM | BODY MASS INDEX: 23.54 KG/M2 | TEMPERATURE: 97.3 F

## 2022-09-09 DIAGNOSIS — I48.19 ATRIAL FIBRILLATION, PERSISTENT: ICD-10-CM

## 2022-09-09 LAB
GLUCOSE BLDC GLUCOMTR-MCNC: 98 MG/DL (ref 70–130)
MAXIMAL PREDICTED HEART RATE: 136 BPM
STRESS TARGET HR: 116 BPM

## 2022-09-09 PROCEDURE — 92960 CARDIOVERSION ELECTRIC EXT: CPT

## 2022-09-09 PROCEDURE — 92960 CARDIOVERSION ELECTRIC EXT: CPT | Performed by: INTERNAL MEDICINE

## 2022-09-09 PROCEDURE — 93005 ELECTROCARDIOGRAM TRACING: CPT | Performed by: INTERNAL MEDICINE

## 2022-09-09 PROCEDURE — 99152 MOD SED SAME PHYS/QHP 5/>YRS: CPT

## 2022-09-09 PROCEDURE — 93010 ELECTROCARDIOGRAM REPORT: CPT | Performed by: INTERNAL MEDICINE

## 2022-09-09 PROCEDURE — 82962 GLUCOSE BLOOD TEST: CPT

## 2022-09-09 RX ORDER — SODIUM CHLORIDE 0.9 % (FLUSH) 0.9 %
10 SYRINGE (ML) INJECTION EVERY 12 HOURS SCHEDULED
Status: DISCONTINUED | OUTPATIENT
Start: 2022-09-09 | End: 2022-09-09 | Stop reason: HOSPADM

## 2022-09-09 RX ORDER — SODIUM CHLORIDE 0.9 % (FLUSH) 0.9 %
10 SYRINGE (ML) INJECTION AS NEEDED
Status: DISCONTINUED | OUTPATIENT
Start: 2022-09-09 | End: 2022-09-09 | Stop reason: HOSPADM

## 2022-09-09 RX ORDER — SODIUM CHLORIDE 9 MG/ML
75 INJECTION, SOLUTION INTRAVENOUS CONTINUOUS
Status: DISCONTINUED | OUTPATIENT
Start: 2022-09-09 | End: 2022-09-10 | Stop reason: HOSPADM

## 2022-09-09 RX ADMIN — Medication 50 MG: at 14:54

## 2022-09-10 DIAGNOSIS — G25.81 RESTLESS LEG SYNDROME: ICD-10-CM

## 2022-09-10 LAB — QT INTERVAL: 348 MS

## 2022-09-11 ENCOUNTER — APPOINTMENT (OUTPATIENT)
Dept: GENERAL RADIOLOGY | Facility: HOSPITAL | Age: 82
End: 2022-09-11

## 2022-09-11 ENCOUNTER — HOSPITAL ENCOUNTER (OUTPATIENT)
Facility: HOSPITAL | Age: 82
Setting detail: OBSERVATION
Discharge: HOME | End: 2022-09-12
Attending: EMERGENCY MEDICINE | Admitting: EMERGENCY MEDICINE

## 2022-09-11 DIAGNOSIS — R06.02 SOB (SHORTNESS OF BREATH): ICD-10-CM

## 2022-09-11 DIAGNOSIS — R53.1 WEAKNESS: ICD-10-CM

## 2022-09-11 DIAGNOSIS — Z86.79 HISTORY OF ATRIAL FIBRILLATION: ICD-10-CM

## 2022-09-11 DIAGNOSIS — R00.1 BRADYCARDIA: Primary | ICD-10-CM

## 2022-09-11 LAB
ALBUMIN SERPL-MCNC: 4.5 G/DL (ref 3.5–5.2)
ALBUMIN/GLOB SERPL: 1.6 G/DL
ALP SERPL-CCNC: 60 U/L (ref 39–117)
ALT SERPL W P-5'-P-CCNC: 14 U/L (ref 1–33)
ANION GAP SERPL CALCULATED.3IONS-SCNC: 8.2 MMOL/L (ref 5–15)
AST SERPL-CCNC: 24 U/L (ref 1–32)
BASOPHILS # BLD AUTO: 0.04 10*3/MM3 (ref 0–0.2)
BASOPHILS NFR BLD AUTO: 0.6 % (ref 0–1.5)
BILIRUB SERPL-MCNC: 0.5 MG/DL (ref 0–1.2)
BUN SERPL-MCNC: 25 MG/DL (ref 8–23)
BUN/CREAT SERPL: 22.3 (ref 7–25)
CALCIUM SPEC-SCNC: 9.8 MG/DL (ref 8.6–10.5)
CHLORIDE SERPL-SCNC: 99 MMOL/L (ref 98–107)
CO2 SERPL-SCNC: 26.8 MMOL/L (ref 22–29)
CREAT SERPL-MCNC: 1.12 MG/DL (ref 0.57–1)
DEPRECATED RDW RBC AUTO: 42.8 FL (ref 37–54)
DIGOXIN SERPL-MCNC: 1 NG/ML (ref 0.6–1.2)
EGFRCR SERPLBLD CKD-EPI 2021: 48.6 ML/MIN/1.73
EOSINOPHIL # BLD AUTO: 0.23 10*3/MM3 (ref 0–0.4)
EOSINOPHIL NFR BLD AUTO: 3.4 % (ref 0.3–6.2)
ERYTHROCYTE [DISTWIDTH] IN BLOOD BY AUTOMATED COUNT: 12.7 % (ref 12.3–15.4)
GLOBULIN UR ELPH-MCNC: 2.8 GM/DL
GLUCOSE SERPL-MCNC: 126 MG/DL (ref 65–99)
HCT VFR BLD AUTO: 40.5 % (ref 34–46.6)
HGB BLD-MCNC: 13.3 G/DL (ref 12–15.9)
HOLD SPECIMEN: NORMAL
HOLD SPECIMEN: NORMAL
IMM GRANULOCYTES # BLD AUTO: 0.02 10*3/MM3 (ref 0–0.05)
IMM GRANULOCYTES NFR BLD AUTO: 0.3 % (ref 0–0.5)
LYMPHOCYTES # BLD AUTO: 1.97 10*3/MM3 (ref 0.7–3.1)
LYMPHOCYTES NFR BLD AUTO: 28.9 % (ref 19.6–45.3)
MCH RBC QN AUTO: 30.2 PG (ref 26.6–33)
MCHC RBC AUTO-ENTMCNC: 32.8 G/DL (ref 31.5–35.7)
MCV RBC AUTO: 92 FL (ref 79–97)
MONOCYTES # BLD AUTO: 0.79 10*3/MM3 (ref 0.1–0.9)
MONOCYTES NFR BLD AUTO: 11.6 % (ref 5–12)
NEUTROPHILS NFR BLD AUTO: 3.76 10*3/MM3 (ref 1.7–7)
NEUTROPHILS NFR BLD AUTO: 55.2 % (ref 42.7–76)
NRBC BLD AUTO-RTO: 0 /100 WBC (ref 0–0.2)
NT-PROBNP SERPL-MCNC: 1110 PG/ML (ref 0–1800)
PLATELET # BLD AUTO: 220 10*3/MM3 (ref 140–450)
PMV BLD AUTO: 10.1 FL (ref 6–12)
POTASSIUM SERPL-SCNC: 4.9 MMOL/L (ref 3.5–5.2)
PROT SERPL-MCNC: 7.3 G/DL (ref 6–8.5)
QT INTERVAL: 437 MS
RBC # BLD AUTO: 4.4 10*6/MM3 (ref 3.77–5.28)
SODIUM SERPL-SCNC: 134 MMOL/L (ref 136–145)
TROPONIN T SERPL-MCNC: <0.01 NG/ML (ref 0–0.03)
TROPONIN T SERPL-MCNC: <0.01 NG/ML (ref 0–0.03)
WBC NRBC COR # BLD: 6.81 10*3/MM3 (ref 3.4–10.8)
WHOLE BLOOD HOLD COAG: NORMAL
WHOLE BLOOD HOLD SPECIMEN: NORMAL

## 2022-09-11 PROCEDURE — 83880 ASSAY OF NATRIURETIC PEPTIDE: CPT | Performed by: EMERGENCY MEDICINE

## 2022-09-11 PROCEDURE — G0378 HOSPITAL OBSERVATION PER HR: HCPCS

## 2022-09-11 PROCEDURE — 80053 COMPREHEN METABOLIC PANEL: CPT | Performed by: EMERGENCY MEDICINE

## 2022-09-11 PROCEDURE — 84484 ASSAY OF TROPONIN QUANT: CPT | Performed by: EMERGENCY MEDICINE

## 2022-09-11 PROCEDURE — 71045 X-RAY EXAM CHEST 1 VIEW: CPT

## 2022-09-11 PROCEDURE — 99284 EMERGENCY DEPT VISIT MOD MDM: CPT

## 2022-09-11 PROCEDURE — 93005 ELECTROCARDIOGRAM TRACING: CPT

## 2022-09-11 PROCEDURE — 93010 ELECTROCARDIOGRAM REPORT: CPT | Performed by: INTERNAL MEDICINE

## 2022-09-11 PROCEDURE — 85025 COMPLETE CBC W/AUTO DIFF WBC: CPT | Performed by: EMERGENCY MEDICINE

## 2022-09-11 PROCEDURE — 80162 ASSAY OF DIGOXIN TOTAL: CPT | Performed by: EMERGENCY MEDICINE

## 2022-09-11 PROCEDURE — 36415 COLL VENOUS BLD VENIPUNCTURE: CPT

## 2022-09-11 RX ORDER — BACLOFEN 10 MG/1
10 TABLET ORAL NIGHTLY
Status: DISCONTINUED | OUTPATIENT
Start: 2022-09-11 | End: 2022-09-12 | Stop reason: HOSPADM

## 2022-09-11 RX ORDER — ATORVASTATIN CALCIUM 20 MG/1
20 TABLET, FILM COATED ORAL NIGHTLY
Status: DISCONTINUED | OUTPATIENT
Start: 2022-09-11 | End: 2022-09-12 | Stop reason: HOSPADM

## 2022-09-11 RX ORDER — VENLAFAXINE HYDROCHLORIDE 150 MG/1
150 CAPSULE, EXTENDED RELEASE ORAL DAILY
Status: DISCONTINUED | OUTPATIENT
Start: 2022-09-12 | End: 2022-09-12 | Stop reason: HOSPADM

## 2022-09-11 RX ORDER — SODIUM CHLORIDE 0.9 % (FLUSH) 0.9 %
10 SYRINGE (ML) INJECTION AS NEEDED
Status: DISCONTINUED | OUTPATIENT
Start: 2022-09-11 | End: 2022-09-12 | Stop reason: HOSPADM

## 2022-09-11 RX ORDER — DOXEPIN HYDROCHLORIDE 25 MG/1
50 CAPSULE ORAL NIGHTLY
Status: DISCONTINUED | OUTPATIENT
Start: 2022-09-11 | End: 2022-09-12 | Stop reason: HOSPADM

## 2022-09-11 RX ORDER — SODIUM CHLORIDE 0.9 % (FLUSH) 0.9 %
10 SYRINGE (ML) INJECTION EVERY 12 HOURS SCHEDULED
Status: DISCONTINUED | OUTPATIENT
Start: 2022-09-11 | End: 2022-09-12 | Stop reason: HOSPADM

## 2022-09-11 RX ORDER — ACETAMINOPHEN 325 MG/1
650 TABLET ORAL EVERY 6 HOURS PRN
Status: DISCONTINUED | OUTPATIENT
Start: 2022-09-11 | End: 2022-09-12 | Stop reason: HOSPADM

## 2022-09-11 RX ORDER — GABAPENTIN 300 MG/1
600 CAPSULE ORAL NIGHTLY
Refills: 1 | Status: DISCONTINUED | OUTPATIENT
Start: 2022-09-11 | End: 2022-09-12 | Stop reason: HOSPADM

## 2022-09-11 RX ORDER — SODIUM CHLORIDE, SODIUM LACTATE, POTASSIUM CHLORIDE, CALCIUM CHLORIDE 600; 310; 30; 20 MG/100ML; MG/100ML; MG/100ML; MG/100ML
50 INJECTION, SOLUTION INTRAVENOUS CONTINUOUS
Status: DISCONTINUED | OUTPATIENT
Start: 2022-09-11 | End: 2022-09-12 | Stop reason: HOSPADM

## 2022-09-11 RX ORDER — NICOTINE POLACRILEX 4 MG
15 LOZENGE BUCCAL
Status: DISCONTINUED | OUTPATIENT
Start: 2022-09-11 | End: 2022-09-12 | Stop reason: HOSPADM

## 2022-09-11 RX ORDER — DEXTROSE MONOHYDRATE 25 G/50ML
25 INJECTION, SOLUTION INTRAVENOUS
Status: DISCONTINUED | OUTPATIENT
Start: 2022-09-11 | End: 2022-09-12 | Stop reason: HOSPADM

## 2022-09-11 RX ORDER — INSULIN LISPRO 100 [IU]/ML
0-7 INJECTION, SOLUTION INTRAVENOUS; SUBCUTANEOUS
Status: DISCONTINUED | OUTPATIENT
Start: 2022-09-12 | End: 2022-09-12 | Stop reason: HOSPADM

## 2022-09-11 RX ORDER — LEVOTHYROXINE SODIUM 0.05 MG/1
50 TABLET ORAL EVERY OTHER DAY
Status: DISCONTINUED | OUTPATIENT
Start: 2022-09-11 | End: 2022-09-12 | Stop reason: HOSPADM

## 2022-09-11 RX ORDER — BACLOFEN 10 MG/1
10 TABLET ORAL DAILY
Status: DISCONTINUED | OUTPATIENT
Start: 2022-09-12 | End: 2022-09-11

## 2022-09-11 RX ADMIN — SODIUM CHLORIDE, POTASSIUM CHLORIDE, SODIUM LACTATE AND CALCIUM CHLORIDE 50 ML/HR: 600; 310; 30; 20 INJECTION, SOLUTION INTRAVENOUS at 22:07

## 2022-09-11 RX ADMIN — DOXEPIN HYDROCHLORIDE 50 MG: 25 CAPSULE ORAL at 23:07

## 2022-09-11 RX ADMIN — ATORVASTATIN CALCIUM 20 MG: 20 TABLET, FILM COATED ORAL at 22:07

## 2022-09-11 RX ADMIN — GABAPENTIN 600 MG: 300 CAPSULE ORAL at 22:06

## 2022-09-11 RX ADMIN — BACLOFEN 10 MG: 10 TABLET ORAL at 23:07

## 2022-09-11 RX ADMIN — LEVOTHYROXINE SODIUM 50 MCG: 0.05 TABLET ORAL at 22:06

## 2022-09-11 NOTE — ED TRIAGE NOTES
Patient reports having a procedure of cardioversion for afib on Friday. She reports that she has been feeling well since then but has been checking her pulse and she thought it was low.She states her pulse was 48. Patient denies chest pains or discomfort at this time, she states that she feels well but wanted us to check her heart rate. Patient did not call cardiologist about this, she states that she came here. Patient has on mask nurse has on proper ppe Patient heart rate here is in the 50's

## 2022-09-11 NOTE — ED PROVIDER NOTES
Subjective   PIT    82-year-old female with past medical history of aortic root dilation, aortic valve insufficiency, coronary artery disease, A. fib who got cardioverted on Friday.  Patient states that she is always been fatigued but her fatigue is gotten worse since she got cardioverted.  She also states that she has had shortness of breath prior to the cardioversion but states that it has got worse.  She states that she is on digoxin but did not take her medication yesterday because she felt worse.  Patient states that she is lightheaded when she stands up.  She denies any chest pain.  She denies any abdominal pain, nausea, vomiting, diarrhea, fevers, chills, rashes, headaches, vision changes, or any other symptoms.  She denies feeling like she is in a pass out.  Of note, patient is on Eliquis and has not missed any doses          Review of Systems   All other systems reviewed and are negative.      Past Medical History:   Diagnosis Date   • Allergic rhinitis    • Anemia    • Anxiety     Controlled w/Meds   • Aortic root dilatation (HCC) 10/02/2017    Borderline--Noted on Echo   • Aortic valve calcification 10/02/2017    Noted on Echo   • Aortic valve insufficiency Dx in 07    w/AVR    • Aortic valve prosthesis present 11/02/2018    Noted on CTA Chest   • Ascending aorta dilatation (HCC) 10/02/2017    Borderline--Noted on Echo   • Asthma    • Atypical chest pain    • Breast pain, right Hx   • CAD (coronary artery disease)    • Cardiomegaly 2017   • Cervicalgia    • Chest pain due to CAD ([facility])    • 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    Borderline--Noted on Echo & Measured @ 2.6CM and Mid Descending @ 2.5CM on CTA Chest-11/2/18   • DM (diabetes mellitus) ([facility])     T2   • Esophagitis    • GERD (gastroesophageal reflux disease)     Controlled w/Meds   • Headache    • Health care maintenance    • Heart murmur    • History of echocardiogram 10/2/17-[facility]    EF 66%; Borderline Concentric Hypertrophy; Mild  Calcification in AV; Mild AVS; Mild AVR; Moderate TVR; Borderline Dilation of Aortic Root/Arch & Borderline Dilation of Ascending/Proximal Aorta Present   • Hyperlipidemia     Controlled w/Meds   • Hypertension     Controlled w/Meds   • Hypothyroidism     Controlled w/Synthroid   • Insomnia    • Macular degeneration, left eye    • Mild aortic valve regurgitation 10/02/2017    Noted on Echo   • Mild aortic valve stenosis 10/02/2017    Noted on Echo   • Mild dilation of ascending aorta (HCC) 10/02/2017    Borderline--Noted on Echo   • Moderate tricuspid valve regurgitation 10/02/2017    Noted on Echo   • Mood disorder in conditions classified elsewhere     Controlled w/Meds   • Near syncope 03/2017-[facility]   • Neuropathy    • NNEKA (obstructive sleep apnea)     Untreated   • Osteoarthritis     Ankles/Feet   • Pulmonary hypertension (HCC) 2017   • Renal insufficiency    • RLS (restless legs syndrome)    • Thoracic ascending aortic aneurysm (HCC) Dx in 2007 @ 3.8CM & 1/02/2018    Noted @ 4CM on CTA Chest;   • Visual impairment     macular degeneratuin       No Known Allergies    Past Surgical History:   Procedure Laterality Date   • AORTIC VALVE REPAIR/REPLACEMENT  2007    Dr. Perry   • APPENDECTOMY     • AUGMENTATION MAMMAPLASTY  1976   • BREAST AUGMENTATION  2014   • BUNIONECTOMY     • CARDIAC CATHETERIZATION  2007   • CARDIAC VALVE REPLACEMENT  2007    porcine   • COLONOSCOPY  2010   • COLONOSCOPY  03/02/2012   • EYE SURGERY      cataracts and ens implants   • HYSTERECTOMY  1972   • SIGMOIDOSCOPY  2001       Family History   Problem Relation Age of Onset   • Hypertension Mother    • Stroke Mother    • Cancer Mother    • Diabetes Sister    • Coronary artery disease Brother    • Diabetes Brother    • Valvular heart disease Brother         TAVR   • Coronary artery disease Brother    • Cancer Brother    • Birth defects Daughter    • Skin cancer Neg Hx        Social History     Socioeconomic History   • Marital status:     Tobacco Use   • Smoking status: Never Smoker   • Smokeless tobacco: Never Used   • Tobacco comment: caffeine use - 1 cup coffee daily    Vaping Use   • Vaping Use: Never used   Substance and Sexual Activity   • Alcohol use: Yes     Comment: less than 1 glass of wine   • Drug use: Never   • Sexual activity: Not Currently     Partners: Male     Birth control/protection: Surgical     Comment: Hyst           Objective   Physical Exam  Constitutional:       General: She is not in acute distress.     Appearance: She is not ill-appearing, toxic-appearing or diaphoretic.   HENT:      Head: Normocephalic and atraumatic.      Right Ear: External ear normal.      Left Ear: External ear normal.      Nose: Nose normal. No congestion or rhinorrhea.      Mouth/Throat:      Mouth: Mucous membranes are dry.      Pharynx: Oropharynx is clear. No oropharyngeal exudate or posterior oropharyngeal erythema.   Eyes:      General: No scleral icterus.        Right eye: No discharge.         Left eye: No discharge.      Conjunctiva/sclera: Conjunctivae normal.      Pupils: Pupils are equal, round, and reactive to light.   Cardiovascular:      Rate and Rhythm: Regular rhythm. Bradycardia present.      Heart sounds: Normal heart sounds. No murmur heard.    No friction rub. No gallop.   Pulmonary:      Effort: Pulmonary effort is normal. No respiratory distress.      Breath sounds: Normal breath sounds. No stridor. No wheezing, rhonchi or rales.   Chest:      Chest wall: No tenderness.   Abdominal:      General: There is no distension.      Palpations: Abdomen is soft.      Tenderness: There is no abdominal tenderness. There is no right CVA tenderness, left CVA tenderness, guarding or rebound.   Musculoskeletal:         General: No swelling, tenderness, deformity or signs of injury. Normal range of motion.      Cervical back: Normal range of motion and neck supple. No rigidity or tenderness.   Skin:     General: Skin is warm and dry.       Capillary Refill: Capillary refill takes less than 2 seconds.      Coloration: Skin is not jaundiced.   Neurological:      Mental Status: She is alert and oriented to person, place, and time.      Sensory: No sensory deficit.      Motor: No weakness.   Psychiatric:         Mood and Affect: Mood normal.         Behavior: Behavior normal.         Procedures           ED Course  ED Course as of 09/11/22 1815   Sun Sep 11, 2022   5315 I reviewed the EKG, sinus bradycardia, rate of 48, T wave inversions in lead V3, V4, V5, V6, 2, which were previously present.  No obvious ST elevation noted [KS]      ED Course User Index  [KS] Sung Jack MD                                           MDM  Number of Diagnoses or Management Options     Amount and/or Complexity of Data Reviewed  Clinical lab tests: ordered and reviewed  Tests in the radiology section of CPT®: ordered and reviewed  Decide to obtain previous medical records or to obtain history from someone other than the patient: yes    Risk of Complications, Morbidity, and/or Mortality  General comments: When I first saw the patient, patient appeared nontoxic.  Patient did have sinus bradycardia.  Patient was complaining of fatigue that was worse after the cardioversion on Friday.  Patient also complained of shortness of breath that was present before the cardioversion.  Given this, IV was started and labs were obtained.  Labs are grossly unremarkable.  Patient's creatinine has been elevated higher before.  Patient's digoxin level was normal.  Patient is on digoxin and did not take her medication yesterday.  Given the recent cardioversion and bradycardia, I consulted cardiology once I had all the labs.  I spoke to Dr. Brand.  She felt like the patient symptoms could be secondary to digoxin.  However, patient had not taken digoxin yesterday which I mention to her.  She felt like it was up to the patient if the patient needed to be admitted or wanted to go home.  I  spoke to the patient and the daughter.  They wanted to stay therefore the patient was admitted to the ED observation unit.  I spoke to Sandra Chuck in the ED observation unit.  I defer all further management of this patient to the ED observation unit.  Patient is stable in the ed,        Final diagnoses:   Bradycardia   Weakness   SOB (shortness of breath)   History of atrial fibrillation       ED Disposition  ED Disposition     ED Disposition   Decision to Admit    Condition   --    Comment   --             No follow-up provider specified.       Medication List      No changes were made to your prescriptions during this visit.          Sung Jack MD  09/11/22 4522

## 2022-09-11 NOTE — ED NOTES
Nursing report ED to floor  Dior Pettit  82 y.o.  female    HPI :   Chief Complaint   Patient presents with   • Slow Heart Rate       Admitting doctor:   Kelby Alford MD    Admitting diagnosis:   The primary encounter diagnosis was Bradycardia. Diagnoses of Weakness, SOB (shortness of breath), and History of atrial fibrillation were also pertinent to this visit.    Code status:   Current Code Status     Date Active Code Status Order ID Comments User Context       Prior    Advance Care Planning Activity          Allergies:   Patient has no known allergies.    Intake and Output  No intake or output data in the 24 hours ending 09/11/22 1832    Weight:   There were no vitals filed for this visit.    Most recent vitals:   Vitals:    09/11/22 1712 09/11/22 1716 09/11/22 1746 09/11/22 1801   BP:  157/84 174/94    BP Location:       Patient Position:       Pulse: (!) 48 (!) 47 (!) 46 (!) 46   Resp:       Temp:       SpO2: 96% 94% 97% 91%       Active LDAs/IV Access:   Lines, Drains & Airways     Active LDAs     Name Placement date Placement time Site Days    Peripheral IV 09/11/22 1646 Right Antecubital 09/11/22  1646  Antecubital  less than 1                Labs (abnormal labs have a star):   Labs Reviewed   COMPREHENSIVE METABOLIC PANEL - Abnormal; Notable for the following components:       Result Value    Glucose 126 (*)     BUN 25 (*)     Creatinine 1.12 (*)     Sodium 134 (*)     eGFR 48.6 (*)     All other components within normal limits    Narrative:     GFR Normal >60  Chronic Kidney Disease <60  Kidney Failure <15     BNP (IN-HOUSE) - Normal    Narrative:     Among patients with dyspnea, NT-proBNP is highly sensitive for the detection of acute congestive heart failure. In addition NT-proBNP of <300 pg/ml effectively rules out acute congestive heart failure with 99% negative predictive value.    Results may be falsely decreased if patient taking Biotin.     TROPONIN (IN-HOUSE) - Normal    Narrative:      Troponin T Reference Range:  <= 0.03 ng/mL-   Negative for AMI  >0.03 ng/mL-     Abnormal for myocardial necrosis.  Clinicians would have to utilize clinical acumen, EKG, Troponin and serial changes to determine if it is an Acute Myocardial Infarction or myocardial injury due to an underlying chronic condition.       Results may be falsely decreased if patient taking Biotin.     DIGOXIN LEVEL - Normal   CBC WITH AUTO DIFFERENTIAL - Normal   RAINBOW DRAW    Narrative:     The following orders were created for panel order [place] Draw.  Procedure                               Abnormality         Status                     ---------                               -----------         ------                     Green Top (Gel)[[id number]]                                  Final result               Lavender Top[[id number]]                                     Final result               Gold Top - SST[[id number]]                                   Final result               Light Blue Top[[id number]]                                   Final result                 Please view results for these tests on the individual orders.   TROPONIN (IN-HOUSE)   GREEN TOP   LAVENDER TOP   GOLD TOP - SST   LIGHT BLUE TOP   CBC AND DIFFERENTIAL    Narrative:     The following orders were created for panel order CBC & Differential.  Procedure                               Abnormality         Status                     ---------                               -----------         ------                     CBC Auto Differential[[id number]]        Normal              Final result                 Please view results for these tests on the individual orders.       EKG:   ECG 12 Lead   Preliminary Result   HEART RATE= 48  bpm   RR Interval= 1250  ms   SD Interval= 176  ms   P Horizontal Axis= 220  deg   P Front Axis= -52  deg   QRSD Interval= 105  ms   QT Interval= 437  ms   QRS Axis= -39  deg   T Wave Axis= -73  deg   - ABNORMAL ECG -   Sinus or ectopic  atrial bradycardia   LVH with secondary repolarization abnormality   Inferior infarct, old   Anterior Q waves, possibly due to LVH   Electronically Signed By:    Date and Time of Study: 2022-09-11 16:36:16          Meds given in ED:   Medications   sodium chloride 0.9 % flush 10 mL (has no administration in time range)   sodium chloride 0.9 % flush 10 mL (has no administration in time range)       Imaging results:  XR Chest 1 View    Result Date: 9/11/2022   Small right pleural effusion.  Scarring or atelectatic changes within the right base.  Status post median sternotomy with a prosthetic valve.        Ambulatory status:   - Assist x 1    Social issues:   Social History     Socioeconomic History   • Marital status:    Tobacco Use   • Smoking status: Never Smoker   • Smokeless tobacco: Never Used   • Tobacco comment: caffeine use - 1 cup coffee daily    Vaping Use   • Vaping Use: Never used   Substance and Sexual Activity   • Alcohol use: Yes     Comment: less than 1 glass of wine   • Drug use: Never   • Sexual activity: Not Currently     Partners: Male     Birth control/protection: Surgical     Comment: Hyst       NIH Stroke Scale:        Nursing report ED to floor:

## 2022-09-12 ENCOUNTER — READMISSION MANAGEMENT (OUTPATIENT)
Dept: CALL CENTER | Facility: HOSPITAL | Age: 82
End: 2022-09-12

## 2022-09-12 VITALS
RESPIRATION RATE: 18 BRPM | OXYGEN SATURATION: 96 % | TEMPERATURE: 97.5 F | SYSTOLIC BLOOD PRESSURE: 137 MMHG | HEIGHT: 67 IN | DIASTOLIC BLOOD PRESSURE: 76 MMHG | HEART RATE: 55 BPM | BODY MASS INDEX: 23.23 KG/M2 | WEIGHT: 148 LBS

## 2022-09-12 LAB
ANION GAP SERPL CALCULATED.3IONS-SCNC: 8.5 MMOL/L (ref 5–15)
BUN SERPL-MCNC: 21 MG/DL (ref 8–23)
BUN/CREAT SERPL: 20.4 (ref 7–25)
CALCIUM SPEC-SCNC: 9.8 MG/DL (ref 8.6–10.5)
CHLORIDE SERPL-SCNC: 105 MMOL/L (ref 98–107)
CO2 SERPL-SCNC: 25.5 MMOL/L (ref 22–29)
CREAT SERPL-MCNC: 1.03 MG/DL (ref 0.57–1)
DEPRECATED RDW RBC AUTO: 45.1 FL (ref 37–54)
EGFRCR SERPLBLD CKD-EPI 2021: 53.7 ML/MIN/1.73
ERYTHROCYTE [DISTWIDTH] IN BLOOD BY AUTOMATED COUNT: 13.2 % (ref 12.3–15.4)
GLUCOSE BLDC GLUCOMTR-MCNC: 103 MG/DL (ref 70–130)
GLUCOSE BLDC GLUCOMTR-MCNC: 88 MG/DL (ref 70–130)
GLUCOSE SERPL-MCNC: 89 MG/DL (ref 65–99)
HCT VFR BLD AUTO: 35.9 % (ref 34–46.6)
HGB BLD-MCNC: 11.4 G/DL (ref 12–15.9)
MCH RBC QN AUTO: 29.7 PG (ref 26.6–33)
MCHC RBC AUTO-ENTMCNC: 31.8 G/DL (ref 31.5–35.7)
MCV RBC AUTO: 93.5 FL (ref 79–97)
PLATELET # BLD AUTO: 178 10*3/MM3 (ref 140–450)
PMV BLD AUTO: 10.3 FL (ref 6–12)
POTASSIUM SERPL-SCNC: 4.3 MMOL/L (ref 3.5–5.2)
RBC # BLD AUTO: 3.84 10*6/MM3 (ref 3.77–5.28)
SODIUM SERPL-SCNC: 139 MMOL/L (ref 136–145)
WBC NRBC COR # BLD: 6.22 10*3/MM3 (ref 3.4–10.8)

## 2022-09-12 PROCEDURE — G0378 HOSPITAL OBSERVATION PER HR: HCPCS

## 2022-09-12 PROCEDURE — 80048 BASIC METABOLIC PNL TOTAL CA: CPT | Performed by: NURSE PRACTITIONER

## 2022-09-12 PROCEDURE — 85027 COMPLETE CBC AUTOMATED: CPT | Performed by: NURSE PRACTITIONER

## 2022-09-12 PROCEDURE — 82962 GLUCOSE BLOOD TEST: CPT

## 2022-09-12 PROCEDURE — 94640 AIRWAY INHALATION TREATMENT: CPT

## 2022-09-12 PROCEDURE — 94799 UNLISTED PULMONARY SVC/PX: CPT

## 2022-09-12 PROCEDURE — 99214 OFFICE O/P EST MOD 30 MIN: CPT

## 2022-09-12 RX ORDER — ALBUTEROL SULFATE 2.5 MG/3ML
2.5 SOLUTION RESPIRATORY (INHALATION) EVERY 6 HOURS PRN
Status: DISCONTINUED | OUTPATIENT
Start: 2022-09-12 | End: 2022-09-12 | Stop reason: HOSPADM

## 2022-09-12 RX ORDER — ALBUTEROL SULFATE 2.5 MG/3ML
SOLUTION RESPIRATORY (INHALATION)
Status: DISCONTINUED
Start: 2022-09-12 | End: 2022-09-12 | Stop reason: HOSPADM

## 2022-09-12 RX ADMIN — Medication 10 ML: at 10:20

## 2022-09-12 RX ADMIN — VENLAFAXINE HYDROCHLORIDE 150 MG: 150 CAPSULE, EXTENDED RELEASE ORAL at 08:10

## 2022-09-12 RX ADMIN — ALBUTEROL SULFATE 2.5 MG: 2.5 SOLUTION RESPIRATORY (INHALATION) at 03:45

## 2022-09-12 NOTE — PLAN OF CARE
Goal Outcome Evaluation:               New to obs unit with sinus bradycardia day 2 post op status-post cardioversion. Presented with soa (not new), fatigue, activity intolerance. NPO since midnight for cardio to see in am. Pt joel most of night with otherwise normal vitals. Pt anxious but otherwise pleasant.

## 2022-09-12 NOTE — CASE MANAGEMENT/SOCIAL WORK
Discharge Planning Assessment  [facility]     Patient Name: Dior Sears  MRN: [id number]  Today's Date: 9/11/2022    Admit Date: 9/11/2022     Discharge Needs Assessment     Row Name 09/11/22 1903       Living Environment    People in Home alone    Current Living Arrangements home    Primary Care Provided by self    Provides Primary Care For no one    Family Caregiver if Needed child(nelia), adult    Family Caregiver Names Severiano sears [phone]    Quality of Family Relationships helpful;involved;supportive    Able to Return to Prior Arrangements yes       Resource/Environmental Concerns    Resource/Environmental Concerns none    Transportation Concerns no car       Transition Planning    Patient/Family Anticipates Transition to home;home with family    Patient/Family Anticipated Services at Transition none    Transportation Anticipated family or friend will provide       Discharge Needs Assessment    Readmission Within the Last 30 Days no previous admission in last 30 days    Equipment Currently Used at Home cane, straight;walker, rolling  Pt has cane/walker available, but does not use    Concerns to be Addressed no discharge needs identified;denies needs/concerns at this time    Anticipated Changes Related to Illness none    Equipment Needed After Discharge none    Provided Post Acute Provider List? N/A    Provided Post Acute Provider Quality & Resource List? N/A               Discharge Plan     Row Name 09/11/22 1906       Plan    Plan Pt intends to return home upon discharge    Patient/Family in Agreement with Plan yes    Provided Post Acute Provider List? N/A    Provided Post Acute Provider Quality & Resource List? N/A    Plan Comments Face sheet verified, role of CCP explained.  Pt is independent in ADL's and denies the use of any assistive devices, but has a cane and a walker available if needed. Pt denies any difficulty paying for medications. Advised pt and family if any further needs arise, to  contact case management              Continued Care and Services - Admitted Since 9/11/2022    Coordination has not been started for this encounter.          Demographic Summary    No documentation.                Functional Status    No documentation.                Psychosocial    No documentation.                Abuse/Neglect    No documentation.                Legal    No documentation.                Substance Abuse    No documentation.                Patient Forms    No documentation.                   Sandy Lopez RN

## 2022-09-12 NOTE — OUTREACH NOTE
Prep Survey    Flowsheet Row Responses   Congregational facility patient discharged from? [place]   Is LACE score < 7 ? No   Emergency Room discharge w/ pulse ox? No   Eligibility [facility]   Date of Admission 09/11/22   Date of Discharge 09/12/22   Discharge Disposition Home or Self Care   Discharge diagnosis Symptomatic bradycardia   Does the patient have one of the following disease processes/diagnoses(primary or secondary)? Other   Does the patient have Home health ordered? No   Is there a DME ordered? No   Prep survey completed? Yes          ELBA DAILEY - Registered Nurse

## 2022-09-12 NOTE — PLAN OF CARE
Goal Outcome Evaluation:            Patient is discharged with daughter. They understand follow appts and medication changes. Patient has all belongings with her.

## 2022-09-12 NOTE — CONSULTS
Electrophysiology Hospital Consult            Patient Name: Dior Pettit  Age/Sex: 82 y.o. female  : 1940  MRN: [id number]    Date of Admission: 2022  Date of Encounter Visit: 22  Encounter Provider: TOSHA Veronica  Referring Provider: Kelby Alford MD  Place of Service: [facility] CARDIOLOGY  Patient Care Team:  Ashanti Hong APRN as PCP - General (Internal Medicine)  Gerardo Rodriguez Jr., MD as Consulting Physician (Pulmonary Disease)  Abbi Mitchell MD as Consulting Physician (Cardiology)  Luis Miguel Parker MD as Consulting Physician (Ophthalmology)  Tae Burton MD as Consulting Physician (Dermatology)  Addis Gomes MD as Consulting Physician (Plastic Surgery)      Subjective:   EP Consultation for: bradycardia     Chief Complaint: weakness    History of Present Illness:  Dior Pettit is a 82 y.o. female who is followed by Dr. Jj.  She has a history of CAD, DM II,  AVR--s/p repair ([place]-), and persistent atrial fibrillation.     She was cardioverted in 2021.  She was started on amiodarone prior to the CV but felt like it was causing dizziness so this was stopped.  She had maintained NSR until September of this year.  She saw Dr. Jj on 2022 and was back in AF.  She estimated that she had been in AF for about a week prior to her appt.  She was complaining of shortness of breath and fatigue.  Since the CV lasted a year she elected to undergo CV again.       Patient underwent CV for persistent AF on 2022 she was successfully CV.  All of her medications were continued. Metoprolol 100mg BID and digoxin 125mcg daily.                    She presented to [facility] ED 2022 with complaints of generalized weakness and shortness of breath. She was noted to be bradycardic in the ED, EKG showed SB with heart rate in the 40s.  She had taken her metoprolol for the day but decided to hold  her digoxin.  Labs were unremarkable. Chest xray was negative for acute changes.     We have been asked to see her for weakness and bradycardia.     She says that since the CV on Friday her heart rate has been staying in the 40s and 50s.  She has been taking her medications as prescribed but yesterday she felt very weak and tired all day.  She says that she started to have some shortness of breath so she decided to come to the ED.  She is very worried about her heart rate, she says it is always higher than that.  She has been taking her apixaban and has not missed any doses.  EKG shows SB. No focal neurologic changes.         Past Medical History:  Past Medical History:   Diagnosis Date   • Allergic rhinitis    • Anemia    • Anxiety     Controlled w/Meds   • Aortic root dilatation (HCC) 10/02/2017    Borderline--Noted on Echo   • Aortic valve calcification 10/02/2017    Noted on Echo   • Aortic valve insufficiency Dx in 07    w/AVR    • Aortic valve prosthesis present 11/02/2018    Noted on CTA Chest   • Ascending aorta dilatation (HCC) 10/02/2017    Borderline--Noted on Echo   • Asthma    • Atypical chest pain    • Breast pain, right Hx   • CAD (coronary artery disease)    • Cardiomegaly 2017   • Cervicalgia    • Chest pain due to CAD ([facility])    • 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    Borderline--Noted on Echo & Measured @ 2.6CM and Mid Descending @ 2.5CM on CTA Chest-11/2/18   • DM (diabetes mellitus) ([facility])     T2   • Esophagitis    • GERD (gastroesophageal reflux disease)     Controlled w/Meds   • Headache    • Health care maintenance    • Heart murmur    • History of echocardiogram 10/2/17-[facility]    EF 66%; Borderline Concentric Hypertrophy; Mild Calcification in AV; Mild AVS; Mild AVR; Moderate TVR; Borderline Dilation of Aortic Root/Arch & Borderline Dilation of Ascending/Proximal Aorta Present   • Hyperlipidemia     Controlled w/Meds   • Hypertension     Controlled w/Meds   • Hypothyroidism      Controlled w/Synthroid   • Insomnia    • Macular degeneration, left eye    • Mild aortic valve regurgitation 10/02/2017    Noted on Echo   • Mild aortic valve stenosis 10/02/2017    Noted on Echo   • Mild dilation of ascending aorta (HCC) 10/02/2017    Borderline--Noted on Echo   • Moderate tricuspid valve regurgitation 10/02/2017    Noted on Echo   • Mood disorder in conditions classified elsewhere     Controlled w/Meds   • Near syncope 03/2017-[facility] ER   • Neuropathy    • NNEKA (obstructive sleep apnea)     Untreated   • Osteoarthritis     Ankles/Feet   • Pulmonary hypertension (HCC) 2017   • Renal insufficiency    • RLS (restless legs syndrome)    • Thoracic ascending aortic aneurysm (HCC) Dx in 2007 @ 3.8CM & 1/02/2018    Noted @ 4CM on CTA Chest;   • Visual impairment     macular degeneratuin       Past Surgical History:   Procedure Laterality Date   • AORTIC VALVE REPAIR/REPLACEMENT  2007    Dr. Perry   • APPENDECTOMY     • AUGMENTATION MAMMAPLASTY  1976   • BREAST AUGMENTATION  2014   • BUNIONECTOMY     • CARDIAC CATHETERIZATION  2007   • CARDIAC VALVE REPLACEMENT  2007    porcine   • COLONOSCOPY  2010   • COLONOSCOPY  03/02/2012   • EYE SURGERY      cataracts and ens implants   • HYSTERECTOMY  1972   • SIGMOIDOSCOPY  2001       Home Medications:   Medications Prior to Admission   Medication Sig Dispense Refill Last Dose   • albuterol sulfate  (90 Base) MCG/ACT inhaler Every 6 (Six) Hours As Needed.      • ascorbic acid (VITAMIN C) 1000 MG tablet Take 1,000 mg by mouth Daily.      • atorvastatin (LIPITOR) 20 MG tablet Take 1 tablet by mouth Every Night. 90 tablet 1    • baclofen (LIORESAL) 10 MG tablet Take 1 tablet by mouth 3 (Three) Times a Day. (Patient taking differently: Take 10 mg by mouth Daily.) 90 tablet 3    • Blood Glucose Monitoring Suppl (Prodigy No Coding Blood Gluc) w/Device kit 1 each Daily. 1 kit 0    • digoxin (LANOXIN) 125 MCG tablet Take 1 tablet by mouth Daily. 30 tablet 11    •  doxepin (SINEquan) 50 MG capsule TAKE 1 CAPSULE EVERY NIGHT 90 capsule 0    • Eliquis 5 MG tablet tablet TAKE 1 TABLET EVERY 12     HOURS FOR ATRIAL           FIBRILLATION 180 tablet 1    • EPINEPHrine (EPIPEN) 0.3 MG/0.3ML solution auto-injector injection       • fenofibrate micronized (LOFIBRA) 134 MG capsule Take 1 capsule by mouth Every Morning Before Breakfast. 90 capsule 3    • gabapentin (NEURONTIN) 600 MG tablet Take 0.5 tablets by mouth Every Night. 90 tablet 1    • glucosamine-chondroitin 500-400 MG capsule capsule Take 2 capsules by mouth Daily.      • glucose blood (Prodigy No Coding Blood Gluc) test strip TEST BLOOD SUGAR DAILY 50 each 3    • levothyroxine (SYNTHROID, LEVOTHROID) 50 MCG tablet TAKE 1 TABLET EVERY OTHER  DAY 45 tablet 1    • levothyroxine (SYNTHROID, LEVOTHROID) 75 MCG tablet TAKE 1 TABLET EVERY OTHER  DAY. OVERDUE FOR           APPOINTMENT AND FASTING    LABS. 45 tablet 3    • metFORMIN ER (GLUCOPHAGE-XR) 750 MG 24 hr tablet Take 1 tablet by mouth 2 (Two) Times a Day. 180 tablet 3    • metoprolol tartrate (LOPRESSOR) 50 MG tablet Take 2 tablets by mouth Every 12 (Twelve) Hours. 270 tablet 0    • Multiple Vitamins-Minerals (MULTIVITAL PO) Take  by mouth Daily.      • [employer] Safety Lancets 26G misc CHECK BLOOD SUGAR ONE TIME PER  each 1    • valACYclovir (Valtrex) 1000 MG tablet 2 pills qid for mouth ulcer 16 tablet 4    • venlafaxine XR (EFFEXOR-XR) 150 MG 24 hr capsule TAKE 1 CAPSULE DAILY 90 capsule 1        Allergies:  No Known Allergies    Past Social History:  Social History     Socioeconomic History   • Marital status:    Tobacco Use   • Smoking status: Never Smoker   • Smokeless tobacco: Never Used   • Tobacco comment: caffeine use - 1 cup coffee daily    Vaping Use   • Vaping Use: Never used   Substance and Sexual Activity   • Alcohol use: Yes     Comment: less than 1 glass of wine   • Drug use: Never   • Sexual activity: Not Currently     Partners: Male     Birth  control/protection: Surgical     Comment: Hyst       Past Family History:  Family History   Problem Relation Age of Onset   • Hypertension Mother    • Stroke Mother    • Cancer Mother    • Diabetes Sister    • Coronary artery disease Brother    • Diabetes Brother    • Valvular heart disease Brother         TAVR   • Coronary artery disease Brother    • Cancer Brother    • Birth defects Daughter    • Skin cancer Neg Hx        Review of Systems: All systems reviewed. Pertinent positives identified in HPI. All other systems are negative.     14 point ROS was performed and is negative except as outlined in HPI.     Objective:     Objective:  Vital Signs (last 24 hours)       09/11 0700  09/12 0659 09/12 0700 09/12 0802   Most Recent      Temp (°F) 98 -  98.2      97.5     97.5 (36.4) 09/12 0713    Heart Rate 46 -  70      55     55 09/12 0713    Resp   16      18     18 09/12 0713    /68 -  187/89      137/76     137/76 09/12 0713    SpO2 (%) 91 -  100      96     96 09/12 0713        Temp:  [97.5 °F (36.4 °C)-98.2 °F (36.8 °C)] 97.5 °F (36.4 °C)  Heart Rate:  [46-70] 55  Resp:  [16-18] 18  BP: (120-187)/(68-94) 137/76  Body mass index is 23.18 kg/m².        Physical Exam:     General Appearance: No acute distress, well developed and well nourished.   Eyes: Conjunctiva and lids: No erythema, swelling, or discharge. Sclera non-icteric.   HENT: Atraumatic, normocephalic. External eyes, ears, and nose normal.   Respiratory: No signs of respiratory distress. Respiration rhythm and depth normal.   Clear to auscultation. No rales, crackles, rhonchi, or wheezing auscultated.   Cardiovascular:  Jugular Venous Pressure: Normal  Heart Rate and Rhythm: Normal, Heart Sounds: Normal S1 and S2. No S3 or S4 noted.  Murmurs: No murmurs noted. No rubs, thrills, or gallops.   Arterial Pulses: Posterior tibialis and dorsalis pedis pulses normal.   Lower Extremities: No edema noted.  Gastrointestinal:  Abdomen soft, non-distended,  non-tender.  Musculoskeletal: Normal movement of extremities  Skin: Warm and dry.   Psychiatric: Patient alert and oriented to person, place, and time. Speech and behavior appropriate. Normal mood and affect.    Labs:   Lab Review:     Results from last 7 days   Lab Units 09/12/22  0533 09/11/22  1648 09/06/22  1437   SODIUM mmol/L 139 134* 138   POTASSIUM mmol/L 4.3 4.9 4.9   CHLORIDE mmol/L 105 99 102   CO2 mmol/L 25.5 26.8 27.3   BUN mg/dL 21 25* 22   CREATININE mg/dL 1.03* 1.12* 1.29*   GLUCOSE mg/dL 89 126* 72   CALCIUM mg/dL 9.8 9.8 10.0   AST (SGOT) U/L  --  24  --    ALT (SGPT) U/L  --  14  --      Results from last 7 days   Lab Units 09/11/22  1926 09/11/22  1648   TROPONIN T ng/mL <0.010 <0.010     Results from last 7 days   Lab Units 09/12/22  0533   WBC 10*3/mm3 6.22   HEMOGLOBIN g/dL 11.4*   HEMATOCRIT % 35.9   PLATELETS 10*3/mm3 178                     Results from last 7 days   Lab Units 09/11/22  1648   PROBNP pg/mL 1,110.0     Results from last 7 days   Lab Units 09/11/22  1648   DIGOXIN LVL ng/mL 1.00             EKG:         I personally viewed and interpreted the patient's EKG/Telemetry tracings.    Assessment:       Symptomatic bradycardia        Plan:   Dr. Carreno and I saw this patient.      She is 3 days post cardioversion and is now experiencing symptomatic bradycardia.  She is on high dose BB with metoprolol 100mg BID and digoxin.  Her heart rate is currently in the 50s and she feels better. We will discontinue both of these medications.  She has follow up in two weeks.      I advised her to call if her symptoms do not improve and we can see her sooner.  We discussed the role for a pacemaker if she continues to have these episodes. I suspect her symptoms will improve after stopping her BB.     Okay for discharge.         Thank you for allowing me to participate in the care of Dior Pettit. Feel free to contact me directly with any further questions or concerns.    Justin Galloway,  TOSHA  [facility]  09/12/22  08:02 EDT

## 2022-09-12 NOTE — PROGRESS NOTES
MD ATTESTATION NOTE    The BACILIO and I have discussed this patient's history, physical exam, and treatment plan.  I have reviewed the documentation and personally had a face to face interaction with the patient. I affirm the documentation and agree with the treatment and plan.  The attached note describes my personal findings.      I provided a substantive portion of the care of the patient.  I personally performed the physical exam in its entirety, and below are my findings.  For this patient encounter, the patient wore surgical mask, I wore full protective PPE including N95 and eye protection.      Brief HPI: Patient is resting comfortably.  She complains of dyspnea on exertion and occasional nonproductive cough but denies chest pain, palpitations, dizziness, or syncope.    PHYSICAL EXAM  ED Triage Vitals   Temp Heart Rate Resp BP SpO2   09/11/22 1526 09/11/22 1524 09/11/22 1524 09/11/22 1528 09/11/22 1524   98.2 °F (36.8 °C) 54 16 140/76 98 %      Temp src Heart Rate Source Patient Position BP Location FiO2 (%)   09/11/22 2021 09/11/22 1712 09/11/22 1641 09/11/22 1641 --   Oral Monitor Lying Right arm          GENERAL: Awake, alert, oriented x3.  Well-developed, well-nourished elderly female.  Resting comfortably in no acute distress  HENT: nares patent  EYES: no scleral icterus  CV: regular rhythm, normal rate  RESPIRATORY: normal effort, clear to auscultation bilaterally  ABDOMEN: soft, nontender  MUSCULOSKELETAL: no deformity  NEURO: alert, moves all extremities, follows commands  PSYCH:  calm, cooperative  SKIN: warm, dry    Vital signs and nursing notes reviewed.        Plan: Patient has been seen by cardiology and cleared for discharge.  Digoxin and metoprolol have been discontinued.  She will follow-up with cardiology in 2 weeks.

## 2022-09-12 NOTE — PROGRESS NOTES
MD ATTESTATION NOTE    The BACILIO and I have discussed this patient's history, physical exam, and treatment plan.  I have reviewed the documentation and personally had a face to face interaction with the patient. I affirm the documentation and agree with the treatment and plan.  The attached note describes my personal findings.      I provided a substantive portion of the care of the patient.  I personally performed the physical exam in its entirety, and below are my findings.  For this patient encounter, the patient wore surgical mask, I wore full protective PPE including N95 and eye protection    Brief HPI: This is a pleasant 82-year-old female who has a history of aortic valve insufficiency, coronary artery disease A. fib who was cardioverted on September 9, 2022 who presented to the emergency department today with some fatigue and generalized weakness.  She does not report any syncopal episodes or near syncope but it feels as if she just does not have as much energy as normal and this started after she was cardioverted on September 9.  She denies any chest pain, abdominal pain, nausea or vomiting, fevers or chills, coughs or colds.  She did not take her digoxin yesterday but has been compliant with the Eliquis.  She was concerned because she checked at home and her heart rate was low below 50.  She spoke with a friend of hers that was a nurse and stated whenever your heart rate is below 50 you should get it evaluated.  She does report shortness of breath but this has been chronic shortness of breath and has not been new since the procedure she had on September 9.  The shortness of breath is a little bit worse with exertion.  Currently sitting in the bed right now during my evaluation she denies any pain anywhere and denies shortness of breath.    General : Elderly female that is pleasant and in no acute cardiovascular or respiratory distress.  She is nontoxic or septic appearing.  Patient is awake alert and  oriented  On my evaluation her vital signs are are an oxygen saturation of 100% on room air, her blood pressure is 135 systolic and she is afebrile.  Her heart rate is in the upper 40s to low 50s and is a sinus bradycardia on the monitor.  HEENT: NCAT  CV: Heart is regular with soft systolic murmur 2 out of 6.  Respiratory: CTA bilaterally.  No crackles appreciated.  No respiratory distress.  Abd: Soft and nontender.  Obese  Ext: No acute abnormalities.  Intact distal pulses to upper and lower extremities that are equal strong and symmetric.  Skin: No rash  Neuro: Cranial nerves II through XII grossly intact as tested.  No acute lateralizing deficits.  Psych: Normal mood and affect    I have reviewed the patient's vital signs, lab work, EKG and imaging.  The EKG that was done in the emergency department has been reviewed it was sinus bradycardia at the rate of 48.  Patient shows signs of LVH on EKG as well as some T wave inversion in the inferior leads as well as V3, V4, V5, V6.  There was no obvious acute injury pattern.  I compared this to the EKG that was done on September 9, 2022.  There is no significant change to the QRS complexes and they look very similar.  The EKG on September 9 patient was in A. fib with a rate of 72.  This EKG today is a sinus bradycardia.  Plan:  1.  Symptomatic bradycardia.  The symptoms are consistent with some weakness and fatigue and this started after cardioversion on September 9 for atrial fibrillation.  Patient has been compliant with Eliquis.  In the emergency department she had hypertension with blood pressures in the 170s and 150s systolic.  First blood pressure in the observation unit is unremarkable.  She is still a little bradycardic.  We are going to hold her digoxin as well as her metoprolol.  Cardiology was consulted in the emergency department, Dr. Brand.  The cardiologist,EP physician, will see patient tomorrow and will continue to monitor her closely her heart rate  and blood pressure and see if she develops any other symptoms.    2..  Chronic renal impairment.  Renal impairment is at baseline has a creatinine of 1.12.  I discussed this plan with the patient and all questions answered.  She is currently in no distress and all questions answered.        Note Disclaimer: At [facility], we believe that sharing information builds trust and better relationships. You are receiving this note because you recently visited [facility]. It is possible you will see health information before a provider has talked with you about it. This kind of information can be easy to misunderstand. To help you fully understand what it means for your health, we urge you to discuss this note with your provider.

## 2022-09-12 NOTE — H&P
.   [facility]   HISTORY AND PHYSICAL    Patient Name: Dior Pettit  : 1940  MRN: [id number]  Primary Care Physician:  Ashanti Hong APRN  Date of admission: 2022    Subjective   Subjective     Chief Complaint: Generalized weakness    HPI:    Dior Pettit is a 82 y.o. female with past medical history including but not limited to aortic valve insufficiency with/AVR, CAD, cardiomegaly, A. fib, hyperlipidemia, hypertension, neuropathy and hypothyroidism presents to [facility] with fatigue and generalized weakness since her cardioversion on 2022.  Patient reports that her heart rate has been in the 50s and she has held her digoxin since yesterday but is compliant with her Eliquis.  Patient denies any near-syncope/syncope reports that she lacks any energy since she was cardioverted.  Patient denies lightheadedness or headache.  Denies chest pain or palpitation.  Patient reports chronic shortness of breath that has not changed since her cardioversion.  Denies orthopnea or PND D denies abdominal pain, nausea, vomit, or diarrhea.  Denies cough, chills, fever, or lower extremity edema.  Denies dysuria, hematuria, increasing frequency/urgency.     Laboratory evaluation in the ED included negative troponin x2, proBNP thousand 110, sodium 134, creatinine 1.12, BUN 25, GFR 48.6, WBC 6.81, hemoglobin 13.3, and platelets 220.  Chest x-ray shows small right pleural effusion.  EKG shows sinus bradycardia with a rate of 48 and LVH with some T wave inversion in anterior leads and V3, V4, V5, and V6.    Review of Systems   All systems were reviewed and negative except for: All systems were reviewed and negative except for the mentioned above in HPI    Personal History     Past Medical History:   Diagnosis Date   • Allergic rhinitis    • Anemia    • Anxiety     Controlled w/Meds   • Aortic root dilatation (HCC) 10/02/2017    Borderline--Noted on Echo   • Aortic valve calcification  10/02/2017    Noted on Echo   • Aortic valve insufficiency Dx in 07    w/AVR    • Aortic valve prosthesis present 11/02/2018    Noted on CTA Chest   • Ascending aorta dilatation (HCC) 10/02/2017    Borderline--Noted on Echo   • Asthma    • Atypical chest pain    • Breast pain, right Hx   • CAD (coronary artery disease)    • Cardiomegaly 2017   • Cervicalgia    • Chest pain due to CAD ([facility])    • 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    EF 66%; Borderline Concentric Hypertrophy; Mild Calcification in AV; Mild AVS; Mild AVR; Moderate TVR; Borderline Dilation of Aortic Root/Arch & Borderline Dilation of Ascending/Proximal Aorta Present   • Hyperlipidemia     Controlled w/Meds   • Hypertension     Controlled w/Meds   • Hypothyroidism     Controlled w/Synthroid   • Insomnia    • Macular degeneration, left eye    • Mild aortic valve regurgitation 10/02/2017    Noted on Echo   • Mild aortic valve stenosis 10/02/2017    Noted on Echo   • Mild dilation of ascending aorta (HCC) 10/02/2017    Borderline--Noted on Echo   • Moderate tricuspid valve regurgitation 10/02/2017    Noted on Echo   • Mood disorder in conditions classified elsewhere     Controlled w/Meds   • Near syncope 03/2017-[facility] ER   • Neuropathy    • NNEKA (obstructive sleep apnea)     Untreated   • Osteoarthritis     Ankles/Feet   • Pulmonary hypertension ([facility]) 2017   • Renal insufficiency    • RLS (restless legs syndrome)    • Thoracic ascending aortic aneurysm (HCC) Dx in 2007 @ 3.8CM & 1/02/2018    Noted @ 4CM on CTA Chest;   • Visual impairment     macular degeneratuin       Past Surgical History:   Procedure Laterality Date   • AORTIC  VALVE REPAIR/REPLACEMENT  2007    Dr. Perry   • APPENDECTOMY     • AUGMENTATION MAMMAPLASTY  1976   • BREAST AUGMENTATION  2014   • BUNIONECTOMY     • CARDIAC CATHETERIZATION  2007   • CARDIAC VALVE REPLACEMENT  2007    porcine   • COLONOSCOPY  2010   • COLONOSCOPY  03/02/2012   • EYE SURGERY      cataracts and ens implants   • HYSTERECTOMY  1972   • SIGMOIDOSCOPY  2001       Family History: family history includes Birth defects in her daughter; Cancer in her brother and mother; Coronary artery disease in her brother and brother; Diabetes in her brother and sister; Hypertension in her mother; Stroke in her mother; Valvular heart disease in her brother. Otherwise pertinent FHx was reviewed and not pertinent to current issue.    Social History:  reports that she has never smoked. She has never used smokeless tobacco. She reports current alcohol use. She reports that she does not use drugs.    Home Medications:  EPINEPHrine, Prodigy No Coding Blood Gluc, Prodigy Safety Lancets 26G, albuterol sulfate HFA, apixaban, ascorbic acid, atorvastatin, baclofen, digoxin, doxepin, fenofibrate micronized, gabapentin, glucosamine-chondroitin, glucose blood, levothyroxine, metFORMIN ER, metoprolol tartrate, multivitamin with minerals, valACYclovir, and venlafaxine XR    Allergies:  No Known Allergies    Objective   Objective     Vitals:   Temp:  [98.2 °F (36.8 °C)] 98.2 °F (36.8 °C)  Heart Rate:  [46-54] 53  Resp:  [16] 16  BP: (135-187)/(76-94) 135/88  Physical Exam    Constitutional: Awake, alert, in no acute distress   Eyes: PERRLA, sclerae anicteric, no conjunctival injection   HENT: NCAT, mucous membranes moist   Neck: Supple, no thyromegaly, no lymphadenopathy, trachea midline   Respiratory: Clear to auscultation bilaterally, nonlabored respirations, no wheezing or stridor   Cardiovascular: RRR, +murmurs, no rubs, or gallops, palpable pedal pulses bilaterally   Gastrointestinal: Positive bowel sounds, soft, nontender,  nondistended   Musculoskeletal: No bilateral ankle edema, no clubbing or cyanosis to extremities   Psychiatric: Appropriate affect, cooperative   Neurologic: Oriented x 3, strength symmetric in all extremities, Cranial Nerves grossly intact to confrontation, speech clear   Skin: No rashes     Result Review    Result Review:  I have personally reviewed the results from the time of this admission to 9/11/2022 21:04 EDT and agree with these findings:  [x]  Laboratory list / accordion  []  Microbiology  []  Radiology  []  EKG/Telemetry   []  Cardiology/Vascular   []  Pathology  []  Old records  []  Other:  Most notable findings include:     proBNP thousand 110, sodium 134, creatinine 1.12, BUN 25, GFR 48.6, WBC 6.81, hemoglobin 13.3, and platelets 220.    Assessment & Plan   Assessment / Plan     Brief Patient Summary:  Dior Pettit is a 82 y.o. female who was seen and evaluated the ED for generalized weakness and fatigue.  Patient is being admitted to the observation for further evaluation and cardiology consult.    Active Hospital Problems:  Active Hospital Problems    Diagnosis    • Symptomatic bradycardia      Plan:   Symptomatic bradycardia  -Cardiology consult  -Currently sinus bradycardia with a rate of 53  -Hold digoxin and metoprolol  -Telemetry monitoring    A. Fib and hypertension  -Hold metoprolol and digoxin  -Continue Eliquis  -Telemetry monitoring  -Vital signs per nursing    Hyperlipidemia  -Chronic condition, continue medication    CKD  -Creatinine 1.12, baseline 0.99-1.29  -Repeat BMP in a.m.    DM2 and neuropathy  -Accu-Chek before meals and at bedtime  -Insulin sliding scale  -Continue gabapentin    Depression and anxiety  -Chronic conditions, continue home medications    Thoracic aortic aneurysm  -CT chest on 721 shows stable mildly dilated ascending thoracic aorta measuring 4.0 cm in diameter      DVT prophylaxis:  No DVT prophylaxis order currently exists.    CODE STATUS:     FULL    Admission  Status:  I believe this patient meets observation status.    .I wore an face mask, eye protection, and gloves during this patient encounter. Patient also wearing a surgical mask. Hand hygeine performed before and after seeing the patient.    Electronically signed by TOSHA Quevedo, 09/11/22, 9:04 PM EDT.

## 2022-09-12 NOTE — PROGRESS NOTES
ED OBSERVATION PROGRESS/DISCHARGE SUMMARY    Date of Admission: 9/11/2022   LOS: 0 days   PCP: Ashanti Hong APRN    Final Diagnosis bradycardia    Subjective     Hospital Outcome:   Patient is a well-appearing afebrile ambulatory 82-year-old  female admitted to the observation unit for bradycardia status post cardioversion.    She had her metoprolol and digoxin held last night and her heart rate improved to the 50s this morning.  Patient states she is feeling well.  She was seen and evaluated by electrophysiology TOSHA Salas accompanied by physician this morning.  They came back and spoke with the family and provided reassurance they we will have patient hold her metoprolol and digoxin and follow-up in the office in 2 weeks.  We will have patient call and can be seen earlier this week if needed.  All questions answered.  Patient is agreeable to discharge plan.    ROS:  General: no fevers, chills  Respiratory: no cough, dyspnea  Cardiovascular: no chest pain, palpitations  Abdomen: No abdominal pain, nausea, vomiting, or diarrhea  Neurologic: No focal weakness    Objective   Physical Exam:  I have reviewed the vital signs.  Temp:  [97.5 °F (36.4 °C)-98.2 °F (36.8 °C)] 97.5 °F (36.4 °C)  Heart Rate:  [46-70] 55  Resp:  [16-18] 18  BP: (120-187)/(68-94) 137/76  General Appearance:    Alert, cooperative, no distress  Head:    Normocephalic, atraumatic  Eyes:    Sclerae anicteric  Neck:   Supple, no mass  Lungs: Clear to auscultation bilaterally, respirations unlabored  Heart: Regular rhythm with mild bradycardia, S1 and S2 normal, no murmur, rub or gallop  Abdomen:  Soft, non-tender, bowel sounds active, nondistended  Extremities: No clubbing, cyanosis, or edema to lower extremities  Pulses:  2+ and symmetric in distal lower extremities  Skin: No rashes   Neurologic: Oriented x3, Normal strength to extremities    Results Review:    I have reviewed the labs, radiology results and diagnostic  studies.    Results from last 7 days   Lab Units 09/12/22  0533   WBC 10*3/mm3 6.22   HEMOGLOBIN g/dL 11.4*   HEMATOCRIT % 35.9   PLATELETS 10*3/mm3 178     Results from last 7 days   Lab Units 09/12/22  0533 09/11/22  1648 09/06/22  1437   SODIUM mmol/L 139 134* 138   POTASSIUM mmol/L 4.3 4.9 4.9   CHLORIDE mmol/L 105 99 102   CO2 mmol/L 25.5 26.8 27.3   BUN mg/dL 21 25* 22   CREATININE mg/dL 1.03* 1.12* 1.29*   CALCIUM mg/dL 9.8 9.8 10.0   BILIRUBIN mg/dL  --  0.5  --    ALK PHOS U/L  --  60  --    ALT (SGPT) U/L  --  14  --    AST (SGOT) U/L  --  24  --    GLUCOSE mg/dL 89 126* 72     Imaging Results (Last 24 Hours)     Procedure Component Value Units Date/Time    XR Chest 1 View [[id number]] Collected: 09/11/22 1713     Updated: 09/11/22 1901    Narrative:      STAT PORTABLE RADIOGRAPHIC VIEW OF THE CHEST     CLINICAL HISTORY: Shortness of air.     FINDINGS:     Linear markings are identified within the right lung base which could  represent areas of scarring or atelectatic change. There is a small  right pleural effusion. There is no convincing evidence to suggest an  acute infiltrate. The patient is status post a median sternotomy. A  prosthetic valve is identified. The osseous structures are unremarkable.       Impression:         Small right pleural effusion.     Scarring or atelectatic changes within the right base.     Status post median sternotomy with a prosthetic valve.     This report was finalized on 9/11/2022 6:58 PM by Dr. Zain Meeks M.D.             I have reviewed the medications.  ---------------------------------------------------------------------------------------------  Assessment & Plan   Assessment/Problem List    Symptomatic bradycardia      Plan:  Symptomatic bradycardia  -Cardiology consult, clear for discharge home  -Currently sinus bradycardia with a rate of 53  -Hold digoxin and metoprolol     A. Fib and hypertension  -Hold metoprolol and digoxin  -Continue  Eliquis     Hyperlipidemia  -Chronic condition, continue medication     CKD  -Creatinine 1.12 yesterday, baseline 0.99-1.29  -Repeat BMP shows creatinine improved to 1.03 today     DM2 and neuropathy  -Accu-Chek before meals and at bedtime  -Insulin sliding scale  -Continue gabapentin     Depression and anxiety  -Chronic conditions, continue home medications     Thoracic aortic aneurysm  -CT chest on 721 shows stable mildly dilated ascending thoracic aorta measuring 4.0 cm in diameter    Disposition: Home    Follow-up after Discharge: Electrophysiology service in 2 weeks, can call if needed earlier this week    This note will serve as a discharge summary    Imelda Quarles, TOSHA 09/12/22 09:24 EDT          I have worn appropriate PPE during this patient encounter, sanitized my hands both with entering and exiting patient's room.

## 2022-09-13 ENCOUNTER — TRANSITIONAL CARE MANAGEMENT TELEPHONE ENCOUNTER (OUTPATIENT)
Dept: CALL CENTER | Facility: HOSPITAL | Age: 82
End: 2022-09-13

## 2022-09-13 DIAGNOSIS — E11.8 CONTROLLED TYPE 2 DIABETES MELLITUS WITH COMPLICATION, WITHOUT LONG-TERM CURRENT USE OF INSULIN: ICD-10-CM

## 2022-09-13 NOTE — TELEPHONE ENCOUNTER
Caller: Dior Pettit    Relationship: Self    Best call back number: [phone]    Requested Prescriptions:   Requested Prescriptions     Pending Prescriptions Disp Refills   • glucose blood (Prodigy No Coding Blood Gluc) test strip 50 each 3     Sig: TEST BLOOD SUGAR DAILY        Pharmacy where request should be sent: [id number] - [facility] [address] - [phone]  - [phone] FX     Does the patient have less than a 3 day supply:  [x] Yes  [] No    Saloni Lei, [facility] Rep   09/13/22 15:04 EDT

## 2022-09-13 NOTE — OUTREACH NOTE
Call Center TCM Note    Flowsheet Row Responses   [facility] patient discharged from? [place]   Does the patient have one of the following disease processes/diagnoses(primary or secondary)? Other   TCM attempt successful? Yes   Discharge diagnosis Symptomatic bradycardia   Meds reviewed with patient/caregiver? Yes   Does the patient have all medications ordered at discharge? N/A   Prescription comments Only medication changes at d/c are pt to stop Digoxin & Metoprolol Tartrate and pt has done so.    Comments PCP Ashanti Hong has no appts within TCM time frame if ofc can please review sched and call pt for TCM APPT to be completed by 09/19/2022 if possible but no later than 09/26/2022.   Has home health visited the patient within 72 hours of discharge? N/A   Psychosocial issues? No   Did the patient receive a copy of their discharge instructions? Yes   Nursing interventions Reviewed instructions with patient   What is the patient's perception of their health status since discharge? Improving   Is the patient/caregiver able to teach back signs and symptoms related to disease process for when to call PCP? Yes   Is the patient/caregiver able to teach back signs and symptoms related to disease process for when to call 911? Yes   Is the patient/caregiver able to teach back the hierarchy of who to call/visit for symptoms/problems? PCP, Specialist, Home health nurse, Urgent Care, ED, 911 Yes   If the patient is a current smoker, are they able to teach back resources for cessation? Not a smoker   TCM call completed? Yes   Wrap up additional comments D/C DX: Symptomatic bradycardia ** Pt feeling pretty well. She was playing cards at time of my call. Only medication changes at d/c are pt to stop Digoxin & Metoprolol Tartrate and pt has done so. CARDIO MD follow up is 09/29/2022. PCP Ashanti Hong has no appts within TCM time frame if ofc can please review sched and call pt for TCM APPT to be completed by 09/19/2022  if possible but no later than 09/26/2022.          Areli Aleman MA    9/13/2022, 11:01 EDT

## 2022-09-14 ENCOUNTER — TELEPHONE (OUTPATIENT)
Dept: CARDIOLOGY | Facility: CLINIC | Age: 82
End: 2022-09-14

## 2022-09-14 NOTE — TELEPHONE ENCOUNTER
"Pt called to let you know her /85 HR 97-98. She is having a problem sleeping with her HR that high. \"What can she do\"...Ivis    "

## 2022-09-16 RX ORDER — GABAPENTIN 600 MG/1
TABLET ORAL
Qty: 90 TABLET | Refills: 1 | Status: SHIPPED | OUTPATIENT
Start: 2022-09-16 | End: 2022-09-20

## 2022-09-19 NOTE — TELEPHONE ENCOUNTER
Pt called this morning to update on BP since the increase of metoprolol to 50 mg BID...Ivis      173/93  HR 77  144/82  HR 54  150/81  HR 62

## 2022-09-20 ENCOUNTER — READMISSION MANAGEMENT (OUTPATIENT)
Dept: CALL CENTER | Facility: HOSPITAL | Age: 82
End: 2022-09-20

## 2022-09-20 ENCOUNTER — OFFICE VISIT (OUTPATIENT)
Dept: INTERNAL MEDICINE | Facility: CLINIC | Age: 82
End: 2022-09-20

## 2022-09-20 VITALS
HEART RATE: 64 BPM | TEMPERATURE: 97.5 F | DIASTOLIC BLOOD PRESSURE: 84 MMHG | OXYGEN SATURATION: 98 % | SYSTOLIC BLOOD PRESSURE: 132 MMHG | WEIGHT: 144.8 LBS | HEIGHT: 67 IN | BODY MASS INDEX: 22.73 KG/M2

## 2022-09-20 DIAGNOSIS — F51.01 PRIMARY INSOMNIA: ICD-10-CM

## 2022-09-20 DIAGNOSIS — R00.1 SYMPTOMATIC BRADYCARDIA: ICD-10-CM

## 2022-09-20 DIAGNOSIS — E11.8 CONTROLLED TYPE 2 DIABETES MELLITUS WITH COMPLICATION, WITHOUT LONG-TERM CURRENT USE OF INSULIN: ICD-10-CM

## 2022-09-20 DIAGNOSIS — K21.9 GASTROESOPHAGEAL REFLUX DISEASE: ICD-10-CM

## 2022-09-20 DIAGNOSIS — G25.81 RESTLESS LEG SYNDROME: Primary | ICD-10-CM

## 2022-09-20 PROCEDURE — 1111F DSCHRG MED/CURRENT MED MERGE: CPT | Performed by: NURSE PRACTITIONER

## 2022-09-20 PROCEDURE — 99496 TRANSJ CARE MGMT HIGH F2F 7D: CPT | Performed by: NURSE PRACTITIONER

## 2022-09-20 RX ORDER — GABAPENTIN 600 MG/1
600 TABLET ORAL NIGHTLY PRN
Qty: 90 TABLET | Refills: 1
Start: 2022-09-20 | End: 2022-09-22

## 2022-09-20 RX ORDER — OMEPRAZOLE 40 MG/1
40 CAPSULE, DELAYED RELEASE ORAL DAILY
Qty: 90 CAPSULE | Refills: 1 | Status: SHIPPED | OUTPATIENT
Start: 2022-09-20 | End: 2023-03-30

## 2022-09-20 RX ORDER — AMITRIPTYLINE HYDROCHLORIDE 25 MG/1
25 TABLET, FILM COATED ORAL NIGHTLY
Qty: 30 TABLET | Refills: 0 | Status: SHIPPED | OUTPATIENT
Start: 2022-09-20 | End: 2022-10-11 | Stop reason: SDUPTHER

## 2022-09-20 RX ORDER — IPRATROPIUM BROMIDE 42 UG/1
SPRAY, METERED NASAL
COMMUNITY
Start: 2022-09-17 | End: 2022-09-29 | Stop reason: ALTCHOICE

## 2022-09-20 NOTE — OUTREACH NOTE
Medical Week 2 Survey    Flowsheet Row Responses   [facility] patient discharged from? [place]   Does the patient have one of the following disease processes/diagnoses(primary or secondary)? Other   Week 2 attempt successful? No   Unsuccessful attempts Attempt 1  [Multiple numbers attempted-no answer]          LANE THOMAS - Registered Nurse

## 2022-09-22 PROBLEM — F51.01 PRIMARY INSOMNIA: Chronic | Status: ACTIVE | Noted: 2018-06-12

## 2022-09-22 NOTE — ASSESSMENT & PLAN NOTE
Hospitalized 9/11 due to generalized weakness with bradycardia, heart rate has improved since discontinuing metoprolol and digoxin.

## 2022-09-22 NOTE — ASSESSMENT & PLAN NOTE
She has tapered off gabapentin due to complaints of feeling lightheaded with medication.  We discussed starting Requip or Mirapex for management of restless legs if symptoms do not improve.

## 2022-09-22 NOTE — PROGRESS NOTES
Transitional Care Follow Up Visit  Subjective     Dior Pettit is a 82 y.o. female who presents for a transitional care management visit.    Within 48 business hours after discharge our office contacted her via telephone to coordinate her care and needs.      I reviewed and discussed the details of that call along with the discharge summary, hospital problems, inpatient lab results, inpatient diagnostic studies, and consultation reports with Dior.     Current outpatient and discharge medications have been reconciled for the patient.  Reviewed by: TOSHA Coto      Date of TCM Phone Call 9/12/2022   [facility]   Date of Admission 9/11/2022   Date of Discharge 9/12/2022   Discharge Disposition Home or Self Care     Risk for Readmission (LACE) Score: 7 (9/12/2022  6:00 AM)      History of Present Illness  She underwent cardioversion 9/9 for persistent atrial fibrillation and experienced bradycardia afterwards.  She presented to the ER on September 11 due to increased weakness and fatigue.  Her heart rate had been in the 50s. Her metoprolol and digoxin were discontinued by cardiology.  She has brought her home blood pressure readings which show a pulse of 58-77.  She reports feeling better with decreased weakness.  She does complain of difficulty sleeping at night despite taking baclofen and doxepin nightly.  She has tapered off gabapentin as she thinks this is contributing to her frequent episodes of feeling lightheaded.  She has had worsening insomnia since tapering off medication.  She is currently taking gabapentin for management of restless legs which she does feel have increased since tapering off medication.     Course During Hospital Stay:  Stable     The following portions of the patient's history were reviewed and updated as appropriate: allergies, current medications, past family history, past medical history, past social history, past surgical history and problem list.    Review of  Systems   Constitutional: Positive for fatigue. Negative for activity change, appetite change, chills, fever and unexpected weight change.   HENT: Negative for congestion, ear pain, rhinorrhea, sinus pressure, sore throat and tinnitus.    Respiratory: Negative for cough, shortness of breath and wheezing.    Cardiovascular: Negative for chest pain, palpitations and leg swelling.        No orthopnea, PND, FRAZIER   Gastrointestinal: Negative for abdominal pain, blood in stool, diarrhea, nausea and vomiting.   Genitourinary: Negative for dysuria, frequency and hematuria.   Musculoskeletal: Positive for arthralgias and back pain. Negative for gait problem, joint swelling, myalgias and neck pain.   Skin: Negative for color change and rash.        No hair changes, no nail changes   Neurological: Negative for dizziness, tremors, numbness and headaches.   Hematological: Negative for adenopathy.   Psychiatric/Behavioral: Positive for sleep disturbance. Negative for decreased concentration and dysphoric mood. The patient is not nervous/anxious.        Objective   Physical Exam  Constitutional:       Appearance: She is well-developed. She is not ill-appearing.   HENT:      Head: Normocephalic.      Right Ear: Hearing, tympanic membrane and external ear normal.      Left Ear: Hearing, tympanic membrane and external ear normal.      Nose: Nose normal. No nasal deformity, mucosal edema or rhinorrhea.      Right Sinus: No maxillary sinus tenderness or frontal sinus tenderness.      Left Sinus: No maxillary sinus tenderness or frontal sinus tenderness.      Mouth/Throat:      Dentition: Normal dentition.   Eyes:      General: Lids are normal.         Right eye: No discharge.         Left eye: No discharge.      Conjunctiva/sclera: Conjunctivae normal.      Right eye: No exudate.     Left eye: No exudate.  Neck:      Thyroid: No thyroid mass or thyromegaly.      Vascular: No carotid bruit.      Trachea: Trachea normal.   Cardiovascular:       Rate and Rhythm: Regular rhythm.      Pulses: Normal pulses.      Heart sounds: Normal heart sounds. No murmur heard.  Pulmonary:      Effort: No respiratory distress.      Breath sounds: Normal breath sounds. No decreased breath sounds, wheezing, rhonchi or rales.   Abdominal:      General: Bowel sounds are normal.      Palpations: Abdomen is soft.      Tenderness: There is no abdominal tenderness.   Musculoskeletal:      Cervical back: Normal range of motion. No edema.   Lymphadenopathy:      Head:      Right side of head: No submental, submandibular, tonsillar, preauricular, posterior auricular or occipital adenopathy.      Left side of head: No submental, submandibular, tonsillar, preauricular, posterior auricular or occipital adenopathy.   Skin:     General: Skin is warm and dry.      Nails: There is no clubbing.   Neurological:      Mental Status: She is alert.   Psychiatric:         Behavior: Behavior is cooperative.         Assessment & Plan   Problems Addressed this Visit        Cardiac and Vasculature    Symptomatic bradycardia     Hospitalized 9/11 due to generalized weakness with bradycardia, heart rate has improved since discontinuing metoprolol and digoxin.            Endocrine and Metabolic    Controlled diabetes mellitus type 2 with complications (HCC) (Chronic)     A1c 5.4 with 4/2022 labs, currently managed on metformin.         Relevant Medications    glucose blood (Prodigy No Coding Blood Gluc) test strip       Gastrointestinal Abdominal     Gastroesophageal reflux disease (Chronic)     Symptoms managed with daily omeprazole which she will continue.         Relevant Medications    omeprazole (priLOSEC) 40 MG capsule       Neuro    Restless leg syndrome - Primary (Chronic)     She has tapered off gabapentin due to complaints of feeling lightheaded with medication.  We discussed starting Requip or Mirapex for management of restless legs if symptoms do not improve.            Sleep    Primary  insomnia (Chronic)     She reports difficulty staying asleep despite taking doxepin and baclofen at night.  She has recently tapered off gabapentin.  I have asked her to also discontinue doxepin due to lack of efficacy.  We will start amitriptyline.  She is taking baclofen nightly but does not feel medication is helpful either.  We will taper off this medication as well in the future.         Relevant Medications    amitriptyline (ELAVIL) 25 MG tablet      Diagnoses       Codes Comments    Restless leg syndrome    -  Primary ICD-10-CM: G25.81  ICD-9-CM: 333.94     Primary insomnia     ICD-10-CM: F51.01  ICD-9-CM: 307.42     Controlled type 2 diabetes mellitus with complication, without long-term current use of insulin (HCC)     ICD-10-CM: E11.8  ICD-9-CM: 250.90     Gastroesophageal reflux disease     ICD-10-CM: K21.9  ICD-9-CM: 530.81     Symptomatic bradycardia     ICD-10-CM: R00.1  ICD-9-CM: 427.89

## 2022-09-22 NOTE — ASSESSMENT & PLAN NOTE
She reports difficulty staying asleep despite taking doxepin and baclofen at night.  She has recently tapered off gabapentin.  I have asked her to also discontinue doxepin due to lack of efficacy.  We will start amitriptyline.  She is taking baclofen nightly but does not feel medication is helpful either.  We will taper off this medication as well in the future.

## 2022-09-23 NOTE — TELEPHONE ENCOUNTER
Spoke with pt.  She will continue at the same dose of Metoprolol and will discuss with TOSHA Burrell on additional BP medication.  (Done)

## 2022-09-23 NOTE — TELEPHONE ENCOUNTER
"Pt called and we went over her BP Readings.  Told her to not to ADD medications unless a provider tells to do that.  Also to check BP 1-2 hours after medication vs 5 times a day.        Yesterday:   Noon   176/96 with 64 took Metoprolol 50mg     4pm   154/94 with 60 took 1/2 Metoprolol     6pm   148/83 with 68      8pm   took Metoprolol 50mg    10pm   167/100 with 77 1/2 Metoprolol       Today:  8:30am  155/92 with 65 took Metoprolol 50mg    Noon  162/91 with 61      No palpitation  No SOA  No edema  No chest pressure  No fatigue  HA yesterday at night but this is gone by morning.  Pt said her vision has been \"messed up\" since she was in the hospital.    Current Cardiac Meds:  Metoprolol 50mg BID  Eliquis 5mg BID          "

## 2022-09-27 DIAGNOSIS — K12.1 MOUTH ULCER: ICD-10-CM

## 2022-09-27 DIAGNOSIS — I10 ESSENTIAL HYPERTENSION: Primary | ICD-10-CM

## 2022-09-27 RX ORDER — APIXABAN 5 MG/1
TABLET, FILM COATED ORAL
Qty: 180 TABLET | Refills: 0 | Status: SHIPPED | OUTPATIENT
Start: 2022-09-27 | End: 2023-01-30

## 2022-09-27 RX ORDER — LOSARTAN POTASSIUM 50 MG/1
50 TABLET ORAL DAILY
Qty: 30 TABLET | Refills: 0 | Status: SHIPPED | OUTPATIENT
Start: 2022-09-27 | End: 2022-09-27 | Stop reason: SDUPTHER

## 2022-09-27 RX ORDER — LOSARTAN POTASSIUM 50 MG/1
50 TABLET ORAL DAILY
Qty: 90 TABLET | Refills: 0 | Status: SHIPPED | OUTPATIENT
Start: 2022-09-27 | End: 2022-09-29 | Stop reason: ALTCHOICE

## 2022-09-28 RX ORDER — VALACYCLOVIR HYDROCHLORIDE 1 G/1
TABLET, FILM COATED ORAL
Qty: 16 TABLET | Refills: 4 | Status: SHIPPED | OUTPATIENT
Start: 2022-09-28

## 2022-09-29 ENCOUNTER — OFFICE VISIT (OUTPATIENT)
Dept: CARDIOLOGY | Facility: CLINIC | Age: 82
End: 2022-09-29

## 2022-09-29 VITALS
DIASTOLIC BLOOD PRESSURE: 74 MMHG | HEIGHT: 67 IN | WEIGHT: 142 LBS | HEART RATE: 81 BPM | SYSTOLIC BLOOD PRESSURE: 130 MMHG | BODY MASS INDEX: 22.29 KG/M2

## 2022-09-29 DIAGNOSIS — I71.20 THORACIC AORTIC ANEURYSM WITHOUT RUPTURE: Chronic | ICD-10-CM

## 2022-09-29 DIAGNOSIS — I34.2 NONRHEUMATIC MITRAL VALVE STENOSIS: ICD-10-CM

## 2022-09-29 DIAGNOSIS — G47.33 OSA (OBSTRUCTIVE SLEEP APNEA): Chronic | ICD-10-CM

## 2022-09-29 DIAGNOSIS — I10 ESSENTIAL HYPERTENSION: Chronic | ICD-10-CM

## 2022-09-29 DIAGNOSIS — I48.0 PAF (PAROXYSMAL ATRIAL FIBRILLATION): Primary | ICD-10-CM

## 2022-09-29 PROCEDURE — 93000 ELECTROCARDIOGRAM COMPLETE: CPT | Performed by: INTERNAL MEDICINE

## 2022-09-29 PROCEDURE — 99214 OFFICE O/P EST MOD 30 MIN: CPT | Performed by: INTERNAL MEDICINE

## 2022-09-29 RX ORDER — AZELASTINE 1 MG/ML
2 SPRAY, METERED NASAL
COMMUNITY

## 2022-09-29 RX ORDER — METOPROLOL TARTRATE 50 MG/1
75 TABLET, FILM COATED ORAL 2 TIMES DAILY
Qty: 150 TABLET | Refills: 3 | Status: SHIPPED | OUTPATIENT
Start: 2022-09-29 | End: 2023-01-17 | Stop reason: SDUPTHER

## 2022-09-29 RX ORDER — GABAPENTIN 600 MG/1
600 TABLET ORAL AS NEEDED
COMMUNITY
End: 2022-11-18 | Stop reason: HOSPADM

## 2022-09-29 RX ORDER — METOPROLOL TARTRATE 50 MG/1
50 TABLET, FILM COATED ORAL 2 TIMES DAILY
COMMUNITY
End: 2022-09-29 | Stop reason: SDUPTHER

## 2022-09-29 NOTE — PROGRESS NOTES
Date of Office Visit: 2022  Encounter Provider: Abbi Mitchell MD  Place of Service: [facility] CARDIOLOGY  Patient Name: Dior Pettit  :1940    Chief complaint  Valvular heart disease, pulmonary hypertension, paroxysmal atrial fibrillation.    History of Present Illness  Patient is a 82-year-old female with history of hypertension, hyperlipidemia, diabetes, aortic valve disease and subaortic membrane.  In  she underwent aortic valve replacement with porcine valve as well as subaortic membrane resection. Cardiac catheterization was negative for coronary artery disease.  She was then found to have significant sleep apnea but was been intolerant of therapy for this.  She is also noted to have pulmonary hypertension with an RV systolic pressure 44 mmHg in .  By 2015 she developed chest tightness while shoveling snow in a stress perfusion study was negative for ischemia.  A follow-up echocardiogram in 2017 that showed normal systolic function with mild left ventricular hypertrophy aortic valve calcification with mild stenosis with mild mitral regurgitation moderate tricuspid valve regurgitation with an RV systolic pressure 52 mmHg and an ascending aorta measuring 3.9 cm.  In 2019 with complaints of chest pain a stress echocardiogram showed normal systolic function grade 1 diastolic dysfunction, mild concentric left ventricular hypertrophy there is mild to moderate bioprosthetic aortic valve stenosis with mild tricuspid regurgitation and an RV systolic pressure 36 mmHg.  There was no ischemia at 6 METs.  By 2021 she was noted to have atrial fibrillation with rapid rates with dyspnea and dizziness.  She was admitted to hospital and underwent BRAULIO as subsequent electrical cardioversion to sinus rhythm.  BRAULIO showed normal sinus rhythm with left ventricular hypertrophy with mild pulmonary hypertension with RVSP pressure 36 mmHg, left atrial  volume was increased.  There was no significant aortic or subaortic valve disease noted.  She was discharged on amiodarone and Eliquis.  She continued to have symptoms of fatigue dizziness.  proBNP was normal.  Amiodarone was decreased.  A stress perfusion study was negative for ischemia.  Carotid Doppler showed mild right carotid artery stenosis.  A 24-hour Holter showed sinus rhythm with rare PACs PVCs with no significant tachycardia or bradycardia arrhythmia noted.  She had a noncontrast CT chest 7/2021 that showed an ascending aorta measuring 4 cm.  She then saw Dr Jj on 11/2021.  After further discussion amiodarone was discontinued.  By September 2022 she went back to symptomatic atrial fibrillation and was seen by Dr Jj and had repeat electrical cardioversion on 9/9/2022.  She was seen again at [facility], [place], operated by [facility] on 9/11 with fatigue with bradycardia.  Digoxin and metoprolol held however with hypertension metoprolol was resumed.  Of note his hemoglobin dropped almost 2 g overnight presumably from hydration.  She also appears to been slightly dehydrated with elevated creatinine that also improved.  She had a hard time remembering she was actually dismissed following cardioversion return to the ER for the second visit with bradycardia.  After getting her permission I asked her son to come back to the room and he also confirmed that she had 2 separate visit.  She patient also states that she had some vision changes prior to that dismissal but was evaluated by provider and was felt to not be neurologic.  Patient has macular degeneration and had some vision changes with this.    Since last visit she has had light palpitations.  Denies any shortness of breath edema chest pain.  Has had occasional dizziness.    Blood work 9/12/2020 includes creatinine 1.03.  Hemoglobin is 11.4 which was down from 11.3 the day before.    Past Medical History:   Diagnosis Date   • Allergic rhinitis    • Anemia    • Anxiety      Controlled w/Meds   • Aortic root dilatation (HCC) 10/02/2017    Borderline--Noted on Echo   • Aortic valve calcification 10/02/2017    Noted on Echo   • Aortic valve insufficiency Dx in 07    w/AVR    • Aortic valve prosthesis present 11/02/2018    Noted on CTA Chest   • Ascending aorta dilatation (HCC) 10/02/2017    Borderline--Noted on Echo   • Asthma    • Atypical chest pain    • Breast pain, right Hx   • CAD (coronary artery disease)    • Cardiomegaly 2017   • Cervicalgia    • Chest pain due to CAD ([facility])    • 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    Borderline--Noted on Echo & Measured @ 2.6CM and Mid Descending @ 2.5CM on CTA Chest-11/2/18   • DM (diabetes mellitus) ([facility])     T2   • Esophagitis    • GERD (gastroesophageal reflux disease)     Controlled w/Meds   • Headache    • Health care maintenance    • Heart murmur    • History of echocardiogram 10/2/17-[facility]    EF 66%; Borderline Concentric Hypertrophy; Mild Calcification in AV; Mild AVS; Mild AVR; Moderate TVR; Borderline Dilation of Aortic Root/Arch & Borderline Dilation of Ascending/Proximal Aorta Present   • Hyperlipidemia     Controlled w/Meds   • Hypertension     Controlled w/Meds   • Hypothyroidism     Controlled w/Synthroid   • Insomnia    • Macular degeneration, left eye    • Mild aortic valve regurgitation 10/02/2017    Noted on Echo   • Mild aortic valve stenosis 10/02/2017    Noted on Echo   • Mild dilation of ascending aorta (HCC) 10/02/2017    Borderline--Noted on Echo   • Moderate tricuspid valve regurgitation 10/02/2017    Noted on Echo   • Mood disorder in conditions classified elsewhere     Controlled w/Meds   • Near syncope 03/2017-[facility] ER   • Neuropathy    • NNEKA (obstructive sleep apnea)     Untreated   • Osteoarthritis     Ankles/Feet   • Pulmonary hypertension ([facility]) 2017   • Renal insufficiency    • RLS (restless legs syndrome)    • Thoracic ascending aortic aneurysm (HCC) Dx in 2007 @ 3.8CM & 1/02/2018    Noted @ 4CM on CTA  Chest;   • Visual impairment     macular degeneratuin     Past Surgical History:   Procedure Laterality Date   • AORTIC VALVE REPAIR/REPLACEMENT  2007    Dr. Perry   • APPENDECTOMY     • AUGMENTATION MAMMAPLASTY  1976   • BREAST AUGMENTATION  2014   • BUNIONECTOMY     • CARDIAC CATHETERIZATION  2007   • CARDIAC VALVE REPLACEMENT  2007    porcine   • COLONOSCOPY  2010   • COLONOSCOPY  03/02/2012   • EYE SURGERY      cataracts and ens implants   • HYSTERECTOMY  1972   • SIGMOIDOSCOPY  2001     Outpatient Medications Prior to Visit   Medication Sig Dispense Refill   • amitriptyline (ELAVIL) 25 MG tablet Take 1 tablet by mouth Every Night. 30 tablet 0   • ascorbic acid (VITAMIN C) 1000 MG tablet Take 1,000 mg by mouth Daily.     • atorvastatin (LIPITOR) 20 MG tablet Take 1 tablet by mouth Every Night. 90 tablet 1   • azelastine (ASTELIN) 0.1 % nasal spray 2 sprays into the nostril(s) as directed by provider. Use in each nostril as directed     • baclofen (LIORESAL) 10 MG tablet Take 1 tablet by mouth 3 (Three) Times a Day. (Patient taking differently: Take 10 mg by mouth Daily.) 90 tablet 3   • Blood Glucose Monitoring Suppl (Prodigy No Coding Blood Gluc) w/Device kit 1 each Daily. 1 kit 0   • Eliquis 5 MG tablet tablet TAKE 1 TABLET BY MOUTH EVERY TWELVE HOURS 180 tablet 0   • EPINEPHrine (EPIPEN) 0.3 MG/0.3ML solution auto-injector injection      • fenofibrate micronized (LOFIBRA) 134 MG capsule Take 1 capsule by mouth Every Morning Before Breakfast. 90 capsule 3   • fluticasone-salmeterol (ADVAIR HFA) 45-21 MCG/ACT inhaler Inhale 2 puffs As Needed.     • gabapentin (NEURONTIN) 600 MG tablet Take 600 mg by mouth As Needed.     • glucosamine-chondroitin 500-400 MG capsule capsule Take 2 capsules by mouth Daily.     • glucose blood (Prodigy No Coding Blood Gluc) test strip TEST BLOOD SUGAR DAILY 50 each 0   • levothyroxine (SYNTHROID, LEVOTHROID) 50 MCG tablet TAKE 1 TABLET EVERY OTHER  DAY 45 tablet 1   •  levothyroxine (SYNTHROID, LEVOTHROID) 75 MCG tablet TAKE 1 TABLET EVERY OTHER  DAY. OVERDUE FOR           APPOINTMENT AND FASTING    LABS. 45 tablet 3   • metFORMIN ER (GLUCOPHAGE-XR) 750 MG 24 hr tablet Take 1 tablet by mouth 2 (Two) Times a Day. 180 tablet 3   • Multiple Vitamins-Minerals (MULTIVITAL PO) Take  by mouth Daily.     • omeprazole (priLOSEC) 40 MG capsule Take 1 capsule by mouth Daily. 90 capsule 1   • Prodigy Safety Lancets 26G misc CHECK BLOOD SUGAR ONE TIME PER  each 1   • valACYclovir (VALTREX) 1000 MG tablet TAKE 2 TABLETS 4 TIMES     DAILY FOR MOUTH ULCER (Patient taking differently: TAKE 2 TABLETS 4 TIMES     DAILY FOR MOUTH ULCER as needed) 16 tablet 4   • venlafaxine XR (EFFEXOR-XR) 150 MG 24 hr capsule TAKE 1 CAPSULE DAILY 90 capsule 1   • metoprolol tartrate (LOPRESSOR) 50 MG tablet Take 50 mg by mouth 2 (Two) Times a Day.     • albuterol sulfate  (90 Base) MCG/ACT inhaler Every 6 (Six) Hours As Needed.     • ipratropium (ATROVENT) 0.06 % nasal spray      • losartan (COZAAR) 50 MG tablet Take 1 tablet by mouth Daily. 90 tablet 0     No facility-administered medications prior to visit.       Allergies as of 09/29/2022   • (No Known Allergies)     Social History     Socioeconomic History   • Marital status:    Tobacco Use   • Smoking status: Never Smoker   • Smokeless tobacco: Never Used   • Tobacco comment: caffeine use - 1 cup coffee daily    Vaping Use   • Vaping Use: Never used   Substance and Sexual Activity   • Alcohol use: Yes     Comment: less than 1 glass of wine   • Drug use: Never   • Sexual activity: Not Currently     Partners: Male     Birth control/protection: Surgical     Comment: Hyst     Family History   Problem Relation Age of Onset   • Hypertension Mother    • Stroke Mother    • Cancer Mother    • Diabetes Sister    • Coronary artery disease Brother    • Diabetes Brother    • Valvular heart disease Brother         TAVR   • Coronary artery disease Brother  "   • Cancer Brother    • Birth defects Daughter    • Skin cancer Neg Hx      Review of Systems   Constitutional: Negative for chills, fever, weight gain and weight loss.   Cardiovascular: Negative for leg swelling.   Respiratory: Negative for cough, snoring and wheezing.    Hematologic/Lymphatic: Negative for bleeding problem. Does not bruise/bleed easily.   Skin: Negative for color change.   Musculoskeletal: Negative for falls, joint pain and myalgias.   Gastrointestinal: Negative for melena.   Genitourinary: Negative for hematuria.   Neurological: Negative for excessive daytime sleepiness.   Psychiatric/Behavioral: Negative for depression. The patient is nervous/anxious.         Objective:     Vitals:    09/29/22 0931   BP: 130/74   Pulse: 81   Weight: 64.4 kg (142 lb)   Height: 170.2 cm (67\")     Body mass index is 22.24 kg/m².    Vitals reviewed.   Constitutional:       Appearance: Well-developed.   Eyes:      General: No scleral icterus.        Right eye: No discharge.      Conjunctiva/sclera: Conjunctivae normal.      Pupils: Pupils are equal, round, and reactive to light.   HENT:      Head: Normocephalic.      Nose: Nose normal.   Neck:      Thyroid: No thyromegaly.      Vascular: No JVD.   Pulmonary:      Effort: Pulmonary effort is normal. No respiratory distress.      Breath sounds: Normal breath sounds. No wheezing. No rales.   Cardiovascular:      Normal rate. Regular rhythm. Normal S1. Normal S2.      Murmurs: There is a grade 2/6 systolic murmur at the LLSB and ULSB.      No gallop.   Pulses:     Intact distal pulses.   Edema:     Peripheral edema absent.   Abdominal:      General: Bowel sounds are normal. There is no distension.      Palpations: Abdomen is soft.      Tenderness: There is no abdominal tenderness. There is no rebound.   Musculoskeletal: Normal range of motion.         General: No tenderness.      Cervical back: Normal range of motion and neck supple. Skin:     General: Skin is warm and " dry.      Findings: No erythema or rash.   Neurological:      Mental Status: Alert and oriented to person, place, and time.   Psychiatric:         Behavior: Behavior normal.         Thought Content: Thought content normal.         Judgment: Judgment normal.       Lab Review:     ECG 12 Lead    Date/Time: 9/29/2022 9:58 AM  Performed by: Abbi Mitchell MD  Authorized by: Abbi Mitchell MD   Comparison: compared with previous ECG   Comparison to previous ECG: HR is faster and with PAC  Rhythm: sinus rhythm  Conduction: left anterior fascicular block and non-specific intraventricular conduction delay  Other findings: left ventricular hypertrophy    Clinical impression: abnormal EKG          Assessment:       Diagnosis Plan   1. PAF (paroxysmal atrial fibrillation) ([facility])  ECG 12 Lead    Adult Transthoracic Echo Complete W/ Cont if Necessary Per Protocol   2. Nonrheumatic mitral valve stenosis  Adult Transthoracic Echo Complete W/ Cont if Necessary Per Protocol   3. Thoracic aortic aneurysm without rupture      4. Essential hypertension     5. NNEKA (obstructive sleep apnea)       Plan:       1.  Paroxysmal atrial fibrillation, status post electrical cardioversion x 2.  Remains in sinus rhythm with some irregularity.  We will increase metoprolol to 75 mg twice daily.  She is to follow-up with Dr Jj in 2 weeks  2.  Vision changes.  No focal motor or sensory deficits.  This does not sound neuropathic in nature.  I have asked her to follow-up with ophthalmology and to make an early appointment.  3.  Hypertension.  Blood pressure at home towards the evening is elevated we will continue to observe as we increase metoprolol.  4.  Aortic valve replacement with bioprosthetic valve and subaortic membrane resection in 2007.  Valve appeared to be working well on January 2022.  5.  Dilated aortic root.  Stable measuring 4 cm and a sending aorta by CT 7/2021.  6.  Pulmonary hypertension, RVSP was elevated to 46 mmHg on echo 1/2022.   Plan to repeat in 6 months.  7.  Mild mitral valve stenosis noted on prior echo reassess by echo in 6 months  She is to have labs with TOSHA in the near future  8.  Anemia.  No obvious blood loss.  She will follow-up with labs with TOSHA Hong.  9.  Diabetes  10.  Dyslipidemia      Time Spent: I spent 35 minutes caring for Dior on this date of service. This time includes time spent by me in the following activities: preparing for the visit, reviewing tests, obtaining and/or reviewing a separately obtained history, performing a medically appropriate examination and/or evaluation, counseling and educating the patient/family/caregiver, ordering medications, tests, or procedures, documenting information in the medical record and independently interpreting results and communicating that information with the patient/family/caregiver.   I spent 1 minutes on the separately reported service of ECG. This time is not included in the time used to support the E/M service also reported today.        Your medication list          Accurate as of September 29, 2022 10:21 AM. If you have any questions, ask your nurse or doctor.            CHANGE how you take these medications      Instructions Last Dose Given Next Dose Due   baclofen 10 MG tablet  Commonly known as: LIORESAL  What changed: when to take this      Take 1 tablet by mouth 3 (Three) Times a Day.       metoprolol tartrate 50 MG tablet  Commonly known as: LOPRESSOR  What changed: how much to take  Changed by: Abbi Mitchell MD      Take 1.5 tablets by mouth 2 (Two) Times a Day.       valACYclovir 1000 MG tablet  Commonly known as: VALTREX  What changed: additional instructions      TAKE 2 TABLETS 4 TIMES     DAILY FOR MOUTH ULCER          CONTINUE taking these medications      Instructions Last Dose Given Next Dose Due   amitriptyline 25 MG tablet  Commonly known as: ELAVIL      Take 1 tablet by mouth Every Night.       ascorbic acid 1000 MG tablet  Commonly known as: VITAMIN  C      Take 1,000 mg by mouth Daily.       atorvastatin 20 MG tablet  Commonly known as: LIPITOR      Take 1 tablet by mouth Every Night.       azelastine 0.1 % nasal spray  Commonly known as: ASTELIN      2 sprays into the nostril(s) as directed by provider. Use in each nostril as directed       Eliquis 5 MG tablet tablet  Generic drug: apixaban      TAKE 1 TABLET BY MOUTH EVERY TWELVE HOURS       EPINEPHrine 0.3 MG/0.3ML solution auto-injector injection  Commonly known as: EPIPEN           fenofibrate micronized 134 MG capsule  Commonly known as: LOFIBRA      Take 1 capsule by mouth Every Morning Before Breakfast.       fluticasone-salmeterol 45-21 MCG/ACT inhaler  Commonly known as: ADVAIR HFA      Inhale 2 puffs As Needed.       gabapentin 600 MG tablet  Commonly known as: NEURONTIN      Take 600 mg by mouth As Needed.       glucosamine-chondroitin 500-400 MG capsule capsule      Take 2 capsules by mouth Daily.       glucose blood test strip  Commonly known as: Prodigy No Coding Blood Gluc      TEST BLOOD SUGAR DAILY       levothyroxine 75 MCG tablet  Commonly known as: SYNTHROID, LEVOTHROID      TAKE 1 TABLET EVERY OTHER  DAY. OVERDUE FOR           APPOINTMENT AND FASTING    LABS.       levothyroxine 50 MCG tablet  Commonly known as: SYNTHROID, LEVOTHROID      TAKE 1 TABLET EVERY OTHER  DAY       metFORMIN  MG 24 hr tablet  Commonly known as: GLUCOPHAGE-XR      Take 1 tablet by mouth 2 (Two) Times a Day.       multivitamin with minerals tablet tablet      Take  by mouth Daily.       omeprazole 40 MG capsule  Commonly known as: priLOSEC      Take 1 capsule by mouth Daily.       Prodigy No Coding Blood Gluc w/Device kit      1 each Daily.       Prodigy Safety Lancets 26G misc      CHECK BLOOD SUGAR ONE TIME PER DAY       venlafaxine  MG 24 hr capsule  Commonly known as: EFFEXOR-XR      TAKE 1 CAPSULE DAILY             Where to Get Your Medications      These medications were sent to [facility]  [facility] - [place], [place] - [address] AT [address] [facility] - [phone] [facility] [phone] FX  [address], [facility] [address]    Phone: [phone]   · metoprolol tartrate 50 MG tablet         Patient is no longer taking -.  I corrected the med list to reflect this.  I did not stop these medications.      Dictated utilizing Dragon dictation

## 2022-10-03 ENCOUNTER — HOSPITAL ENCOUNTER (INPATIENT)
Facility: HOSPITAL | Age: 82
LOS: 2 days | Discharge: HOME | End: 2022-10-06
Attending: EMERGENCY MEDICINE | Admitting: INTERNAL MEDICINE

## 2022-10-03 ENCOUNTER — APPOINTMENT (OUTPATIENT)
Dept: GENERAL RADIOLOGY | Facility: HOSPITAL | Age: 82
End: 2022-10-03

## 2022-10-03 DIAGNOSIS — I48.0 PAROXYSMAL ATRIAL FIBRILLATION: Primary | ICD-10-CM

## 2022-10-03 DIAGNOSIS — E83.42 HYPOMAGNESEMIA: ICD-10-CM

## 2022-10-03 LAB
ALBUMIN SERPL-MCNC: 4.4 G/DL (ref 3.5–5.2)
ALBUMIN/GLOB SERPL: 1.6 G/DL
ALP SERPL-CCNC: 70 U/L (ref 39–117)
ALT SERPL W P-5'-P-CCNC: 17 U/L (ref 1–33)
ANION GAP SERPL CALCULATED.3IONS-SCNC: 11.8 MMOL/L (ref 5–15)
AST SERPL-CCNC: 23 U/L (ref 1–32)
BASOPHILS # BLD AUTO: 0.09 10*3/MM3 (ref 0–0.2)
BASOPHILS NFR BLD AUTO: 1.1 % (ref 0–1.5)
BILIRUB SERPL-MCNC: 0.3 MG/DL (ref 0–1.2)
BUN SERPL-MCNC: 24 MG/DL (ref 8–23)
BUN/CREAT SERPL: 24 (ref 7–25)
CALCIUM SPEC-SCNC: 9.4 MG/DL (ref 8.6–10.5)
CHLORIDE SERPL-SCNC: 103 MMOL/L (ref 98–107)
CO2 SERPL-SCNC: 25.2 MMOL/L (ref 22–29)
CREAT SERPL-MCNC: 1 MG/DL (ref 0.57–1)
DEPRECATED RDW RBC AUTO: 43 FL (ref 37–54)
EGFRCR SERPLBLD CKD-EPI 2021: 55.7 ML/MIN/1.73
EOSINOPHIL # BLD AUTO: 0.25 10*3/MM3 (ref 0–0.4)
EOSINOPHIL NFR BLD AUTO: 3.1 % (ref 0.3–6.2)
ERYTHROCYTE [DISTWIDTH] IN BLOOD BY AUTOMATED COUNT: 13.2 % (ref 12.3–15.4)
GLOBULIN UR ELPH-MCNC: 2.8 GM/DL
GLUCOSE SERPL-MCNC: 130 MG/DL (ref 65–99)
HCT VFR BLD AUTO: 44.9 % (ref 34–46.6)
HGB BLD-MCNC: 14.6 G/DL (ref 12–15.9)
IMM GRANULOCYTES # BLD AUTO: 0.03 10*3/MM3 (ref 0–0.05)
IMM GRANULOCYTES NFR BLD AUTO: 0.4 % (ref 0–0.5)
LYMPHOCYTES # BLD AUTO: 2.96 10*3/MM3 (ref 0.7–3.1)
LYMPHOCYTES NFR BLD AUTO: 36.2 % (ref 19.6–45.3)
MAGNESIUM SERPL-MCNC: 1.4 MG/DL (ref 1.6–2.4)
MCH RBC QN AUTO: 29.5 PG (ref 26.6–33)
MCHC RBC AUTO-ENTMCNC: 32.5 G/DL (ref 31.5–35.7)
MCV RBC AUTO: 90.7 FL (ref 79–97)
MONOCYTES # BLD AUTO: 0.95 10*3/MM3 (ref 0.1–0.9)
MONOCYTES NFR BLD AUTO: 11.6 % (ref 5–12)
NEUTROPHILS NFR BLD AUTO: 3.89 10*3/MM3 (ref 1.7–7)
NEUTROPHILS NFR BLD AUTO: 47.6 % (ref 42.7–76)
NRBC BLD AUTO-RTO: 0 /100 WBC (ref 0–0.2)
NT-PROBNP SERPL-MCNC: 4969 PG/ML (ref 0–1800)
PLATELET # BLD AUTO: 249 10*3/MM3 (ref 140–450)
PMV BLD AUTO: 9.8 FL (ref 6–12)
POTASSIUM SERPL-SCNC: 4.2 MMOL/L (ref 3.5–5.2)
PROT SERPL-MCNC: 7.2 G/DL (ref 6–8.5)
QT INTERVAL: 310 MS
QT INTERVAL: 366 MS
RBC # BLD AUTO: 4.95 10*6/MM3 (ref 3.77–5.28)
SODIUM SERPL-SCNC: 140 MMOL/L (ref 136–145)
TROPONIN T SERPL-MCNC: <0.01 NG/ML (ref 0–0.03)
TSH SERPL DL<=0.05 MIU/L-ACNC: 2.16 UIU/ML (ref 0.27–4.2)
WBC NRBC COR # BLD: 8.17 10*3/MM3 (ref 3.4–10.8)

## 2022-10-03 PROCEDURE — 84443 ASSAY THYROID STIM HORMONE: CPT | Performed by: EMERGENCY MEDICINE

## 2022-10-03 PROCEDURE — 93010 ELECTROCARDIOGRAM REPORT: CPT | Performed by: INTERNAL MEDICINE

## 2022-10-03 PROCEDURE — 84484 ASSAY OF TROPONIN QUANT: CPT | Performed by: EMERGENCY MEDICINE

## 2022-10-03 PROCEDURE — 93005 ELECTROCARDIOGRAM TRACING: CPT | Performed by: EMERGENCY MEDICINE

## 2022-10-03 PROCEDURE — 93005 ELECTROCARDIOGRAM TRACING: CPT

## 2022-10-03 PROCEDURE — 80053 COMPREHEN METABOLIC PANEL: CPT | Performed by: EMERGENCY MEDICINE

## 2022-10-03 PROCEDURE — 25010000002 MAGNESIUM SULFATE 2 GM/50ML SOLUTION: Performed by: EMERGENCY MEDICINE

## 2022-10-03 PROCEDURE — 99214 OFFICE O/P EST MOD 30 MIN: CPT

## 2022-10-03 PROCEDURE — 71045 X-RAY EXAM CHEST 1 VIEW: CPT

## 2022-10-03 PROCEDURE — 83880 ASSAY OF NATRIURETIC PEPTIDE: CPT | Performed by: EMERGENCY MEDICINE

## 2022-10-03 PROCEDURE — 99214 OFFICE O/P EST MOD 30 MIN: CPT | Performed by: NURSE PRACTITIONER

## 2022-10-03 PROCEDURE — G0378 HOSPITAL OBSERVATION PER HR: HCPCS

## 2022-10-03 PROCEDURE — 36415 COLL VENOUS BLD VENIPUNCTURE: CPT

## 2022-10-03 PROCEDURE — 85025 COMPLETE CBC W/AUTO DIFF WBC: CPT | Performed by: EMERGENCY MEDICINE

## 2022-10-03 PROCEDURE — 99284 EMERGENCY DEPT VISIT MOD MDM: CPT

## 2022-10-03 PROCEDURE — 83735 ASSAY OF MAGNESIUM: CPT | Performed by: EMERGENCY MEDICINE

## 2022-10-03 RX ORDER — DOFETILIDE 0.25 MG/1
250 CAPSULE ORAL EVERY 12 HOURS
Status: DISCONTINUED | OUTPATIENT
Start: 2022-10-04 | End: 2022-10-06 | Stop reason: HOSPADM

## 2022-10-03 RX ORDER — MAGNESIUM SULFATE HEPTAHYDRATE 40 MG/ML
2 INJECTION, SOLUTION INTRAVENOUS AS NEEDED
Status: DISCONTINUED | OUTPATIENT
Start: 2022-10-03 | End: 2022-10-06 | Stop reason: HOSPADM

## 2022-10-03 RX ORDER — SODIUM CHLORIDE 0.9 % (FLUSH) 0.9 %
10 SYRINGE (ML) INJECTION EVERY 12 HOURS SCHEDULED
Status: DISCONTINUED | OUTPATIENT
Start: 2022-10-03 | End: 2022-10-06 | Stop reason: HOSPADM

## 2022-10-03 RX ORDER — SODIUM CHLORIDE 0.9 % (FLUSH) 0.9 %
10 SYRINGE (ML) INJECTION AS NEEDED
Status: DISCONTINUED | OUTPATIENT
Start: 2022-10-03 | End: 2022-10-06 | Stop reason: HOSPADM

## 2022-10-03 RX ORDER — LEVOTHYROXINE SODIUM 0.07 MG/1
75 TABLET ORAL EVERY OTHER DAY
Status: DISCONTINUED | OUTPATIENT
Start: 2022-10-04 | End: 2022-10-06 | Stop reason: HOSPADM

## 2022-10-03 RX ORDER — AZELASTINE 1 MG/ML
2 SPRAY, METERED NASAL DAILY
Status: DISCONTINUED | OUTPATIENT
Start: 2022-10-03 | End: 2022-10-06 | Stop reason: HOSPADM

## 2022-10-03 RX ORDER — ACETAMINOPHEN 325 MG/1
650 TABLET ORAL EVERY 6 HOURS PRN
Status: DISCONTINUED | OUTPATIENT
Start: 2022-10-03 | End: 2022-10-06 | Stop reason: HOSPADM

## 2022-10-03 RX ORDER — MULTIPLE VITAMINS W/ MINERALS TAB 9MG-400MCG
1 TAB ORAL DAILY
Status: DISCONTINUED | OUTPATIENT
Start: 2022-10-03 | End: 2022-10-06 | Stop reason: HOSPADM

## 2022-10-03 RX ORDER — DILTIAZEM HYDROCHLORIDE 5 MG/ML
10 INJECTION INTRAVENOUS ONCE
Status: COMPLETED | OUTPATIENT
Start: 2022-10-03 | End: 2022-10-03

## 2022-10-03 RX ORDER — PANTOPRAZOLE SODIUM 40 MG/1
40 TABLET, DELAYED RELEASE ORAL EVERY MORNING
Refills: 1 | Status: DISCONTINUED | OUTPATIENT
Start: 2022-10-03 | End: 2022-10-06 | Stop reason: HOSPADM

## 2022-10-03 RX ORDER — POTASSIUM CHLORIDE 750 MG/1
40 TABLET, FILM COATED, EXTENDED RELEASE ORAL AS NEEDED
Status: DISCONTINUED | OUTPATIENT
Start: 2022-10-03 | End: 2022-10-06 | Stop reason: HOSPADM

## 2022-10-03 RX ORDER — LEVOTHYROXINE SODIUM 0.05 MG/1
50 TABLET ORAL EVERY OTHER DAY
Status: DISCONTINUED | OUTPATIENT
Start: 2022-10-03 | End: 2022-10-06 | Stop reason: HOSPADM

## 2022-10-03 RX ORDER — VENLAFAXINE HYDROCHLORIDE 150 MG/1
150 CAPSULE, EXTENDED RELEASE ORAL DAILY
Status: DISCONTINUED | OUTPATIENT
Start: 2022-10-03 | End: 2022-10-06 | Stop reason: HOSPADM

## 2022-10-03 RX ORDER — AMITRIPTYLINE HYDROCHLORIDE 25 MG/1
25 TABLET, FILM COATED ORAL NIGHTLY
Status: DISCONTINUED | OUTPATIENT
Start: 2022-10-03 | End: 2022-10-06 | Stop reason: HOSPADM

## 2022-10-03 RX ORDER — NITROGLYCERIN 0.4 MG/1
0.4 TABLET SUBLINGUAL
Status: DISCONTINUED | OUTPATIENT
Start: 2022-10-03 | End: 2022-10-06 | Stop reason: HOSPADM

## 2022-10-03 RX ORDER — BUDESONIDE AND FORMOTEROL FUMARATE DIHYDRATE 80; 4.5 UG/1; UG/1
2 AEROSOL RESPIRATORY (INHALATION)
Status: DISCONTINUED | OUTPATIENT
Start: 2022-10-03 | End: 2022-10-06 | Stop reason: HOSPADM

## 2022-10-03 RX ORDER — ASCORBIC ACID 500 MG
1000 TABLET ORAL DAILY
Status: DISCONTINUED | OUTPATIENT
Start: 2022-10-03 | End: 2022-10-06 | Stop reason: HOSPADM

## 2022-10-03 RX ORDER — POTASSIUM CHLORIDE 1.5 G/1.77G
40 POWDER, FOR SOLUTION ORAL AS NEEDED
Status: DISCONTINUED | OUTPATIENT
Start: 2022-10-03 | End: 2022-10-06 | Stop reason: HOSPADM

## 2022-10-03 RX ORDER — BACLOFEN 10 MG/1
10 TABLET ORAL DAILY
Status: DISCONTINUED | OUTPATIENT
Start: 2022-10-03 | End: 2022-10-06 | Stop reason: HOSPADM

## 2022-10-03 RX ORDER — MAGNESIUM SULFATE HEPTAHYDRATE 40 MG/ML
4 INJECTION, SOLUTION INTRAVENOUS AS NEEDED
Status: DISCONTINUED | OUTPATIENT
Start: 2022-10-03 | End: 2022-10-06 | Stop reason: HOSPADM

## 2022-10-03 RX ORDER — LEVOTHYROXINE SODIUM 0.05 MG/1
50 TABLET ORAL EVERY OTHER DAY
Status: DISCONTINUED | OUTPATIENT
Start: 2022-10-05 | End: 2022-10-03

## 2022-10-03 RX ORDER — GABAPENTIN 300 MG/1
300 CAPSULE ORAL EVERY 12 HOURS PRN
Status: DISCONTINUED | OUTPATIENT
Start: 2022-10-03 | End: 2022-10-06 | Stop reason: HOSPADM

## 2022-10-03 RX ORDER — MAGNESIUM SULFATE HEPTAHYDRATE 40 MG/ML
2 INJECTION, SOLUTION INTRAVENOUS ONCE
Status: COMPLETED | OUTPATIENT
Start: 2022-10-03 | End: 2022-10-03

## 2022-10-03 RX ORDER — ATORVASTATIN CALCIUM 20 MG/1
20 TABLET, FILM COATED ORAL NIGHTLY
Status: DISCONTINUED | OUTPATIENT
Start: 2022-10-03 | End: 2022-10-06 | Stop reason: HOSPADM

## 2022-10-03 RX ADMIN — METOPROLOL TARTRATE 75 MG: 25 TABLET, FILM COATED ORAL at 21:41

## 2022-10-03 RX ADMIN — ATORVASTATIN CALCIUM 20 MG: 20 TABLET, FILM COATED ORAL at 21:41

## 2022-10-03 RX ADMIN — METFORMIN HYDROCHLORIDE 500 MG: 500 TABLET ORAL at 18:10

## 2022-10-03 RX ADMIN — BACLOFEN 10 MG: 10 TABLET ORAL at 21:41

## 2022-10-03 RX ADMIN — VENLAFAXINE HYDROCHLORIDE 150 MG: 150 CAPSULE, EXTENDED RELEASE ORAL at 18:10

## 2022-10-03 RX ADMIN — MAGNESIUM SULFATE HEPTAHYDRATE 2 G: 2 INJECTION, SOLUTION INTRAVENOUS at 10:44

## 2022-10-03 RX ADMIN — LEVOTHYROXINE SODIUM 50 MCG: 0.05 TABLET ORAL at 21:41

## 2022-10-03 RX ADMIN — ACETAMINOPHEN 650 MG: 325 TABLET, FILM COATED ORAL at 21:41

## 2022-10-03 RX ADMIN — DILTIAZEM HYDROCHLORIDE 10 MG: 5 INJECTION INTRAVENOUS at 10:40

## 2022-10-03 RX ADMIN — AMITRIPTYLINE HYDROCHLORIDE 25 MG: 25 TABLET, FILM COATED ORAL at 21:41

## 2022-10-03 RX ADMIN — MULTIPLE VITAMINS W/ MINERALS TAB 1 TABLET: TAB at 18:10

## 2022-10-03 RX ADMIN — METOPROLOL TARTRATE 25 MG: 25 TABLET, FILM COATED ORAL at 06:53

## 2022-10-03 RX ADMIN — Medication 10 ML: at 21:42

## 2022-10-03 RX ADMIN — METOPROLOL TARTRATE 5 MG: 1 INJECTION, SOLUTION INTRAVENOUS at 06:54

## 2022-10-03 RX ADMIN — APIXABAN 5 MG: 5 TABLET, FILM COATED ORAL at 21:42

## 2022-10-03 RX ADMIN — PANTOPRAZOLE SODIUM 40 MG: 40 TABLET, DELAYED RELEASE ORAL at 21:42

## 2022-10-03 RX ADMIN — OXYCODONE HYDROCHLORIDE AND ACETAMINOPHEN 1000 MG: 500 TABLET ORAL at 18:10

## 2022-10-03 RX ADMIN — METOPROLOL TARTRATE 5 MG: 1 INJECTION, SOLUTION INTRAVENOUS at 08:05

## 2022-10-03 RX ADMIN — SODIUM CHLORIDE 1000 ML: 9 INJECTION, SOLUTION INTRAVENOUS at 06:53

## 2022-10-03 NOTE — CONSULTS
Electrophysiology Hospital Consult            Patient Name: Dior Pettit  Age/Sex: 82 y.o. female  : 1940  MRN: [id number]    Date of Admission: 10/3/2022  Date of Encounter Visit: 10/03/22  Encounter Provider: TOSHA Veronica  Referring Provider: Abbi Mitchell MD  Place of Service: [facility] CARDIOLOGY  Patient Care Team:  Ashanti Hong APRN as PCP - General (Internal Medicine)  Gerardo Rodriguez Jr., MD as Consulting Physician (Pulmonary Disease)  Abbi Mitchell MD as Consulting Physician (Cardiology)  Luis Miguel Parker MD as Consulting Physician (Ophthalmology)  Tae Burton MD as Consulting Physician (Dermatology)  Addis Gomes MD as Consulting Physician (Plastic Surgery)      Subjective:   EP Consultation for: persistent atrial fibrillation    Chief Complaint: palpitations     History of Present Illness:  Dior Pettit is a 82 y.o. female who is followed by Dr. Jj.  She has a history of CAD, DM II,  AVR--s/p repair ([place]-), and persistent atrial fibrillation.      She was cardioverted in 2021.  She was started on amiodarone prior to the CV but felt like it was causing dizziness so this was stopped.  She had maintained NSR until September of this year.  She saw Dr. Jj on 2022 and was back in AF.  She estimated that she had been in AF for about a week prior to her appt.  She was complaining of shortness of breath and fatigue.  Since the CV lasted a year she elected to undergo CV again.         Patient underwent CV for persistent AF on 2022 she was successfully CV.  All of her medications were continued. Metoprolol 100mg BID and digoxin 125mcg daily.      She presented to the ED on 2022 with complaints of weakness and shortness of breath. She was noted to be SB with heart rate of 40 bpm.  We stopped her digoxin and metoprolol.     She called on  with complaints of elevated HR and HTN.  She was started back on metoprolol 50mg BID.    She saw Dr. Mitchell on 9/29/2022. Was in SR with PACs so BB was increased to 75mg BID and had follow up with Dr. Jj this week.       She presented to the ED this morning after she noticed palpitations and irregular heart beats last night. She was noted to be in AF with RVR around 120bpm. She was treated with IV diltiazem and metoprolol.     We have been asked to see her for persistent atrial fibrillation.           Dr. Jj and I saw her this afternoon. She says that last night she was checking her BP and noticed that her heart was beating irregular. This morning she was a little short of breath so decided to come to ED. She remains in AF with rates in the 80s-100s.  She says that she feels much better. She denies any chest pain, palpitations, or dyspnea.      Past Medical History:  Past Medical History:   Diagnosis Date   • Allergic rhinitis    • Anemia    • Anxiety     Controlled w/Meds   • Aortic root dilatation (HCC) 10/02/2017    Borderline--Noted on Echo   • Aortic valve calcification 10/02/2017    Noted on Echo   • Aortic valve insufficiency Dx in 07    w/AVR    • Aortic valve prosthesis present 11/02/2018    Noted on CTA Chest   • Ascending aorta dilatation (HCC) 10/02/2017    Borderline--Noted on Echo   • Asthma    • Atypical chest pain    • Breast pain, right Hx   • CAD (coronary artery disease)    • Cardiomegaly 2017   • Cervicalgia    • Chest pain due to CAD ([facility])    • 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    Borderline--Noted on Echo & Measured @ 2.6CM and Mid Descending @ 2.5CM on CTA Chest-11/2/18   • DM (diabetes mellitus) ([facility])     T2   • Esophagitis    • GERD (gastroesophageal reflux disease)     Controlled w/Meds   • Headache    • Health care maintenance    • Heart murmur    • History of echocardiogram 10/2/17-[facility]    EF 66%; Borderline Concentric Hypertrophy; Mild Calcification in AV; Mild AVS; Mild AVR; Moderate TVR; Borderline Dilation  of Aortic Root/Arch & Borderline Dilation of Ascending/Proximal Aorta Present   • Hyperlipidemia     Controlled w/Meds   • Hypertension     Controlled w/Meds   • Hypothyroidism     Controlled w/Synthroid   • Insomnia    • Macular degeneration, left eye    • Mild aortic valve regurgitation 10/02/2017    Noted on Echo   • Mild aortic valve stenosis 10/02/2017    Noted on Echo   • Mild dilation of ascending aorta (HCC) 10/02/2017    Borderline--Noted on Echo   • Moderate tricuspid valve regurgitation 10/02/2017    Noted on Echo   • Mood disorder in conditions classified elsewhere     Controlled w/Meds   • Near syncope 03/2017-[facility] ER   • Neuropathy    • NNEKA (obstructive sleep apnea)     Untreated   • Osteoarthritis     Ankles/Feet   • Pulmonary hypertension (HCC) 2017   • Renal insufficiency    • RLS (restless legs syndrome)    • Thoracic ascending aortic aneurysm Dx in 2007 @ 3.8CM & 1/02/2018    Noted @ 4CM on CTA Chest;   • Visual impairment     macular degeneratuin       Past Surgical History:   Procedure Laterality Date   • AORTIC VALVE REPAIR/REPLACEMENT  2007    Dr. Perry   • APPENDECTOMY     • AUGMENTATION MAMMAPLASTY  1976   • BREAST AUGMENTATION  2014   • BUNIONECTOMY     • CARDIAC CATHETERIZATION  2007   • CARDIAC VALVE REPLACEMENT  2007    porcine   • COLONOSCOPY  2010   • COLONOSCOPY  03/02/2012   • EYE SURGERY      cataracts and ens implants   • HYSTERECTOMY  1972   • SIGMOIDOSCOPY  2001       Home Medications:   Medications Prior to Admission   Medication Sig Dispense Refill Last Dose   • amitriptyline (ELAVIL) 25 MG tablet Take 1 tablet by mouth Every Night. 30 tablet 0 10/2/2022 at Unknown time   • atorvastatin (LIPITOR) 20 MG tablet Take 1 tablet by mouth Every Night. 90 tablet 1 10/2/2022 at Unknown time   • azelastine (ASTELIN) 0.1 % nasal spray 2 sprays into the nostril(s) as directed by provider. Use in each nostril as directed   10/2/2022 at Unknown time   • baclofen (LIORESAL) 10 MG tablet Take  1 tablet by mouth 3 (Three) Times a Day. (Patient taking differently: Take 10 mg by mouth Daily.) 90 tablet 3 10/2/2022 at Unknown time   • Blood Glucose Monitoring Suppl (Prodigy No Coding Blood Gluc) w/Device kit 1 each Daily. 1 kit 0    • Eliquis 5 MG tablet tablet TAKE 1 TABLET BY MOUTH EVERY TWELVE HOURS 180 tablet 0 10/2/2022 at Unknown time   • EPINEPHrine (EPIPEN) 0.3 MG/0.3ML solution auto-injector injection       • fenofibrate micronized (LOFIBRA) 134 MG capsule Take 1 capsule by mouth Every Morning Before Breakfast. 90 capsule 3 10/2/2022 at Unknown time   • fluticasone-salmeterol (ADVAIR HFA) 45-21 MCG/ACT inhaler Inhale 2 puffs As Needed.      • gabapentin (NEURONTIN) 600 MG tablet Take 600 mg by mouth As Needed.   Past Week at Unknown time   • glucosamine-chondroitin 500-400 MG capsule capsule Take 2 capsules by mouth Daily.   10/2/2022 at Unknown time   • glucose blood (Prodigy No Coding Blood Gluc) test strip TEST BLOOD SUGAR DAILY 50 each 0    • levothyroxine (SYNTHROID, LEVOTHROID) 50 MCG tablet TAKE 1 TABLET EVERY OTHER  DAY 45 tablet 1 Past Week at Unknown time   • levothyroxine (SYNTHROID, LEVOTHROID) 75 MCG tablet TAKE 1 TABLET EVERY OTHER  DAY. OVERDUE FOR           APPOINTMENT AND FASTING    LABS. 45 tablet 3 Past Week at Unknown time   • metFORMIN ER (GLUCOPHAGE-XR) 750 MG 24 hr tablet Take 1 tablet by mouth 2 (Two) Times a Day. 180 tablet 3 10/2/2022 at Unknown time   • metoprolol tartrate (LOPRESSOR) 50 MG tablet Take 1.5 tablets by mouth 2 (Two) Times a Day. 150 tablet 3 10/2/2022 at Unknown time   • Multiple Vitamins-Minerals (MULTIVITAL PO) Take  by mouth Daily.   10/2/2022 at Unknown time   • omeprazole (priLOSEC) 40 MG capsule Take 1 capsule by mouth Daily. 90 capsule 1 10/2/2022 at Unknown time   • Prodigy Safety Lancets 26G misc CHECK BLOOD SUGAR ONE TIME PER  each 1    • venlafaxine XR (EFFEXOR-XR) 150 MG 24 hr capsule TAKE 1 CAPSULE DAILY 90 capsule 1 10/2/2022 at Unknown  time   • ascorbic acid (VITAMIN C) 1000 MG tablet Take 1,000 mg by mouth Daily.      • valACYclovir (VALTREX) 1000 MG tablet TAKE 2 TABLETS 4 TIMES     DAILY FOR MOUTH ULCER (Patient taking differently: TAKE 2 TABLETS 4 TIMES     DAILY FOR MOUTH ULCER as needed) 16 tablet 4 More than a month at Unknown time       Allergies:  No Known Allergies    Past Social History:  Social History     Socioeconomic History   • Marital status:    Tobacco Use   • Smoking status: Never Smoker   • Smokeless tobacco: Never Used   • Tobacco comment: caffeine use - 1 cup coffee daily    Vaping Use   • Vaping Use: Never used   Substance and Sexual Activity   • Alcohol use: Yes     Comment: less than 1 glass of wine   • Drug use: Never   • Sexual activity: Not Currently     Partners: Male     Birth control/protection: Surgical     Comment: Hyst       Past Family History:  Family History   Problem Relation Age of Onset   • Hypertension Mother    • Stroke Mother    • Cancer Mother    • Diabetes Sister    • Coronary artery disease Brother    • Diabetes Brother    • Valvular heart disease Brother         TAVR   • Coronary artery disease Brother    • Cancer Brother    • Birth defects Daughter    • Skin cancer Neg Hx        Review of Systems: All systems reviewed. Pertinent positives identified in HPI. All other systems are negative.     14 point ROS was performed and is negative except as outlined in HPI.     Objective:     Objective:  Vital Signs (last 24 hours)       10/02 0700  10/03 0659 10/03 0700  10/03 1635   Most Recent      Temp (°F)   97.4       97.4 (36.3) 10/03 0602    Heart Rate 114 -  146    81 -  121     84 10/03 1133    Resp 18 -  20      17     17 10/03 0821    BP 99/60 -  121/93    99/80 -  126/86     103/80 10/03 1256    SpO2 (%) 95 -  100    94 -  98     96 10/03 1133        Temp:  [97.4 °F (36.3 °C)] 97.4 °F (36.3 °C)  Heart Rate:  [] 84  Resp:  [17-20] 17  BP: ()/(60-93) 103/80  Body mass index is 22.08  kg/m².        Physical Exam:     General Appearance: No acute distress, well developed and well nourished.   Eyes: Conjunctiva and lids: No erythema, swelling, or discharge. Sclera non-icteric.   HENT: Atraumatic, normocephalic. External eyes, ears, and nose normal.   Respiratory: No signs of respiratory distress. Respiration rhythm and depth normal.   Clear to auscultation. No rales, crackles, rhonchi, or wheezing auscultated.   Cardiovascular:  Heart Rate and Rhythm: irregularly irregular, Heart  Lower Extremities: No edema noted.  Musculoskeletal: Normal movement of extremities  Skin: Warm and dry.   Psychiatric: Patient alert and oriented to person, place, and time. Speech and behavior appropriate. Normal mood and affect.    Labs:   Lab Review:     Results from last 7 days   Lab Units 10/03/22  0805   SODIUM mmol/L 140   POTASSIUM mmol/L 4.2   CHLORIDE mmol/L 103   CO2 mmol/L 25.2   BUN mg/dL 24*   CREATININE mg/dL 1.00   GLUCOSE mg/dL 130*   CALCIUM mg/dL 9.4   AST (SGOT) U/L 23   ALT (SGPT) U/L 17     Results from last 7 days   Lab Units 10/03/22  0805   TROPONIN T ng/mL <0.010     Results from last 7 days   Lab Units 10/03/22  0650   WBC 10*3/mm3 8.17   HEMOGLOBIN g/dL 14.6   HEMATOCRIT % 44.9   PLATELETS 10*3/mm3 249             Results from last 7 days   Lab Units 10/03/22  0650   MAGNESIUM mg/dL 1.4*         Results from last 7 days   Lab Units 10/03/22  0650   PROBNP pg/mL 4,969.0*         Results from last 7 days   Lab Units 10/03/22  0650   TSH uIU/mL 2.160         I personally viewed and interpreted the patient's EKG/Telemetry tracings.    Assessment:       Paroxysmal atrial fibrillation (HCC)        Plan:   Dr. Carreno and I saw this patient.      82 year old female with persistent AF. She has been CV twice in the past, once on amiodarone but was stopped post CV due to side effects (dizziness).     We did discuss abandoning rhythm control and focusing on rate control but due to symptoms she strongly  wishes to pursue rhythm control.     After discussion we have elected to start tikosyn 250mcg BID. Her magnesium was 1.4 and has been replaced. We will check labs in the morning and administer first dose around 0800.      We will plan for CV Wednesday. She has been on uninterrupted AC and has not missed any doses.            Thank you for allowing me to participate in the care of Dior KEEGAN Pettit. Feel free to contact me directly with any further questions or concerns.    TOSHA Veronica  [facility]  10/03/22  16:35 EDT

## 2022-10-03 NOTE — ED PROVIDER NOTES
EMERGENCY DEPARTMENT ENCOUNTER    Room Number:  2207/1  Date of encounter:  10/3/2022  PCP: Ashanti Hong APRN  Historian: Patient, son      I used full protective equipment while examining this patient.  This includes face mask, gloves and protective eyewear.  I washed my hands before entering the room and immediately upon leaving the room      HPI:  Chief Complaint: Palpitations, weakness  A complete HPI/ROS/PMH/PSH/SH/FH are unobtainable due to: Nothing    Context: Dior Pettit is a 82 y.o. female who presents to the ED c/o sudden onset of heart palpitations, weakness last night.  Patient was checking her blood pressure last night and noticed that she was back in atrial fibrillation.  Patient does have a history of atrial fibrillation.  She denies any overt chest pain or shortness of breath.  She does feel weak when her A. fib is acting Epic.  She follows with Dr. Mitchell and Dr. Jj.  Patient had a cardioversion on 9/9/2022.  Patient is on Eliquis.  She denies any unilateral weakness, shortness of breath.    Review of Medical Records  I did review patient's last cardiology office visit from 9/29/2022.  Patient was in normal sinus rhythm at that time.    PAST MEDICAL HISTORY  Active Ambulatory Problems     Diagnosis Date Noted   • Essential hypertension 08/22/2016   • Pulmonary hypertension (HCC) 08/22/2016   • NNEKA (obstructive sleep apnea) 08/22/2016   • Gastroesophageal reflux disease 08/24/2016   • Anxiety    • Restless leg syndrome    • Controlled diabetes mellitus type 2 with complications ([facility])    • Hypothyroidism    • Hypercholesteremia    • Seasonal allergic rhinitis 11/11/2016   • Mild intermittent asthma without complication 11/11/2016   • S/P AVR 03/05/2017   • Dilated aortic root (HCC) 10/02/2017   • Thoracic aortic aneurysm without rupture  10/02/2017   • Primary insomnia 06/12/2018   • Renal insufficiency 10/28/2018   • Macular degeneration of both eyes 04/11/2019   • Coronary artery disease  involving native coronary artery with angina pectoris ([facility]) 04/11/2019   • Rectocele 07/15/2021   • Nonrheumatic mitral valve stenosis 09/08/2021   • PAF (paroxysmal atrial fibrillation) ([facility]) 09/14/2021   • Slow transit constipation 09/14/2021   • Dizziness 09/27/2021   • Daily headache 04/17/2022   • Symptomatic bradycardia 09/11/2022     Resolved Ambulatory Problems     Diagnosis Date Noted   • HARSHA (acute kidney injury) ([facility]) 09/14/2021   • Exudative age-related macular degeneration, right eye, with active choroidal neovascularization ([facility]) 01/15/2022     Past Medical History:   Diagnosis Date   • Allergic rhinitis    • Anemia    • Aortic root dilatation ([facility]) 10/02/2017   • Aortic valve calcification 10/02/2017   • Aortic valve insufficiency Dx in 07   • Aortic valve prosthesis present 11/02/2018   • Ascending aorta dilatation ([facility]) 10/02/2017   • Asthma    • Atypical chest pain    • Breast pain, right Hx   • CAD (coronary artery disease)    • Cardiomegaly 2017   • Cervicalgia    • Chest pain due to CAD ([facility])    • 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   • DM (diabetes mellitus) ([facility])    • Esophagitis    • GERD (gastroesophageal reflux disease)    • Headache    • Health care maintenance    • Heart murmur    • History of echocardiogram 10/2/17-[facility]   • Hyperlipidemia    • Hypertension    • Insomnia    • Macular degeneration, left eye    • Mild aortic valve regurgitation 10/02/2017   • Mild aortic valve stenosis 10/02/2017   • Mild dilation of ascending aorta ([facility]) 10/02/2017   • Moderate tricuspid valve regurgitation 10/02/2017   • Mood disorder in conditions classified elsewhere    • Near syncope 03/2017-[facility] ER   • Neuropathy    • Osteoarthritis    • RLS (restless legs syndrome)    • Thoracic ascending aortic aneurysm Dx in 2007 @ 3.8CM & 1/02/2018   • Visual impairment          PAST SURGICAL HISTORY  Past Surgical History:   Procedure Laterality Date   • AORTIC VALVE REPAIR/REPLACEMENT  2007      [place]   • APPENDECTOMY     • AUGMENTATION MAMMAPLASTY  1976   • BREAST AUGMENTATION  2014   • BUNIONECTOMY     • CARDIAC CATHETERIZATION  2007   • CARDIAC VALVE REPLACEMENT  2007    porcine   • COLONOSCOPY  2010   • COLONOSCOPY  03/02/2012   • EYE SURGERY      cataracts and ens implants   • HYSTERECTOMY  1972   • SIGMOIDOSCOPY  2001         FAMILY HISTORY  Family History   Problem Relation Age of Onset   • Hypertension Mother    • Stroke Mother    • Cancer Mother    • Diabetes Sister    • Coronary artery disease Brother    • Diabetes Brother    • Valvular heart disease Brother         TAVR   • Coronary artery disease Brother    • Cancer Brother    • Birth defects Daughter    • Skin cancer Neg Hx          SOCIAL HISTORY  Social History     Socioeconomic History   • Marital status:    Tobacco Use   • Smoking status: Never Smoker   • Smokeless tobacco: Never Used   • Tobacco comment: caffeine use - 1 cup coffee daily    Vaping Use   • Vaping Use: Never used   Substance and Sexual Activity   • Alcohol use: Yes     Comment: less than 1 glass of wine   • Drug use: Never   • Sexual activity: Not Currently     Partners: Male     Birth control/protection: Surgical     Comment: Hyst         ALLERGIES  Patient has no known allergies.        REVIEW OF SYSTEMS  All systems reviewed and negative except for those discussed in HPI.       PHYSICAL EXAM    I have reviewed the triage vital signs and nursing notes.    ED Triage Vitals   Temp Heart Rate Resp BP SpO2   10/03/22 0602 10/03/22 0536 10/03/22 0536 10/03/22 0602 10/03/22 0536   97.4 °F (36.3 °C) (!) 137 20 99/60 100 %      Temp src Heart Rate Source Patient Position BP Location FiO2 (%)   10/03/22 0602 -- -- -- --   Tympanic           Physical Exam  GENERAL: Alert, oriented, not distressed  HENT: head atraumatic, no nuchal rigidity  EYES: no scleral icterus, EOMI  CV: Irregular rhythm, tachycardic rate, no murmur  RESPIRATORY: normal effort, CTA  ABDOMEN: soft,  nontender  MUSCULOSKELETAL: no deformity, FROM, no calf swelling or tenderness  NEURO: alert, moves all extremities, follows commands  SKIN: warm, dry        LAB RESULTS  Recent Results (from the past 24 hour(s))   ECG 12 Lead    Collection Time: 10/03/22  5:39 AM   Result Value Ref Range    QT Interval 310 ms   Magnesium    Collection Time: 10/03/22  6:50 AM    Specimen: Blood   Result Value Ref Range    Magnesium 1.4 (L) 1.6 - 2.4 mg/dL   BNP    Collection Time: 10/03/22  6:50 AM    Specimen: Blood   Result Value Ref Range    proBNP 4,969.0 (H) 0.0 - 1,800.0 pg/mL   TSH    Collection Time: 10/03/22  6:50 AM    Specimen: Blood   Result Value Ref Range    TSH 2.160 0.270 - 4.200 uIU/mL   CBC Auto Differential    Collection Time: 10/03/22  6:50 AM    Specimen: Blood   Result Value Ref Range    WBC 8.17 3.40 - 10.80 10*3/mm3    RBC 4.95 3.77 - 5.28 10*6/mm3    Hemoglobin 14.6 12.0 - 15.9 g/dL    Hematocrit 44.9 34.0 - 46.6 %    MCV 90.7 79.0 - 97.0 fL    MCH 29.5 26.6 - 33.0 pg    MCHC 32.5 31.5 - 35.7 g/dL    RDW 13.2 12.3 - 15.4 %    RDW-SD 43.0 37.0 - 54.0 fl    MPV 9.8 6.0 - 12.0 fL    Platelets 249 140 - 450 10*3/mm3    Neutrophil % 47.6 42.7 - 76.0 %    Lymphocyte % 36.2 19.6 - 45.3 %    Monocyte % 11.6 5.0 - 12.0 %    Eosinophil % 3.1 0.3 - 6.2 %    Basophil % 1.1 0.0 - 1.5 %    Immature Grans % 0.4 0.0 - 0.5 %    Neutrophils, Absolute 3.89 1.70 - 7.00 10*3/mm3    Lymphocytes, Absolute 2.96 0.70 - 3.10 10*3/mm3    Monocytes, Absolute 0.95 (H) 0.10 - 0.90 10*3/mm3    Eosinophils, Absolute 0.25 0.00 - 0.40 10*3/mm3    Basophils, Absolute 0.09 0.00 - 0.20 10*3/mm3    Immature Grans, Absolute 0.03 0.00 - 0.05 10*3/mm3    nRBC 0.0 0.0 - 0.2 /100 WBC   Comprehensive Metabolic Panel    Collection Time: 10/03/22  8:05 AM    Specimen: Blood   Result Value Ref Range    Glucose 130 (H) 65 - 99 mg/dL    BUN 24 (H) 8 - 23 mg/dL    Creatinine 1.00 0.57 - 1.00 mg/dL    Sodium 140 136 - 145 mmol/L    Potassium 4.2 3.5 - 5.2  mmol/L    Chloride 103 98 - 107 mmol/L    CO2 25.2 22.0 - 29.0 mmol/L    Calcium 9.4 8.6 - 10.5 mg/dL    Total Protein 7.2 6.0 - 8.5 g/dL    Albumin 4.40 3.50 - 5.20 g/dL    ALT (SGPT) 17 1 - 33 U/L    AST (SGOT) 23 1 - 32 U/L    Alkaline Phosphatase 70 39 - 117 U/L    Total Bilirubin 0.3 0.0 - 1.2 mg/dL    Globulin 2.8 gm/dL    A/G Ratio 1.6 g/dL    BUN/Creatinine Ratio 24.0 7.0 - 25.0    Anion Gap 11.8 5.0 - 15.0 mmol/L    eGFR 55.7 (L) >60.0 mL/min/1.73   Troponin    Collection Time: 10/03/22  8:05 AM    Specimen: Blood   Result Value Ref Range    Troponin T <0.010 0.000 - 0.030 ng/mL   ECG 12 Lead    Collection Time: 10/03/22  3:34 PM   Result Value Ref Range    QT Interval 366 ms       Ordered the above labs and independently reviewed the results.        RADIOLOGY  XR Chest 1 View    Result Date: 10/3/2022  XR CHEST 1 VW-  HISTORY:  Palpitations.  COMPARISON:  Chest radiograph 09/11/2022  FINDINGS:  A single portable view of the chest was obtained. The cardiac silhouette is normal in size. There is a cardiac valve bioprosthesis. The descending aorta is tortuous. There is calcific aortic atherosclerosis. There is biapical pleuroparenchymal fibrosis. There is linear subsegmental atelectasis in the mid right lung. There is a small right pleural effusion, not significantly changed. There are fractured median sternotomy wires. There are bilateral breast implants.  This report was finalized on 10/3/2022 6:21 AM by Dr. Ira CRAVEN I ordered the above noted radiological studies. Reviewed by me and discussed with radiologist.  See dictation for official radiology interpretation.      MEDICATIONS GIVEN IN ER    Medications   sodium chloride 0.9 % flush 10 mL (has no administration in time range)   sodium chloride 0.9 % flush 10 mL (has no administration in time range)   sodium chloride 0.9 % flush 10 mL (has no administration in time range)   nitroglycerin (NITROSTAT) SL tablet 0.4 mg (has no administration  in time range)   amitriptyline (ELAVIL) tablet 25 mg (has no administration in time range)   ascorbic acid (VITAMIN C) tablet 1,000 mg (has no administration in time range)   atorvastatin (LIPITOR) tablet 20 mg (has no administration in time range)   azelastine (ASTELIN) nasal spray 2 spray (has no administration in time range)   baclofen (LIORESAL) tablet 10 mg (has no administration in time range)   apixaban (ELIQUIS) tablet 5 mg (has no administration in time range)   EPINEPHrine (ANAPHYLAXIS) 1 mg/ml injection kit (has no administration in time range)   budesonide-formoterol (SYMBICORT) 80-4.5 MCG/ACT inhaler 2 puff (has no administration in time range)   levothyroxine (SYNTHROID, LEVOTHROID) tablet 50 mcg (has no administration in time range)   levothyroxine (SYNTHROID, LEVOTHROID) tablet 75 mcg (has no administration in time range)   metFORMIN (GLUCOPHAGE) tablet 500 mg (has no administration in time range)   metoprolol tartrate (LOPRESSOR) tablet 75 mg (has no administration in time range)   multivitamin with minerals 1 tablet (has no administration in time range)   pantoprazole (PROTONIX) EC tablet 40 mg (has no administration in time range)   venlafaxine XR (EFFEXOR-XR) 24 hr capsule 150 mg (has no administration in time range)   gabapentin (NEURONTIN) capsule 300 mg (has no administration in time range)   Pharmacy Consult (has no administration in time range)   dofetilide (TIKOSYN) capsule 250 mcg (has no administration in time range)   potassium chloride (K-DUR,KLOR-CON) ER tablet 40 mEq (has no administration in time range)     Or   potassium chloride (KLOR-CON) packet 40 mEq (has no administration in time range)   Magnesium Sulfate 2 gram Bolus, followed by 8 gram infusion (total Mg dose 10 grams)- Mg less than or equal to 1mg/dL (has no administration in time range)     Or   Magnesium Sulfate 2 gram / 50mL Infusion (GIVE X 3 BAGS TO EQUAL 6GM TOTAL DOSE) - Mg 1.1 - 1.5 mg/dl (has no administration in  time range)     Or   Magnesium Sulfate 4 gram infusion- Mg 1.6-1.9 mg/dL (has no administration in time range)   sodium chloride 0.9 % bolus 1,000 mL (0 mL Intravenous Stopped 10/3/22 1039)   metoprolol tartrate (LOPRESSOR) injection 5 mg (5 mg Intravenous Given 10/3/22 0654)   metoprolol tartrate (LOPRESSOR) tablet 25 mg (25 mg Oral Given 10/3/22 0653)   metoprolol tartrate (LOPRESSOR) injection 5 mg (5 mg Intravenous Given 10/3/22 0805)   dilTIAZem (CARDIZEM) injection 10 mg (10 mg Intravenous Given 10/3/22 1040)   magnesium sulfate 2g/50 mL (PREMIX) infusion (0 g Intravenous Stopped 10/3/22 1130)     Critical Care  Performed by: Fletcher Mccallum PA  Authorized by: Fabio Mac MD     Critical care provider statement:     Critical care time (minutes):  34    Critical care time was exclusive of:  Separately billable procedures and treating other patients    Critical care was necessary to treat or prevent imminent or life-threatening deterioration of the following conditions:  Cardiac failure    Critical care was time spent personally by me on the following activities:  Blood draw for specimens, development of treatment plan with patient or surrogate, discussions with consultants, evaluation of patient's response to treatment, examination of patient, interpretation of cardiac output measurements, ordering and performing treatments and interventions, ordering and review of laboratory studies, pulse oximetry, re-evaluation of patient's condition, ordering and review of radiographic studies and review of old charts        PROGRESS, DATA ANALYSIS, CONSULTS, AND MEDICAL DECISION MAKING    All labs have been independently reviewed by me.  All radiology studies have been reviewed by me and discussed with radiologist dictating the report.   EKG's independently viewed and interpreted by me.  Discussion below represents my analysis of pertinent findings related to patient's condition, differential diagnosis, treatment plan  and final disposition.    I have discussed case with Dr. Mac, emergency room physician.  He has performed his own bedside examination and agrees with treatment plan.    ED Course as of 10/03/22 1557   Mon Oct 03, 2022   0639 Patient presents with heart palpitations and generalized weakness.  Patient does have a history of paroxysmal A. fib status post recent cardioversion.  Patient is in atrial fibrillation here.  She has stable blood pressure.  No evidence of heart failure.  Plan for IV Lopressor, fluids. [EE]   0648 Chest x-ray interpreted myself shows no acute infiltrate. [EE]   0650 EKG interpreted myself.  Time 0539.  Atrial fib, rate 124.  QRS shows left anterior fascicular block with left axis deviation.  No significant ST abnormalities.  A. fib is new in comparison to prior EKG from 9/11/2022. [EE]   0717 WBC: 8.17 [EE]   0717 Hemoglobin: 14.6 [EE]   0729 Recheck of patient.  Patient still in atrial fibrillation, heart rate 120. [EE]   0830 Patient's heart rate is not improved with IV Lopressor x2 and p.o. Lopressor.  Will discuss with patient's EP cardiologist. [EE]   0840 I discussed the patient with Dr. Jj, EP cardiology.  He suspects patient likely needs to have an ablation performed.  If we can get the patient's rate under control he is comfortable with him going home.  He recommends a dose of Cardizem. [EE]   0851 Recheck of the patient.  Her heart rate is back to the 120s.  There family is concerned about her going home.  Plan to discuss this with on-call cardiology and likely admit.  [EE]   1021 I discussed case with Dr. Mitchell, cardiology.  She agrees to admit. [EE]   1035 Recheck of patient.  Heart rate now in the 80s.  Patient alert and oriented. [EE]      ED Course User Index  [EE] Fletcher Mccallum PA       AS OF 15:57 EDT VITALS:    BP - 103/80  HR - 84  TEMP - 97.4 °F (36.3 °C) (Tympanic)  O2 SATS - 96%        DIAGNOSIS  Final diagnoses:   Paroxysmal atrial fibrillation (HCC)    Hypomagnesemia         DISPOSITION  Admitted      Dictated utilizing Dragon dictation     Fletcher Mccallum PA  10/03/22 5654

## 2022-10-03 NOTE — ED TRIAGE NOTES
"To ER via PV.  States \" I think I'm in a fib again.\"  C/o palpitations and SOA since 2200 last pm.  Pt does have hx of a-fib. States had cardioversion 2 weeks ago.  Cardiologist is Dr. Mitchell    Pt in mask at time of triage.  Triage staff in appropriate PPE.  "

## 2022-10-03 NOTE — ED PROVIDER NOTES
MD ATTESTATION NOTE    The BACILIO and I have discussed this patient's history, physical exam, and treatment plan.  I have reviewed the documentation and personally had a face to face interaction with the patient. I affirm the documentation and agree with the treatment and plan.  The attached note describes my personal findings.    I provided a substantive portion of the care of this patient. I personally performed the physical exam, in its entirety.    History  82-year-old female with history of paroxysmal atrial fibrillation, anticoagulated on Eliquis returns with palpitations and feeling like that she has gone back into A. fib.  Patient did have recent cardioversion by Dr. Jj.    Physical Exam  Vital Signs reviewed  GENERAL: Alert female no obvious distress.  Triage vitals and notable for initial pulse of 137.  HENT: nares patent  EYES: no scleral icterus  CV: Irregularly irregular with pulse in the 80s  RESPIRATORY: normal effort, clear to auscultation bilaterally  ABDOMEN: soft, nontender to palpation  MUSCULOSKELETAL: no deformity-mild peripheral edema  NEURO: Strength sensation and coordination are grossly intact.  Speech and mentation are unremarkable  SKIN: warm, dry      Disposition  I discussed treatment and evaluation of this patient with PERI Mccallum.  Patient is back in A. fib with RVR.  She is anticoagulated.  We will check labs and give some beta-blocker to slow rate.  We will try to message Dr. Jj to let him know that patient has returned to A. fib.       Fabio Mac MD  10/03/22 0845

## 2022-10-03 NOTE — H&P
Patient Name: Dior Pettit  :1940  82 y.o.    Date of Admission: 10/3/2022  Date of Consultation:  10/03/22  Encounter Provider: TOSHA Younger  Place of Service: [facility] CARDIOLOGY  Referring Provider: Abbi Mitchell MD  Patient Care Team:  Ashanti Hong APRN as PCP - General (Internal Medicine)  Gerardo Rodriguez Jr., MD as Consulting Physician (Pulmonary Disease)  Abbi Mitchell MD as Consulting Physician (Cardiology)  Luis Miguel Parker MD as Consulting Physician (Ophthalmology)  Tae Burton MD as Consulting Physician (Dermatology)  Addis Gomes MD as Consulting Physician (Plastic Surgery)      Chief complaint: palpitations, AF    History of Present Illness: Ms. Pettit is an 82 year old woman followed by Dr. Mitchell for paroxysmal atrial fibrillation. She has aortic valve disease with prior aortic valve replacement as well as subaortic membrane resection in . She has hypertension, hyperlipidemia, and diabetes.      She underwent prior successful cardioversion in 2021. She was placed on Amiodarone. This was stopped about 2 months after cardioversion for dizziness.     In 2022 she was found to be back in AF with symptoms of shortness of breath and fatigue. She again underwent repeat cardioversion on 22. She was seen in the emergency room on  22 with symptomatic bradycardic in the 40-50's. Digoxin and metoprolol stopped. Metoprolol was then restarted at 50 mg BID due to reports of HR in the 90's.     She later followed up in office 22 with Dr. Mitchell where she was noted to be in SR with some irregularities. Her Metoprolol was increased to 75mg twice daily at that time, with plans noted to follow up with Dr. Carreno 10/6/22.     Last night she was feeling fine. She took her blood pressure then noted bounding irregular pulse and felt she was back in AF. She then noticed dyspnea on exertion and came to the  emergency room.     CXR was negative acutely.     Troponin negative. proBNP 4,969.     EKG noted rapid AF, rates 120's, treated with IV Cardizem/Lopressor.     Previous Cardiac Testing:    Echocardiogram 1/25/22  · Calculated left ventricular EF = 73.7% Estimated left ventricular EF = 74% Estimated left ventricular EF was in agreement with the calculated left ventricular EF. Left ventricular systolic function is hyperdynamic (EF > 70%). The left ventricular cavity is small in size. Left ventricular wall thickness is consistent with mild concentric hypertrophy. All left ventricular wall segments contract normally. Left ventricular diastolic function is consistent with (grade II w/high LAP) pseudonormalization.  · There is a 21 mm, bovine, Poon bioprosthetic aortic valve present. The aortic valve peak and mean gradients are elevated. There is no significant prosthetic valve regurgitation. There is an increased gradient across the prosthetic valve consistent with prosthetic valve stenosis.  · Severe mitral annular calcification is present. There is mild, anterior mitral leaflet thickening present. There is severe, posterior mitral leaflet thickening present. Trace mitral valve regurgitation is present. Mild mitral valve stenosis is present. The mean gradient is 5 mmHg at a heart of 74 bpm  · Mild tricuspid valve regurgitation is present. Estimated right ventricular systolic pressure from tricuspid regurgitation is moderately elevated (45-55 mmHg). Calculated right ventricular systolic pressure from tricuspid regurgitation is 46.2 mmHg.  · Borderline dilation of the ascending aorta is present. Ascending aorta = 3.8 cm    Stress Test 10/20/21  · Myocardial perfusion imaging indicates a normal myocardial perfusion study with no evidence of ischemia.  · Left ventricular ejection fraction is hyperdynamic (Calculated EF > 70%). .  · Impressions are consistent with a low risk study.  · Please ignore an prevous reported PET  studies on this day        Past Medical History:   Diagnosis Date   • Allergic rhinitis    • Anemia    • Anxiety     Controlled w/Meds   • Aortic root dilatation (HCC) 10/02/2017    Borderline--Noted on Echo   • Aortic valve calcification 10/02/2017    Noted on Echo   • Aortic valve insufficiency Dx in 07    w/AVR    • Aortic valve prosthesis present 11/02/2018    Noted on CTA Chest   • Ascending aorta dilatation (HCC) 10/02/2017    Borderline--Noted on Echo   • Asthma    • Atypical chest pain    • Breast pain, right Hx   • CAD (coronary artery disease)    • Cardiomegaly 2017   • Cervicalgia    • Chest pain due to CAD ([facility])    • 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    Borderline--Noted on Echo & Measured @ 2.6CM and Mid Descending @ 2.5CM on CTA Chest-11/2/18   • DM (diabetes mellitus) ([facility])     T2   • Esophagitis    • GERD (gastroesophageal reflux disease)     Controlled w/Meds   • Headache    • Health care maintenance    • Heart murmur    • History of echocardiogram 10/2/17-[facility]    EF 66%; Borderline Concentric Hypertrophy; Mild Calcification in AV; Mild AVS; Mild AVR; Moderate TVR; Borderline Dilation of Aortic Root/Arch & Borderline Dilation of Ascending/Proximal Aorta Present   • Hyperlipidemia     Controlled w/Meds   • Hypertension     Controlled w/Meds   • Hypothyroidism     Controlled w/Synthroid   • Insomnia    • Macular degeneration, left eye    • Mild aortic valve regurgitation 10/02/2017    Noted on Echo   • Mild aortic valve stenosis 10/02/2017    Noted on Echo   • Mild dilation of ascending aorta (HCC) 10/02/2017    Borderline--Noted on Echo   • Moderate tricuspid valve regurgitation 10/02/2017    Noted on Echo   • Mood disorder in conditions classified elsewhere     Controlled w/Meds   • Near syncope 03/2017-[facility] ER   • Neuropathy    • NNEKA (obstructive sleep apnea)     Untreated   • Osteoarthritis     Ankles/Feet   • Pulmonary hypertension ([facility]) 2017   • Renal insufficiency    • RLS (restless  legs syndrome)    • Thoracic ascending aortic aneurysm Dx in 2007 @ 3.8CM & 1/02/2018    Noted @ 4CM on CTA Chest;   • Visual impairment     macular degeneratuin       Past Surgical History:   Procedure Laterality Date   • AORTIC VALVE REPAIR/REPLACEMENT  2007    Dr. Perry   • APPENDECTOMY     • AUGMENTATION MAMMAPLASTY  1976   • BREAST AUGMENTATION  2014   • BUNIONECTOMY     • CARDIAC CATHETERIZATION  2007   • CARDIAC VALVE REPLACEMENT  2007    porcine   • COLONOSCOPY  2010   • COLONOSCOPY  03/02/2012   • EYE SURGERY      cataracts and ens implants   • HYSTERECTOMY  1972   • SIGMOIDOSCOPY  2001         Prior to Admission medications    Medication Sig Start Date End Date Taking? Authorizing Provider   amitriptyline (ELAVIL) 25 MG tablet Take 1 tablet by mouth Every Night. 9/20/22  Yes Ashanti Hong APRN   ascorbic acid (VITAMIN C) 1000 MG tablet Take 1,000 mg by mouth Daily.   Yes ProviderVaughn MD   atorvastatin (LIPITOR) 20 MG tablet Take 1 tablet by mouth Every Night. 5/11/22  Yes Ashanti Hong APRN   azelastine (ASTELIN) 0.1 % nasal spray 2 sprays into the nostril(s) as directed by provider. Use in each nostril as directed   Yes Provider, MD Vaughn   baclofen (LIORESAL) 10 MG tablet Take 1 tablet by mouth 3 (Three) Times a Day.  Patient taking differently: Take 10 mg by mouth Daily. 7/11/22  Yes Ashanti Hong APRN   Blood Glucose Monitoring Suppl (Prodigy No Coding Blood Gluc) w/Device kit 1 each Daily. 5/25/21  Yes Magdalena Joy MD   Eliquis 5 MG tablet tablet TAKE 1 TABLET BY MOUTH EVERY TWELVE HOURS 9/27/22  Yes Margarita Archer, NARDA, TOSHA   EPINEPHrine (EPIPEN) 0.3 MG/0.3ML solution auto-injector injection  10/14/20  Yes Provider, MD Vaughn   fenofibrate micronized (LOFIBRA) 134 MG capsule Take 1 capsule by mouth Every Morning Before Breakfast. 6/20/22  Yes Ashanti Hong APRN   fluticasone-salmeterol (ADVAIR HFA) 45-21 MCG/ACT inhaler Inhale 2 puffs As Needed.   Yes  ProviderVaughn MD   gabapentin (NEURONTIN) 600 MG tablet Take 600 mg by mouth As Needed.   Yes Vaughn Christopher MD   glucosamine-chondroitin 500-400 MG capsule capsule Take 2 capsules by mouth Daily.   Yes Vaughn Christopher MD   glucose blood (Prodigy No Coding Blood Gluc) test strip TEST BLOOD SUGAR DAILY 9/20/22  Yes Ashanti Hong APRN   levothyroxine (SYNTHROID, LEVOTHROID) 50 MCG tablet TAKE 1 TABLET EVERY OTHER  DAY 4/13/22  Yes Jennifer Robledo APRN   levothyroxine (SYNTHROID, LEVOTHROID) 75 MCG tablet TAKE 1 TABLET EVERY OTHER  DAY. OVERDUE FOR           APPOINTMENT AND FASTING    LABS. 1/3/22  Yes Magdalena Joy MD   metFORMIN ER (GLUCOPHAGE-XR) 750 MG 24 hr tablet Take 1 tablet by mouth 2 (Two) Times a Day. 7/11/22  Yes Ashanti Hong APRN   metoprolol tartrate (LOPRESSOR) 50 MG tablet Take 1.5 tablets by mouth 2 (Two) Times a Day. 9/29/22  Yes Abbi Mitchell MD   Multiple Vitamins-Minerals (MULTIVITAL PO) Take  by mouth Daily.   Yes Vaughn Christopher MD   omeprazole (priLOSEC) 40 MG capsule Take 1 capsule by mouth Daily. 9/20/22  Yes Ashanti Hong APRN Prodigy Safety Lancets 26G misc CHECK BLOOD SUGAR ONE TIME PER DAY 5/25/21  Yes Magdalena Joy MD   valACYclovir (VALTREX) 1000 MG tablet TAKE 2 TABLETS 4 TIMES     DAILY FOR MOUTH ULCER  Patient taking differently: TAKE 2 TABLETS 4 TIMES     DAILY FOR MOUTH ULCER as needed 9/28/22  Yes Magdalena Joy MD   venlafaxine XR (EFFEXOR-XR) 150 MG 24 hr capsule TAKE 1 CAPSULE DAILY 5/23/22  Yes Ashanti Hong APRN       No Known Allergies    Social History     Socioeconomic History   • Marital status:    Tobacco Use   • Smoking status: Never Smoker   • Smokeless tobacco: Never Used   • Tobacco comment: caffeine use - 1 cup coffee daily    Vaping Use   • Vaping Use: Never used   Substance and Sexual Activity   • Alcohol use: Yes     Comment: less than 1 glass of wine   • Drug use: Never   • Sexual activity:  Not Currently     Partners: Male     Birth control/protection: Surgical     Comment: Hyst       Family History   Problem Relation Age of Onset   • Hypertension Mother    • Stroke Mother    • Cancer Mother    • Diabetes Sister    • Coronary artery disease Brother    • Diabetes Brother    • Valvular heart disease Brother         TAVR   • Coronary artery disease Brother    • Cancer Brother    • Birth defects Daughter    • Skin cancer Neg Hx        REVIEW OF SYSTEMS:   All systems reviewed.  Pertinent positives identified in HPI.  All other systems are negative.    I have reviewed and confirmed the accuracy of the ROS as documented by the MA/LPN/RN TOSHA Younger    Constitutional: She is oriented to person, place, and time. She appears well-developed. She does not appear ill.   HENT:   Head: Normocephalic and atraumatic. Head is without contusion.   Right Ear: Hearing normal. No drainage.   Left Ear: Hearing normal. No drainage.   Nose: No nasal deformity. No epistaxis.   Eyes: Lids are normal. Right eye exhibits no exudate. Left eye exhibits no exudate.  Neck: No JVD present.    Cardiovascular: Normal rate, irregular rhythm and normal heart sounds.    Pulses:       Posterior tibial pulses are 2+ on the right side, and 2+ on the left side.   Pulmonary/Chest: Effort normal and breath sounds normal.   Abdominal: Soft. Normal appearance and bowel sounds are normal. There is no tenderness.   Musculoskeletal: Normal range of motion.        Right shoulder: She exhibits no deformity.        Left shoulder: She exhibits no deformity.   Neurological: She is alert and oriented to person, place, and time. She has normal strength.   Skin: Skin is warm, dry and intact. No rash noted.   Psychiatric: She has a normal mood and affect. Her behavior is normal. Thought content normal.   Vitals reviewed      Objective:     Vitals:    10/03/22 0911 10/03/22 1039 10/03/22 1133 10/03/22 1256   BP:   112/90 103/80   Pulse: 112  84     Resp:       Temp:       TempSrc:       SpO2: 94%  96%    Weight:  64 kg (141 lb)     Height:         Body mass index is 22.08 kg/m².    Lab Review:     Results from last 7 days   Lab Units 10/03/22  0805   SODIUM mmol/L 140   POTASSIUM mmol/L 4.2   CHLORIDE mmol/L 103   CO2 mmol/L 25.2   BUN mg/dL 24*   CREATININE mg/dL 1.00   CALCIUM mg/dL 9.4   BILIRUBIN mg/dL 0.3   ALK PHOS U/L 70   ALT (SGPT) U/L 17   AST (SGOT) U/L 23   GLUCOSE mg/dL 130*     Results from last 7 days   Lab Units 10/03/22  0805   TROPONIN T ng/mL <0.010     Results from last 7 days   Lab Units 10/03/22  0650   WBC 10*3/mm3 8.17   HEMOGLOBIN g/dL 14.6   HEMATOCRIT % 44.9   PLATELETS 10*3/mm3 249         Results from last 7 days   Lab Units 10/03/22  0650   MAGNESIUM mg/dL 1.4*                   EKG 10/3/22      Previous EKG 8/29/22        Assessment and Plan:       1. Atrial fibrillation with 2 prior cardioversions. Bradycardia when in sinus and on high dose beta blocker and digoxin. Now back in symptomatic AF. Will ask Dr. Jj to evaluate. She is anticoagulated with apixaban.     2. Valvular heart disease status post bioprosthetic aortic valve replacement and subaortic membrane resection in 2007. Valve working well on echo January 2022.    3. Pulmonary hypertension - routine surveillance with transthoracic echocardiogram recommended.     4. Obstructive sleep apnea    5. TAA    6. Diabetes mellitus type II - on metformin.     Recommendations per Dr. Jj.     Mattie Parish, APRN  10/03/22  14:59 EDT

## 2022-10-03 NOTE — ED NOTES
Nursing report ED to floor  Dior Pettit  82 y.o.  female    HPI :   Chief Complaint   Patient presents with   • Palpitations       Admitting doctor:   Abbi Mitchell MD    Admitting diagnosis:   The primary encounter diagnosis was Paroxysmal atrial fibrillation (HCC). A diagnosis of Hypomagnesemia was also pertinent to this visit.    Code status:   Current Code Status     Date Active Code Status Order ID Comments User Context       Prior    Advance Care Planning Activity          Allergies:   Patient has no known allergies.    Isolation:   No active isolations    Intake and Output  No intake or output data in the 24 hours ending 10/03/22 1133    Weight:       10/03/22  1039   Weight: 64 kg (141 lb)       Most recent vitals:   Vitals:    10/03/22 0821 10/03/22 0901 10/03/22 0911 10/03/22 1039   BP:  126/86     Pulse:  82 112    Resp: 17      Temp:       TempSrc:       SpO2:  97% 94%    Weight:    64 kg (141 lb)   Height:           Active LDAs/IV Access:   Lines, Drains & Airways     Active LDAs     Name Placement date Placement time Site Days    Peripheral IV 10/03/22 0652 Right;Lower Forearm 10/03/22  0652  Forearm  less than 1                Labs (abnormal labs have a star):   Labs Reviewed   COMPREHENSIVE METABOLIC PANEL - Abnormal; Notable for the following components:       Result Value    Glucose 130 (*)     BUN 24 (*)     eGFR 55.7 (*)     All other components within normal limits    Narrative:     GFR Normal >60  Chronic Kidney Disease <60  Kidney Failure <15     MAGNESIUM - Abnormal; Notable for the following components:    Magnesium 1.4 (*)     All other components within normal limits   BNP (IN-HOUSE) - Abnormal; Notable for the following components:    proBNP 4,969.0 (*)     All other components within normal limits    Narrative:     Among patients with dyspnea, NT-proBNP is highly sensitive for the detection of acute congestive heart failure. In addition NT-proBNP of <300 pg/ml effectively rules out  acute congestive heart failure with 99% negative predictive value.    Results may be falsely decreased if patient taking Biotin.     CBC WITH AUTO DIFFERENTIAL - Abnormal; Notable for the following components:    Monocytes, Absolute 0.95 (*)     All other components within normal limits   TROPONIN (IN-HOUSE) - Normal    Narrative:     Troponin T Reference Range:  <= 0.03 ng/mL-   Negative for AMI  >0.03 ng/mL-     Abnormal for myocardial necrosis.  Clinicians would have to utilize clinical acumen, EKG, Troponin and serial changes to determine if it is an Acute Myocardial Infarction or myocardial injury due to an underlying chronic condition.       Results may be falsely decreased if patient taking Biotin.     TSH - Normal   CBC AND DIFFERENTIAL    Narrative:     The following orders were created for panel order CBC & Differential.  Procedure                               Abnormality         Status                     ---------                               -----------         ------                     CBC Auto Differential[[id number]]        Abnormal            Final result                 Please view results for these tests on the individual orders.       EKG:   ECG 12 Lead   Preliminary Result   HEART RATE= 124  bpm   RR Interval= 484  ms   UT Interval=   ms   P Horizontal Axis=   deg   P Front Axis=   deg   QRSD Interval= 100  ms   QT Interval= 310  ms   QRS Axis= -49  deg   T Wave Axis= 139  deg   - ABNORMAL ECG -   Atrial flutter   Left anterior fascicular block   Abnormal R-wave progression, late transition   LVH with secondary repolarization abnormality   Baseline wander in lead(s) V3   Electronically Signed By:    Date and Time of Study: 2022-10-03 05:39:52          Meds given in ED:   Medications   sodium chloride 0.9 % flush 10 mL (has no administration in time range)   sodium chloride 0.9 % bolus 1,000 mL (0 mL Intravenous Stopped 10/3/22 1039)   metoprolol tartrate (LOPRESSOR) injection 5 mg (5 mg  Intravenous Given 10/3/22 0654)   metoprolol tartrate (LOPRESSOR) tablet 25 mg (25 mg Oral Given 10/3/22 0653)   metoprolol tartrate (LOPRESSOR) injection 5 mg (5 mg Intravenous Given 10/3/22 0805)   dilTIAZem (CARDIZEM) injection 10 mg (10 mg Intravenous Given 10/3/22 1040)   magnesium sulfate 2g/50 mL (PREMIX) infusion (2 g Intravenous New Bag 10/3/22 1044)       Imaging results:  No radiology results for the last day    Ambulatory status:   -Standby    Social issues:   Social History     Socioeconomic History   • Marital status:    Tobacco Use   • Smoking status: Never Smoker   • Smokeless tobacco: Never Used   • Tobacco comment: caffeine use - 1 cup coffee daily    Vaping Use   • Vaping Use: Never used   Substance and Sexual Activity   • Alcohol use: Yes     Comment: less than 1 glass of wine   • Drug use: Never   • Sexual activity: Not Currently     Partners: Male     Birth control/protection: Surgical     Comment: [facility]       NIH Stroke Scale:   0      Galilea Erazo RN  10/03/22 11:33 EDT

## 2022-10-04 LAB
ANION GAP SERPL CALCULATED.3IONS-SCNC: 10.2 MMOL/L (ref 5–15)
BUN SERPL-MCNC: 21 MG/DL (ref 8–23)
BUN/CREAT SERPL: 23.3 (ref 7–25)
CALCIUM SPEC-SCNC: 9.6 MG/DL (ref 8.6–10.5)
CHLORIDE SERPL-SCNC: 102 MMOL/L (ref 98–107)
CO2 SERPL-SCNC: 24.8 MMOL/L (ref 22–29)
CREAT SERPL-MCNC: 0.9 MG/DL (ref 0.57–1)
DEPRECATED RDW RBC AUTO: 42.1 FL (ref 37–54)
EGFRCR SERPLBLD CKD-EPI 2021: 63.2 ML/MIN/1.73
ERYTHROCYTE [DISTWIDTH] IN BLOOD BY AUTOMATED COUNT: 13 % (ref 12.3–15.4)
GLUCOSE SERPL-MCNC: 124 MG/DL (ref 65–99)
HCT VFR BLD AUTO: 42.4 % (ref 34–46.6)
HGB BLD-MCNC: 14.5 G/DL (ref 12–15.9)
MAGNESIUM SERPL-MCNC: 1.8 MG/DL (ref 1.6–2.4)
MCH RBC QN AUTO: 30.2 PG (ref 26.6–33)
MCHC RBC AUTO-ENTMCNC: 34.2 G/DL (ref 31.5–35.7)
MCV RBC AUTO: 88.3 FL (ref 79–97)
PLATELET # BLD AUTO: 235 10*3/MM3 (ref 140–450)
PMV BLD AUTO: 10.3 FL (ref 6–12)
POTASSIUM SERPL-SCNC: 4 MMOL/L (ref 3.5–5.2)
QT INTERVAL: 294 MS
QT INTERVAL: 351 MS
RBC # BLD AUTO: 4.8 10*6/MM3 (ref 3.77–5.28)
SODIUM SERPL-SCNC: 137 MMOL/L (ref 136–145)
WBC NRBC COR # BLD: 8.25 10*3/MM3 (ref 3.4–10.8)

## 2022-10-04 PROCEDURE — 93005 ELECTROCARDIOGRAM TRACING: CPT | Performed by: INTERNAL MEDICINE

## 2022-10-04 PROCEDURE — 93010 ELECTROCARDIOGRAM REPORT: CPT | Performed by: INTERNAL MEDICINE

## 2022-10-04 PROCEDURE — 93005 ELECTROCARDIOGRAM TRACING: CPT

## 2022-10-04 PROCEDURE — 85027 COMPLETE CBC AUTOMATED: CPT | Performed by: NURSE PRACTITIONER

## 2022-10-04 PROCEDURE — 80048 BASIC METABOLIC PNL TOTAL CA: CPT | Performed by: NURSE PRACTITIONER

## 2022-10-04 PROCEDURE — 99232 SBSQ HOSP IP/OBS MODERATE 35: CPT

## 2022-10-04 PROCEDURE — 83735 ASSAY OF MAGNESIUM: CPT

## 2022-10-04 RX ORDER — METOPROLOL TARTRATE 50 MG/1
50 TABLET, FILM COATED ORAL 2 TIMES DAILY
Status: DISCONTINUED | OUTPATIENT
Start: 2022-10-04 | End: 2022-10-06 | Stop reason: HOSPADM

## 2022-10-04 RX ADMIN — DOFETILIDE 250 MCG: 0.25 CAPSULE ORAL at 10:08

## 2022-10-04 RX ADMIN — METFORMIN HYDROCHLORIDE 500 MG: 500 TABLET ORAL at 10:09

## 2022-10-04 RX ADMIN — LEVOTHYROXINE SODIUM 75 MCG: 0.07 TABLET ORAL at 10:09

## 2022-10-04 RX ADMIN — Medication 10 ML: at 22:01

## 2022-10-04 RX ADMIN — APIXABAN 5 MG: 5 TABLET, FILM COATED ORAL at 22:00

## 2022-10-04 RX ADMIN — METOPROLOL TARTRATE 50 MG: 50 TABLET, FILM COATED ORAL at 10:35

## 2022-10-04 RX ADMIN — GABAPENTIN 300 MG: 300 CAPSULE ORAL at 22:01

## 2022-10-04 RX ADMIN — MULTIPLE VITAMINS W/ MINERALS TAB 1 TABLET: TAB at 10:08

## 2022-10-04 RX ADMIN — ATORVASTATIN CALCIUM 20 MG: 20 TABLET, FILM COATED ORAL at 22:00

## 2022-10-04 RX ADMIN — APIXABAN 5 MG: 5 TABLET, FILM COATED ORAL at 10:08

## 2022-10-04 RX ADMIN — AMITRIPTYLINE HYDROCHLORIDE 25 MG: 25 TABLET, FILM COATED ORAL at 22:00

## 2022-10-04 RX ADMIN — DOFETILIDE 250 MCG: 0.25 CAPSULE ORAL at 22:01

## 2022-10-04 RX ADMIN — GABAPENTIN 300 MG: 300 CAPSULE ORAL at 03:10

## 2022-10-04 RX ADMIN — OXYCODONE HYDROCHLORIDE AND ACETAMINOPHEN 1000 MG: 500 TABLET ORAL at 10:09

## 2022-10-04 RX ADMIN — Medication 10 ML: at 10:14

## 2022-10-04 RX ADMIN — METFORMIN HYDROCHLORIDE 500 MG: 500 TABLET ORAL at 17:02

## 2022-10-04 RX ADMIN — METOPROLOL TARTRATE 50 MG: 50 TABLET, FILM COATED ORAL at 22:00

## 2022-10-04 RX ADMIN — BACLOFEN 10 MG: 10 TABLET ORAL at 22:00

## 2022-10-04 RX ADMIN — VENLAFAXINE HYDROCHLORIDE 150 MG: 150 CAPSULE, EXTENDED RELEASE ORAL at 10:08

## 2022-10-04 NOTE — PROGRESS NOTES
Electrophysiology Follow-Up Note      Patient Name: Dior Pettit  Age/Sex: 82 y.o. female  : 1940  MRN: [id number]      Day of Service: 10/04/22       Chief Complaint/Follow-up: persistent AF    S: she is doing well today. No events overnight, she has no complaints. Remains in AF with rates in the 110s-130s. Will start tikosyn today.       Temp:  [97.8 °F (36.6 °C)-98.2 °F (36.8 °C)] 98 °F (36.7 °C)  Heart Rate:  [] 111  Resp:  [18] 18  BP: (106-120)/() 106/74     PHYSICAL EXAM:    General Appearance: No acute distress, well developed and well nourished.   Eyes: Conjunctiva and lids: No erythema, swelling, or discharge. Sclera non-icteric.   HENT: Atraumatic, normocephalic. External eyes, ears, and nose normal.   Respiratory: No signs of respiratory distress. Respiration rhythm and depth normal.   Clear to auscultation. No rales, crackles, rhonchi, or wheezing auscultated.   Cardiovascular:  Heart Rate and Rhythm: irregularly irregular, Heart Sounds: Normal S1 and S2. No S3 or S4 noted.  Gastrointestinal:  Abdomen soft, non-distended, non-tender.  Musculoskeletal: Normal movement of extremities  Skin: Warm and dry.   Psychiatric: Patient alert and oriented to person, place, and time. Speech and behavior appropriate. Normal mood and affect.       Results from last 7 days   Lab Units 10/04/22  0318 10/03/22  0805   SODIUM mmol/L 137 140   POTASSIUM mmol/L 4.0 4.2   CHLORIDE mmol/L 102 103   CO2 mmol/L 24.8 25.2   BUN mg/dL 21 24*   CREATININE mg/dL 0.90 1.00   GLUCOSE mg/dL 124* 130*   CALCIUM mg/dL 9.6 9.4     Results from last 7 days   Lab Units 10/04/22  0318 10/03/22  0650   WBC 10*3/mm3 8.25 8.17   HEMOGLOBIN g/dL 14.5 14.6   HEMATOCRIT % 42.4 44.9   PLATELETS 10*3/mm3 235 249         Results from last 7 days   Lab Units 10/03/22  0805   TROPONIN T ng/mL <0.010     Results from last 7 days   Lab Units 10/03/22  0650   TSH uIU/mL 2.160           Current Medications:   Scheduled  Meds:amitriptyline, 25 mg, Oral, Nightly  apixaban, 5 mg, Oral, Q12H  ascorbic acid, 1,000 mg, Oral, Daily  atorvastatin, 20 mg, Oral, Nightly  azelastine, 2 spray, Each Nare, Daily  baclofen, 10 mg, Oral, Daily  budesonide-formoterol, 2 puff, Inhalation, BID - RT  dofetilide, 250 mcg, Oral, Q12H  levothyroxine, 50 mcg, Oral, Every Other Day  levothyroxine, 75 mcg, Oral, Every Other Day  metFORMIN, 500 mg, Oral, BID With Meals  metoprolol tartrate, 50 mg, Oral, BID  multivitamin with minerals, 1 tablet, Oral, Daily  pantoprazole, 40 mg, Oral, QAM  sodium chloride, 10 mL, Intravenous, Q12H  venlafaxine XR, 150 mg, Oral, Daily            Paroxysmal atrial fibrillation (HCC)       Plan:   1. Persistent atrial fibrillation-- remains in AF with periods of RVR. Will start tikosyn today. Creatinine clearance measured at 45. Will start tikosyn at 250 mcg BID. Reduce metoprolol to 50mg BID.  We will plan for CV tomorrow if she does not convert. NPO after midnight.         TOSHA Veronica  10/04/22  15:48 EDT

## 2022-10-04 NOTE — PROGRESS NOTES
[facility] Clinical Pharmacy Services: Tikosyn (Dofetilide) Consult    Pharmacy has been consulted to look over Dior Pettit's profile to review appropriateness of starting tikosyn inpatient based on renal function and drug interactions per Justin Galloway's request.    Initiation   Patient's creatinine clearance has been hand-calculated using actual body weight: 47  mL/min    CrCl (mL/min) = (140 - AGE) X Actual Body Weight (kg)        X 0.85 (for females)                                                              72 X SCr (mg/dL)       I verified the dose based on CrCl:   CrCl 40 to 60 mL/minute: Initial: 250 mcg twice daily.    I evaluated the timing and interval between first and second doses to insure the space between the 2 doses is not <10 hours.    The baseline QTc has been reviewed and confirmed that QTc < 440 msec (500 msec in patients with ventricular conduction abnormality) QTc 490. Provider aware and wants to continue.     Drug Interactions    CONTRAINDICATED WARNING Other Antiarrhythmics   verapamil   cimetidine   ketoconazole   trimethoprim   cotrimoxazole   hydrochlorothiazide   prochlorperazine   megestrol   dolutegravir   dronedarone  phenothiazines   amiodarone   tricyclic antidepressants   bepridil   some macrolide antibiotics   SSRIs   azole antifungals   fluoroquinolone antibiotics   protease inhibitors   potassium-depleting diuretics  procainamide   disopyramide   lidocaine   mexilitene   flecanide   propafenone   ibutilide   sotalol   amiodarone         Patient's home medications: EPINEPHrine, Prodigy No Coding Blood Gluc, Prodigy Safety Lancets 26G, amitriptyline, apixaban, ascorbic acid, atorvastatin, azelastine, baclofen, fenofibrate micronized, fluticasone-salmeterol, gabapentin, glucosamine-chondroitin, glucose blood, levothyroxine, metFORMIN ER, metoprolol tartrate, multivitamin with minerals, omeprazole, valACYclovir, and venlafaxine XR    Based on the known drug interactions and  their above med list, the appropriate changes have been made or were discussed with the ordering provider.    Assessment/Plan    1. Based on patient's renal function and baseline EKG, dosing guidelines recommend a starting dose of tikosyn 250 mcg PO every 12 hours. This dose may change based on the EKG 2-3 hrs after initial dose, or per physician's judgement. QTc interval will continue to be monitored in-hospital after all additional doses to see if QTc has increased. If the QTc has increased by greater than 15%, or if the QTc is greater than 500 msec (550 msec for patients with ventricular conduction abnormalities), dose reduction should be considered. If patient does not convert to normal sinus rhythm within 24 hours of initiation of tikosyn therapy, electrical conversion may be scheduled for the patient.    Dose Adjustments based on prolonged QTc  Starting Dose Based on CrCl: Then the Adjusted Dose (for QT prolongation) is:   500mcg PO BID (greater than 60ml/min) 250mcg PO BID   250mcg PO BID (40 - 60ml/min) 125mcg PO BID   125mcg PO BID (20 - 39.9ml/min) 125mcg PO Daily     2. Ensure serum potassium and magnesium are maintained within normal range while on tikosyn to reduce risk of torsade de pointes     Potassium/Magnesium is currently:   Potassium   Date Value Ref Range Status   10/04/2022 4.0 3.5 - 5.2 mmol/L Final     Magnesium   Date Value Ref Range Status   10/04/2022 1.8 1.6 - 2.4 mg/dL Final     Only magnesium needs to be replaced yesterday. Potassium and Magnesium protocols have been ordered on the patient's profile to replace as needed during hospitalization.    Thank you for allowing me to participate in your patient's care. Please call pharmacy with any questions or concerns.    Jack Moreno [facility]  Clinical Pharmacist

## 2022-10-05 LAB
ANION GAP SERPL CALCULATED.3IONS-SCNC: 6 MMOL/L (ref 5–15)
BUN SERPL-MCNC: 20 MG/DL (ref 8–23)
BUN/CREAT SERPL: 25.3 (ref 7–25)
CALCIUM SPEC-SCNC: 9.5 MG/DL (ref 8.6–10.5)
CHLORIDE SERPL-SCNC: 103 MMOL/L (ref 98–107)
CO2 SERPL-SCNC: 26 MMOL/L (ref 22–29)
CREAT SERPL-MCNC: 0.79 MG/DL (ref 0.57–1)
EGFRCR SERPLBLD CKD-EPI 2021: 73.9 ML/MIN/1.73
GLUCOSE SERPL-MCNC: 93 MG/DL (ref 65–99)
MAGNESIUM SERPL-MCNC: 1.9 MG/DL (ref 1.6–2.4)
POTASSIUM SERPL-SCNC: 4 MMOL/L (ref 3.5–5.2)
QT INTERVAL: 415 MS
QT INTERVAL: 445 MS
QT INTERVAL: 470 MS
SODIUM SERPL-SCNC: 135 MMOL/L (ref 136–145)

## 2022-10-05 PROCEDURE — 93005 ELECTROCARDIOGRAM TRACING: CPT

## 2022-10-05 PROCEDURE — 93010 ELECTROCARDIOGRAM REPORT: CPT | Performed by: INTERNAL MEDICINE

## 2022-10-05 PROCEDURE — 99232 SBSQ HOSP IP/OBS MODERATE 35: CPT

## 2022-10-05 PROCEDURE — 93005 ELECTROCARDIOGRAM TRACING: CPT | Performed by: INTERNAL MEDICINE

## 2022-10-05 PROCEDURE — 80048 BASIC METABOLIC PNL TOTAL CA: CPT | Performed by: NURSE PRACTITIONER

## 2022-10-05 PROCEDURE — 83735 ASSAY OF MAGNESIUM: CPT

## 2022-10-05 RX ADMIN — DOFETILIDE 250 MCG: 0.25 CAPSULE ORAL at 10:07

## 2022-10-05 RX ADMIN — OXYCODONE HYDROCHLORIDE AND ACETAMINOPHEN 1000 MG: 500 TABLET ORAL at 08:39

## 2022-10-05 RX ADMIN — Medication 10 ML: at 21:57

## 2022-10-05 RX ADMIN — ACETAMINOPHEN 650 MG: 325 TABLET, FILM COATED ORAL at 21:55

## 2022-10-05 RX ADMIN — AMITRIPTYLINE HYDROCHLORIDE 25 MG: 25 TABLET, FILM COATED ORAL at 21:56

## 2022-10-05 RX ADMIN — ACETAMINOPHEN 650 MG: 325 TABLET, FILM COATED ORAL at 08:48

## 2022-10-05 RX ADMIN — Medication 10 ML: at 08:39

## 2022-10-05 RX ADMIN — DOFETILIDE 250 MCG: 0.25 CAPSULE ORAL at 21:55

## 2022-10-05 RX ADMIN — VENLAFAXINE HYDROCHLORIDE 150 MG: 150 CAPSULE, EXTENDED RELEASE ORAL at 08:38

## 2022-10-05 RX ADMIN — MULTIPLE VITAMINS W/ MINERALS TAB 1 TABLET: TAB at 13:56

## 2022-10-05 RX ADMIN — APIXABAN 5 MG: 5 TABLET, FILM COATED ORAL at 21:56

## 2022-10-05 RX ADMIN — ATORVASTATIN CALCIUM 20 MG: 20 TABLET, FILM COATED ORAL at 21:56

## 2022-10-05 RX ADMIN — METOPROLOL TARTRATE 50 MG: 50 TABLET, FILM COATED ORAL at 21:56

## 2022-10-05 RX ADMIN — METFORMIN HYDROCHLORIDE 500 MG: 500 TABLET ORAL at 16:22

## 2022-10-05 RX ADMIN — BACLOFEN 10 MG: 10 TABLET ORAL at 21:56

## 2022-10-05 RX ADMIN — APIXABAN 5 MG: 5 TABLET, FILM COATED ORAL at 10:07

## 2022-10-05 RX ADMIN — METFORMIN HYDROCHLORIDE 500 MG: 500 TABLET ORAL at 08:38

## 2022-10-05 RX ADMIN — LEVOTHYROXINE SODIUM 50 MCG: 0.05 TABLET ORAL at 08:38

## 2022-10-05 RX ADMIN — ACETAMINOPHEN 650 MG: 325 TABLET, FILM COATED ORAL at 15:36

## 2022-10-05 RX ADMIN — METOPROLOL TARTRATE 50 MG: 50 TABLET, FILM COATED ORAL at 08:38

## 2022-10-05 RX ADMIN — PANTOPRAZOLE SODIUM 40 MG: 40 TABLET, DELAYED RELEASE ORAL at 07:31

## 2022-10-05 NOTE — PROGRESS NOTES
Electrophysiology Follow-Up Note      Patient Name: Dior Pettit  Age/Sex: 82 y.o. female  : 1940  MRN: [id number]      Day of Service: 10/05/22       Chief Complaint/Follow-up: persistent AF    S: doing well this AM. Converted to NSR overnight after second dose of tikosyn. No complaints. Says she slept well.       Temp:  [97.5 °F (36.4 °C)-98.7 °F (37.1 °C)] 98.1 °F (36.7 °C)  Heart Rate:  [] 72  Resp:  [16-18] 16  BP: (106-127)/(70-99) 121/70     PHYSICAL EXAM:    General Appearance: No acute distress, well developed and well nourished.   Eyes: Conjunctiva and lids: No erythema, swelling, or discharge. Sclera non-icteric.   HENT: Atraumatic, normocephalic. External eyes, ears, and nose normal.   Respiratory: No signs of respiratory distress. Respiration rhythm and depth normal.   Clear to auscultation. No rales, crackles, rhonchi, or wheezing auscultated.   Cardiovascular:  Heart Rate and Rhythm: Normal, Heart Sounds: Normal S1 and S2. No S3 or S4 noted.  Murmurs: No murmurs noted. No rubs, thrills, or gallops.   Lower Extremities: No edema noted.  Gastrointestinal:  Abdomen soft, non-distended, non-tender.  Musculoskeletal: Normal movement of extremities  Skin: Warm and dry.   Psychiatric: Patient alert and oriented to person, place, and time. Speech and behavior appropriate. Normal mood and affect.       ECG/TELE:       Results from last 7 days   Lab Units 10/05/22  0342 10/04/22  0318 10/03/22  0805   SODIUM mmol/L 135* 137 140   POTASSIUM mmol/L 4.0 4.0 4.2   CHLORIDE mmol/L 103 102 103   CO2 mmol/L 26.0 24.8 25.2   BUN mg/dL 20 21 24*   CREATININE mg/dL 0.79 0.90 1.00   GLUCOSE mg/dL 93 124* 130*   CALCIUM mg/dL 9.5 9.6 9.4     Results from last 7 days   Lab Units 10/04/22  0318 10/03/22  0650   WBC 10*3/mm3 8.25 8.17   HEMOGLOBIN g/dL 14.5 14.6   HEMATOCRIT % 42.4 44.9   PLATELETS 10*3/mm3 235 249         Results from last 7 days   Lab Units 10/03/22  0805   TROPONIN T ng/mL  <0.010     Results from last 7 days   Lab Units 10/03/22  0650   TSH uIU/mL 2.160           Current Medications:   Scheduled Meds:amitriptyline, 25 mg, Oral, Nightly  apixaban, 5 mg, Oral, Q12H  ascorbic acid, 1,000 mg, Oral, Daily  atorvastatin, 20 mg, Oral, Nightly  azelastine, 2 spray, Each Nare, Daily  baclofen, 10 mg, Oral, Daily  budesonide-formoterol, 2 puff, Inhalation, BID - RT  dofetilide, 250 mcg, Oral, Q12H  levothyroxine, 50 mcg, Oral, Every Other Day  levothyroxine, 75 mcg, Oral, Every Other Day  metFORMIN, 500 mg, Oral, BID With Meals  metoprolol tartrate, 50 mg, Oral, BID  multivitamin with minerals, 1 tablet, Oral, Daily  pantoprazole, 40 mg, Oral, QAM  sodium chloride, 10 mL, Intravenous, Q12H  venlafaxine XR, 150 mg, Oral, Daily            Paroxysmal atrial fibrillation (HCC)       Plan:   1. Persistent AF-- converted overnight after second dose of tikosyn, QTc is WNL. Continue tikosyn at 250mcg BID. Will cancel CV today and monitor for another 24 hours. BB reduced yesterday.     TOSHA Veronica  10/05/22  09:24 EDT

## 2022-10-06 ENCOUNTER — READMISSION MANAGEMENT (OUTPATIENT)
Dept: CALL CENTER | Facility: HOSPITAL | Age: 82
End: 2022-10-06

## 2022-10-06 VITALS
TEMPERATURE: 98 F | OXYGEN SATURATION: 95 % | SYSTOLIC BLOOD PRESSURE: 127 MMHG | HEIGHT: 67 IN | DIASTOLIC BLOOD PRESSURE: 73 MMHG | RESPIRATION RATE: 16 BRPM | HEART RATE: 93 BPM | BODY MASS INDEX: 21.93 KG/M2 | WEIGHT: 139.7 LBS

## 2022-10-06 LAB
ANION GAP SERPL CALCULATED.3IONS-SCNC: 8 MMOL/L (ref 5–15)
BUN SERPL-MCNC: 20 MG/DL (ref 8–23)
BUN/CREAT SERPL: 24.1 (ref 7–25)
CALCIUM SPEC-SCNC: 9.4 MG/DL (ref 8.6–10.5)
CHLORIDE SERPL-SCNC: 101 MMOL/L (ref 98–107)
CO2 SERPL-SCNC: 29 MMOL/L (ref 22–29)
CREAT SERPL-MCNC: 0.83 MG/DL (ref 0.57–1)
EGFRCR SERPLBLD CKD-EPI 2021: 69.6 ML/MIN/1.73
GLUCOSE SERPL-MCNC: 109 MG/DL (ref 65–99)
MAGNESIUM SERPL-MCNC: 1.7 MG/DL (ref 1.6–2.4)
POTASSIUM SERPL-SCNC: 4.5 MMOL/L (ref 3.5–5.2)
QT INTERVAL: 448 MS
QT INTERVAL: 461 MS
SODIUM SERPL-SCNC: 138 MMOL/L (ref 136–145)

## 2022-10-06 PROCEDURE — 80048 BASIC METABOLIC PNL TOTAL CA: CPT | Performed by: NURSE PRACTITIONER

## 2022-10-06 PROCEDURE — 93010 ELECTROCARDIOGRAM REPORT: CPT | Performed by: INTERNAL MEDICINE

## 2022-10-06 PROCEDURE — 94799 UNLISTED PULMONARY SVC/PX: CPT

## 2022-10-06 PROCEDURE — 83735 ASSAY OF MAGNESIUM: CPT

## 2022-10-06 PROCEDURE — 99238 HOSP IP/OBS DSCHRG MGMT 30/<: CPT | Performed by: NURSE PRACTITIONER

## 2022-10-06 PROCEDURE — 94664 DEMO&/EVAL PT USE INHALER: CPT

## 2022-10-06 PROCEDURE — 93005 ELECTROCARDIOGRAM TRACING: CPT

## 2022-10-06 PROCEDURE — 94640 AIRWAY INHALATION TREATMENT: CPT

## 2022-10-06 RX ORDER — DOFETILIDE 0.25 MG/1
250 CAPSULE ORAL EVERY 12 HOURS
Qty: 60 CAPSULE | Refills: 5 | Status: SHIPPED | OUTPATIENT
Start: 2022-10-06 | End: 2022-11-02 | Stop reason: SDUPTHER

## 2022-10-06 RX ADMIN — METOPROLOL TARTRATE 50 MG: 50 TABLET, FILM COATED ORAL at 09:47

## 2022-10-06 RX ADMIN — BUDESONIDE AND FORMOTEROL FUMARATE DIHYDRATE 2 PUFF: 80; 4.5 AEROSOL RESPIRATORY (INHALATION) at 07:04

## 2022-10-06 RX ADMIN — LEVOTHYROXINE SODIUM 75 MCG: 0.07 TABLET ORAL at 05:40

## 2022-10-06 RX ADMIN — OXYCODONE HYDROCHLORIDE AND ACETAMINOPHEN 1000 MG: 500 TABLET ORAL at 09:46

## 2022-10-06 RX ADMIN — AZELASTINE HYDROCHLORIDE 2 SPRAY: 137 SPRAY, METERED NASAL at 09:47

## 2022-10-06 RX ADMIN — MULTIPLE VITAMINS W/ MINERALS TAB 1 TABLET: TAB at 09:46

## 2022-10-06 RX ADMIN — DOFETILIDE 250 MCG: 0.25 CAPSULE ORAL at 09:46

## 2022-10-06 RX ADMIN — APIXABAN 5 MG: 5 TABLET, FILM COATED ORAL at 09:47

## 2022-10-06 RX ADMIN — METFORMIN HYDROCHLORIDE 500 MG: 500 TABLET ORAL at 05:40

## 2022-10-06 RX ADMIN — PANTOPRAZOLE SODIUM 40 MG: 40 TABLET, DELAYED RELEASE ORAL at 05:40

## 2022-10-06 RX ADMIN — VENLAFAXINE HYDROCHLORIDE 150 MG: 150 CAPSULE, EXTENDED RELEASE ORAL at 09:47

## 2022-10-06 NOTE — DISCHARGE SUMMARY
DISCHARGE NOTE    Patient Name: Dior Pettit  Age/Sex: 82 y.o. female  : 1940  MRN: [id number]    Date of Discharge:  10/6/2022   Date of Admit: 10/3/2022  Encounter Provider: TOSHA Shin  Place of Service: [facility] CARDIOLOGY  Patient Care Team:  Ashanti Hong APRN as PCP - General (Internal Medicine)  Gerardo Rodriguez Jr., MD as Consulting Physician (Pulmonary Disease)  Abbi Mitchell MD as Consulting Physician (Cardiology)  Luis Miguel Parker MD as Consulting Physician (Ophthalmology)  Tae Burton MD as Consulting Physician (Dermatology)  Addis Gomes MD as Consulting Physician (Plastic Surgery)    Subjective:     Discharge Diagnosis:    Paroxysmal atrial fibrillation (HCC)      Hospital Course:     82 yr old patient of Dr. Mitchell with PAF, valvular heart disease, s/p AVR w/subaortic membrane resection , HTN and DM.     Had persistent afib, amio and CV in 2021, stopped about 2 months post CV secondary to dizziness.     Presented 10/3 with palpitations, was back in AF. She has been started of dofetilide during this admission, spontaneously converted to SR after 2nd dose.     Has maintained SR and is stable for dc home today.     Follow up in the office in 4 weeks.     Vital Signs  Temp:  [97.7 °F (36.5 °C)-98.2 °F (36.8 °C)] 98 °F (36.7 °C)  Heart Rate:  [65-93] 93  Resp:  [16] 16  BP: (120-145)/(66-78) 127/73    Intake/Output Summary (Last 24 hours) at 10/6/2022 0838  Last data filed at 10/6/2022 0805  Gross per 24 hour   Intake 1080 ml   Output --   Net 1080 ml       Physical Exam:    General Appearance: No acute distress, well developed and well nourished.   Eyes: Conjunctiva and lids: No erythema, swelling, or discharge. Sclera non-icteric.   HENT: Atraumatic, normocephalic. External eyes, ears, and nose normal.   Respiratory: No  signs of respiratory distress. Respiration rhythm and depth normal.   Cardiovascular:  Heart Rate and Rhythm: Normal, Heart Sounds: Normal S1 and S2.  Lower Extremities: No edema noted.  Gastrointestinal:  Abdomen soft, non-distended, non-tender.  Musculoskeletal: Normal movement of extremities  Skin: Warm and dry.   Psychiatric: Patient alert and oriented to person, place, and time. Speech and behavior appropriate. Normal mood and affect.    Labs:   Results from last 7 days   Lab Units 10/06/22  0318 10/05/22  0342 10/04/22  0318 10/03/22  0805   SODIUM mmol/L 138 135* 137 140   POTASSIUM mmol/L 4.5 4.0 4.0 4.2   CHLORIDE mmol/L 101 103 102 103   CO2 mmol/L 29.0 26.0 24.8 25.2   BUN mg/dL 20 20 21 24*   CREATININE mg/dL 0.83 0.79 0.90 1.00   GLUCOSE mg/dL 109* 93 124* 130*   CALCIUM mg/dL 9.4 9.5 9.6 9.4   AST (SGOT) U/L  --   --   --  23   ALT (SGPT) U/L  --   --   --  17     Results from last 7 days   Lab Units 10/03/22  0805   TROPONIN T ng/mL <0.010     Results from last 7 days   Lab Units 10/04/22  0318 10/03/22  0650   WBC 10*3/mm3 8.25 8.17   HEMOGLOBIN g/dL 14.5 14.6   HEMATOCRIT % 42.4 44.9   PLATELETS 10*3/mm3 235 249         Results from last 7 days   Lab Units 10/06/22  0318 10/05/22  0342 10/04/22  0318 10/03/22  0650   MAGNESIUM mg/dL 1.7 1.9 1.8 1.4*         Results from last 7 days   Lab Units 10/03/22  0650   PROBNP pg/mL 4,969.0*     Results from last 7 days   Lab Units 10/03/22  0650   TSH uIU/mL 2.160       Discharge Diet:    Dietary Orders (From admission, onward)     Start     Ordered    10/05/22 0928  Diet Regular  Diet Effective Now        Question:  Diet Texture / Consistency  Answer:  Regular    10/05/22 0927              Activity at Discharge:  as tolerated    Discharge Medications     Discharge Medications      New Medications      Instructions Start Date   dofetilide 250 MCG capsule  Commonly known as: Tikosyn   250 mcg, Oral, Every 12 Hours         Changes to Medications       Instructions Start Date   baclofen 10 MG tablet  Commonly known as: LIORESAL  What changed: when to take this   10 mg, Oral, 3 Times Daily      valACYclovir 1000 MG tablet  Commonly known as: VALTREX  What changed: additional instructions   TAKE 2 TABLETS 4 TIMES     DAILY FOR MOUTH ULCER         Continue These Medications      Instructions Start Date   amitriptyline 25 MG tablet  Commonly known as: ELAVIL   25 mg, Oral, Nightly      ascorbic acid 1000 MG tablet  Commonly known as: VITAMIN C   1,000 mg, Oral, Daily      atorvastatin 20 MG tablet  Commonly known as: LIPITOR   20 mg, Oral, Nightly      azelastine 0.1 % nasal spray  Commonly known as: ASTELIN   2 sprays, Nasal, Use in each nostril as directed      Eliquis 5 MG tablet tablet  Generic drug: apixaban   TAKE 1 TABLET BY MOUTH EVERY TWELVE HOURS      EPINEPHrine 0.3 MG/0.3ML solution auto-injector injection  Commonly known as: EPIPEN   No dose, route, or frequency recorded.      fenofibrate micronized 134 MG capsule  Commonly known as: LOFIBRA   134 mg, Oral, Every Morning Before Breakfast      fluticasone-salmeterol 45-21 MCG/ACT inhaler  Commonly known as: ADVAIR HFA   2 puffs, Inhalation, As Needed      gabapentin 600 MG tablet  Commonly known as: NEURONTIN   600 mg, Oral, As Needed      glucosamine-chondroitin 500-400 MG capsule capsule   2 capsules, Oral, Daily      glucose blood test strip  Commonly known as: Prodigy No Coding Blood Gluc   TEST BLOOD SUGAR DAILY      levothyroxine 75 MCG tablet  Commonly known as: SYNTHROID, LEVOTHROID   TAKE 1 TABLET EVERY OTHER  DAY. OVERDUE FOR           APPOINTMENT AND FASTING    LABS.      levothyroxine 50 MCG tablet  Commonly known as: SYNTHROID, LEVOTHROID   TAKE 1 TABLET EVERY OTHER  DAY      metFORMIN  MG 24 hr tablet  Commonly known as: GLUCOPHAGE-XR   750 mg, Oral, 2 Times Daily      metoprolol tartrate 50 MG tablet  Commonly known as: LOPRESSOR   75 mg, Oral, 2 Times Daily      multivitamin with  minerals tablet tablet   Oral, Daily      omeprazole 40 MG capsule  Commonly known as: priLOSEC   40 mg, Oral, Daily      Prodigy No Coding Blood Gluc w/Device kit   1 each, Does not apply, Daily      Prodigy Safety Lancets 26G misc   CHECK BLOOD SUGAR ONE TIME PER DAY      venlafaxine  MG 24 hr capsule  Commonly known as: EFFEXOR-XR   TAKE 1 CAPSULE DAILY             Discharge disposition: home    Follow-up Appointments   Follow-up Information     Justin Galloway APRN Follow up in 4 week(s).    Specialties: Nurse Practitioner, Cardiology  Contact information:  [address]  [place] [id number] [phone]             Ashanti Hong APRN .    Specialty: Internal Medicine  Why: OCT 11TH AT 11:30AM  Contact information:  [address]  [place] [id number] [phone]                       Future Appointments   Date Time Provider Department Center   10/10/2022  9:30 AM [facility]   10/11/2022 11:30 AM Ashanti Hong APRN [facility]   10/21/2022 10:00 AM Ashanti Hong APRN [facility]   3/24/2023 10:00 AM [facility] 1 [facility]   3/30/2023  9:40 AM Abbi Mitchell MD [facility] TOSHA Mclain  10/06/22  08:38 EDT

## 2022-10-06 NOTE — PROGRESS NOTES
[facility] Clinical Pharmacy Services: Tikosyn (Dofetilide) Education    Dior Pettit was initiated on tikosyn for atrial fibrillation. Counseling points included the followin) Explained indication and need for tikosyn for atrial fibrillation, and the testing and labs needed during the initiation phase (EKGs, potassium, magnesium, renal function).  2) Went over dosing and frequency of this medication, stressing importance of taking medication every 12 hours and not missing or doubling up on doses.  3) Discussed any administration, storage, and monitoring instructions with tikosyn.  4) Discussed all important drug interactions, including antibiotics, antiemetics, over-the-counter medications and supplements.  5) Explained possible side effects for tikosyn.  6) Instructed the patient not to begin or discontinue any medications without informing his/her physician/pharmacist.    Patient expressed understanding and had no further questions.      Sandra Nichols, Pharm.D., [facility]   Clinical Staff Pharmacist   Phone Extension #[phone]

## 2022-10-06 NOTE — OUTREACH NOTE
Prep Survey    Flowsheet Row Responses   [facility] [place] patient discharged from? [place]   Is LACE score < 7 ? No   Emergency Room discharge w/ pulse ox? No   Eligibility [facility]   Date of Admission 10/03/22   Date of Discharge 10/06/22   Discharge Disposition Home or Self Care   Discharge diagnosis Paroxysmal atrial fibrillation    Does the patient have one of the following disease processes/diagnoses(primary or secondary)? Other   Does the patient have Home health ordered? No   Is there a DME ordered? No   Prep survey completed? Yes          BERTRAND CLEMENTS - Registered Nurse

## 2022-10-07 ENCOUNTER — TRANSITIONAL CARE MANAGEMENT TELEPHONE ENCOUNTER (OUTPATIENT)
Dept: CALL CENTER | Facility: HOSPITAL | Age: 82
End: 2022-10-07

## 2022-10-07 NOTE — OUTREACH NOTE
Call Center TCM Note    Flowsheet Row Responses   [facility] patient discharged from? [place]   Does the patient have one of the following disease processes/diagnoses(primary or secondary)? Other   TCM attempt successful? Yes   Call start time 1435   Call end time 1436   Discharge diagnosis Paroxysmal atrial fibrillation    Meds reviewed with patient/caregiver? Yes   Is the patient having any side effects they believe may be caused by any medication additions or changes? No   Does the patient have all medications ordered at discharge? Yes   Prescription comments Pt states she is tolerating Tikosyn well    Is the patient taking all medications as directed (includes completed medication regime)? Yes   Comments Hosp dc fu apt on 10/11/22 with PCP group    Has home health visited the patient within 72 hours of discharge? N/A   Psychosocial issues? No   Did the patient receive a copy of their discharge instructions? Yes   Nursing interventions Reviewed instructions with patient   What is the patient's perception of their health status since discharge? Improving   Is the patient/caregiver able to teach back signs and symptoms related to disease process for when to call PCP? Yes   Is the patient/caregiver able to teach back signs and symptoms related to disease process for when to call 911? Yes   Is the patient/caregiver able to teach back the hierarchy of who to call/visit for symptoms/problems? PCP, Specialist, Home health nurse, Urgent Care, ED, 911 Yes   If the patient is a current smoker, are they able to teach back resources for cessation? Not a smoker   TCM call completed? Yes   Wrap up additional comments Patient tolerating Tikosyn well-pt was instructed to seek medical attention if any symptoms worsen/adverse affects from medication occur.           Fozia Kay RN    10/7/2022, 14:38 EDT

## 2022-10-11 ENCOUNTER — OFFICE VISIT (OUTPATIENT)
Dept: INTERNAL MEDICINE | Facility: CLINIC | Age: 82
End: 2022-10-11

## 2022-10-11 VITALS
BODY MASS INDEX: 22.6 KG/M2 | OXYGEN SATURATION: 100 % | WEIGHT: 144 LBS | DIASTOLIC BLOOD PRESSURE: 70 MMHG | SYSTOLIC BLOOD PRESSURE: 110 MMHG | TEMPERATURE: 96.6 F | HEIGHT: 67 IN | HEART RATE: 64 BPM

## 2022-10-11 DIAGNOSIS — I48.0 PAF (PAROXYSMAL ATRIAL FIBRILLATION): Primary | Chronic | ICD-10-CM

## 2022-10-11 DIAGNOSIS — F51.01 PRIMARY INSOMNIA: ICD-10-CM

## 2022-10-11 DIAGNOSIS — J45.20 MILD INTERMITTENT ASTHMA WITHOUT COMPLICATION: Chronic | ICD-10-CM

## 2022-10-11 DIAGNOSIS — Z23 NEED FOR INFLUENZA VACCINATION: ICD-10-CM

## 2022-10-11 DIAGNOSIS — E11.8 CONTROLLED TYPE 2 DIABETES MELLITUS WITH COMPLICATION, WITHOUT LONG-TERM CURRENT USE OF INSULIN: Chronic | ICD-10-CM

## 2022-10-11 PROCEDURE — G0008 ADMIN INFLUENZA VIRUS VAC: HCPCS | Performed by: NURSE PRACTITIONER

## 2022-10-11 PROCEDURE — 90662 IIV NO PRSV INCREASED AG IM: CPT | Performed by: NURSE PRACTITIONER

## 2022-10-11 PROCEDURE — 99496 TRANSJ CARE MGMT HIGH F2F 7D: CPT | Performed by: NURSE PRACTITIONER

## 2022-10-11 PROCEDURE — 1111F DSCHRG MED/CURRENT MED MERGE: CPT | Performed by: NURSE PRACTITIONER

## 2022-10-11 RX ORDER — AMITRIPTYLINE HYDROCHLORIDE 50 MG/1
50 TABLET, FILM COATED ORAL NIGHTLY
Qty: 90 TABLET | Refills: 1 | Status: SHIPPED | OUTPATIENT
Start: 2022-10-11

## 2022-10-12 ENCOUNTER — DOCUMENTATION (OUTPATIENT)
Dept: INTERNAL MEDICINE | Facility: CLINIC | Age: 82
End: 2022-10-12

## 2022-10-12 NOTE — PROGRESS NOTES
Notified via text of the potassium was 6.3. Determined that lab was drawn from an outside lab on 10/11 after calling hub. Lab is not released due to technician believing it is an inaccurate reading. Will notify TOSHA Tamez in the morning to notify and make sure another BMP is drawn.

## 2022-10-14 LAB
BUN SERPL-MCNC: NORMAL MG/DL
CALCIUM SERPL-MCNC: NORMAL MG/DL
CHLORIDE SERPL-SCNC: NORMAL MMOL/L
CO2 SERPL-SCNC: NORMAL MMOL/L
CREAT SERPL-MCNC: NORMAL MG/DL
GLUCOSE SERPL-MCNC: NORMAL MG/DL
HBA1C MFR BLD: 6.6 % (ref 4.8–5.6)
POTASSIUM SERPL-SCNC: NORMAL MMOL/L
REQUEST PROBLEM: NORMAL
SODIUM SERPL-SCNC: NORMAL MMOL/L

## 2022-11-02 RX ORDER — DOFETILIDE 0.25 MG/1
250 CAPSULE ORAL EVERY 12 HOURS
Qty: 180 CAPSULE | Refills: 1 | Status: SHIPPED | OUTPATIENT
Start: 2022-11-02

## 2022-11-08 DIAGNOSIS — E78.00 HYPERCHOLESTEREMIA: ICD-10-CM

## 2022-11-08 DIAGNOSIS — E11.8 CONTROLLED TYPE 2 DIABETES MELLITUS WITH COMPLICATION, WITHOUT LONG-TERM CURRENT USE OF INSULIN: ICD-10-CM

## 2022-11-08 DIAGNOSIS — E11.8 CONTROLLED TYPE 2 DIABETES MELLITUS WITH COMPLICATION, WITHOUT LONG-TERM CURRENT USE OF INSULIN: Primary | ICD-10-CM

## 2022-11-09 LAB
BUN SERPL-MCNC: 19 MG/DL (ref 8–23)
BUN/CREAT SERPL: 19.6 (ref 7–25)
CALCIUM SERPL-MCNC: 9.8 MG/DL (ref 8.6–10.5)
CHLORIDE SERPL-SCNC: 101 MMOL/L (ref 98–107)
CHOLEST SERPL-MCNC: 176 MG/DL (ref 0–200)
CO2 SERPL-SCNC: 29 MMOL/L (ref 22–29)
CREAT SERPL-MCNC: 0.97 MG/DL (ref 0.57–1)
EGFRCR SERPLBLD CKD-EPI 2021: 57.7 ML/MIN/1.73
GLUCOSE SERPL-MCNC: 117 MG/DL (ref 65–99)
HDLC SERPL-MCNC: 51 MG/DL (ref 40–60)
LDLC SERPL CALC-MCNC: 101 MG/DL (ref 0–100)
POTASSIUM SERPL-SCNC: 4.9 MMOL/L (ref 3.5–5.2)
SODIUM SERPL-SCNC: 137 MMOL/L (ref 136–145)
TRIGL SERPL-MCNC: 133 MG/DL (ref 0–150)
VLDLC SERPL CALC-MCNC: 24 MG/DL (ref 5–40)

## 2022-11-13 ENCOUNTER — HOSPITAL ENCOUNTER (INPATIENT)
Facility: HOSPITAL | Age: 82
LOS: 5 days | Discharge: NURSING, REHAB OR LONG-TERM CARE | End: 2022-11-18
Attending: EMERGENCY MEDICINE | Admitting: HOSPITALIST

## 2022-11-13 ENCOUNTER — APPOINTMENT (OUTPATIENT)
Dept: GENERAL RADIOLOGY | Facility: HOSPITAL | Age: 82
End: 2022-11-13

## 2022-11-13 ENCOUNTER — APPOINTMENT (OUTPATIENT)
Dept: CT IMAGING | Facility: HOSPITAL | Age: 82
End: 2022-11-13

## 2022-11-13 DIAGNOSIS — S72.001A CLOSED DISPLACED FRACTURE OF RIGHT FEMORAL NECK: Primary | ICD-10-CM
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
## 2022-11-13 LAB
ALBUMIN SERPL-MCNC: 4.6 G/DL (ref 3.5–5.2)
ALBUMIN/GLOB SERPL: 1.8 G/DL
ALP SERPL-CCNC: 58 U/L (ref 39–117)
ALT SERPL W P-5'-P-CCNC: 17 U/L (ref 1–33)
ANION GAP SERPL CALCULATED.3IONS-SCNC: 10 MMOL/L (ref 5–15)
AST SERPL-CCNC: 28 U/L (ref 1–32)
BASOPHILS # BLD AUTO: 0.05 10*3/MM3 (ref 0–0.2)
BASOPHILS NFR BLD AUTO: 0.5 % (ref 0–1.5)
BILIRUB SERPL-MCNC: 0.5 MG/DL (ref 0–1.2)
BUN SERPL-MCNC: 18 MG/DL (ref 8–23)
BUN/CREAT SERPL: 22 (ref 7–25)
CALCIUM SPEC-SCNC: 9.6 MG/DL (ref 8.6–10.5)
CHLORIDE SERPL-SCNC: 100 MMOL/L (ref 98–107)
CO2 SERPL-SCNC: 25 MMOL/L (ref 22–29)
CREAT SERPL-MCNC: 0.82 MG/DL (ref 0.57–1)
DEPRECATED RDW RBC AUTO: 48.5 FL (ref 37–54)
EGFRCR SERPLBLD CKD-EPI 2021: 70.6 ML/MIN/1.73
EOSINOPHIL # BLD AUTO: 0.05 10*3/MM3 (ref 0–0.4)
EOSINOPHIL NFR BLD AUTO: 0.5 % (ref 0.3–6.2)
ERYTHROCYTE [DISTWIDTH] IN BLOOD BY AUTOMATED COUNT: 14.4 % (ref 12.3–15.4)
GLOBULIN UR ELPH-MCNC: 2.6 GM/DL
GLUCOSE BLDC GLUCOMTR-MCNC: 126 MG/DL (ref 70–130)
GLUCOSE BLDC GLUCOMTR-MCNC: 168 MG/DL (ref 70–130)
GLUCOSE SERPL-MCNC: 136 MG/DL (ref 65–99)
HBA1C MFR BLD: 6.1 % (ref 4.8–5.6)
HCT VFR BLD AUTO: 40.1 % (ref 34–46.6)
HGB BLD-MCNC: 13.2 G/DL (ref 12–15.9)
IMM GRANULOCYTES # BLD AUTO: 0.03 10*3/MM3 (ref 0–0.05)
IMM GRANULOCYTES NFR BLD AUTO: 0.3 % (ref 0–0.5)
INR PPP: 1.08 (ref 0.9–1.1)
LYMPHOCYTES # BLD AUTO: 1.21 10*3/MM3 (ref 0.7–3.1)
LYMPHOCYTES NFR BLD AUTO: 11.5 % (ref 19.6–45.3)
MCH RBC QN AUTO: 30.4 PG (ref 26.6–33)
MCHC RBC AUTO-ENTMCNC: 32.9 G/DL (ref 31.5–35.7)
MCV RBC AUTO: 92.4 FL (ref 79–97)
MONOCYTES # BLD AUTO: 0.6 10*3/MM3 (ref 0.1–0.9)
MONOCYTES NFR BLD AUTO: 5.7 % (ref 5–12)
NEUTROPHILS NFR BLD AUTO: 8.59 10*3/MM3 (ref 1.7–7)
NEUTROPHILS NFR BLD AUTO: 81.5 % (ref 42.7–76)
NRBC BLD AUTO-RTO: 0 /100 WBC (ref 0–0.2)
PLATELET # BLD AUTO: 233 10*3/MM3 (ref 140–450)
PMV BLD AUTO: 9.8 FL (ref 6–12)
POTASSIUM SERPL-SCNC: 4.6 MMOL/L (ref 3.5–5.2)
PROT SERPL-MCNC: 7.2 G/DL (ref 6–8.5)
PROTHROMBIN TIME: 14.2 SECONDS (ref 11.7–14.2)
QT INTERVAL: 425 MS
RBC # BLD AUTO: 4.34 10*6/MM3 (ref 3.77–5.28)
SODIUM SERPL-SCNC: 135 MMOL/L (ref 136–145)
WBC NRBC COR # BLD: 10.53 10*3/MM3 (ref 3.4–10.8)

## 2022-11-13 PROCEDURE — 70450 CT HEAD/BRAIN W/O DYE: CPT

## 2022-11-13 PROCEDURE — 99285 EMERGENCY DEPT VISIT HI MDM: CPT

## 2022-11-13 PROCEDURE — 85610 PROTHROMBIN TIME: CPT | Performed by: ORTHOPAEDIC SURGERY

## 2022-11-13 PROCEDURE — 93010 ELECTROCARDIOGRAM REPORT: CPT | Performed by: INTERNAL MEDICINE

## 2022-11-13 PROCEDURE — 80053 COMPREHEN METABOLIC PANEL: CPT | Performed by: EMERGENCY MEDICINE

## 2022-11-13 PROCEDURE — 25010000002 HYDROMORPHONE PER 4 MG: Performed by: HOSPITALIST

## 2022-11-13 PROCEDURE — 25010000002 HYDROMORPHONE PER 4 MG: Performed by: EMERGENCY MEDICINE

## 2022-11-13 PROCEDURE — 73502 X-RAY EXAM HIP UNI 2-3 VIEWS: CPT

## 2022-11-13 PROCEDURE — 85025 COMPLETE CBC W/AUTO DIFF WBC: CPT | Performed by: EMERGENCY MEDICINE

## 2022-11-13 PROCEDURE — 83036 HEMOGLOBIN GLYCOSYLATED A1C: CPT | Performed by: HOSPITALIST

## 2022-11-13 PROCEDURE — 36415 COLL VENOUS BLD VENIPUNCTURE: CPT | Performed by: EMERGENCY MEDICINE

## 2022-11-13 PROCEDURE — 82962 GLUCOSE BLOOD TEST: CPT

## 2022-11-13 PROCEDURE — 71045 X-RAY EXAM CHEST 1 VIEW: CPT

## 2022-11-13 PROCEDURE — 72125 CT NECK SPINE W/O DYE: CPT

## 2022-11-13 PROCEDURE — 93005 ELECTROCARDIOGRAM TRACING: CPT | Performed by: EMERGENCY MEDICINE

## 2022-11-13 PROCEDURE — 99222 1ST HOSP IP/OBS MODERATE 55: CPT | Performed by: ORTHOPAEDIC SURGERY

## 2022-11-13 PROCEDURE — 25010000002 MORPHINE PER 10 MG: Performed by: EMERGENCY MEDICINE

## 2022-11-13 RX ORDER — ACETAMINOPHEN 325 MG/1
650 TABLET ORAL EVERY 4 HOURS PRN
Status: DISCONTINUED | OUTPATIENT
Start: 2022-11-13 | End: 2022-11-18 | Stop reason: HOSPADM

## 2022-11-13 RX ORDER — VENLAFAXINE HYDROCHLORIDE 150 MG/1
150 CAPSULE, EXTENDED RELEASE ORAL DAILY
Status: DISCONTINUED | OUTPATIENT
Start: 2022-11-13 | End: 2022-11-18 | Stop reason: HOSPADM

## 2022-11-13 RX ORDER — ONDANSETRON 4 MG/1
4 TABLET, FILM COATED ORAL EVERY 6 HOURS PRN
Status: DISCONTINUED | OUTPATIENT
Start: 2022-11-13 | End: 2022-11-15 | Stop reason: SDUPTHER

## 2022-11-13 RX ORDER — BACLOFEN 10 MG/1
10 TABLET ORAL 3 TIMES DAILY
Status: DISCONTINUED | OUTPATIENT
Start: 2022-11-13 | End: 2022-11-18 | Stop reason: HOSPADM

## 2022-11-13 RX ORDER — HYDROMORPHONE HYDROCHLORIDE 1 MG/ML
0.25 INJECTION, SOLUTION INTRAMUSCULAR; INTRAVENOUS; SUBCUTANEOUS ONCE
Status: COMPLETED | OUTPATIENT
Start: 2022-11-13 | End: 2022-11-13

## 2022-11-13 RX ORDER — HYDROMORPHONE HYDROCHLORIDE 1 MG/ML
0.5 INJECTION, SOLUTION INTRAMUSCULAR; INTRAVENOUS; SUBCUTANEOUS
Status: DISCONTINUED | OUTPATIENT
Start: 2022-11-13 | End: 2022-11-18 | Stop reason: HOSPADM

## 2022-11-13 RX ORDER — TIZANIDINE 4 MG/1
4 TABLET ORAL ONCE
Status: COMPLETED | OUTPATIENT
Start: 2022-11-14 | End: 2022-11-14

## 2022-11-13 RX ORDER — SODIUM CHLORIDE 0.9 % (FLUSH) 0.9 %
10 SYRINGE (ML) INJECTION AS NEEDED
Status: DISCONTINUED | OUTPATIENT
Start: 2022-11-13 | End: 2022-11-18 | Stop reason: HOSPADM

## 2022-11-13 RX ORDER — INSULIN LISPRO 100 [IU]/ML
0-7 INJECTION, SOLUTION INTRAVENOUS; SUBCUTANEOUS
Status: DISCONTINUED | OUTPATIENT
Start: 2022-11-13 | End: 2022-11-18 | Stop reason: HOSPADM

## 2022-11-13 RX ORDER — PANTOPRAZOLE SODIUM 40 MG/1
40 TABLET, DELAYED RELEASE ORAL EVERY MORNING
Status: DISCONTINUED | OUTPATIENT
Start: 2022-11-14 | End: 2022-11-18 | Stop reason: HOSPADM

## 2022-11-13 RX ORDER — HYDROCODONE BITARTRATE AND ACETAMINOPHEN 5; 325 MG/1; MG/1
1 TABLET ORAL EVERY 4 HOURS PRN
Status: DISCONTINUED | OUTPATIENT
Start: 2022-11-13 | End: 2022-11-18 | Stop reason: HOSPADM

## 2022-11-13 RX ORDER — AMITRIPTYLINE HYDROCHLORIDE 50 MG/1
50 TABLET, FILM COATED ORAL NIGHTLY
Status: DISCONTINUED | OUTPATIENT
Start: 2022-11-13 | End: 2022-11-18 | Stop reason: HOSPADM

## 2022-11-13 RX ORDER — DEXTROSE MONOHYDRATE 25 G/50ML
25 INJECTION, SOLUTION INTRAVENOUS
Status: DISCONTINUED | OUTPATIENT
Start: 2022-11-13 | End: 2022-11-18 | Stop reason: HOSPADM

## 2022-11-13 RX ORDER — LEVOTHYROXINE SODIUM 0.07 MG/1
75 TABLET ORAL EVERY OTHER DAY
Status: DISCONTINUED | OUTPATIENT
Start: 2022-11-14 | End: 2022-11-18 | Stop reason: HOSPADM

## 2022-11-13 RX ORDER — ONDANSETRON 2 MG/ML
4 INJECTION INTRAMUSCULAR; INTRAVENOUS EVERY 6 HOURS PRN
Status: DISCONTINUED | OUTPATIENT
Start: 2022-11-13 | End: 2022-11-15 | Stop reason: SDUPTHER

## 2022-11-13 RX ORDER — NALOXONE HCL 0.4 MG/ML
0.4 VIAL (ML) INJECTION
Status: DISCONTINUED | OUTPATIENT
Start: 2022-11-13 | End: 2022-11-18 | Stop reason: HOSPADM

## 2022-11-13 RX ORDER — LEVOTHYROXINE SODIUM 0.05 MG/1
50 TABLET ORAL EVERY OTHER DAY
Status: DISCONTINUED | OUTPATIENT
Start: 2022-11-13 | End: 2022-11-18 | Stop reason: HOSPADM

## 2022-11-13 RX ORDER — DOFETILIDE 0.25 MG/1
250 CAPSULE ORAL EVERY 12 HOURS
Status: DISCONTINUED | OUTPATIENT
Start: 2022-11-13 | End: 2022-11-18 | Stop reason: HOSPADM

## 2022-11-13 RX ORDER — SODIUM CHLORIDE 0.9 % (FLUSH) 0.9 %
10 SYRINGE (ML) INJECTION EVERY 12 HOURS SCHEDULED
Status: DISCONTINUED | OUTPATIENT
Start: 2022-11-13 | End: 2022-11-18 | Stop reason: HOSPADM

## 2022-11-13 RX ORDER — ACETAMINOPHEN 160 MG/5ML
650 SOLUTION ORAL EVERY 4 HOURS PRN
Status: DISCONTINUED | OUTPATIENT
Start: 2022-11-13 | End: 2022-11-18 | Stop reason: HOSPADM

## 2022-11-13 RX ORDER — MORPHINE SULFATE 2 MG/ML
4 INJECTION, SOLUTION INTRAMUSCULAR; INTRAVENOUS ONCE
Status: COMPLETED | OUTPATIENT
Start: 2022-11-13 | End: 2022-11-13

## 2022-11-13 RX ORDER — ACETAMINOPHEN 650 MG/1
650 SUPPOSITORY RECTAL EVERY 4 HOURS PRN
Status: DISCONTINUED | OUTPATIENT
Start: 2022-11-13 | End: 2022-11-18 | Stop reason: HOSPADM

## 2022-11-13 RX ORDER — NICOTINE POLACRILEX 4 MG
15 LOZENGE BUCCAL
Status: DISCONTINUED | OUTPATIENT
Start: 2022-11-13 | End: 2022-11-18 | Stop reason: HOSPADM

## 2022-11-13 RX ADMIN — HYDROMORPHONE HYDROCHLORIDE 0.5 MG: 1 INJECTION, SOLUTION INTRAMUSCULAR; INTRAVENOUS; SUBCUTANEOUS at 17:00

## 2022-11-13 RX ADMIN — Medication 10 ML: at 22:49

## 2022-11-13 RX ADMIN — BACLOFEN 10 MG: 10 TABLET ORAL at 16:34

## 2022-11-13 RX ADMIN — MORPHINE SULFATE 4 MG: 2 INJECTION, SOLUTION INTRAMUSCULAR; INTRAVENOUS at 10:06

## 2022-11-13 RX ADMIN — VENLAFAXINE HYDROCHLORIDE 150 MG: 150 CAPSULE, EXTENDED RELEASE ORAL at 16:34

## 2022-11-13 RX ADMIN — METFORMIN HYDROCHLORIDE 500 MG: 500 TABLET, FILM COATED ORAL at 16:34

## 2022-11-13 RX ADMIN — BACLOFEN 10 MG: 10 TABLET ORAL at 20:16

## 2022-11-13 RX ADMIN — HYDROCODONE BITARTRATE AND ACETAMINOPHEN 1 TABLET: 5; 325 TABLET ORAL at 19:29

## 2022-11-13 RX ADMIN — DOFETILIDE 250 MCG: 0.25 CAPSULE ORAL at 22:28

## 2022-11-13 RX ADMIN — METOPROLOL TARTRATE 75 MG: 25 TABLET, FILM COATED ORAL at 20:16

## 2022-11-13 RX ADMIN — LEVOTHYROXINE SODIUM 50 MCG: 0.05 TABLET ORAL at 16:34

## 2022-11-13 RX ADMIN — HYDROMORPHONE HYDROCHLORIDE 0.5 MG: 1 INJECTION, SOLUTION INTRAMUSCULAR; INTRAVENOUS; SUBCUTANEOUS at 14:48

## 2022-11-13 RX ADMIN — AMITRIPTYLINE HYDROCHLORIDE 50 MG: 50 TABLET, FILM COATED ORAL at 20:16

## 2022-11-13 RX ADMIN — HYDROMORPHONE HYDROCHLORIDE 0.5 MG: 1 INJECTION, SOLUTION INTRAMUSCULAR; INTRAVENOUS; SUBCUTANEOUS at 22:25

## 2022-11-13 RX ADMIN — HYDROMORPHONE HYDROCHLORIDE 0.25 MG: 1 INJECTION, SOLUTION INTRAMUSCULAR; INTRAVENOUS; SUBCUTANEOUS at 11:43

## 2022-11-13 NOTE — PLAN OF CARE
Goal Outcome Evaluation:               Admit from ER s/p fall at home with R femur fracture. A&Ox4. Hypertensive but has not had any home BP meds. Purewick in place. Pain medication PRN. SCDs and accumax on. Plans for surgery with Dr Martinez on Tuesday 11/15/22. D/C plans pending. Education provided.

## 2022-11-13 NOTE — ED NOTES
Pt arrives via EMS for fall. She was trying to walk to the bathroom. Per EMS her left hip is killing her and they believe it is broken. Sugar is 141. Pt got 75 of fentanyl and 4 of zofran    This RN in appropriate PPE while in pt room. Pt wearing mask

## 2022-11-13 NOTE — CONSULTS
ORTHO CONSULT NOTE    Patient Identification:        Name: Dior Pettit  Age: 82 y.o.  Sex: female  :  1940  MRN: [id number]                                                    HPI:        Dior Pettit is a 82 y.o. year old female who presented to the ED after a fall onto the right hip.  She was unable to bear weight on the hip after the fall.  The patient developed immediate pain in the hip.  X-rays in the ER showed a hip fracture.  The patient was admitted to the hospitalist service and orthopaedics was consulted for management of the fracture. The patient currently complains of pain in the hip.  The patient denies numbness or tingling. The patient denies chest pain or shortness of breath.    Problem List:  Patient Active Problem List   Diagnosis   • Essential hypertension   • Pulmonary hypertension (HCC)   • NNEKA (obstructive sleep apnea)   • Gastroesophageal reflux disease   • Anxiety   • Restless leg syndrome   • Controlled diabetes mellitus type 2 with complications ([facility])   • Hypothyroidism   • Hypercholesteremia   • Seasonal allergic rhinitis   • Mild intermittent asthma without complication   • S/P AVR   • Dilated aortic root ([facility])   • Thoracic aortic aneurysm without rupture    • Primary insomnia   • Renal insufficiency   • Macular degeneration of both eyes   • Coronary artery disease involving native coronary artery with angina pectoris ([facility])   • Rectocele   • Nonrheumatic mitral valve stenosis   • PAF (paroxysmal atrial fibrillation) ([facility])   • Slow transit constipation   • Dizziness   • Daily headache   • Symptomatic bradycardia   • Paroxysmal atrial fibrillation ([facility])   • Closed displaced fracture of right femoral neck ([facility])       Past Medical History:  Past Medical History:   Diagnosis Date   • Allergic rhinitis    • Anemia    • Anxiety     Controlled w/Meds   • Aortic root dilatation (HCC) 10/02/2017    Borderline--Noted on Echo   • Aortic valve calcification 10/02/2017    Noted on Echo    • Aortic valve insufficiency Dx in 07    w/AVR    • Aortic valve prosthesis present 11/02/2018    Noted on CTA Chest   • Ascending aorta dilatation (HCC) 10/02/2017    Borderline--Noted on Echo   • Asthma    • Atypical chest pain    • Breast pain, right Hx   • CAD (coronary artery disease)    • Cardiomegaly 2017   • Cervicalgia    • Chest pain due to CAD (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)     T2   • Esophagitis    • GERD (gastroesophageal reflux disease)     Controlled w/Meds   • Headache    • Health care maintenance    • Heart murmur    • History of echocardiogram 10/2/17-[facility]    EF 66%; Borderline Concentric Hypertrophy; Mild Calcification in AV; Mild AVS; Mild AVR; Moderate TVR; Borderline Dilation of Aortic Root/Arch & Borderline Dilation of Ascending/Proximal Aorta Present   • Hyperlipidemia     Controlled w/Meds   • Hypertension     Controlled w/Meds   • Hypothyroidism     Controlled w/Synthroid   • Insomnia    • Macular degeneration, left eye    • Mild aortic valve regurgitation 10/02/2017    Noted on Echo   • Mild aortic valve stenosis 10/02/2017    Noted on Echo   • Mild dilation of ascending aorta (HCC) 10/02/2017    Borderline--Noted on Echo   • Moderate tricuspid valve regurgitation 10/02/2017    Noted on Echo   • Mood disorder in conditions classified elsewhere     Controlled w/Meds   • Near syncope 03/2017-[facility] ER   • Neuropathy    • NNEKA (obstructive sleep apnea)     Untreated   • Osteoarthritis     Ankles/Feet   • Pulmonary hypertension ([facility]) 2017   • Renal insufficiency    • RLS (restless legs syndrome)    • Thoracic ascending aortic aneurysm Dx in 2007 @ 3.8CM & 1/02/2018    Noted @ 4CM on CTA Chest;   • Visual impairment     macular degeneratuin       Past Surgical History:  Past Surgical History:   Procedure Laterality Date   • AORTIC VALVE  REPAIR/REPLACEMENT  2007    Dr. Perry   • APPENDECTOMY     • AUGMENTATION MAMMAPLASTY  1976   • BREAST AUGMENTATION  2014   • BUNIONECTOMY     • CARDIAC CATHETERIZATION  2007   • CARDIAC VALVE REPLACEMENT  2007    porcine   • COLONOSCOPY  2010   • COLONOSCOPY  03/02/2012   • EYE SURGERY      cataracts and ens implants   • HYSTERECTOMY  1972   • SIGMOIDOSCOPY  2001       Home Meds:  Medications Prior to Admission   Medication Sig Dispense Refill Last Dose   • amitriptyline (ELAVIL) 50 MG tablet Take 1 tablet by mouth Every Night. 90 tablet 1 11/12/2022   • ascorbic acid (VITAMIN C) 1000 MG tablet Take 1,000 mg by mouth Daily.   11/12/2022   • atorvastatin (LIPITOR) 20 MG tablet Take 1 tablet by mouth Every Night. 90 tablet 1 11/12/2022   • azelastine (ASTELIN) 0.1 % nasal spray 2 sprays into the nostril(s) as directed by provider. Use in each nostril as directed      • baclofen (LIORESAL) 10 MG tablet Take 1 tablet by mouth 3 (Three) Times a Day. (Patient taking differently: Take 1 tablet by mouth Daily.) 90 tablet 3 11/12/2022   • Blood Glucose Monitoring Suppl (Prodigy No Coding Blood Gluc) w/Device kit 1 each Daily. 1 kit 0    • dofetilide (Tikosyn) 250 MCG capsule Take 1 capsule by mouth Every 12 (Twelve) Hours. 180 capsule 1 11/12/2022   • Eliquis 5 MG tablet tablet TAKE 1 TABLET BY MOUTH EVERY TWELVE HOURS 180 tablet 0 11/12/2022   • EPINEPHrine (EPIPEN) 0.3 MG/0.3ML solution auto-injector injection       • fenofibrate micronized (LOFIBRA) 134 MG capsule Take 1 capsule by mouth Every Morning Before Breakfast. 90 capsule 3 11/12/2022   • fluticasone-salmeterol (ADVAIR HFA) 45-21 MCG/ACT inhaler Inhale 2 puffs As Needed.      • gabapentin (NEURONTIN) 600 MG tablet Take 600 mg by mouth As Needed.      • glucosamine-chondroitin 500-400 MG capsule capsule Take 2 capsules by mouth Daily.   11/12/2022   • glucose blood (Prodigy No Coding Blood Gluc) test strip TEST BLOOD SUGAR DAILY 50 each 0    • levothyroxine  (SYNTHROID, LEVOTHROID) 50 MCG tablet TAKE 1 TABLET EVERY OTHER  DAY 45 tablet 1 11/12/2022   • levothyroxine (SYNTHROID, LEVOTHROID) 75 MCG tablet TAKE 1 TABLET EVERY OTHER  DAY. OVERDUE FOR           APPOINTMENT AND FASTING    LABS. 45 tablet 3 Past Week   • metFORMIN ER (GLUCOPHAGE-XR) 750 MG 24 hr tablet Take 1 tablet by mouth 2 (Two) Times a Day. 180 tablet 3 11/12/2022   • metoprolol tartrate (LOPRESSOR) 50 MG tablet Take 1.5 tablets by mouth 2 (Two) Times a Day. 150 tablet 3 11/12/2022   • Multiple Vitamins-Minerals (MULTIVITAL PO) Take  by mouth Daily.      • omeprazole (priLOSEC) 40 MG capsule Take 1 capsule by mouth Daily. 90 capsule 1 11/12/2022   • Prodigy Safety Lancets 26G misc CHECK BLOOD SUGAR ONE TIME PER  each 1    • venlafaxine XR (EFFEXOR-XR) 150 MG 24 hr capsule TAKE 1 CAPSULE DAILY 90 capsule 1 11/12/2022   • valACYclovir (VALTREX) 1000 MG tablet TAKE 2 TABLETS 4 TIMES     DAILY FOR MOUTH ULCER (Patient taking differently: TAKE 2 TABLETS 4 TIMES     DAILY FOR MOUTH ULCER as needed) 16 tablet 4        Current Meds:   Scheduled Meds:  Continuous Infusions:No current facility-administered medications for this encounter.    PRN Meds:.    Allergies:  No Known Allergies    Social History:   Social History     Tobacco Use   • Smoking status: Never   • Smokeless tobacco: Never   • Tobacco comments:     caffeine use - 1 cup coffee daily    Substance Use Topics   • Alcohol use: Yes     Comment: less than 1 glass of wine       Family History:  Family History   Problem Relation Age of Onset   • Hypertension Mother    • Stroke Mother    • Cancer Mother    • Diabetes Sister    • Coronary artery disease Brother    • Diabetes Brother    • Valvular heart disease Brother         TAVR   • Coronary artery disease Brother    • Cancer Brother    • Birth defects Daughter    • Skin cancer Neg Hx        Review of Systems:      Negative for fever, chills, nausea, vomiting, chest pain, shortness of breath,  "headache, dizziness, vision changes, or slurred speech.  All other pertinent positives and negatives as noted above in HPI.      Vitals:   Blood pressure (!) 196/66, pulse 80, temperature 97.5 °F (36.4 °C), temperature source Oral, resp. rate 16, height 170.2 cm (67\"), weight 64.9 kg (143 lb), SpO2 99 %, not currently breastfeeding.    I/O:   No intake or output data in the 24 hours ending 11/13/22 1335    Physical Exam:        General - awake, alert, and oriented x 3, answers questions appropriately, well nourished, no acute distress   HEENT: atraumatic  Neck: supple  Skin: warm, no rash  Lung: unlabored breathing  CV: palpable dp/pt pulses    rightlower extremity:  Extremity held in externally rotated position  Slightly shortened compared to contralateral extremity  Tender to palpation over greater trochanter  Able to dorsiflex ankle and move toes  Pain with any IR/ER of hip  Unable to test ROM due to pain  Sensation grossly intact to light touch throughout  Distal pulses intact  No calf swelling  Homans sign negative  Compartments soft  No signs or symptoms of DVT or compartment syndrome    Exam of the contralateral hip is normal:  No atrophy, erythema, ecchymosis, or gross deformity noted  No tenderness to palpation  Full active range of motion  5/5 strength in hip flexion, abduction, and adduction  Stinchfield and straight leg raise negative  SANDOVAL negative  Sensation grossly intact to light touch throughout  Skin and distal pulses intact      Labs:      Lab Results (last 24 hours)     Procedure Component Value Units Date/Time    Comprehensive Metabolic Panel [[id number]] Updated: 11/13/22 1222    Specimen: Blood     CBC & Differential [[id number]]  (Abnormal) Collected: 11/13/22 1122    Specimen: Blood Updated: 11/13/22 1140    Narrative:      The following orders were created for panel order CBC & Differential.  Procedure                               Abnormality         Status                     ---------     "                           -----------         ------                     CBC Auto Differential[[id number]]        Abnormal            Final result                 Please view results for these tests on the individual orders.    CBC Auto Differential [[id number]]  (Abnormal) Collected: 11/13/22 1122    Specimen: Blood Updated: 11/13/22 1140     WBC 10.53 10*3/mm3      RBC 4.34 10*6/mm3      Hemoglobin 13.2 g/dL      Hematocrit 40.1 %      MCV 92.4 fL      MCH 30.4 pg      MCHC 32.9 g/dL      RDW 14.4 %      RDW-SD 48.5 fl      MPV 9.8 fL      Platelets 233 10*3/mm3      Neutrophil % 81.5 %      Lymphocyte % 11.5 %      Monocyte % 5.7 %      Eosinophil % 0.5 %      Basophil % 0.5 %      Immature Grans % 0.3 %      Neutrophils, Absolute 8.59 10*3/mm3      Lymphocytes, Absolute 1.21 10*3/mm3      Monocytes, Absolute 0.60 10*3/mm3      Eosinophils, Absolute 0.05 10*3/mm3      Basophils, Absolute 0.05 10*3/mm3      Immature Grans, Absolute 0.03 10*3/mm3      nRBC 0.0 /100 WBC           Diagnostic Studies:      AP of the pelvis and lateral of the right hip were reviewed from the ED.  There is a femoral neck fracture with moderate comminution at the fracture site.  The fracture is in mild varus. There are no periosteal reactions or medullary lesions seen.    Comparison films  not available      Assessment:     right hip femoral neck fracture     Plan:      I have discussed the nature of femoral neck fractures with the patient and family.  This fracture will require endoprosthetic replacement vs. total hip arthroplasty.  The surgical procedure will be scheduled once all medical and cardiac clearances have been obtained.    The risks, benefits, and alternatives of hip fracture surgery were discussed extensively.  The risks include but are not limited to infection, DVT, PE, bleeding and blood loss, damage to nerves or blood vessels, malunion or nonunion, dislocation, continued pain, hip stiffness/loss of motion, hardware  irritation, and the possibility of future arthrosis of the hip.  The risks of anesthesia including heart attack, stroke, and even death were discussed.  The typical post-operative course and rehab plan were discussed as well.  Activity restrictions following the surgery were emphasized and the patient expressed understanding.  All the patient's questions were answered.  A consent form was signed and placed on the chart.    The patient will receive perioperative antibiotics.  The patient will be placed on DVT prophylaxis after surgery.    Given patient's functional status we will plan to proceed with right total hip replacement.    Patient has cardiac history and has been on Eliquis we will likely plan to wait until Tuesday morning prior to proceeding pending clearance.    Please call with any questions or concerns thank you

## 2022-11-13 NOTE — ED NOTES
Nursing report ED to floor  Dior Pettit  82 y.o.  female    HPI :   Chief Complaint   Patient presents with    Fall       Admitting doctor:   Mark Ruiz MD    Admitting diagnosis:   The primary encounter diagnosis was Closed displaced fracture of right femoral neck (HCC). A diagnosis of Fall, initial encounter was also pertinent to this visit.    Code status:   Current Code Status       Date Active Code Status Order ID Comments User Context       Prior            Allergies:   Patient has no known allergies.    Intake and Output  No intake or output data in the 24 hours ending 11/13/22 1144    Weight:       11/13/22  0812   Weight: 64.9 kg (143 lb)       Most recent vitals:   Vitals:    11/13/22 0927 11/13/22 0959 11/13/22 1007 11/13/22 1032   BP:  161/95     Pulse: 76 75 77 77   Resp:       Temp:       TempSrc:       SpO2: 100% 99% 96% 94%   Weight:       Height:           Active LDAs/IV Access:   Lines, Drains & Airways       Active LDAs       Name Placement date Placement time Site Days    Peripheral IV 11/13/22 0741 Anterior;Left Forearm 11/13/22  0741  Forearm  less than 1                    Labs (abnormal labs have a star):   Labs Reviewed   CBC WITH AUTO DIFFERENTIAL - Abnormal; Notable for the following components:       Result Value    Neutrophil % 81.5 (*)     Lymphocyte % 11.5 (*)     Neutrophils, Absolute 8.59 (*)     All other components within normal limits   COMPREHENSIVE METABOLIC PANEL   CBC AND DIFFERENTIAL    Narrative:     The following orders were created for panel order CBC & Differential.  Procedure                               Abnormality         Status                     ---------                               -----------         ------                     CBC Auto Differential[[id number]]        Abnormal            Final result                 Please view results for these tests on the individual orders.       EKG:   ECG 12 Lead Other; Evaluate for arrhythmia   Preliminary Result    HEART RATE= 77  bpm   RR Interval= 779  ms   AZ Interval= 152  ms   P Horizontal Axis= -86  deg   P Front Axis= -85  deg   QRSD Interval= 108  ms   QT Interval= 425  ms   QRS Axis= -50  deg   T Wave Axis= 82  deg   - ABNORMAL ECG -   Ectopic atrial rhythm   Atrial premature complex   Left anterior fascicular block   Abnormal R-wave progression, late transition   Left ventricular hypertrophy   Electronically Signed By:    Date and Time of Study: 2022-11-13 11:03:55          Meds given in ED:   Medications   morphine injection 4 mg (4 mg Intravenous Given 11/13/22 1006)   HYDROmorphone (DILAUDID) injection 0.25 mg (0.25 mg Intravenous Given 11/13/22 1143)       Imaging results:  No radiology results for the last day    Ambulatory status:   -Bedrest    Social issues:   Social History     Socioeconomic History    Marital status:    Tobacco Use    Smoking status: Never    Smokeless tobacco: Never    Tobacco comments:     caffeine use - 1 cup coffee daily    Vaping Use    Vaping Use: Never used   Substance and Sexual Activity    Alcohol use: Yes     Comment: less than 1 glass of wine    Drug use: Never    Sexual activity: Not Currently     Partners: Male     Birth control/protection: Surgical     Comment: Hyst       NIH Stroke Scale:   NA     Nursing report ED to floor:

## 2022-11-13 NOTE — ED PROVIDER NOTES
EMERGENCY DEPARTMENT ENCOUNTER    CHIEF COMPLAINT  Chief Complaint: Fall, hip pain  History given by: Patient  History limited by: Nothing  Room Number: P797/1  PMD: Ashanti Hong APRN  Cardiologist: Dr. Lily Mitchell/Lazaro Jj MD    HPI:  Pt is a 82 y.o. female presents from home where she lives by her self reporting a fall while walking to the bathroom this morning.  Patient reports she lost her balance when she ran into a wall and fell back hitting her head on an iron bed frame.  Patient denies loss of consciousness, neck pain or back pain, chest pain, shortness of air, abdominal pain, nausea/vomiting.  Patient reports right hip pain and inability to weight-bear since that time.  Patient reports she scooted across the floor, got to her phone and called for assistance.  Patient reports she thinks she was on the floor approximately an hour.    Patient reports she takes Eliquis for atrial fibs, last dose 10 PM last night    Duration: Few hours  Associated Symptoms: Headache  Aggravating Factors: Movement of right hip  Alleviating Factors: Nothing  Treatment before arrival: Transported by EMS, IV fentanyl    PAST MEDICAL HISTORY  Active Ambulatory Problems     Diagnosis Date Noted   • Essential hypertension 08/22/2016   • Pulmonary hypertension (HCC) 08/22/2016   • NNEKA (obstructive sleep apnea) 08/22/2016   • Gastroesophageal reflux disease 08/24/2016   • Anxiety    • Restless leg syndrome    • Controlled diabetes mellitus type 2 with complications ([facility])    • Hypothyroidism    • Hypercholesteremia    • Seasonal allergic rhinitis 11/11/2016   • Mild intermittent asthma without complication 11/11/2016   • S/P AVR 03/05/2017   • Dilated aortic root (HCC) 10/02/2017   • Thoracic aortic aneurysm without rupture  10/02/2017   • Primary insomnia 06/12/2018   • Renal insufficiency 10/28/2018   • Macular degeneration of both eyes 04/11/2019   • Coronary artery disease involving native coronary artery with angina  pectoris ([facility]) 04/11/2019   • Rectocele 07/15/2021   • Nonrheumatic mitral valve stenosis 09/08/2021   • PAF (paroxysmal atrial fibrillation) ([facility]) 09/14/2021   • Slow transit constipation 09/14/2021   • Dizziness 09/27/2021   • Daily headache 04/17/2022   • Symptomatic bradycardia 09/11/2022   • Paroxysmal atrial fibrillation ([facility]) 10/03/2022     Resolved Ambulatory Problems     Diagnosis Date Noted   • HARSHA (acute kidney injury) ([facility]) 09/14/2021   • Exudative age-related macular degeneration, right eye, with active choroidal neovascularization ([facility]) 01/15/2022     Past Medical History:   Diagnosis Date   • Allergic rhinitis    • Anemia    • Aortic root dilatation ([facility]) 10/02/2017   • Aortic valve calcification 10/02/2017   • Aortic valve insufficiency Dx in 07   • Aortic valve prosthesis present 11/02/2018   • Ascending aorta dilatation ([facility]) 10/02/2017   • Asthma    • Atypical chest pain    • Breast pain, right Hx   • CAD (coronary artery disease)    • Cardiomegaly 2017   • Cervicalgia    • Chest pain due to CAD ([facility])    • 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   • DM (diabetes mellitus) ([facility])    • Esophagitis    • GERD (gastroesophageal reflux disease)    • Headache    • Health care maintenance    • Heart murmur    • History of echocardiogram 10/2/17-[facility]   • Hyperlipidemia    • Hypertension    • Insomnia    • Macular degeneration, left eye    • Mild aortic valve regurgitation 10/02/2017   • Mild aortic valve stenosis 10/02/2017   • Mild dilation of ascending aorta ([facility]) 10/02/2017   • Moderate tricuspid valve regurgitation 10/02/2017   • Mood disorder in conditions classified elsewhere    • Near syncope 03/2017-[facility]   • Neuropathy    • Osteoarthritis    • RLS (restless legs syndrome)    • Thoracic ascending aortic aneurysm Dx in 2007 @ 3.8CM & 1/02/2018   • Visual impairment        PAST SURGICAL HISTORY  Past Surgical History:   Procedure Laterality Date   • AORTIC VALVE REPAIR/REPLACEMENT  2007      [place]   • APPENDECTOMY     • AUGMENTATION MAMMAPLASTY  1976   • BREAST AUGMENTATION  2014   • BUNIONECTOMY     • CARDIAC CATHETERIZATION  2007   • CARDIAC VALVE REPLACEMENT  2007    porcine   • COLONOSCOPY  2010   • COLONOSCOPY  03/02/2012   • EYE SURGERY      cataracts and ens implants   • HYSTERECTOMY  1972   • SIGMOIDOSCOPY  2001       FAMILY HISTORY  Family History   Problem Relation Age of Onset   • Hypertension Mother    • Stroke Mother    • Cancer Mother    • Diabetes Sister    • Coronary artery disease Brother    • Diabetes Brother    • Valvular heart disease Brother         TAVR   • Coronary artery disease Brother    • Cancer Brother    • Birth defects Daughter    • Skin cancer Neg Hx        SOCIAL HISTORY  Social History     Socioeconomic History   • Marital status:    Tobacco Use   • Smoking status: Never   • Smokeless tobacco: Never   • Tobacco comments:     caffeine use - 1 cup coffee daily    Vaping Use   • Vaping Use: Never used   Substance and Sexual Activity   • Alcohol use: Yes     Comment: less than 1 glass of wine   • Drug use: Never   • Sexual activity: Not Currently     Partners: Male     Birth control/protection: Surgical     Comment: Hyst       ALLERGIES  Patient has no known allergies.    REVIEW OF SYSTEMS  Review of Systems   Constitutional: Negative for chills and fever.   HENT: Negative for sore throat and trouble swallowing.    Eyes: Negative for visual disturbance.   Respiratory: Negative for cough and shortness of breath.    Cardiovascular: Negative for chest pain, palpitations and leg swelling.   Gastrointestinal: Negative for abdominal pain, diarrhea and vomiting. Blood in stool: .admission.   Endocrine: Negative.    Genitourinary: Negative for decreased urine volume, dysuria and frequency.   Musculoskeletal: Positive for arthralgias ( Right hip pain) and gait problem. Negative for neck pain.   Skin: Negative for rash.   Allergic/Immunologic: Negative.    Neurological:  Positive for headaches. Negative for syncope, weakness and numbness.   Hematological: Negative.    Psychiatric/Behavioral: Negative.    All other systems reviewed and are negative.      PHYSICAL EXAM  ED Triage Vitals [11/13/22 0739]   Temp Heart Rate Resp BP SpO2   97.6 °F (36.4 °C) 77 12 164/82 100 %      Temp src Heart Rate Source Patient Position BP Location FiO2 (%)   Oral -- -- -- --       Physical Exam  Vitals and nursing note reviewed.   Constitutional:       General: She is in acute distress (mild).      Appearance: She is not toxic-appearing.   HENT:      Head: Normocephalic and atraumatic.   Eyes:      Extraocular Movements: Extraocular movements intact and EOM normal.      Pupils: Pupils are equal, round, and reactive to light.   Neck:      Comments: No cervical spine tenderness to palpation  Cardiovascular:      Rate and Rhythm: Normal rate and regular rhythm.      Pulses: Intact distal pulses.           Posterior tibial pulses are 2+ on the right side and 2+ on the left side.      Heart sounds: Normal heart sounds. No murmur heard.  Pulmonary:      Effort: Pulmonary effort is normal. No respiratory distress.      Breath sounds: Normal breath sounds.   Abdominal:      General: Bowel sounds are normal.      Palpations: Abdomen is soft.      Tenderness: There is no abdominal tenderness. There is no guarding or rebound.   Musculoskeletal:         General: No edema.      Cervical back: Normal range of motion and neck supple. No tenderness.      Right hip: Deformity, tenderness and bony tenderness present.      Left hip: Normal.      Comments: Distal sensation, cap refill, dorsalis pedis and posterior tibial pulses intact right lower extremity   Skin:     General: Skin is warm and dry.   Neurological:      General: No focal deficit present.      Mental Status: She is alert and oriented to person, place, and time.   Psychiatric:         Mood and Affect: Mood and affect normal.         LAB RESULTS  Lab Results  (last 24 hours)     Procedure Component Value Units Date/Time    CBC & Differential [[id number]]  (Abnormal) Collected: 11/13/22 1122    Specimen: Blood Updated: 11/13/22 1140    Narrative:      The following orders were created for panel order CBC & Differential.  Procedure                               Abnormality         Status                     ---------                               -----------         ------                     CBC Auto Differential[[id number]]        Abnormal            Final result                 Please view results for these tests on the individual orders.    CBC Auto Differential [[id number]]  (Abnormal) Collected: 11/13/22 1122    Specimen: Blood Updated: 11/13/22 1140     WBC 10.53 10*3/mm3      RBC 4.34 10*6/mm3      Hemoglobin 13.2 g/dL      Hematocrit 40.1 %      MCV 92.4 fL      MCH 30.4 pg      MCHC 32.9 g/dL      RDW 14.4 %      RDW-SD 48.5 fl      MPV 9.8 fL      Platelets 233 10*3/mm3      Neutrophil % 81.5 %      Lymphocyte % 11.5 %      Monocyte % 5.7 %      Eosinophil % 0.5 %      Basophil % 0.5 %      Immature Grans % 0.3 %      Neutrophils, Absolute 8.59 10*3/mm3      Lymphocytes, Absolute 1.21 10*3/mm3      Monocytes, Absolute 0.60 10*3/mm3      Eosinophils, Absolute 0.05 10*3/mm3      Basophils, Absolute 0.05 10*3/mm3      Immature Grans, Absolute 0.03 10*3/mm3      nRBC 0.0 /100 WBC     Hemoglobin A1c [[id number]]  (Abnormal) Collected: 11/13/22 1122    Specimen: Blood Updated: 11/13/22 1455     Hemoglobin A1C 6.10 %     Narrative:      Hemoglobin A1C Ranges:    Increased Risk for Diabetes  5.7% to 6.4%  Diabetes                     >= 6.5%  Diabetic Goal                < 7.0%    Comprehensive Metabolic Panel [[id number]]  (Abnormal) Collected: 11/13/22 1346    Specimen: Blood from Arm, Right Updated: 11/13/22 1421     Glucose 136 mg/dL      BUN 18 mg/dL      Creatinine 0.82 mg/dL      Sodium 135 mmol/L      Potassium 4.6 mmol/L      Comment: Slight hemolysis detected by  analyzer. Results may be affected.        Chloride 100 mmol/L      CO2 25.0 mmol/L      Calcium 9.6 mg/dL      Total Protein 7.2 g/dL      Albumin 4.60 g/dL      ALT (SGPT) 17 U/L      AST (SGOT) 28 U/L      Alkaline Phosphatase 58 U/L      Total Bilirubin 0.5 mg/dL      Globulin 2.6 gm/dL      A/G Ratio 1.8 g/dL      BUN/Creatinine Ratio 22.0     Anion Gap 10.0 mmol/L      eGFR 70.6 mL/min/1.73      Comment: National Kidney Foundation and American Society of Nephrology (ASN) Task Force recommended calculation based on the Chronic Kidney Disease Epidemiology Collaboration (CKD-EPI) equation refit without adjustment for race.       Narrative:      GFR Normal >60  Chronic Kidney Disease <60  Kidney Failure <15    The GFR formula is only valid for adults with stable renal function between ages 18 and 70.    Protime-INR [[id number]]  (Normal) Collected: 11/13/22 1346    Specimen: Blood from Arm, Right Updated: 11/13/22 1417     Protime 14.2 Seconds      INR 1.08    POC Glucose Once [[id number]]  (Normal) Collected: 11/13/22 1629    Specimen: Blood Updated: 11/13/22 1630     Glucose 126 mg/dL      Comment: Meter: [id number] : [id number] Mason April NA             I ordered the above labs and reviewed the results    RADIOLOGY  CT Head Without Contrast   Final Result      CT Cervical Spine Without Contrast   Final Result      XR Chest 1 View   Final Result      XR Hip With or Without Pelvis 2 - 3 View Right   Final Result      CT head and neck negative for acute traumatic injury.    Chest x-ray unremarkable for acute disease.    Right hip with pelvis- demonstrates right femoral neck fracture    I ordered the above noted radiological studies. Interpreted by radiologist. Viewed by me in PACS.       PROCEDURES  Procedures      PROGRESS AND CONSULTS  ED Course as of 11/13/22 1746   Sun Nov 13, 2022   1120 Discussed with Dr. Ramachandran, on-call for Ortho who is aware of patient's presentation, history of Eliquis, has  reviewed images and agrees to see in consult for further testing, treatment as needed [TO]   1138 Discussed with Dr. Mark Ruiz  [facility] who is aware of patient's presentation, imaging, labs, my discussion with Ortho, patient medications and agrees to accept to [facility] full admit for further testing, treatment as needed [TO]   1138  to [facility] full admit for further testing, treatment as needed [TO]   1142 Dr. Jean Baptiste reports patient CT head and neck negative for acute findings [TO]      ED Course User Index  [TO] Jennifer Gómez MD     EKG          EKG time: 1103  Rhythm/Rate:, Sinus rhythm, rate in the 70s, occasional PACs  P waves and WI: Normal P waves, normal DEE's  QRS, axis: Poor wave progression, LVH  ST and T waves: Nonspecific ST/T wave findings diffuse    Interpreted Contemporaneously by me, independently viewed  Mildly changed compared to prior 10/6/2022        MEDICAL DECISION MAKING  Results were reviewed/discussed with the patient and they were also made aware of online access. Pt also made aware that some labs, such as cultures, will not be resulted during ER visit and followup with PMD is necessary.       MDM       DIAGNOSIS  Final diagnoses:   Closed displaced fracture of right femoral neck (HCC)   Fall, initial encounter       DISPOSITION  ADMISSION    Discussed treatment plan and reason for admission with pt/family and admitting physician.  Pt/family voiced understanding of the plan for admission for further testing/treatment as needed.         Latest Documented Vital Signs:  As of 17:46 EST  BP- (!) 196/66 HR- 80 Temp- 97.5 °F (36.4 °C) (Oral) O2 sat- 99%    --  Patient was wearing facemask when I entered the room and throughout our encounter. Full protective equipment was worn throughout this patient encounter including a face mask, eye protection and gloves. Hand hygiene was performed before donning protective equipment and after removal when leaving the room.      Jennifer Gómez MD  11/13/22  1749

## 2022-11-13 NOTE — H&P
HISTORY AND PHYSICAL   [facility]        Patient Identification:  Name: Dior Pettit  Age: 82 y.o.  Sex: female  :  1940  MRN: [id number]                     Primary Care Physician: Ashanti Hong APRN    Chief Complaint: fall and right hip pain    History of Present Illness:      The patient is a 82 y.o. female with history of aortic valve replacement, paroxysmal atrial fibrillation on chronic anticoagulation, anxiety, depression, coronary artery disease, diabetes, hypertension, hypothyroidism and sleep apnea presents to the hospital from home where she lives by her self reporting a fall while walking to the bathroom this morning.  Patient reports she lost her balance when she ran into a wall and fell back hitting her head on an iron bed frame.  Patient denies loss of consciousness, neck pain or back pain, chest pain, shortness of air, abdominal pain, nausea/vomiting.  Patient reports right hip pain and inability to weight-bear since that time.  Patient reports she scooted across the floor, got to her phone and called for assistance.  Patient reports she thinks she was on the floor approximately an hour.  The patient was evaluated in the ER and found to have a right femoral neck fracture.  Orthopedic surgery was consulted from the ER.  The patient was admitted for further evaluation treatment.  She states she generally been in her usual state of health and denied having any chest pain.  She has been a little short of air but she does have asthma and uses some inhalers periodically.         Past Medical History:  Past Medical History:   Diagnosis Date   • Allergic rhinitis    • Anemia    • Anxiety     Controlled w/Meds   • Aortic root dilatation (HCC) 10/02/2017    Borderline--Noted on Echo   • Aortic valve calcification 10/02/2017    Noted on Echo   • Aortic valve insufficiency Dx in     w/AVR    • Aortic valve prosthesis present 2018    Noted on CTA Chest   • Ascending aorta  dilatation (HCC) 10/02/2017    Borderline--Noted on Echo   • Asthma    • Atypical chest pain    • Breast pain, right Hx   • CAD (coronary artery disease)    • Cardiomegaly 2017   • Cervicalgia    • Chest pain due to CAD ([facility])    • 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    Borderline--Noted on Echo & Measured @ 2.6CM and Mid Descending @ 2.5CM on CTA Chest-11/2/18   • DM (diabetes mellitus) ([facility])     T2   • Esophagitis    • GERD (gastroesophageal reflux disease)     Controlled w/Meds   • Headache    • Health care maintenance    • Heart murmur    • History of echocardiogram 10/2/17-[facility]    EF 66%; Borderline Concentric Hypertrophy; Mild Calcification in AV; Mild AVS; Mild AVR; Moderate TVR; Borderline Dilation of Aortic Root/Arch & Borderline Dilation of Ascending/Proximal Aorta Present   • Hyperlipidemia     Controlled w/Meds   • Hypertension     Controlled w/Meds   • Hypothyroidism     Controlled w/Synthroid   • Insomnia    • Macular degeneration, left eye    • Mild aortic valve regurgitation 10/02/2017    Noted on Echo   • Mild aortic valve stenosis 10/02/2017    Noted on Echo   • Mild dilation of ascending aorta ([facility]) 10/02/2017    Borderline--Noted on Echo   • Moderate tricuspid valve regurgitation 10/02/2017    Noted on Echo   • Mood disorder in conditions classified elsewhere     Controlled w/Meds   • Near syncope 03/2017-[facility] ER   • Neuropathy    • NNEKA (obstructive sleep apnea)     Untreated   • Osteoarthritis     Ankles/Feet   • Pulmonary hypertension ([facility]) 2017   • Renal insufficiency    • RLS (restless legs syndrome)    • Thoracic ascending aortic aneurysm Dx in 2007 @ 3.8CM & 1/02/2018    Noted @ 4CM on CTA Chest;   • Visual impairment     macular degeneratuin     Past Surgical History:  Past Surgical History:   Procedure Laterality Date   • AORTIC VALVE REPAIR/REPLACEMENT  2007    Dr. Perry   • APPENDECTOMY     • AUGMENTATION MAMMAPLASTY  1976   • BREAST AUGMENTATION  2014   • BUNIONECTOMY     •  CARDIAC CATHETERIZATION  2007   • CARDIAC VALVE REPLACEMENT  2007    porcine   • COLONOSCOPY  2010   • COLONOSCOPY  03/02/2012   • EYE SURGERY      cataracts and ens implants   • HYSTERECTOMY  1972   • SIGMOIDOSCOPY  2001      Home Meds:  Medications Prior to Admission   Medication Sig Dispense Refill Last Dose   • amitriptyline (ELAVIL) 50 MG tablet Take 1 tablet by mouth Every Night. 90 tablet 1 11/12/2022   • ascorbic acid (VITAMIN C) 1000 MG tablet Take 1,000 mg by mouth Daily.   11/12/2022   • atorvastatin (LIPITOR) 20 MG tablet Take 1 tablet by mouth Every Night. 90 tablet 1 11/12/2022   • azelastine (ASTELIN) 0.1 % nasal spray 2 sprays into the nostril(s) as directed by provider. Use in each nostril as directed      • baclofen (LIORESAL) 10 MG tablet Take 1 tablet by mouth 3 (Three) Times a Day. (Patient taking differently: Take 1 tablet by mouth Daily.) 90 tablet 3 11/12/2022   • Blood Glucose Monitoring Suppl (Prodigy No Coding Blood Gluc) w/Device kit 1 each Daily. 1 kit 0    • dofetilide (Tikosyn) 250 MCG capsule Take 1 capsule by mouth Every 12 (Twelve) Hours. 180 capsule 1 11/12/2022   • Eliquis 5 MG tablet tablet TAKE 1 TABLET BY MOUTH EVERY TWELVE HOURS 180 tablet 0 11/12/2022   • EPINEPHrine (EPIPEN) 0.3 MG/0.3ML solution auto-injector injection       • fenofibrate micronized (LOFIBRA) 134 MG capsule Take 1 capsule by mouth Every Morning Before Breakfast. 90 capsule 3 11/12/2022   • fluticasone-salmeterol (ADVAIR HFA) 45-21 MCG/ACT inhaler Inhale 2 puffs As Needed.      • gabapentin (NEURONTIN) 600 MG tablet Take 600 mg by mouth As Needed.      • glucosamine-chondroitin 500-400 MG capsule capsule Take 2 capsules by mouth Daily.   11/12/2022   • glucose blood (Prodigy No Coding Blood Gluc) test strip TEST BLOOD SUGAR DAILY 50 each 0    • levothyroxine (SYNTHROID, LEVOTHROID) 50 MCG tablet TAKE 1 TABLET EVERY OTHER  DAY 45 tablet 1 11/12/2022   • levothyroxine (SYNTHROID, LEVOTHROID) 75 MCG tablet TAKE  1 TABLET EVERY OTHER  DAY. OVERDUE FOR           APPOINTMENT AND FASTING    LABS. 45 tablet 3 Past Week   • metFORMIN ER (GLUCOPHAGE-XR) 750 MG 24 hr tablet Take 1 tablet by mouth 2 (Two) Times a Day. 180 tablet 3 11/12/2022   • metoprolol tartrate (LOPRESSOR) 50 MG tablet Take 1.5 tablets by mouth 2 (Two) Times a Day. 150 tablet 3 11/12/2022   • Multiple Vitamins-Minerals (MULTIVITAL PO) Take  by mouth Daily.      • omeprazole (priLOSEC) 40 MG capsule Take 1 capsule by mouth Daily. 90 capsule 1 11/12/2022   • Prodigy Safety Lancets 26G misc CHECK BLOOD SUGAR ONE TIME PER  each 1    • venlafaxine XR (EFFEXOR-XR) 150 MG 24 hr capsule TAKE 1 CAPSULE DAILY 90 capsule 1 11/12/2022   • valACYclovir (VALTREX) 1000 MG tablet TAKE 2 TABLETS 4 TIMES     DAILY FOR MOUTH ULCER (Patient taking differently: TAKE 2 TABLETS 4 TIMES     DAILY FOR MOUTH ULCER as needed) 16 tablet 4      Current meds    Current Facility-Administered Medications:   •  acetaminophen (TYLENOL) tablet 650 mg, 650 mg, Oral, Q4H PRN **OR** acetaminophen (TYLENOL) 160 MG/5ML solution 650 mg, 650 mg, Oral, Q4H PRN **OR** acetaminophen (TYLENOL) suppository 650 mg, 650 mg, Rectal, Q4H PRN, Mark Ruiz MD  •  amitriptyline (ELAVIL) tablet 50 mg, 50 mg, Oral, Nightly, Mark Ruiz MD  •  baclofen (LIORESAL) tablet 10 mg, 10 mg, Oral, TID, Mark Ruiz MD  •  dextrose (D50W) (25 g/50 mL) IV injection 25 g, 25 g, Intravenous, Q15 Min PRN, Mark Ruiz MD  •  dextrose (GLUTOSE) oral gel 15 g, 15 g, Oral, Q15 Min PRN, Mark Ruiz MD  •  dofetilide (TIKOSYN) capsule 250 mcg, 250 mcg, Oral, Q12H, Mark Ruiz MD  •  glucagon (human recombinant) (GLUCAGEN DIAGNOSTIC) injection 1 mg, 1 mg, Intramuscular, Q15 Min PRN, Mark Ruiz MD  •  HYDROcodone-acetaminophen (NORCO) 5-325 MG per tablet 1 tablet, 1 tablet, Oral, Q4H PRN, Mark Ruiz MD  •  HYDROmorphone (DILAUDID) injection 0.5 mg, 0.5 mg, Intravenous, Q2H PRN **AND** naloxone (NARCAN)  injection 0.4 mg, 0.4 mg, Intravenous, Q5 Min PRN, Mark Ruiz MD  •  insulin lispro (ADMELOG) injection 0-7 Units, 0-7 Units, Subcutaneous, TID AC, Mark Ruiz MD  •  levothyroxine (SYNTHROID, LEVOTHROID) tablet 50 mcg, 50 mcg, Oral, Every Other Day, Mark Ruiz MD  •  [START ON 11/14/2022] levothyroxine (SYNTHROID, LEVOTHROID) tablet 75 mcg, 75 mcg, Oral, Every Other Day, Mark Ruiz MD  •  metFORMIN (GLUCOPHAGE) tablet 500 mg, 500 mg, Oral, BID With Meals, Mark Ruiz MD  •  metoprolol tartrate (LOPRESSOR) tablet 75 mg, 75 mg, Oral, BID, Mark Ruiz MD  •  ondansetron (ZOFRAN) tablet 4 mg, 4 mg, Oral, Q6H PRN **OR** ondansetron (ZOFRAN) injection 4 mg, 4 mg, Intravenous, Q6H PRN, Mark Ruiz MD  •  [START ON 11/14/2022] pantoprazole (PROTONIX) EC tablet 40 mg, 40 mg, Oral, QAM, Mark Ruiz MD  •  sodium chloride 0.9 % flush 10 mL, 10 mL, Intravenous, Q12H, Mark Ruiz MD  •  sodium chloride 0.9 % flush 10 mL, 10 mL, Intravenous, PRN, Mark Ruiz MD  •  venlafaxine XR (EFFEXOR-XR) 24 hr capsule 150 mg, 150 mg, Oral, Daily, Mark Ruiz MD  Allergies:  No Known Allergies  Immunizations:  Immunization History   Administered Date(s) Administered   • COVID-19 (PFIZER) PURPLE CAP 02/19/2021, 03/12/2021, 10/04/2021   • Covid-19 (Pfizer) Gray Cap 06/24/2022   • Flu Vaccine Quad PF >36MO 10/05/2020   • FluLaval/Fluzone >6mos 10/05/2020   • Fluad Quad 65+ 10/04/2021   • Fluzone High Dose =>65 Years ([employer] ONLY) 10/12/2015, 10/19/2016, 09/15/2017, 09/17/2018, 10/07/2019   • Fluzone High-Dose 65+yrs 10/11/2022   • Pneumococcal Conjugate 13-Valent (PCV13) 08/07/2015   • Pneumococcal Polysaccharide (PPSV23) 09/01/2017   • Td 01/01/1994   • Zostavax 01/01/2012     Social History:   Social History     Social History Narrative   • Not on file     Social History     Socioeconomic History   • Marital status:    Tobacco Use   • Smoking status: Never   • Smokeless tobacco: Never   • Tobacco  "comments:     caffeine use - 1 cup coffee daily    Vaping Use   • Vaping Use: Never used   Substance and Sexual Activity   • Alcohol use: Yes     Comment: less than 1 glass of wine   • Drug use: Never   • Sexual activity: Not Currently     Partners: Male     Birth control/protection: Surgical     Comment: [place]       Family History:  Family History   Problem Relation Age of Onset   • Hypertension Mother    • Stroke Mother    • Cancer Mother    • Diabetes Sister    • Coronary artery disease Brother    • Diabetes Brother    • Valvular heart disease Brother         TAVR   • Coronary artery disease Brother    • Cancer Brother    • Birth defects Daughter    • Skin cancer Neg Hx         Review of Systems  See history of present illness and past medical history.  Patient denies headache, dizziness, syncope, falls, trauma, change in vision, change in hearing, change in taste, changes in weight, changes in appetite, focal weakness, numbness, or paresthesia.  Patient denies chest pain, palpitations, dyspnea, orthopnea, PND, cough, sinus pressure, rhinorrhea, epistaxis, hemoptysis, nausea, vomiting, hematemesis, diarrhea, constipation or hematchezia.  Denies cold or heat intolerance, polydipsia, polyuria, polyphagia. Denies hematuria, pyuria, dysuria, hesitancy, frequency or urgency.   Denies fever, chills, sweats, night sweats.  Denies missing any routine medications. Remainder of ROS is negative.    Objective:  tMax 24 hrs: Temp (24hrs), Av.6 °F (36.4 °C), Min:97.5 °F (36.4 °C), Max:97.6 °F (36.4 °C)    Vitals Ranges:   Temp:  [97.5 °F (36.4 °C)-97.6 °F (36.4 °C)] 97.5 °F (36.4 °C)  Heart Rate:  [75-80] 80  Resp:  [12-16] 16  BP: (161-196)/(66-95) 196/66      Exam:  BP (!) 196/66 (BP Location: Right arm, Patient Position: Lying) Comment: pt hurting and just pulled from stretcher  Pulse 80   Temp 97.5 °F (36.4 °C) (Oral)   Resp 16   Ht 170.2 cm (67\")   Wt 64.9 kg (143 lb)   LMP  (LMP Unknown)   SpO2 99%   BMI 22.40 " kg/m²     General Appearance:    Alert, cooperative, no distress, appears stated age   Head:    Normocephalic, without obvious abnormality, atraumatic   Eyes:    PERRL, conjunctivae/corneas clear, EOM's intact, both eyes   Ears:    Normal external ear canals, both ears   Nose:   Nares normal, septum midline, mucosa normal, no drainage    or sinus tenderness   Throat:   Lips, mucosa, and tongue normal   Neck:   Supple, symmetrical, trachea midline, no adenopathy;     thyroid:  no enlargement/tenderness/nodules; no carotid    bruit or JVD   Back:     Symmetric, no curvature, ROM normal, no CVA tenderness   Lungs:     Clear to auscultation bilaterally, respirations unlabored   Chest Wall:    No tenderness or deformity    Heart:    Regular rate and rhythm, S1 and S2 normal, no murmur, rub   or gallop   Abdomen:     Soft, nontender, bowel sounds active all four quadrants,     no masses, no hepatomegaly, no splenomegaly   Extremities:   Extremities normal, atraumatic, no cyanosis or edema   Pulses:   2+ and symmetric all extremities   Skin:   Skin color, texture, turgor normal, no rashes or lesions   Lymph nodes:   Cervical, supraclavicular, and axillary nodes normal   Neurologic:   CNII-XII intact, normal strength, sensation intact throughout      .    Data Review:  Lab Results (last 72 hours)     Procedure Component Value Units Date/Time    Comprehensive Metabolic Panel [[id number]]  (Abnormal) Collected: 11/13/22 1346    Specimen: Blood from Arm, Right Updated: 11/13/22 1421     Glucose 136 mg/dL      BUN 18 mg/dL      Creatinine 0.82 mg/dL      Sodium 135 mmol/L      Potassium 4.6 mmol/L      Comment: Slight hemolysis detected by analyzer. Results may be affected.        Chloride 100 mmol/L      CO2 25.0 mmol/L      Calcium 9.6 mg/dL      Total Protein 7.2 g/dL      Albumin 4.60 g/dL      ALT (SGPT) 17 U/L      AST (SGOT) 28 U/L      Alkaline Phosphatase 58 U/L      Total Bilirubin 0.5 mg/dL      Globulin 2.6 gm/dL       A/G Ratio 1.8 g/dL      BUN/Creatinine Ratio 22.0     Anion Gap 10.0 mmol/L      eGFR 70.6 mL/min/1.73      Comment: National Kidney Foundation and American Society of Nephrology (ASN) Task Force recommended calculation based on the Chronic Kidney Disease Epidemiology Collaboration (CKD-EPI) equation refit without adjustment for race.       Narrative:      GFR Normal >60  Chronic Kidney Disease <60  Kidney Failure <15    The GFR formula is only valid for adults with stable renal function between ages 18 and 70.    Protime-INR [[id number]]  (Normal) Collected: 11/13/22 1346    Specimen: Blood from Arm, Right Updated: 11/13/22 1417     Protime 14.2 Seconds      INR 1.08    CBC & Differential [[id number]]  (Abnormal) Collected: 11/13/22 1122    Specimen: Blood Updated: 11/13/22 1140    Narrative:      The following orders were created for panel order CBC & Differential.  Procedure                               Abnormality         Status                     ---------                               -----------         ------                     CBC Auto Differential[[id number]]        Abnormal            Final result                 Please view results for these tests on the individual orders.    CBC Auto Differential [[id number]]  (Abnormal) Collected: 11/13/22 1122    Specimen: Blood Updated: 11/13/22 1140     WBC 10.53 10*3/mm3      RBC 4.34 10*6/mm3      Hemoglobin 13.2 g/dL      Hematocrit 40.1 %      MCV 92.4 fL      MCH 30.4 pg      MCHC 32.9 g/dL      RDW 14.4 %      RDW-SD 48.5 fl      MPV 9.8 fL      Platelets 233 10*3/mm3      Neutrophil % 81.5 %      Lymphocyte % 11.5 %      Monocyte % 5.7 %      Eosinophil % 0.5 %      Basophil % 0.5 %      Immature Grans % 0.3 %      Neutrophils, Absolute 8.59 10*3/mm3      Lymphocytes, Absolute 1.21 10*3/mm3      Monocytes, Absolute 0.60 10*3/mm3      Eosinophils, Absolute 0.05 10*3/mm3      Basophils, Absolute 0.05 10*3/mm3      Immature Grans, Absolute 0.03 10*3/mm3       nRBC 0.0 /100 WBC                    Imaging Results (All)     Procedure Component Value Units Date/Time    CT Head Without Contrast [[id number]] Collected: 22     Updated: 22    Narrative:      CT SCAN OF THE BRAIN WITHOUT CONTRAST     HISTORY: Fell with trauma to back of head. Headache.     The CT scan was performed as an emergency procedure through the brain  without contrast. There is moderate diffuse atrophy and chronic small  vessel ischemic change similar to the study of 2019. There is no  evidence of acute intracranial hemorrhage or mass effect. There is mild  mucosal thickening scattered in the ethmoid air cells and both maxillary  sinuses that is slightly worse on today's exam. The mastoid air cells  are clear.     CT SCAN OF CERVICAL SPINE     HISTORY: Fell. Neck pain.     The CT scan was performed as an emergency procedure through the cervical  spine and demonstrates the followin. There is no evidence of acute fracture with particular reference to  the odontoid. The prevertebral soft tissues appear normal.  2. There are scattered degenerative changes in the cervical spine with  some spurring of the lateral facets and uncovertebral joints. However,  there is no central or foraminal encroachment throughout the cervical  spine.           Radiation dose reduction techniques were utilized, including automated  exposure control and exposure modulation based on body size.     This report was finalized on 2022 12:14 PM by Dr. Hakan Jean Baptiste M.D.       CT Cervical Spine Without Contrast [[id number]] Collected: 22     Updated: 22    Narrative:      CT SCAN OF THE BRAIN WITHOUT CONTRAST     HISTORY: Fell with trauma to back of head. Headache.     The CT scan was performed as an emergency procedure through the brain  without contrast. There is moderate diffuse atrophy and chronic small  vessel ischemic change similar to the study of 2019. There is  no  evidence of acute intracranial hemorrhage or mass effect. There is mild  mucosal thickening scattered in the ethmoid air cells and both maxillary  sinuses that is slightly worse on today's exam. The mastoid air cells  are clear.     CT SCAN OF CERVICAL SPINE     HISTORY: Fell. Neck pain.     The CT scan was performed as an emergency procedure through the cervical  spine and demonstrates the followin. There is no evidence of acute fracture with particular reference to  the odontoid. The prevertebral soft tissues appear normal.  2. There are scattered degenerative changes in the cervical spine with  some spurring of the lateral facets and uncovertebral joints. However,  there is no central or foraminal encroachment throughout the cervical  spine.           Radiation dose reduction techniques were utilized, including automated  exposure control and exposure modulation based on body size.     This report was finalized on 2022 12:14 PM by Dr. Hakan Jean Baptiste M.D.       XR Hip With or Without Pelvis 2 - 3 View Right [[id number]] Collected: 22 104     Updated: 22 104    Narrative:      TWO-VIEW RIGHT HIP AND ONE VIEW AP PELVIS     HISTORY: Fell. Right hip pain.     FINDINGS: There is an acute fracture of the right femoral neck with  slight superior displacement of the femur relative to the femoral head.     This report was finalized on 2022 10:46 AM by Dr. Hakan Jean Baptiste M.D.       XR Chest 1 View [[id number]] Collected: 22 1044     Updated: 22 1048    Narrative:      ONE VIEW PORTABLE CHEST     HISTORY: Right hip fracture. Preop for surgery. Hypertension.     FINDINGS: The lungs are well-expanded and clear. The heart is top normal  in size with sternal wires from previous cardiac surgery. There is no  acute disease or change from 10/03/2022.     This report was finalized on 2022 10:45 AM by Dr. Hakan Jean Baptiste M.D.           Past Medical History:   Diagnosis Date   •  Allergic rhinitis    • Anemia    • Anxiety     Controlled w/Meds   • Aortic root dilatation (HCC) 10/02/2017    Borderline--Noted on Echo   • Aortic valve calcification 10/02/2017    Noted on Echo   • Aortic valve insufficiency Dx in 07    w/AVR    • Aortic valve prosthesis present 11/02/2018    Noted on CTA Chest   • Ascending aorta dilatation (HCC) 10/02/2017    Borderline--Noted on Echo   • Asthma    • Atypical chest pain    • Breast pain, right Hx   • CAD (coronary artery disease)    • Cardiomegaly 2017   • Cervicalgia    • Chest pain due to CAD ([facility])    • 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    Borderline--Noted on Echo & Measured @ 2.6CM and Mid Descending @ 2.5CM on CTA Chest-11/2/18   • DM (diabetes mellitus) ([facility])     T2   • Esophagitis    • GERD (gastroesophageal reflux disease)     Controlled w/Meds   • Headache    • Health care maintenance    • Heart murmur    • History of echocardiogram 10/2/17-[facility]    EF 66%; Borderline Concentric Hypertrophy; Mild Calcification in AV; Mild AVS; Mild AVR; Moderate TVR; Borderline Dilation of Aortic Root/Arch & Borderline Dilation of Ascending/Proximal Aorta Present   • Hyperlipidemia     Controlled w/Meds   • Hypertension     Controlled w/Meds   • Hypothyroidism     Controlled w/Synthroid   • Insomnia    • Macular degeneration, left eye    • Mild aortic valve regurgitation 10/02/2017    Noted on Echo   • Mild aortic valve stenosis 10/02/2017    Noted on Echo   • Mild dilation of ascending aorta (HCC) 10/02/2017    Borderline--Noted on Echo   • Moderate tricuspid valve regurgitation 10/02/2017    Noted on Echo   • Mood disorder in conditions classified elsewhere     Controlled w/Meds   • Near syncope 03/2017-[facility] ER   • Neuropathy    • NNEKA (obstructive sleep apnea)     Untreated   • Osteoarthritis     Ankles/Feet   • Pulmonary hypertension ([facility]) 2017   • Renal insufficiency    • RLS (restless legs syndrome)    • Thoracic ascending aortic aneurysm Dx in 2007 @  3.8CM & 1/02/2018    Noted @ 4CM on CTA Chest;   • Visual impairment     macular degeneratuin       Assessment:  Active Hospital Problems    Diagnosis  POA   • **Closed displaced fracture of right femoral neck (HCC) [S72.001A]  Yes   • Fall [W19.XXXA]  Unknown   • PAF (paroxysmal atrial fibrillation) ([facility]) [I48.0]  Yes   • S/P AVR [Z95.2]  Not Applicable   • Mild intermittent asthma without complication [J45.20]  Yes   • Anxiety [F41.9]  Yes   • Restless leg syndrome [G25.81]  Yes   • Hypothyroidism [E03.9]  Yes   • Hypercholesteremia [E78.00]  Yes   • Gastroesophageal reflux disease [K21.9]  Yes   • Essential hypertension [I10]  Yes   • Pulmonary hypertension (HCC) [I27.20]  Yes   • NNEKA (obstructive sleep apnea) [G47.33]  Yes      Resolved Hospital Problems   No resolved problems to display.       Plan:  The patient admitted to the hospital with orthopedic surgery consult.  We will hold her anticoagulation.  She appears medically stable for surgery which is planned for Tuesday morning after anticoagulation has been held for a couple of days.  We will get some follow-up lab studies lab ordered medication for pain.    Mark Ruiz MD  11/13/2022  14:40 EST

## 2022-11-14 ENCOUNTER — HOSPITAL ENCOUNTER (OUTPATIENT)
Facility: HOSPITAL | Age: 82
Setting detail: SURGERY ADMIT
End: 2022-11-14
Attending: ORTHOPAEDIC SURGERY | Admitting: ORTHOPAEDIC SURGERY

## 2022-11-14 DIAGNOSIS — F41.9 ANXIETY: ICD-10-CM

## 2022-11-14 DIAGNOSIS — E03.8 OTHER SPECIFIED HYPOTHYROIDISM: ICD-10-CM

## 2022-11-14 LAB
ANION GAP SERPL CALCULATED.3IONS-SCNC: 8.2 MMOL/L (ref 5–15)
BASOPHILS # BLD AUTO: 0.04 10*3/MM3 (ref 0–0.2)
BASOPHILS NFR BLD AUTO: 0.3 % (ref 0–1.5)
BUN SERPL-MCNC: 15 MG/DL (ref 8–23)
BUN/CREAT SERPL: 19.5 (ref 7–25)
CALCIUM SPEC-SCNC: 10.1 MG/DL (ref 8.6–10.5)
CHLORIDE SERPL-SCNC: 94 MMOL/L (ref 98–107)
CO2 SERPL-SCNC: 29.8 MMOL/L (ref 22–29)
CREAT SERPL-MCNC: 0.77 MG/DL (ref 0.57–1)
DEPRECATED RDW RBC AUTO: 46.9 FL (ref 37–54)
EGFRCR SERPLBLD CKD-EPI 2021: 76.2 ML/MIN/1.73
EOSINOPHIL # BLD AUTO: 0.12 10*3/MM3 (ref 0–0.4)
EOSINOPHIL NFR BLD AUTO: 0.8 % (ref 0.3–6.2)
ERYTHROCYTE [DISTWIDTH] IN BLOOD BY AUTOMATED COUNT: 14.3 % (ref 12.3–15.4)
GLUCOSE BLDC GLUCOMTR-MCNC: 120 MG/DL (ref 70–130)
GLUCOSE BLDC GLUCOMTR-MCNC: 133 MG/DL (ref 70–130)
GLUCOSE BLDC GLUCOMTR-MCNC: 137 MG/DL (ref 70–130)
GLUCOSE BLDC GLUCOMTR-MCNC: 154 MG/DL (ref 70–130)
GLUCOSE SERPL-MCNC: 156 MG/DL (ref 65–99)
HCT VFR BLD AUTO: 40.8 % (ref 34–46.6)
HGB BLD-MCNC: 13.6 G/DL (ref 12–15.9)
IMM GRANULOCYTES # BLD AUTO: 0.07 10*3/MM3 (ref 0–0.05)
IMM GRANULOCYTES NFR BLD AUTO: 0.5 % (ref 0–0.5)
LYMPHOCYTES # BLD AUTO: 0.99 10*3/MM3 (ref 0.7–3.1)
LYMPHOCYTES NFR BLD AUTO: 6.8 % (ref 19.6–45.3)
MCH RBC QN AUTO: 30.1 PG (ref 26.6–33)
MCHC RBC AUTO-ENTMCNC: 33.3 G/DL (ref 31.5–35.7)
MCV RBC AUTO: 90.3 FL (ref 79–97)
MONOCYTES # BLD AUTO: 1.06 10*3/MM3 (ref 0.1–0.9)
MONOCYTES NFR BLD AUTO: 7.2 % (ref 5–12)
NEUTROPHILS NFR BLD AUTO: 12.35 10*3/MM3 (ref 1.7–7)
NEUTROPHILS NFR BLD AUTO: 84.4 % (ref 42.7–76)
NRBC BLD AUTO-RTO: 0 /100 WBC (ref 0–0.2)
PLATELET # BLD AUTO: 255 10*3/MM3 (ref 140–450)
PMV BLD AUTO: 10 FL (ref 6–12)
POTASSIUM SERPL-SCNC: 5 MMOL/L (ref 3.5–5.2)
RBC # BLD AUTO: 4.52 10*6/MM3 (ref 3.77–5.28)
SODIUM SERPL-SCNC: 132 MMOL/L (ref 136–145)
T4 FREE SERPL-MCNC: 1.22 NG/DL (ref 0.93–1.7)
TSH SERPL DL<=0.05 MIU/L-ACNC: 0.68 UIU/ML (ref 0.27–4.2)
WBC NRBC COR # BLD: 14.63 10*3/MM3 (ref 3.4–10.8)

## 2022-11-14 PROCEDURE — 84443 ASSAY THYROID STIM HORMONE: CPT | Performed by: HOSPITALIST

## 2022-11-14 PROCEDURE — 25010000002 HYDROMORPHONE PER 4 MG: Performed by: HOSPITALIST

## 2022-11-14 PROCEDURE — 80048 BASIC METABOLIC PNL TOTAL CA: CPT | Performed by: HOSPITALIST

## 2022-11-14 PROCEDURE — 84439 ASSAY OF FREE THYROXINE: CPT | Performed by: HOSPITALIST

## 2022-11-14 PROCEDURE — 82962 GLUCOSE BLOOD TEST: CPT

## 2022-11-14 PROCEDURE — 85025 COMPLETE CBC W/AUTO DIFF WBC: CPT | Performed by: HOSPITALIST

## 2022-11-14 RX ORDER — SODIUM CHLORIDE 9 MG/ML
75 INJECTION, SOLUTION INTRAVENOUS CONTINUOUS
Status: DISCONTINUED | OUTPATIENT
Start: 2022-11-14 | End: 2022-11-15

## 2022-11-14 RX ORDER — LEVOTHYROXINE SODIUM 0.05 MG/1
TABLET ORAL
Qty: 45 TABLET | Refills: 1 | Status: SHIPPED | OUTPATIENT
Start: 2022-11-14

## 2022-11-14 RX ADMIN — HYDROMORPHONE HYDROCHLORIDE 0.5 MG: 1 INJECTION, SOLUTION INTRAMUSCULAR; INTRAVENOUS; SUBCUTANEOUS at 07:59

## 2022-11-14 RX ADMIN — PANTOPRAZOLE SODIUM 40 MG: 40 TABLET, DELAYED RELEASE ORAL at 05:28

## 2022-11-14 RX ADMIN — HYDROCODONE BITARTRATE AND ACETAMINOPHEN 1 TABLET: 5; 325 TABLET ORAL at 01:43

## 2022-11-14 RX ADMIN — BACLOFEN 10 MG: 10 TABLET ORAL at 17:45

## 2022-11-14 RX ADMIN — METFORMIN HYDROCHLORIDE 500 MG: 500 TABLET, FILM COATED ORAL at 08:01

## 2022-11-14 RX ADMIN — LEVOTHYROXINE SODIUM 75 MCG: 0.07 TABLET ORAL at 08:00

## 2022-11-14 RX ADMIN — TIZANIDINE 4 MG: 4 TABLET ORAL at 01:43

## 2022-11-14 RX ADMIN — Medication 10 ML: at 08:02

## 2022-11-14 RX ADMIN — VENLAFAXINE HYDROCHLORIDE 150 MG: 150 CAPSULE, EXTENDED RELEASE ORAL at 08:01

## 2022-11-14 RX ADMIN — HYDROCODONE BITARTRATE AND ACETAMINOPHEN 1 TABLET: 5; 325 TABLET ORAL at 21:24

## 2022-11-14 RX ADMIN — AMITRIPTYLINE HYDROCHLORIDE 50 MG: 50 TABLET, FILM COATED ORAL at 21:24

## 2022-11-14 RX ADMIN — BACLOFEN 10 MG: 10 TABLET ORAL at 08:01

## 2022-11-14 RX ADMIN — HYDROCODONE BITARTRATE AND ACETAMINOPHEN 1 TABLET: 5; 325 TABLET ORAL at 11:11

## 2022-11-14 RX ADMIN — SODIUM CHLORIDE 75 ML/HR: 9 INJECTION, SOLUTION INTRAVENOUS at 21:29

## 2022-11-14 RX ADMIN — METFORMIN HYDROCHLORIDE 500 MG: 500 TABLET, FILM COATED ORAL at 18:58

## 2022-11-14 RX ADMIN — METOPROLOL TARTRATE 75 MG: 25 TABLET, FILM COATED ORAL at 21:24

## 2022-11-14 RX ADMIN — HYDROCODONE BITARTRATE AND ACETAMINOPHEN 1 TABLET: 5; 325 TABLET ORAL at 05:28

## 2022-11-14 RX ADMIN — HYDROMORPHONE HYDROCHLORIDE 0.5 MG: 1 INJECTION, SOLUTION INTRAMUSCULAR; INTRAVENOUS; SUBCUTANEOUS at 14:26

## 2022-11-14 RX ADMIN — HYDROMORPHONE HYDROCHLORIDE 0.5 MG: 1 INJECTION, SOLUTION INTRAMUSCULAR; INTRAVENOUS; SUBCUTANEOUS at 18:58

## 2022-11-14 RX ADMIN — DOFETILIDE 250 MCG: 0.25 CAPSULE ORAL at 11:11

## 2022-11-14 RX ADMIN — BACLOFEN 10 MG: 10 TABLET ORAL at 21:24

## 2022-11-14 RX ADMIN — METOPROLOL TARTRATE 75 MG: 25 TABLET, FILM COATED ORAL at 08:00

## 2022-11-14 RX ADMIN — DOFETILIDE 250 MCG: 0.25 CAPSULE ORAL at 22:23

## 2022-11-14 RX ADMIN — SODIUM CHLORIDE 75 ML/HR: 9 INJECTION, SOLUTION INTRAVENOUS at 17:45

## 2022-11-14 NOTE — PROGRESS NOTES
"DAILY PROGRESS NOTE  [facility]    Patient Identification:  Name: Dior Pettit  Age: 82 y.o.  Sex: female  :  1940  MRN: [id number]         Primary Care Physician: Ashanti Hong APRN    Subjective:  Interval History:She complains of pain.    Objective:    Scheduled Meds:amitriptyline, 50 mg, Oral, Nightly  baclofen, 10 mg, Oral, TID  dofetilide, 250 mcg, Oral, Q12H  insulin lispro, 0-7 Units, Subcutaneous, TID AC  levothyroxine, 50 mcg, Oral, Every Other Day  levothyroxine, 75 mcg, Oral, Every Other Day  metFORMIN, 500 mg, Oral, BID With Meals  metoprolol tartrate, 75 mg, Oral, BID  pantoprazole, 40 mg, Oral, QAM  sodium chloride, 10 mL, Intravenous, Q12H  venlafaxine XR, 150 mg, Oral, Daily      Continuous Infusions:sodium chloride, 75 mL/hr        Vital signs in last 24 hours:  Temp:  [97.1 °F (36.2 °C)-98.1 °F (36.7 °C)] 97.1 °F (36.2 °C)  Heart Rate:  [78-89] 78  Resp:  [14-16] 16  BP: (132-173)/() 159/87    Intake/Output:    Intake/Output Summary (Last 24 hours) at 2022 1457  Last data filed at 2022 1300  Gross per 24 hour   Intake 580 ml   Output 600 ml   Net -20 ml       Exam:  /87 (BP Location: Left arm, Patient Position: Lying)   Pulse 78   Temp 97.1 °F (36.2 °C) (Oral)   Resp 16   Ht 170.2 cm (67\")   Wt 64.9 kg (143 lb)   LMP  (LMP Unknown)   SpO2 97%   BMI 22.40 kg/m²     General Appearance:    Alert, cooperative, no distress   Head:    Normocephalic, without obvious abnormality, atraumatic   Eyes:       Throat:   Lips, tongue, gums normal   Neck:   Supple, symmetrical, trachea midline, no JVD   Lungs:     Clear to auscultation bilaterally, respirations unlabored   Chest Wall:    No tenderness or deformity    Heart:    Regular rate and rhythm, S1 and S2 normal, no murmur,no  Rub or gallop   Abdomen:     Soft, nontender, bowel sounds active, no masses, no organomegaly    Extremities:   Extremities normal, atraumatic, no cyanosis or edema "   Pulses:      Skin:   Skin is warm and dry,  no rashes or palpable lesions   Neurologic:   no focal deficits noted      Lab Results (last 72 hours)     Procedure Component Value Units Date/Time    POC Glucose Once [[id number]]  (Normal) Collected: 11/14/22 1107    Specimen: Blood Updated: 11/14/22 1108     Glucose 120 mg/dL      Comment: Meter: [id number] : [id number] Shane Sera JUANPABLO       Basic Metabolic Panel [[id number]]  (Abnormal) Collected: 11/14/22 0430    Specimen: Blood Updated: 11/14/22 0625     Glucose 156 mg/dL      BUN 15 mg/dL      Creatinine 0.77 mg/dL      Sodium 132 mmol/L      Potassium 5.0 mmol/L      Chloride 94 mmol/L      CO2 29.8 mmol/L      Calcium 10.1 mg/dL      BUN/Creatinine Ratio 19.5     Anion Gap 8.2 mmol/L      eGFR 76.2 mL/min/1.73      Comment: National Kidney Foundation and American Society of Nephrology (ASN) Task Force recommended calculation based on the Chronic Kidney Disease Epidemiology Collaboration (CKD-EPI) equation refit without adjustment for race.       Narrative:      GFR Normal >60  Chronic Kidney Disease <60  Kidney Failure <15    The GFR formula is only valid for adults with stable renal function between ages 18 and 70.    POC Glucose Once [[id number]]  (Abnormal) Collected: 11/14/22 0615    Specimen: Blood Updated: 11/14/22 0619     Glucose 137 mg/dL      Comment: Meter: [id number] : [id number] King Yue TILLEY       TSH [[id number]]  (Normal) Collected: 11/14/22 0430    Specimen: Blood Updated: 11/14/22 0542     TSH 0.677 uIU/mL     T4, Free [[id number]]  (Normal) Collected: 11/14/22 0430    Specimen: Blood Updated: 11/14/22 0542     Free T4 1.22 ng/dL     Narrative:      Results may be falsely increased if patient taking Biotin.      CBC Auto Differential [[id number]]  (Abnormal) Collected: 11/14/22 0430    Specimen: Blood Updated: 11/14/22 0516     WBC 14.63 10*3/mm3      RBC 4.52 10*6/mm3      Hemoglobin 13.6 g/dL      Hematocrit 40.8 %      MCV 90.3 fL       MCH 30.1 pg      MCHC 33.3 g/dL      RDW 14.3 %      RDW-SD 46.9 fl      MPV 10.0 fL      Platelets 255 10*3/mm3      Neutrophil % 84.4 %      Lymphocyte % 6.8 %      Monocyte % 7.2 %      Eosinophil % 0.8 %      Basophil % 0.3 %      Immature Grans % 0.5 %      Neutrophils, Absolute 12.35 10*3/mm3      Lymphocytes, Absolute 0.99 10*3/mm3      Monocytes, Absolute 1.06 10*3/mm3      Eosinophils, Absolute 0.12 10*3/mm3      Basophils, Absolute 0.04 10*3/mm3      Immature Grans, Absolute 0.07 10*3/mm3      nRBC 0.0 /100 WBC     POC Glucose Once [[id number]]  (Abnormal) Collected: 11/13/22 2138    Specimen: Blood Updated: 11/13/22 2139     Glucose 168 mg/dL      Comment: Meter: [id number] : [id number] Ariela GERONIMO       POC Glucose Once [[id number]]  (Normal) Collected: 11/13/22 1629    Specimen: Blood Updated: 11/13/22 1630     Glucose 126 mg/dL      Comment: Meter: [id number] : [id number] Mason GERONIMO       Hemoglobin A1c [[id number]]  (Abnormal) Collected: 11/13/22 1122    Specimen: Blood Updated: 11/13/22 1455     Hemoglobin A1C 6.10 %     Narrative:      Hemoglobin A1C Ranges:    Increased Risk for Diabetes  5.7% to 6.4%  Diabetes                     >= 6.5%  Diabetic Goal                < 7.0%    Comprehensive Metabolic Panel [[id number]]  (Abnormal) Collected: 11/13/22 1346    Specimen: Blood from Arm, Right Updated: 11/13/22 1421     Glucose 136 mg/dL      BUN 18 mg/dL      Creatinine 0.82 mg/dL      Sodium 135 mmol/L      Potassium 4.6 mmol/L      Comment: Slight hemolysis detected by analyzer. Results may be affected.        Chloride 100 mmol/L      CO2 25.0 mmol/L      Calcium 9.6 mg/dL      Total Protein 7.2 g/dL      Albumin 4.60 g/dL      ALT (SGPT) 17 U/L      AST (SGOT) 28 U/L      Alkaline Phosphatase 58 U/L      Total Bilirubin 0.5 mg/dL      Globulin 2.6 gm/dL      A/G Ratio 1.8 g/dL      BUN/Creatinine Ratio 22.0     Anion Gap 10.0 mmol/L      eGFR 70.6 mL/min/1.73      Comment:  National Kidney Foundation and American Society of Nephrology (ASN) Task Force recommended calculation based on the Chronic Kidney Disease Epidemiology Collaboration (CKD-EPI) equation refit without adjustment for race.       Narrative:      GFR Normal >60  Chronic Kidney Disease <60  Kidney Failure <15    The GFR formula is only valid for adults with stable renal function between ages 18 and 70.    Protime-INR [[id number]]  (Normal) Collected: 11/13/22 1346    Specimen: Blood from Arm, Right Updated: 11/13/22 1417     Protime 14.2 Seconds      INR 1.08    CBC & Differential [[id number]]  (Abnormal) Collected: 11/13/22 1122    Specimen: Blood Updated: 11/13/22 1140    Narrative:      The following orders were created for panel order CBC & Differential.  Procedure                               Abnormality         Status                     ---------                               -----------         ------                     CBC Auto Differential[[id number]]        Abnormal            Final result                 Please view results for these tests on the individual orders.    CBC Auto Differential [[id number]]  (Abnormal) Collected: 11/13/22 1122    Specimen: Blood Updated: 11/13/22 1140     WBC 10.53 10*3/mm3      RBC 4.34 10*6/mm3      Hemoglobin 13.2 g/dL      Hematocrit 40.1 %      MCV 92.4 fL      MCH 30.4 pg      MCHC 32.9 g/dL      RDW 14.4 %      RDW-SD 48.5 fl      MPV 9.8 fL      Platelets 233 10*3/mm3      Neutrophil % 81.5 %      Lymphocyte % 11.5 %      Monocyte % 5.7 %      Eosinophil % 0.5 %      Basophil % 0.5 %      Immature Grans % 0.3 %      Neutrophils, Absolute 8.59 10*3/mm3      Lymphocytes, Absolute 1.21 10*3/mm3      Monocytes, Absolute 0.60 10*3/mm3      Eosinophils, Absolute 0.05 10*3/mm3      Basophils, Absolute 0.05 10*3/mm3      Immature Grans, Absolute 0.03 10*3/mm3      nRBC 0.0 /100 WBC         Data Review:  Results from last 7 days   Lab Units 11/14/22  0430 11/13/22  1346 11/13/22 1346  11/09/22  1035   SODIUM mmol/L 132*  --  135* 137   POTASSIUM mmol/L 5.0  --  4.6 4.9   CHLORIDE mmol/L 94*  --  100 101   TOTAL CO2 mmol/L  --   --   --  29.0   CO2 mmol/L 29.8*  --  25.0  --    BUN mg/dL 15  --  18 19   CREATININE mg/dL 0.77  --  0.82 0.97   GLUCOSE mg/dL 156*   < > 136* 117*   CALCIUM mg/dL 10.1  --  9.6 9.8    < > = values in this interval not displayed.     Results from last 7 days   Lab Units 11/14/22  0430 11/13/22  1122   WBC 10*3/mm3 14.63* 10.53   HEMOGLOBIN g/dL 13.6 13.2   HEMATOCRIT % 40.8 40.1   PLATELETS 10*3/mm3 255 233     Results from last 7 days   Lab Units 11/14/22  0430   TSH uIU/mL 0.677   FREE T4 ng/dL 1.22     Results from last 7 days   Lab Units 11/13/22  1122   HEMOGLOBIN A1C % 6.10*     Lab Results   Lab Value Date/Time    TROPONINT <0.010 10/03/2022 0805    TROPONINT <0.010 09/11/2022 1926    TROPONINT <0.010 09/11/2022 1648    TROPONINT <0.010 12/11/2021 1505    TROPONINT <0.010 01/31/2019 2331    TROPONINT <0.010 03/07/2017 2037     Results from last 7 days   Lab Units 11/09/22  1035   TRIGLYCERIDES mg/dL 133   HDL CHOL mg/dL 51   LDL CHOL mg/dL 101*     Results from last 7 days   Lab Units 11/13/22  1346   ALK PHOS U/L 58   BILIRUBIN mg/dL 0.5   ALT (SGPT) U/L 17   AST (SGOT) U/L 28     Results from last 7 days   Lab Units 11/14/22  0430   TSH uIU/mL 0.677   FREE T4 ng/dL 1.22     Results from last 7 days   Lab Units 11/13/22  1122   HEMOGLOBIN A1C % 6.10*     Glucose   Date/Time Value Ref Range Status   11/14/2022 1107 120 70 - 130 mg/dL Final     Comment:     Meter: [id number] : [id number] Shane GERONIMO   11/14/2022 0615 137 (H) 70 - 130 mg/dL Final     Comment:     Meter: [id number] : [id number] King Yue TILLEY   11/13/2022 2138 168 (H) 70 - 130 mg/dL Final     Comment:     Meter: [id number] : [id number] Ariela Galo NA   11/13/2022 1629 126 70 - 130 mg/dL Final     Comment:     Meter: [id number] : [id number] Mason April NA     Results from  last 7 days   Lab Units 11/13/22  1346   INR  1.08       Past Medical History:   Diagnosis Date   • Allergic rhinitis    • Anemia    • Anxiety     Controlled w/Meds   • Aortic root dilatation (HCC) 10/02/2017    Borderline--Noted on Echo   • Aortic valve calcification 10/02/2017    Noted on Echo   • Aortic valve insufficiency Dx in 07    w/AVR    • Aortic valve prosthesis present 11/02/2018    Noted on CTA Chest   • Ascending aorta dilatation (HCC) 10/02/2017    Borderline--Noted on Echo   • Asthma    • Atypical chest pain    • Breast pain, right Hx   • CAD (coronary artery disease)    • Cardiomegaly 2017   • Cervicalgia    • Chest pain due to CAD ([facility])    • 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    Borderline--Noted on Echo & Measured @ 2.6CM and Mid Descending @ 2.5CM on CTA Chest-11/2/18   • DM (diabetes mellitus) ([facility])     T2   • Esophagitis    • GERD (gastroesophageal reflux disease)     Controlled w/Meds   • Headache    • Health care maintenance    • Heart murmur    • History of echocardiogram 10/2/17-[facility]    EF 66%; Borderline Concentric Hypertrophy; Mild Calcification in AV; Mild AVS; Mild AVR; Moderate TVR; Borderline Dilation of Aortic Root/Arch & Borderline Dilation of Ascending/Proximal Aorta Present   • Hyperlipidemia     Controlled w/Meds   • Hypertension     Controlled w/Meds   • Hypothyroidism     Controlled w/Synthroid   • Insomnia    • Macular degeneration, left eye    • Mild aortic valve regurgitation 10/02/2017    Noted on Echo   • Mild aortic valve stenosis 10/02/2017    Noted on Echo   • Mild dilation of ascending aorta (HCC) 10/02/2017    Borderline--Noted on Echo   • Moderate tricuspid valve regurgitation 10/02/2017    Noted on Echo   • Mood disorder in conditions classified elsewhere     Controlled w/Meds   • Near syncope 03/2017-[facility] ER   • Neuropathy    • NNEKA (obstructive sleep apnea)     Untreated   • Osteoarthritis     Ankles/Feet   • Pulmonary hypertension ([facility]) 2017   • Renal  insufficiency    • RLS (restless legs syndrome)    • Thoracic ascending aortic aneurysm Dx in 2007 @ 3.8CM & 1/02/2018    Noted @ 4CM on CTA Chest;   • Visual impairment     macular degeneratuin       Assessment:  Active Hospital Problems    Diagnosis  POA   • **Closed displaced fracture of right femoral neck ([facility]) [S72.001A]  Yes   • Fall [W19.XXXA]  Unknown   • PAF (paroxysmal atrial fibrillation) ([facility]) [I48.0]  Yes   • S/P AVR [Z95.2]  Not Applicable   • Mild intermittent asthma without complication [J45.20]  Yes   • Anxiety [F41.9]  Yes   • Restless leg syndrome [G25.81]  Yes   • Hypothyroidism [E03.9]  Yes   • Hypercholesteremia [E78.00]  Yes   • Gastroesophageal reflux disease [K21.9]  Yes   • Essential hypertension [I10]  Yes   • Pulmonary hypertension ([facility]) [I27.20]  Yes   • NNEKA (obstructive sleep apnea) [G47.33]  Yes      Resolved Hospital Problems   No resolved problems to display.       Plan:  She is medically stable for surgery tomorrow.  Follow lab.  Start some IV fluids. Hold anticoagulation.    Mark Ruiz MD  11/14/2022  14:57 EST

## 2022-11-15 ENCOUNTER — ANESTHESIA EVENT (OUTPATIENT)
Dept: PERIOP | Facility: HOSPITAL | Age: 82
End: 2022-11-15

## 2022-11-15 ENCOUNTER — APPOINTMENT (OUTPATIENT)
Dept: GENERAL RADIOLOGY | Facility: HOSPITAL | Age: 82
End: 2022-11-15

## 2022-11-15 ENCOUNTER — ANESTHESIA (OUTPATIENT)
Dept: PERIOP | Facility: HOSPITAL | Age: 82
End: 2022-11-15

## 2022-11-15 LAB
ANION GAP SERPL CALCULATED.3IONS-SCNC: 8 MMOL/L (ref 5–15)
BASOPHILS # BLD AUTO: 0.05 10*3/MM3 (ref 0–0.2)
BASOPHILS NFR BLD AUTO: 0.4 % (ref 0–1.5)
BUN SERPL-MCNC: 14 MG/DL (ref 8–23)
BUN/CREAT SERPL: 20.9 (ref 7–25)
CALCIUM SPEC-SCNC: 9.5 MG/DL (ref 8.6–10.5)
CHLORIDE SERPL-SCNC: 95 MMOL/L (ref 98–107)
CO2 SERPL-SCNC: 28 MMOL/L (ref 22–29)
CREAT SERPL-MCNC: 0.67 MG/DL (ref 0.57–1)
DEPRECATED RDW RBC AUTO: 43.9 FL (ref 37–54)
EGFRCR SERPLBLD CKD-EPI 2021: 86.3 ML/MIN/1.73
EOSINOPHIL # BLD AUTO: 0.28 10*3/MM3 (ref 0–0.4)
EOSINOPHIL NFR BLD AUTO: 2.1 % (ref 0.3–6.2)
ERYTHROCYTE [DISTWIDTH] IN BLOOD BY AUTOMATED COUNT: 13.7 % (ref 12.3–15.4)
GLUCOSE BLDC GLUCOMTR-MCNC: 152 MG/DL (ref 70–130)
GLUCOSE BLDC GLUCOMTR-MCNC: 159 MG/DL (ref 70–130)
GLUCOSE BLDC GLUCOMTR-MCNC: 187 MG/DL (ref 70–130)
GLUCOSE SERPL-MCNC: 160 MG/DL (ref 65–99)
HCT VFR BLD AUTO: 38.6 % (ref 34–46.6)
HGB BLD-MCNC: 12.8 G/DL (ref 12–15.9)
IMM GRANULOCYTES # BLD AUTO: 0.08 10*3/MM3 (ref 0–0.05)
IMM GRANULOCYTES NFR BLD AUTO: 0.6 % (ref 0–0.5)
LYMPHOCYTES # BLD AUTO: 1.06 10*3/MM3 (ref 0.7–3.1)
LYMPHOCYTES NFR BLD AUTO: 8.1 % (ref 19.6–45.3)
MCH RBC QN AUTO: 29.2 PG (ref 26.6–33)
MCHC RBC AUTO-ENTMCNC: 33.2 G/DL (ref 31.5–35.7)
MCV RBC AUTO: 87.9 FL (ref 79–97)
MONOCYTES # BLD AUTO: 1 10*3/MM3 (ref 0.1–0.9)
MONOCYTES NFR BLD AUTO: 7.6 % (ref 5–12)
NEUTROPHILS NFR BLD AUTO: 10.68 10*3/MM3 (ref 1.7–7)
NEUTROPHILS NFR BLD AUTO: 81.2 % (ref 42.7–76)
NRBC BLD AUTO-RTO: 0 /100 WBC (ref 0–0.2)
PLATELET # BLD AUTO: 218 10*3/MM3 (ref 140–450)
PMV BLD AUTO: 9.8 FL (ref 6–12)
POTASSIUM SERPL-SCNC: 4.6 MMOL/L (ref 3.5–5.2)
RBC # BLD AUTO: 4.39 10*6/MM3 (ref 3.77–5.28)
SODIUM SERPL-SCNC: 131 MMOL/L (ref 136–145)
WBC NRBC COR # BLD: 13.15 10*3/MM3 (ref 3.4–10.8)

## 2022-11-15 PROCEDURE — 85025 COMPLETE CBC W/AUTO DIFF WBC: CPT | Performed by: HOSPITALIST

## 2022-11-15 PROCEDURE — C1776 JOINT DEVICE (IMPLANTABLE): HCPCS | Performed by: ORTHOPAEDIC SURGERY

## 2022-11-15 PROCEDURE — 25010000002 CEFAZOLIN IN DEXTROSE 2-4 GM/100ML-% SOLUTION: Performed by: ORTHOPAEDIC SURGERY

## 2022-11-15 PROCEDURE — 25010000002 VANCOMYCIN PER 500 MG: Performed by: ORTHOPAEDIC SURGERY

## 2022-11-15 PROCEDURE — 25010000002 ONDANSETRON PER 1 MG: Performed by: NURSE ANESTHETIST, CERTIFIED REGISTERED

## 2022-11-15 PROCEDURE — 27130 TOTAL HIP ARTHROPLASTY: CPT | Performed by: ORTHOPAEDIC SURGERY

## 2022-11-15 PROCEDURE — 73501 X-RAY EXAM HIP UNI 1 VIEW: CPT

## 2022-11-15 PROCEDURE — 25010000002 KETOROLAC TROMETHAMINE PER 15 MG: Performed by: ORTHOPAEDIC SURGERY

## 2022-11-15 PROCEDURE — 76000 FLUOROSCOPY <1 HR PHYS/QHP: CPT

## 2022-11-15 PROCEDURE — 25010000002 DEXAMETHASONE SODIUM PHOSPHATE 20 MG/5ML SOLUTION: Performed by: NURSE ANESTHETIST, CERTIFIED REGISTERED

## 2022-11-15 PROCEDURE — 25010000002 CLONIDINE PER 1 MG: Performed by: ORTHOPAEDIC SURGERY

## 2022-11-15 PROCEDURE — 0SR90JA REPLACEMENT OF RIGHT HIP JOINT WITH SYNTHETIC SUBSTITUTE, UNCEMENTED, OPEN APPROACH: ICD-10-PCS | Performed by: ORTHOPAEDIC SURGERY

## 2022-11-15 PROCEDURE — 25010000002 NEOSTIGMINE 5 MG/10ML SOLUTION: Performed by: NURSE ANESTHETIST, CERTIFIED REGISTERED

## 2022-11-15 PROCEDURE — 63710000001 INSULIN LISPRO (HUMAN) PER 5 UNITS: Performed by: ORTHOPAEDIC SURGERY

## 2022-11-15 PROCEDURE — 27130 TOTAL HIP ARTHROPLASTY: CPT | Performed by: NURSE PRACTITIONER

## 2022-11-15 PROCEDURE — 25010000002 PROPOFOL 10 MG/ML EMULSION: Performed by: NURSE ANESTHETIST, CERTIFIED REGISTERED

## 2022-11-15 PROCEDURE — 80048 BASIC METABOLIC PNL TOTAL CA: CPT | Performed by: HOSPITALIST

## 2022-11-15 PROCEDURE — 25010000002 EPINEPHRINE 1 MG/ML SOLUTION 30 ML VIAL: Performed by: ORTHOPAEDIC SURGERY

## 2022-11-15 PROCEDURE — 25010000002 ROPIVACAINE PER 1 MG: Performed by: ORTHOPAEDIC SURGERY

## 2022-11-15 PROCEDURE — 82962 GLUCOSE BLOOD TEST: CPT

## 2022-11-15 DEVICE — SHLL ACET OSSEOTI G7 3H SZE 52MM: Type: IMPLANTABLE DEVICE | Site: HIP | Status: FUNCTIONAL

## 2022-11-15 DEVICE — BIOLOX® DELTA, CERAMIC FEMORAL HEAD, M, Ø 28/0, TAPER 12/14
Type: IMPLANTABLE DEVICE | Site: HIP | Status: FUNCTIONAL
Brand: BIOLOX® DELTA

## 2022-11-15 DEVICE — IMPLANTABLE DEVICE
Type: IMPLANTABLE DEVICE | Site: HIP | Status: FUNCTIONAL
Brand: AVENIR COMPLETE™

## 2022-11-15 DEVICE — IMPLANTABLE DEVICE: Type: IMPLANTABLE DEVICE | Site: HIP | Status: FUNCTIONAL

## 2022-11-15 DEVICE — CAP HIP 2 MOBL UPCHRG: Type: IMPLANTABLE DEVICE | Status: FUNCTIONAL

## 2022-11-15 DEVICE — KNOTLESS TISSUE CONTROL DEVICE, UNDYED UNIDIRECTIONAL (ANTIBACTERIAL) SYNTHETIC ABSORBABLE DEVICE
Type: IMPLANTABLE DEVICE | Site: HIP | Status: FUNCTIONAL
Brand: STRATAFIX

## 2022-11-15 DEVICE — TOTAL HIP PRIMARY: Type: IMPLANTABLE DEVICE | Status: FUNCTIONAL

## 2022-11-15 DEVICE — IMPLANTABLE DEVICE
Type: IMPLANTABLE DEVICE | Site: HIP | Status: FUNCTIONAL
Brand: G7® DUAL MOBILITY ACETABULAR SYSTEM

## 2022-11-15 DEVICE — IMPLANTABLE DEVICE
Type: IMPLANTABLE DEVICE | Site: HIP | Status: FUNCTIONAL
Brand: VIVACIT-E®

## 2022-11-15 DEVICE — CP HIP UPCHRG OSSEOTI LTD HL CUPS: Type: IMPLANTABLE DEVICE | Status: FUNCTIONAL

## 2022-11-15 RX ORDER — ROCURONIUM BROMIDE 10 MG/ML
INJECTION, SOLUTION INTRAVENOUS AS NEEDED
Status: DISCONTINUED | OUTPATIENT
Start: 2022-11-15 | End: 2022-11-15 | Stop reason: SURG

## 2022-11-15 RX ORDER — SODIUM CHLORIDE, SODIUM LACTATE, POTASSIUM CHLORIDE, CALCIUM CHLORIDE 600; 310; 30; 20 MG/100ML; MG/100ML; MG/100ML; MG/100ML
9 INJECTION, SOLUTION INTRAVENOUS CONTINUOUS
Status: DISCONTINUED | OUTPATIENT
Start: 2022-11-15 | End: 2022-11-16

## 2022-11-15 RX ORDER — SODIUM CHLORIDE 9 MG/ML
INJECTION, SOLUTION INTRAVENOUS AS NEEDED
Status: DISCONTINUED | OUTPATIENT
Start: 2022-11-15 | End: 2022-11-15 | Stop reason: HOSPADM

## 2022-11-15 RX ORDER — PROMETHAZINE HYDROCHLORIDE 25 MG/1
25 TABLET ORAL ONCE AS NEEDED
Status: DISCONTINUED | OUTPATIENT
Start: 2022-11-15 | End: 2022-11-15

## 2022-11-15 RX ORDER — CEFAZOLIN SODIUM 2 G/100ML
2 INJECTION, SOLUTION INTRAVENOUS ONCE
Status: COMPLETED | OUTPATIENT
Start: 2022-11-15 | End: 2022-11-15

## 2022-11-15 RX ORDER — VENLAFAXINE HYDROCHLORIDE 150 MG/1
CAPSULE, EXTENDED RELEASE ORAL
Qty: 90 CAPSULE | Refills: 3 | Status: SHIPPED | OUTPATIENT
Start: 2022-11-15

## 2022-11-15 RX ORDER — POLYETHYLENE GLYCOL 3350 17 G/17G
17 POWDER, FOR SOLUTION ORAL 2 TIMES DAILY
Status: DISCONTINUED | OUTPATIENT
Start: 2022-11-15 | End: 2022-11-18 | Stop reason: HOSPADM

## 2022-11-15 RX ORDER — LIDOCAINE HYDROCHLORIDE 20 MG/ML
INJECTION, SOLUTION INTRAVENOUS AS NEEDED
Status: DISCONTINUED | OUTPATIENT
Start: 2022-11-15 | End: 2022-11-15 | Stop reason: SURG

## 2022-11-15 RX ORDER — SODIUM CHLORIDE 0.9 % (FLUSH) 0.9 %
3-10 SYRINGE (ML) INJECTION AS NEEDED
Status: DISCONTINUED | OUTPATIENT
Start: 2022-11-15 | End: 2022-11-15 | Stop reason: HOSPADM

## 2022-11-15 RX ORDER — DIPHENHYDRAMINE HYDROCHLORIDE 50 MG/ML
12.5 INJECTION INTRAMUSCULAR; INTRAVENOUS
Status: DISCONTINUED | OUTPATIENT
Start: 2022-11-15 | End: 2022-11-15

## 2022-11-15 RX ORDER — ACETAMINOPHEN 500 MG
1000 TABLET ORAL ONCE
Status: DISCONTINUED | OUTPATIENT
Start: 2022-11-15 | End: 2022-11-15 | Stop reason: HOSPADM

## 2022-11-15 RX ORDER — PREGABALIN 75 MG/1
150 CAPSULE ORAL ONCE
Status: DISCONTINUED | OUTPATIENT
Start: 2022-11-15 | End: 2022-11-15 | Stop reason: HOSPADM

## 2022-11-15 RX ORDER — PROMETHAZINE HYDROCHLORIDE 25 MG/1
25 SUPPOSITORY RECTAL ONCE AS NEEDED
Status: DISCONTINUED | OUTPATIENT
Start: 2022-11-15 | End: 2022-11-15

## 2022-11-15 RX ORDER — ONDANSETRON 2 MG/ML
4 INJECTION INTRAMUSCULAR; INTRAVENOUS ONCE AS NEEDED
Status: DISCONTINUED | OUTPATIENT
Start: 2022-11-15 | End: 2022-11-15

## 2022-11-15 RX ORDER — MELOXICAM 15 MG/1
15 TABLET ORAL ONCE
Status: DISCONTINUED | OUTPATIENT
Start: 2022-11-15 | End: 2022-11-15 | Stop reason: HOSPADM

## 2022-11-15 RX ORDER — SODIUM CHLORIDE, SODIUM LACTATE, POTASSIUM CHLORIDE, CALCIUM CHLORIDE 600; 310; 30; 20 MG/100ML; MG/100ML; MG/100ML; MG/100ML
100 INJECTION, SOLUTION INTRAVENOUS CONTINUOUS
Status: DISCONTINUED | OUTPATIENT
Start: 2022-11-15 | End: 2022-11-16

## 2022-11-15 RX ORDER — DEXAMETHASONE SODIUM PHOSPHATE 4 MG/ML
INJECTION, SOLUTION INTRA-ARTICULAR; INTRALESIONAL; INTRAMUSCULAR; INTRAVENOUS; SOFT TISSUE AS NEEDED
Status: DISCONTINUED | OUTPATIENT
Start: 2022-11-15 | End: 2022-11-15 | Stop reason: SURG

## 2022-11-15 RX ORDER — MIDAZOLAM HYDROCHLORIDE 1 MG/ML
0.5 INJECTION INTRAMUSCULAR; INTRAVENOUS
Status: DISCONTINUED | OUTPATIENT
Start: 2022-11-15 | End: 2022-11-15 | Stop reason: HOSPADM

## 2022-11-15 RX ORDER — DOCUSATE SODIUM 100 MG/1
100 CAPSULE, LIQUID FILLED ORAL 2 TIMES DAILY
Status: DISCONTINUED | OUTPATIENT
Start: 2022-11-15 | End: 2022-11-18 | Stop reason: HOSPADM

## 2022-11-15 RX ORDER — TRANEXAMIC ACID 100 MG/ML
INJECTION, SOLUTION INTRAVENOUS AS NEEDED
Status: DISCONTINUED | OUTPATIENT
Start: 2022-11-15 | End: 2022-11-15 | Stop reason: SURG

## 2022-11-15 RX ORDER — MAGNESIUM HYDROXIDE 1200 MG/15ML
LIQUID ORAL AS NEEDED
Status: DISCONTINUED | OUTPATIENT
Start: 2022-11-15 | End: 2022-11-15 | Stop reason: HOSPADM

## 2022-11-15 RX ORDER — HYDRALAZINE HYDROCHLORIDE 20 MG/ML
5 INJECTION INTRAMUSCULAR; INTRAVENOUS
Status: DISCONTINUED | OUTPATIENT
Start: 2022-11-15 | End: 2022-11-15

## 2022-11-15 RX ORDER — BISACODYL 10 MG
10 SUPPOSITORY, RECTAL RECTAL DAILY PRN
Status: DISCONTINUED | OUTPATIENT
Start: 2022-11-15 | End: 2022-11-18 | Stop reason: HOSPADM

## 2022-11-15 RX ORDER — FENTANYL CITRATE 50 UG/ML
50 INJECTION, SOLUTION INTRAMUSCULAR; INTRAVENOUS
Status: DISCONTINUED | OUTPATIENT
Start: 2022-11-15 | End: 2022-11-15

## 2022-11-15 RX ORDER — ONDANSETRON 4 MG/1
4 TABLET, FILM COATED ORAL EVERY 6 HOURS PRN
Status: DISCONTINUED | OUTPATIENT
Start: 2022-11-15 | End: 2022-11-18 | Stop reason: HOSPADM

## 2022-11-15 RX ORDER — LIDOCAINE HYDROCHLORIDE 10 MG/ML
0.5 INJECTION, SOLUTION EPIDURAL; INFILTRATION; INTRACAUDAL; PERINEURAL ONCE AS NEEDED
Status: DISCONTINUED | OUTPATIENT
Start: 2022-11-15 | End: 2022-11-15 | Stop reason: HOSPADM

## 2022-11-15 RX ORDER — HYDROCODONE BITARTRATE AND ACETAMINOPHEN 7.5; 325 MG/1; MG/1
1 TABLET ORAL ONCE AS NEEDED
Status: DISCONTINUED | OUTPATIENT
Start: 2022-11-15 | End: 2022-11-15

## 2022-11-15 RX ORDER — LABETALOL HYDROCHLORIDE 5 MG/ML
5 INJECTION, SOLUTION INTRAVENOUS
Status: DISCONTINUED | OUTPATIENT
Start: 2022-11-15 | End: 2022-11-15

## 2022-11-15 RX ORDER — GLYCOPYRROLATE 0.2 MG/ML
INJECTION INTRAMUSCULAR; INTRAVENOUS AS NEEDED
Status: DISCONTINUED | OUTPATIENT
Start: 2022-11-15 | End: 2022-11-15 | Stop reason: SURG

## 2022-11-15 RX ORDER — KETOROLAC TROMETHAMINE 15 MG/ML
15 INJECTION, SOLUTION INTRAMUSCULAR; INTRAVENOUS EVERY 6 HOURS PRN
Status: DISCONTINUED | OUTPATIENT
Start: 2022-11-15 | End: 2022-11-18 | Stop reason: HOSPADM

## 2022-11-15 RX ORDER — DIPHENHYDRAMINE HCL 25 MG
25 CAPSULE ORAL
Status: DISCONTINUED | OUTPATIENT
Start: 2022-11-15 | End: 2022-11-15

## 2022-11-15 RX ORDER — PROPOFOL 10 MG/ML
VIAL (ML) INTRAVENOUS AS NEEDED
Status: DISCONTINUED | OUTPATIENT
Start: 2022-11-15 | End: 2022-11-15 | Stop reason: SURG

## 2022-11-15 RX ORDER — CEFAZOLIN SODIUM 2 G/100ML
2 INJECTION, SOLUTION INTRAVENOUS EVERY 8 HOURS
Status: COMPLETED | OUTPATIENT
Start: 2022-11-15 | End: 2022-11-16

## 2022-11-15 RX ORDER — HYDROMORPHONE HYDROCHLORIDE 1 MG/ML
0.5 INJECTION, SOLUTION INTRAMUSCULAR; INTRAVENOUS; SUBCUTANEOUS
Status: DISCONTINUED | OUTPATIENT
Start: 2022-11-15 | End: 2022-11-15

## 2022-11-15 RX ORDER — ONDANSETRON 2 MG/ML
4 INJECTION INTRAMUSCULAR; INTRAVENOUS EVERY 6 HOURS PRN
Status: DISCONTINUED | OUTPATIENT
Start: 2022-11-15 | End: 2022-11-18 | Stop reason: HOSPADM

## 2022-11-15 RX ORDER — NEOSTIGMINE METHYLSULFATE 0.5 MG/ML
INJECTION, SOLUTION INTRAVENOUS AS NEEDED
Status: DISCONTINUED | OUTPATIENT
Start: 2022-11-15 | End: 2022-11-15 | Stop reason: SURG

## 2022-11-15 RX ORDER — FLUMAZENIL 0.1 MG/ML
0.2 INJECTION INTRAVENOUS AS NEEDED
Status: DISCONTINUED | OUTPATIENT
Start: 2022-11-15 | End: 2022-11-15

## 2022-11-15 RX ORDER — SODIUM CHLORIDE 0.9 % (FLUSH) 0.9 %
3 SYRINGE (ML) INJECTION EVERY 12 HOURS SCHEDULED
Status: DISCONTINUED | OUTPATIENT
Start: 2022-11-15 | End: 2022-11-15 | Stop reason: HOSPADM

## 2022-11-15 RX ORDER — POVIDONE-IODINE 10 MG/ML
1 SOLUTION TOPICAL AS NEEDED
Status: COMPLETED | OUTPATIENT
Start: 2022-11-15 | End: 2022-11-15

## 2022-11-15 RX ORDER — DOXEPIN HYDROCHLORIDE 50 MG/1
CAPSULE ORAL
Qty: 90 CAPSULE | Refills: 0 | OUTPATIENT
Start: 2022-11-15

## 2022-11-15 RX ORDER — ONDANSETRON 2 MG/ML
INJECTION INTRAMUSCULAR; INTRAVENOUS AS NEEDED
Status: DISCONTINUED | OUTPATIENT
Start: 2022-11-15 | End: 2022-11-15 | Stop reason: SURG

## 2022-11-15 RX ORDER — NALOXONE HCL 0.4 MG/ML
0.2 VIAL (ML) INJECTION AS NEEDED
Status: DISCONTINUED | OUTPATIENT
Start: 2022-11-15 | End: 2022-11-15

## 2022-11-15 RX ORDER — EPHEDRINE SULFATE 50 MG/ML
INJECTION INTRAVENOUS AS NEEDED
Status: DISCONTINUED | OUTPATIENT
Start: 2022-11-15 | End: 2022-11-15 | Stop reason: SURG

## 2022-11-15 RX ORDER — VANCOMYCIN HYDROCHLORIDE 1 G/200ML
15 INJECTION, SOLUTION INTRAVENOUS ONCE
Status: COMPLETED | OUTPATIENT
Start: 2022-11-15 | End: 2022-11-15

## 2022-11-15 RX ORDER — ACETAMINOPHEN 325 MG/1
325 TABLET ORAL EVERY 4 HOURS PRN
Status: DISCONTINUED | OUTPATIENT
Start: 2022-11-15 | End: 2022-11-18 | Stop reason: HOSPADM

## 2022-11-15 RX ORDER — SODIUM CHLORIDE, SODIUM LACTATE, POTASSIUM CHLORIDE, CALCIUM CHLORIDE 600; 310; 30; 20 MG/100ML; MG/100ML; MG/100ML; MG/100ML
INJECTION, SOLUTION INTRAVENOUS CONTINUOUS PRN
Status: DISCONTINUED | OUTPATIENT
Start: 2022-11-15 | End: 2022-11-15 | Stop reason: SURG

## 2022-11-15 RX ORDER — OXYCODONE AND ACETAMINOPHEN 7.5; 325 MG/1; MG/1
1 TABLET ORAL EVERY 4 HOURS PRN
Status: DISCONTINUED | OUTPATIENT
Start: 2022-11-15 | End: 2022-11-15

## 2022-11-15 RX ADMIN — EPHEDRINE SULFATE 5 MG: 50 INJECTION INTRAVENOUS at 09:35

## 2022-11-15 RX ADMIN — PANTOPRAZOLE SODIUM 40 MG: 40 TABLET, DELAYED RELEASE ORAL at 05:53

## 2022-11-15 RX ADMIN — HYDROCODONE BITARTRATE AND ACETAMINOPHEN 1 TABLET: 5; 325 TABLET ORAL at 02:49

## 2022-11-15 RX ADMIN — PROPOFOL 150 MG: 10 INJECTION, EMULSION INTRAVENOUS at 09:17

## 2022-11-15 RX ADMIN — EPHEDRINE SULFATE 5 MG: 50 INJECTION INTRAVENOUS at 09:57

## 2022-11-15 RX ADMIN — SODIUM CHLORIDE, POTASSIUM CHLORIDE, SODIUM LACTATE AND CALCIUM CHLORIDE 100 ML/HR: 600; 310; 30; 20 INJECTION, SOLUTION INTRAVENOUS at 15:00

## 2022-11-15 RX ADMIN — HYDROCODONE BITARTRATE AND ACETAMINOPHEN 1 TABLET: 5; 325 TABLET ORAL at 20:10

## 2022-11-15 RX ADMIN — CEFAZOLIN SODIUM 2 G: 2 INJECTION, SOLUTION INTRAVENOUS at 08:58

## 2022-11-15 RX ADMIN — POLYETHYLENE GLYCOL 3350 17 G: 17 POWDER, FOR SOLUTION ORAL at 20:10

## 2022-11-15 RX ADMIN — ONDANSETRON 4 MG: 2 INJECTION INTRAMUSCULAR; INTRAVENOUS at 10:56

## 2022-11-15 RX ADMIN — DEXAMETHASONE SODIUM PHOSPHATE 8 MG: 4 INJECTION, SOLUTION INTRAMUSCULAR; INTRAVENOUS at 09:22

## 2022-11-15 RX ADMIN — AMITRIPTYLINE HYDROCHLORIDE 50 MG: 50 TABLET, FILM COATED ORAL at 20:10

## 2022-11-15 RX ADMIN — EPHEDRINE SULFATE 10 MG: 50 INJECTION INTRAVENOUS at 09:38

## 2022-11-15 RX ADMIN — BACLOFEN 10 MG: 10 TABLET ORAL at 16:27

## 2022-11-15 RX ADMIN — METOPROLOL TARTRATE 75 MG: 25 TABLET, FILM COATED ORAL at 05:53

## 2022-11-15 RX ADMIN — CEFAZOLIN SODIUM 2 G: 2 INJECTION, SOLUTION INTRAVENOUS at 16:27

## 2022-11-15 RX ADMIN — DOFETILIDE 250 MCG: 0.25 CAPSULE ORAL at 14:08

## 2022-11-15 RX ADMIN — SODIUM CHLORIDE, POTASSIUM CHLORIDE, SODIUM LACTATE AND CALCIUM CHLORIDE 500 ML: 600; 310; 30; 20 INJECTION, SOLUTION INTRAVENOUS at 08:28

## 2022-11-15 RX ADMIN — HYDROCODONE BITARTRATE AND ACETAMINOPHEN 1 TABLET: 5; 325 TABLET ORAL at 06:58

## 2022-11-15 RX ADMIN — GLYCOPYRROLATE 0.4 MCG: 1 INJECTION INTRAMUSCULAR; INTRAVENOUS at 12:20

## 2022-11-15 RX ADMIN — VANCOMYCIN HYDROCHLORIDE 1000 MG: 1 INJECTION, SOLUTION INTRAVENOUS at 08:28

## 2022-11-15 RX ADMIN — METFORMIN HYDROCHLORIDE 500 MG: 500 TABLET, FILM COATED ORAL at 18:23

## 2022-11-15 RX ADMIN — NEOSTIGMINE METHYLSULFATE 3 MG: 0.5 INJECTION INTRAVENOUS at 12:20

## 2022-11-15 RX ADMIN — SODIUM CHLORIDE, POTASSIUM CHLORIDE, SODIUM LACTATE AND CALCIUM CHLORIDE: 600; 310; 30; 20 INJECTION, SOLUTION INTRAVENOUS at 09:09

## 2022-11-15 RX ADMIN — TRANEXAMIC ACID 1000 MG: 1 INJECTION, SOLUTION INTRAVENOUS at 12:14

## 2022-11-15 RX ADMIN — LIDOCAINE HYDROCHLORIDE 30 MG: 20 INJECTION, SOLUTION INTRAVENOUS at 09:17

## 2022-11-15 RX ADMIN — ROCURONIUM BROMIDE 30 MG: 50 INJECTION INTRAVENOUS at 09:18

## 2022-11-15 RX ADMIN — INSULIN LISPRO 2 UNITS: 100 INJECTION, SOLUTION INTRAVENOUS; SUBCUTANEOUS at 16:48

## 2022-11-15 RX ADMIN — DOFETILIDE 250 MCG: 0.25 CAPSULE ORAL at 22:00

## 2022-11-15 RX ADMIN — ROCURONIUM BROMIDE 20 MG: 50 INJECTION INTRAVENOUS at 09:55

## 2022-11-15 RX ADMIN — POVIDONE-IODINE 1 EACH: 10 SOLUTION TOPICAL at 08:27

## 2022-11-15 RX ADMIN — TRANEXAMIC ACID 1000 MG: 1 INJECTION, SOLUTION INTRAVENOUS at 09:50

## 2022-11-15 RX ADMIN — DOCUSATE SODIUM 100 MG: 100 CAPSULE, LIQUID FILLED ORAL at 20:10

## 2022-11-15 RX ADMIN — BACLOFEN 10 MG: 10 TABLET ORAL at 20:11

## 2022-11-15 NOTE — ANESTHESIA POSTPROCEDURE EVALUATION
"Patient: Dior Pettit    Procedure Summary     Date: 11/15/22 Room / Location: [facility] OR  /  [facility]    Anesthesia Start: 0909 Anesthesia Stop: 1250    Procedure: Right anterior total hip arthroplasty (Right: Hip) Diagnosis:       Closed displaced fracture of right femoral neck (HCC)      (Closed displaced fracture of right femoral neck (HCC) [S72.001A])    Surgeons: Joao Martinez MD Provider: Saad Muro MD    Anesthesia Type: general ASA Status: 3          Anesthesia Type: general    Vitals  Vitals Value Taken Time   /66 11/15/22 1346   Temp 37.3 °C (99.1 °F) 11/15/22 1345   Pulse 73 11/15/22 1352   Resp 16 11/15/22 1345   SpO2 97 % 11/15/22 1352   Vitals shown include unvalidated device data.        Post Anesthesia Care and Evaluation    Patient location during evaluation: bedside  Patient participation: complete - patient participated  Level of consciousness: awake and alert  Pain management: adequate    Airway patency: patent  Anesthetic complications: No anesthetic complications    Cardiovascular status: acceptable  Respiratory status: acceptable  Hydration status: acceptable    Comments: /66 (BP Location: Right arm, Patient Position: Lying)   Pulse 73   Temp 37.3 °C (99.1 °F) (Oral)   Resp 16   Ht 170.2 cm (67\")   Wt 64.9 kg (143 lb)   LMP  (LMP Unknown)   SpO2 98%   BMI 22.40 kg/m²       "

## 2022-11-15 NOTE — PLAN OF CARE
Goal Outcome Evaluation:      VSS, pain controlled with PRN Norco, voiding per purewick, NS @ 75ml/hr, consent signed and CHG bath given. Daughter is at bedside.

## 2022-11-15 NOTE — ANESTHESIA PREPROCEDURE EVALUATION
Anesthesia Evaluation     Patient summary reviewed   no history of anesthetic complications:  NPO Solid Status: > 8 hours  NPO Liquid Status: > 2 hours           Airway   Mallampati: II  TM distance: >3 FB  Neck ROM: full  No difficulty expected  Dental      Pulmonary     breath sounds clear to auscultation  (+) asthma,shortness of breath, sleep apnea (NO CPAP),   (-) recent URI  Cardiovascular     ECG reviewed  Patient on routine beta blocker  Rhythm: regular  Rate: normal    (+) hypertension, valvular problems/murmurs (AVR 2007, mild aortic root dialation) MR and TI, CAD, dysrhythmias Paroxysmal Atrial Fib, Bradycardia, angina, CHF (Pulmonary hypertension ) , hyperlipidemia,     ROS comment: Ectopic atrial rhythm  Atrial premature complex  Left anterior fascicular block  Abnormal R-wave progression, late transition  Left ventricular hypertrophy  No change from previous tracing    ? Calculated left ventricular EF = 73.7% Estimated left ventricular EF = 74% Estimated left ventricular EF was in agreement with the calculated left ventricular EF. Left ventricular systolic function is hyperdynamic (EF > 70%). The left ventricular cavity is small in size. Left ventricular wall thickness is consistent with mild concentric hypertrophy. All left ventricular wall segments contract normally. Left ventricular diastolic function is consistent with (grade II w/high LAP) pseudonormalization.  ? There is a 21 mm, bovine, Poon bioprosthetic aortic valve present. The aortic valve peak and mean gradients are elevated. There is no significant prosthetic valve regurgitation. There is an increased gradient across the prosthetic valve consistent with prosthetic valve stenosis.  ? Severe mitral annular calcification is present. There is mild, anterior mitral leaflet thickening present. There is severe, posterior mitral leaflet thickening present. Trace mitral valve regurgitation is present. Mild mitral valve stenosis is present. The  mean gradient is 5 mmHg at a heart of 74 bpm  ? Mild tricuspid valve regurgitation is present. Estimated right ventricular systolic pressure from tricuspid regurgitation is moderately elevated (45-55 mmHg). Calculated right ventricular systolic pressure from tricuspid regurgitation is 46.2 mmHg.  ? Borderline dilation of the ascending aorta is present. Ascending aorta = 3.8 cm      Neuro/Psych  (+) headaches, dizziness/light headedness,    GI/Hepatic/Renal/Endo    (+)  GERD,  renal disease CRI, diabetes mellitus type 2, thyroid problem hypothyroidism    Musculoskeletal     (+) neck pain,   Abdominal    Substance History      OB/GYN          Other   arthritis,                        Anesthesia Plan    ASA 3     general     intravenous induction     Anesthetic plan, risks, benefits, and alternatives have been provided, discussed and informed consent has been obtained with: patient.

## 2022-11-15 NOTE — ANESTHESIA PROCEDURE NOTES
Airway  Date/Time: 11/15/2022 9:20 AM  Airway not difficult    General Information and Staff    Anesthesiologist: Pieter Brown MD  CRNA/CAA: Areli Day CRNA    Indications and Patient Condition    Preoxygenated: yes  Mask difficulty assessment: 1 - vent by mask    Final Airway Details  Final airway type: endotracheal airway      Successful airway: ETT  Cuffed: yes   Successful intubation technique: direct laryngoscopy  Facilitating devices/methods: intubating stylet  Endotracheal tube insertion site: oral  Blade: Stephane  Blade size: 4  ETT size (mm): 7.0  Cormack-Lehane Classification: grade IIa - partial view of glottis  Placement verified by: chest auscultation and capnometry   Measured from: teeth  ETT/EBT  to teeth (cm): 20  Number of attempts at approach: 1  Assessment: lips, teeth, and gum same as pre-op and atraumatic intubation

## 2022-11-15 NOTE — OP NOTE
Operative Report        Facility: [facility]  Patient Name: Dior Pettit  YOB: 1940  Date: 11/15/2022  Medical Record Number: [id number]       Preoperative diagnosis: right  Femoral neck fracture     Postoperative diagnosis:  right Femoral neck fracture     Surgery performed: right Total hip arthroplasty     Implants:    Implant Name Type Inv. Item Serial No.  Lot No. LRB No. Used Action   SHLL ACET OSSEOTI G7 3H KEITH 52MM - [id number] Implant SHLL ACET OSSEOTI G7 3H KEITH 52MM  [employer] [id number] Right 1 Implanted   LINER G7 2MOBL KEITH 42MM - [id number] Implant LINER G7 2MOBL KEITH 42MM  [employer] [id number] Right 1 Implanted   SCRW ACET JEZ TRILOGY S/TAP 6.5X30 - [id number] Implant SCRW ACET JEZ TRILOGY S/TAP 6.5X30  [employer] [id number] Right 1 Implanted   STEM FEM/HIP AVENIRCOMPLETE COLAR HA STD SZ7.5 - [id number] Implant STEM FEM/HIP AVENIRCOMPLETE COLAR HA STD SZ7.5  [employer] [id number] Right 1 Implanted   BEAR HIP VIVACIT/E 2MOBIL HXPE 64K04AW - [id number] Implant BEAR HIP VIVACIT/E 2MOBIL HXPE [id number]  [employer] [id number] Right 1 Implanted   HD FEM/HIP BIOLOX/DELTA CERAM 12/26N05QI - [id number] Implant HD FEM/HIP BIOLOX/DELTA CERAM 12/32E44PL  [employer] [id number] Right 1 Implanted   DEV CONTRL TISS STRATAFIX SPIRAL MNCRYL UD 3/0 PLS 30CM - [id number] Implant DEV CONTRL TISS STRATAFIX SPIRAL MNCRYL UD 3/0 PLS 30CM  [id number] Right 1 Implanted           Surgeon: Joao Martinez MD      Assistant: TOSHA Paulson whose assistance was critical for help with patient positioning, suctioning and irrigation, retraction, manipulation of the extremity for insertion of the implants, wound closure and application of the bandages.  Her assistance was critical to the success of this case.          Anesthesia: General    Estimated blood loss: 200 mL     Drains: None     Complications: None    Specimens: None     Disposition: The  patient will be transferred back to the med-surge floor postoperatively and will be weightbearing as tolerated.  The patient will be started on Eliquis for DVT prophylaxis and will mobilize with physical therapy likely tomorrow.  Once medically stable patient will be discharged home or to an outpatient facility per the primary team and family.     Indications for procedure: This is a pleasant 81yo female who presented to the ER after a ground-level fall.  X-rays were taken demonstrate a right femoral neck fracture.  Orthopedics was consulted.  The injury and treatment options were discussed with the patient and her family They elected for surgical intervention and we decided on a total hip arthroplasty due to the patient's  functional and  mental status.  Risk benefits were explained and include but are not limited to bleeding, infection, fracture, dislocation, blood clots, damage to nearby nerves or blood vessels, death, loosening, leg length inequality, infection from a blood transfusion, pneumonia, heart attack, stroke, liver or kidney failure, possible future procedures/ surgeries.  The patient and her family wish to proceed with surgery all questions were answered.     Description of procedure:     After identification in the holding area, the patient was brought to the operating theater.  After induction of anesthesia, the patient received preoperative antibiotics and tranexamic acid.  The patient was placed in a Hubbell table in a supine position and the right lower extremity was prepped and draped free.  A timeout was performed identifying correct patient, surgical site, surgical procedure, antibiotics given and implants available.  Standard anterior approach to the hip was performed.  I used a clean knife to incise the tensor fascia.  I coagulated the circumflex vessels with the Aquamantys.  I retracted the rectus muscle medially coming down to the anterior capsule.  An inverted T capsulotomy was made and the  capsule was incised, tagged and saved for later repair.  Upon making the capsulotomy the fracture was identified.  Hematoma was expressed.  I decided to make a napkin ring cut. I then continued with a femoral neck osteotomy/cleanup cut with a sagittal saw which was made based upon preoperative templating and intraoperative measurements for leg length.  The napkin ring fragment was removed a corkscrew was inserted into the femoral head and was removed.  The head ball measured about 46 mm.  Loose fracture fragments were removed and the hip was irrigated. Attention was then turned to the acetabulum.  The foveal and labral contents were excised.  The C-arm was brought in in order to ream under x-ray guidance to ensure appropriate depth and position.  The acetabulum was reamed with hemispherical reamers to size 51 mm.  This brought me down to good bleeding cancellous bone.  The acetabulum was copiously irrigated with normal sterile saline to remove any remaining debris.    A real 52 mm cup was impacted under fluoroscopic guidance in 40 degrees of abduction and 20 degrees of forward flexion/anteversion.  Good press-fit was achieved. 1 Dome screw were placed and position was verified using C arm.  The cup was copiously irrigated with normal sterile saline.   The final dual mobility neutral liner for a 42 mm outer diameter and 28 mm head ball was then impacted into place.  Attention was then turned back to the femur.  The hook was placed on the lateral femur.  The leg was externally rotated, extended and adducted.  The piriformis  capsule was released off the femur and the femur was elevated into the wound.  I used a rongeur to remove some lateral cortical neck bone.  I then lateralized with a rasp.  I then began sequentially broaching  up to a size 7.5 stem.  This brought me down to good cortical contact with rotational stability.  I then trialed with a standard neck and a -3.5 head ball.  The hip was reduced.  Fluoroscopy  confirmed good size/fill, position of the femoral stem, with near equal offset and leg lengths but I felt we could increase head ball to make up the difference as were about 3 mm short.  The hip was dislocated.  Trial components were removed.  The final size 7.5 stem was impacted into place and dual mobility 42 mm outer head ball and inner head ball 28 mm head ball were begun on the back table and were impacted.  The hip was relocated.  Stability was checked actually rotated 90 degrees bone hook was placed around the neck cannot dislocate.  Stability was found to be excellent.  Final x-rays were taken verifying proper implant placement no fractures appreciated.  Offset was well matched we are 7 mm leg length.  Wounds were copiously irrigated with dilute Betadine and pulse lavage with normal sterile saline.  The capsule was then closed with #2 Ethibond suture.  I injected the anterior capsule with about 10 mL of the hip ortho cocktail mix. The circumflex vessels were recoagulated with the Aquamantys. Excellent hemostasis was maintained.  Additional hip Ortho cocktail mix was administered and deep fascia was closed using a running 0 Vicryl suture.  Deep subcutaneous tissues were closed using 2-0 Vicryl and 3-0 Strata-fix was used for the skin.  Zip tie placed on skin.  Sterile dressings were applied prior to drapes being removed.    All sponge, needles, and instrument counts were correct at the conclusion of the procedure. The patient tolerated procedure well and was transferred from the operating room to the PACU vital signs stable.

## 2022-11-15 NOTE — PROGRESS NOTES
"DAILY PROGRESS NOTE  [facility]    Patient Identification:  Name: Dior Pettit  Age: 82 y.o.  Sex: female  :  1940  MRN: [id number]         Primary Care Physician: Ashanti Hong APRN    Subjective:  Interval History:She complains of pain.    Objective:    Scheduled Meds:amitriptyline, 50 mg, Oral, Nightly  [START ON 2022] apixaban, 2.5 mg, Oral, Q12H  baclofen, 10 mg, Oral, TID  ceFAZolin, 2 g, Intravenous, Q8H  docusate sodium, 100 mg, Oral, BID  dofetilide, 250 mcg, Oral, Q12H  insulin lispro, 0-7 Units, Subcutaneous, TID AC  levothyroxine, 50 mcg, Oral, Every Other Day  levothyroxine, 75 mcg, Oral, Every Other Day  metFORMIN, 500 mg, Oral, BID With Meals  metoprolol tartrate, 75 mg, Oral, BID  pantoprazole, 40 mg, Oral, QAM  polyethylene glycol, 17 g, Oral, BID  sodium chloride, 10 mL, Intravenous, Q12H  venlafaxine XR, 150 mg, Oral, Daily      Continuous Infusions:lactated ringers, 9 mL/hr  lactated ringers, 100 mL/hr        Vital signs in last 24 hours:  Temp:  [97.4 °F (36.3 °C)-99.4 °F (37.4 °C)] 98 °F (36.7 °C)  Heart Rate:  [73-89] 73  Resp:  [14-18] 16  BP: (124-159)/(66-93) 124/69    Intake/Output:    Intake/Output Summary (Last 24 hours) at 11/15/2022 1609  Last data filed at 11/15/2022 1236  Gross per 24 hour   Intake 920 ml   Output 1100 ml   Net -180 ml       Exam:  /69 (BP Location: Right arm, Patient Position: Lying)   Pulse 73   Temp 98 °F (36.7 °C) (Oral)   Resp 16   Ht 170.2 cm (67\")   Wt 64.9 kg (143 lb)   LMP  (LMP Unknown)   SpO2 99%   BMI 22.40 kg/m²     General Appearance:    Alert, cooperative, no distress   Head:    Normocephalic, without obvious abnormality, atraumatic   Eyes:       Throat:   Lips, tongue, gums normal   Neck:   Supple, symmetrical, trachea midline, no JVD   Lungs:     Clear to auscultation bilaterally, respirations unlabored   Chest Wall:    No tenderness or deformity    Heart:    Regular rate and rhythm, S1 and S2 " normal, no murmur,no  Rub or gallop   Abdomen:     Soft, nontender, bowel sounds active, no masses, no organomegaly    Extremities:   Extremities normal, right hip with surgical changes., no cyanosis or edema   Pulses:      Skin:   Skin is warm and dry,  no rashes or palpable lesions   Neurologic:   no focal deficits noted      Lab Results (last 72 hours)     Procedure Component Value Units Date/Time    POC Glucose Once [[id number]]  (Normal) Collected: 11/14/22 1107    Specimen: Blood Updated: 11/14/22 1108     Glucose 120 mg/dL      Comment: Meter: [id number] : [id number] Shane GERONIMO       Basic Metabolic Panel [[id number]]  (Abnormal) Collected: 11/14/22 0430    Specimen: Blood Updated: 11/14/22 0625     Glucose 156 mg/dL      BUN 15 mg/dL      Creatinine 0.77 mg/dL      Sodium 132 mmol/L      Potassium 5.0 mmol/L      Chloride 94 mmol/L      CO2 29.8 mmol/L      Calcium 10.1 mg/dL      BUN/Creatinine Ratio 19.5     Anion Gap 8.2 mmol/L      eGFR 76.2 mL/min/1.73      Comment: National Kidney Foundation and American Society of Nephrology (ASN) Task Force recommended calculation based on the Chronic Kidney Disease Epidemiology Collaboration (CKD-EPI) equation refit without adjustment for race.       Narrative:      GFR Normal >60  Chronic Kidney Disease <60  Kidney Failure <15    The GFR formula is only valid for adults with stable renal function between ages 18 and 70.    POC Glucose Once [[id number]]  (Abnormal) Collected: 11/14/22 0615    Specimen: Blood Updated: 11/14/22 0619     Glucose 137 mg/dL      Comment: Meter: [id number] : [id number] King Yue TILLEY       TSH [[id number]]  (Normal) Collected: 11/14/22 0430    Specimen: Blood Updated: 11/14/22 0542     TSH 0.677 uIU/mL     T4, Free [[id number]]  (Normal) Collected: 11/14/22 0430    Specimen: Blood Updated: 11/14/22 0542     Free T4 1.22 ng/dL     Narrative:      Results may be falsely increased if patient taking Biotin.      CBC Auto Differential  [[id number]]  (Abnormal) Collected: 11/14/22 0430    Specimen: Blood Updated: 11/14/22 0516     WBC 14.63 10*3/mm3      RBC 4.52 10*6/mm3      Hemoglobin 13.6 g/dL      Hematocrit 40.8 %      MCV 90.3 fL      MCH 30.1 pg      MCHC 33.3 g/dL      RDW 14.3 %      RDW-SD 46.9 fl      MPV 10.0 fL      Platelets 255 10*3/mm3      Neutrophil % 84.4 %      Lymphocyte % 6.8 %      Monocyte % 7.2 %      Eosinophil % 0.8 %      Basophil % 0.3 %      Immature Grans % 0.5 %      Neutrophils, Absolute 12.35 10*3/mm3      Lymphocytes, Absolute 0.99 10*3/mm3      Monocytes, Absolute 1.06 10*3/mm3      Eosinophils, Absolute 0.12 10*3/mm3      Basophils, Absolute 0.04 10*3/mm3      Immature Grans, Absolute 0.07 10*3/mm3      nRBC 0.0 /100 WBC     POC Glucose Once [[id number]]  (Abnormal) Collected: 11/13/22 2138    Specimen: Blood Updated: 11/13/22 2139     Glucose 168 mg/dL      Comment: Meter: [id number] : [id number] Ariela GERONIMO       POC Glucose Once [[id number]]  (Normal) Collected: 11/13/22 1629    Specimen: Blood Updated: 11/13/22 1630     Glucose 126 mg/dL      Comment: Meter: [id number] : [id number] Mason GERONIMO       Hemoglobin A1c [[id number]]  (Abnormal) Collected: 11/13/22 1122    Specimen: Blood Updated: 11/13/22 1455     Hemoglobin A1C 6.10 %     Narrative:      Hemoglobin A1C Ranges:    Increased Risk for Diabetes  5.7% to 6.4%  Diabetes                     >= 6.5%  Diabetic Goal                < 7.0%    Comprehensive Metabolic Panel [[id number]]  (Abnormal) Collected: 11/13/22 1346    Specimen: Blood from Arm, Right Updated: 11/13/22 1421     Glucose 136 mg/dL      BUN 18 mg/dL      Creatinine 0.82 mg/dL      Sodium 135 mmol/L      Potassium 4.6 mmol/L      Comment: Slight hemolysis detected by analyzer. Results may be affected.        Chloride 100 mmol/L      CO2 25.0 mmol/L      Calcium 9.6 mg/dL      Total Protein 7.2 g/dL      Albumin 4.60 g/dL      ALT (SGPT) 17 U/L      AST (SGOT) 28 U/L       Alkaline Phosphatase 58 U/L      Total Bilirubin 0.5 mg/dL      Globulin 2.6 gm/dL      A/G Ratio 1.8 g/dL      BUN/Creatinine Ratio 22.0     Anion Gap 10.0 mmol/L      eGFR 70.6 mL/min/1.73      Comment: National Kidney Foundation and American Society of Nephrology (ASN) Task Force recommended calculation based on the Chronic Kidney Disease Epidemiology Collaboration (CKD-EPI) equation refit without adjustment for race.       Narrative:      GFR Normal >60  Chronic Kidney Disease <60  Kidney Failure <15    The GFR formula is only valid for adults with stable renal function between ages 18 and 70.    Protime-INR [[id number]]  (Normal) Collected: 11/13/22 1346    Specimen: Blood from Arm, Right Updated: 11/13/22 1417     Protime 14.2 Seconds      INR 1.08    CBC & Differential [[id number]]  (Abnormal) Collected: 11/13/22 1122    Specimen: Blood Updated: 11/13/22 1140    Narrative:      The following orders were created for panel order CBC & Differential.  Procedure                               Abnormality         Status                     ---------                               -----------         ------                     CBC Auto Differential[[id number]]        Abnormal            Final result                 Please view results for these tests on the individual orders.    CBC Auto Differential [[id number]]  (Abnormal) Collected: 11/13/22 1122    Specimen: Blood Updated: 11/13/22 1140     WBC 10.53 10*3/mm3      RBC 4.34 10*6/mm3      Hemoglobin 13.2 g/dL      Hematocrit 40.1 %      MCV 92.4 fL      MCH 30.4 pg      MCHC 32.9 g/dL      RDW 14.4 %      RDW-SD 48.5 fl      MPV 9.8 fL      Platelets 233 10*3/mm3      Neutrophil % 81.5 %      Lymphocyte % 11.5 %      Monocyte % 5.7 %      Eosinophil % 0.5 %      Basophil % 0.5 %      Immature Grans % 0.3 %      Neutrophils, Absolute 8.59 10*3/mm3      Lymphocytes, Absolute 1.21 10*3/mm3      Monocytes, Absolute 0.60 10*3/mm3      Eosinophils, Absolute 0.05 10*3/mm3       Basophils, Absolute 0.05 10*3/mm3      Immature Grans, Absolute 0.03 10*3/mm3      nRBC 0.0 /100 WBC         Data Review:  Results from last 7 days   Lab Units 11/15/22  0505 11/14/22  0430 11/13/22  1346   SODIUM mmol/L 131* 132* 135*   POTASSIUM mmol/L 4.6 5.0 4.6   CHLORIDE mmol/L 95* 94* 100   CO2 mmol/L 28.0 29.8* 25.0   BUN mg/dL 14 15 18   CREATININE mg/dL 0.67 0.77 0.82   GLUCOSE mg/dL 160* 156* 136*   CALCIUM mg/dL 9.5 10.1 9.6     Results from last 7 days   Lab Units 11/15/22  0505 11/14/22  0430 11/13/22  1122   WBC 10*3/mm3 13.15* 14.63* 10.53   HEMOGLOBIN g/dL 12.8 13.6 13.2   HEMATOCRIT % 38.6 40.8 40.1   PLATELETS 10*3/mm3 218 255 233     Results from last 7 days   Lab Units 11/14/22  0430   TSH uIU/mL 0.677   FREE T4 ng/dL 1.22     Results from last 7 days   Lab Units 11/13/22  1122   HEMOGLOBIN A1C % 6.10*     Lab Results   Lab Value Date/Time    TROPONINT <0.010 10/03/2022 0805    TROPONINT <0.010 09/11/2022 1926    TROPONINT <0.010 09/11/2022 1648    TROPONINT <0.010 12/11/2021 1505    TROPONINT <0.010 01/31/2019 2331    TROPONINT <0.010 03/07/2017 2037     Results from last 7 days   Lab Units 11/09/22  1035   TRIGLYCERIDES mg/dL 133   HDL CHOL mg/dL 51   LDL CHOL mg/dL 101*     Results from last 7 days   Lab Units 11/13/22  1346   ALK PHOS U/L 58   BILIRUBIN mg/dL 0.5   ALT (SGPT) U/L 17   AST (SGOT) U/L 28     Results from last 7 days   Lab Units 11/14/22  0430   TSH uIU/mL 0.677   FREE T4 ng/dL 1.22     Results from last 7 days   Lab Units 11/13/22  1122   HEMOGLOBIN A1C % 6.10*     Glucose   Date/Time Value Ref Range Status   11/15/2022 0629 152 (H) 70 - 130 mg/dL Final     Comment:     Meter: [id number] : [id number] Anabela GERONIMO   11/14/2022 2103 154 (H) 70 - 130 mg/dL Final     Comment:     Meter: [id number] : [id number] Anabela GERONIMO   11/14/2022 1633 133 (H) 70 - 130 mg/dL Final     Comment:     Meter: [id number] : [id number] Shane GERONIMO   11/14/2022 1107 120 70  - 130 mg/dL Final     Comment:     Meter: [id number] : [id number] Shane Zamora NA   11/14/2022 0615 137 (H) 70 - 130 mg/dL Final     Comment:     Meter: [id number] : [id number] King Yue RN   11/13/2022 2138 168 (H) 70 - 130 mg/dL Final     Comment:     Meter: [id number] : [id number] Ariela Galo NA   11/13/2022 1629 126 70 - 130 mg/dL Final     Comment:     Meter: [id number] : [id number] Mason April NA     Results from last 7 days   Lab Units 11/13/22  1346   INR  1.08       Past Medical History:   Diagnosis Date   • Allergic rhinitis    • Anemia    • Anxiety     Controlled w/Meds   • Aortic root dilatation (HCC) 10/02/2017    Borderline--Noted on Echo   • Aortic valve calcification 10/02/2017    Noted on Echo   • Aortic valve insufficiency Dx in 07    w/AVR    • Aortic valve prosthesis present 11/02/2018    Noted on CTA Chest   • Ascending aorta dilatation (HCC) 10/02/2017    Borderline--Noted on Echo   • Asthma    • Atypical chest pain    • Breast pain, right Hx   • CAD (coronary artery disease)    • Cardiomegaly 2017   • Cervicalgia    • Chest pain due to CAD (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)     T2   • Esophagitis    • GERD (gastroesophageal reflux disease)     Controlled w/Meds   • Headache    • Health care maintenance    • Heart murmur    • History of echocardiogram 10/2/17-[facility]    EF 66%; Borderline Concentric Hypertrophy; Mild Calcification in AV; Mild AVS; Mild AVR; Moderate TVR; Borderline Dilation of Aortic Root/Arch & Borderline Dilation of Ascending/Proximal Aorta Present   • Hyperlipidemia     Controlled w/Meds   • Hypertension     Controlled w/Meds   • Hypothyroidism     Controlled w/Synthroid   • Insomnia    • Macular degeneration, left eye    • Mild aortic valve regurgitation 10/02/2017    Noted on Echo   • Mild aortic valve  stenosis 10/02/2017    Noted on Echo   • Mild dilation of ascending aorta (HCC) 10/02/2017    Borderline--Noted on Echo   • Moderate tricuspid valve regurgitation 10/02/2017    Noted on Echo   • Mood disorder in conditions classified elsewhere     Controlled w/Meds   • Near syncope 03/2017-Merged with [facility] ER   • Neuropathy    • NNEKA (obstructive sleep apnea)     Untreated   • Osteoarthritis     Ankles/Feet   • Pulmonary hypertension (HCC) 2017   • Renal insufficiency    • RLS (restless legs syndrome)    • Thoracic ascending aortic aneurysm Dx in 2007 @ 3.8CM & 1/02/2018    Noted @ 4CM on CTA Chest;   • Visual impairment     macular degeneratuin       Assessment:  Active Hospital Problems    Diagnosis  POA   • **Closed displaced fracture of right femoral neck ([facility]) [S72.001A]  Yes   • Fall [W19.XXXA]  Unknown   • PAF (paroxysmal atrial fibrillation) ([facility]) [I48.0]  Yes   • S/P AVR [Z95.2]  Not Applicable   • Mild intermittent asthma without complication [J45.20]  Yes   • Anxiety [F41.9]  Yes   • Restless leg syndrome [G25.81]  Yes   • Hypothyroidism [E03.9]  Yes   • Hypercholesteremia [E78.00]  Yes   • Gastroesophageal reflux disease [K21.9]  Yes   • Essential hypertension [I10]  Yes   • Pulmonary hypertension (HCC) [I27.20]  Yes   • NNEKA (obstructive sleep apnea) [G47.33]  Yes      Resolved Hospital Problems   No resolved problems to display.       Plan:  Post op care.  Follow lab.  Restart anticoagulation anticoagulation when OK with surgery. DC planning. Will need SNU for rehab.  Mark Ruiz MD  11/15/2022  16:09 EST

## 2022-11-16 LAB
ANION GAP SERPL CALCULATED.3IONS-SCNC: 10.2 MMOL/L (ref 5–15)
BASOPHILS # BLD AUTO: 0.02 10*3/MM3 (ref 0–0.2)
BASOPHILS NFR BLD AUTO: 0.2 % (ref 0–1.5)
BUN SERPL-MCNC: 22 MG/DL (ref 8–23)
BUN/CREAT SERPL: 25.3 (ref 7–25)
CALCIUM SPEC-SCNC: 8.8 MG/DL (ref 8.6–10.5)
CHLORIDE SERPL-SCNC: 93 MMOL/L (ref 98–107)
CO2 SERPL-SCNC: 26.8 MMOL/L (ref 22–29)
CREAT SERPL-MCNC: 0.87 MG/DL (ref 0.57–1)
DEPRECATED RDW RBC AUTO: 48.7 FL (ref 37–54)
EGFRCR SERPLBLD CKD-EPI 2021: 65.8 ML/MIN/1.73
EOSINOPHIL # BLD AUTO: 0.13 10*3/MM3 (ref 0–0.4)
EOSINOPHIL NFR BLD AUTO: 1.3 % (ref 0.3–6.2)
ERYTHROCYTE [DISTWIDTH] IN BLOOD BY AUTOMATED COUNT: 14.4 % (ref 12.3–15.4)
GLUCOSE BLDC GLUCOMTR-MCNC: 119 MG/DL (ref 70–130)
GLUCOSE BLDC GLUCOMTR-MCNC: 129 MG/DL (ref 70–130)
GLUCOSE BLDC GLUCOMTR-MCNC: 132 MG/DL (ref 70–130)
GLUCOSE SERPL-MCNC: 134 MG/DL (ref 65–99)
HCT VFR BLD AUTO: 30.6 % (ref 34–46.6)
HGB BLD-MCNC: 10.2 G/DL (ref 12–15.9)
IMM GRANULOCYTES # BLD AUTO: 0.05 10*3/MM3 (ref 0–0.05)
IMM GRANULOCYTES NFR BLD AUTO: 0.5 % (ref 0–0.5)
LYMPHOCYTES # BLD AUTO: 1.32 10*3/MM3 (ref 0.7–3.1)
LYMPHOCYTES NFR BLD AUTO: 13.1 % (ref 19.6–45.3)
MCH RBC QN AUTO: 30.8 PG (ref 26.6–33)
MCHC RBC AUTO-ENTMCNC: 33.3 G/DL (ref 31.5–35.7)
MCV RBC AUTO: 92.4 FL (ref 79–97)
MONOCYTES # BLD AUTO: 1.06 10*3/MM3 (ref 0.1–0.9)
MONOCYTES NFR BLD AUTO: 10.5 % (ref 5–12)
NEUTROPHILS NFR BLD AUTO: 7.47 10*3/MM3 (ref 1.7–7)
NEUTROPHILS NFR BLD AUTO: 74.4 % (ref 42.7–76)
NRBC BLD AUTO-RTO: 0 /100 WBC (ref 0–0.2)
PLATELET # BLD AUTO: 188 10*3/MM3 (ref 140–450)
PMV BLD AUTO: 10.1 FL (ref 6–12)
POTASSIUM SERPL-SCNC: 4.5 MMOL/L (ref 3.5–5.2)
RBC # BLD AUTO: 3.31 10*6/MM3 (ref 3.77–5.28)
SODIUM SERPL-SCNC: 130 MMOL/L (ref 136–145)
WBC NRBC COR # BLD: 10.05 10*3/MM3 (ref 3.4–10.8)

## 2022-11-16 PROCEDURE — 99024 POSTOP FOLLOW-UP VISIT: CPT | Performed by: ORTHOPAEDIC SURGERY

## 2022-11-16 PROCEDURE — 97161 PT EVAL LOW COMPLEX 20 MIN: CPT

## 2022-11-16 PROCEDURE — 97530 THERAPEUTIC ACTIVITIES: CPT

## 2022-11-16 PROCEDURE — 80048 BASIC METABOLIC PNL TOTAL CA: CPT | Performed by: HOSPITALIST

## 2022-11-16 PROCEDURE — 85025 COMPLETE CBC W/AUTO DIFF WBC: CPT | Performed by: HOSPITALIST

## 2022-11-16 PROCEDURE — 25010000002 CEFAZOLIN IN DEXTROSE 2-4 GM/100ML-% SOLUTION: Performed by: ORTHOPAEDIC SURGERY

## 2022-11-16 PROCEDURE — 82962 GLUCOSE BLOOD TEST: CPT

## 2022-11-16 PROCEDURE — 97116 GAIT TRAINING THERAPY: CPT

## 2022-11-16 RX ADMIN — AMITRIPTYLINE HYDROCHLORIDE 50 MG: 50 TABLET, FILM COATED ORAL at 22:31

## 2022-11-16 RX ADMIN — BACLOFEN 10 MG: 10 TABLET ORAL at 15:52

## 2022-11-16 RX ADMIN — POLYETHYLENE GLYCOL 3350 17 G: 17 POWDER, FOR SOLUTION ORAL at 22:33

## 2022-11-16 RX ADMIN — DOFETILIDE 250 MCG: 0.25 CAPSULE ORAL at 12:09

## 2022-11-16 RX ADMIN — HYDROCODONE BITARTRATE AND ACETAMINOPHEN 1 TABLET: 5; 325 TABLET ORAL at 00:48

## 2022-11-16 RX ADMIN — PANTOPRAZOLE SODIUM 40 MG: 40 TABLET, DELAYED RELEASE ORAL at 06:06

## 2022-11-16 RX ADMIN — HYDROCODONE BITARTRATE AND ACETAMINOPHEN 1 TABLET: 5; 325 TABLET ORAL at 14:51

## 2022-11-16 RX ADMIN — BACLOFEN 10 MG: 10 TABLET ORAL at 22:31

## 2022-11-16 RX ADMIN — POLYETHYLENE GLYCOL 3350 17 G: 17 POWDER, FOR SOLUTION ORAL at 08:45

## 2022-11-16 RX ADMIN — HYDROCODONE BITARTRATE AND ACETAMINOPHEN 1 TABLET: 5; 325 TABLET ORAL at 22:32

## 2022-11-16 RX ADMIN — LEVOTHYROXINE SODIUM 75 MCG: 0.07 TABLET ORAL at 08:44

## 2022-11-16 RX ADMIN — DOCUSATE SODIUM 100 MG: 100 CAPSULE, LIQUID FILLED ORAL at 08:44

## 2022-11-16 RX ADMIN — Medication 10 ML: at 08:45

## 2022-11-16 RX ADMIN — APIXABAN 5 MG: 5 TABLET, FILM COATED ORAL at 22:31

## 2022-11-16 RX ADMIN — Medication 10 ML: at 22:33

## 2022-11-16 RX ADMIN — BACLOFEN 10 MG: 10 TABLET ORAL at 08:45

## 2022-11-16 RX ADMIN — METFORMIN HYDROCHLORIDE 500 MG: 500 TABLET, FILM COATED ORAL at 08:44

## 2022-11-16 RX ADMIN — CEFAZOLIN SODIUM 2 G: 2 INJECTION, SOLUTION INTRAVENOUS at 02:09

## 2022-11-16 RX ADMIN — APIXABAN 2.5 MG: 2.5 TABLET, FILM COATED ORAL at 08:45

## 2022-11-16 RX ADMIN — DOFETILIDE 250 MCG: 0.25 CAPSULE ORAL at 22:33

## 2022-11-16 RX ADMIN — DOCUSATE SODIUM 100 MG: 100 CAPSULE, LIQUID FILLED ORAL at 22:32

## 2022-11-16 RX ADMIN — METFORMIN HYDROCHLORIDE 500 MG: 500 TABLET, FILM COATED ORAL at 17:55

## 2022-11-16 RX ADMIN — SODIUM CHLORIDE, POTASSIUM CHLORIDE, SODIUM LACTATE AND CALCIUM CHLORIDE 100 ML/HR: 600; 310; 30; 20 INJECTION, SOLUTION INTRAVENOUS at 14:52

## 2022-11-16 RX ADMIN — METOPROLOL TARTRATE 75 MG: 25 TABLET, FILM COATED ORAL at 22:31

## 2022-11-16 RX ADMIN — VENLAFAXINE HYDROCHLORIDE 150 MG: 150 CAPSULE, EXTENDED RELEASE ORAL at 08:44

## 2022-11-16 RX ADMIN — METOPROLOL TARTRATE 75 MG: 25 TABLET, FILM COATED ORAL at 08:44

## 2022-11-16 NOTE — PROGRESS NOTES
Orthopedic Progress Note      Patient: Dior Pettit  Date of Admission: 11/13/2022  YOB: 1940  Medical Record Number: [id number]    POD # :  1 Day Post-Op Procedure(s) (LRB):  Right anterior total hip arthroplasty (Right)    Resting well. Pain controlled. No complaints.      Physical Exam:  82 y.o.  female  Vitals:  Temp:  [97.2 °F (36.2 °C)-99.4 °F (37.4 °C)] 97.2 °F (36.2 °C)  Heart Rate:  [70-92] 88  Resp:  [14-18] 16  BP: ()/(55-77) 136/76  alert and oriented    Ext: NV intact. ROM appropriate. Calf is soft and nontender. Negative Homans Sn.  2+ pedal pulses  Skin: Incision clean dry and intact w/out signs or  symptoms of infection.    Activity: Mobilizing Per P.T.   Weight Bearing: As Tolerated    Data Review     Admission on 11/13/2022   Component Date Value Ref Range Status   • QT Interval 11/13/2022 425  ms Final   • Glucose 11/13/2022 136 (H)  65 - 99 mg/dL Final   • BUN 11/13/2022 18  8 - 23 mg/dL Final   • Creatinine 11/13/2022 0.82  0.57 - 1.00 mg/dL Final   • Sodium 11/13/2022 135 (L)  136 - 145 mmol/L Final   • Potassium 11/13/2022 4.6  3.5 - 5.2 mmol/L Final    Slight hemolysis detected by analyzer. Results may be affected.   • Chloride 11/13/2022 100  98 - 107 mmol/L Final   • CO2 11/13/2022 25.0  22.0 - 29.0 mmol/L Final   • Calcium 11/13/2022 9.6  8.6 - 10.5 mg/dL Final   • Total Protein 11/13/2022 7.2  6.0 - 8.5 g/dL Final   • Albumin 11/13/2022 4.60  3.50 - 5.20 g/dL Final   • ALT (SGPT) 11/13/2022 17  1 - 33 U/L Final   • AST (SGOT) 11/13/2022 28  1 - 32 U/L Final   • Alkaline Phosphatase 11/13/2022 58  39 - 117 U/L Final   • Total Bilirubin 11/13/2022 0.5  0.0 - 1.2 mg/dL Final   • Globulin 11/13/2022 2.6  gm/dL Final   • A/G Ratio 11/13/2022 1.8  g/dL Final   • BUN/Creatinine Ratio 11/13/2022 22.0  7.0 - 25.0 Final   • Anion Gap 11/13/2022 10.0  5.0 - 15.0 mmol/L Final   • eGFR 11/13/2022 70.6  >60.0 mL/min/1.73 Final    National Kidney Foundation and American Society  of Nephrology (ASN) Task Force recommended calculation based on the Chronic Kidney Disease Epidemiology Collaboration (CKD-EPI) equation refit without adjustment for race.   • WBC 11/13/2022 10.53  3.40 - 10.80 10*3/mm3 Final   • RBC 11/13/2022 4.34  3.77 - 5.28 10*6/mm3 Final   • Hemoglobin 11/13/2022 13.2  12.0 - 15.9 g/dL Final   • Hematocrit 11/13/2022 40.1  34.0 - 46.6 % Final   • MCV 11/13/2022 92.4  79.0 - 97.0 fL Final   • MCH 11/13/2022 30.4  26.6 - 33.0 pg Final   • MCHC 11/13/2022 32.9  31.5 - 35.7 g/dL Final   • RDW 11/13/2022 14.4  12.3 - 15.4 % Final   • RDW-SD 11/13/2022 48.5  37.0 - 54.0 fl Final   • MPV 11/13/2022 9.8  6.0 - 12.0 fL Final   • Platelets 11/13/2022 233  140 - 450 10*3/mm3 Final   • Neutrophil % 11/13/2022 81.5 (H)  42.7 - 76.0 % Final   • Lymphocyte % 11/13/2022 11.5 (L)  19.6 - 45.3 % Final   • Monocyte % 11/13/2022 5.7  5.0 - 12.0 % Final   • Eosinophil % 11/13/2022 0.5  0.3 - 6.2 % Final   • Basophil % 11/13/2022 0.5  0.0 - 1.5 % Final   • Immature Grans % 11/13/2022 0.3  0.0 - 0.5 % Final   • Neutrophils, Absolute 11/13/2022 8.59 (H)  1.70 - 7.00 10*3/mm3 Final   • Lymphocytes, Absolute 11/13/2022 1.21  0.70 - 3.10 10*3/mm3 Final   • Monocytes, Absolute 11/13/2022 0.60  0.10 - 0.90 10*3/mm3 Final   • Eosinophils, Absolute 11/13/2022 0.05  0.00 - 0.40 10*3/mm3 Final   • Basophils, Absolute 11/13/2022 0.05  0.00 - 0.20 10*3/mm3 Final   • Immature Grans, Absolute 11/13/2022 0.03  0.00 - 0.05 10*3/mm3 Final   • nRBC 11/13/2022 0.0  0.0 - 0.2 /100 WBC Final   • Protime 11/13/2022 14.2  11.7 - 14.2 Seconds Final   • INR 11/13/2022 1.08  0.90 - 1.10 Final   • Hemoglobin A1C 11/13/2022 6.10 (H)  4.80 - 5.60 % Final   • Glucose 11/13/2022 126  70 - 130 mg/dL Final    Meter: [id number] : [id number] Mason GERONIMO   • Glucose 11/13/2022 168 (H)  70 - 130 mg/dL Final    Meter: [id number] : [id number] Ariela GERONIMO   • WBC 11/14/2022 14.63 (H)  3.40 - 10.80 10*3/mm3 Final   •  RBC 11/14/2022 4.52  3.77 - 5.28 10*6/mm3 Final   • Hemoglobin 11/14/2022 13.6  12.0 - 15.9 g/dL Final   • Hematocrit 11/14/2022 40.8  34.0 - 46.6 % Final   • MCV 11/14/2022 90.3  79.0 - 97.0 fL Final   • MCH 11/14/2022 30.1  26.6 - 33.0 pg Final   • MCHC 11/14/2022 33.3  31.5 - 35.7 g/dL Final   • RDW 11/14/2022 14.3  12.3 - 15.4 % Final   • RDW-SD 11/14/2022 46.9  37.0 - 54.0 fl Final   • MPV 11/14/2022 10.0  6.0 - 12.0 fL Final   • Platelets 11/14/2022 255  140 - 450 10*3/mm3 Final   • Neutrophil % 11/14/2022 84.4 (H)  42.7 - 76.0 % Final   • Lymphocyte % 11/14/2022 6.8 (L)  19.6 - 45.3 % Final   • Monocyte % 11/14/2022 7.2  5.0 - 12.0 % Final   • Eosinophil % 11/14/2022 0.8  0.3 - 6.2 % Final   • Basophil % 11/14/2022 0.3  0.0 - 1.5 % Final   • Immature Grans % 11/14/2022 0.5  0.0 - 0.5 % Final   • Neutrophils, Absolute 11/14/2022 12.35 (H)  1.70 - 7.00 10*3/mm3 Final   • Lymphocytes, Absolute 11/14/2022 0.99  0.70 - 3.10 10*3/mm3 Final   • Monocytes, Absolute 11/14/2022 1.06 (H)  0.10 - 0.90 10*3/mm3 Final   • Eosinophils, Absolute 11/14/2022 0.12  0.00 - 0.40 10*3/mm3 Final   • Basophils, Absolute 11/14/2022 0.04  0.00 - 0.20 10*3/mm3 Final   • Immature Grans, Absolute 11/14/2022 0.07 (H)  0.00 - 0.05 10*3/mm3 Final   • nRBC 11/14/2022 0.0  0.0 - 0.2 /100 WBC Final   • Glucose 11/14/2022 156 (H)  65 - 99 mg/dL Final   • BUN 11/14/2022 15  8 - 23 mg/dL Final   • Creatinine 11/14/2022 0.77  0.57 - 1.00 mg/dL Final   • Sodium 11/14/2022 132 (L)  136 - 145 mmol/L Final   • Potassium 11/14/2022 5.0  3.5 - 5.2 mmol/L Final   • Chloride 11/14/2022 94 (L)  98 - 107 mmol/L Final   • CO2 11/14/2022 29.8 (H)  22.0 - 29.0 mmol/L Final   • Calcium 11/14/2022 10.1  8.6 - 10.5 mg/dL Final   • BUN/Creatinine Ratio 11/14/2022 19.5  7.0 - 25.0 Final   • Anion Gap 11/14/2022 8.2  5.0 - 15.0 mmol/L Final   • eGFR 11/14/2022 76.2  >60.0 mL/min/1.73 Final    National Kidney Foundation and American Society of Nephrology (ASN) Task  Force recommended calculation based on the Chronic Kidney Disease Epidemiology Collaboration (CKD-EPI) equation refit without adjustment for race.   • Free T4 11/14/2022 1.22  0.93 - 1.70 ng/dL Final   • TSH 11/14/2022 0.677  0.270 - 4.200 uIU/mL Final   • Glucose 11/14/2022 137 (H)  70 - 130 mg/dL Final    Meter: [id number] : [id number] King Yue TILLEY   • Glucose 11/14/2022 120  70 - 130 mg/dL Final    Meter: [id number] : [id number] Shane Zamora NA   • Glucose 11/14/2022 133 (H)  70 - 130 mg/dL Final    Meter: [id number] : [id number] Shanebruno Zamora NA   • Glucose 11/14/2022 154 (H)  70 - 130 mg/dL Final    Meter: [id number] : [id number] Anabela Diamshirlene NA   • Glucose 11/15/2022 160 (H)  65 - 99 mg/dL Final   • BUN 11/15/2022 14  8 - 23 mg/dL Final   • Creatinine 11/15/2022 0.67  0.57 - 1.00 mg/dL Final   • Sodium 11/15/2022 131 (L)  136 - 145 mmol/L Final   • Potassium 11/15/2022 4.6  3.5 - 5.2 mmol/L Final   • Chloride 11/15/2022 95 (L)  98 - 107 mmol/L Final   • CO2 11/15/2022 28.0  22.0 - 29.0 mmol/L Final   • Calcium 11/15/2022 9.5  8.6 - 10.5 mg/dL Final   • BUN/Creatinine Ratio 11/15/2022 20.9  7.0 - 25.0 Final   • Anion Gap 11/15/2022 8.0  5.0 - 15.0 mmol/L Final   • eGFR 11/15/2022 86.3  >60.0 mL/min/1.73 Final    National Kidney Foundation and American Society of Nephrology (ASN) Task Force recommended calculation based on the Chronic Kidney Disease Epidemiology Collaboration (CKD-EPI) equation refit without adjustment for race.   • WBC 11/15/2022 13.15 (H)  3.40 - 10.80 10*3/mm3 Final   • RBC 11/15/2022 4.39  3.77 - 5.28 10*6/mm3 Final   • Hemoglobin 11/15/2022 12.8  12.0 - 15.9 g/dL Final   • Hematocrit 11/15/2022 38.6  34.0 - 46.6 % Final   • MCV 11/15/2022 87.9  79.0 - 97.0 fL Final   • MCH 11/15/2022 29.2  26.6 - 33.0 pg Final   • MCHC 11/15/2022 33.2  31.5 - 35.7 g/dL Final   • RDW 11/15/2022 13.7  12.3 - 15.4 % Final   • RDW-SD 11/15/2022 43.9  37.0 - 54.0 fl Final   • MPV 11/15/2022  9.8  6.0 - 12.0 fL Final   • Platelets 11/15/2022 218  140 - 450 10*3/mm3 Final   • Neutrophil % 11/15/2022 81.2 (H)  42.7 - 76.0 % Final   • Lymphocyte % 11/15/2022 8.1 (L)  19.6 - 45.3 % Final   • Monocyte % 11/15/2022 7.6  5.0 - 12.0 % Final   • Eosinophil % 11/15/2022 2.1  0.3 - 6.2 % Final   • Basophil % 11/15/2022 0.4  0.0 - 1.5 % Final   • Immature Grans % 11/15/2022 0.6 (H)  0.0 - 0.5 % Final   • Neutrophils, Absolute 11/15/2022 10.68 (H)  1.70 - 7.00 10*3/mm3 Final   • Lymphocytes, Absolute 11/15/2022 1.06  0.70 - 3.10 10*3/mm3 Final   • Monocytes, Absolute 11/15/2022 1.00 (H)  0.10 - 0.90 10*3/mm3 Final   • Eosinophils, Absolute 11/15/2022 0.28  0.00 - 0.40 10*3/mm3 Final   • Basophils, Absolute 11/15/2022 0.05  0.00 - 0.20 10*3/mm3 Final   • Immature Grans, Absolute 11/15/2022 0.08 (H)  0.00 - 0.05 10*3/mm3 Final   • nRBC 11/15/2022 0.0  0.0 - 0.2 /100 WBC Final   • Glucose 11/15/2022 152 (H)  70 - 130 mg/dL Final    Meter: [id number] : [id number] Anabela Diamonique NA   • Glucose 11/15/2022 187 (H)  70 - 130 mg/dL Final    Meter: [id number] : [id number] Haley Nicloe NA   • Glucose 11/15/2022 159 (H)  70 - 130 mg/dL Final    Meter: [id number] : [id number] Lisa Zuniga NA   • Glucose 11/16/2022 134 (H)  65 - 99 mg/dL Final   • BUN 11/16/2022 22  8 - 23 mg/dL Final   • Creatinine 11/16/2022 0.87  0.57 - 1.00 mg/dL Final   • Sodium 11/16/2022 130 (L)  136 - 145 mmol/L Final   • Potassium 11/16/2022 4.5  3.5 - 5.2 mmol/L Final   • Chloride 11/16/2022 93 (L)  98 - 107 mmol/L Final   • CO2 11/16/2022 26.8  22.0 - 29.0 mmol/L Final   • Calcium 11/16/2022 8.8  8.6 - 10.5 mg/dL Final   • BUN/Creatinine Ratio 11/16/2022 25.3 (H)  7.0 - 25.0 Final   • Anion Gap 11/16/2022 10.2  5.0 - 15.0 mmol/L Final   • eGFR 11/16/2022 65.8  >60.0 mL/min/1.73 Final    National Kidney Foundation and American Society of Nephrology (ASN) Task Force recommended calculation based on the Chronic Kidney Disease Epidemiology  Collaboration (CKD-EPI) equation refit without adjustment for race.   • WBC 11/16/2022 10.05  3.40 - 10.80 10*3/mm3 Final   • RBC 11/16/2022 3.31 (L)  3.77 - 5.28 10*6/mm3 Final   • Hemoglobin 11/16/2022 10.2 (L)  12.0 - 15.9 g/dL Final   • Hematocrit 11/16/2022 30.6 (L)  34.0 - 46.6 % Final   • MCV 11/16/2022 92.4  79.0 - 97.0 fL Final   • MCH 11/16/2022 30.8  26.6 - 33.0 pg Final   • MCHC 11/16/2022 33.3  31.5 - 35.7 g/dL Final   • RDW 11/16/2022 14.4  12.3 - 15.4 % Final   • RDW-SD 11/16/2022 48.7  37.0 - 54.0 fl Final   • MPV 11/16/2022 10.1  6.0 - 12.0 fL Final   • Platelets 11/16/2022 188  140 - 450 10*3/mm3 Final   • Neutrophil % 11/16/2022 74.4  42.7 - 76.0 % Final   • Lymphocyte % 11/16/2022 13.1 (L)  19.6 - 45.3 % Final   • Monocyte % 11/16/2022 10.5  5.0 - 12.0 % Final   • Eosinophil % 11/16/2022 1.3  0.3 - 6.2 % Final   • Basophil % 11/16/2022 0.2  0.0 - 1.5 % Final   • Immature Grans % 11/16/2022 0.5  0.0 - 0.5 % Final   • Neutrophils, Absolute 11/16/2022 7.47 (H)  1.70 - 7.00 10*3/mm3 Final   • Lymphocytes, Absolute 11/16/2022 1.32  0.70 - 3.10 10*3/mm3 Final   • Monocytes, Absolute 11/16/2022 1.06 (H)  0.10 - 0.90 10*3/mm3 Final   • Eosinophils, Absolute 11/16/2022 0.13  0.00 - 0.40 10*3/mm3 Final   • Basophils, Absolute 11/16/2022 0.02  0.00 - 0.20 10*3/mm3 Final   • Immature Grans, Absolute 11/16/2022 0.05  0.00 - 0.05 10*3/mm3 Final   • nRBC 11/16/2022 0.0  0.0 - 0.2 /100 WBC Final   • Glucose 11/16/2022 129  70 - 130 mg/dL Final    Meter: [id number] : [id number] Lisa GERONIMO       No results found.    Medications:  amitriptyline, 50 mg, Oral, Nightly  apixaban, 2.5 mg, Oral, Q12H  baclofen, 10 mg, Oral, TID  docusate sodium, 100 mg, Oral, BID  dofetilide, 250 mcg, Oral, Q12H  insulin lispro, 0-7 Units, Subcutaneous, TID AC  levothyroxine, 50 mcg, Oral, Every Other Day  levothyroxine, 75 mcg, Oral, Every Other Day  metFORMIN, 500 mg, Oral, BID With Meals  metoprolol tartrate, 75 mg, Oral,  BID  pantoprazole, 40 mg, Oral, QAM  polyethylene glycol, 17 g, Oral, BID  sodium chloride, 10 mL, Intravenous, Q12H  venlafaxine XR, 150 mg, Oral, Daily      •  acetaminophen **OR** acetaminophen **OR** acetaminophen  •  acetaminophen  •  bisacodyl  •  dextrose  •  dextrose  •  glucagon (human recombinant)  •  HYDROcodone-acetaminophen  •  HYDROmorphone **AND** naloxone  •  ketorolac  •  magnesium hydroxide  •  ondansetron **OR** ondansetron  •  sodium chloride      Imaging: Right hip radiograph     HISTORY:Postop     TECHNIQUE: Single AP radiograph the right hip     COMPARISON:Right hip radiograph 11/13/2022     FINDINGS:  Post surgical changes from recent right total hip arthroplasty are  present. The hardware is intact. There is no new periprosthetic  fracture.     IMPRESSION:  Postoperative changes from recent right total hip  arthroplasty without findings of immediate complication.     This report was finalized on 11/15/2022 1:12 PM by Dr. Michael Valenzuela M.D.      Assessment:  Doing well POD  # 1 Day Post-Op Procedure(s) (LRB):  Right anterior total hip arthroplasty (Right)  Problems Addressed this Visit        Musculoskeletal and Injuries    * (Principal) Closed displaced fracture of right femoral neck (HCC) - Primary    Fall   Diagnoses       Codes Comments    Closed displaced fracture of right femoral neck (HCC)    -  Primary ICD-10-CM: S72.001A  ICD-9-CM: 820.8     Fall, initial encounter     ICD-10-CM: W19.XXXA  ICD-9-CM: E888.9           Plan:  Anterior hip precautions  Continue efforts to mobilize  Continue Pain Control Measures  Continue incisional Care  DVT prophylaxis    Follow up in 2 weeks with ortho     Please call with any questions or concerns        Joao Martinez MD    Date: 11/16/2022  Time: 07:19 EST

## 2022-11-16 NOTE — PROGRESS NOTES
Continued Stay Note  [facility]     Patient Name: Dior Pettit  MRN: [id number]  Today's Date: 11/16/2022    Admit Date: 11/13/2022    Plan: [facility]   Discharge Plan     Row Name 11/16/22 1723       Plan    Plan [facility], [place] vs [facility]    Patient/Family in Agreement with Plan yes    Plan Comments Spoke with pt, verified correct information on facesheet and explained the role of CCP. Pt states she would like [facility] 1st choice and [facility] as 2nd choice. Referrals sent in [facility]. Awaiting to hear from [facility] and [facility] for bed availability. CCP to follow. Possible d/c 11/17.               Discharge Codes    No documentation.               Expected Discharge Date and Time     Expected Discharge Date Expected Discharge Time    Nov 16, 2022             Hallie Velasquez RN

## 2022-11-16 NOTE — DISCHARGE PLACEMENT REQUEST
"PettitDior vera (82 y.o. Female)     Date of Birth   1940    Social Security Number       Address   [address] [place] [address]    Home Phone   [phone]    MRN   [id number]       Judaism   Druze    Marital Status                               Admission Date   11/13/22    Admission Type   Emergency    Admitting Provider   Mark Ruiz MD    Attending Provider   Mark Ruiz MD    Department, Room/Bed   [address], P797/1       Discharge Date       Discharge Disposition       Discharge Destination                               Attending Provider: Mark Ruiz MD    Allergies: No Known Allergies    Isolation: None   Infection: None   Code Status: No CPR    Ht: 170.2 cm (67\")   Wt: 64.9 kg (143 lb)    Admission Cmt: None   Principal Problem: Closed displaced fracture of right femoral neck (HCC) [S72.001A]                 Active Insurance as of 11/13/2022     Primary Coverage     Payor Plan Insurance Group Employer/Plan Group    MEDICARE MEDICARE A & B      Payor Plan Address Payor Plan Phone Number Payor Plan Fax Number Effective Dates    [address] [phone]  6/1/2003 - None Entered    [facility] [address]       Subscriber Name Subscriber Birth Date Member ID       DIOR PETTIT 1940 [id number]           Secondary Coverage     Payor Plan Insurance Group Employer/Plan Group    [facility] SU[facility] [id number]     Payor Plan Address Payor Plan Phone Number Payor Plan Fax Number Effective Dates    [address]   12/1/2016 - None Entered    [facility] [address]       Subscriber Name Subscriber Birth Date Member ID       DIOR PETTIT 1940 [id number]                 Emergency Contacts      (Rel.) Home Phone Work Phone Mobile Phone    Stephan Pettit (Son) [phone] -- --    Severiano Pettit (Daughter) [phone] -- [phone]    JENNIFERARMANI (Daughter) [phone] -- [phone]    Jose Alejandro Pettit " (Msz) [phone] -- --

## 2022-11-16 NOTE — PLAN OF CARE
Goal Outcome Evaluation:  Plan of Care Reviewed With: patient, family           Outcome Evaluation: Pt is an 83 yo F admitted from home with a fall - R hip fx. Pt is now POD 1 R anterior CLIFF. Pt lives alone and reports use of a cane as needed. Pt denies other falls hx and does not have any steps at home. Pt presents to PT with impaired strength, endurance, and balance limiting overall mobility. Pt tolerated CLIFF exercises well in bed - required assisted 2/2 weakness. Pt transferred to EOB with min A x1, STS with mod A x1, and ambulated 5ft to chair with rwx and CGA. Pt cued for upright posture and sequencing of steps with rwx movement. Pt with impaired RLE WB, but able to safely get to chair with assist x1. Encouraged pt to try sitting up at least through lunchtime as well as work on ambulation to bathroom as tolerated instead of BSC as able. Pt assisted with repositioning and left with all needs met, family present. PT will continue to follow to progress mobility as tolerated. Anticipate DC to SNF.

## 2022-11-16 NOTE — PROGRESS NOTES
"DAILY PROGRESS NOTE  [facility]    Patient Identification:  Name: Dior Pettit  Age: 82 y.o.  Sex: female  :  1940  MRN: [id number]         Primary Care Physician: Ashanti Hong APRN    Subjective:  Interval History:She complains of pain.    Objective:    Scheduled Meds:amitriptyline, 50 mg, Oral, Nightly  apixaban, 5 mg, Oral, Q12H  baclofen, 10 mg, Oral, TID  docusate sodium, 100 mg, Oral, BID  dofetilide, 250 mcg, Oral, Q12H  insulin lispro, 0-7 Units, Subcutaneous, TID AC  levothyroxine, 50 mcg, Oral, Every Other Day  levothyroxine, 75 mcg, Oral, Every Other Day  metFORMIN, 500 mg, Oral, BID With Meals  metoprolol tartrate, 75 mg, Oral, BID  pantoprazole, 40 mg, Oral, QAM  polyethylene glycol, 17 g, Oral, BID  sodium chloride, 10 mL, Intravenous, Q12H  venlafaxine XR, 150 mg, Oral, Daily      Continuous Infusions:lactated ringers, 9 mL/hr  lactated ringers, 100 mL/hr, Last Rate: 100 mL/hr (22 1452)        Vital signs in last 24 hours:  Temp:  [97.2 °F (36.2 °C)-99.3 °F (37.4 °C)] 98.3 °F (36.8 °C)  Heart Rate:  [] 103  Resp:  [16-18] 16  BP: ()/(55-84) 134/71    Intake/Output:    Intake/Output Summary (Last 24 hours) at 2022 1459  Last data filed at 2022 1447  Gross per 24 hour   Intake 360 ml   Output 1950 ml   Net -1590 ml       Exam:  /71 (BP Location: Right arm, Patient Position: Lying)   Pulse 103   Temp 98.3 °F (36.8 °C) (Oral)   Resp 16   Ht 170.2 cm (67\")   Wt 64.9 kg (143 lb)   LMP  (LMP Unknown)   SpO2 94%   BMI 22.40 kg/m²     General Appearance:    Alert, cooperative, no distress   Head:    Normocephalic, without obvious abnormality, atraumatic   Eyes:       Throat:   Lips, tongue, gums normal   Neck:   Supple, symmetrical, trachea midline, no JVD   Lungs:     Clear to auscultation bilaterally, respirations unlabored   Chest Wall:    No tenderness or deformity    Heart:    Regular rate and rhythm, S1 and S2 normal, no " murmur,no  Rub or gallop   Abdomen:     Soft, nontender, bowel sounds active, no masses, no organomegaly    Extremities:   Extremities normal, right hip with surgical changes., no cyanosis or edema   Pulses:      Skin:   Skin is warm and dry,  no rashes or palpable lesions   Neurologic:   no focal deficits noted      Lab Results (last 72 hours)     Procedure Component Value Units Date/Time    POC Glucose Once [[id number]]  (Normal) Collected: 11/14/22 1107    Specimen: Blood Updated: 11/14/22 1108     Glucose 120 mg/dL      Comment: Meter: [id number] : [id number] Shane GERONIMO       Basic Metabolic Panel [[id number]]  (Abnormal) Collected: 11/14/22 0430    Specimen: Blood Updated: 11/14/22 0625     Glucose 156 mg/dL      BUN 15 mg/dL      Creatinine 0.77 mg/dL      Sodium 132 mmol/L      Potassium 5.0 mmol/L      Chloride 94 mmol/L      CO2 29.8 mmol/L      Calcium 10.1 mg/dL      BUN/Creatinine Ratio 19.5     Anion Gap 8.2 mmol/L      eGFR 76.2 mL/min/1.73      Comment: National Kidney Foundation and American Society of Nephrology (ASN) Task Force recommended calculation based on the Chronic Kidney Disease Epidemiology Collaboration (CKD-EPI) equation refit without adjustment for race.       Narrative:      GFR Normal >60  Chronic Kidney Disease <60  Kidney Failure <15    The GFR formula is only valid for adults with stable renal function between ages 18 and 70.    POC Glucose Once [[id number]]  (Abnormal) Collected: 11/14/22 0615    Specimen: Blood Updated: 11/14/22 0619     Glucose 137 mg/dL      Comment: Meter: [id number] : [id number] King Yue TILLEY       TSH [[id number]]  (Normal) Collected: 11/14/22 0430    Specimen: Blood Updated: 11/14/22 0542     TSH 0.677 uIU/mL     T4, Free [[id number]]  (Normal) Collected: 11/14/22 0430    Specimen: Blood Updated: 11/14/22 0542     Free T4 1.22 ng/dL     Narrative:      Results may be falsely increased if patient taking Biotin.      CBC Auto Differential [[id number]]   (Abnormal) Collected: 11/14/22 0430    Specimen: Blood Updated: 11/14/22 0516     WBC 14.63 10*3/mm3      RBC 4.52 10*6/mm3      Hemoglobin 13.6 g/dL      Hematocrit 40.8 %      MCV 90.3 fL      MCH 30.1 pg      MCHC 33.3 g/dL      RDW 14.3 %      RDW-SD 46.9 fl      MPV 10.0 fL      Platelets 255 10*3/mm3      Neutrophil % 84.4 %      Lymphocyte % 6.8 %      Monocyte % 7.2 %      Eosinophil % 0.8 %      Basophil % 0.3 %      Immature Grans % 0.5 %      Neutrophils, Absolute 12.35 10*3/mm3      Lymphocytes, Absolute 0.99 10*3/mm3      Monocytes, Absolute 1.06 10*3/mm3      Eosinophils, Absolute 0.12 10*3/mm3      Basophils, Absolute 0.04 10*3/mm3      Immature Grans, Absolute 0.07 10*3/mm3      nRBC 0.0 /100 WBC     POC Glucose Once [[id number]]  (Abnormal) Collected: 11/13/22 2138    Specimen: Blood Updated: 11/13/22 2139     Glucose 168 mg/dL      Comment: Meter: [id number] : [id number] Ariela GERONIMO       POC Glucose Once [[id number]]  (Normal) Collected: 11/13/22 1629    Specimen: Blood Updated: 11/13/22 1630     Glucose 126 mg/dL      Comment: Meter: [id number] : [id number] Mason GERONIMO       Hemoglobin A1c [[id number]]  (Abnormal) Collected: 11/13/22 1122    Specimen: Blood Updated: 11/13/22 1455     Hemoglobin A1C 6.10 %     Narrative:      Hemoglobin A1C Ranges:    Increased Risk for Diabetes  5.7% to 6.4%  Diabetes                     >= 6.5%  Diabetic Goal                < 7.0%    Comprehensive Metabolic Panel [[id number]]  (Abnormal) Collected: 11/13/22 1346    Specimen: Blood from Arm, Right Updated: 11/13/22 1421     Glucose 136 mg/dL      BUN 18 mg/dL      Creatinine 0.82 mg/dL      Sodium 135 mmol/L      Potassium 4.6 mmol/L      Comment: Slight hemolysis detected by analyzer. Results may be affected.        Chloride 100 mmol/L      CO2 25.0 mmol/L      Calcium 9.6 mg/dL      Total Protein 7.2 g/dL      Albumin 4.60 g/dL      ALT (SGPT) 17 U/L      AST (SGOT) 28 U/L      Alkaline  Phosphatase 58 U/L      Total Bilirubin 0.5 mg/dL      Globulin 2.6 gm/dL      A/G Ratio 1.8 g/dL      BUN/Creatinine Ratio 22.0     Anion Gap 10.0 mmol/L      eGFR 70.6 mL/min/1.73      Comment: National Kidney Foundation and American Society of Nephrology (ASN) Task Force recommended calculation based on the Chronic Kidney Disease Epidemiology Collaboration (CKD-EPI) equation refit without adjustment for race.       Narrative:      GFR Normal >60  Chronic Kidney Disease <60  Kidney Failure <15    The GFR formula is only valid for adults with stable renal function between ages 18 and 70.    Protime-INR [[id number]]  (Normal) Collected: 11/13/22 1346    Specimen: Blood from Arm, Right Updated: 11/13/22 1417     Protime 14.2 Seconds      INR 1.08    CBC & Differential [[id number]]  (Abnormal) Collected: 11/13/22 1122    Specimen: Blood Updated: 11/13/22 1140    Narrative:      The following orders were created for panel order CBC & Differential.  Procedure                               Abnormality         Status                     ---------                               -----------         ------                     CBC Auto Differential[[id number]]        Abnormal            Final result                 Please view results for these tests on the individual orders.    CBC Auto Differential [[id number]]  (Abnormal) Collected: 11/13/22 1122    Specimen: Blood Updated: 11/13/22 1140     WBC 10.53 10*3/mm3      RBC 4.34 10*6/mm3      Hemoglobin 13.2 g/dL      Hematocrit 40.1 %      MCV 92.4 fL      MCH 30.4 pg      MCHC 32.9 g/dL      RDW 14.4 %      RDW-SD 48.5 fl      MPV 9.8 fL      Platelets 233 10*3/mm3      Neutrophil % 81.5 %      Lymphocyte % 11.5 %      Monocyte % 5.7 %      Eosinophil % 0.5 %      Basophil % 0.5 %      Immature Grans % 0.3 %      Neutrophils, Absolute 8.59 10*3/mm3      Lymphocytes, Absolute 1.21 10*3/mm3      Monocytes, Absolute 0.60 10*3/mm3      Eosinophils, Absolute 0.05 10*3/mm3       Basophils, Absolute 0.05 10*3/mm3      Immature Grans, Absolute 0.03 10*3/mm3      nRBC 0.0 /100 WBC         Data Review:  Results from last 7 days   Lab Units 11/16/22  0501 11/15/22  0505 11/14/22  0430   SODIUM mmol/L 130* 131* 132*   POTASSIUM mmol/L 4.5 4.6 5.0   CHLORIDE mmol/L 93* 95* 94*   CO2 mmol/L 26.8 28.0 29.8*   BUN mg/dL 22 14 15   CREATININE mg/dL 0.87 0.67 0.77   GLUCOSE mg/dL 134* 160* 156*   CALCIUM mg/dL 8.8 9.5 10.1     Results from last 7 days   Lab Units 11/16/22  0501 11/15/22  0505 11/14/22  0430   WBC 10*3/mm3 10.05 13.15* 14.63*   HEMOGLOBIN g/dL 10.2* 12.8 13.6   HEMATOCRIT % 30.6* 38.6 40.8   PLATELETS 10*3/mm3 188 218 255     Results from last 7 days   Lab Units 11/14/22  0430   TSH uIU/mL 0.677   FREE T4 ng/dL 1.22     Results from last 7 days   Lab Units 11/13/22  1122   HEMOGLOBIN A1C % 6.10*     Lab Results   Lab Value Date/Time    TROPONINT <0.010 10/03/2022 0805    TROPONINT <0.010 09/11/2022 1926    TROPONINT <0.010 09/11/2022 1648    TROPONINT <0.010 12/11/2021 1505    TROPONINT <0.010 01/31/2019 2331    TROPONINT <0.010 03/07/2017 2037         Results from last 7 days   Lab Units 11/13/22  1346   ALK PHOS U/L 58   BILIRUBIN mg/dL 0.5   ALT (SGPT) U/L 17   AST (SGOT) U/L 28     Results from last 7 days   Lab Units 11/14/22  0430   TSH uIU/mL 0.677   FREE T4 ng/dL 1.22     Results from last 7 days   Lab Units 11/13/22  1122   HEMOGLOBIN A1C % 6.10*     Glucose   Date/Time Value Ref Range Status   11/16/2022 1150 119 70 - 130 mg/dL Final     Comment:     Meter: [id number] : [id number] Celestino Vincent NA   11/16/2022 0607 129 70 - 130 mg/dL Final     Comment:     Meter: [id number] : [id number] Shaniwilfredo Ohn NA   11/15/2022 2124 159 (H) 70 - 130 mg/dL Final     Comment:     Meter: [id number] : [id number] FrankiMervin Zuniga NA   11/15/2022 1628 187 (H) 70 - 130 mg/dL Final     Comment:     Meter: [id number] : [id number] Tera Rivera    11/15/2022 0629 152 (H) 70 -  130 mg/dL Final     Comment:     Meter: [id number] : [id number] Anabela Winter NA   11/14/2022 2103 154 (H) 70 - 130 mg/dL Final     Comment:     Meter: [id number] : [id number] Anabela Winter NA   11/14/2022 1633 133 (H) 70 - 130 mg/dL Final     Comment:     Meter: [id number] : [id number] Shane Zamora NA   11/14/2022 1107 120 70 - 130 mg/dL Final     Comment:     Meter: [id number] : [id number] Shane Zamora NA     Results from last 7 days   Lab Units 11/13/22  1346   INR  1.08       Past Medical History:   Diagnosis Date   • Allergic rhinitis    • Anemia    • Anxiety     Controlled w/Meds   • Aortic root dilatation (HCC) 10/02/2017    Borderline--Noted on Echo   • Aortic valve calcification 10/02/2017    Noted on Echo   • Aortic valve insufficiency Dx in 07    w/AVR    • Aortic valve prosthesis present 11/02/2018    Noted on CTA Chest   • Ascending aorta dilatation (HCC) 10/02/2017    Borderline--Noted on Echo   • Asthma    • Atypical chest pain    • Breast pain, right Hx   • CAD (coronary artery disease)    • Cardiomegaly 2017   • Cervicalgia    • Chest pain due to CAD ([facility])    • 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    Borderline--Noted on Echo & Measured @ 2.6CM and Mid Descending @ 2.5CM on CTA Chest-11/2/18   • DM (diabetes mellitus) ([facility])     T2   • Esophagitis    • GERD (gastroesophageal reflux disease)     Controlled w/Meds   • Headache    • Health care maintenance    • Heart murmur    • History of echocardiogram 10/2/17-[facility]    EF 66%; Borderline Concentric Hypertrophy; Mild Calcification in AV; Mild AVS; Mild AVR; Moderate TVR; Borderline Dilation of Aortic Root/Arch & Borderline Dilation of Ascending/Proximal Aorta Present   • Hyperlipidemia     Controlled w/Meds   • Hypertension     Controlled w/Meds   • Hypothyroidism     Controlled w/Synthroid   • Insomnia    • Macular degeneration, left eye    • Mild aortic valve regurgitation 10/02/2017    Noted on Echo   • Mild aortic valve  stenosis 10/02/2017    Noted on Echo   • Mild dilation of ascending aorta (HCC) 10/02/2017    Borderline--Noted on Echo   • Moderate tricuspid valve regurgitation 10/02/2017    Noted on Echo   • Mood disorder in conditions classified elsewhere     Controlled w/Meds   • Near syncope 03/2017-[facility] ER   • Neuropathy    • NNEKA (obstructive sleep apnea)     Untreated   • Osteoarthritis     Ankles/Feet   • Pulmonary hypertension (HCC) 2017   • Renal insufficiency    • RLS (restless legs syndrome)    • Thoracic ascending aortic aneurysm Dx in 2007 @ 3.8CM & 1/02/2018    Noted @ 4CM on CTA Chest;   • Visual impairment     macular degeneratuin       Assessment:  Active Hospital Problems    Diagnosis  POA   • **Closed displaced fracture of right femoral neck ([facility]) [S72.001A]  Yes   • Fall [W19.XXXA]  Unknown   • PAF (paroxysmal atrial fibrillation) ([facility]) [I48.0]  Yes   • S/P AVR [Z95.2]  Not Applicable   • Mild intermittent asthma without complication [J45.20]  Yes   • Anxiety [F41.9]  Yes   • Restless leg syndrome [G25.81]  Yes   • Hypothyroidism [E03.9]  Yes   • Hypercholesteremia [E78.00]  Yes   • Gastroesophageal reflux disease [K21.9]  Yes   • Essential hypertension [I10]  Yes   • Pulmonary hypertension ([facility]) [I27.20]  Yes   • NNEKA (obstructive sleep apnea) [G47.33]  Yes      Resolved Hospital Problems   No resolved problems to display.       Plan:  Post op care.  Follow lab.  Restarted anticoagulation today per surgery. DC planning. Will need SNU for rehab.  DC IV fluids.  Mark Ruiz MD  11/16/2022  14:59 EST

## 2022-11-17 LAB
ANION GAP SERPL CALCULATED.3IONS-SCNC: 9 MMOL/L (ref 5–15)
BASOPHILS # BLD AUTO: 0.06 10*3/MM3 (ref 0–0.2)
BASOPHILS NFR BLD AUTO: 0.7 % (ref 0–1.5)
BUN SERPL-MCNC: 14 MG/DL (ref 8–23)
BUN/CREAT SERPL: 20.6 (ref 7–25)
CALCIUM SPEC-SCNC: 9.2 MG/DL (ref 8.6–10.5)
CHLORIDE SERPL-SCNC: 96 MMOL/L (ref 98–107)
CO2 SERPL-SCNC: 30 MMOL/L (ref 22–29)
CREAT SERPL-MCNC: 0.68 MG/DL (ref 0.57–1)
DEPRECATED RDW RBC AUTO: 47.4 FL (ref 37–54)
EGFRCR SERPLBLD CKD-EPI 2021: 86 ML/MIN/1.73
EOSINOPHIL # BLD AUTO: 0.49 10*3/MM3 (ref 0–0.4)
EOSINOPHIL NFR BLD AUTO: 5.7 % (ref 0.3–6.2)
ERYTHROCYTE [DISTWIDTH] IN BLOOD BY AUTOMATED COUNT: 14.2 % (ref 12.3–15.4)
GLUCOSE BLDC GLUCOMTR-MCNC: 105 MG/DL (ref 70–130)
GLUCOSE BLDC GLUCOMTR-MCNC: 107 MG/DL (ref 70–130)
GLUCOSE BLDC GLUCOMTR-MCNC: 123 MG/DL (ref 70–130)
GLUCOSE BLDC GLUCOMTR-MCNC: 154 MG/DL (ref 70–130)
GLUCOSE BLDC GLUCOMTR-MCNC: 164 MG/DL (ref 70–130)
GLUCOSE SERPL-MCNC: 110 MG/DL (ref 65–99)
HCT VFR BLD AUTO: 32.3 % (ref 34–46.6)
HGB BLD-MCNC: 10.8 G/DL (ref 12–15.9)
IMM GRANULOCYTES # BLD AUTO: 0.04 10*3/MM3 (ref 0–0.05)
IMM GRANULOCYTES NFR BLD AUTO: 0.5 % (ref 0–0.5)
LYMPHOCYTES # BLD AUTO: 1.34 10*3/MM3 (ref 0.7–3.1)
LYMPHOCYTES NFR BLD AUTO: 15.7 % (ref 19.6–45.3)
MCH RBC QN AUTO: 30.6 PG (ref 26.6–33)
MCHC RBC AUTO-ENTMCNC: 33.4 G/DL (ref 31.5–35.7)
MCV RBC AUTO: 91.5 FL (ref 79–97)
MONOCYTES # BLD AUTO: 1.35 10*3/MM3 (ref 0.1–0.9)
MONOCYTES NFR BLD AUTO: 15.8 % (ref 5–12)
NEUTROPHILS NFR BLD AUTO: 5.26 10*3/MM3 (ref 1.7–7)
NEUTROPHILS NFR BLD AUTO: 61.6 % (ref 42.7–76)
NRBC BLD AUTO-RTO: 0 /100 WBC (ref 0–0.2)
PLATELET # BLD AUTO: 226 10*3/MM3 (ref 140–450)
PMV BLD AUTO: 10.2 FL (ref 6–12)
POTASSIUM SERPL-SCNC: 4.4 MMOL/L (ref 3.5–5.2)
RBC # BLD AUTO: 3.53 10*6/MM3 (ref 3.77–5.28)
SODIUM SERPL-SCNC: 135 MMOL/L (ref 136–145)
WBC NRBC COR # BLD: 8.54 10*3/MM3 (ref 3.4–10.8)

## 2022-11-17 PROCEDURE — 97110 THERAPEUTIC EXERCISES: CPT

## 2022-11-17 PROCEDURE — 82962 GLUCOSE BLOOD TEST: CPT

## 2022-11-17 PROCEDURE — 80048 BASIC METABOLIC PNL TOTAL CA: CPT | Performed by: HOSPITALIST

## 2022-11-17 PROCEDURE — 85025 COMPLETE CBC W/AUTO DIFF WBC: CPT | Performed by: HOSPITALIST

## 2022-11-17 RX ADMIN — HYDROCODONE BITARTRATE AND ACETAMINOPHEN 1 TABLET: 5; 325 TABLET ORAL at 18:03

## 2022-11-17 RX ADMIN — BACLOFEN 10 MG: 10 TABLET ORAL at 08:14

## 2022-11-17 RX ADMIN — METFORMIN HYDROCHLORIDE 500 MG: 500 TABLET, FILM COATED ORAL at 08:15

## 2022-11-17 RX ADMIN — HYDROCODONE BITARTRATE AND ACETAMINOPHEN 1 TABLET: 5; 325 TABLET ORAL at 05:35

## 2022-11-17 RX ADMIN — LEVOTHYROXINE SODIUM 50 MCG: 0.05 TABLET ORAL at 08:15

## 2022-11-17 RX ADMIN — VENLAFAXINE HYDROCHLORIDE 150 MG: 150 CAPSULE, EXTENDED RELEASE ORAL at 08:15

## 2022-11-17 RX ADMIN — BACLOFEN 10 MG: 10 TABLET ORAL at 22:42

## 2022-11-17 RX ADMIN — DOCUSATE SODIUM 100 MG: 100 CAPSULE, LIQUID FILLED ORAL at 08:15

## 2022-11-17 RX ADMIN — POLYETHYLENE GLYCOL 3350 17 G: 17 POWDER, FOR SOLUTION ORAL at 22:43

## 2022-11-17 RX ADMIN — BACLOFEN 10 MG: 10 TABLET ORAL at 16:17

## 2022-11-17 RX ADMIN — POLYETHYLENE GLYCOL 3350 17 G: 17 POWDER, FOR SOLUTION ORAL at 08:15

## 2022-11-17 RX ADMIN — METOPROLOL TARTRATE 75 MG: 25 TABLET, FILM COATED ORAL at 08:15

## 2022-11-17 RX ADMIN — HYDROCODONE BITARTRATE AND ACETAMINOPHEN 1 TABLET: 5; 325 TABLET ORAL at 22:43

## 2022-11-17 RX ADMIN — APIXABAN 5 MG: 5 TABLET, FILM COATED ORAL at 22:42

## 2022-11-17 RX ADMIN — Medication 10 ML: at 08:17

## 2022-11-17 RX ADMIN — METFORMIN HYDROCHLORIDE 500 MG: 500 TABLET, FILM COATED ORAL at 18:03

## 2022-11-17 RX ADMIN — DOFETILIDE 250 MCG: 0.25 CAPSULE ORAL at 22:42

## 2022-11-17 RX ADMIN — METOPROLOL TARTRATE 75 MG: 25 TABLET, FILM COATED ORAL at 22:42

## 2022-11-17 RX ADMIN — DOCUSATE SODIUM 100 MG: 100 CAPSULE, LIQUID FILLED ORAL at 22:43

## 2022-11-17 RX ADMIN — Medication 10 ML: at 22:43

## 2022-11-17 RX ADMIN — AMITRIPTYLINE HYDROCHLORIDE 50 MG: 50 TABLET, FILM COATED ORAL at 22:42

## 2022-11-17 RX ADMIN — DOFETILIDE 250 MCG: 0.25 CAPSULE ORAL at 11:46

## 2022-11-17 RX ADMIN — PANTOPRAZOLE SODIUM 40 MG: 40 TABLET, DELAYED RELEASE ORAL at 05:35

## 2022-11-17 RX ADMIN — APIXABAN 5 MG: 5 TABLET, FILM COATED ORAL at 08:14

## 2022-11-17 NOTE — PROGRESS NOTES
Continued Stay Note  [facility]     Patient Name: Dior Pettit  MRN: [id number]  Today's Date: 11/17/2022    Admit Date: 11/13/2022    Plan: [facility]   Discharge Plan     Row Name 11/17/22 1451       Plan    Plan [facility]    Patient/Family in Agreement with Plan yes    Plan Comments Pt is approved with bed tomorrow at [facility]. Spoke with family on the phone and they have decided on [facility] due to location. Informed Kelly/Cristin that pt will d/c to [facility]. Pt plans to transport to SNF.               Discharge Codes    No documentation.               Expected Discharge Date and Time     Expected Discharge Date Expected Discharge Time    Nov 16, 2022             Hallie Velasquez RN

## 2022-11-17 NOTE — PLAN OF CARE
Goal Outcome Evaluation:         VSS, pt voiding via external catheter due to frequency and inability to make it to the restroom in time, fluids discontinued, good output, pain medication given one time this shift, plan for possible discharge to rehab facility 11/17, pt mentioned she prefers to go to [facility] over [facility] if possible.

## 2022-11-17 NOTE — THERAPY TREATMENT NOTE
Patient Name: Dior Pettit  : 1940    MRN: [id number]                              Today's Date: 2022       Admit Date: 2022    Visit Dx:     ICD-10-CM ICD-9-CM   1. Closed displaced fracture of right femoral neck (HCC)  S72.001A 820.8   2. Fall, initial encounter  W19.XXXA E888.9     Patient Active Problem List   Diagnosis   • Essential hypertension   • Pulmonary hypertension (HCC)   • NNEKA (obstructive sleep apnea)   • Gastroesophageal reflux disease   • Anxiety   • Restless leg syndrome   • Controlled diabetes mellitus type 2 with complications ([facility])   • Hypothyroidism   • Hypercholesteremia   • Seasonal allergic rhinitis   • Mild intermittent asthma without complication   • S/P AVR   • Dilated aortic root (HCC)   • Thoracic aortic aneurysm without rupture    • Primary insomnia   • Renal insufficiency   • Macular degeneration of both eyes   • Coronary artery disease involving native coronary artery with angina pectoris ([facility])   • Rectocele   • Nonrheumatic mitral valve stenosis   • PAF (paroxysmal atrial fibrillation) ([facility])   • Slow transit constipation   • Dizziness   • Daily headache   • Symptomatic bradycardia   • Paroxysmal atrial fibrillation (HCC)   • Closed displaced fracture of right femoral neck (HCC)   • Fall     Past Medical History:   Diagnosis Date   • Allergic rhinitis    • Anemia    • Anxiety     Controlled w/Meds   • Aortic root dilatation (HCC) 10/02/2017    Borderline--Noted on Echo   • Aortic valve calcification 10/02/2017    Noted on Echo   • Aortic valve insufficiency Dx in 07    w/AVR    • Aortic valve prosthesis present 2018    Noted on CTA Chest   • Ascending aorta dilatation (HCC) 10/02/2017    Borderline--Noted on Echo   • Asthma    • Atypical chest pain    • Breast pain, right Hx   • CAD (coronary artery disease)    • Cardiomegaly    • Cervicalgia    • Chest pain due to CAD ([facility])    • 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    Borderline--Noted on  Echo & Measured @ 2.6CM and Mid Descending @ 2.5CM on CTA Chest-11/2/18   • DM (diabetes mellitus) ([facility])     T2   • Esophagitis    • GERD (gastroesophageal reflux disease)     Controlled w/Meds   • Headache    • Health care maintenance    • Heart murmur    • History of echocardiogram 10/2/17-[facility]    EF 66%; Borderline Concentric Hypertrophy; Mild Calcification in AV; Mild AVS; Mild AVR; Moderate TVR; Borderline Dilation of Aortic Root/Arch & Borderline Dilation of Ascending/Proximal Aorta Present   • Hyperlipidemia     Controlled w/Meds   • Hypertension     Controlled w/Meds   • Hypothyroidism     Controlled w/Synthroid   • Insomnia    • Macular degeneration, left eye    • Mild aortic valve regurgitation 10/02/2017    Noted on Echo   • Mild aortic valve stenosis 10/02/2017    Noted on Echo   • Mild dilation of ascending aorta ([facility]) 10/02/2017    Borderline--Noted on Echo   • Moderate tricuspid valve regurgitation 10/02/2017    Noted on Echo   • Mood disorder in conditions classified elsewhere     Controlled w/Meds   • Near syncope 03/2017-[facility] ER   • Neuropathy    • NNEKA (obstructive sleep apnea)     Untreated   • Osteoarthritis     Ankles/Feet   • Pulmonary hypertension ([facility]) 2017   • Renal insufficiency    • RLS (restless legs syndrome)    • Thoracic ascending aortic aneurysm Dx in 2007 @ 3.8CM & 1/02/2018    Noted @ 4CM on CTA Chest;   • Visual impairment     macular degeneratuin     Past Surgical History:   Procedure Laterality Date   • AORTIC VALVE REPAIR/REPLACEMENT  2007    Dr. Perry   • APPENDECTOMY     • AUGMENTATION MAMMAPLASTY  1976   • BREAST AUGMENTATION  2014   • BUNIONECTOMY     • CARDIAC CATHETERIZATION  2007   • CARDIAC VALVE REPLACEMENT  2007    porcine   • COLONOSCOPY  2010   • COLONOSCOPY  03/02/2012   • EYE SURGERY      cataracts and ens implants   • HYSTERECTOMY  1972   • SIGMOIDOSCOPY  2001   • TOTAL HIP ARTHROPLASTY Right 11/15/2022    Procedure: Right anterior total hip arthroplasty;   Surgeon: Joao Martinez MD;  Location: [facility] MAIN OR;  Service: Orthopedics;  Laterality: Right;      General Information     [facility] Name 11/17/22 1535          Physical Therapy Time and Intention    Document Type therapy note (daily note)  -     Mode of Treatment individual therapy;physical therapy  -[facility] Name 11/17/22 1535          General Information    Patient Profile Reviewed yes  -     Existing Precautions/Restrictions fall;right;hip, anterior  -[facility] Name 11/17/22 1535          Living Environment    People in Home alone  -[facility] Name 11/17/22 1535          Cognition    Orientation Status (Cognition) oriented x 3  possibly [place] also  -[facility] Name 11/17/22 1535          Safety Issues, Functional Mobility    Safety Issues Affecting Function (Mobility) judgment;positioning of assistive device;problem-solving;safety precaution awareness  -     Impairments Affecting Function (Mobility) balance;coordination;endurance/activity tolerance;pain;range of motion (ROM);strength  -           User Key  (r) = Recorded By, (t) = Taken By, (c) = Cosigned By    Initials Name Provider Type     Sharyn Mcdaniel PTA Physical Therapist Assistant               Mobility     Row Name 11/17/22 1536          Bed Mobility    Bed Mobility sit-supine  -     Supine-Sit [place] (Bed Mobility) not tested  -     Sit-Supine [place] (Bed Mobility) 2 person assist;minimum assist (75% patient effort);moderate assist (50% patient effort);verbal cues;nonverbal cues (demo/gesture)  -[facility] Name 11/17/22 1536          Sit-Stand Transfer    Sit-Stand [place] (Transfers) 2 person assist;minimum assist (75% patient effort);verbal cues;nonverbal cues (demo/gesture)  -     Assistive Device (Sit-Stand Transfers) walker, front-wheeled  -[facility] Name 11/17/22 1536          Gait/Stairs (Locomotion)    [place] Level (Gait) contact guard;minimum assist (75% patient effort)  -     Assistive Device  (Gait) walker, front-wheeled  -     Distance in Feet (Gait) 20ft, cues to stay on task, assist to guide rwx  -     Deviations/Abnormal Patterns (Gait) antalgic;base of support, narrow;guille decreased;stride length decreased  -     Bilateral Gait Deviations forward flexed posture  some correction w/cues  -     Right Sided Gait Deviations weight shift ability decreased  -           User Key  (r) = Recorded By, (t) = Taken By, (c) = Cosigned By    Initials Name Provider Type    Sharyn Maynard PTA Physical Therapist Assistant               Obj/Interventions     Row Name 11/17/22 1538          Motor Skills    Therapeutic Exercise --  hip protocol x10 reps w/assist  -           User Key  (r) = Recorded By, (t) = Taken By, (c) = Cosigned By    Initials Name Provider Type    Sharyn Maynard PTA Physical Therapist Assistant               Goals/Plan    No documentation.                Clinical Impression     [facility] Name 11/17/22 1539          Pain    Pretreatment Pain Rating 3/10  -     Posttreatment Pain Rating 5/10  -     Pain Location - Side/Orientation Right  -     Pain Location - hip  -     Pain Intervention(s) Repositioned;Ambulation/increased activity;Elevated;Rest  meds not due yet, educ pt on calling in ~30min for meds  -[facility] Name 11/17/22 1539          Plan of Care Review    Plan of Care Reviewed With patient  fam entered for some of session  -     Progress improving  -     Outcome Evaluation Pt agreed to PT session, c/o fatigue/wkness and pain, but darron amb 20ft assist of 1, assist of 2 to tfer into bed, darron hip exer x10 reps w/assist , plans SNU at [place] as pt lives alone and currently not safe to return there, falls risk at present  -     Row Name 11/17/22 1539          Therapy Assessment/Plan (PT)    Rehab Potential (PT) good, to achieve stated therapy goals  -     Criteria for Skilled Interventions Met (PT) yes  -[facility] Name 11/17/22 1539          Positioning and  Restraints    Pre-Treatment Position sitting in chair/recliner  -JM     Post Treatment Position bed  -JM     In Bed supine;call light within reach;encouraged to call for assist;exit alarm on;with family/caregiver;notified nsg  -           User Key  (r) = Recorded By, (t) = Taken By, (c) = Cosigned By    Initials Name Provider Type    Sharyn Maynard PTA Physical Therapist Assistant               Outcome Measures     Row Name 11/17/22 3961          How much help from another person do you currently need...    Turning from your back to your side while in flat bed without using bedrails? 2  -JM     Moving from lying on back to sitting on the side of a flat bed without bedrails? 2  -JM     Moving to and from a bed to a chair (including a wheelchair)? 3  -JM     Standing up from a chair using your arms (e.g., wheelchair, bedside chair)? 3  -JM     Climbing 3-5 steps with a railing? 1  -JM     To walk in hospital room? 3  -JM     AM-PAC 6 Clicks Score (PT) 14  -JM     Highest level of mobility 4 --> Transferred to chair/commode  -           User Key  (r) = Recorded By, (t) = Taken By, (c) = Cosigned By    Initials Name Provider Type    Sharyn Maynard PTA Physical Therapist Assistant                             Physical Therapy Education     Title: PT OT SLP Therapies (Done)     Topic: Physical Therapy (Done)     Point: Mobility training (Done)     Learning Progress Summary           Patient Acceptance, E,TB,D, VU,NR by  at 11/17/2022 1544    Acceptance, E,TB,D, VU,NR by  at 11/16/2022 1236                   Point: Home exercise program (Done)     Learning Progress Summary           Patient Acceptance, E,TB,D, VU,NR by JONATHAN at 11/17/2022 1544    Acceptance, E,TB,D, VU,NR by  at 11/16/2022 1236                   Point: Body mechanics (Done)     Learning Progress Summary           Patient Acceptance, E,TB,D, VU,NR by  at 11/17/2022 1544    Acceptance, E,TB,D, VU,NR by  at 11/16/2022 1236                    Point: Precautions (Done)     Learning Progress Summary           Patient Acceptance, E,TB,D, VU,NR by  at 11/17/2022 1544    Acceptance, E,TB,D, VU,NR by  at 11/16/2022 1236                               User Key     Initials Effective Dates Name Provider Type [facility] 03/07/18 -  Sharyn Mcdaniel PTA Physical Therapist Assistant PT     04/08/22 -  Galilea Jean PT Physical Therapist PT              PT Recommendation and Plan     Plan of Care Reviewed With: patient (fam entered for some of session)  Progress: improving  Outcome Evaluation: Pt agreed to PT session, c/o fatigue/wkness and pain, but darron amb 20ft assist of 1, assist of 2 to tfer into bed, darron hip exer x10 reps w/assist , [place] SNU [place] as pt lives alone and currently not safe to return there, falls risk at present     Time Calculation:    PT Charges     Row Name 11/17/22 1545             Time Calculation    Start Time 1415  -      Stop Time 1443  -      Time Calculation (min) 28 min  -      PT Received On 11/17/22  -      PT - Next Appointment 11/18/22  -            User Key  (r) = Recorded By, (t) = Taken By, (c) = Cosigned By    Initials Name Provider Type     Sharyn Mcdaniel PTA Physical Therapist Assistant              Therapy Charges for Today     Code Description Service Date Service Provider Modifiers Qty    85493338842 HC PT THER PROC EA 15 MIN 11/17/2022 Sharyn Mcdaniel PTA GP 2    09260212815 HC PT THER SUPP EA 15 MIN 11/17/2022 Sharyn Mcdaniel PTA GP 1          PT G-Codes  Outcome Measure Options: AM-PAC 6 Clicks Basic Mobility (PT)  AM-PAC 6 Clicks Score (PT): 14  PT Discharge Summary  Anticipated Discharge Disposition (PT): skilled nursing facility    Sharyn Mcdaniel PTA  11/17/2022

## 2022-11-17 NOTE — PLAN OF CARE
Goal Outcome Evaluation:  Plan of Care Reviewed With: patient (fam entered for some of session)        Progress: improving  Outcome Evaluation: Pt agreed to PT session, c/o fatigue/wkness and pain, but darron amb 20ft assist of 1, assist of 2 to tfer into bed, darron hip exer x10 reps w/assist , plans SNU at [place] as pt lives alone and currently not safe to return there, falls risk at present  Patient was not wearing a face mask during this therapy encounter. Therapist used appropriate personal protective equipment including eye protection, mask, and gloves.  Mask used was standard procedure mask. Appropriate PPE was worn during the entire therapy session. Hand hygiene was completed before and after therapy session. Patient is not in enhanced droplet precautions.    Nedra Wild, PT TECH present

## 2022-11-17 NOTE — PLAN OF CARE
Goal Outcome Evaluation:               VSS, no complaints of pain, voiding well, dressing CDI, ambulated to chair this shift with assist x1, plan to discharge to rehab when accepted.

## 2022-11-17 NOTE — PLAN OF CARE
Problem: Fall Injury Risk  Goal: Absence of Fall and Fall-Related Injury  Outcome: Ongoing, Progressing  Intervention: Identify and Manage Contributors  Recent Flowsheet Documentation  Taken 11/17/2022 0404 by Jorge Way RN  Medication Review/Management: medications reviewed  Taken 11/17/2022 0227 by Jorge Way RN  Medication Review/Management: medications reviewed  Taken 11/17/2022 0015 by Jorge Way RN  Medication Review/Management: medications reviewed  Taken 11/16/2022 2215 by Jorge Way RN  Medication Review/Management: medications reviewed  Taken 11/16/2022 2015 by Jorge Way RN  Medication Review/Management: medications reviewed  Intervention: Promote Injury-Free Environment  Recent Flowsheet Documentation  Taken 11/17/2022 0404 by Jorge Way RN  Safety Promotion/Fall Prevention: safety round/check completed  Taken 11/17/2022 0227 by Jorge Way RN  Safety Promotion/Fall Prevention: safety round/check completed  Taken 11/17/2022 0015 by Jorge Way RN  Safety Promotion/Fall Prevention: safety round/check completed  Taken 11/16/2022 2215 by Jorge Way RN  Safety Promotion/Fall Prevention:   activity supervised   assistive device/personal items within reach   safety round/check completed  Taken 11/16/2022 2015 by Jorge Way RN  Safety Promotion/Fall Prevention:   activity supervised   assistive device/personal items within reach   safety round/check completed     Problem: Skin Injury Risk Increased  Goal: Skin Health and Integrity  Outcome: Ongoing, Progressing  Intervention: Optimize Skin Protection  Recent Flowsheet Documentation  Taken 11/16/2022 2015 by Jorge Way RN  Pressure Reduction Techniques:   frequent weight shift encouraged   weight shift assistance provided  Pressure Reduction Devices:   alternating pressure pump (ADD)   heel offloading device utilized  Skin Protection: adhesive use limited     Problem: Bleeding  (Orthopaedic Fracture)  Goal: Absence of Bleeding  Outcome: Ongoing, Progressing     Problem: Embolism (Orthopaedic Fracture)  Goal: Absence of Embolism Signs and Symptoms  Outcome: Ongoing, Progressing  Intervention: Prevent or Manage Embolism Risk  Recent Flowsheet Documentation  Taken 11/17/2022 0404 by Jorge Way RN  VTE Prevention/Management:   bilateral   sequential compression devices on  Taken 11/17/2022 0015 by Jorge Way RN  VTE Prevention/Management:   bilateral   sequential compression devices on  Taken 11/16/2022 2015 by Jorge Way RN  VTE Prevention/Management:   bilateral   sequential compression devices on     Problem: Fracture Stabilization and Management (Orthopaedic Fracture)  Goal: Fracture Stability  Outcome: Ongoing, Progressing     Problem: Functional Ability Impaired (Orthopaedic Fracture)  Goal: Optimal Functional Ability  Outcome: Ongoing, Progressing  Intervention: Optimize Functional Ability  Recent Flowsheet Documentation  Taken 11/16/2022 2015 by Jorge Way RN  Range of Motion: active ROM (range of motion) encouraged     Problem: Infection (Orthopaedic Fracture)  Goal: Absence of Infection Signs and Symptoms  Outcome: Ongoing, Progressing  Intervention: Prevent or Manage Infection  Recent Flowsheet Documentation  Taken 11/17/2022 0404 by Jorge Way RN  Infection Prevention: rest/sleep promoted  Taken 11/17/2022 0227 by Jorge Way RN  Infection Prevention: rest/sleep promoted  Taken 11/17/2022 0015 by Jorge Way RN  Infection Prevention: rest/sleep promoted  Taken 11/16/2022 2215 by Jorge Way RN  Infection Prevention: rest/sleep promoted  Taken 11/16/2022 2015 by Jorge Way RN  Infection Prevention: rest/sleep promoted     Problem: Neurovascular Compromise (Orthopaedic Fracture)  Goal: Effective Tissue Perfusion  Outcome: Ongoing, Progressing  Intervention: Prevent or Manage Neurovascular Compromise  Recent Flowsheet  Documentation  Taken 11/17/2022 0404 by Jorge Way RN  Compartment Syndrome Management: active flexion/extension encouraged  Taken 11/16/2022 2015 by Jorge Way RN  Compartment Syndrome Management: active flexion/extension encouraged     Problem: Pain (Orthopaedic Fracture)  Goal: Acceptable Pain Control  Outcome: Ongoing, Progressing  Intervention: Manage Acute Orthopaedic-Related Pain  Recent Flowsheet Documentation  Taken 11/16/2022 2015 by Jorge Way RN  Pain Management Interventions: see MAR  Diversional Activities: television     Problem: Respiratory Compromise (Orthopaedic Fracture)  Goal: Effective Oxygenation and Ventilation  Outcome: Ongoing, Progressing  Intervention: Optimize Oxygenation and Ventilation  Recent Flowsheet Documentation  Taken 11/16/2022 2015 by Jorge Way RN  Airway/Ventilation Management: airway patency maintained     Problem: Adjustment to Surgery (Hip Arthroplasty)  Goal: Optimal Coping  Outcome: Ongoing, Progressing  Intervention: Support Psychosocial Response to Surgery and Mobility Changes  Recent Flowsheet Documentation  Taken 11/16/2022 2015 by Jorge Way RN  Supportive Measures: active listening utilized     Problem: Bleeding (Hip Arthroplasty)  Goal: Absence of Bleeding  Outcome: Ongoing, Progressing     Problem: Bowel Motility Impaired (Hip Arthroplasty)  Goal: Effective Bowel Elimination  Outcome: Ongoing, Progressing     Problem: Fluid and Electrolyte Imbalance (Hip Arthroplasty)  Goal: Fluid and Electrolyte Balance  Outcome: Ongoing, Progressing     Problem: Functional Ability Impaired (Hip Arthroplasty)  Goal: Optimal Functional Ability  Outcome: Ongoing, Progressing     Problem: Infection (Hip Arthroplasty)  Goal: Absence of Infection Signs and Symptoms  Outcome: Ongoing, Progressing     Problem: Neurovascular Compromise (Hip Arthroplasty)  Goal: Intact Neurovascular Status  Outcome: Ongoing, Progressing  Intervention: Prevent or Manage  Neurovascular Compromise  Recent Flowsheet Documentation  Taken 11/17/2022 0404 by Jorge Way RN  Compartment Syndrome Management: active flexion/extension encouraged  Taken 11/16/2022 2015 by Jorge Way RN  Compartment Syndrome Management: active flexion/extension encouraged     Problem: Ongoing Anesthesia Effects (Hip Arthroplasty)  Goal: Anesthesia/Sedation Recovery  Outcome: Ongoing, Progressing  Intervention: Optimize Anesthesia Recovery  Recent Flowsheet Documentation  Taken 11/17/2022 0404 by Jorge Way RN  Safety Promotion/Fall Prevention: safety round/check completed  Taken 11/17/2022 0227 by Jorge Way RN  Safety Promotion/Fall Prevention: safety round/check completed  Taken 11/17/2022 0015 by Jorge Way RN  Safety Promotion/Fall Prevention: safety round/check completed  Taken 11/16/2022 2215 by Jorge Way RN  Safety Promotion/Fall Prevention:   activity supervised   assistive device/personal items within reach   safety round/check completed  Taken 11/16/2022 2200 by Jorge Way RN  Administration (IS): self-administered  Taken 11/16/2022 2015 by Jorge Way RN  Safety Promotion/Fall Prevention:   activity supervised   assistive device/personal items within reach   safety round/check completed  Administration (IS): self-administered  Taken 11/16/2022 2000 by Jorge Way RN  Administration (IS): self-administered     Problem: Pain (Hip Arthroplasty)  Goal: Acceptable Pain Control  Outcome: Ongoing, Progressing  Intervention: Prevent or Manage Pain  Recent Flowsheet Documentation  Taken 11/16/2022 2015 by Jorge Way RN  Pain Management Interventions: see MAR  Diversional Activities: television     Problem: Postoperative Nausea and Vomiting (Hip Arthroplasty)  Goal: Nausea and Vomiting Relief  Outcome: Ongoing, Progressing     Problem: Postoperative Urinary Retention (Hip Arthroplasty)  Goal: Effective Urinary Elimination  Outcome: Ongoing,  Progressing     Problem: Respiratory Compromise (Hip Arthroplasty)  Goal: Effective Oxygenation and Ventilation  Outcome: Ongoing, Progressing  Intervention: Optimize Oxygenation and Ventilation  Recent Flowsheet Documentation  Taken 11/16/2022 2015 by Jorge Way RN  Airway/Ventilation Management: airway patency maintained   Goal Outcome Evaluation:

## 2022-11-18 VITALS
HEIGHT: 67 IN | RESPIRATION RATE: 16 BRPM | HEART RATE: 85 BPM | OXYGEN SATURATION: 100 % | SYSTOLIC BLOOD PRESSURE: 107 MMHG | DIASTOLIC BLOOD PRESSURE: 58 MMHG | TEMPERATURE: 98.5 F | BODY MASS INDEX: 22.44 KG/M2 | WEIGHT: 143 LBS

## 2022-11-18 LAB
GLUCOSE BLDC GLUCOMTR-MCNC: 105 MG/DL (ref 70–130)
GLUCOSE BLDC GLUCOMTR-MCNC: 132 MG/DL (ref 70–130)
HCT VFR BLD AUTO: 33.3 % (ref 34–46.6)
HGB BLD-MCNC: 10.9 G/DL (ref 12–15.9)

## 2022-11-18 PROCEDURE — 85014 HEMATOCRIT: CPT | Performed by: ORTHOPAEDIC SURGERY

## 2022-11-18 PROCEDURE — 82962 GLUCOSE BLOOD TEST: CPT

## 2022-11-18 PROCEDURE — 85018 HEMOGLOBIN: CPT | Performed by: ORTHOPAEDIC SURGERY

## 2022-11-18 RX ORDER — POLYETHYLENE GLYCOL 3350 17 G/17G
17 POWDER, FOR SOLUTION ORAL 2 TIMES DAILY
Qty: 22 PACKET | Refills: 0
Start: 2022-11-18 | End: 2022-11-29

## 2022-11-18 RX ORDER — HYDROCODONE BITARTRATE AND ACETAMINOPHEN 5; 325 MG/1; MG/1
1 TABLET ORAL EVERY 4 HOURS PRN
Qty: 20 TABLET | Refills: 0 | Status: SHIPPED | OUTPATIENT
Start: 2022-11-18 | End: 2022-11-20

## 2022-11-18 RX ORDER — PSEUDOEPHEDRINE HCL 30 MG
100 TABLET ORAL 2 TIMES DAILY
Start: 2022-11-18 | End: 2023-01-11

## 2022-11-18 RX ORDER — BISACODYL 10 MG
10 SUPPOSITORY, RECTAL RECTAL DAILY PRN
Start: 2022-11-18

## 2022-11-18 RX ORDER — ACETAMINOPHEN 325 MG/1
650 TABLET ORAL EVERY 4 HOURS PRN
Start: 2022-11-18

## 2022-11-18 RX ADMIN — BISACODYL 10 MG: 10 SUPPOSITORY RECTAL at 10:32

## 2022-11-18 RX ADMIN — VENLAFAXINE HYDROCHLORIDE 150 MG: 150 CAPSULE, EXTENDED RELEASE ORAL at 08:50

## 2022-11-18 RX ADMIN — HYDROCODONE BITARTRATE AND ACETAMINOPHEN 1 TABLET: 5; 325 TABLET ORAL at 05:41

## 2022-11-18 RX ADMIN — LEVOTHYROXINE SODIUM 75 MCG: 0.07 TABLET ORAL at 08:48

## 2022-11-18 RX ADMIN — BACLOFEN 10 MG: 10 TABLET ORAL at 08:48

## 2022-11-18 RX ADMIN — DOCUSATE SODIUM 100 MG: 100 CAPSULE, LIQUID FILLED ORAL at 08:48

## 2022-11-18 RX ADMIN — POLYETHYLENE GLYCOL 3350 17 G: 17 POWDER, FOR SOLUTION ORAL at 08:51

## 2022-11-18 RX ADMIN — DOFETILIDE 250 MCG: 0.25 CAPSULE ORAL at 10:32

## 2022-11-18 RX ADMIN — Medication 10 ML: at 08:50

## 2022-11-18 RX ADMIN — PANTOPRAZOLE SODIUM 40 MG: 40 TABLET, DELAYED RELEASE ORAL at 05:41

## 2022-11-18 RX ADMIN — APIXABAN 5 MG: 5 TABLET, FILM COATED ORAL at 08:48

## 2022-11-18 RX ADMIN — METOPROLOL TARTRATE 75 MG: 25 TABLET, FILM COATED ORAL at 08:49

## 2022-11-18 RX ADMIN — METFORMIN HYDROCHLORIDE 500 MG: 500 TABLET, FILM COATED ORAL at 08:49

## 2022-11-18 NOTE — PLAN OF CARE
Problem: Fall Injury Risk  Goal: Absence of Fall and Fall-Related Injury  Outcome: Ongoing, Progressing  Intervention: Identify and Manage Contributors  Recent Flowsheet Documentation  Taken 11/18/2022 0423 by Jorge Way RN  Medication Review/Management: medications reviewed  Taken 11/18/2022 0245 by Jorge Way RN  Medication Review/Management: medications reviewed  Taken 11/18/2022 0040 by Jorge Way RN  Medication Review/Management: medications reviewed  Taken 11/17/2022 2240 by Jorge Way RN  Medication Review/Management: medications reviewed  Taken 11/17/2022 2040 by Jorge Way RN  Medication Review/Management: medications reviewed  Intervention: Promote Injury-Free Environment  Recent Flowsheet Documentation  Taken 11/18/2022 0423 by Jorge Way RN  Safety Promotion/Fall Prevention:   activity supervised   assistive device/personal items within reach   safety round/check completed  Taken 11/18/2022 0245 by Jorge Way RN  Safety Promotion/Fall Prevention:   activity supervised   assistive device/personal items within reach   safety round/check completed  Taken 11/18/2022 0040 by Jorge Way RN  Safety Promotion/Fall Prevention: safety round/check completed  Taken 11/17/2022 2240 by Jorge Way RN  Safety Promotion/Fall Prevention:   activity supervised   assistive device/personal items within reach   safety round/check completed  Taken 11/17/2022 2040 by Jorge Way RN  Safety Promotion/Fall Prevention:   activity supervised   assistive device/personal items within reach   safety round/check completed     Problem: Skin Injury Risk Increased  Goal: Skin Health and Integrity  Outcome: Ongoing, Progressing  Intervention: Optimize Skin Protection  Recent Flowsheet Documentation  Taken 11/17/2022 2040 by Jorge Way RN  Pressure Reduction Techniques: frequent weight shift encouraged  Pressure Reduction Devices: alternating pressure pump  (ADD)  Skin Protection: adhesive use limited     Problem: Bleeding (Orthopaedic Fracture)  Goal: Absence of Bleeding  Outcome: Ongoing, Progressing     Problem: Embolism (Orthopaedic Fracture)  Goal: Absence of Embolism Signs and Symptoms  Outcome: Ongoing, Progressing  Intervention: Prevent or Manage Embolism Risk  Recent Flowsheet Documentation  Taken 11/18/2022 0423 by Jorge Way RN  VTE Prevention/Management:   bilateral   sequential compression devices on  Taken 11/17/2022 2040 by Jorge Way RN  VTE Prevention/Management:   bilateral   sequential compression devices on     Problem: Fracture Stabilization and Management (Orthopaedic Fracture)  Goal: Fracture Stability  Outcome: Ongoing, Progressing     Problem: Functional Ability Impaired (Orthopaedic Fracture)  Goal: Optimal Functional Ability  Outcome: Ongoing, Progressing  Intervention: Optimize Functional Ability  Recent Flowsheet Documentation  Taken 11/17/2022 2040 by Jorge Way RN  Range of Motion: active ROM (range of motion) encouraged     Problem: Infection (Orthopaedic Fracture)  Goal: Absence of Infection Signs and Symptoms  Outcome: Ongoing, Progressing  Intervention: Prevent or Manage Infection  Recent Flowsheet Documentation  Taken 11/18/2022 0423 by Jorge Way RN  Infection Prevention: rest/sleep promoted  Taken 11/18/2022 0245 by Jorge Way RN  Infection Prevention: rest/sleep promoted  Taken 11/18/2022 0040 by Jorge Way RN  Infection Prevention: rest/sleep promoted  Taken 11/17/2022 2240 by Jorge Way RN  Infection Prevention: rest/sleep promoted  Taken 11/17/2022 2040 by Jorge Way RN  Infection Prevention: rest/sleep promoted     Problem: Neurovascular Compromise (Orthopaedic Fracture)  Goal: Effective Tissue Perfusion  Outcome: Ongoing, Progressing  Intervention: Prevent or Manage Neurovascular Compromise  Recent Flowsheet Documentation  Taken 11/18/2022 0423 by Jorge Way  RN  Compartment Syndrome Management: active flexion/extension encouraged  Taken 11/17/2022 2040 by Jorge Way RN  Compartment Syndrome Management: active flexion/extension encouraged     Problem: Pain (Orthopaedic Fracture)  Goal: Acceptable Pain Control  Outcome: Ongoing, Progressing  Intervention: Manage Acute Orthopaedic-Related Pain  Recent Flowsheet Documentation  Taken 11/17/2022 2040 by Jorge Way RN  Pain Management Interventions: see MAR  Diversional Activities: television     Problem: Respiratory Compromise (Orthopaedic Fracture)  Goal: Effective Oxygenation and Ventilation  Outcome: Ongoing, Progressing  Intervention: Promote Airway Secretion Clearance  Recent Flowsheet Documentation  Taken 11/17/2022 2040 by Jorge Way RN  Cough And Deep Breathing: done independently per patient  Intervention: Optimize Oxygenation and Ventilation  Recent Flowsheet Documentation  Taken 11/17/2022 2040 by Jorge Way RN  Airway/Ventilation Management: airway patency maintained     Problem: Adjustment to Surgery (Hip Arthroplasty)  Goal: Optimal Coping  Outcome: Ongoing, Progressing  Intervention: Support Psychosocial Response to Surgery and Mobility Changes  Recent Flowsheet Documentation  Taken 11/17/2022 2040 by Jorge Way RN  Supportive Measures: active listening utilized     Problem: Bleeding (Hip Arthroplasty)  Goal: Absence of Bleeding  Outcome: Ongoing, Progressing     Problem: Bowel Motility Impaired (Hip Arthroplasty)  Goal: Effective Bowel Elimination  Outcome: Ongoing, Progressing  Intervention: Enhance Bowel Motility and Elimination  Recent Flowsheet Documentation  Taken 11/17/2022 2040 by Jorge Way RN  Bowel Elimination Management:   toileting offered   sitting position facilitated   relaxation techniques promoted     Problem: Fluid and Electrolyte Imbalance (Hip Arthroplasty)  Goal: Fluid and Electrolyte Balance  Outcome: Ongoing, Progressing     Problem: Functional  Ability Impaired (Hip Arthroplasty)  Goal: Optimal Functional Ability  Outcome: Ongoing, Progressing     Problem: Infection (Hip Arthroplasty)  Goal: Absence of Infection Signs and Symptoms  Outcome: Ongoing, Progressing     Problem: Neurovascular Compromise (Hip Arthroplasty)  Goal: Intact Neurovascular Status  Outcome: Ongoing, Progressing  Intervention: Prevent or Manage Neurovascular Compromise  Recent Flowsheet Documentation  Taken 11/18/2022 0423 by Jorge Way RN  Compartment Syndrome Management: active flexion/extension encouraged  Taken 11/17/2022 2040 by Jorge Way RN  Compartment Syndrome Management: active flexion/extension encouraged     Problem: Ongoing Anesthesia Effects (Hip Arthroplasty)  Goal: Anesthesia/Sedation Recovery  Outcome: Ongoing, Progressing  Intervention: Optimize Anesthesia Recovery  Recent Flowsheet Documentation  Taken 11/18/2022 0544 by Jorge Way RN  Administration (IS): self-administered  Taken 11/18/2022 0423 by Jorge Way RN  Safety Promotion/Fall Prevention:   activity supervised   assistive device/personal items within reach   safety round/check completed  Taken 11/18/2022 0245 by Jorge Way RN  Safety Promotion/Fall Prevention:   activity supervised   assistive device/personal items within reach   safety round/check completed  Taken 11/18/2022 0040 by Jorge Way RN  Safety Promotion/Fall Prevention: safety round/check completed  Taken 11/17/2022 2240 by Jorge Way RN  Safety Promotion/Fall Prevention:   activity supervised   assistive device/personal items within reach   safety round/check completed  Taken 11/17/2022 2200 by Jorge Way RN  Administration (IS): self-administered  Taken 11/17/2022 2040 by Jorge Way RN  Safety Promotion/Fall Prevention:   activity supervised   assistive device/personal items within reach   safety round/check completed  Administration (IS): self-administered  Taken 11/17/2022 2000 by  Jorge Way RN  Administration (IS): self-administered     Problem: Pain (Hip Arthroplasty)  Goal: Acceptable Pain Control  Outcome: Ongoing, Progressing  Intervention: Prevent or Manage Pain  Recent Flowsheet Documentation  Taken 11/17/2022 2040 by Jorge Way RN  Pain Management Interventions: see MAR  Diversional Activities: television     Problem: Postoperative Nausea and Vomiting (Hip Arthroplasty)  Goal: Nausea and Vomiting Relief  Outcome: Ongoing, Progressing     Problem: Postoperative Urinary Retention (Hip Arthroplasty)  Goal: Effective Urinary Elimination  Outcome: Ongoing, Progressing     Problem: Respiratory Compromise (Hip Arthroplasty)  Goal: Effective Oxygenation and Ventilation  Outcome: Ongoing, Progressing  Intervention: Optimize Oxygenation and Ventilation  Recent Flowsheet Documentation  Taken 11/17/2022 2040 by Jorge Way RN  Airway/Ventilation Management: airway patency maintained     Problem: Adult Inpatient Plan of Care  Goal: Plan of Care Review  Outcome: Ongoing, Progressing  Goal: Patient-Specific Goal (Individualized)  Outcome: Ongoing, Progressing  Goal: Absence of Hospital-Acquired Illness or Injury  Outcome: Ongoing, Progressing  Intervention: Identify and Manage Fall Risk  Recent Flowsheet Documentation  Taken 11/18/2022 0423 by Jorge Way RN  Safety Promotion/Fall Prevention:   activity supervised   assistive device/personal items within reach   safety round/check completed  Taken 11/18/2022 0245 by Jorge Way RN  Safety Promotion/Fall Prevention:   activity supervised   assistive device/personal items within reach   safety round/check completed  Taken 11/18/2022 0040 by Jorge Way RN  Safety Promotion/Fall Prevention: safety round/check completed  Taken 11/17/2022 2240 by Jorge Way RN  Safety Promotion/Fall Prevention:   activity supervised   assistive device/personal items within reach   safety round/check completed  Taken 11/17/2022  2040 by Jorge Way RN  Safety Promotion/Fall Prevention:   activity supervised   assistive device/personal items within reach   safety round/check completed  Intervention: Prevent Skin Injury  Recent Flowsheet Documentation  Taken 11/17/2022 2040 by Jorge Way RN  Skin Protection: adhesive use limited  Intervention: Prevent and Manage VTE (Venous Thromboembolism) Risk  Recent Flowsheet Documentation  Taken 11/18/2022 0423 by Jorge Way RN  VTE Prevention/Management:   bilateral   sequential compression devices on  Taken 11/17/2022 2040 by Jorge Way RN  VTE Prevention/Management:   bilateral   sequential compression devices on  Range of Motion: active ROM (range of motion) encouraged  Intervention: Prevent Infection  Recent Flowsheet Documentation  Taken 11/18/2022 0423 by Jorge Way RN  Infection Prevention: rest/sleep promoted  Taken 11/18/2022 0245 by Jorge Way RN  Infection Prevention: rest/sleep promoted  Taken 11/18/2022 0040 by Jorge Way RN  Infection Prevention: rest/sleep promoted  Taken 11/17/2022 2240 by Jorge Way RN  Infection Prevention: rest/sleep promoted  Taken 11/17/2022 2040 by Jorge Way RN  Infection Prevention: rest/sleep promoted  Goal: Optimal Comfort and Wellbeing  Outcome: Ongoing, Progressing  Intervention: Monitor Pain and Promote Comfort  Recent Flowsheet Documentation  Taken 11/17/2022 2040 by Jorge Way RN  Pain Management Interventions: see MAR  Intervention: Provide Person-Centered Care  Recent Flowsheet Documentation  Taken 11/17/2022 2040 by Jorge Way RN  Trust Relationship/Rapport:   care explained   choices provided   questions answered   questions encouraged  Goal: Readiness for Transition of Care  Outcome: Ongoing, Progressing   Goal Outcome Evaluation:

## 2022-11-18 NOTE — DISCHARGE SUMMARY
PHYSICIAN DISCHARGE SUMMARY                                                                        [facility]    Patient Identification:  Name: Doir Pettit  Age: 82 y.o.  Sex: female  :  1940  MRN: [id number]  Primary Care Physician: Ashanti Hong APRN    Admit date: 2022  Discharge date and time:2022  Discharged Condition: good    Discharge Diagnoses:  Active Hospital Problems    Diagnosis  POA   • **Closed displaced fracture of right femoral neck (HCC) [S72.001A]  Yes   • Fall [W19.XXXA]  Unknown   • PAF (paroxysmal atrial fibrillation) (HCC) [I48.0]  Yes   • S/P AVR [Z95.2]  Not Applicable   • Mild intermittent asthma without complication [J45.20]  Yes   • Anxiety [F41.9]  Yes   • Restless leg syndrome [G25.81]  Yes   • Hypothyroidism [E03.9]  Yes   • Hypercholesteremia [E78.00]  Yes   • Gastroesophageal reflux disease [K21.9]  Yes   • Essential hypertension [I10]  Yes   • Pulmonary hypertension (HCC) [I27.20]  Yes   • NNEKA (obstructive sleep apnea) [G47.33]  Yes      Resolved Hospital Problems   No resolved problems to display.          PMHX:   Past Medical History:   Diagnosis Date   • Allergic rhinitis    • Anemia    • Anxiety     Controlled w/Meds   • Aortic root dilatation (HCC) 10/02/2017    Borderline--Noted on Echo   • Aortic valve calcification 10/02/2017    Noted on Echo   • Aortic valve insufficiency Dx in 07    w/AVR    • Aortic valve prosthesis present 2018    Noted on CTA Chest   • Ascending aorta dilatation (HCC) 10/02/2017    Borderline--Noted on Echo   • Asthma    • Atypical chest pain    • Breast pain, right Hx   • CAD (coronary artery disease)    • Cardiomegaly    • Cervicalgia    • Chest pain due to CAD (HCC)    • 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    Borderline--Noted on Echo & Measured @ 2.6CM and Mid Descending @ 2.5CM on CTA Chest-18   • DM (diabetes  mellitus) ([facility])     T2   • Esophagitis    • GERD (gastroesophageal reflux disease)     Controlled w/Meds   • Headache    • Health care maintenance    • Heart murmur    • History of echocardiogram 10/2/17-[facility]    EF 66%; Borderline Concentric Hypertrophy; Mild Calcification in AV; Mild AVS; Mild AVR; Moderate TVR; Borderline Dilation of Aortic Root/Arch & Borderline Dilation of Ascending/Proximal Aorta Present   • Hyperlipidemia     Controlled w/Meds   • Hypertension     Controlled w/Meds   • Hypothyroidism     Controlled w/Synthroid   • Insomnia    • Macular degeneration, left eye    • Mild aortic valve regurgitation 10/02/2017    Noted on Echo   • Mild aortic valve stenosis 10/02/2017    Noted on Echo   • Mild dilation of ascending aorta ([facility]) 10/02/2017    Borderline--Noted on Echo   • Moderate tricuspid valve regurgitation 10/02/2017    Noted on Echo   • Mood disorder in conditions classified elsewhere     Controlled w/Meds   • Near syncope 03/2017-[facility]   • Neuropathy    • NNEKA (obstructive sleep apnea)     Untreated   • Osteoarthritis     Ankles/Feet   • Pulmonary hypertension ([facility]) 2017   • Renal insufficiency    • RLS (restless legs syndrome)    • Thoracic ascending aortic aneurysm Dx in 2007 @ 3.8CM & 1/02/2018    Noted @ 4CM on CTA Chest;   • Visual impairment     macular degeneratuin     PSHX:   Past Surgical History:   Procedure Laterality Date   • AORTIC VALVE REPAIR/REPLACEMENT  2007    Dr. Perry   • APPENDECTOMY     • AUGMENTATION MAMMAPLASTY  1976   • BREAST AUGMENTATION  2014   • BUNIONECTOMY     • CARDIAC CATHETERIZATION  2007   • CARDIAC VALVE REPLACEMENT  2007    porcine   • COLONOSCOPY  2010   • COLONOSCOPY  03/02/2012   • EYE SURGERY      cataracts and ens implants   • HYSTERECTOMY  1972   • SIGMOIDOSCOPY  2001   • TOTAL HIP ARTHROPLASTY Right 11/15/2022    Procedure: Right anterior total hip arthroplasty;  Surgeon: Joao Martinez MD;  Location: [facility];  Service: Orthopedics;   Laterality: Right;       Hospital Course: Dior Pettit  is a 82 y.o. female with history of aortic valve replacement, paroxysmal atrial fibrillation on chronic anticoagulation, anxiety, depression, coronary artery disease, diabetes, hypertension, hypothyroidism and sleep apnea presents to the hospital from home where she lives by her self reporting a fall while walking to the bathroom this morning.  Patient reports she lost her balance when she ran into a wall and fell back hitting her head on an iron bed frame.  Patient denies loss of consciousness, neck pain or back pain, chest pain, shortness of air, abdominal pain, nausea/vomiting.  Patient reports right hip pain and inability to weight-bear since that time.  Patient reports she scooted across the floor, got to her phone and called for assistance.  Patient reports she thinks she was on the floor approximately an hour.  The patient was evaluated in the ER and found to have a right femoral neck fracture.  Orthopedic surgery was consulted from the ER.  The patient was admitted for further evaluation treatment.  She states she generally been in her usual state of health and denied having any chest pain.  She has been a little short of air but she does have asthma and uses some inhalers periodically.  The patient was admitted to the hospital and seen by orthopedic surgery.  Patient underwent total hip replacement and postoperatively did pretty well.  She is still weak and the plan is to go to skilled nursing facility for some rehab prior to returning home.  She will follow-up with her primary care after released from rehab.  She will follow-up with orthopedic surgery in 2 weeks for postop check.      Consults:     Consults     Date and Time Order Name Status Description    11/13/2022  2:40 PM Inpatient Orthopedic Surgery Consult      11/13/2022 10:55 AM Ortho (on-call MD unless specified) Completed     11/13/2022 10:55 AM [facility] (on-call MD unless specified) Details           Results from last 7 days   Lab Units 11/18/22  0515 11/17/22  0503   WBC 10*3/mm3  --  8.54   HEMOGLOBIN g/dL 10.9* 10.8*   HEMATOCRIT % 33.3* 32.3*   PLATELETS 10*3/mm3  --  226     Results from last 7 days   Lab Units 11/17/22  0503   SODIUM mmol/L 135*   POTASSIUM mmol/L 4.4   CHLORIDE mmol/L 96*   CO2 mmol/L 30.0*   BUN mg/dL 14   CREATININE mg/dL 0.68   GLUCOSE mg/dL 110*   CALCIUM mg/dL 9.2     Significant Diagnostic Studies:   WBC   Date Value Ref Range Status   11/17/2022 8.54 3.40 - 10.80 10*3/mm3 Final     Hemoglobin   Date Value Ref Range Status   11/18/2022 10.9 (L) 12.0 - 15.9 g/dL Final     Hematocrit   Date Value Ref Range Status   11/18/2022 33.3 (L) 34.0 - 46.6 % Final     Platelets   Date Value Ref Range Status   11/17/2022 226 140 - 450 10*3/mm3 Final     Sodium   Date Value Ref Range Status   11/17/2022 135 (L) 136 - 145 mmol/L Final     Potassium   Date Value Ref Range Status   11/17/2022 4.4 3.5 - 5.2 mmol/L Final     Chloride   Date Value Ref Range Status   11/17/2022 96 (L) 98 - 107 mmol/L Final     CO2   Date Value Ref Range Status   11/17/2022 30.0 (H) 22.0 - 29.0 mmol/L Final     BUN   Date Value Ref Range Status   11/17/2022 14 8 - 23 mg/dL Final     Creatinine   Date Value Ref Range Status   11/17/2022 0.68 0.57 - 1.00 mg/dL Final     Glucose   Date Value Ref Range Status   11/17/2022 110 (H) 65 - 99 mg/dL Final     Calcium   Date Value Ref Range Status   11/17/2022 9.2 8.6 - 10.5 mg/dL Final     No results found for: AST, ALT, ALKPHOS  No results found for: APTT, INR  No results found for: COLORU, CLARITYU, SPECGRAV, PHUR, PROTEINUR, GLUCOSEU, KETONESU, BLOODU, NITRITE, LEUKOCYTESUR, BILIRUBINUR, UROBILINOGEN, RBCUA, WBCUA, BACTERIA, UACOMMENT  No results found for: TROPONINT, TROPONINI, BNP  No components found for: HGBA1C;2  No components found for: TSH;2  Imaging Results (All)     Procedure Component Value Units Date/Time    XR Hip 1 View Without Pelvis Right (Surgery Only)  [[id number]] Collected: 11/15/22 1311     Updated: 11/15/22 1316    Narrative:      Right hip radiograph     HISTORY:Postop     TECHNIQUE: Single AP radiograph the right hip     COMPARISON:Right hip radiograph 11/13/2022     FINDINGS:  Post surgical changes from recent right total hip arthroplasty are  present. The hardware is intact. There is no new periprosthetic  fracture.       Impression:      Postoperative changes from recent right total hip  arthroplasty without findings of immediate complication.     This report was finalized on 11/15/2022 1:12 PM by Dr. Michael Valenzuela M.D.       XR Hip With or Without Pelvis 1 View Right [[id number]] Collected: 11/15/22 1227     Updated: 11/15/22 1231    Narrative:      INTRAOPERATIVE PORTABLE VIEW RIGHT HIP     CLINICAL INFORMATION: Post total hip arthroplasty     FINDINGS: A complicating process is not identified.     Fluoroscopy time 82 s, 3 images     This report was finalized on 11/15/2022 12:27 PM by Dr. Michael Valenzuela M.D.       FL C Arm During Surgery [[id number]] Resulted: 11/15/22 1225     Updated: 11/15/22 1225    Narrative:      This procedure was auto-finalized with no dictation required.    CT Head Without Contrast [[id number]] Collected: 11/13/22 1107     Updated: 11/13/22 1218    Narrative:      CT SCAN OF THE BRAIN WITHOUT CONTRAST     HISTORY: Fell with trauma to back of head. Headache.     The CT scan was performed as an emergency procedure through the brain  without contrast. There is moderate diffuse atrophy and chronic small  vessel ischemic change similar to the study of 02/01/2019. There is no  evidence of acute intracranial hemorrhage or mass effect. There is mild  mucosal thickening scattered in the ethmoid air cells and both maxillary  sinuses that is slightly worse on today's exam. The mastoid air cells  are clear.     CT SCAN OF CERVICAL SPINE     HISTORY: Fell. Neck pain.     The CT scan was performed as an emergency procedure through the  cervical  spine and demonstrates the followin. There is no evidence of acute fracture with particular reference to  the odontoid. The prevertebral soft tissues appear normal.  2. There are scattered degenerative changes in the cervical spine with  some spurring of the lateral facets and uncovertebral joints. However,  there is no central or foraminal encroachment throughout the cervical  spine.           Radiation dose reduction techniques were utilized, including automated  exposure control and exposure modulation based on body size.     This report was finalized on 2022 12:14 PM by Dr. Hakan Jean Baptiste M.D.       CT Cervical Spine Without Contrast [[id number]] Collected: 22 1107     Updated: 22 1218    Narrative:      CT SCAN OF THE BRAIN WITHOUT CONTRAST     HISTORY: Fell with trauma to back of head. Headache.     The CT scan was performed as an emergency procedure through the brain  without contrast. There is moderate diffuse atrophy and chronic small  vessel ischemic change similar to the study of 2019. There is no  evidence of acute intracranial hemorrhage or mass effect. There is mild  mucosal thickening scattered in the ethmoid air cells and both maxillary  sinuses that is slightly worse on today's exam. The mastoid air cells  are clear.     CT SCAN OF CERVICAL SPINE     HISTORY: Fell. Neck pain.     The CT scan was performed as an emergency procedure through the cervical  spine and demonstrates the followin. There is no evidence of acute fracture with particular reference to  the odontoid. The prevertebral soft tissues appear normal.  2. There are scattered degenerative changes in the cervical spine with  some spurring of the lateral facets and uncovertebral joints. However,  there is no central or foraminal encroachment throughout the cervical  spine.           Radiation dose reduction techniques were utilized, including automated  exposure control and exposure modulation  based on body size.     This report was finalized on 11/13/2022 12:14 PM by Dr. Hakan Jean Baptiste M.D.       XR Hip With or Without Pelvis 2 - 3 View Right [[id number]] Collected: 11/13/22 1045     Updated: 11/13/22 1049    Narrative:      TWO-VIEW RIGHT HIP AND ONE VIEW AP PELVIS     HISTORY: Fell. Right hip pain.     FINDINGS: There is an acute fracture of the right femoral neck with  slight superior displacement of the femur relative to the femoral head.     This report was finalized on 11/13/2022 10:46 AM by Dr. Hakan Jean Baptiste M.D.       XR Chest 1 View [[id number]] Collected: 11/13/22 1044     Updated: 11/13/22 1048    Narrative:      ONE VIEW PORTABLE CHEST     HISTORY: Right hip fracture. Preop for surgery. Hypertension.     FINDINGS: The lungs are well-expanded and clear. The heart is top normal  in size with sternal wires from previous cardiac surgery. There is no  acute disease or change from 10/03/2022.     This report was finalized on 11/13/2022 10:45 AM by Dr. Hakan Jean Baptiste M.D.           Lab Results (last 7 days)     Procedure Component Value Units Date/Time    Hemoglobin & Hematocrit, Blood [[id number]]  (Abnormal) Collected: 11/18/22 0515    Specimen: Blood Updated: 11/18/22 0539     Hemoglobin 10.9 g/dL      Hematocrit 33.3 %     POC Glucose Once [[id number]]  (Abnormal) Collected: 11/18/22 0524    Specimen: Blood Updated: 11/18/22 0525     Glucose 132 mg/dL      Comment: Meter: [id number] : [id number] Stegemoller Emilyne NA       POC Glucose Once [[id number]]  (Normal) Collected: 11/17/22 2309    Specimen: Blood Updated: 11/17/22 2319     Glucose 123 mg/dL      Comment: Meter: [id number] : [id number] Stegemoller Emilyne NA       POC Glucose Once [[id number]]  (Normal) Collected: 11/17/22 1609    Specimen: Blood Updated: 11/17/22 1610     Glucose 107 mg/dL      Comment: Meter: [id number] : [id number] Svitlana Olivia NA       POC Glucose Once [[id number]]  (Abnormal) Collected: 11/15/22 4118     Specimen: Blood Updated: 11/17/22 1428     Glucose 164 mg/dL      Comment: Meter: [id number] : [id number] Raymundo Calero RN       POC Glucose Once [[id number]]  (Abnormal) Collected: 11/17/22 1144    Specimen: Blood Updated: 11/17/22 1149     Glucose 154 mg/dL      Comment: Meter: [id number] : nicole Durbin RN       Basic Metabolic Panel [[id number]]  (Abnormal) Collected: 11/17/22 0503    Specimen: Blood Updated: 11/17/22 0730     Glucose 110 mg/dL      BUN 14 mg/dL      Creatinine 0.68 mg/dL      Sodium 135 mmol/L      Potassium 4.4 mmol/L      Chloride 96 mmol/L      CO2 30.0 mmol/L      Calcium 9.2 mg/dL      BUN/Creatinine Ratio 20.6     Anion Gap 9.0 mmol/L      eGFR 86.0 mL/min/1.73      Comment: National Kidney Foundation and American Society of Nephrology (ASN) Task Force recommended calculation based on the Chronic Kidney Disease Epidemiology Collaboration (CKD-EPI) equation refit without adjustment for race.       Narrative:      GFR Normal >60  Chronic Kidney Disease <60  Kidney Failure <15    The GFR formula is only valid for adults with stable renal function between ages 18 and 70.    CBC & Differential [[id number]]  (Abnormal) Collected: 11/17/22 0503    Specimen: Blood Updated: 11/17/22 0649    Narrative:      The following orders were created for panel order CBC & Differential.  Procedure                               Abnormality         Status                     ---------                               -----------         ------                     CBC Auto Differential[[id number]]        Abnormal            Final result                 Please view results for these tests on the individual orders.    CBC Auto Differential [[id number]]  (Abnormal) Collected: 11/17/22 0503    Specimen: Blood Updated: 11/17/22 0649     WBC 8.54 10*3/mm3      RBC 3.53 10*6/mm3      Hemoglobin 10.8 g/dL      Hematocrit 32.3 %      MCV 91.5 fL      MCH 30.6 pg      MCHC 33.4 g/dL      RDW 14.2 %       RDW-SD 47.4 fl      MPV 10.2 fL      Platelets 226 10*3/mm3      Neutrophil % 61.6 %      Lymphocyte % 15.7 %      Monocyte % 15.8 %      Eosinophil % 5.7 %      Basophil % 0.7 %      Immature Grans % 0.5 %      Neutrophils, Absolute 5.26 10*3/mm3      Lymphocytes, Absolute 1.34 10*3/mm3      Monocytes, Absolute 1.35 10*3/mm3      Eosinophils, Absolute 0.49 10*3/mm3      Basophils, Absolute 0.06 10*3/mm3      Immature Grans, Absolute 0.04 10*3/mm3      nRBC 0.0 /100 WBC     POC Glucose Once [[id number]]  (Normal) Collected: 11/17/22 0549    Specimen: Blood Updated: 11/17/22 0550     Glucose 105 mg/dL      Comment: Meter: [id number] : [id number] Robbie GERONIMO       POC Glucose Once [[id number]]  (Abnormal) Collected: 11/16/22 1616    Specimen: Blood Updated: 11/16/22 1618     Glucose 132 mg/dL      Comment: Meter: [id number] : [id number] Joe Ny RN       POC Glucose Once [[id number]]  (Normal) Collected: 11/16/22 1150    Specimen: Blood Updated: 11/16/22 1152     Glucose 119 mg/dL      Comment: Meter: [id number] : [id number] Celestino GERONIMO       POC Glucose Once [[id number]]  (Normal) Collected: 11/16/22 0607    Specimen: Blood Updated: 11/16/22 0608     Glucose 129 mg/dL      Comment: Meter: [id number] : [id number] Lisa GERONIMO       Basic Metabolic Panel [[id number]]  (Abnormal) Collected: 11/16/22 0501    Specimen: Blood Updated: 11/16/22 0601     Glucose 134 mg/dL      BUN 22 mg/dL      Creatinine 0.87 mg/dL      Sodium 130 mmol/L      Potassium 4.5 mmol/L      Chloride 93 mmol/L      CO2 26.8 mmol/L      Calcium 8.8 mg/dL      BUN/Creatinine Ratio 25.3     Anion Gap 10.2 mmol/L      eGFR 65.8 mL/min/1.73      Comment: National Kidney Foundation and American Society of Nephrology (ASN) Task Force recommended calculation based on the Chronic Kidney Disease Epidemiology Collaboration (CKD-EPI) equation refit without adjustment for race.       Narrative:      GFR Normal >60  Chronic  Kidney Disease <60  Kidney Failure <15    The GFR formula is only valid for adults with stable renal function between ages 18 and 70.    CBC & Differential [[id number]]  (Abnormal) Collected: 11/16/22 0501    Specimen: Blood Updated: 11/16/22 0540    Narrative:      The following orders were created for panel order CBC & Differential.  Procedure                               Abnormality         Status                     ---------                               -----------         ------                     CBC Auto Differential[[id number]]        Abnormal            Final result                 Please view results for these tests on the individual orders.    CBC Auto Differential [[id number]]  (Abnormal) Collected: 11/16/22 0501    Specimen: Blood Updated: 11/16/22 0540     WBC 10.05 10*3/mm3      RBC 3.31 10*6/mm3      Hemoglobin 10.2 g/dL      Hematocrit 30.6 %      MCV 92.4 fL      MCH 30.8 pg      MCHC 33.3 g/dL      RDW 14.4 %      RDW-SD 48.7 fl      MPV 10.1 fL      Platelets 188 10*3/mm3      Neutrophil % 74.4 %      Lymphocyte % 13.1 %      Monocyte % 10.5 %      Eosinophil % 1.3 %      Basophil % 0.2 %      Immature Grans % 0.5 %      Neutrophils, Absolute 7.47 10*3/mm3      Lymphocytes, Absolute 1.32 10*3/mm3      Monocytes, Absolute 1.06 10*3/mm3      Eosinophils, Absolute 0.13 10*3/mm3      Basophils, Absolute 0.02 10*3/mm3      Immature Grans, Absolute 0.05 10*3/mm3      nRBC 0.0 /100 WBC     POC Glucose Once [[id number]]  (Abnormal) Collected: 11/15/22 2124    Specimen: Blood Updated: 11/15/22 2125     Glucose 159 mg/dL      Comment: Meter: [id number] : [id number] Shaniwilfredo Ohn NA       POC Glucose Once [[id number]]  (Abnormal) Collected: 11/15/22 1628    Specimen: Blood Updated: 11/15/22 1630     Glucose 187 mg/dL      Comment: Meter: [id number] : [id number] Tera Rivera NA       POC Glucose Once [[id number]]  (Abnormal) Collected: 11/15/22 0629    Specimen: Blood Updated: 11/15/22 0629      Glucose 152 mg/dL      Comment: Meter: [id number] : [id number] Anabela GERONIMO       Basic Metabolic Panel [[id number]]  (Abnormal) Collected: 11/15/22 0505    Specimen: Blood Updated: 11/15/22 0610     Glucose 160 mg/dL      BUN 14 mg/dL      Creatinine 0.67 mg/dL      Sodium 131 mmol/L      Potassium 4.6 mmol/L      Chloride 95 mmol/L      CO2 28.0 mmol/L      Calcium 9.5 mg/dL      BUN/Creatinine Ratio 20.9     Anion Gap 8.0 mmol/L      eGFR 86.3 mL/min/1.73      Comment: National Kidney Foundation and American Society of Nephrology (ASN) Task Force recommended calculation based on the Chronic Kidney Disease Epidemiology Collaboration (CKD-EPI) equation refit without adjustment for race.       Narrative:      GFR Normal >60  Chronic Kidney Disease <60  Kidney Failure <15    The GFR formula is only valid for adults with stable renal function between ages 18 and 70.    CBC & Differential [[id number]]  (Abnormal) Collected: 11/15/22 0505    Specimen: Blood Updated: 11/15/22 0546    Narrative:      The following orders were created for panel order CBC & Differential.  Procedure                               Abnormality         Status                     ---------                               -----------         ------                     CBC Auto Differential[[id number]]        Abnormal            Final result                 Please view results for these tests on the individual orders.    CBC Auto Differential [[id number]]  (Abnormal) Collected: 11/15/22 0505    Specimen: Blood Updated: 11/15/22 0546     WBC 13.15 10*3/mm3      RBC 4.39 10*6/mm3      Hemoglobin 12.8 g/dL      Hematocrit 38.6 %      MCV 87.9 fL      MCH 29.2 pg      MCHC 33.2 g/dL      RDW 13.7 %      RDW-SD 43.9 fl      MPV 9.8 fL      Platelets 218 10*3/mm3      Neutrophil % 81.2 %      Lymphocyte % 8.1 %      Monocyte % 7.6 %      Eosinophil % 2.1 %      Basophil % 0.4 %      Immature Grans % 0.6 %      Neutrophils, Absolute 10.68 10*3/mm3       Lymphocytes, Absolute 1.06 10*3/mm3      Monocytes, Absolute 1.00 10*3/mm3      Eosinophils, Absolute 0.28 10*3/mm3      Basophils, Absolute 0.05 10*3/mm3      Immature Grans, Absolute 0.08 10*3/mm3      nRBC 0.0 /100 WBC     POC Glucose Once [[id number]]  (Abnormal) Collected: 11/14/22 2103    Specimen: Blood Updated: 11/14/22 2105     Glucose 154 mg/dL      Comment: Meter: [id number] : [id number] Anabela Diamonique NA       POC Glucose Once [[id number]]  (Abnormal) Collected: 11/14/22 1633    Specimen: Blood Updated: 11/14/22 1635     Glucose 133 mg/dL      Comment: Meter: [id number] : [id number] IP Ghoster NA       POC Glucose Once [[id number]]  (Normal) Collected: 11/14/22 1107    Specimen: Blood Updated: 11/14/22 1108     Glucose 120 mg/dL      Comment: Meter: [id number] : [id number] IP Ghoster NA       Basic Metabolic Panel [[id number]]  (Abnormal) Collected: 11/14/22 0430    Specimen: Blood Updated: 11/14/22 0625     Glucose 156 mg/dL      BUN 15 mg/dL      Creatinine 0.77 mg/dL      Sodium 132 mmol/L      Potassium 5.0 mmol/L      Chloride 94 mmol/L      CO2 29.8 mmol/L      Calcium 10.1 mg/dL      BUN/Creatinine Ratio 19.5     Anion Gap 8.2 mmol/L      eGFR 76.2 mL/min/1.73      Comment: National Kidney Foundation and American Society of Nephrology (ASN) Task Force recommended calculation based on the Chronic Kidney Disease Epidemiology Collaboration (CKD-EPI) equation refit without adjustment for race.       Narrative:      GFR Normal >60  Chronic Kidney Disease <60  Kidney Failure <15    The GFR formula is only valid for adults with stable renal function between ages 18 and 70.    POC Glucose Once [[id number]]  (Abnormal) Collected: 11/14/22 0615    Specimen: Blood Updated: 11/14/22 0619     Glucose 137 mg/dL      Comment: Meter: [id number] : [id number] King Yue TILLEY       TSH [[id number]]  (Normal) Collected: 11/14/22 0430    Specimen: Blood Updated: 11/14/22 0542     TSH 0.677 uIU/mL      T4, Free [[id number]]  (Normal) Collected: 11/14/22 0430    Specimen: Blood Updated: 11/14/22 0542     Free T4 1.22 ng/dL     Narrative:      Results may be falsely increased if patient taking Biotin.      CBC Auto Differential [[id number]]  (Abnormal) Collected: 11/14/22 0430    Specimen: Blood Updated: 11/14/22 0516     WBC 14.63 10*3/mm3      RBC 4.52 10*6/mm3      Hemoglobin 13.6 g/dL      Hematocrit 40.8 %      MCV 90.3 fL      MCH 30.1 pg      MCHC 33.3 g/dL      RDW 14.3 %      RDW-SD 46.9 fl      MPV 10.0 fL      Platelets 255 10*3/mm3      Neutrophil % 84.4 %      Lymphocyte % 6.8 %      Monocyte % 7.2 %      Eosinophil % 0.8 %      Basophil % 0.3 %      Immature Grans % 0.5 %      Neutrophils, Absolute 12.35 10*3/mm3      Lymphocytes, Absolute 0.99 10*3/mm3      Monocytes, Absolute 1.06 10*3/mm3      Eosinophils, Absolute 0.12 10*3/mm3      Basophils, Absolute 0.04 10*3/mm3      Immature Grans, Absolute 0.07 10*3/mm3      nRBC 0.0 /100 WBC     POC Glucose Once [[id number]]  (Abnormal) Collected: 11/13/22 2138    Specimen: Blood Updated: 11/13/22 2139     Glucose 168 mg/dL      Comment: Meter: [id number] : [id number] Ariela GERONIMO       POC Glucose Once [[id number]]  (Normal) Collected: 11/13/22 1629    Specimen: Blood Updated: 11/13/22 1630     Glucose 126 mg/dL      Comment: Meter: [id number] : [id number] Mason GERONIMO       Hemoglobin A1c [[id number]]  (Abnormal) Collected: 11/13/22 1122    Specimen: Blood Updated: 11/13/22 1455     Hemoglobin A1C 6.10 %     Narrative:      Hemoglobin A1C Ranges:    Increased Risk for Diabetes  5.7% to 6.4%  Diabetes                     >= 6.5%  Diabetic Goal                < 7.0%    Comprehensive Metabolic Panel [[id number]]  (Abnormal) Collected: 11/13/22 1346    Specimen: Blood from Arm, Right Updated: 11/13/22 1421     Glucose 136 mg/dL      BUN 18 mg/dL      Creatinine 0.82 mg/dL      Sodium 135 mmol/L      Potassium 4.6 mmol/L      Comment: Slight  hemolysis detected by analyzer. Results may be affected.        Chloride 100 mmol/L      CO2 25.0 mmol/L      Calcium 9.6 mg/dL      Total Protein 7.2 g/dL      Albumin 4.60 g/dL      ALT (SGPT) 17 U/L      AST (SGOT) 28 U/L      Alkaline Phosphatase 58 U/L      Total Bilirubin 0.5 mg/dL      Globulin 2.6 gm/dL      A/G Ratio 1.8 g/dL      BUN/Creatinine Ratio 22.0     Anion Gap 10.0 mmol/L      eGFR 70.6 mL/min/1.73      Comment: National Kidney Foundation and American Society of Nephrology (ASN) Task Force recommended calculation based on the Chronic Kidney Disease Epidemiology Collaboration (CKD-EPI) equation refit without adjustment for race.       Narrative:      GFR Normal >60  Chronic Kidney Disease <60  Kidney Failure <15    The GFR formula is only valid for adults with stable renal function between ages 18 and 70.    Protime-INR [[id number]]  (Normal) Collected: 11/13/22 1346    Specimen: Blood from Arm, Right Updated: 11/13/22 1417     Protime 14.2 Seconds      INR 1.08    CBC & Differential [[id number]]  (Abnormal) Collected: 11/13/22 1122    Specimen: Blood Updated: 11/13/22 1140    Narrative:      The following orders were created for panel order CBC & Differential.  Procedure                               Abnormality         Status                     ---------                               -----------         ------                     CBC Auto Differential[[id number]]        Abnormal            Final result                 Please view results for these tests on the individual orders.    CBC Auto Differential [[id number]]  (Abnormal) Collected: 11/13/22 1122    Specimen: Blood Updated: 11/13/22 1140     WBC 10.53 10*3/mm3      RBC 4.34 10*6/mm3      Hemoglobin 13.2 g/dL      Hematocrit 40.1 %      MCV 92.4 fL      MCH 30.4 pg      MCHC 32.9 g/dL      RDW 14.4 %      RDW-SD 48.5 fl      MPV 9.8 fL      Platelets 233 10*3/mm3      Neutrophil % 81.5 %      Lymphocyte % 11.5 %      Monocyte % 5.7 %       "Eosinophil % 0.5 %      Basophil % 0.5 %      Immature Grans % 0.3 %      Neutrophils, Absolute 8.59 10*3/mm3      Lymphocytes, Absolute 1.21 10*3/mm3      Monocytes, Absolute 0.60 10*3/mm3      Eosinophils, Absolute 0.05 10*3/mm3      Basophils, Absolute 0.05 10*3/mm3      Immature Grans, Absolute 0.03 10*3/mm3      nRBC 0.0 /100 WBC         /69 (BP Location: Left arm, Patient Position: Lying)   Pulse 85   Temp 98.9 °F (37.2 °C) (Oral)   Resp 16   Ht 170.2 cm (67\")   Wt 64.9 kg (143 lb)   LMP  (LMP Unknown)   SpO2 100%   BMI 22.40 kg/m²     Discharge Exam:  General Appearance:    Alert, cooperative, no distress                          Head:    Normocephalic, without obvious abnormality, atraumatic                          Eyes:                            Throat:   Lips, tongue, gums normal                          Neck:   Supple, symmetrical, trachea midline, no JVD                        Lungs:     Clear to auscultation bilaterally, respirations unlabored                Chest Wall:    No tenderness or deformity                        Heart:    Regular rate and rhythm, S1 and S2 normal, no murmur,no  Rub  or gallop                  Abdomen:     Soft, non-tender, bowel sounds active, no masses, no organomegaly                  Extremities:   Extremities normal, atraumatic, no cyanosis or edema                             Skin:   Skin is warm and dry,  no rashes or palpable lesions                  Neurologic:   no focal deficits noted     Disposition:  Skilled nursing facility    Activity as tolerated    Diet as tolerated  Diet Order   Procedures   • Diet Regular Texture (IDDSI 7); Regular Consistency; Cardiac Diets, Diabetic Diets; Healthy Heart (2-3 Na+); Consistent Carbohydrate       Patient Instructions:      Discharge Medications      New Medications      Instructions Start Date   acetaminophen 325 MG tablet  Commonly known as: TYLENOL   650 mg, Oral, Every 4 Hours PRN      bisacodyl 10 MG " suppository  Commonly known as: DULCOLAX   10 mg, Rectal, Daily PRN      docusate sodium 100 MG capsule   100 mg, Oral, 2 Times Daily      HYDROcodone-acetaminophen 5-325 MG per tablet  Commonly known as: NORCO   1 tablet, Oral, Every 4 Hours PRN      magnesium hydroxide 2400 MG/10ML suspension suspension  Commonly known as: MILK OF MAGNESIA   10 mL, Oral, Daily PRN      polyethylene glycol 17 g packet  Commonly known as: MIRALAX   17 g, Oral, 2 Times Daily         Changes to Medications      Instructions Start Date   baclofen 10 MG tablet  Commonly known as: LIORESAL  What changed: when to take this   10 mg, Oral, 3 Times Daily      valACYclovir 1000 MG tablet  Commonly known as: VALTREX  What changed: additional instructions   TAKE 2 TABLETS 4 TIMES     DAILY FOR MOUTH ULCER         Continue These Medications      Instructions Start Date   amitriptyline 50 MG tablet  Commonly known as: ELAVIL   50 mg, Oral, Nightly      ascorbic acid 1000 MG tablet  Commonly known as: VITAMIN C   1,000 mg, Oral, Daily      atorvastatin 20 MG tablet  Commonly known as: LIPITOR   20 mg, Oral, Nightly      azelastine 0.1 % nasal spray  Commonly known as: ASTELIN   2 sprays, Nasal, Use in each nostril as directed      dofetilide 250 MCG capsule  Commonly known as: Tikosyn   250 mcg, Oral, Every 12 Hours      Eliquis 5 MG tablet tablet  Generic drug: apixaban   TAKE 1 TABLET BY MOUTH EVERY TWELVE HOURS      fenofibrate micronized 134 MG capsule  Commonly known as: LOFIBRA   134 mg, Oral, Every Morning Before Breakfast      fluticasone-salmeterol 45-21 MCG/ACT inhaler  Commonly known as: ADVAIR HFA   2 puffs, Inhalation, As Needed      glucosamine-chondroitin 500-400 MG capsule capsule   2 capsules, Oral, Daily      glucose blood test strip  Commonly known as: Prodigy No Coding Blood Gluc   TEST BLOOD SUGAR DAILY      levothyroxine 75 MCG tablet  Commonly known as: SYNTHROID, LEVOTHROID   TAKE 1 TABLET EVERY OTHER  DAY. OVERDUE FOR            APPOINTMENT AND FASTING    LABS.      levothyroxine 50 MCG tablet  Commonly known as: SYNTHROID, LEVOTHROID   TAKE 1 TABLET EVERY OTHER  DAY      metFORMIN  MG 24 hr tablet  Commonly known as: GLUCOPHAGE-XR   750 mg, Oral, 2 Times Daily      metoprolol tartrate 50 MG tablet  Commonly known as: LOPRESSOR   75 mg, Oral, 2 Times Daily      multivitamin with minerals tablet tablet   Oral, Daily      omeprazole 40 MG capsule  Commonly known as: priLOSEC   40 mg, Oral, Daily      Prodigy No Coding Blood Gluc w/Device kit   1 each, Does not apply, Daily      Prodigy Safety Lancets 26G misc   CHECK BLOOD SUGAR ONE TIME PER DAY      venlafaxine  MG 24 hr capsule  Commonly known as: EFFEXOR-XR   TAKE 1 CAPSULE DAILY         Stop These Medications    EPINEPHrine 0.3 MG/0.3ML solution auto-injector injection  Commonly known as: EPIPEN     gabapentin 600 MG tablet  Commonly known as: NEURONTIN          Future Appointments   Date Time Provider Department Center   12/8/2022  1:30 PM Lazaro Jj MD [facility]   1/11/2023 10:45 AM Ashanti Hong APRN [facility]   3/24/2023 10:00 AM [facility]/V[facility] RM 1 [facility]   3/30/2023  9:40 AM Abbi Mitchell MD [facility]     Additional Instructions for the Follow-ups that You Need to Schedule     Ambulatory Referral to Home Health   As directed      Face to Face Visit Date: 11/17/2022    Follow-up provider for Plan of Care?: I will be treating the patient on an ongoing basis.  Please send me the Plan of Care for signature.    Follow-up provider: MICHELINE GARNER [[id number]]    Reason/Clinical Findings: Postsurgical    Describe mobility limitations that make leaving home difficult: Patient requires the assistance of another to leave home    Nursing/Therapeutic Services Requested: Physical Therapy    PT orders: Total joint pathway    Frequency: 1 Week 1         Discharge Follow-up with Specialty: Orthopedics; 2 Weeks   As directed       Specialty: Orthopedics    Follow Up: 2 Weeks    Follow Up Details: Return to the office to see Dr. Joao Martinez            Follow-up Information     Ashanti Hong, APRN .    Specialty: Internal Medicine  Why: After released from rehab  Contact information:  [address]  [facility] [address]  [place] [address]  [phone]             Joao Martinez MD Follow up in 2 week(s).    Specialty: Orthopedic Surgery  Contact information:  [address]  [facility] [address]  [place] [address]  [phone]                       Discharge Order (From admission, onward)     Start     Ordered    11/18/22 1005  Discharge patient  Once        Expected Discharge Date: 11/18/22    Discharge Disposition: Skilled Nursing Facility (DC - External)    Physician of Record for Attribution - Please select from Treatment Team: JADYN RUIZ [[id number]]    Review needed by CMO to determine Physician of Record: No       Question Answer Comment   Physician of Record for Attribution - Please select from Treatment Team JADYN RUIZ    Review needed by CMO to determine Physician of Record No        11/18/22 1009                Total time spent discharging patient including evaluation,post hospitalization follow up,  medication and post hospitalization instructions and education total time exceeds 30 minutes.    Signed:  Jadyn Ruiz MD  11/18/2022  10:10 EST

## 2022-11-18 NOTE — PLAN OF CARE
Goal Outcome Evaluation:  Plan of Care Reviewed With: patient           Outcome Evaluation: VSS, pt had large bowel movement today, minor complaints of weakness and pain with movement, pt discharged to [facility] this afternoon via private vehicle.
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
## 2022-11-20 NOTE — ASSESSMENT & PLAN NOTE
She was recently hospitalized due to an episode of atrial fibrillation and converted to sinus rhythm after 2 doses of dofetilide.  She reports feeling well without palpitations, also managed on Eliquis and metoprolol daily.

## 2022-11-20 NOTE — PROGRESS NOTES
Transitional Care Follow Up Visit  Subjective     Dior Pettit is a 82 y.o. female who presents for a transitional care management visit.    Within 48 business hours after discharge our office contacted her via telephone to coordinate her care and needs.      I reviewed and discussed the details of that call along with the discharge summary, hospital problems, inpatient lab results, inpatient diagnostic studies, and consultation reports with Dior.     Current outpatient and discharge medications have been reconciled for the patient.  Reviewed by: TOSHA Coto      Date of TCM Phone Call 10/6/2022   [facility]   Date of Admission 10/3/2022   Date of Discharge 10/6/2022   Discharge Disposition Home or Self Care     Risk for Readmission (LACE) Score: 15 (11/18/2022  6:00 AM)      History of Present Illness  Patient presented to the ER October 3 due to palpitations and was noted to be in atrial fibrillation.  She was started on dofetilide during her admission and converted back to sinus rhythm after her second dose.  She reports feeling well overall at home but does complain of lingering fatigue.  Denies chest pain or palpitations.     Course During Hospital Stay:  Stable     The following portions of the patient's history were reviewed and updated as appropriate: allergies, current medications, past family history, past medical history, past social history, past surgical history and problem list.    Review of Systems   Constitutional: Positive for fatigue.   HENT: Positive for congestion and postnasal drip.    Respiratory: Positive for chest tightness and shortness of breath. Negative for cough.    Cardiovascular: Negative for chest pain, palpitations and leg swelling.   Gastrointestinal: Negative for abdominal pain.   Neurological: Positive for weakness.       Objective   Physical Exam  Constitutional:       Appearance: She is well-developed. She is not ill-appearing.   HENT:       Head: Normocephalic.      Right Ear: Hearing, tympanic membrane and external ear normal.      Left Ear: Hearing, tympanic membrane and external ear normal.      Nose: Nose normal. No nasal deformity, mucosal edema or rhinorrhea.      Right Sinus: No maxillary sinus tenderness or frontal sinus tenderness.      Left Sinus: No maxillary sinus tenderness or frontal sinus tenderness.      Mouth/Throat:      Dentition: Normal dentition.   Eyes:      General: Lids are normal.         Right eye: No discharge.         Left eye: No discharge.      Conjunctiva/sclera: Conjunctivae normal.      Right eye: No exudate.     Left eye: No exudate.  Neck:      Thyroid: No thyroid mass or thyromegaly.      Vascular: No carotid bruit.      Trachea: Trachea normal.   Cardiovascular:      Rate and Rhythm: Regular rhythm.      Pulses: Normal pulses.      Heart sounds: Normal heart sounds. No murmur heard.  Pulmonary:      Effort: No respiratory distress.      Breath sounds: Normal breath sounds. No decreased breath sounds, wheezing, rhonchi or rales.   Abdominal:      General: Bowel sounds are normal.      Palpations: Abdomen is soft.      Tenderness: There is no abdominal tenderness.   Musculoskeletal:      Cervical back: Normal range of motion. No edema.   Lymphadenopathy:      Head:      Right side of head: No submental, submandibular, tonsillar, preauricular, posterior auricular or occipital adenopathy.      Left side of head: No submental, submandibular, tonsillar, preauricular, posterior auricular or occipital adenopathy.   Skin:     General: Skin is warm and dry.      Nails: There is no clubbing.   Neurological:      Mental Status: She is alert.   Psychiatric:         Behavior: Behavior is cooperative.         Assessment & Plan   Diagnoses and all orders for this visit:    1. PAF (paroxysmal atrial fibrillation) ([facility]) (Primary)  Assessment & Plan:  She was recently hospitalized due to an episode of atrial fibrillation and converted  to sinus rhythm after 2 doses of dofetilide.  She reports feeling well without palpitations, also managed on Eliquis and metoprolol daily.      2. Primary insomnia  Assessment & Plan:  Doxepin was not helpful with insomnia, will begin Elavil nightly and continue to monitor.    Orders:  -     amitriptyline (ELAVIL) 50 MG tablet; Take 1 tablet by mouth Every Night.  Dispense: 90 tablet; Refill: 1    3. Mild intermittent asthma without complication  Assessment & Plan:  Asthma is currently managed on Advair daily with infrequent albuterol use.        4. Controlled type 2 diabetes mellitus with complication, without long-term current use of insulin ([facility])  Assessment & Plan:  She is now managed on metformin daily, recheck A1c today.    Orders:  -     Hemoglobin A1c  -     Basic Metabolic Panel    5. Need for influenza vaccination  -     Fluzone High-Dose 65+yrs    Other orders  -     Request Problem    Current outpatient and discharge medications have been reconciled for the patient.  Reviewed by: TOSHA Coto

## 2022-11-21 ENCOUNTER — TELEPHONE (OUTPATIENT)
Dept: INTERNAL MEDICINE | Facility: CLINIC | Age: 82
End: 2022-11-21

## 2022-11-21 NOTE — CASE MANAGEMENT/SOCIAL WORK
Case Management Discharge Note      Final Note: [facility].         Selected Continued Care - Discharged on 11/18/2022 Admission date: 11/13/2022 - Discharge disposition: Skilled Nursing Facility (DC - External)    Destination Coordination complete.    Service Provider Selected Services Address Phone Fax Patient Preferred    [facility] [address] [address] [phone] --          Durable Medical Equipment    No services have been selected for the patient.              Dialysis/Infusion    No services have been selected for the patient.              Home Medical Care    No services have been selected for the patient.              Therapy    No services have been selected for the patient.              Community Resources    No services have been selected for the patient.              Community & DME    No services have been selected for the patient.                       Final Discharge Disposition Code: 03 - skilled nursing facility (SNF)

## 2022-11-21 NOTE — TELEPHONE ENCOUNTER
Caller: Dior Pettit    Relationship to patient: Self    Best call back number: [phone]    Patient is needing:PATIENT IS IN REHAB FOR A BROKEN HIP AND BROKEN FEMUR BONE.  SHE WANTED YOU TO BE AWARE

## 2022-11-28 ENCOUNTER — TELEPHONE (OUTPATIENT)
Dept: ORTHOPEDIC SURGERY | Facility: CLINIC | Age: 82
End: 2022-11-28

## 2022-11-28 NOTE — TELEPHONE ENCOUNTER
Dr. Juan Cloud with [facility] is calling about dressing changes for Mrs. Pettit.  She states she has some yellow and blue not discharge but something on her dressing.  She states the yellow and blue substance is hard.  She doesn't think that it is discharge but she is not sure what it is.  She said the area around the yellow and blue is not red, swollen, or soft.  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  [phone]

## 2022-11-28 NOTE — TELEPHONE ENCOUNTER
Call returned to Ai Tian.  There was some concerns about drainage on the patient's dressing.  She did send me a couple of pictures and she does have 2 small areas at the distal third of the dressing that looks like old drainage.  It does not look like anything fresh.  The remainder of the arlen dressing is intact.  There is no erythema or any ecchymosis noted around the arlen dressing.  Patient still has some swelling to the thigh.  Patient has remained afebrile.  Have advised them that this is normal.  And we would recommend leaving the arlen intact until her follow-up visit with Dr. Martinez.  I did discuss with the nurse that once the battery pack dies that they may disconnect it and go ahead and throw it away however they are to leave the arlen dressing on intact.  Have left it open for them to call if they have any other questions or concerns

## 2022-12-02 ENCOUNTER — OFFICE VISIT (OUTPATIENT)
Dept: ORTHOPEDIC SURGERY | Facility: CLINIC | Age: 82
End: 2022-12-02

## 2022-12-02 VITALS — TEMPERATURE: 97.6 F | HEIGHT: 67 IN | BODY MASS INDEX: 22.29 KG/M2 | WEIGHT: 142 LBS

## 2022-12-02 DIAGNOSIS — Z96.641 STATUS POST TOTAL HIP REPLACEMENT, RIGHT: Primary | ICD-10-CM

## 2022-12-02 DIAGNOSIS — R52 PAIN: ICD-10-CM

## 2022-12-02 PROCEDURE — 73502 X-RAY EXAM HIP UNI 2-3 VIEWS: CPT | Performed by: ORTHOPAEDIC SURGERY

## 2022-12-02 PROCEDURE — 99024 POSTOP FOLLOW-UP VISIT: CPT | Performed by: ORTHOPAEDIC SURGERY

## 2022-12-06 NOTE — PROGRESS NOTES
Dior Pettit : 1940 MRN: [id number] DATE: 2022    DIAGNOSIS: 2 week follow up right total hip     SUBJECTIVE:Patient returns today for 2 week follow up of right total hip replacement. Patient reports doing well with no unusual complaints. Appears to be progressing appropriately.  Patient does state she had some shortness of breath with activity but she is had these events have happened before denies any chest pain and says this is been getting better.    OBJECTIVE:   Exam:. The incision is healing appropriately. No sign of infection. Range of motion is progressing as expected. The calf is soft and nontender with a negative Homans sign.  Patient is not out of breath and doing well shows no distress.    DIAGNOSTIC STUDIES  Xrays: 2 views of the right hip (AP pelvis and lateral right hip) were ordered and reviewed for evaluation of recent hip replacement. They demonstrate a well positioned, well aligned hip replacement without complicating factors noted. In comparison with previous films there has been interval implant placement.    ASSESSMENT: 2 week status post right hip replacement.    PLAN: 1) Dressing removed and steri strips applied   2) PT exercises   3) Continue ice PRN   4) WBAT   5) aspirin 81 mg orally every day for 4 weeks   6) Follow up in 4 weeks with repeat Xrays of right hip (2views)  Told the patient that since she is breathing well and not having any calf pain, swelling or any other issues to continue to monitor that I told her we considered over to the emergency department for evaluation however she states she is feeling fine currently and has been feeling better.  Feels that some of this is some anxiety as she has had this before.    2-week total hip information packet given and discussed including dental and GI procedure antibiotic prophylaxis.     Joao Martinez MD  2022

## 2022-12-08 ENCOUNTER — READMISSION MANAGEMENT (OUTPATIENT)
Dept: CALL CENTER | Facility: HOSPITAL | Age: 82
End: 2022-12-08

## 2022-12-08 NOTE — OUTREACH NOTE
Prep Survey    Flowsheet Row Responses   [facility] discharged [facility]? Non-BH   Is LACE score < 7 ? Non-BH Discharge   Emergency Room discharge w/ pulse ox? No   Eligibility [facility]    Date of Discharge 12/08/22   Discharge Disposition Home or Self Care   Discharge diagnosis Fracture of femur   Does the patient have one of the following disease processes/diagnoses(primary or secondary)? Other   Prep survey completed? Yes          MAKEDA THOMAS - Registered Nurse

## 2022-12-09 ENCOUNTER — TRANSITIONAL CARE MANAGEMENT TELEPHONE ENCOUNTER (OUTPATIENT)
Dept: CALL CENTER | Facility: HOSPITAL | Age: 82
End: 2022-12-09

## 2022-12-09 NOTE — OUTREACH NOTE
Call Center TCM Note    Flowsheet Row Responses   [facility] patient discharged from? Non-BH   Does the patient have one of the following disease processes/diagnoses(primary or secondary)? Other   TCM attempt successful? No   Unsuccessful attempts Attempt 1  [No PCP  VERBAL ON FILE]          Katlyn Garcia RN    12/9/2022, 13:42 EST

## 2022-12-09 NOTE — OUTREACH NOTE
Call Center TCM Note    Flowsheet Row Responses   [facility] patient discharged from? Non-   Does the patient have one of the following disease processes/diagnoses(primary or secondary)? Other   TCM attempt successful? Yes   Call start time 1409   Call end time 1411   Discharge diagnosis Fracture of femur   Person spoke with today (if not patient) and relationship Patient   Meds reviewed with patient/caregiver? Yes   Comments  She wants to not follow up with any providers until after lisa at this time   Does the patient have an appointment with their PCP within 7 days of discharge? No   Nursing Interventions Assisted patient with making appointment per protocol, Patient declined scheduling/rescheduling appointment at this time   Has home health visited the patient within 72 hours of discharge? Yes   Home health comments Home health coming to the home per patient   Psychosocial issues? No   Did the patient receive a copy of their discharge instructions? Yes   Nursing interventions Reviewed instructions with patient   What is the patient's perception of their health status since discharge? Improving   Is the patient/caregiver able to teach back signs and symptoms related to disease process for when to call PCP? Yes   Is the patient/caregiver able to teach back signs and symptoms related to disease process for when to call 911? Yes   Is the patient/caregiver able to teach back the hierarchy of who to call/visit for symptoms/problems? PCP, Specialist, Home health nurse, Urgent Care, ED, 911 Yes   TCM call completed? Yes   Wrap up additional comments Patient states she is doing well at this time she has no questions or concerns. She want to not follow up with any providers until after lisa at this time   Call end time 1411   Would this patient benefit from a Referral to Amb Social Work? No   Is the patient interested in additional calls from an ambulatory ?  NOTE:  applies to high risk  patients requiring additional follow-up. No          Katlyn Garcia RN    12/9/2022, 14:11 EST

## 2022-12-14 ENCOUNTER — TELEPHONE (OUTPATIENT)
Dept: ORTHOPEDIC SURGERY | Facility: CLINIC | Age: 82
End: 2022-12-14

## 2022-12-14 NOTE — TELEPHONE ENCOUNTER
Hub staff attempted to follow warm transfer process and was unsuccessful     Caller: LING    Relationship to patient: WITH [employer]     Best call back number: [phone]    Patient is needing: OT IS ASKING FOR A CALL BACK ON THIS PT TO GET ORDERS

## 2023-01-04 DIAGNOSIS — I10 ESSENTIAL HYPERTENSION: ICD-10-CM

## 2023-01-04 RX ORDER — LOSARTAN POTASSIUM 50 MG/1
TABLET ORAL
Qty: 90 TABLET | Refills: 0 | Status: SHIPPED | OUTPATIENT
Start: 2023-01-04 | End: 2023-04-06

## 2023-01-04 NOTE — TELEPHONE ENCOUNTER
Caller: Dior Pettit    Relationship to patient: Self    Best call back number: [phone]    Patient is needing: PATIENT STATES THAT SHE IS STILL TAKING THIS MEDICATION

## 2023-01-06 RX ORDER — ATORVASTATIN CALCIUM 20 MG/1
TABLET, FILM COATED ORAL
Qty: 90 TABLET | Refills: 1 | Status: ON HOLD | OUTPATIENT
Start: 2023-01-06

## 2023-01-10 ENCOUNTER — OFFICE VISIT (OUTPATIENT)
Dept: ORTHOPEDIC SURGERY | Facility: CLINIC | Age: 83
End: 2023-01-10
Payer: MEDICARE

## 2023-01-10 VITALS — BODY MASS INDEX: 22.46 KG/M2 | TEMPERATURE: 97.8 F | HEIGHT: 67 IN | WEIGHT: 143.1 LBS

## 2023-01-10 DIAGNOSIS — Z96.641 STATUS POST TOTAL HIP REPLACEMENT, RIGHT: Primary | ICD-10-CM

## 2023-01-10 PROCEDURE — 73502 X-RAY EXAM HIP UNI 2-3 VIEWS: CPT | Performed by: ORTHOPAEDIC SURGERY

## 2023-01-10 PROCEDURE — 99024 POSTOP FOLLOW-UP VISIT: CPT | Performed by: ORTHOPAEDIC SURGERY

## 2023-01-10 NOTE — ADDENDUM NOTE
Addended by: JUDY MARSHALL on: 11/20/2018 11:58 AM     Modules accepted: Orders     The patient will have left 5th metatarsal ORIF surgery with Dr Huang. Patient will be Heel WBAT in boot after surgery. The following information was discussed with the patient and myself:    1. Patient given \"What to expect from foot and ankle surgery\" along with surgical folder. Contact information given for the office and surgery scheduler. Explained that the surgery scheduler will call to set up surgical date, time and location. At the time the pre-op information will be sent to PCP and postop appointment will be set up at 2 - 3 weeks post surgery.  2.   Narcotic refill policy given and reviewed with the patient.  3.   Presurgical wash instructions reviewed with the patient. Hibiclens bottle given? Yes.  4.  Patent given GERARDO information  5.  Counseled patient on use of DME needed for surgery. DME needed: pneumatic CAM boot, crutches, iWALK, or knee scooter. Patient has boot and crutches and will bring from home to day of surgery. Patient informed that insurance may not pay for DME. Consent for the DME items signed: No.  6.  Is this related to a work comp claim? No.  7.  Patient instructed about need for COVID testing prior to surgery. Directly after the test, the patient was instructed to isolate at home until the surgery. Explained that if this was positive for COVID surgery may be postponed.    Patient verbalized understanding of the above information.    *Dr Huang to clear patient for surgery

## 2023-01-10 NOTE — PROGRESS NOTES
Dior Pettit : 1940 MRN: [id number] DATE: 1/10/2023    DIAGNOSIS: 8 week follow up right total hip     SUBJECTIVE:Patient returns today for 8 week follow up of right total hip replacement. Patient reports doing well with no unusual complaints. Appears to be progressing appropriately and is using a cane only for balance which was pre-existing.    OBJECTIVE:   Exam:. The incision is healed. No sign of infection. Range of motion is progressing as expected. The calf is soft and nontender with a negative Homans sign. Strength progressing    DIAGNOSTIC STUDIES  Xrays: 2 views of the right  hip (AP pelvis and lateral right hip) were ordered and reviewed for evaluation of recent hip replacement. They demonstrate a well positioned, well aligned hip replacement without complicating factors noted. In comparison with previous films there has been interval implant placement.    ASSESSMENT: 8 week status post right  hip replacement.    PLAN: 1) Activity as tolerated   2) Continue hip strengthening exercises    3) ok to stop DVT ppx    4) Follow up in 9 months with repeat Xrays of right hip (2views AP Pelvis and lateral  right hip)    Antibiotics before dental and GI procedures discussed.     Joao Martinez MD  1/10/2023

## 2023-01-11 ENCOUNTER — OFFICE VISIT (OUTPATIENT)
Dept: INTERNAL MEDICINE | Facility: CLINIC | Age: 83
End: 2023-01-11
Payer: MEDICARE

## 2023-01-11 VITALS
TEMPERATURE: 97.8 F | HEART RATE: 54 BPM | HEIGHT: 67 IN | BODY MASS INDEX: 22.82 KG/M2 | DIASTOLIC BLOOD PRESSURE: 72 MMHG | OXYGEN SATURATION: 98 % | WEIGHT: 145.4 LBS | SYSTOLIC BLOOD PRESSURE: 126 MMHG

## 2023-01-11 DIAGNOSIS — E11.8 CONTROLLED TYPE 2 DIABETES MELLITUS WITH COMPLICATION, WITHOUT LONG-TERM CURRENT USE OF INSULIN: Primary | Chronic | ICD-10-CM

## 2023-01-11 DIAGNOSIS — I48.0 PAF (PAROXYSMAL ATRIAL FIBRILLATION): Chronic | ICD-10-CM

## 2023-01-11 DIAGNOSIS — I10 ESSENTIAL HYPERTENSION: Chronic | ICD-10-CM

## 2023-01-11 DIAGNOSIS — E78.00 HYPERCHOLESTEREMIA: Chronic | ICD-10-CM

## 2023-01-11 DIAGNOSIS — F41.9 ANXIETY: ICD-10-CM

## 2023-01-11 PROCEDURE — 99214 OFFICE O/P EST MOD 30 MIN: CPT | Performed by: NURSE PRACTITIONER

## 2023-01-11 RX ORDER — IPRATROPIUM BROMIDE 42 UG/1
SPRAY, METERED NASAL
COMMUNITY
Start: 2023-01-09 | End: 2023-04-09

## 2023-01-11 RX ORDER — VENLAFAXINE HYDROCHLORIDE 75 MG/1
75 CAPSULE, EXTENDED RELEASE ORAL DAILY
Qty: 90 CAPSULE | Refills: 1 | Status: ON HOLD | OUTPATIENT
Start: 2023-01-11

## 2023-01-11 NOTE — PATIENT INSTRUCTIONS
Do not take doxepin if you are taking amitriptyline. Please discontinue gabapentin due to complaints of dizziness with medication.

## 2023-01-12 LAB
ALBUMIN SERPL-MCNC: 4.8 G/DL (ref 3.5–5.2)
ALBUMIN/CREAT UR: 16 MG/G CREAT (ref 0–29)
ALBUMIN/GLOB SERPL: 1.8 G/DL
ALP SERPL-CCNC: 103 U/L (ref 39–117)
ALT SERPL-CCNC: 13 U/L (ref 1–33)
AST SERPL-CCNC: 19 U/L (ref 1–32)
BASOPHILS # BLD AUTO: 0.04 10*3/MM3 (ref 0–0.2)
BASOPHILS NFR BLD AUTO: 0.7 % (ref 0–1.5)
BILIRUB SERPL-MCNC: 0.5 MG/DL (ref 0–1.2)
BUN SERPL-MCNC: 23 MG/DL (ref 8–23)
BUN/CREAT SERPL: 23.2 (ref 7–25)
CALCIUM SERPL-MCNC: 10.1 MG/DL (ref 8.6–10.5)
CHLORIDE SERPL-SCNC: 102 MMOL/L (ref 98–107)
CO2 SERPL-SCNC: 29.7 MMOL/L (ref 22–29)
CREAT SERPL-MCNC: 0.99 MG/DL (ref 0.57–1)
CREAT UR-MCNC: 37.4 MG/DL
EGFRCR SERPLBLD CKD-EPI 2021: 56.3 ML/MIN/1.73
EOSINOPHIL # BLD AUTO: 0.16 10*3/MM3 (ref 0–0.4)
EOSINOPHIL NFR BLD AUTO: 2.9 % (ref 0.3–6.2)
ERYTHROCYTE [DISTWIDTH] IN BLOOD BY AUTOMATED COUNT: 13.5 % (ref 12.3–15.4)
GLOBULIN SER CALC-MCNC: 2.6 GM/DL
GLUCOSE SERPL-MCNC: 69 MG/DL (ref 65–99)
HBA1C MFR BLD: 5.5 % (ref 4.8–5.6)
HCT VFR BLD AUTO: 38.1 % (ref 34–46.6)
HGB BLD-MCNC: 12.1 G/DL (ref 12–15.9)
IMM GRANULOCYTES # BLD AUTO: 0.02 10*3/MM3 (ref 0–0.05)
IMM GRANULOCYTES NFR BLD AUTO: 0.4 % (ref 0–0.5)
LYMPHOCYTES # BLD AUTO: 1.83 10*3/MM3 (ref 0.7–3.1)
LYMPHOCYTES NFR BLD AUTO: 32.6 % (ref 19.6–45.3)
MCH RBC QN AUTO: 29.2 PG (ref 26.6–33)
MCHC RBC AUTO-ENTMCNC: 31.8 G/DL (ref 31.5–35.7)
MCV RBC AUTO: 92 FL (ref 79–97)
MICROALBUMIN UR-MCNC: 5.9 UG/ML
MONOCYTES # BLD AUTO: 0.76 10*3/MM3 (ref 0.1–0.9)
MONOCYTES NFR BLD AUTO: 13.5 % (ref 5–12)
NEUTROPHILS # BLD AUTO: 2.8 10*3/MM3 (ref 1.7–7)
NEUTROPHILS NFR BLD AUTO: 49.9 % (ref 42.7–76)
NRBC BLD AUTO-RTO: 0 /100 WBC (ref 0–0.2)
PLATELET # BLD AUTO: 295 10*3/MM3 (ref 140–450)
POTASSIUM SERPL-SCNC: 5 MMOL/L (ref 3.5–5.2)
PROT SERPL-MCNC: 7.4 G/DL (ref 6–8.5)
RBC # BLD AUTO: 4.14 10*6/MM3 (ref 3.77–5.28)
SODIUM SERPL-SCNC: 140 MMOL/L (ref 136–145)
WBC # BLD AUTO: 5.61 10*3/MM3 (ref 3.4–10.8)

## 2023-01-17 ENCOUNTER — OFFICE VISIT (OUTPATIENT)
Dept: CARDIOLOGY | Facility: CLINIC | Age: 83
End: 2023-01-17
Payer: MEDICARE

## 2023-01-17 VITALS
WEIGHT: 146 LBS | BODY MASS INDEX: 22.91 KG/M2 | SYSTOLIC BLOOD PRESSURE: 128 MMHG | DIASTOLIC BLOOD PRESSURE: 88 MMHG | HEART RATE: 57 BPM | HEIGHT: 67 IN

## 2023-01-17 DIAGNOSIS — M25.559 HIP PAIN: ICD-10-CM

## 2023-01-17 DIAGNOSIS — I10 ESSENTIAL HYPERTENSION: Chronic | ICD-10-CM

## 2023-01-17 DIAGNOSIS — I48.19 ATRIAL FIBRILLATION, PERSISTENT: Primary | ICD-10-CM

## 2023-01-17 PROCEDURE — 93000 ELECTROCARDIOGRAM COMPLETE: CPT

## 2023-01-17 PROCEDURE — 99214 OFFICE O/P EST MOD 30 MIN: CPT

## 2023-01-17 RX ORDER — METOPROLOL TARTRATE 50 MG/1
25 TABLET, FILM COATED ORAL 2 TIMES DAILY
Qty: 90 TABLET | Refills: 2 | Status: ON HOLD | OUTPATIENT
Start: 2023-01-17

## 2023-01-17 RX ORDER — METOPROLOL TARTRATE 50 MG/1
25 TABLET, FILM COATED ORAL 2 TIMES DAILY
Qty: 90 TABLET | Refills: 2 | Status: SHIPPED | OUTPATIENT
Start: 2023-01-17 | End: 2023-01-17

## 2023-01-17 NOTE — PROGRESS NOTES
"Chief Complaint  Diabetes (3 month follow up), Hypertension, and Anxiety    Subjective        Dior Pettit presents to [facility] for f/u regarding DM2, HTN and anxiety.    History of Present Illness  She underwent a right hip replacement 11/15/2022 after a fall and fracture, discharged home to rehab. She has completed PT and reports feeling well with improvement in right hip pain.  She c/o episodes of feeling lightheaded during rehab, believes her BP was elevated?  She notes increased anxiety and nervousness despite taking Venlafaxine daily.  She continues to c/o difficulty falling and staying asleep.      Objective   Vital Signs:  /72 (BP Location: Left arm, Patient Position: Sitting, Cuff Size: Adult)   Pulse 54   Temp 97.8 °F (36.6 °C) (Temporal)   Ht 170.2 cm (67\")   Wt 66 kg (145 lb 6.4 oz)   SpO2 98%   BMI 22.77 kg/m²   Estimated body mass index is 22.77 kg/m² as calculated from the following:    Height as of this encounter: 170.2 cm (67\").    Weight as of this encounter: 66 kg (145 lb 6.4 oz).       BMI is within normal parameters. No other follow-up for BMI required.      Physical Exam  Constitutional:       Appearance: She is well-developed. She is not ill-appearing.   HENT:      Head: Normocephalic.      Right Ear: Hearing, tympanic membrane and external ear normal.      Left Ear: Hearing, tympanic membrane and external ear normal.      Nose: Nose normal. No nasal deformity, mucosal edema or rhinorrhea.      Right Sinus: No maxillary sinus tenderness or frontal sinus tenderness.      Left Sinus: No maxillary sinus tenderness or frontal sinus tenderness.      Mouth/Throat:      Dentition: Normal dentition.   Eyes:      General: Lids are normal.         Right eye: No discharge.         Left eye: No discharge.      Conjunctiva/sclera: Conjunctivae normal.      Right eye: No exudate.     Left eye: No exudate.  Neck:      Thyroid: No thyroid mass or thyromegaly. "      Vascular: No carotid bruit.      Trachea: Trachea normal.   Cardiovascular:      Rate and Rhythm: Regular rhythm.      Pulses: Normal pulses.      Heart sounds: Normal heart sounds. No murmur heard.  Pulmonary:      Effort: No respiratory distress.      Breath sounds: Normal breath sounds. No decreased breath sounds, wheezing, rhonchi or rales.   Abdominal:      General: Bowel sounds are normal.      Palpations: Abdomen is soft.      Tenderness: There is no abdominal tenderness.   Musculoskeletal:      Cervical back: Normal range of motion. No edema.   Lymphadenopathy:      Head:      Right side of head: No submental, submandibular, tonsillar, preauricular, posterior auricular or occipital adenopathy.      Left side of head: No submental, submandibular, tonsillar, preauricular, posterior auricular or occipital adenopathy.   Skin:     General: Skin is warm and dry.      Nails: There is no clubbing.   Neurological:      Mental Status: She is alert.   Psychiatric:         Behavior: Behavior is cooperative.        Result Review :  The following data was reviewed by: TOSHA Houser on 01/11/2023:  Common labs    Common Labs 11/17/22 11/17/22 11/18/22 1/11/23 1/11/23 1/11/23 1/11/23    0503 0503  1159 1159 1159 1159   Glucose  110 (A)   69     BUN  14   23     Creatinine  0.68   0.99     Sodium  135 (A)   140     Potassium  4.4   5.0     Chloride  96 (A)   102     Calcium  9.2   10.1     Total Protein     7.4     Albumin     4.8     Total Bilirubin     0.5     Alkaline Phosphatase     103     AST (SGOT)     19     ALT (SGPT)     13     WBC 8.54     5.61    Hemoglobin 10.8 (A)  10.9 (A)   12.1    Hematocrit 32.3 (A)  33.3 (A)   38.1    Platelets 226     295    Hemoglobin A1C    5.50      Microalbumin, Urine       5.9   (A) Abnormal value       Comments are available for some flowsheets but are not being displayed.           Data reviewed: Recent hospitalization notes 11/13/2022             Assessment and Plan    Diagnoses and all orders for this visit:    1. Controlled type 2 diabetes mellitus with complication, without long-term current use of insulin ([facility]) (Primary)  Assessment & Plan:  She is managed on metformin (glimepiride stopped 1/2022); recheck A1c today.    Orders:  -     Hemoglobin A1c  -     Microalbumin / Creatinine Urine Ratio - Urine, Clean Catch    2. Essential hypertension  Assessment & Plan:  BP is well-controlled on losartan and metoprolol which she will continue along with a low sodium diet.    Orders:  -     Comprehensive Metabolic Panel  -     CBC & Differential    3. Hypercholesteremia  Assessment & Plan:  She denies myalgias with atorvastatin   which she will continue along with a low-fat, low-cholesterol diet.   Lab Results   Component Value Date     (H) 11/09/2022           4. PAF (paroxysmal atrial fibrillation) ([facility])  Assessment & Plan:  She had recurrent episodes of palpitations with admission for atrial fibrillation 10/2022; managed on metoprolol.      5. Anxiety  Assessment & Plan:  She notes increased anxiety, will increase venlafaxine from 75 mg to 150 mg daily and continue to monitor.    Orders:  -     venlafaxine XR (Effexor XR) 75 MG 24 hr capsule; Take 1 capsule by mouth Daily.  Dispense: 90 capsule; Refill: 1           Follow Up   Return in about 4 months (around 5/11/2023).  Patient was given instructions and counseling regarding her condition or for health maintenance advice. Please see specific information pulled into the AVS if appropriate.

## 2023-01-17 NOTE — PROGRESS NOTES
Date of Office Visit: 2023  Encounter Provider: TOSHA Veronica  Place of Service: [facility] CARDIOLOGY  Patient Name: Dior Pettit  :1940    Chief Complaint   Patient presents with   • Atrial Fibrillation     2 mnth follow up   :     HPI: Dior Pettit is a 82 y.o. female who is followed by Dr. Jj.  She has a history of CAD, DM II,  AVR--s/p repair ([place]-), and persistent atrial fibrillation.      She was cardioverted in 2021.  She was started on amiodarone prior to the CV but felt like it was causing dizziness so this was stopped.  She had maintained NSR until September.      She saw Dr. Jj on 2022 and was back in AF.  She estimated that she had been in AF for about a week prior to her appt.  She was complaining of shortness of breath and fatigue.  Since the CV lasted a year she elected to undergo CV again.      Patient underwent CV for persistent AF on 2022 she was successfully CV.       She presented to the ED on 2022 with complaints of weakness and shortness of breath. She was noted to be SB with heart rate of 40 bpm.  We stopped her digoxin and metoprolol. She called on  with complaints of elevated HR and HTN. She was started back on metoprolol 50mg BID.     We saw her in October when she presented with complaints of palpitations. She was noted to be in AF with RVR. We discussed treatment options and elected to start tikosyn at 250mcg BID. She converted after the second dose and was discharged.                   She presents today for follow up appt.     From a cardiac standpoint she is doing very well.     She had a mechanical fall and hip fracture in nov-dec and underwent hip replacement. She is recovering well.     Since starting tikosyn her atrial fibrillation has been very well controlled. She was persistent before.     She estimates that she has had about 1 episode per week, lasting seconds to a  couple of minutes since starting tikosyn.     Currently on 250mcg BID. QT is okay.     She is on metoprolol 50mg BID. EKG shows SB.     On apixaban for AC.          Past Medical History:   Diagnosis Date   • Allergic rhinitis    • Anemia    • Anxiety     Controlled w/Meds   • Aortic root dilatation (HCC) 10/02/2017    Borderline--Noted on Echo   • Aortic valve calcification 10/02/2017    Noted on Echo   • Aortic valve insufficiency Dx in 07    w/AVR    • Aortic valve prosthesis present 11/02/2018    Noted on CTA Chest   • Ascending aorta dilatation (HCC) 10/02/2017    Borderline--Noted on Echo   • Asthma    • Atypical chest pain    • Breast pain, right Hx   • CAD (coronary artery disease)    • Cardiomegaly 2017   • Cervicalgia    • Chest pain due to CAD ([facility])    • 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    Borderline--Noted on Echo & Measured @ 2.6CM and Mid Descending @ 2.5CM on CTA Chest-11/2/18   • DM (diabetes mellitus) ([facility])     T2   • Esophagitis    • GERD (gastroesophageal reflux disease)     Controlled w/Meds   • Headache    • Health care maintenance    • Heart murmur    • History of echocardiogram 10/2/17-[facility]    EF 66%; Borderline Concentric Hypertrophy; Mild Calcification in AV; Mild AVS; Mild AVR; Moderate TVR; Borderline Dilation of Aortic Root/Arch & Borderline Dilation of Ascending/Proximal Aorta Present   • Hyperlipidemia     Controlled w/Meds   • Hypertension     Controlled w/Meds   • Hypothyroidism     Controlled w/Synthroid   • Insomnia    • Macular degeneration, left eye    • Mild aortic valve regurgitation 10/02/2017    Noted on Echo   • Mild aortic valve stenosis 10/02/2017    Noted on Echo   • Mild dilation of ascending aorta (HCC) 10/02/2017    Borderline--Noted on Echo   • Moderate tricuspid valve regurgitation 10/02/2017    Noted on Echo   • Mood disorder in conditions classified elsewhere     Controlled w/Meds   • Near syncope 03/2017-[facility] ER   • Neuropathy    • NNEKA (obstructive sleep  apnea)     Untreated   • Osteoarthritis     Ankles/Feet   • Pulmonary hypertension (HCC) 2017   • Renal insufficiency    • RLS (restless legs syndrome)    • Thoracic ascending aortic aneurysm Dx in 2007 @ 3.8CM & 1/02/2018    Noted @ 4CM on CTA Chest;   • Visual impairment     macular degeneratuin       Past Surgical History:   Procedure Laterality Date   • AORTIC VALVE REPAIR/REPLACEMENT  2007    Dr. Perry   • APPENDECTOMY     • AUGMENTATION MAMMAPLASTY  1976   • BREAST AUGMENTATION  2014   • BUNIONECTOMY     • CARDIAC CATHETERIZATION  2007   • CARDIAC VALVE REPLACEMENT  2007    porcine   • COLONOSCOPY  2010   • COLONOSCOPY  03/02/2012   • EYE SURGERY      cataracts and ens implants   • HYSTERECTOMY  1972   • SIGMOIDOSCOPY  2001   • TOTAL HIP ARTHROPLASTY Right 11/15/2022    Procedure: Right anterior total hip arthroplasty;  Surgeon: Joao Martinez MD;  Location: [facility];  Service: Orthopedics;  Laterality: Right;       Social History     Socioeconomic History   • Marital status:    Tobacco Use   • Smoking status: Never   • Smokeless tobacco: Never   • Tobacco comments:     caffeine use - 1 cup coffee daily    Vaping Use   • Vaping Use: Never used   Substance and Sexual Activity   • Alcohol use: Yes     Comment: less than 1 glass of wine   • Drug use: Never   • Sexual activity: Not Currently     Partners: Male     Birth control/protection: Surgical     Comment: Hyst       Family History   Problem Relation Age of Onset   • Hypertension Mother    • Stroke Mother    • Cancer Mother    • Diabetes Sister    • Coronary artery disease Brother    • Diabetes Brother    • Valvular heart disease Brother         TAVR   • Coronary artery disease Brother    • Cancer Brother    • Birth defects Daughter    • Skin cancer Neg Hx        Review of Systems   Constitutional: Negative for chills, fever and malaise/fatigue.   Cardiovascular: Negative for chest pain, dyspnea on exertion, leg swelling, near-syncope,  orthopnea, palpitations, paroxysmal nocturnal dyspnea and syncope.   Respiratory: Negative for cough and shortness of breath.    Hematologic/Lymphatic: Negative.    Musculoskeletal: Negative for joint pain, joint swelling and myalgias.   Gastrointestinal: Negative for abdominal pain, diarrhea, melena, nausea and vomiting.   Genitourinary: Negative for frequency and hematuria.   Neurological: Negative for light-headedness, numbness, paresthesias and seizures.   Allergic/Immunologic: Negative.    All other systems reviewed and are negative.      No Known Allergies      Current Outpatient Medications:   •  acetaminophen (TYLENOL) 325 MG tablet, Take 2 tablets by mouth Every 4 (Four) Hours As Needed for Mild Pain., Disp: , Rfl:   •  amitriptyline (ELAVIL) 50 MG tablet, Take 1 tablet by mouth Every Night., Disp: 90 tablet, Rfl: 1  •  ascorbic acid (VITAMIN C) 1000 MG tablet, Take 1,000 mg by mouth Daily., Disp: , Rfl:   •  atorvastatin (LIPITOR) 20 MG tablet, TAKE 1 TABLET EVERY NIGHT, Disp: 90 tablet, Rfl: 1  •  azelastine (ASTELIN) 0.1 % nasal spray, 2 sprays into the nostril(s) as directed by provider. Use in each nostril as directed, Disp: , Rfl:   •  baclofen (LIORESAL) 10 MG tablet, Take 1 tablet by mouth 3 (Three) Times a Day., Disp: 90 tablet, Rfl: 3  •  bisacodyl (DULCOLAX) 10 MG suppository, Insert 1 suppository into the rectum Daily As Needed for Constipation., Disp: , Rfl:   •  Blood Glucose Monitoring Suppl (Prodigy No Coding Blood Gluc) w/Device kit, 1 each Daily., Disp: 1 kit, Rfl: 0  •  dofetilide (Tikosyn) 250 MCG capsule, Take 1 capsule by mouth Every 12 (Twelve) Hours., Disp: 180 capsule, Rfl: 1  •  Eliquis 5 MG tablet tablet, TAKE 1 TABLET BY MOUTH EVERY TWELVE HOURS, Disp: 180 tablet, Rfl: 0  •  fenofibrate micronized (LOFIBRA) 134 MG capsule, Take 1 capsule by mouth Every Morning Before Breakfast., Disp: 90 capsule, Rfl: 3  •  fluticasone-salmeterol (ADVAIR HFA) 45-21 MCG/ACT inhaler, Inhale 2 puffs  "As Needed., Disp: , Rfl:   •  glucosamine-chondroitin 500-400 MG capsule capsule, Take 2 capsules by mouth Daily., Disp: , Rfl:   •  glucose blood (Prodigy No Coding Blood Gluc) test strip, TEST BLOOD SUGAR DAILY, Disp: 50 each, Rfl: 0  •  ipratropium (ATROVENT) 0.06 % nasal spray, , Disp: , Rfl:   •  levothyroxine (SYNTHROID, LEVOTHROID) 50 MCG tablet, TAKE 1 TABLET EVERY OTHER  DAY, Disp: 45 tablet, Rfl: 1  •  levothyroxine (SYNTHROID, LEVOTHROID) 75 MCG tablet, TAKE 1 TABLET EVERY OTHER  DAY. OVERDUE FOR           APPOINTMENT AND FASTING    LABS., Disp: 45 tablet, Rfl: 3  •  losartan (COZAAR) 50 MG tablet, TAKE 1 TABLET DAILY, Disp: 90 tablet, Rfl: 0  •  metFORMIN ER (GLUCOPHAGE-XR) 750 MG 24 hr tablet, Take 1 tablet by mouth 2 (Two) Times a Day., Disp: 180 tablet, Rfl: 3  •  metoprolol tartrate (LOPRESSOR) 50 MG tablet, Take 0.5 tablets by mouth 2 (Two) Times a Day., Disp: 90 tablet, Rfl: 2  •  Multiple Vitamins-Minerals (MULTIVITAL PO), Take  by mouth Daily., Disp: , Rfl:   •  omeprazole (priLOSEC) 40 MG capsule, Take 1 capsule by mouth Daily., Disp: 90 capsule, Rfl: 1  •  Prodigy Safety Lancets 26G misc, CHECK BLOOD SUGAR ONE TIME PER DAY, Disp: 100 each, Rfl: 1  •  valACYclovir (VALTREX) 1000 MG tablet, TAKE 2 TABLETS 4 TIMES     DAILY FOR MOUTH ULCER (Patient taking differently: TAKE 2 TABLETS 4 TIMES     DAILY FOR MOUTH ULCER as needed), Disp: 16 tablet, Rfl: 4  •  venlafaxine XR (Effexor XR) 75 MG 24 hr capsule, Take 1 capsule by mouth Daily., Disp: 90 capsule, Rfl: 1  •  venlafaxine XR (EFFEXOR-XR) 150 MG 24 hr capsule, TAKE 1 CAPSULE DAILY, Disp: 90 capsule, Rfl: 3      Objective:     Vitals:    01/17/23 1011   BP: 128/88   Pulse: 57   Weight: 66.2 kg (146 lb)   Height: 170.2 cm (67\")     Body mass index is 22.87 kg/m².    PHYSICAL EXAM:    Vitals Reviewed.   General Appearance: No acute distress, well developed and well nourished.   Eyes: Conjunctiva and lids: No erythema, swelling, or discharge. Sclera " non-icteric.   HENT: Atraumatic, normocephalic. External eyes, ears, and nose normal.   Respiratory: No signs of respiratory distress. Respiration rhythm and depth normal.   Clear to auscultation. No rales, crackles, rhonchi, or wheezing auscultated.   Cardiovascular:  Heart Rate and Rhythm: Normal, Heart Sounds: Normal S1 and S2. No S3 or S4 noted.  Gastrointestinal:  Abdomen soft, non-distended, non-tender.   Musculoskeletal: Normal movement of extremities  Skin: Warm and dry.   Psychiatric: Patient alert and oriented to person, place, and time. Speech and behavior appropriate. Normal mood and affect.       ECG 12 Lead    Date/Time: 1/17/2023 11:48 AM  Performed by: Justin Galloway APRN  Authorized by: Justin Galloway APRN   Comparison: compared with previous ECG   Similar to previous ECG  Rhythm: sinus bradycardia  BPM: 57                Assessment:       Diagnosis Plan   1. Atrial fibrillation, persistent (HCC)  metoprolol tartrate (LOPRESSOR) 50 MG tablet      2. Essential hypertension        3. Hip pain               Plan:   1. Persistent atrial fibrillation---CVx2--- now well controlled on tikosyn---- she was started on tikosyn in October. Since then she has done very well. She was persistent AF before and now suspects she is having 1 30 second episode per week which is significantly improved. Overall she feels much better in NSR.  QT is okay. Continue tikosyn at current dose. apixaban for AC.     She has had some bradycardia. Will reduce BB to 50mg BID. Can take extra dose as needed.       2. HTN-- currently well controlled.       3. Hip pain---s/p hip replacement 11/15--- she is recovering well without any issues. Did not have any problems with AF during or after surgery.          She is going to follow up with Dr. Mitchell in 6 months and will see us in a year or sooner if she has issues.          As always, it has been a pleasure to participate in your patient's care.      Sincerely,         Justin Galloway  APRN

## 2023-01-19 ENCOUNTER — TELEPHONE (OUTPATIENT)
Dept: INTERNAL MEDICINE | Facility: CLINIC | Age: 83
End: 2023-01-19
Payer: MEDICARE

## 2023-01-19 NOTE — TELEPHONE ENCOUNTER
[facility] called to verify why pharmacy notes said patient needs to take 225mg. Advised pharmacy that patient is on 2 separate dosages

## 2023-01-30 RX ORDER — APIXABAN 5 MG/1
TABLET, FILM COATED ORAL
Qty: 180 TABLET | Refills: 0 | Status: ON HOLD | OUTPATIENT
Start: 2023-01-30

## 2023-01-31 PROBLEM — I48.0 PAF (PAROXYSMAL ATRIAL FIBRILLATION) (HCC): Chronic | Status: ACTIVE | Noted: 2023-01-31

## 2023-01-31 NOTE — ASSESSMENT & PLAN NOTE
She had recurrent episodes of palpitations with admission for atrial fibrillation 10/2022; managed on metoprolol.

## 2023-01-31 NOTE — ASSESSMENT & PLAN NOTE
She denies myalgias with atorvastatin   which she will continue along with a low-fat, low-cholesterol diet.   Lab Results   Component Value Date     (H) 11/09/2022

## 2023-01-31 NOTE — ASSESSMENT & PLAN NOTE
She notes increased anxiety, will increase venlafaxine from 75 mg to 150 mg daily and continue to monitor.

## 2023-02-06 ENCOUNTER — TELEPHONE (OUTPATIENT)
Dept: INTERNAL MEDICINE | Facility: CLINIC | Age: 83
End: 2023-02-06
Payer: MEDICARE

## 2023-02-08 ENCOUNTER — TELEPHONE (OUTPATIENT)
Dept: CARDIOLOGY | Facility: CLINIC | Age: 83
End: 2023-02-08
Payer: MEDICARE

## 2023-02-08 NOTE — TELEPHONE ENCOUNTER
Spoke with pt.    She stated that her BP has been up.  162/92  173/92   155/92  154/95    Pulse 59-62    Current Meds:  Metoprolol 50mg BID *Pt increased (on her own) to 75mg BID this week*  Tikosyn 250mg BID  Eliquis 5mg BID  Losartan 50mg QPM     +SOA  +Fatigue  +Dizziness  No chest pain  No edema    *She is unsure why she can not have a pacemaker and wants to know if Justin can let her know.

## 2023-02-08 NOTE — TELEPHONE ENCOUNTER
Caller: Dior Pettit    Relationship: Self    Best call back number: [phone]    What is the best time to reach you: ANY     Who are you requesting to speak with (clinical staff, provider,  specific staff member): CHIQUIS     Do you know the name of the person who called:     What was the call regarding: PT WANTED TO SPEAK TO CHIQUIS IN REGARDS TO PACEMAKER. PLEASE CALL PT BACK ASAP. THANK YOU.     Do you require a callback: YES

## 2023-02-09 NOTE — TELEPHONE ENCOUNTER
Please see what her BP and HR readings have been since increasing the metoprolol. If still above goal, would increase losartan to 100 mg daily with repeat labs 1-2 weeks after dose increase to monitor kidney function and potassium (which was ok on her 1/11/23 labs). Would not increase metoprolol further without input from EP d/t risk of significant bradycardia.

## 2023-02-15 RX ORDER — FLUTICASONE PROPIONATE AND SALMETEROL XINAFOATE 45; 21 UG/1; UG/1
2 AEROSOL, METERED RESPIRATORY (INHALATION)
Qty: 3 EACH | Refills: 1 | Status: SHIPPED | OUTPATIENT
Start: 2023-02-15 | End: 2023-04-09

## 2023-02-28 ENCOUNTER — TRANSCRIBE ORDERS (OUTPATIENT)
Dept: ADMINISTRATIVE | Facility: HOSPITAL | Age: 83
End: 2023-02-28
Payer: MEDICARE

## 2023-02-28 DIAGNOSIS — R13.10 DYSPHAGIA, UNSPECIFIED TYPE: Primary | ICD-10-CM

## 2023-02-28 DIAGNOSIS — R05.3 CHRONIC COUGH: Primary | ICD-10-CM

## 2023-03-01 DIAGNOSIS — E03.9 ACQUIRED HYPOTHYROIDISM: ICD-10-CM

## 2023-03-01 RX ORDER — LEVOTHYROXINE SODIUM 0.07 MG/1
TABLET ORAL
Qty: 45 TABLET | Refills: 3 | Status: ON HOLD | OUTPATIENT
Start: 2023-03-01

## 2023-03-01 NOTE — TELEPHONE ENCOUNTER
Caller: [facility] AT Portal to Registered [facility] - [phone]  - [phone]    Relationship: Pharmacy    Best call back number: [phone]  REFERENCE [id number]  Requested Prescriptions:   Requested Prescriptions     Pending Prescriptions Disp Refills   • levothyroxine (SYNTHROID, LEVOTHROID) 75 MCG tablet 45 tablet 3        Pharmacy where request should be sent: [facility] - [facility] AT Portal to Registered [facility] - [phone]  - [phone]         Additional details provided by patient:     Does the patient have less than a 3 day supply:  [] Yes  [] No    Would you like a call back once the refill request has been completed: [] Yes [] No    If the office needs to give you a call back, can they leave a voicemail: [] Yes [] No    Ritesh Gama Rep   03/01/23 10:20 EST

## 2023-03-13 ENCOUNTER — TELEPHONE (OUTPATIENT)
Dept: CARDIOLOGY | Facility: CLINIC | Age: 83
End: 2023-03-13

## 2023-03-13 NOTE — TELEPHONE ENCOUNTER
Caller: Dior Pettit    Relationship: Self    Best call back number: [phone]    What is the best time to reach you: AFTER 10     Who are you requesting to speak with (clinical staff, provider,  specific staff member): ANY     Do you know the name of the person who called:     What was the call regarding: PTS APPT ON 3-30 WAS  CANCELLED. PT HAS BEEN HAVING INCREASINGLY BAD SOB. PLEASE CALL BACK TO SCHEDULE SOONER APPT.     Do you require a callback: YES

## 2023-03-24 ENCOUNTER — TELEPHONE (OUTPATIENT)
Dept: CARDIOLOGY | Facility: CLINIC | Age: 83
End: 2023-03-24
Payer: MEDICARE

## 2023-03-24 ENCOUNTER — HOSPITAL ENCOUNTER (OUTPATIENT)
Dept: CARDIOLOGY | Facility: HOSPITAL | Age: 83
Discharge: HOME | End: 2023-03-24
Admitting: INTERNAL MEDICINE
Payer: MEDICARE

## 2023-03-24 VITALS
SYSTOLIC BLOOD PRESSURE: 136 MMHG | DIASTOLIC BLOOD PRESSURE: 90 MMHG | OXYGEN SATURATION: 97 % | HEART RATE: 51 BPM | HEIGHT: 67 IN | BODY MASS INDEX: 22.91 KG/M2 | WEIGHT: 146 LBS

## 2023-03-24 DIAGNOSIS — I48.0 PAF (PAROXYSMAL ATRIAL FIBRILLATION): ICD-10-CM

## 2023-03-24 DIAGNOSIS — I34.2 NONRHEUMATIC MITRAL VALVE STENOSIS: ICD-10-CM

## 2023-03-24 LAB
AORTIC ARCH: 3.1 CM
ASCENDING AORTA: 3.7 CM
BH CV ECHO MEAS - ACS: 1.17 CM
BH CV ECHO MEAS - AO MAX PG: 38.2 MMHG
BH CV ECHO MEAS - AO MEAN PG: 19.7 MMHG
BH CV ECHO MEAS - AO V2 MAX: 308.9 CM/SEC
BH CV ECHO MEAS - AO V2 VTI: 69.5 CM
BH CV ECHO MEAS - AVA(I,D): 0.82 CM2
BH CV ECHO MEAS - EDV(CUBED): 22.1 ML
BH CV ECHO MEAS - EDV(MOD-SP2): 88 ML
BH CV ECHO MEAS - EDV(MOD-SP4): 57 ML
BH CV ECHO MEAS - EF(MOD-BP): 67.2 %
BH CV ECHO MEAS - EF(MOD-SP2): 68.2 %
BH CV ECHO MEAS - EF(MOD-SP4): 64.9 %
BH CV ECHO MEAS - ESV(CUBED): 7.6 ML
BH CV ECHO MEAS - ESV(MOD-SP2): 28 ML
BH CV ECHO MEAS - ESV(MOD-SP4): 20 ML
BH CV ECHO MEAS - FS: 30 %
BH CV ECHO MEAS - IVS/LVPW: 0.95 CM
BH CV ECHO MEAS - IVSD: 1.45 CM
BH CV ECHO MEAS - LAT PEAK E' VEL: 7.8 CM/SEC
BH CV ECHO MEAS - LV DIASTOLIC VOL/BSA (35-75): 32.2 CM2
BH CV ECHO MEAS - LV MASS(C)D: 143.4 GRAMS
BH CV ECHO MEAS - LV MAX PG: 4.5 MMHG
BH CV ECHO MEAS - LV MEAN PG: 2.13 MMHG
BH CV ECHO MEAS - LV SYSTOLIC VOL/BSA (12-30): 11.3 CM2
BH CV ECHO MEAS - LV V1 MAX: 106.1 CM/SEC
BH CV ECHO MEAS - LV V1 VTI: 26.6 CM
BH CV ECHO MEAS - LVIDD: 2.8 CM
BH CV ECHO MEAS - LVIDS: 1.97 CM
BH CV ECHO MEAS - LVOT AREA: 2.15 CM2
BH CV ECHO MEAS - LVOT DIAM: 1.65 CM
BH CV ECHO MEAS - LVPWD: 1.53 CM
BH CV ECHO MEAS - MED PEAK E' VEL: 6.4 CM/SEC
BH CV ECHO MEAS - MV A DUR: 0.16 SEC
BH CV ECHO MEAS - MV A MAX VEL: 97.9 CM/SEC
BH CV ECHO MEAS - MV DEC SLOPE: 521.6 CM/SEC2
BH CV ECHO MEAS - MV DEC TIME: 254 MSEC
BH CV ECHO MEAS - MV E MAX VEL: 121 CM/SEC
BH CV ECHO MEAS - MV E/A: 1.24
BH CV ECHO MEAS - MV MAX PG: 6.7 MMHG
BH CV ECHO MEAS - MV MEAN PG: 1.92 MMHG
BH CV ECHO MEAS - MV P1/2T: 72.8 MSEC
BH CV ECHO MEAS - MV V2 VTI: 45.8 CM
BH CV ECHO MEAS - MVA(P1/2T): 3 CM2
BH CV ECHO MEAS - MVA(VTI): 1.25 CM2
BH CV ECHO MEAS - PA ACC TIME: 0.08 SEC
BH CV ECHO MEAS - PA PR(ACCEL): 42.2 MMHG
BH CV ECHO MEAS - PA V2 MAX: 72.8 CM/SEC
BH CV ECHO MEAS - QP/QS: 0.79
BH CV ECHO MEAS - RV MAX PG: 0.48 MMHG
BH CV ECHO MEAS - RV V1 MAX: 34.5 CM/SEC
BH CV ECHO MEAS - RV V1 VTI: 8.5 CM
BH CV ECHO MEAS - RVOT DIAM: 2.6 CM
BH CV ECHO MEAS - SI(MOD-SP2): 33.9 ML/M2
BH CV ECHO MEAS - SI(MOD-SP4): 20.9 ML/M2
BH CV ECHO MEAS - SV(LVOT): 57.1 ML
BH CV ECHO MEAS - SV(MOD-SP2): 60 ML
BH CV ECHO MEAS - SV(MOD-SP4): 37 ML
BH CV ECHO MEAS - SV(RVOT): 45 ML
BH CV ECHO MEAS - TAPSE (>1.6): 1.65 CM
BH CV ECHO MEAS - TR MAX PG: 17 MMHG
BH CV ECHO MEAS - TR MAX VEL: 206.3 CM/SEC
BH CV ECHO MEASUREMENTS AVERAGE E/E' RATIO: 17.04
BH CV XLRA - RV BASE: 4.2 CM
BH CV XLRA - RV LENGTH: 8.1 CM
BH CV XLRA - RV MID: 5.2 CM
BH CV XLRA - TDI S': 7.5 CM/SEC
LEFT ATRIUM VOLUME INDEX: 39.8 ML/M2
MAXIMAL PREDICTED HEART RATE: 136 BPM
SINUS: 2.6 CM
STJ: 3 CM
STRESS TARGET HR: 116 BPM

## 2023-03-24 PROCEDURE — 93306 TTE W/DOPPLER COMPLETE: CPT

## 2023-03-24 PROCEDURE — 93306 TTE W/DOPPLER COMPLETE: CPT | Performed by: INTERNAL MEDICINE

## 2023-03-24 PROCEDURE — 25510000001 PERFLUTREN (DEFINITY) 8.476 MG IN SODIUM CHLORIDE (PF) 0.9 % 10 ML INJECTION: Performed by: INTERNAL MEDICINE

## 2023-03-24 RX ADMIN — PERFLUTREN 2 ML: 6.52 INJECTION, SUSPENSION INTRAVENOUS at 11:30

## 2023-03-24 NOTE — TELEPHONE ENCOUNTER
Please let her know that echo looks good. Hear heart is still strong and functioning well. Her prosthetic valve is stable, as well as the leaking from her mitral valve. The pressures in her lungs are better than last echo and now in the normal range.

## 2023-03-25 ENCOUNTER — PATIENT MESSAGE (OUTPATIENT)
Dept: CARDIOLOGY | Facility: CLINIC | Age: 83
End: 2023-03-25
Payer: MEDICARE

## 2023-03-28 NOTE — TELEPHONE ENCOUNTER
Attempted to contact patient. Voicemail left requesting a call back for the results. Will continue to try and reach patient.      Susan Weeks RN  Triage [facility]

## 2023-03-29 NOTE — TELEPHONE ENCOUNTER
Reviewed results with Dior Pettit and patient verbalized understanding of results.    Thank you,  Lisa RODRÍGUEZ RN  Triage Nurse [facility]

## 2023-03-30 DIAGNOSIS — K21.9 GASTROESOPHAGEAL REFLUX DISEASE: ICD-10-CM

## 2023-03-30 RX ORDER — OMEPRAZOLE 40 MG/1
CAPSULE, DELAYED RELEASE ORAL
Qty: 90 CAPSULE | Refills: 1 | Status: ON HOLD | OUTPATIENT
Start: 2023-03-30

## 2023-03-31 ENCOUNTER — OFFICE VISIT (OUTPATIENT)
Dept: CARDIOLOGY | Facility: CLINIC | Age: 83
End: 2023-03-31
Payer: MEDICARE

## 2023-03-31 VITALS
HEIGHT: 67 IN | SYSTOLIC BLOOD PRESSURE: 138 MMHG | WEIGHT: 141 LBS | BODY MASS INDEX: 22.13 KG/M2 | HEART RATE: 69 BPM | DIASTOLIC BLOOD PRESSURE: 82 MMHG

## 2023-03-31 DIAGNOSIS — R94.31 ABNORMAL ELECTROCARDIOGRAM (ECG) (EKG): ICD-10-CM

## 2023-03-31 DIAGNOSIS — I71.22 ANEURYSM OF AORTIC ARCH WITHOUT RUPTURE: ICD-10-CM

## 2023-03-31 DIAGNOSIS — I27.20 PULMONARY HYPERTENSION: ICD-10-CM

## 2023-03-31 DIAGNOSIS — I34.2 NONRHEUMATIC MITRAL VALVE STENOSIS: ICD-10-CM

## 2023-03-31 DIAGNOSIS — I10 ESSENTIAL HYPERTENSION: ICD-10-CM

## 2023-03-31 DIAGNOSIS — I48.0 PAF (PAROXYSMAL ATRIAL FIBRILLATION): Primary | ICD-10-CM

## 2023-03-31 DIAGNOSIS — E78.5 DYSLIPIDEMIA: ICD-10-CM

## 2023-03-31 DIAGNOSIS — Z95.2 S/P AVR: ICD-10-CM

## 2023-03-31 NOTE — PROGRESS NOTES
Date of Office Visit: 2023  Encounter Provider: TOSHA Olguin  Place of Service: [facility] CARDIOLOGY  Patient Name: Dior Pettit  :1940    Chief complaint  Follow-up hypertension, aortic valve disease    History of Present Illness  Patient is an 82-year-old female patient of Dr. Mitchell.  Past medical history includes hypertension, hyperlipidemia, diabetes, aortic valve disease and subaortic membrane.  In  she underwent aortic valve replacement with porcine valve as well as subaortic membrane resection. Cardiac catheterization was negative for coronary artery disease.  She was then found to have significant sleep apnea but was been intolerant of therapy for this.  She is also noted to have pulmonary hypertension with an RV systolic pressure 44 mmHg in .  By 2015 she developed chest tightness while shoveling snow in a stress perfusion study was negative for ischemia.  A follow-up echocardiogram in 2017 that showed normal systolic function with mild left ventricular hypertrophy aortic valve calcification with mild stenosis with mild mitral regurgitation moderate tricuspid valve regurgitation with an RV systolic pressure 52 mmHg and an ascending aorta measuring 3.9 cm.  In 2019 with complaints of chest pain a stress echocardiogram showed normal systolic function grade 1 diastolic dysfunction, mild concentric left ventricular hypertrophy there is mild to moderate bioprosthetic aortic valve stenosis with mild tricuspid regurgitation and an RV systolic pressure 36 mmHg.  There was no ischemia at 6 METs.  By 2021 she was noted to have atrial fibrillation with rapid rates with dyspnea and dizziness.  She was admitted to hospital and underwent BRAULIO as subsequent electrical cardioversion to sinus rhythm.  BRAULIO showed normal sinus rhythm with left ventricular hypertrophy with mild pulmonary hypertension with RVSP pressure 36 mmHg, left  atrial volume was increased.  There was no significant aortic or subaortic valve disease noted.  She was discharged on amiodarone and Eliquis.  She continued to have symptoms of fatigue dizziness.  proBNP was normal.  Amiodarone was decreased.  A stress perfusion study was negative for ischemia.  Carotid Doppler showed mild right carotid artery stenosis.  A 24-hour Holter showed sinus rhythm with rare PACs PVCs with no significant tachycardia or bradycardia arrhythmia noted.  She had a noncontrast CT chest 7/2021 that showed an ascending aorta measuring 4 cm.  She then saw Dr Jj on 11/2021.  After further discussion amiodarone was discontinued.  By September 2022 she went back to symptomatic atrial fibrillation and was seen by Dr Jj and had repeat electrical cardioversion on 9/9/2022.  She was seen again at [facility] on 9/11 with fatigue with bradycardia.  Digoxin and metoprolol held however with hypertension metoprolol was resumed.  Of note his hemoglobin dropped almost 2 g overnight presumably from hydration.  She also appears to been slightly dehydrated with elevated creatinine that also improved.  She had a hard time remembering she was actually dismissed following cardioversion return to the ER for the second visit with bradycardia.  After getting her permission I asked her son to come back to the room and he also confirmed that she had 2 separate visit.  She patient also states that she had some vision changes prior to that dismissal but was evaluated by provider and was felt to not be neurologic.  Patient has macular degeneration and had some vision changes with this. Echocardiogram 3/24/2023 shows LVEF 67.2%, moderate concentric LVH, grade 11A diastolic dysfunction, moderately dilated right atrial cavity.  Bioprosthetic aortic valve present with elevated peak and mean gradients at 20 mmHg mean gradient.  There is also moderate tricuspid valve regurgitation however RVSP is normal.    Interval  history  Patient presents today for routine follow-up.  I will visit with her for the first time today and have reviewed her medical record.  Her daughter accompanies her to visit today per her preference, and she also assists in providing more thorough history.  For the past few weeks she has been having progressive shortness of breath.  This is associated with fatigue but she denies any chest pain chest tightness or chest pressure.  She states the palpitations have much improved and she has not noticed any atrial fibrillation recently.  She also notes that she has worsening dizziness recently.  This can occur when she is sitting in a chair and does not seem to be associated with position changes.  She has not checked blood pressure, heart rate, or oxygen levels during these episodes.  She did see pulmonology, Dr. Quijano, and has barium swallow study and CT of her chest planned for April 14.  She did have recent echocardiogram that was stable.    Past Medical History:   Diagnosis Date   • Allergic rhinitis    • Anemia    • Anxiety     Controlled w/Meds   • Aortic root dilatation (HCC) 10/02/2017    Borderline--Noted on Echo   • Aortic valve calcification 10/02/2017    Noted on Echo   • Aortic valve insufficiency Dx in 07    w/AVR    • Aortic valve prosthesis present 11/02/2018    Noted on CTA Chest   • Ascending aorta dilatation (HCC) 10/02/2017    Borderline--Noted on Echo   • Asthma    • Atypical chest pain    • Breast pain, right Hx   • CAD (coronary artery disease)    • Cardiomegaly 2017   • Cervicalgia    • Chest pain due to CAD ([facility])    • 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    Borderline--Noted on Echo & Measured @ 2.6CM and Mid Descending @ 2.5CM on CTA Chest-11/2/18   • DM (diabetes mellitus) ([facility])     T2   • Esophagitis    • GERD (gastroesophageal reflux disease)     Controlled w/Meds   • Headache    • Health care maintenance    • Heart murmur    • History of echocardiogram 10/2/17-[facility]    EF  66%; Borderline Concentric Hypertrophy; Mild Calcification in AV; Mild AVS; Mild AVR; Moderate TVR; Borderline Dilation of Aortic Root/Arch & Borderline Dilation of Ascending/Proximal Aorta Present   • Hyperlipidemia     Controlled w/Meds   • Hypertension     Controlled w/Meds   • Hypothyroidism     Controlled w/Synthroid   • Insomnia    • Macular degeneration, left eye    • Mild aortic valve regurgitation 10/02/2017    Noted on Echo   • Mild aortic valve stenosis 10/02/2017    Noted on Echo   • Mild dilation of ascending aorta (HCC) 10/02/2017    Borderline--Noted on Echo   • Moderate tricuspid valve regurgitation 10/02/2017    Noted on Echo   • Mood disorder in conditions classified elsewhere     Controlled w/Meds   • Near syncope 03/2017-[facility] ER   • Neuropathy    • NNEKA (obstructive sleep apnea)     Untreated   • Osteoarthritis     Ankles/Feet   • Pulmonary hypertension (HCC) 2017   • Renal insufficiency    • RLS (restless legs syndrome)    • Thoracic ascending aortic aneurysm Dx in 2007 @ 3.8CM & 1/02/2018    Noted @ 4CM on CTA Chest;   • Visual impairment     macular degeneratuin     Past Surgical History:   Procedure Laterality Date   • AORTIC VALVE REPAIR/REPLACEMENT  2007    Dr. Perry   • APPENDECTOMY     • AUGMENTATION MAMMAPLASTY  1976   • BREAST AUGMENTATION  2014   • BUNIONECTOMY     • CARDIAC CATHETERIZATION  2007   • CARDIAC VALVE REPLACEMENT  2007    porcine   • COLONOSCOPY  2010   • COLONOSCOPY  03/02/2012   • EYE SURGERY      cataracts and ens implants   • HYSTERECTOMY  1972   • SIGMOIDOSCOPY  2001   • TOTAL HIP ARTHROPLASTY Right 11/15/2022    Procedure: Right anterior total hip arthroplasty;  Surgeon: Joao Martinez MD;  Location: [facility];  Service: Orthopedics;  Laterality: Right;     Outpatient Medications Prior to Visit   Medication Sig Dispense Refill   • acetaminophen (TYLENOL) 325 MG tablet Take 2 tablets by mouth Every 4 (Four) Hours As Needed for Mild Pain.     • Advair HFA 45-21  MCG/ACT inhaler Inhale 2 puffs 2 (Two) Times a Day. 3 each 1   • amitriptyline (ELAVIL) 50 MG tablet Take 1 tablet by mouth Every Night. 90 tablet 1   • ascorbic acid (VITAMIN C) 1000 MG tablet Take 1 tablet by mouth Daily.     • atorvastatin (LIPITOR) 20 MG tablet TAKE 1 TABLET EVERY NIGHT 90 tablet 1   • azelastine (ASTELIN) 0.1 % nasal spray 2 sprays into the nostril(s) as directed by provider. Use in each nostril as directed     • bisacodyl (DULCOLAX) 10 MG suppository Insert 1 suppository into the rectum Daily As Needed for Constipation.     • Blood Glucose Monitoring Suppl (Prodigy No Coding Blood Gluc) w/Device kit 1 each Daily. 1 kit 0   • dofetilide (Tikosyn) 250 MCG capsule Take 1 capsule by mouth Every 12 (Twelve) Hours. 180 capsule 1   • Eliquis 5 MG tablet tablet TAKE 1 TABLET BY MOUTH EVERY TWELVE HOURS 180 tablet 0   • fenofibrate micronized (LOFIBRA) 134 MG capsule Take 1 capsule by mouth Every Morning Before Breakfast. 90 capsule 3   • glucosamine-chondroitin 500-400 MG capsule capsule Take 2 capsules by mouth Daily.     • glucose blood (Prodigy No Coding Blood Gluc) test strip TEST BLOOD SUGAR DAILY 50 each 0   • ipratropium (ATROVENT) 0.06 % nasal spray      • levothyroxine (SYNTHROID, LEVOTHROID) 50 MCG tablet TAKE 1 TABLET EVERY OTHER  DAY 45 tablet 1   • levothyroxine (SYNTHROID, LEVOTHROID) 75 MCG tablet TAKE ONE TABLET EVERY OTHER DAY 45 tablet 3   • losartan (COZAAR) 50 MG tablet TAKE 1 TABLET DAILY 90 tablet 0   • metFORMIN ER (GLUCOPHAGE-XR) 750 MG 24 hr tablet Take 1 tablet by mouth 2 (Two) Times a Day. 180 tablet 3   • metoprolol tartrate (LOPRESSOR) 50 MG tablet Take 0.5 tablets by mouth 2 (Two) Times a Day. 90 tablet 2   • Multiple Vitamins-Minerals (MULTIVITAL PO) Take  by mouth Daily.     • omeprazole (priLOSEC) 40 MG capsule TAKE 1 CAPSULE DAILY 90 capsule 1   • Prodigy Safety Lancets 26G misc CHECK BLOOD SUGAR ONE TIME PER  each 1   • valACYclovir (VALTREX) 1000 MG tablet  "TAKE 2 TABLETS 4 TIMES     DAILY FOR MOUTH ULCER (Patient taking differently: TAKE 2 TABLETS 4 TIMES     DAILY FOR MOUTH ULCER as needed) 16 tablet 4   • venlafaxine XR (Effexor XR) 75 MG 24 hr capsule Take 1 capsule by mouth Daily. 90 capsule 1   • venlafaxine XR (EFFEXOR-XR) 150 MG 24 hr capsule TAKE 1 CAPSULE DAILY 90 capsule 3   • baclofen (LIORESAL) 10 MG tablet Take 1 tablet by mouth 3 (Three) Times a Day. 90 tablet 3     No facility-administered medications prior to visit.       Allergies as of 03/31/2023   • (No Known Allergies)     Social History     Socioeconomic History   • Marital status:    Tobacco Use   • Smoking status: Never   • Smokeless tobacco: Never   • Tobacco comments:     caffeine use - 1 cup coffee daily    Vaping Use   • Vaping Use: Never used   Substance and Sexual Activity   • Alcohol use: Yes     Comment: less than 1 glass of wine   • Drug use: Never   • Sexual activity: Not Currently     Partners: Male     Birth control/protection: Surgical     Comment: Hyst     Family History   Problem Relation Age of Onset   • Hypertension Mother    • Stroke Mother    • Cancer Mother    • Diabetes Sister    • Coronary artery disease Brother    • Diabetes Brother    • Valvular heart disease Brother         TAVR   • Coronary artery disease Brother    • Cancer Brother    • Birth defects Daughter    • Skin cancer Neg Hx      Review of Systems   Constitutional: Positive for malaise/fatigue.   Cardiovascular: Positive for dyspnea on exertion. Negative for chest pain, claudication, leg swelling, near-syncope, orthopnea, palpitations, paroxysmal nocturnal dyspnea and syncope.   Respiratory: Positive for shortness of breath.    Neurological: Positive for dizziness and light-headedness. Negative for brief paralysis and headaches.   All other systems reviewed and are negative.       Objective:     Vitals:    03/31/23 1537   BP: 138/82   Pulse: 69   Weight: 64 kg (141 lb)   Height: 170.2 cm (67\")     Body " mass index is 22.08 kg/m².    Vitals reviewed.   Constitutional:       General: Not in acute distress.     Appearance: Well-developed and not in distress. Frail. Not diaphoretic.   HENT:      Head: Normocephalic.   Pulmonary:      Effort: Pulmonary effort is normal. No respiratory distress.      Breath sounds: Normal breath sounds. No wheezing. No rhonchi. No rales.   Cardiovascular:      Normal rate. Regular rhythm.      Murmurs: There is no murmur.   Pulses:     Radial: 2+ bilaterally.  Edema:     Peripheral edema absent.   Skin:     General: Skin is warm and dry. There is no cyanosis.      Findings: No rash.   Neurological:      Mental Status: Alert and oriented to person, place, and time.   Psychiatric:         Behavior: Behavior normal. Behavior is cooperative.         Thought Content: Thought content normal.         Judgment: Judgment normal.       Lab Review:     ECG 12 Lead    Date/Time: 3/31/2023 4:34 PM  Performed by: Abby Pittman APRN  Authorized by: Abby Pittman APRN   Comparison: compared with previous ECG   Similar to previous ECG  Rhythm: sinus rhythm  Rate: normal  BPM: 69  Conduction: left anterior fascicular block  QRS axis: normal  Other findings: left ventricular hypertrophy  Comments: Similar to prior.  No new ischemic changes          Assessment:       Diagnosis Plan   1. PAF (paroxysmal atrial fibrillation) ([facility])  Stress Test With Myocardial Perfusion One Day      2. Essential hypertension  Stress Test With Myocardial Perfusion One Day      3. S/P AVR  Stress Test With Myocardial Perfusion One Day      4. Aneurysm of aortic arch without rupture (HCC)  Stress Test With Myocardial Perfusion One Day      5. Pulmonary hypertension (HCC)  Stress Test With Myocardial Perfusion One Day      6. Nonrheumatic mitral valve stenosis  Stress Test With Myocardial Perfusion One Day      7. Dyslipidemia  Stress Test With Myocardial Perfusion One Day      8. Abnormal electrocardiogram (ECG) (EKG)   Stress Test With Myocardial Perfusion One Day        Plan:       1.  Paroxysmal atrial fibrillation, status post electrical cardioversion x 2.  Remains in sinus rhythm with some irregularity.   2.  New and worsening shortness of breath. Has seen pulmonary with plans for barium swallow and CT chest. Will proceed with Lexiscan stress test if the prior testing is negative.  Discussed that if stress test is abnormal, may need cardiac cath.  Patient understands.  She has had cath in the past and understands risks.  3.  Hypertension.    Slightly elevated in office today, but she has not been checking at home.  Given her recent dizziness we will have her check blood pressure at home and call if consistently above 130/80 on average.  We will need to increase blood pressure medications cautiously given her propensity for falls.  4.  Aortic valve replacement with bioprosthetic valve and subaortic membrane resection in 2007.  Valve appeared to be working well on recent echo, though gradients are slightly increased. Will continue to follow.   5.  Dilated aortic root.  Stable measuring 4 cm and a sending aorta by CT 7/2021.  6.  Pulmonary hypertension, RVSP was elevated to 46 mmHg on echo 1/2022.  RVSP normal on most recent echo.  7.  Mild mitral valve stenosis noted on prior echo. Stable as of most recent echo.  8.  Anemia.  No obvious blood loss.  She will follow-up with labs with TOSHA Hong.  9.  Diabetes  10.  Dyslipidemia      Time Spent: I spent 45 minutes caring for Dior on this date of service. This time includes time spent by me in the following activities: preparing for the visit, reviewing tests, obtaining and/or reviewing a separately obtained history, performing a medically appropriate examination and/or evaluation, counseling and educating the patient/family/caregiver, ordering medications, tests, or procedures and documenting information in the medical record.   I spent 1 minutes on the separately reported  service of ECG. This time is not included in the time used to support the E/M service also reported today.        Your medication list          Accurate as of March 31, 2023  4:40 PM. If you have any questions, ask your nurse or doctor.            CHANGE how you take these medications      Instructions Last Dose Given Next Dose Due   valACYclovir 1000 MG tablet  Commonly known as: VALTREX  What changed: additional instructions      TAKE 2 TABLETS 4 TIMES     DAILY FOR MOUTH ULCER          CONTINUE taking these medications      Instructions Last Dose Given Next Dose Due   acetaminophen 325 MG tablet  Commonly known as: TYLENOL      Take 2 tablets by mouth Every 4 (Four) Hours As Needed for Mild Pain.       Advair HFA 45-21 MCG/ACT inhaler  Generic drug: fluticasone-salmeterol      Inhale 2 puffs 2 (Two) Times a Day.       amitriptyline 50 MG tablet  Commonly known as: ELAVIL      Take 1 tablet by mouth Every Night.       ascorbic acid 1000 MG tablet  Commonly known as: VITAMIN C      Take 1 tablet by mouth Daily.       atorvastatin 20 MG tablet  Commonly known as: LIPITOR      TAKE 1 TABLET EVERY NIGHT       azelastine 0.1 % nasal spray  Commonly known as: ASTELIN      2 sprays into the nostril(s) as directed by provider. Use in each nostril as directed       bisacodyl 10 MG suppository  Commonly known as: DULCOLAX      Insert 1 suppository into the rectum Daily As Needed for Constipation.       dofetilide 250 MCG capsule  Commonly known as: Tikosyn      Take 1 capsule by mouth Every 12 (Twelve) Hours.       Eliquis 5 MG tablet tablet  Generic drug: apixaban      TAKE 1 TABLET BY MOUTH EVERY TWELVE HOURS       fenofibrate micronized 134 MG capsule  Commonly known as: LOFIBRA      Take 1 capsule by mouth Every Morning Before Breakfast.       glucosamine-chondroitin 500-400 MG capsule capsule      Take 2 capsules by mouth Daily.       glucose blood test strip  Commonly known as: Prodigy No Coding Blood Gluc      TEST  BLOOD SUGAR DAILY       ipratropium 0.06 % nasal spray  Commonly known as: ATROVENT           levothyroxine 50 MCG tablet  Commonly known as: SYNTHROID, LEVOTHROID      TAKE 1 TABLET EVERY OTHER  DAY       levothyroxine 75 MCG tablet  Commonly known as: SYNTHROID, LEVOTHROID      TAKE ONE TABLET EVERY OTHER DAY       losartan 50 MG tablet  Commonly known as: COZAAR      TAKE 1 TABLET DAILY       metFORMIN  MG 24 hr tablet  Commonly known as: GLUCOPHAGE-XR      Take 1 tablet by mouth 2 (Two) Times a Day.       metoprolol tartrate 50 MG tablet  Commonly known as: LOPRESSOR      Take 0.5 tablets by mouth 2 (Two) Times a Day.       multivitamin with minerals tablet tablet      Take  by mouth Daily.       omeprazole 40 MG capsule  Commonly known as: priLOSEC      TAKE 1 CAPSULE DAILY       Prodigy No Coding Blood Gluc w/Device kit      1 each Daily.       Prodigy Safety Lancets 26G misc      CHECK BLOOD SUGAR ONE TIME PER DAY       venlafaxine  MG 24 hr capsule  Commonly known as: EFFEXOR-XR      TAKE 1 CAPSULE DAILY       venlafaxine XR 75 MG 24 hr capsule  Commonly known as: Effexor XR      Take 1 capsule by mouth Daily.              Patient is no longer taking -.  I corrected the med list to reflect this.  I did not stop these medications.      Dictated utilizing Dragon dictation

## 2023-04-04 ENCOUNTER — NURSE TRIAGE (OUTPATIENT)
Dept: CALL CENTER | Facility: HOSPITAL | Age: 83
End: 2023-04-04
Payer: MEDICARE

## 2023-04-04 ENCOUNTER — TELEPHONE (OUTPATIENT)
Dept: INTERNAL MEDICINE | Facility: CLINIC | Age: 83
End: 2023-04-04

## 2023-04-04 NOTE — TELEPHONE ENCOUNTER
Caller: Dior Pettit    Relationship to patient: Self    Best call back number: [phone]    Patient is needing: BLOOD PRESSURE READINGS FOR 4-4-23   182/100 PULSE 75  187/99 PULSE 74  163/95 PULSE 62      COULD NOT REACH OFFICE, SENT CALL TO NURSE TRIAGE

## 2023-04-04 NOTE — TELEPHONE ENCOUNTER
"Patient c/o elevated blood pressure. Recently decreased dose of metoprolol per cardiologist.  BP last night: 163/107  This mornin/99 HR 74  Re-check: 182/100 HR 75  Elevated BP accompanied by headache. Patient says vision may be blurred but difficult to tell d/t macular degeneration. Reviewed protocol with patient. Advised patient to go to the ER per protocol. Patient states she does not have a way to get to the ER today. Patient stated she may be able to get a ride to the ER tomorrow. Connected patient with Ashanti Hong's office for further triage/consultation.    Reason for Disposition  • [1] Systolic BP  >= 160 OR Diastolic >= 100 AND [2] cardiac or neurologic symptoms (e.g., chest pain, difficulty breathing, unsteady gait, blurred vision)    Additional Information  • Negative: Difficult to awaken or acting confused (e.g., disoriented, slurred speech)  • Negative: SEVERE difficulty breathing (e.g., struggling for each breath, speaks in single words)  • Negative: [1] Weakness of the face, arm or leg on one side of the body AND [2] new-onset  • Negative: [1] Numbness (i.e., loss of sensation) of the face, arm or leg on one side of the body AND [2] new-onset  • Negative: [1] Chest pain lasts > 5 minutes AND [2] history of heart disease  (i.e., heart attack, bypass surgery, angina, angioplasty, CHF)  • Negative: [1] Chest pain AND [2] took nitrogylcerin AND [3] pain was not relieved  • Negative: Sounds like a life-threatening emergency to the triager  • Negative: Symptom is main concern  (e.g., headache, chest pain)  • Negative: Low blood pressure is main concern    Answer Assessment - Initial Assessment Questions  1. BLOOD PRESSURE: \"What is the blood pressure?\" \"Did you take at least two measurements 5 minutes apart?\"      187/99 and 182/100  2. ONSET: \"When did you take your blood pressure?\"      This morning  3. HOW: \"How did you obtain the blood pressure?\" (e.g., visiting nurse, automatic home BP " "monitor)      Home BP monitor  4. HISTORY: \"Do you have a history of high blood pressure?\"      Yes   5. MEDICATIONS: \"Are you taking any medications for blood pressure?\" \"Have you missed any doses recently?\"      Metoprolol, Losartan (recently decreased dose of metoprolol)  6. OTHER SYMPTOMS: \"Do you have any symptoms?\" (e.g., headache, chest pain, blurred vision, difficulty breathing, weakness)      Headache, possible blurred vision  7. PREGNANCY: \"Is there any chance you are pregnant?\" \"When was your last menstrual period?\"      NA    Protocols used: BLOOD PRESSURE - HIGH-ADULT-AH      "

## 2023-04-05 NOTE — TELEPHONE ENCOUNTER
Offered to work her in on 04/06 and 04/07 but she couldn't make those dates.  Scheduled for Monday, 04/10.

## 2023-04-06 DIAGNOSIS — I10 ESSENTIAL HYPERTENSION: ICD-10-CM

## 2023-04-06 RX ORDER — LOSARTAN POTASSIUM 50 MG/1
TABLET ORAL
Qty: 90 TABLET | Refills: 1 | Status: SHIPPED | OUTPATIENT
Start: 2023-04-06 | End: 2023-04-15 | Stop reason: HOSPADM

## 2023-04-09 ENCOUNTER — HOSPITAL ENCOUNTER (INPATIENT)
Facility: HOSPITAL | Age: 83
LOS: 6 days | Discharge: HOME HEALTH | DRG: 42 | End: 2023-04-15
Attending: EMERGENCY MEDICINE | Admitting: SURGERY
Payer: MEDICARE

## 2023-04-09 ENCOUNTER — APPOINTMENT (OUTPATIENT)
Dept: MRI IMAGING | Facility: HOSPITAL | Age: 83
DRG: 42 | End: 2023-04-09
Payer: MEDICARE

## 2023-04-09 ENCOUNTER — APPOINTMENT (OUTPATIENT)
Dept: CT IMAGING | Facility: HOSPITAL | Age: 83
DRG: 42 | End: 2023-04-09
Payer: MEDICARE

## 2023-04-09 ENCOUNTER — APPOINTMENT (OUTPATIENT)
Dept: GENERAL RADIOLOGY | Facility: HOSPITAL | Age: 83
DRG: 42 | End: 2023-04-09
Payer: MEDICARE

## 2023-04-09 DIAGNOSIS — M31.6 TEMPORAL ARTERITIS: ICD-10-CM

## 2023-04-09 DIAGNOSIS — I63.9 ACUTE STROKE DUE TO ISCHEMIA: Primary | ICD-10-CM

## 2023-04-09 DIAGNOSIS — R41.82 ALTERED MENTAL STATUS, UNSPECIFIED ALTERED MENTAL STATUS TYPE: ICD-10-CM

## 2023-04-09 PROBLEM — R06.02 SHORTNESS OF BREATH: Status: ACTIVE | Noted: 2023-04-09

## 2023-04-09 LAB
ABO GROUP BLD: NORMAL
ALBUMIN SERPL-MCNC: 4.5 G/DL (ref 3.5–5.2)
ALBUMIN/GLOB SERPL: 1.3 G/DL
ALP SERPL-CCNC: 112 U/L (ref 39–117)
ALT SERPL W P-5'-P-CCNC: 18 U/L (ref 1–33)
ANION GAP SERPL CALCULATED.3IONS-SCNC: 11.2 MMOL/L (ref 5–15)
APTT PPP: 39 SECONDS (ref 22.7–35.4)
AST SERPL-CCNC: 28 U/L (ref 1–32)
BASOPHILS # BLD AUTO: 0.07 10*3/MM3 (ref 0–0.2)
BASOPHILS NFR BLD AUTO: 1 % (ref 0–1.5)
BILIRUB SERPL-MCNC: 0.7 MG/DL (ref 0–1.2)
BILIRUB UR QL STRIP: NEGATIVE
BLD GP AB SCN SERPL QL: NEGATIVE
BUN SERPL-MCNC: 19 MG/DL (ref 8–23)
BUN/CREAT SERPL: 20.9 (ref 7–25)
CALCIUM SPEC-SCNC: 11.1 MG/DL (ref 8.6–10.5)
CHLORIDE SERPL-SCNC: 99 MMOL/L (ref 98–107)
CLARITY UR: CLEAR
CO2 SERPL-SCNC: 23.8 MMOL/L (ref 22–29)
COLOR UR: YELLOW
CREAT SERPL-MCNC: 0.91 MG/DL (ref 0.57–1)
DEPRECATED RDW RBC AUTO: 48.5 FL (ref 37–54)
EGFRCR SERPLBLD CKD-EPI 2021: 62.3 ML/MIN/1.73
EOSINOPHIL # BLD AUTO: 0.15 10*3/MM3 (ref 0–0.4)
EOSINOPHIL NFR BLD AUTO: 2.1 % (ref 0.3–6.2)
ERYTHROCYTE [DISTWIDTH] IN BLOOD BY AUTOMATED COUNT: 14.9 % (ref 12.3–15.4)
GLOBULIN UR ELPH-MCNC: 3.6 GM/DL
GLUCOSE BLDC GLUCOMTR-MCNC: 75 MG/DL (ref 70–130)
GLUCOSE BLDC GLUCOMTR-MCNC: 85 MG/DL (ref 70–130)
GLUCOSE SERPL-MCNC: 85 MG/DL (ref 65–99)
GLUCOSE UR STRIP-MCNC: NEGATIVE MG/DL
HCT VFR BLD AUTO: 48.7 % (ref 34–46.6)
HGB BLD-MCNC: 15.3 G/DL (ref 12–15.9)
HGB UR QL STRIP.AUTO: NEGATIVE
HOLD SPECIMEN: NORMAL
HOLD SPECIMEN: NORMAL
IMM GRANULOCYTES # BLD AUTO: 0.02 10*3/MM3 (ref 0–0.05)
IMM GRANULOCYTES NFR BLD AUTO: 0.3 % (ref 0–0.5)
INR PPP: 1.2 (ref 0.9–1.1)
KETONES UR QL STRIP: NEGATIVE
LEUKOCYTE ESTERASE UR QL STRIP.AUTO: NEGATIVE
LYMPHOCYTES # BLD AUTO: 2.45 10*3/MM3 (ref 0.7–3.1)
LYMPHOCYTES NFR BLD AUTO: 34.3 % (ref 19.6–45.3)
MAGNESIUM SERPL-MCNC: 1.4 MG/DL (ref 1.6–2.4)
MCH RBC QN AUTO: 27.7 PG (ref 26.6–33)
MCHC RBC AUTO-ENTMCNC: 31.4 G/DL (ref 31.5–35.7)
MCV RBC AUTO: 88.2 FL (ref 79–97)
MONOCYTES # BLD AUTO: 0.91 10*3/MM3 (ref 0.1–0.9)
MONOCYTES NFR BLD AUTO: 12.7 % (ref 5–12)
NEUTROPHILS NFR BLD AUTO: 3.54 10*3/MM3 (ref 1.7–7)
NEUTROPHILS NFR BLD AUTO: 49.6 % (ref 42.7–76)
NITRITE UR QL STRIP: NEGATIVE
NRBC BLD AUTO-RTO: 0 /100 WBC (ref 0–0.2)
PH UR STRIP.AUTO: 5.5 [PH] (ref 5–8)
PLATELET # BLD AUTO: 228 10*3/MM3 (ref 140–450)
PMV BLD AUTO: 9.4 FL (ref 6–12)
POTASSIUM SERPL-SCNC: 4.3 MMOL/L (ref 3.5–5.2)
PROT SERPL-MCNC: 8.1 G/DL (ref 6–8.5)
PROT UR QL STRIP: NEGATIVE
PROTHROMBIN TIME: 15.4 SECONDS (ref 11.7–14.2)
QT INTERVAL: 346 MS
RBC # BLD AUTO: 5.52 10*6/MM3 (ref 3.77–5.28)
RH BLD: POSITIVE
SODIUM SERPL-SCNC: 134 MMOL/L (ref 136–145)
SP GR UR STRIP: 1.02 (ref 1–1.03)
T&S EXPIRATION DATE: NORMAL
TROPONIN T SERPL HS-MCNC: 10 NG/L
UROBILINOGEN UR QL STRIP: NORMAL
WBC NRBC COR # BLD: 7.14 10*3/MM3 (ref 3.4–10.8)
WHOLE BLOOD HOLD COAG: NORMAL
WHOLE BLOOD HOLD SPECIMEN: NORMAL

## 2023-04-09 PROCEDURE — 84484 ASSAY OF TROPONIN QUANT: CPT | Performed by: EMERGENCY MEDICINE

## 2023-04-09 PROCEDURE — 70553 MRI BRAIN STEM W/O & W/DYE: CPT

## 2023-04-09 PROCEDURE — 85610 PROTHROMBIN TIME: CPT | Performed by: EMERGENCY MEDICINE

## 2023-04-09 PROCEDURE — 81003 URINALYSIS AUTO W/O SCOPE: CPT | Performed by: EMERGENCY MEDICINE

## 2023-04-09 PROCEDURE — 86901 BLOOD TYPING SEROLOGIC RH(D): CPT | Performed by: EMERGENCY MEDICINE

## 2023-04-09 PROCEDURE — 99285 EMERGENCY DEPT VISIT HI MDM: CPT

## 2023-04-09 PROCEDURE — A9577 INJ MULTIHANCE: HCPCS | Performed by: EMERGENCY MEDICINE

## 2023-04-09 PROCEDURE — 70551 MRI BRAIN STEM W/O DYE: CPT

## 2023-04-09 PROCEDURE — 82962 GLUCOSE BLOOD TEST: CPT

## 2023-04-09 PROCEDURE — 70549 MR ANGIOGRAPH NECK W/O&W/DYE: CPT

## 2023-04-09 PROCEDURE — 86850 RBC ANTIBODY SCREEN: CPT | Performed by: EMERGENCY MEDICINE

## 2023-04-09 PROCEDURE — 70544 MR ANGIOGRAPHY HEAD W/O DYE: CPT

## 2023-04-09 PROCEDURE — 83735 ASSAY OF MAGNESIUM: CPT | Performed by: EMERGENCY MEDICINE

## 2023-04-09 PROCEDURE — 71045 X-RAY EXAM CHEST 1 VIEW: CPT

## 2023-04-09 PROCEDURE — 25010000002 LORAZEPAM PER 2 MG: Performed by: EMERGENCY MEDICINE

## 2023-04-09 PROCEDURE — 80053 COMPREHEN METABOLIC PANEL: CPT | Performed by: EMERGENCY MEDICINE

## 2023-04-09 PROCEDURE — 85730 THROMBOPLASTIN TIME PARTIAL: CPT | Performed by: EMERGENCY MEDICINE

## 2023-04-09 PROCEDURE — 93005 ELECTROCARDIOGRAM TRACING: CPT | Performed by: EMERGENCY MEDICINE

## 2023-04-09 PROCEDURE — 85025 COMPLETE CBC W/AUTO DIFF WBC: CPT | Performed by: EMERGENCY MEDICINE

## 2023-04-09 PROCEDURE — 0 GADOBENATE DIMEGLUMINE 529 MG/ML SOLUTION: Performed by: EMERGENCY MEDICINE

## 2023-04-09 PROCEDURE — 93010 ELECTROCARDIOGRAM REPORT: CPT | Performed by: INTERNAL MEDICINE

## 2023-04-09 PROCEDURE — 86900 BLOOD TYPING SEROLOGIC ABO: CPT | Performed by: EMERGENCY MEDICINE

## 2023-04-09 RX ORDER — SODIUM CHLORIDE 9 MG/ML
125 INJECTION, SOLUTION INTRAVENOUS CONTINUOUS
Status: DISCONTINUED | OUTPATIENT
Start: 2023-04-09 | End: 2023-04-09

## 2023-04-09 RX ORDER — DOFETILIDE 0.25 MG/1
250 CAPSULE ORAL 2 TIMES DAILY
Status: DISCONTINUED | OUTPATIENT
Start: 2023-04-10 | End: 2023-04-15 | Stop reason: HOSPADM

## 2023-04-09 RX ORDER — LEVOTHYROXINE SODIUM 0.07 MG/1
75 TABLET ORAL EVERY OTHER DAY
Status: DISCONTINUED | OUTPATIENT
Start: 2023-04-10 | End: 2023-04-15 | Stop reason: HOSPADM

## 2023-04-09 RX ORDER — IBUPROFEN 600 MG/1
1 TABLET ORAL
Status: DISCONTINUED | OUTPATIENT
Start: 2023-04-09 | End: 2023-04-15 | Stop reason: HOSPADM

## 2023-04-09 RX ORDER — SODIUM CHLORIDE 0.9 % (FLUSH) 0.9 %
10 SYRINGE (ML) INJECTION AS NEEDED
Status: DISCONTINUED | OUTPATIENT
Start: 2023-04-09 | End: 2023-04-15 | Stop reason: HOSPADM

## 2023-04-09 RX ORDER — LOSARTAN POTASSIUM 50 MG/1
50 TABLET ORAL DAILY
Status: DISCONTINUED | OUTPATIENT
Start: 2023-04-10 | End: 2023-04-11

## 2023-04-09 RX ORDER — ONDANSETRON 2 MG/ML
4 INJECTION INTRAMUSCULAR; INTRAVENOUS EVERY 6 HOURS PRN
Status: DISCONTINUED | OUTPATIENT
Start: 2023-04-09 | End: 2023-04-15 | Stop reason: HOSPADM

## 2023-04-09 RX ORDER — BISACODYL 10 MG
10 SUPPOSITORY, RECTAL RECTAL DAILY PRN
Status: DISCONTINUED | OUTPATIENT
Start: 2023-04-09 | End: 2023-04-15 | Stop reason: HOSPADM

## 2023-04-09 RX ORDER — ASPIRIN 325 MG
325 TABLET ORAL DAILY
Status: DISCONTINUED | OUTPATIENT
Start: 2023-04-10 | End: 2023-04-10

## 2023-04-09 RX ORDER — LEVOTHYROXINE SODIUM 0.05 MG/1
50 TABLET ORAL EVERY OTHER DAY
Status: DISCONTINUED | OUTPATIENT
Start: 2023-04-11 | End: 2023-04-15 | Stop reason: HOSPADM

## 2023-04-09 RX ORDER — ACETAMINOPHEN 325 MG/1
650 TABLET ORAL EVERY 4 HOURS PRN
Status: DISCONTINUED | OUTPATIENT
Start: 2023-04-09 | End: 2023-04-15 | Stop reason: HOSPADM

## 2023-04-09 RX ORDER — ASPIRIN 300 MG/1
300 SUPPOSITORY RECTAL DAILY
Status: DISCONTINUED | OUTPATIENT
Start: 2023-04-10 | End: 2023-04-10

## 2023-04-09 RX ORDER — ATORVASTATIN CALCIUM 80 MG/1
80 TABLET, FILM COATED ORAL NIGHTLY
Status: DISCONTINUED | OUTPATIENT
Start: 2023-04-09 | End: 2023-04-15 | Stop reason: HOSPADM

## 2023-04-09 RX ORDER — INSULIN LISPRO 100 [IU]/ML
0-7 INJECTION, SOLUTION INTRAVENOUS; SUBCUTANEOUS
Status: DISCONTINUED | OUTPATIENT
Start: 2023-04-10 | End: 2023-04-15 | Stop reason: HOSPADM

## 2023-04-09 RX ORDER — LABETALOL HYDROCHLORIDE 5 MG/ML
10 INJECTION, SOLUTION INTRAVENOUS
Status: DISCONTINUED | OUTPATIENT
Start: 2023-04-09 | End: 2023-04-15 | Stop reason: HOSPADM

## 2023-04-09 RX ORDER — SODIUM CHLORIDE 9 MG/ML
75 INJECTION, SOLUTION INTRAVENOUS CONTINUOUS
Status: DISCONTINUED | OUTPATIENT
Start: 2023-04-09 | End: 2023-04-10

## 2023-04-09 RX ORDER — NICOTINE POLACRILEX 4 MG
15 LOZENGE BUCCAL
Status: DISCONTINUED | OUTPATIENT
Start: 2023-04-09 | End: 2023-04-15 | Stop reason: HOSPADM

## 2023-04-09 RX ORDER — AMITRIPTYLINE HYDROCHLORIDE 50 MG/1
50 TABLET, FILM COATED ORAL NIGHTLY
Status: DISCONTINUED | OUTPATIENT
Start: 2023-04-10 | End: 2023-04-15 | Stop reason: HOSPADM

## 2023-04-09 RX ORDER — VENLAFAXINE HYDROCHLORIDE 75 MG/1
75 CAPSULE, EXTENDED RELEASE ORAL DAILY
Status: DISCONTINUED | OUTPATIENT
Start: 2023-04-10 | End: 2023-04-15 | Stop reason: HOSPADM

## 2023-04-09 RX ORDER — VENLAFAXINE HYDROCHLORIDE 150 MG/1
150 CAPSULE, EXTENDED RELEASE ORAL DAILY
Status: DISCONTINUED | OUTPATIENT
Start: 2023-04-10 | End: 2023-04-15 | Stop reason: HOSPADM

## 2023-04-09 RX ORDER — LORAZEPAM 2 MG/ML
0.25 INJECTION INTRAMUSCULAR ONCE
Status: COMPLETED | OUTPATIENT
Start: 2023-04-09 | End: 2023-04-09

## 2023-04-09 RX ORDER — ASPIRIN 325 MG
325 TABLET ORAL ONCE
Status: COMPLETED | OUTPATIENT
Start: 2023-04-09 | End: 2023-04-09

## 2023-04-09 RX ORDER — ACETAMINOPHEN 650 MG/1
650 SUPPOSITORY RECTAL EVERY 4 HOURS PRN
Status: DISCONTINUED | OUTPATIENT
Start: 2023-04-09 | End: 2023-04-15 | Stop reason: HOSPADM

## 2023-04-09 RX ORDER — DEXTROSE MONOHYDRATE 25 G/50ML
25 INJECTION, SOLUTION INTRAVENOUS
Status: DISCONTINUED | OUTPATIENT
Start: 2023-04-09 | End: 2023-04-15 | Stop reason: HOSPADM

## 2023-04-09 RX ADMIN — LORAZEPAM 0.25 MG: 2 INJECTION INTRAMUSCULAR; INTRAVENOUS at 16:40

## 2023-04-09 RX ADMIN — ASPIRIN 325 MG: 325 TABLET ORAL at 20:13

## 2023-04-09 RX ADMIN — ACETAMINOPHEN 650 MG: 325 TABLET, FILM COATED ORAL at 22:36

## 2023-04-09 RX ADMIN — GADOBENATE DIMEGLUMINE 13 ML: 529 INJECTION, SOLUTION INTRAVENOUS at 17:54

## 2023-04-09 RX ADMIN — SODIUM CHLORIDE 75 ML/HR: 9 INJECTION, SOLUTION INTRAVENOUS at 22:25

## 2023-04-09 RX ADMIN — SODIUM CHLORIDE 125 ML/HR: 9 INJECTION, SOLUTION INTRAVENOUS at 15:40

## 2023-04-09 RX ADMIN — SODIUM CHLORIDE 500 ML: 9 INJECTION, SOLUTION INTRAVENOUS at 15:39

## 2023-04-09 RX ADMIN — ATORVASTATIN CALCIUM 80 MG: 80 TABLET, FILM COATED ORAL at 22:23

## 2023-04-09 NOTE — PROGRESS NOTES
Clinical Pharmacy Services: Medication History    Dior Pettit is a 82 y.o. female presenting to [facility] for No admission diagnoses are documented for this encounter.    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), DM (diabetes mellitus), Esophagitis, GERD (gastroesophageal reflux disease), Headache, Health care maintenance, Heart murmur, History of echocardiogram (10/2/17-[facility]), Hyperlipidemia, Hypertension, Hypothyroidism, Insomnia, Macular degeneration, left eye, Mild aortic valve regurgitation (10/02/2017), Mild aortic valve stenosis (10/02/2017), Mild dilation of ascending aorta (10/02/2017), Moderate tricuspid valve regurgitation (10/02/2017), Mood disorder in conditions classified elsewhere, Near syncope (03/2017-[facility] ER), Neuropathy, NNEKA (obstructive sleep apnea), Osteoarthritis, Pulmonary hypertension (2017), Renal insufficiency, RLS (restless legs syndrome), Thoracic ascending aortic aneurysm (Dx in 2007 @ 3.8CM & 1/02/2018), and Visual impairment.    Allergies as of 04/09/2023    (No Known Allergies)       Medication information was obtained from: pt and her med list   Pharmacy and Phone Number:     Prior to Admission Medications       Prescriptions Last Dose Informant Patient Reported? Taking?    acetaminophen (TYLENOL) 325 MG tablet  Self No Yes    Take 2 tablets by mouth Every 4 (Four) Hours As Needed for Mild Pain.    amitriptyline (ELAVIL) 50 MG tablet 4/8/2023 Self No Yes    Take 1 tablet by mouth Every Night.    ascorbic acid (VITAMIN C) 1000 MG tablet 4/9/2023 Self  Yes Yes    Take 1 tablet by mouth Daily.    atorvastatin (LIPITOR) 20 MG tablet 4/8/2023 Self No Yes    TAKE 1 TABLET EVERY NIGHT    azelastine (ASTELIN) 0.1 % nasal spray  Self Yes Yes    2 sprays into the nostril(s) as directed by provider 2 (Two) Times a Day As Needed for Allergies or Rhinitis. Use in each nostril as directed    bisacodyl (DULCOLAX) 10 MG suppository  Self No No    Insert 1 suppository into the rectum Daily As Needed for Constipation.    Blood Glucose Monitoring Suppl (Prodigy No Coding Blood Gluc) w/Device kit  Self No No    1 each Daily.    dofetilide (Tikosyn) 250 MCG capsule 4/9/2023 Self No Yes    Take 1 capsule by mouth Every 12 (Twelve) Hours.    Eliquis 5 MG tablet tablet 4/9/2023 Self No Yes    TAKE 1 TABLET BY MOUTH EVERY TWELVE HOURS    fenofibrate micronized (LOFIBRA) 134 MG capsule 4/9/2023 Self No Yes    Take 1 capsule by mouth Every Morning Before Breakfast.    glucosamine-chondroitin 500-400 MG capsule capsule 4/9/2023 Self Yes Yes    Take 2 capsules by mouth Daily.    glucose blood (Prodigy No Coding Blood Gluc) test strip  Self No No    TEST BLOOD SUGAR DAILY    levothyroxine (SYNTHROID, LEVOTHROID) 50 MCG tablet 4/9/2023 Self No Yes    TAKE 1 TABLET EVERY OTHER  DAY    levothyroxine (SYNTHROID, LEVOTHROID) 75 MCG tablet 4/8/2023 Self No Yes    TAKE ONE TABLET EVERY OTHER DAY    losartan (COZAAR) 50 MG tablet 4/9/2023 Self No Yes    TAKE 1 TABLET DAILY    metFORMIN ER (GLUCOPHAGE-XR) 750 MG 24 hr tablet 4/9/2023 Self No Yes    Take 1 tablet by mouth 2 (Two) Times a Day.    metoprolol tartrate (LOPRESSOR) 50 MG tablet 4/9/2023 Self No Yes    Take 0.5 tablets by mouth 2 (Two) Times a Day.    Multiple Vitamins-Minerals (MULTIVITAL PO) 4/9/2023 Self Yes Yes    Take  by mouth Daily.    omeprazole (priLOSEC) 40 MG capsule 4/9/2023 Self No Yes    TAKE 1 CAPSULE DAILY    Prody Safety Lancets 26G misc  Self No No    CHECK BLOOD SUGAR ONE TIME PER DAY    valACYclovir (VALTREX) 1000 MG  tablet  Self No Yes    TAKE 2 TABLETS 4 TIMES     DAILY FOR MOUTH ULCER    Patient taking differently:  TAKE 2 TABLETS 4 TIMES     DAILY FOR MOUTH ULCER as needed    venlafaxine XR (Effexor XR) 75 MG 24 hr capsule 4/9/2023 Self No Yes    Take 1 capsule by mouth Daily.    venlafaxine XR (EFFEXOR-XR) 150 MG 24 hr capsule 4/9/2023 Self No Yes    TAKE 1 CAPSULE DAILY              Medication notes:     This medication list is complete to the best of my knowledge as of 4/9/2023    Please call if questions.    Isis Hernandez, PharmD, BCPS  4/9/2023 15:38 EDT

## 2023-04-09 NOTE — ED PROVIDER NOTES
" EMERGENCY DEPARTMENT ENCOUNTER    Room Number:  P593/1  Date seen:  4/9/2023  PCP: Ashanti Hong APRN  Historian(s): Patient along with patient's multiple family members (children)      HPI:  Chief Complaint: Right hand numbness, confusion  A complete HPI/ROS/PMH/PSH/SH/FH are unobtainable / limited due to: Patient's confusion  Context: Dior Pettit is a 82 y.o. female who presents to the ED for evaluation of worsening confusion and also some numbness and weakness in her right hand area and some trembling and shaking of the body.  Her children tell me that she has not been acting the same for the past 2 to 3 weeks now.  They noticed some mental status changes with some behavioral changes as well.  For example, she has been more confused about her medications in recent weeks and has become accusatory towards them about it \"stealing her blood pressure pills.\"  Patient lives alone in her own home.  There have been no known significant falls that they are aware of.  They became increasingly concerned about her today because of her confusion and report of new numbness in her right thumb.  Her son tells me that today she was tremoring on the way over here as well and he had to hold her hand to help her calm down.        PAST MEDICAL HISTORY  Active Ambulatory Problems     Diagnosis Date Noted   • Essential hypertension 08/22/2016   • Pulmonary hypertension (HCC) 08/22/2016   • NNEKA (obstructive sleep apnea) 08/22/2016   • Gastroesophageal reflux disease 08/24/2016   • Anxiety    • Restless leg syndrome    • Controlled diabetes mellitus type 2 with complications    • Hypothyroidism    • Hypercholesteremia    • Seasonal allergic rhinitis 11/11/2016   • Mild intermittent asthma without complication 11/11/2016   • S/P AVR 03/05/2017   • Dilated aortic root 10/02/2017   • Thoracic aortic aneurysm without rupture  10/02/2017   • Primary insomnia 06/12/2018   • Renal insufficiency 10/28/2018   • Macular degeneration of " both eyes 04/11/2019   • Coronary artery disease involving native coronary artery with angina pectoris 04/11/2019   • Rectocele 07/15/2021   • Nonrheumatic mitral valve stenosis 09/08/2021   • Slow transit constipation 09/14/2021   • Dizziness 09/27/2021   • Daily headache 04/17/2022   • Symptomatic bradycardia 09/11/2022   • Closed displaced fracture of right femoral neck 11/13/2022   • Atrial fibrillation, persistent 01/17/2023   • Hip pain 01/17/2023   • PAF (paroxysmal atrial fibrillation) 01/31/2023     Resolved Ambulatory Problems     Diagnosis Date Noted   • HARSHA (acute kidney injury) ([facility]) 09/14/2021   • Exudative age-related macular degeneration, right eye, with active choroidal neovascularization 01/15/2022   • Fall 11/13/2022     Past Medical History:   Diagnosis Date   • Allergic rhinitis    • Anemia    • Aortic root dilatation 10/02/2017   • Aortic valve calcification 10/02/2017   • Aortic valve insufficiency Dx in 07   • Aortic valve prosthesis present 11/02/2018   • Ascending aorta dilatation 10/02/2017   • Asthma    • Atypical chest pain    • Breast pain, right Hx   • CAD (coronary artery disease)    • Cardiomegaly 2017   • Cervicalgia    • Chest pain due to CAD    • 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   • DM (diabetes mellitus)    • Esophagitis    • GERD (gastroesophageal reflux disease)    • Headache    • Health care maintenance    • Heart murmur    • History of echocardi[facility]2/17-[facility]   • Hyperlipidemia    • Hypertension    • Insomnia    • Macular degeneration, left eye    • Mild aortic valve regurgitation 10/02/2017   • Mild aortic valve stenosis 10/02/2017   • Mild dilation of ascending aorta 10/02/2017   • Moderate tricuspid valve regurgitation 10/02/2017   • Mood disorder in conditions classified elsewhere    • Near syncope 03/2017-[facility]   • Neuropathy    • Osteoarthritis    • RLS (restless legs syndrome)    • Thoracic ascending aortic aneurysm Dx in 2007 @ 3.8CM & 1/02/2018   •  Visual impairment          PAST SURGICAL HISTORY  Past Surgical History:   Procedure Laterality Date   • AORTIC VALVE REPAIR/REPLACEMENT  2007    Dr. Perry   • APPENDECTOMY     • AUGMENTATION MAMMAPLASTY  1976   • BREAST AUGMENTATION  2014   • BUNIONECTOMY     • CARDIAC CATHETERIZATION  2007   • CARDIAC VALVE REPLACEMENT  2007    porcine   • COLONOSCOPY  2010   • COLONOSCOPY  03/02/2012   • EYE SURGERY      cataracts and ens implants   • HYSTERECTOMY  1972   • SIGMOIDOSCOPY  2001   • TOTAL HIP ARTHROPLASTY Right 11/15/2022    Procedure: Right anterior total hip arthroplasty;  Surgeon: Joao Martinez MD;  Location: [facility];  Service: Orthopedics;  Laterality: Right;         FAMILY HISTORY  Family History   Problem Relation Age of Onset   • Hypertension Mother    • Stroke Mother    • Cancer Mother    • Diabetes Sister    • Coronary artery disease Brother    • Diabetes Brother    • Valvular heart disease Brother         TAVR   • Coronary artery disease Brother    • Cancer Brother    • Birth defects Daughter    • Skin cancer Neg Hx          SOCIAL HISTORY  Social History     Socioeconomic History   • Marital status:    Tobacco Use   • Smoking status: Never   • Smokeless tobacco: Never   • Tobacco comments:     caffeine use - 1 cup coffee daily    Vaping Use   • Vaping Use: Never used   Substance and Sexual Activity   • Alcohol use: Yes     Comment: less than 1 glass of wine   • Drug use: Never   • Sexual activity: Not Currently     Partners: Male     Birth control/protection: Surgical     Comment: Hyst         ALLERGIES  Patient has no known allergies.      REVIEW OF SYSTEMS  Review of Systems   Constitutional: Negative for activity change, diaphoresis and fever.   HENT: Negative.    Eyes: Negative for pain and visual disturbance.   Respiratory: Negative for cough and shortness of breath.    Cardiovascular: Negative for chest pain.   Gastrointestinal: Negative for abdominal pain, diarrhea and  vomiting.   Genitourinary: Negative for dysuria.   Skin: Negative for color change and wound.   Neurological: Positive for tremors, weakness and numbness. Negative for syncope and headaches.   Psychiatric/Behavioral: Positive for confusion.   All other systems reviewed and are negative.           PHYSICAL EXAM  ED Triage Vitals   Temp Heart Rate Resp BP SpO2   04/09/23 1506 04/09/23 1506 04/09/23 1506 04/09/23 1511 04/09/23 1506   96.7 °F (35.9 °C) 104 18 156/99 96 %      Temp src Heart Rate Source Patient Position BP Location FiO2 (%)   -- -- -- -- --              Physical Exam      GENERAL: Pleasant, elderly lady, no diaphoresis, no acute distress  HENT: nares patent, normocephalic and atraumatic  EYES: no scleral icterus, EOMI, normal conjunctiva  CV: regular rhythm, normal rate, normal distal pulses  RESPIRATORY: normal effort, no stridor, lungs clear to auscultation bilaterally  ABDOMEN: soft, nontender in all quadrants  MUSCULOSKELETAL: no deformity, no asymmetry  NEURO: alert, moves all extremities, follows commands appropriately, speech is clear and coherent, no focal motor deficits to any of the 4 extremities.  No facial droop evident.  Patient expresses normal sensation to all 5 fingers of both hands.  Patient is alert and oriented x4 but does seem to have some signs of confusion during conversation.  PSYCH:  calm, cooperative, insight and judgment do appear to be somewhat limited during prolonged conversation.  SKIN: warm, dry    Vital signs and nursing notes reviewed.        LAB RESULTS  Recent Results (from the past 24 hour(s))   POC Glucose Once    Collection Time: 04/09/23  3:12 PM    Specimen: Blood   Result Value Ref Range    Glucose 85 70 - 130 mg/dL   Comprehensive Metabolic Panel    Collection Time: 04/09/23  3:14 PM    Specimen: Blood   Result Value Ref Range    Glucose 85 65 - 99 mg/dL    BUN 19 8 - 23 mg/dL    Creatinine 0.91 0.57 - 1.00 mg/dL    Sodium 134 (L) 136 - 145 mmol/L    Potassium  4.3 3.5 - 5.2 mmol/L    Chloride 99 98 - 107 mmol/L    CO2 23.8 22.0 - 29.0 mmol/L    Calcium 11.1 (H) 8.6 - 10.5 mg/dL    Total Protein 8.1 6.0 - 8.5 g/dL    Albumin 4.5 3.5 - 5.2 g/dL    ALT (SGPT) 18 1 - 33 U/L    AST (SGOT) 28 1 - 32 U/L    Alkaline Phosphatase 112 39 - 117 U/L    Total Bilirubin 0.7 0.0 - 1.2 mg/dL    Globulin 3.6 gm/dL    A/G Ratio 1.3 g/dL    BUN/Creatinine Ratio 20.9 7.0 - 25.0    Anion Gap 11.2 5.0 - 15.0 mmol/L    eGFR 62.3 >60.0 mL/min/1.73   Protime-INR    Collection Time: 04/09/23  3:14 PM    Specimen: Blood   Result Value Ref Range    Protime 15.4 (H) 11.7 - 14.2 Seconds    INR 1.20 (H) 0.90 - 1.10   aPTT    Collection Time: 04/09/23  3:14 PM    Specimen: Blood   Result Value Ref Range    PTT 39.0 (H) 22.7 - 35.4 seconds   Single High Sensitivity Troponin T    Collection Time: 04/09/23  3:14 PM    Specimen: Blood   Result Value Ref Range    HS Troponin T 10 (H) <10 ng/L   Type & Screen    Collection Time: 04/09/23  3:14 PM    Specimen: Blood   Result Value Ref Range    ABO Type A     RH type Positive     Antibody Screen Negative     T&S Expiration Date 4/12/2023 11:59:59 PM    Green Top (Gel)    Collection Time: 04/09/23  3:14 PM   Result Value Ref Range    Extra Tube Hold for add-ons.    Lavender Top    Collection Time: 04/09/23  3:14 PM   Result Value Ref Range    Extra Tube hold for add-on    Gold Top - SST    Collection Time: 04/09/23  3:14 PM   Result Value Ref Range    Extra Tube Hold for add-ons.    Light Blue Top    Collection Time: 04/09/23  3:14 PM   Result Value Ref Range    Extra Tube Hold for add-ons.    CBC Auto Differential    Collection Time: 04/09/23  3:14 PM    Specimen: Blood   Result Value Ref Range    WBC 7.14 3.40 - 10.80 10*3/mm3    RBC 5.52 (H) 3.77 - 5.28 10*6/mm3    Hemoglobin 15.3 12.0 - 15.9 g/dL    Hematocrit 48.7 (H) 34.0 - 46.6 %    MCV 88.2 79.0 - 97.0 fL    MCH 27.7 26.6 - 33.0 pg    MCHC 31.4 (L) 31.5 - 35.7 g/dL    RDW 14.9 12.3 - 15.4 %    RDW-SD  48.5 37.0 - 54.0 fl    MPV 9.4 6.0 - 12.0 fL    Platelets 228 140 - 450 10*3/mm3    Neutrophil % 49.6 42.7 - 76.0 %    Lymphocyte % 34.3 19.6 - 45.3 %    Monocyte % 12.7 (H) 5.0 - 12.0 %    Eosinophil % 2.1 0.3 - 6.2 %    Basophil % 1.0 0.0 - 1.5 %    Immature Grans % 0.3 0.0 - 0.5 %    Neutrophils, Absolute 3.54 1.70 - 7.00 10*3/mm3    Lymphocytes, Absolute 2.45 0.70 - 3.10 10*3/mm3    Monocytes, Absolute 0.91 (H) 0.10 - 0.90 10*3/mm3    Eosinophils, Absolute 0.15 0.00 - 0.40 10*3/mm3    Basophils, Absolute 0.07 0.00 - 0.20 10*3/mm3    Immature Grans, Absolute 0.02 0.00 - 0.05 10*3/mm3    nRBC 0.0 0.0 - 0.2 /100 WBC   Magnesium    Collection Time: 04/09/23  3:14 PM    Specimen: Blood   Result Value Ref Range    Magnesium 1.4 (L) 1.6 - 2.4 mg/dL   ECG 12 Lead ED Triage Standing Order; Acute Stroke (Onset <24 hrs)    Collection Time: 04/09/23  3:17 PM   Result Value Ref Range    QT Interval 346 ms   Urinalysis With Microscopic If Indicated (No Culture) - Urine, Clean Catch    Collection Time: 04/09/23  4:15 PM    Specimen: Urine, Clean Catch   Result Value Ref Range    Color, UA Yellow Yellow, Straw    Appearance, UA Clear Clear    pH, UA 5.5 5.0 - 8.0    Specific Gravity, UA 1.020 1.005 - 1.030    Glucose, UA Negative Negative    Ketones, UA Negative Negative    Bilirubin, UA Negative Negative    Blood, UA Negative Negative    Protein, UA Negative Negative    Leuk Esterase, UA Negative Negative    Nitrite, UA Negative Negative    Urobilinogen, UA 0.2 E.U./dL 0.2 - 1.0 E.U./dL       Ordered the above labs and reviewed the results.        RADIOLOGY  MRI Angiogram Neck With & Without Contrast, MRI Angiogram Head Without Contrast, MRI Brain With & Without Contrast    Result Date: 4/9/2023  MRI EXAMINATION OF BRAIN WITH AND WITHOUT CONTRAST, MRA OF THE NECK WITH AND WITHOUT CONTRAST AND MRA OF THE HEAD WITHOUT CONTRAST  HISTORY: Word finding difficulty.  COMPARISON: CT head 11/13/2022.  MRI examination of the brain with  and without contrast:  TECHNIQUE: A MRI examination of the brain was performed utilizing sagittal T1, axial diffusion, T1, T2, T2 FLAIR, susceptibility weighted imaging as well as axial and coronal T1 postcontrast weighted sequences.  FINDINGS: The study is hampered somewhat by patient motion. The diffusion sequence demonstrates a small cortical acute infarct involving the left middle frontal gyrus superiorly measuring approximately 2-3 mm in size. No other areas of restricted diffusion are noted. Mild-to-moderate small vessel ischemic disease is appreciated on the axial T2 FLAIR sequence. After contrast administration, there was no evidence of abnormal enhancement.      The study is hampered by patient motion. There is a 2-3 mm acute cortical infarct involving the left middle frontal gyrus superiorly. Mild-to-moderate small vessel ischemic disease is noted.   MRA of the head without contrast:  The study is hampered by patient motion. There is signal present within the distal aspect of the vertebral arteries bilaterally. The left vertebral artery is larger than that of the right. The basilar artery and the proximal aspects of the posterior cerebral arteries appear unremarkable. The distal aspects of the internal carotid arteries are of normal caliber. The right A1 segment is hypoplastic. The proximal aspects of the anterior and middle cerebral arteries appear unremarkable.  IMPRESSION: Study is hampered by patient motion. The right A1 segment is hypoplastic. The proximal aspects of the anterior, middle and posterior cerebral arteries appear otherwise unremarkable.   MRA of the neck with and without contrast:  The great vessels are arranged in a classic configuration. There is 0% stenosis of the internal carotid arteries using NASCET criteria. It is slightly larger than that of the right. There is no evidence of a vertebral ostial stenosis. The cervical segments of the vertebral arteries are of relatively uniform  caliber.  IMPRESSION: There is 0% stenosis of the internal carotid arteries using NASCET criteria.      XR Chest 1 View    Result Date: 4/9/2023  PORTABLE CHEST  HISTORY: Stroke.  COMPARISON: 11/13/2022.  FINDINGS: A single portable view of the chest demonstrates the heart to be within normal limits in size. There is no evidence of infiltrate, effusion or of congestive failure.        Ordered the above noted radiological studies. Reviewed by me in PACS.          PROCEDURES  Procedures    EKG           EKG time/Interp time: 1517/1600  Rhythm/Rate: Sinus rhythm, 108 bpm  P waves and RI: Present, 143 ms  QRS, axis: 94 ms, left axis deviation, LVH  ST and T waves: No ST segment elevations are notable    Independently interpreted by me contemporaneously with treatment      MEDICATIONS GIVEN IN ER  Medications   sodium chloride 0.9 % flush 10 mL (has no administration in time range)   sodium chloride 0.9 % flush 10 mL (has no administration in time range)   sodium chloride 0.9 % infusion (75 mL/hr Intravenous New Bag 4/9/23 2225)   acetaminophen (TYLENOL) tablet 650 mg (650 mg Oral Given 4/9/23 2236)     Or   acetaminophen (TYLENOL) suppository 650 mg ( Rectal Not Given:  See Alt 4/9/23 2236)   labetalol (NORMODYNE,TRANDATE) injection 10 mg (has no administration in time range)   aspirin tablet 325 mg (has no administration in time range)     Or   aspirin suppository 300 mg (has no administration in time range)   atorvastatin (LIPITOR) tablet 80 mg (80 mg Oral Given 4/9/23 2223)   ondansetron (ZOFRAN) injection 4 mg (has no administration in time range)   bisacodyl (DULCOLAX) suppository 10 mg (has no administration in time range)   sodium chloride 0.9 % bolus 500 mL (0 mL Intravenous Stopped 4/9/23 1609)   LORazepam (ATIVAN) injection 0.25 mg (0.25 mg Intravenous Given 4/9/23 1640)   gadobenate dimeglumine (MULTIHANCE) injection 13 mL (13 mL Intravenous Given 4/9/23 1754)   aspirin tablet 325 mg (325 mg Oral Given 4/9/23  2013)           MEDICAL DECISION MAKING, PROGRESS, and CONSULTS    All labs have been independently reviewed by me.  All radiology studies have been reviewed by me and I have also reviewed the radiology report.   EKG's independently viewed and interpreted by me.  Discussion below represents my analysis of pertinent findings related to patient's condition, differential diagnosis, treatment plan and final disposition.      Additional sources:  - Discussed/ obtained information from independent historians: The vast majority the history was provided for the patient's family members who are in the room with her.    - External (non-ED) record review: I reviewed the most recent cardiology office progress note from March 31, 2023 when patient had follow-up care for paroxysmal atrial fibrillation, hypertension and status post aortic valve replacement and aortic root dilation.    - Chronic or social conditions impacting care: Elderly patient.  Lives in her own home.  Children and family have her check on her on a daily basis.        Orders placed during this visit:  Orders Placed This Encounter   Procedures   • XR Chest 1 View   • MRI Angiogram Neck With & Without Contrast   • MRI Angiogram Head Without Contrast   • MRI Brain With & Without Contrast   • CT Chest Without Contrast Diagnostic   • [place] Draw   • Comprehensive Metabolic Panel   • Protime-INR   • aPTT   • Single High Sensitivity Troponin T   • CBC Auto Differential   • Magnesium   • Urinalysis With Microscopic If Indicated (No Culture) - Urine, Clean Catch   • CBC (No Diff)   • Comprehensive Metabolic Panel   • Hemoglobin A1c   • Lipid Panel   • TSH   • NPO Diet NPO Type: Strict NPO   • Initiate Department's Acute Stroke Process (Team D, Code 19, etc)   • Measure Weight   • Perform NIH Stroke Scale   • Measure Actual Weight   • Head of Bed 30 Degrees or Less   • Undress and Gown   • Vital Signs   • Neuro Checks   • Notify MD for SBP < 80 or > 200   • Notify  Provider for SBP greater than 140 if hemorrhagic stroke   • No Hypotonic Fluids   • Nursing Swallow Assessment   • Monitor Blood Pressure   • Vital Signs   • Pulse Oximetry, Continuous   • Cardiac Monitoring   • Notify physician of changes in level of consciousness, worsening of stroke symptoms, acute headache or severe nausea and vomiting or any of the following vital sign parameters:   • Nursing Dysphagia Screening   • RN to Place Order SLP Consult - Eval & Treat Choosing Reason of RN Dysphagia Screen Failed   • Nurse to Call MD or Nutrition Services for Diet if Patient Passes Dysphagia Screen   • Intake and Output   • Neuro Checks   • NIHSS Assessment   • Order CT Head Without Contrast for Neurological Decline   • Provide Stroke Education Material   • Tobacco Cessation Education   • Place Sequential Compression Device   • Maintain Sequential Compression Device   • Activity As Tolerated   • Code Status and Medical Interventions:   • Inpatient Neurology Consult Stroke   • Inpatient Neurology Consult Stroke   • [facility] (on-call MD unless specified) Details   • Notify Stroke Coordinator   • Inpatient Rehab Admission Consult   • Consult to Case Management    • Consult to Diabetes Educator   • OT Consult: Eval & Treat   • OT Consult: Eval & Treat   • PT Consult: Eval & Treat as tolerated   • Oxygen Therapy-   • POC Glucose Once   • POC Glucose Once   • POC Glucose Q6H   • ECG 12 Lead ED Triage Standing Order; Acute Stroke (Onset <24 hrs)   • ECG 12 Lead Other; Possible stroke   • ECG 12 Lead   • Type & Screen   • Insert Large Bore Peripheral IV - Right AC Preferred   • Insert Peripheral IV   • Inpatient Admission   • Fall Precautions   • CBC & Differential   • Green Top (Gel)   • Lavender Top   • Gold Top - SST   • Light Blue Top           Differential diagnosis includes but is not limited to:    Acute stroke, UTI, hypoglycemia, seizure, metabolic encephalopathy.      Independent interpretation of labs,  radiology studies, and discussions with consultants:  ED Course as of 04/09/23 2250   Sun Apr 09, 2023   1533 I saw this patient as a team D alert.  She was brought into the room.  I just want to make certain she did not have any criteria for acute stroke or any acute stroke intervention and make sure she was not a team D. [MM]   1534 History was obtained from the patient as well as family that did enter the room.  Patient has been shaky and tremulous for at least 24 hours.  Patient is uncertain.  She has had the appearance of increased anxiety per family.  Patient states that she has been having a harder time [MM]   1534  getting words out and getting jumbled up on words.  Her daughter who is present at bedside states that she has been having problems getting words out and has requested help in managing her medicines as because of increased forgetfulness for approximately 2-1/2 to 3 weeks.  Patient at baseline has severe macular degeneration and has significant vision impairment.  She is uncertain whether she has any new change in her vision.  When I asked if she could walk.  She states that she walks with a walker and is uncertain if there is been any change in her gait when I ask her if she is able to walk the same as normal.  She states that she has had more increased anxiety and stress over Easter and seeing family.  She denies any pain anywhere.  No chest pain, headache, shortness of breath or abdominal pain.  Denies any numbness or tingling to extremities. [MM]   1535 On my exam her vitals are unremarkable other than her heart rate in the low 100s.  She is an elderly frail female who is anxious.  She has no respiratory distress.  Does not appear to be in any pain.  Patient's pupils are equal round reactive to light.  Extraocular muscles are intact.  No obvious visual field deficit.  No facial droop.  Cardiovascular exam and respiratory exam is unremarkable with no shortness of breath.  Abdomen exam is benign.  [MM]   1536   Patient has no edema.  Intact distal pulses to upper and lower extremities are equal strong and symmetric.  Patient's NIH stroke scale is 1.  She appears to be having some mild expressive aphasia.  With some word finding issues. [MM]   1536 This patient is not a tPA candidate.  It appears as if this been going on for about 2-1/2 to 3 weeks. [MM]   1537 I discussed the plan with the patient and the fact that my partner Dr. Andre will take over when he comes in at 3.  Informed him that I will talk with the stroke neurologist. [MM]   1537 I spoke with Dr. Jones was on for stroke neurology.  Informed of the patient's presenting symptoms.  He would like me to skip the CT of the head and do a MRI and MRI of the brain. [MM]   1744 I independently interpreted the chest x-ray and my findings are: No pneumothorax, no infiltrate, no effusion. [MICHELLE]   2044 I reviewed the MRI findings of the brain.  There are features consistent with a small area of acute infarct.  I discussed this finding with Dr. Ponce over the telephone.  He request that we admit the patient to the hospitalist service and he will continue to consult from a stroke standpoint. [MICHELLE]   2045 I just discussed with Dr. Mason from [facility] about this patient.  He agrees to accept her for continued care on the hospitalist team tonight. [MICHELLE]   2242 Urinalysis is unremarkable. [MICHELLE]      ED Course User Index  [MICHELLE] Armando Andre MD  [MM] Kelby Alford MD         DIAGNOSIS  Final diagnoses:   Acute stroke due to ischemia   Altered mental status, unspecified altered mental status type         DISPOSITION  Admit to hospitalist, telemetry  Stroke neurology consult        Latest Documented Vital Signs:  As of 22:50 EDT  BP- 151/96 HR- 105 Temp- 98.2 °F (36.8 °C) (Oral) O2 sat- 96%              --    Please note that portions of this were completed with a voice recognition program.       Note Disclaimer: At [facility], we believe that sharing information builds  trust and better relationships. You are receiving this note because you are receiving care at [facility] or recently visited. It is possible you will see health information before a provider has talked with you about it. This kind of information can be easy to misunderstand. To help you fully understand what it means for your health, we urge you to discuss this note with your provider.           Armando Andre MD  04/09/23 2689

## 2023-04-09 NOTE — ED NOTES
Patient presents for AMS and numbness in right thumb. Family notes that the word searching and confusion has worsened over the past 2 weeks or so. Yesterday at 1600 she had numbness in her right thumb. She also notes shakiness and tremors over the past few days. Notes that she has been stressed and overstimulated by family for easter. She has hx of macular degeneration and is unsure if she has had any acute vision changes.   Aox4, ambulatory, declines having any focal deficits at this time.

## 2023-04-09 NOTE — ED TRIAGE NOTES
Patient arrives to ED via PV. Patient reports numbness and weakness in right hand that she noticed yesterday around 1600. Patient also reports increased confusion.

## 2023-04-10 ENCOUNTER — APPOINTMENT (OUTPATIENT)
Dept: CARDIOLOGY | Facility: HOSPITAL | Age: 83
DRG: 42 | End: 2023-04-10
Payer: MEDICARE

## 2023-04-10 LAB
ALBUMIN SERPL-MCNC: 4.2 G/DL (ref 3.5–5.2)
ALBUMIN/GLOB SERPL: 1.4 G/DL
ALP SERPL-CCNC: 95 U/L (ref 39–117)
ALT SERPL W P-5'-P-CCNC: 16 U/L (ref 1–33)
ANION GAP SERPL CALCULATED.3IONS-SCNC: 7.3 MMOL/L (ref 5–15)
AST SERPL-CCNC: 24 U/L (ref 1–32)
BH CV ECHO SHUNT ASSESSMENT PERFORMED (HIDDEN SCRIPTING): 1
BILIRUB SERPL-MCNC: 0.6 MG/DL (ref 0–1.2)
BUN SERPL-MCNC: 15 MG/DL (ref 8–23)
BUN/CREAT SERPL: 20 (ref 7–25)
CALCIUM SPEC-SCNC: 9.9 MG/DL (ref 8.6–10.5)
CHLORIDE SERPL-SCNC: 102 MMOL/L (ref 98–107)
CHOLEST SERPL-MCNC: 163 MG/DL (ref 0–200)
CO2 SERPL-SCNC: 28.7 MMOL/L (ref 22–29)
CREAT SERPL-MCNC: 0.75 MG/DL (ref 0.57–1)
DEPRECATED RDW RBC AUTO: 46.6 FL (ref 37–54)
EGFRCR SERPLBLD CKD-EPI 2021: 78.6 ML/MIN/1.73
ERYTHROCYTE [DISTWIDTH] IN BLOOD BY AUTOMATED COUNT: 14.8 % (ref 12.3–15.4)
GLOBULIN UR ELPH-MCNC: 2.9 GM/DL
GLUCOSE BLDC GLUCOMTR-MCNC: 121 MG/DL (ref 70–130)
GLUCOSE BLDC GLUCOMTR-MCNC: 83 MG/DL (ref 70–130)
GLUCOSE BLDC GLUCOMTR-MCNC: 92 MG/DL (ref 70–130)
GLUCOSE SERPL-MCNC: 99 MG/DL (ref 65–99)
HBA1C MFR BLD: 6.8 % (ref 4.8–5.6)
HCT VFR BLD AUTO: 45.4 % (ref 34–46.6)
HDLC SERPL-MCNC: 50 MG/DL (ref 40–60)
HGB BLD-MCNC: 14.2 G/DL (ref 12–15.9)
LDLC SERPL CALC-MCNC: 91 MG/DL (ref 0–100)
LDLC/HDLC SERPL: 1.76 {RATIO}
MAGNESIUM SERPL-MCNC: 1.7 MG/DL (ref 1.6–2.4)
MAXIMAL PREDICTED HEART RATE: 136 BPM
MCH RBC QN AUTO: 26.9 PG (ref 26.6–33)
MCHC RBC AUTO-ENTMCNC: 31.3 G/DL (ref 31.5–35.7)
MCV RBC AUTO: 86.1 FL (ref 79–97)
PLATELET # BLD AUTO: 245 10*3/MM3 (ref 140–450)
PMV BLD AUTO: 9.8 FL (ref 6–12)
POTASSIUM SERPL-SCNC: 4.1 MMOL/L (ref 3.5–5.2)
PROT SERPL-MCNC: 7.1 G/DL (ref 6–8.5)
RBC # BLD AUTO: 5.27 10*6/MM3 (ref 3.77–5.28)
SODIUM SERPL-SCNC: 138 MMOL/L (ref 136–145)
STRESS TARGET HR: 116 BPM
TRIGL SERPL-MCNC: 125 MG/DL (ref 0–150)
TSH SERPL DL<=0.05 MIU/L-ACNC: 3.95 UIU/ML (ref 0.27–4.2)
VLDLC SERPL-MCNC: 22 MG/DL (ref 5–40)
WBC NRBC COR # BLD: 5.84 10*3/MM3 (ref 3.4–10.8)

## 2023-04-10 PROCEDURE — 84443 ASSAY THYROID STIM HORMONE: CPT | Performed by: NURSE PRACTITIONER

## 2023-04-10 PROCEDURE — 85027 COMPLETE CBC AUTOMATED: CPT | Performed by: NURSE PRACTITIONER

## 2023-04-10 PROCEDURE — 80053 COMPREHEN METABOLIC PANEL: CPT | Performed by: NURSE PRACTITIONER

## 2023-04-10 PROCEDURE — 83735 ASSAY OF MAGNESIUM: CPT | Performed by: INTERNAL MEDICINE

## 2023-04-10 PROCEDURE — 97530 THERAPEUTIC ACTIVITIES: CPT

## 2023-04-10 PROCEDURE — 97535 SELF CARE MNGMENT TRAINING: CPT

## 2023-04-10 PROCEDURE — 99232 SBSQ HOSP IP/OBS MODERATE 35: CPT | Performed by: INTERNAL MEDICINE

## 2023-04-10 PROCEDURE — 97166 OT EVAL MOD COMPLEX 45 MIN: CPT

## 2023-04-10 PROCEDURE — 82962 GLUCOSE BLOOD TEST: CPT

## 2023-04-10 PROCEDURE — 93308 TTE F-UP OR LMTD: CPT | Performed by: INTERNAL MEDICINE

## 2023-04-10 PROCEDURE — 83036 HEMOGLOBIN GLYCOSYLATED A1C: CPT | Performed by: NURSE PRACTITIONER

## 2023-04-10 PROCEDURE — 99223 1ST HOSP IP/OBS HIGH 75: CPT | Performed by: PSYCHIATRY & NEUROLOGY

## 2023-04-10 PROCEDURE — 97162 PT EVAL MOD COMPLEX 30 MIN: CPT

## 2023-04-10 PROCEDURE — 93308 TTE F-UP OR LMTD: CPT

## 2023-04-10 PROCEDURE — 80061 LIPID PANEL: CPT | Performed by: NURSE PRACTITIONER

## 2023-04-10 RX ORDER — VALACYCLOVIR HYDROCHLORIDE 500 MG/1
2000 TABLET, FILM COATED ORAL EVERY 12 HOURS SCHEDULED
Status: COMPLETED | OUTPATIENT
Start: 2023-04-11 | End: 2023-04-11

## 2023-04-10 RX ORDER — UREA 10 %
3 LOTION (ML) TOPICAL NIGHTLY PRN
Status: DISCONTINUED | OUTPATIENT
Start: 2023-04-10 | End: 2023-04-15 | Stop reason: HOSPADM

## 2023-04-10 RX ADMIN — LABETALOL HYDROCHLORIDE 10 MG: 5 INJECTION, SOLUTION INTRAVENOUS at 22:52

## 2023-04-10 RX ADMIN — METOPROLOL TARTRATE 25 MG: 25 TABLET, FILM COATED ORAL at 09:39

## 2023-04-10 RX ADMIN — LEVOTHYROXINE SODIUM 75 MCG: 0.07 TABLET ORAL at 09:38

## 2023-04-10 RX ADMIN — VENLAFAXINE HYDROCHLORIDE 75 MG: 75 CAPSULE, EXTENDED RELEASE ORAL at 09:41

## 2023-04-10 RX ADMIN — APIXABAN 5 MG: 5 TABLET, FILM COATED ORAL at 01:33

## 2023-04-10 RX ADMIN — DOFETILIDE 250 MCG: 0.25 CAPSULE ORAL at 20:03

## 2023-04-10 RX ADMIN — DOFETILIDE 250 MCG: 0.25 CAPSULE ORAL at 09:39

## 2023-04-10 RX ADMIN — METOPROLOL TARTRATE 25 MG: 25 TABLET, FILM COATED ORAL at 20:02

## 2023-04-10 RX ADMIN — APIXABAN 5 MG: 5 TABLET, FILM COATED ORAL at 20:03

## 2023-04-10 RX ADMIN — SODIUM CHLORIDE 75 ML/HR: 9 INJECTION, SOLUTION INTRAVENOUS at 14:28

## 2023-04-10 RX ADMIN — AMITRIPTYLINE HYDROCHLORIDE 50 MG: 50 TABLET, FILM COATED ORAL at 20:03

## 2023-04-10 RX ADMIN — APIXABAN 5 MG: 5 TABLET, FILM COATED ORAL at 09:39

## 2023-04-10 RX ADMIN — AMITRIPTYLINE HYDROCHLORIDE 50 MG: 50 TABLET, FILM COATED ORAL at 01:34

## 2023-04-10 RX ADMIN — LOSARTAN POTASSIUM 50 MG: 50 TABLET, FILM COATED ORAL at 09:39

## 2023-04-10 RX ADMIN — ACETAMINOPHEN 650 MG: 325 TABLET, FILM COATED ORAL at 20:03

## 2023-04-10 RX ADMIN — ACETAMINOPHEN 650 MG: 325 TABLET, FILM COATED ORAL at 14:25

## 2023-04-10 RX ADMIN — ASPIRIN 325 MG: 325 TABLET ORAL at 09:38

## 2023-04-10 RX ADMIN — DOFETILIDE 250 MCG: 0.25 CAPSULE ORAL at 01:34

## 2023-04-10 RX ADMIN — ATORVASTATIN CALCIUM 80 MG: 80 TABLET, FILM COATED ORAL at 20:03

## 2023-04-10 RX ADMIN — METOPROLOL TARTRATE 25 MG: 25 TABLET, FILM COATED ORAL at 01:34

## 2023-04-10 RX ADMIN — VENLAFAXINE HYDROCHLORIDE 150 MG: 150 CAPSULE, EXTENDED RELEASE ORAL at 09:38

## 2023-04-10 NOTE — PLAN OF CARE
Goal Outcome Evaluation:  Plan of Care Reviewed With: (P) patient, daughter        Progress: (P) improving  Outcome Evaluation: (P) Pt is an 83 y/o female admitted to [facility] w/ c/o numbness and weakness in the R thumb in addition to increased confusion as well as onset of tremors and shaking. Pt Dx w/ acute CVA. The pt has a PMHx that includes but is not limited to: anemia, s/p AVR, CAD, DM, GERD, HLD, HTN, and macular degeneration. Pt lives alone in a condo and was IND w/ ADLs and mobility at baseline using a walker. The pt came to EOB w/ cga, STS w/ cga-Nicol, amb 180' w/ cga-Nicol + rwx, and returned to chair. Pt demos higher level cognitive deficits t/o session w/ obvious confusion. Pt is a high fall risk d/t combination of cognitive deficits and visual impairments cause by macular degeneration. Anticipated DC to [facility]. PT will continue to monitor and progress as appropriate.

## 2023-04-10 NOTE — PROGRESS NOTES
Name: Dior Pettit ADMIT: 2023   : 1940  PCP: Ashanti Hong APRN    MRN: [id number] LOS: 1 days   AGE/SEX: 82 y.o. female  ROOM: [facility]     Subjective   Subjective   C/o still some word finding issues. Unclear to me if confused or just having trouble with words. Some right hand tingling    C/o lower lip soreness and ?cold sore emerging    Review of Systems     Objective   Objective   Vital Signs  Temp:  [97.8 °F (36.6 °C)-98.5 °F (36.9 °C)] 98.5 °F (36.9 °C)  Heart Rate:  [] 84  Resp:  [18] 18  BP: (142-154)/() 152/77  SpO2:  [93 %-98 %] 97 %  on   ;   Device (Oxygen Therapy): room air  Body mass index is 23.18 kg/m².  Physical Exam  Vitals reviewed.   Constitutional:       General: She is not in acute distress.     Appearance: She is not ill-appearing.   Cardiovascular:      Rate and Rhythm: Normal rate and regular rhythm.   Pulmonary:      Effort: Pulmonary effort is normal.   Abdominal:      Palpations: Abdomen is soft.      Tenderness: There is no abdominal tenderness.   Musculoskeletal:      Right lower leg: No edema.      Left lower leg: No edema.   Skin:     General: Skin is warm and dry.   Neurological:      Mental Status: She is alert and oriented to person, place, and time.      Sensory: No sensory deficit.      Motor: No weakness.      Comments: Some word finding difficulty displayed   Psychiatric:         Mood and Affect: Mood normal.       Results Review     I reviewed the patient's new clinical results.  Results from last 7 days   Lab Units 04/10/23  0518 23  1514   WBC 10*3/mm3 5.84 7.14   HEMOGLOBIN g/dL 14.2 15.3   PLATELETS 10*3/mm3 245 228     Results from last 7 days   Lab Units 04/10/23  0518 23  1514   SODIUM mmol/L 138 134*   POTASSIUM mmol/L 4.1 4.3   CHLORIDE mmol/L 102 99   CO2 mmol/L 28.7 23.8   BUN mg/dL 15 19   CREATININE mg/dL 0.75 0.91   GLUCOSE mg/dL 99 85   EGFR mL/min/1.73 78.6 62.3     Results from last 7 days   Lab Units  04/10/23  0518 04/09/23  1514   ALBUMIN g/dL 4.2 4.5   BILIRUBIN mg/dL 0.6 0.7   ALK PHOS U/L 95 112   AST (SGOT) U/L 24 28   ALT (SGPT) U/L 16 18     Results from last 7 days   Lab Units 04/10/23  0518 04/09/23  1514   CALCIUM mg/dL 9.9 11.1*   ALBUMIN g/dL 4.2 4.5   MAGNESIUM mg/dL 1.7 1.4*       Hemoglobin A1C   Date/Time Value Ref Range Status   04/10/2023 0518 6.80 (H) 4.80 - 5.60 % Final     Glucose   Date/Time Value Ref Range Status   04/10/2023 1137 92 70 - 130 mg/dL Final     Comment:     Meter: [id number] : [id number] Altamirano Chemell NA   04/10/2023 0612 83 70 - 130 mg/dL Final     Comment:     Meter: [id number] : [id number] Nacario Nabila  NA   04/09/2023 2332 75 70 - 130 mg/dL Final     Comment:     Meter: [id number] : [id number] Nacario Nabila  NA   04/09/2023 1512 85 70 - 130 mg/dL Final     Comment:     Meter: [id number] : [id number] [facility]       MRI Angiogram Head Without Contrast    Result Date: 4/9/2023  The study is hampered by patient motion. There is a 2-3 mm acute cortical infarct involving the left middle frontal gyrus superiorly. Mild-to-moderate small vessel ischemic disease is noted.   MRA OF THE HEAD WITHOUT CONTRAST:  The study is hampered by patient motion. There is signal present within the distal aspect of the vertebral arteries bilaterally. The left vertebral artery is larger than that of the right. The basilar artery and the proximal aspects of the posterior cerebral arteries appear unremarkable. The distal aspects of the internal carotid arteries are of normal caliber. The right A1 segment is hypoplastic. The proximal aspects of the anterior and middle cerebral arteries appear unremarkable.  IMPRESSION: Study is hampered by patient motion. The right A1 segment is hypoplastic. The proximal aspects of the anterior, middle and posterior cerebral arteries appear otherwise unremarkable.   MRA OF THE NECK WITH AND WITHOUT CONTRAST:  The great vessels are arranged  in a classic configuration. There is 0% stenosis of the internal carotid arteries using NASCET criteria. It is slightly larger than that of the right. There is no evidence of a vertebral ostial stenosis. The cervical segments of the vertebral arteries are of relatively uniform caliber.  IMPRESSION: There is 0% stenosis of the internal carotid arteries using NASCET criteria.  This report was finalized on 4/9/2023 11:49 PM by Dr. Tae Morrissey M.D.      MRI Angiogram Neck With & Without Contrast    Result Date: 4/9/2023  The study is hampered by patient motion. There is a 2-3 mm acute cortical infarct involving the left middle frontal gyrus superiorly. Mild-to-moderate small vessel ischemic disease is noted.   MRA OF THE HEAD WITHOUT CONTRAST:  The study is hampered by patient motion. There is signal present within the distal aspect of the vertebral arteries bilaterally. The left vertebral artery is larger than that of the right. The basilar artery and the proximal aspects of the posterior cerebral arteries appear unremarkable. The distal aspects of the internal carotid arteries are of normal caliber. The right A1 segment is hypoplastic. The proximal aspects of the anterior and middle cerebral arteries appear unremarkable.  IMPRESSION: Study is hampered by patient motion. The right A1 segment is hypoplastic. The proximal aspects of the anterior, middle and posterior cerebral arteries appear otherwise unremarkable.   MRA OF THE NECK WITH AND WITHOUT CONTRAST:  The great vessels are arranged in a classic configuration. There is 0% stenosis of the internal carotid arteries using NASCET criteria. It is slightly larger than that of the right. There is no evidence of a vertebral ostial stenosis. The cervical segments of the vertebral arteries are of relatively uniform caliber.  IMPRESSION: There is 0% stenosis of the internal carotid arteries using NASCET criteria.  This report was finalized on 4/9/2023 11:49 PM by   Tae Morrissey M.D.      MRI Brain With & Without Contrast    Result Date: 4/9/2023  The study is hampered by patient motion. There is a 2-3 mm acute cortical infarct involving the left middle frontal gyrus superiorly. Mild-to-moderate small vessel ischemic disease is noted.   MRA OF THE HEAD WITHOUT CONTRAST:  The study is hampered by patient motion. There is signal present within the distal aspect of the vertebral arteries bilaterally. The left vertebral artery is larger than that of the right. The basilar artery and the proximal aspects of the posterior cerebral arteries appear unremarkable. The distal aspects of the internal carotid arteries are of normal caliber. The right A1 segment is hypoplastic. The proximal aspects of the anterior and middle cerebral arteries appear unremarkable.  IMPRESSION: Study is hampered by patient motion. The right A1 segment is hypoplastic. The proximal aspects of the anterior, middle and posterior cerebral arteries appear otherwise unremarkable.   MRA OF THE NECK WITH AND WITHOUT CONTRAST:  The great vessels are arranged in a classic configuration. There is 0% stenosis of the internal carotid arteries using NASCET criteria. It is slightly larger than that of the right. There is no evidence of a vertebral ostial stenosis. The cervical segments of the vertebral arteries are of relatively uniform caliber.  IMPRESSION: There is 0% stenosis of the internal carotid arteries using NASCET criteria.  This report was finalized on 4/9/2023 11:49 PM by Dr. Tae Morrissey M.D.      I have personally reviewed all medications:  Scheduled Medications  amitriptyline, 50 mg, Oral, Nightly  apixaban, 5 mg, Oral, BID  aspirin, 325 mg, Oral, Daily   Or  aspirin, 300 mg, Rectal, Daily  atorvastatin, 80 mg, Oral, Nightly  dofetilide, 250 mcg, Oral, BID  insulin lispro, 0-7 Units, Subcutaneous, TID AC  [START ON 4/11/2023] levothyroxine, 50 mcg, Oral, Every Other Day  levothyroxine, 75 mcg, Oral, Every  Other Day  losartan, 50 mg, Oral, Daily  metoprolol tartrate, 25 mg, Oral, BID  venlafaxine XR, 150 mg, Oral, Daily  venlafaxine XR, 75 mg, Oral, Daily    Infusions   Diet  Diet: Cardiac Diets, Diabetic Diets; Healthy Heart (2-3 Na+); Consistent Carbohydrate; Texture: Regular Texture (IDDSI 7); Fluid Consistency: Thin (IDDSI 0)    I have personally reviewed:  [x]  Laboratory   [x]  Microbiology   [x]  Radiology   [x]  EKG/Telemetry  [x]  Cardiology/Vascular   []  Pathology    [x]  Records       Assessment/Plan     Active Hospital Problems    Diagnosis  POA   • **Acute stroke due to ischemia [I63.9]  Yes   • Shortness of breath [R06.02]  Unknown   • PAF (paroxysmal atrial fibrillation) [I48.0]  Yes   • S/P AVR [Z95.2]  Not Applicable   • Restless leg syndrome [G25.81]  Yes   • 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       82 y.o. female with h/o PAF, GERD, DM2 admitted with confusion and balance issues and found to have Acute stroke due to ischemia.    Stroke etiology most likely related to noncompliance with Eliquis although patient does not seem to think she missed many doses.  Not really clear to me if she is confused or just having some aphasia  - Continue Eliquis.  No indication for aspirin per cardiology and neurology so we will DC  - Patient just had echocardiogram.  Defer to neurology if this needs to be repeated  - Continue statin  - PT/OT/speech following.  Will follow for any rehab needs    Possible herpes labialis-we will start Valtrex x1    Reviewed neurology note.  Noted concern regarding potential encephalopathy.  I do not see any metabolic cause for this.  Labs are unremarkable.  No obvious infectious issues, afebrile with normal white count.  Will  check sed rate.  Defer to neuro regarding potential LP as mentioned    CT chest pending.  Has been ordered outpatient.  Apparently was having some difficulty with shortness of breath.  CXR unremarkable.    Discussed with family at bedside.  CCP following for potential rehab.  Likely stable soon.        Luigi Viera MD  [facility]  04/10/23  16:10 EDT

## 2023-04-10 NOTE — PLAN OF CARE
The pt was admitted to [facility] 2/2 to numbness and weakness in the R thumb in addition to increased confusion as well as onset of tremors and shaking. Pt Dx w/ acute CVA. The pt has a PMHx that includes but is not limited to: anemia, s/p AVR, CAD, DM, GERD, HLD, HTN, and macular degeneration. The pt lives alone and is independent at baseline. The pt is in the process of transitioning into a [facility] where they only provide assist with medications. Today, the pt was confused, actively hallucinating and experiencing double vision at times. Min A provided for bed mobility. CGA/Min A + rwx provided while standing. The pt was impulsive with the use of her walker and completing toileting/ADL's in her hospital room. She is unsafe to return home at this time but will require more assistance than what she signed up for at [facility] - after discussions with her daughter, SNF vs. [facility] (with 24/7 supervision) recommended.

## 2023-04-10 NOTE — PLAN OF CARE
Alert but forgetful. NIH 0. Vision issues related to macular degeneration. SCD on. Family at bedside. IVF infusing. Right thumb numbness not present at this time.     Problem: Adult Inpatient Plan of Care  Goal: Absence of Hospital-Acquired Illness or Injury  Intervention: Prevent Skin Injury  Recent Flowsheet Documentation  Taken 4/10/2023 0200 by Mary Beth Vann, RN  Body Position: sitting up in bed  Taken 4/10/2023 0002 by Mary Beth Vann RN  Body Position: sitting up in bed  Taken 4/9/2023 2237 by Mary Beth Vann RN  Body Position: sitting up in bed  Skin Protection:   adhesive use limited   incontinence pads utilized   Goal Outcome Evaluation:

## 2023-04-10 NOTE — H&P
Patient Name:  Dior Pettit  YOB: 1940  MRN:  [id number]  Admit Date:  4/9/2023  Patient Care Team:  Ashanti Hong APRN as PCP - General (Internal Medicine)  Gerardo Rodriguez Jr., MD as Consulting Physician (Pulmonary Disease)  Abbi Mitchell MD as Consulting Physician (Cardiology)  Luis Miguel Parker MD as Consulting Physician (Ophthalmology)  Tae Burton MD as Consulting Physician (Dermatology)  Addis Gomes MD as Consulting Physician (Plastic Surgery)      Subjective   History Present Illness     Chief Complaint   Patient presents with   • Extremity Weakness     History of Present Illness   Mrs. Pettit is a 82-year-old female with history of paroxysmal A-fib on anticoagulation, hypertension, GERD, anxiety, restless leg syndrome, type 2 diabetes, pulmonary hypertension, status post AVR, obstructive sleep apnea who presents to the emergency room with confusion and balance issues.  Patient initially has some vague symptoms, she states she has been off balance for few days and having to use her walker more.  Her biggest concern was that yesterday when she was cleaning her kitchen her right thumb was numb most of the day, that has resolved at this time.  She states she has been having word finding issues and some confusion and hallucinations, she states she is been seeing people that she knows are not there.  Over the past 2 to 3 weeks she is also had episodes of significant shortness of breath with just standing up, she states she gets very short of breath and anxious when she cannot catch her breath and has to sit back down.  She was seen by pulmonology, Dr. Quijano a few weeks ago who ordered a CT of her chest and a barium swallow.  She was also recently seen by cardiology for follow-up appointment, she had last 2D echo on March 23, 2023 which showed ejection fraction of 67% and normal left ventricular systolic function, normal right ventricular function.  She  states she had no recent changes to her medication.  She is currently on Eliquis 5 mg twice daily.  According to her daughter with her increased confusion over the last few weeks she has likely missed a few doses of her medications or get them mixed up, she had been slightly paranoid of her medication was accusing someone of stealing her blood pressure medications.  She does live at home and normally is able to function on her own and set up her own medication, this is new behavior for the last 2 weeks.  In the emergency room at bedtime troponin was 10, repeat is pending she denies any chest pain or shortness of breath, EKG shows sinus or ectopic atrial tachycardia, but now on the monitor appears to be in A-fib.  Patient's glucose was 85, sodium 134, potassium 4.3, creatinine 0.91, BUN 91, INR 1.2, white blood cell count 7.1, hemoglobin 15.3, hematocrit 48.7, platelets 228.  Urinalysis negative.  Chest x-ray shows heart within normal limits, no evidence of infiltrate, effusion, or congestive failure.  ED provider did speak with the stroke neurologist on-call who recommended no CTA but to obtain a MRI and MRA given patient's onset of symptoms.  MRI of the head shows a 2 to 3 mm acute cortical infarct involving the left middle frontal gyrus, mild to moderate small vessel ischemic disease.  MRA of the head shows right A1 segment hypoplastic, proximal aspect of the anterior middle and posterior cerebral arteries appear otherwise unremarkable, MRA of the neck shows 0% stenosis of the internal carotid arteries using NASCET criteria.    Review of Systems   Constitutional: Negative for appetite change and fever.   HENT: Negative for nosebleeds and trouble swallowing.    Eyes: Negative for photophobia, redness and visual disturbance.   Respiratory: Positive for shortness of breath (with just standing up intermittent for the last 2 weeks). Negative for cough, chest tightness and wheezing.    Cardiovascular: Negative for chest  pain, palpitations and leg swelling.   Gastrointestinal: Negative for abdominal distention, abdominal pain, nausea and vomiting.   Endocrine: Negative.    Genitourinary: Negative.    Musculoskeletal: Positive for gait problem (gait unsteady for a few days, using walker more). Negative for joint swelling.   Skin: Negative.    Neurological: Negative for dizziness, seizures, speech difficulty, light-headedness and headaches.   Hematological: Negative.    Psychiatric/Behavioral: Positive for confusion (word finding issues, scattered thoughts and visual hallucinations for the last few weeks). Negative for behavioral problems.        Personal History     Past Medical History:   Diagnosis Date   • Allergic rhinitis    • Anemia    • Anxiety     Controlled w/Meds   • Aortic root dilatation 10/02/2017    Borderline--Noted on Echo   • Aortic valve calcification 10/02/2017    Noted on Echo   • Aortic valve insufficiency Dx in 07    w/AVR    • Aortic valve prosthesis present 11/02/2018    Noted on CTA Chest   • Ascending aorta dilatation 10/02/2017    Borderline--Noted on Echo   • Asthma    • Atypical chest pain    • Breast pain, right Hx   • CAD (coronary artery disease)    • Cardiomegaly 2017   • Cervicalgia    • Chest pain due to CAD    • 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    Borderline--Noted on Echo & Measured @ 2.6CM and Mid Descending @ 2.5CM on CTA Chest-11/2/18   • DM (diabetes mellitus)     T2   • Esophagitis    • GERD (gastroesophageal reflux disease)     Controlled w/Meds   • Headache    • Health care maintenance    • Heart murmur    • History of echocardiogram 10/2/17-[facility]    EF 66%; Borderline Concentric Hypertrophy; Mild Calcification in AV; Mild AVS; Mild AVR; Moderate TVR; Borderline Dilation of Aortic Root/Arch & Borderline Dilation of Ascending/Proximal Aorta Present   • Hyperlipidemia     Controlled w/Meds   • Hypertension     Controlled w/Meds   • Hypothyroidism     Controlled w/Synthroid   •  Insomnia    • Macular degeneration, left eye    • Mild aortic valve regurgitation 10/02/2017    Noted on Echo   • Mild aortic valve stenosis 10/02/2017    Noted on Echo   • Mild dilation of ascending aorta 10/02/2017    Borderline--Noted on Echo   • Moderate tricuspid valve regurgitation 10/02/2017    Noted on Echo   • Mood disorder in conditions classified elsewhere     Controlled w/Meds   • Near syncope 03/2017-[facility]   • Neuropathy    • NNEKA (obstructive sleep apnea)     Untreated   • Osteoarthritis     Ankles/Feet   • Pulmonary hypertension 2017   • Renal insufficiency    • RLS (restless legs syndrome)    • Thoracic ascending aortic aneurysm Dx in 2007 @ 3.8CM & 1/02/2018    Noted @ 4CM on CTA Chest;   • Visual impairment     macular degeneratuin     Past Surgical History:   Procedure Laterality Date   • AORTIC VALVE REPAIR/REPLACEMENT  2007    Dr. Perry   • APPENDECTOMY     • AUGMENTATION MAMMAPLASTY  1976   • BREAST AUGMENTATION  2014   • BUNIONECTOMY     • CARDIAC CATHETERIZATION  2007   • CARDIAC VALVE REPLACEMENT  2007    porcine   • COLONOSCOPY  2010   • COLONOSCOPY  03/02/2012   • EYE SURGERY      cataracts and ens implants   • HYSTERECTOMY  1972   • SIGMOIDOSCOPY  2001   • TOTAL HIP ARTHROPLASTY Right 11/15/2022    Procedure: Right anterior total hip arthroplasty;  Surgeon: Joao Martinez MD;  Location: [facility];  Service: Orthopedics;  Laterality: Right;     Family History   Problem Relation Age of Onset   • Hypertension Mother    • Stroke Mother    • Cancer Mother    • Diabetes Sister    • Coronary artery disease Brother    • Diabetes Brother    • Valvular heart disease Brother         TAVR   • Coronary artery disease Brother    • Cancer Brother    • Birth defects Daughter    • Skin cancer Neg Hx      Social History     Tobacco Use   • Smoking status: Never   • Smokeless tobacco: Never   • Tobacco comments:     caffeine use - 1 cup coffee daily    Vaping Use   • Vaping Use: Never used    Substance Use Topics   • Alcohol use: Yes     Comment: less than 1 glass of wine   • Drug use: Never     No current facility-administered medications on file prior to encounter.     Current Outpatient Medications on File Prior to Encounter   Medication Sig Dispense Refill   • acetaminophen (TYLENOL) 325 MG tablet Take 2 tablets by mouth Every 4 (Four) Hours As Needed for Mild Pain.     • amitriptyline (ELAVIL) 50 MG tablet Take 1 tablet by mouth Every Night. 90 tablet 1   • ascorbic acid (VITAMIN C) 1000 MG tablet Take 1 tablet by mouth Daily.     • atorvastatin (LIPITOR) 20 MG tablet TAKE 1 TABLET EVERY NIGHT 90 tablet 1   • azelastine (ASTELIN) 0.1 % nasal spray 2 sprays into the nostril(s) as directed by provider 2 (Two) Times a Day As Needed for Allergies or Rhinitis. Use in each nostril as directed     • dofetilide (Tikosyn) 250 MCG capsule Take 1 capsule by mouth Every 12 (Twelve) Hours. 180 capsule 1   • Eliquis 5 MG tablet tablet TAKE 1 TABLET BY MOUTH EVERY TWELVE HOURS 180 tablet 0   • fenofibrate micronized (LOFIBRA) 134 MG capsule Take 1 capsule by mouth Every Morning Before Breakfast. 90 capsule 3   • glucosamine-chondroitin 500-400 MG capsule capsule Take 2 capsules by mouth Daily.     • levothyroxine (SYNTHROID, LEVOTHROID) 50 MCG tablet TAKE 1 TABLET EVERY OTHER  DAY 45 tablet 1   • levothyroxine (SYNTHROID, LEVOTHROID) 75 MCG tablet TAKE ONE TABLET EVERY OTHER DAY 45 tablet 3   • losartan (COZAAR) 50 MG tablet TAKE 1 TABLET DAILY 90 tablet 1   • metFORMIN ER (GLUCOPHAGE-XR) 750 MG 24 hr tablet Take 1 tablet by mouth 2 (Two) Times a Day. 180 tablet 3   • metoprolol tartrate (LOPRESSOR) 50 MG tablet Take 0.5 tablets by mouth 2 (Two) Times a Day. 90 tablet 2   • Multiple Vitamins-Minerals (MULTIVITAL PO) Take  by mouth Daily.     • omeprazole (priLOSEC) 40 MG capsule TAKE 1 CAPSULE DAILY 90 capsule 1   • valACYclovir (VALTREX) 1000 MG tablet TAKE 2 TABLETS 4 TIMES     DAILY FOR MOUTH ULCER (Patient  taking differently: TAKE 2 TABLETS 4 TIMES     DAILY FOR MOUTH ULCER as needed) 16 tablet 4   • venlafaxine XR (Effexor XR) 75 MG 24 hr capsule Take 1 capsule by mouth Daily. 90 capsule 1   • venlafaxine XR (EFFEXOR-XR) 150 MG 24 hr capsule TAKE 1 CAPSULE DAILY 90 capsule 3   • bisacodyl (DULCOLAX) 10 MG suppository Insert 1 suppository into the rectum Daily As Needed for Constipation.     • Blood Glucose Monitoring Suppl (Prodigy No Coding Blood Gluc) w/Device kit 1 each Daily. 1 kit 0   • glucose blood (Prodigy No Coding Blood Gluc) test strip TEST BLOOD SUGAR DAILY 50 each 0   • Prodigy Safety Lancets 26G misc CHECK BLOOD SUGAR ONE TIME PER  each 1   • [DISCONTINUED] Advair HFA 45-21 MCG/ACT inhaler Inhale 2 puffs 2 (Two) Times a Day. 3 each 1   • [DISCONTINUED] ipratropium (ATROVENT) 0.06 % nasal spray        No Known Allergies    Objective    Objective     Vital Signs  Temp:  [96.7 °F (35.9 °C)-98.2 °F (36.8 °C)] 98.2 °F (36.8 °C)  Heart Rate:  [] 105  Resp:  [18] 18  BP: (142-156)/() 151/96  SpO2:  [93 %-98 %] 96 %  on   ;   Device (Oxygen Therapy): room air  Body mass index is 23.24 kg/m².    Physical Exam  Vitals and nursing note reviewed.   Constitutional:       General: She is not in acute distress.     Appearance: She is well-developed.   HENT:      Head: Normocephalic.   Neck:      Vascular: No JVD.   Cardiovascular:      Rate and Rhythm: Normal rate. Rhythm irregular.      Comments: afib on the monitor with a rate in the 90s she denies any chest pain.  Pulmonary:      Effort: Pulmonary effort is normal.      Breath sounds: Normal breath sounds.      Comments: Lung sounds clear, sats 97% on room air, no shortness of breath at rest  Abdominal:      General: There is no distension.      Palpations: Abdomen is soft.      Tenderness: There is no abdominal tenderness.   Musculoskeletal:         General: Normal range of motion.      Cervical back: Normal range of motion.      Right lower  leg: No edema.      Left lower leg: No edema.   Skin:     General: Skin is warm and dry.      Capillary Refill: Capillary refill takes less than 2 seconds.   Neurological:      Mental Status: She is alert and oriented to person, place, and time.      Comments: Patient is alert and oriented, NIH stroke scale 0, no slurred speech or facial droop.  She does have macular degeneration but states her eyesight is at baseline.  She follows commands and has no other focal neurodeficits at this time.  Her numbness in her thumb has resolved.  She does have some repetitive questioning and some mild confusion, but she realizes she is confused and is reoriented easily.   Psychiatric:         Attention and Perception: She perceives visual (patient states at times she sees things she knows are not there ) hallucinations.         Mood and Affect: Mood is anxious.         Speech: Speech normal.         Behavior: Behavior is cooperative.         Results Review:  I reviewed the patient's new clinical results.  I reviewed the patient's new imaging results and agree with the interpretation.  I reviewed the patient's other test results and agree with the interpretation  I personally viewed and interpreted the patient's EKG/Telemetry data  Discussed with ED provider.    Lab Results (last 24 hours)     Procedure Component Value Units Date/Time    POC Glucose Once [[id number]]  (Normal) Collected: 04/09/23 1512    Specimen: Blood Updated: 04/09/23 1523     Glucose 85 mg/dL      Comment: Meter: [id number] : [id number] [facility]       CBC & Differential [[id number]]  (Abnormal) Collected: 04/09/23 1514    Specimen: Blood Updated: 04/09/23 1524    Narrative:      The following orders were created for panel order CBC & Differential.  Procedure                               Abnormality         Status                     ---------                               -----------         ------                     CBC Auto  Differential[[id number]]        Abnormal            Final result                 Please view results for these tests on the individual orders.    Comprehensive Metabolic Panel [[id number]]  (Abnormal) Collected: 04/09/23 1514    Specimen: Blood Updated: 04/09/23 1550     Glucose 85 mg/dL      BUN 19 mg/dL      Creatinine 0.91 mg/dL      Sodium 134 mmol/L      Potassium 4.3 mmol/L      Chloride 99 mmol/L      CO2 23.8 mmol/L      Calcium 11.1 mg/dL      Total Protein 8.1 g/dL      Albumin 4.5 g/dL      ALT (SGPT) 18 U/L      AST (SGOT) 28 U/L      Alkaline Phosphatase 112 U/L      Total Bilirubin 0.7 mg/dL      Globulin 3.6 gm/dL      A/G Ratio 1.3 g/dL      BUN/Creatinine Ratio 20.9     Anion Gap 11.2 mmol/L      eGFR 62.3 mL/min/1.73     Narrative:      GFR Normal >60  Chronic Kidney Disease <60  Kidney Failure <15    The GFR formula is only valid for adults with stable renal function between ages 18 and 70.    Protime-INR [[id number]]  (Abnormal) Collected: 04/09/23 1514    Specimen: Blood Updated: 04/09/23 1537     Protime 15.4 Seconds      INR 1.20    aPTT [[id number]]  (Abnormal) Collected: 04/09/23 1514    Specimen: Blood Updated: 04/09/23 1537     PTT 39.0 seconds     Single High Sensitivity Troponin T [[id number]]  (Abnormal) Collected: 04/09/23 1514    Specimen: Blood Updated: 04/09/23 1550     HS Troponin T 10 ng/L     Narrative:      High Sensitive Troponin T Reference Range:  <10.0 ng/L- Negative Female for AMI  <15.0 ng/L- Negative Male for AMI  >=10 - Abnormal Female indicating possible myocardial injury.  >=15 - Abnormal Male indicating possible myocardial injury.   Clinicians would have to utilize clinical acumen, EKG, Troponin, and serial changes to determine if it is an Acute Myocardial Infarction or myocardial injury due to an underlying chronic condition.         CBC Auto Differential [[id number]]  (Abnormal) Collected: 04/09/23 1514    Specimen: Blood Updated: 04/09/23 1524     WBC 7.14 10*3/mm3       RBC 5.52 10*6/mm3      Hemoglobin 15.3 g/dL      Hematocrit 48.7 %      MCV 88.2 fL      MCH 27.7 pg      MCHC 31.4 g/dL      RDW 14.9 %      RDW-SD 48.5 fl      MPV 9.4 fL      Platelets 228 10*3/mm3      Neutrophil % 49.6 %      Lymphocyte % 34.3 %      Monocyte % 12.7 %      Eosinophil % 2.1 %      Basophil % 1.0 %      Immature Grans % 0.3 %      Neutrophils, Absolute 3.54 10*3/mm3      Lymphocytes, Absolute 2.45 10*3/mm3      Monocytes, Absolute 0.91 10*3/mm3      Eosinophils, Absolute 0.15 10*3/mm3      Basophils, Absolute 0.07 10*3/mm3      Immature Grans, Absolute 0.02 10*3/mm3      nRBC 0.0 /100 WBC     Magnesium [[id number]]  (Abnormal) Collected: 04/09/23 1514    Specimen: Blood Updated: 04/09/23 1555     Magnesium 1.4 mg/dL     Urinalysis With Microscopic If Indicated (No Culture) - Urine, Clean Catch [[id number]]  (Normal) Collected: 04/09/23 1615    Specimen: Urine, Clean Catch Updated: 04/09/23 2048     Color, UA Yellow     Appearance, UA Clear     pH, UA 5.5     Specific Gravity, UA 1.020     Glucose, UA Negative     Ketones, UA Negative     Bilirubin, UA Negative     Blood, UA Negative     Protein, UA Negative     Leuk Esterase, UA Negative     Nitrite, UA Negative     Urobilinogen, UA 0.2 E.U./dL    Narrative:      Urine microscopic not indicated.          Imaging Results (Last 24 Hours)     Procedure Component Value Units Date/Time    MRI Angiogram Neck With & Without Contrast [[id number]] Collected: 04/09/23 1914     Updated: 04/09/23 1914    Narrative:      MRI EXAMINATION OF BRAIN WITH AND WITHOUT CONTRAST, MRA OF THE NECK WITH  AND WITHOUT CONTRAST AND MRA OF THE HEAD WITHOUT CONTRAST     HISTORY: Word finding difficulty.     COMPARISON: CT head 11/13/2022.     MRI examination of the brain with and without contrast:     TECHNIQUE: A MRI examination of the brain was performed utilizing  sagittal T1, axial diffusion, T1, T2, T2 FLAIR, susceptibility weighted  imaging as well as axial and coronal T1  postcontrast weighted sequences.     FINDINGS: The study is hampered somewhat by patient motion. The  diffusion sequence demonstrates a small cortical acute infarct involving  the left middle frontal gyrus superiorly measuring approximately 2-3 mm  in size. No other areas of restricted diffusion are noted.  Mild-to-moderate small vessel ischemic disease is appreciated on the  axial T2 FLAIR sequence. After contrast administration, there was no  evidence of abnormal enhancement.       Impression:      The study is hampered by patient motion. There is a 2-3 mm  acute cortical infarct involving the left middle frontal gyrus  superiorly. Mild-to-moderate small vessel ischemic disease is noted.        MRA of the head without contrast:     The study is hampered by patient motion. There is signal present within  the distal aspect of the vertebral arteries bilaterally. The left  vertebral artery is larger than that of the right. The basilar artery  and the proximal aspects of the posterior cerebral arteries appear  unremarkable. The distal aspects of the internal carotid arteries are of  normal caliber. The right A1 segment is hypoplastic. The proximal  aspects of the anterior and middle cerebral arteries appear  unremarkable.     IMPRESSION: Study is hampered by patient motion. The right A1 segment is  hypoplastic. The proximal aspects of the anterior, middle and posterior  cerebral arteries appear otherwise unremarkable.        MRA of the neck with and without contrast:      The great vessels are arranged in a classic configuration. There is 0%  stenosis of the internal carotid arteries using NASCET criteria. It is  slightly larger than that of the right. There is no evidence of a  vertebral ostial stenosis. The cervical segments of the vertebral  arteries are of relatively uniform caliber.     IMPRESSION: There is 0% stenosis of the internal carotid arteries using  NASCET criteria.       MRI Angiogram Head Without  Contrast [[id number]] Collected: 04/09/23 1914     Updated: 04/09/23 1914    Narrative:      MRI EXAMINATION OF BRAIN WITH AND WITHOUT CONTRAST, MRA OF THE NECK WITH  AND WITHOUT CONTRAST AND MRA OF THE HEAD WITHOUT CONTRAST     HISTORY: Word finding difficulty.     COMPARISON: CT head 11/13/2022.     MRI examination of the brain with and without contrast:     TECHNIQUE: A MRI examination of the brain was performed utilizing  sagittal T1, axial diffusion, T1, T2, T2 FLAIR, susceptibility weighted  imaging as well as axial and coronal T1 postcontrast weighted sequences.     FINDINGS: The study is hampered somewhat by patient motion. The  diffusion sequence demonstrates a small cortical acute infarct involving  the left middle frontal gyrus superiorly measuring approximately 2-3 mm  in size. No other areas of restricted diffusion are noted.  Mild-to-moderate small vessel ischemic disease is appreciated on the  axial T2 FLAIR sequence. After contrast administration, there was no  evidence of abnormal enhancement.       Impression:      The study is hampered by patient motion. There is a 2-3 mm  acute cortical infarct involving the left middle frontal gyrus  superiorly. Mild-to-moderate small vessel ischemic disease is noted.        MRA of the head without contrast:     The study is hampered by patient motion. There is signal present within  the distal aspect of the vertebral arteries bilaterally. The left  vertebral artery is larger than that of the right. The basilar artery  and the proximal aspects of the posterior cerebral arteries appear  unremarkable. The distal aspects of the internal carotid arteries are of  normal caliber. The right A1 segment is hypoplastic. The proximal  aspects of the anterior and middle cerebral arteries appear  unremarkable.     IMPRESSION: Study is hampered by patient motion. The right A1 segment is  hypoplastic. The proximal aspects of the anterior, middle and posterior  cerebral arteries  appear otherwise unremarkable.        MRA of the neck with and without contrast:      The great vessels are arranged in a classic configuration. There is 0%  stenosis of the internal carotid arteries using NASCET criteria. It is  slightly larger than that of the right. There is no evidence of a  vertebral ostial stenosis. The cervical segments of the vertebral  arteries are of relatively uniform caliber.     IMPRESSION: There is 0% stenosis of the internal carotid arteries using  NASCET criteria.       MRI Brain With & Without Contrast [[id number]] Collected: 04/09/23 1914     Updated: 04/09/23 1914    Narrative:      MRI EXAMINATION OF BRAIN WITH AND WITHOUT CONTRAST, MRA OF THE NECK WITH  AND WITHOUT CONTRAST AND MRA OF THE HEAD WITHOUT CONTRAST     HISTORY: Word finding difficulty.     COMPARISON: CT head 11/13/2022.     MRI examination of the brain with and without contrast:     TECHNIQUE: A MRI examination of the brain was performed utilizing  sagittal T1, axial diffusion, T1, T2, T2 FLAIR, susceptibility weighted  imaging as well as axial and coronal T1 postcontrast weighted sequences.     FINDINGS: The study is hampered somewhat by patient motion. The  diffusion sequence demonstrates a small cortical acute infarct involving  the left middle frontal gyrus superiorly measuring approximately 2-3 mm  in size. No other areas of restricted diffusion are noted.  Mild-to-moderate small vessel ischemic disease is appreciated on the  axial T2 FLAIR sequence. After contrast administration, there was no  evidence of abnormal enhancement.       Impression:      The study is hampered by patient motion. There is a 2-3 mm  acute cortical infarct involving the left middle frontal gyrus  superiorly. Mild-to-moderate small vessel ischemic disease is noted.        MRA of the head without contrast:     The study is hampered by patient motion. There is signal present within  the distal aspect of the vertebral arteries bilaterally.  The left  vertebral artery is larger than that of the right. The basilar artery  and the proximal aspects of the posterior cerebral arteries appear  unremarkable. The distal aspects of the internal carotid arteries are of  normal caliber. The right A1 segment is hypoplastic. The proximal  aspects of the anterior and middle cerebral arteries appear  unremarkable.     IMPRESSION: Study is hampered by patient motion. The right A1 segment is  hypoplastic. The proximal aspects of the anterior, middle and posterior  cerebral arteries appear otherwise unremarkable.        MRA of the neck with and without contrast:      The great vessels are arranged in a classic configuration. There is 0%  stenosis of the internal carotid arteries using NASCET criteria. It is  slightly larger than that of the right. There is no evidence of a  vertebral ostial stenosis. The cervical segments of the vertebral  arteries are of relatively uniform caliber.     IMPRESSION: There is 0% stenosis of the internal carotid arteries using  NASCET criteria.       XR Chest 1 View [[id number]] Collected: 04/09/23 1608     Updated: 04/09/23 1608    Narrative:      PORTABLE CHEST     HISTORY: Stroke.     COMPARISON: 11/13/2022.     FINDINGS: A single portable view of the chest demonstrates the heart to  be within normal limits in size. There is no evidence of infiltrate,  effusion or of congestive failure.             Results for orders placed during the hospital encounter of 03/24/23    Adult Transthoracic Echo Complete W/ Cont if Necessary Per Protocol    Interpretation Summary  •  Left ventricular systolic function is normal. Calculated left ventricular EF = 67.2%  •  Left ventricular wall thickness is consistent with moderate concentric hypertrophy.  •  Left ventricular diastolic function is consistent with (grade Ia w/high LAP) impaired relaxation.  •  The right atrial cavity is moderately  dilated.  •  Peak velocity of the flow distal to the aortic  valve is 308.9 cm/s. Aortic valve mean pressure gradient is 20 mmHg.  •  There is a bioprosthetic aortic valve present. The prosthetic aortic valve peak and mean gradients are elevated.  •  Moderate tricuspid valve regurgitation is present.  •  Estimated right ventricular systolic pressure from tricuspid regurgitation is normal (<35 mmHg).      ECG 12 Lead ED Triage Standing Order; Acute Stroke (Onset <24 hrs)   Final Result   HEART RATE= 108  bpm   RR Interval= 556  ms   RI Interval= 143  ms   P Horizontal Axis= 247  deg   P Front Axis= -52  deg   QRSD Interval= 94  ms   QT Interval= 346  ms   QRS Axis= -51  deg   T Wave Axis= 90  deg   - ABNORMAL ECG -   Sinus or ectopic atrial tachycardia   Left anterior fascicular block   Abnormal R-wave progression, late transition   Left ventricular hypertrophy   Nonspecific T abnormalities, lateral leads   No change from previous tracing   Electronically Signed By: Saad Baptiste (STEFFEN) ([facility]) 09-Apr-2023 20:07:57   Date and Time of Study: 2023-04-09 15:17:23      ECG 12 Lead Other; Possible stroke    (Results Pending)        Assessment/Plan     Active Hospital Problems    Diagnosis  POA   • **Acute stroke due to ischemia [I63.9]  Yes   • PAF (paroxysmal atrial fibrillation) [I48.0]  Yes   • S/P AVR [Z95.2]  Not Applicable   • Restless leg syndrome [G25.81]  Yes   • 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 Pettit is a 82-year-old female with history of paroxysmal A-fib on anticoagulation, hypertension, GERD, anxiety, restless leg syndrome, type 2 diabetes, pulmonary hypertension, status post AVR, obstructive sleep apnea who presents to the emergency room  with confusion and balance issues.    Acute CVA  -Consult neurology  -Stroke order set initiated  -PT/OT/ST to eval and treat  -Neurochecks every 4 hours  -Patient given aspirin in the emergency room, she is currently still on Eliquis, will continue that as well  -Telemetry unit for monitoring  -Patient's last 2D echo was in March 2023 with an ejection fraction of 67%  -Safety precautions  -Allow permissive hypertension with parameters    Paroxysmal A-fib/status post AVR/hypertension  -Consult cardiology given patient had acute CVA on Eliquis's  -Normal 2D echo few weeks ago  -Telemetry unit for monitoring  -Continue Eliquis, Tikosyn, metoprolol, losartan but allow some permissive hypertension    Shortness of breath/anxiety  -Continue home-antianxiety medications  -Continuous pulse oximetry for monitoring  -Up with assist  -We will obtain CT of chest, this was scheduled as outpatient after patient was seen by pulmonology a few weeks ago  -O2 to keep sats above 90%, patient currently on room air and no shortness of breath at rest    Type 2 diabetes  -Accu-Cheks before meals and at bedtime with correctional dose insulin  -Check hemoglobin A1c  -Hold metformin    · I discussed the patient's findings and my recommendations with patient, family and nursing staff.    VTE Prophylaxis -Eliquis 5 mg twice daily.  Code Status - Full code.       TOSHA Soliman  [facility]  04/09/23  22:29 EDT

## 2023-04-10 NOTE — PLAN OF CARE
Goal Outcome Evaluation:  Plan of Care Reviewed With: patient, family        Progress: improving     No new neuro deficits noted or reported.

## 2023-04-10 NOTE — DISCHARGE PLACEMENT REQUEST
"Dior Pettit (82 y.o. Female)     Date of Birth   1940    Social Security Number       Address   [address] [place], [place] [address]    Home Phone   [phone]    MRN   [id number]       Mandaen   Jewish    Marital Status                               Admission Date   4/9/23    Admission Type   Emergency    Admitting Provider   Justin Mason MD    Attending Provider   Luigi Viera MD    Department, Room/Bed   [address], P593/1       Discharge Date       Discharge Disposition       Discharge Destination                               Attending Provider: Luigi Viera MD    Allergies: No Known Allergies    Isolation: None   Infection: None   Code Status: CPR    Ht: 170.2 cm (67\")   Wt: 67.1 kg (148 lb)    Admission Cmt: None   Principal Problem: Acute stroke due to ischemia [I63.9]                 Active Insurance as of 4/9/2023     Primary Coverage     Payor Plan Insurance Group Employer/Plan Group    MEDICARE MEDICARE A & B      Payor Plan Address Payor Plan Phone Number Payor Plan Fax Number Effective Dates    [address] [phone]  6/1/2003 - None Entered    [facility] [id number]       Subscriber Name Subscriber Birth Date Member ID       DIOR PETTIT 1940 [id number]           Secondary Coverage     Payor Plan Insurance Group Employer/Plan Group    [facility] SUPP [id number]     Payor Plan Address Payor Plan Phone Number Payor Plan Fax Number Effective Dates    [address]   12/1/2016 - None Entered    [facility] [id number]       Subscriber Name Subscriber Birth Date Member ID       DIOR PETTIT 1940 [id number]                 Emergency Contacts      (Rel.) Home Phone Work Phone Mobile Phone    Stephan Pettit (Son) [phone] -- --    Severiano Pettit (Daughter) [phone] -- [phone]    JENNIFERARMANI (Daughter) [phone] -- [phone]    Jose Alejandro Pettit (Son) " [phone] -- --

## 2023-04-10 NOTE — THERAPY EVALUATION
Patient Name: Dior Pettit  : 1940    MRN: [id number]                              Today's Date: 4/10/2023       Admit Date: 2023    Visit Dx:     ICD-10-CM ICD-9-CM   1. Acute stroke due to ischemia  I63.9 434.91   2. Altered mental status, unspecified altered mental status type  R41.82 780.97     Patient Active Problem List   Diagnosis   • Essential hypertension   • Pulmonary hypertension (HCC)   • NNEKA (obstructive sleep apnea)   • Gastroesophageal reflux disease   • Anxiety   • Restless leg syndrome   • Controlled diabetes mellitus type 2 with complications   • Hypothyroidism   • Hypercholesteremia   • Seasonal allergic rhinitis   • Mild intermittent asthma without complication   • S/P AVR   • Dilated aortic root   • Thoracic aortic aneurysm without rupture    • Primary insomnia   • Renal insufficiency   • Macular degeneration of both eyes   • Coronary artery disease involving native coronary artery with angina pectoris   • Rectocele   • Nonrheumatic mitral valve stenosis   • Slow transit constipation   • Dizziness   • Daily headache   • Symptomatic bradycardia   • Closed displaced fracture of right femoral neck   • Atrial fibrillation, persistent   • Hip pain   • PAF (paroxysmal atrial fibrillation)   • Acute stroke due to ischemia   • Shortness of breath     Past Medical History:   Diagnosis Date   • Allergic rhinitis    • Anemia    • Anxiety     Controlled w/Meds   • Aortic root dilatation 10/02/2017    Borderline--Noted on Echo   • Aortic valve calcification 10/02/2017    Noted on Echo   • Aortic valve insufficiency Dx in     w/AVR    • Aortic valve prosthesis present 2018    Noted on CTA Chest   • Ascending aorta dilatation 10/02/2017    Borderline--Noted on Echo   • Asthma    • Atypical chest pain    • Breast pain, right Hx   • CAD (coronary artery disease)    • Cardiomegaly 2017   • Cervicalgia    • Chest pain due to CAD    • 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     Borderline--Noted on Echo & Measured @ 2.6CM and Mid Descending @ 2.5CM on CTA Chest-11/2/18   • DM (diabetes mellitus)     T2   • Esophagitis    • GERD (gastroesophageal reflux disease)     Controlled w/Meds   • Headache    • Health care maintenance    • Heart murmur    • History of echocardiogram 10/2/17-[facility]    EF 66%; Borderline Concentric Hypertrophy; Mild Calcification in AV; Mild AVS; Mild AVR; Moderate TVR; Borderline Dilation of Aortic Root/Arch & Borderline Dilation of Ascending/Proximal Aorta Present   • Hyperlipidemia     Controlled w/Meds   • Hypertension     Controlled w/Meds   • Hypothyroidism     Controlled w/Synthroid   • Insomnia    • Macular degeneration, left eye    • Mild aortic valve regurgitation 10/02/2017    Noted on Echo   • Mild aortic valve stenosis 10/02/2017    Noted on Echo   • Mild dilation of ascending aorta 10/02/2017    Borderline--Noted on Echo   • Moderate tricuspid valve regurgitation 10/02/2017    Noted on Echo   • Mood disorder in conditions classified elsewhere     Controlled w/Meds   • Near syncope 03/2017-[facility] ER   • Neuropathy    • NNEKA (obstructive sleep apnea)     Untreated   • Osteoarthritis     Ankles/Feet   • Pulmonary hypertension 2017   • Renal insufficiency    • RLS (restless legs syndrome)    • Thoracic ascending aortic aneurysm Dx in 2007 @ 3.8CM & 1/02/2018    Noted @ 4CM on CTA Chest;   • Visual impairment     macular degeneratuin     Past Surgical History:   Procedure Laterality Date   • AORTIC VALVE REPAIR/REPLACEMENT  2007    Dr. Perry   • APPENDECTOMY     • AUGMENTATION MAMMAPLASTY  1976   • BREAST AUGMENTATION  2014   • BUNIONECTOMY     • CARDIAC CATHETERIZATION  2007   • CARDIAC VALVE REPLACEMENT  2007    porcine   • COLONOSCOPY  2010   • COLONOSCOPY  03/02/2012   • EYE SURGERY      cataracts and ens implants   • HYSTERECTOMY  1972   • SIGMOIDOSCOPY  2001   • TOTAL HIP ARTHROPLASTY Right 11/15/2022    Procedure: Right anterior total hip arthroplasty;   Surgeon: Joao Martinez MD;  Location: [facility] MAIN OR;  Service: Orthopedics;  Laterality: Right;      General Information     Row Name 04/10/23 1108          Physical Therapy Time and Intention    Document Type evaluation (P)   -ZB     Mode of Treatment physical therapy (P)   -ZB     Row Name 04/10/23 1108          General Information    Patient Profile Reviewed yes (P)   -ZB     Prior Level of Function independent:;all household mobility;community mobility;dressing;bathing;ADL's (P)   -ZB     Existing Precautions/Restrictions fall (P)   -ZB     Barriers to Rehab cognitive status;medically complex (P)   -ZB     Row Name 04/10/23 1108          Living Environment    People in Home alone (P)   -ZB     Row Name 04/10/23 1108          Home Main Entrance    Number of Stairs, Main Entrance none (P)   -ZB     Row Name 04/10/23 1108          Cognition    Orientation Status (Cognition) oriented to;person;place (P)   -ZB     Row Name 04/10/23 1108          Safety Issues, Functional Mobility    Safety Issues Affecting Function (Mobility) awareness of need for assistance;judgment;insight into deficits/self-awareness (P)   -ZB     Impairments Affecting Function (Mobility) balance;cognition;endurance/activity tolerance;strength;visual/perceptual (P)   -ZB           User Key  (r) = Recorded By, (t) = Taken By, (c) = Cosigned By    Initials Name Provider Type    ZB Perez Escobar, PT Student PT Student               Mobility     Row Name 04/10/23 1110          Bed Mobility    Bed Mobility supine-sit (P)   -ZB     Supine-Sit [place] (Bed Mobility) contact guard (P)   -ZB     Assistive Device (Bed Mobility) head of bed elevated;bed rails (P)   -ZB     Row Name 04/10/23 1110          Bed-Chair Transfer    Bed-Chair [place] (Transfers) contact guard;minimum assist (75% patient effort);1 person assist;verbal cues (P)   -ZB     Assistive Device (Bed-Chair Transfers) walker, front-wheeled (P)   -ZB     Row Name 04/10/23 1110           Sit-Stand Transfer    Sit-Stand [place] (Transfers) contact guard;minimum assist (75% patient effort);1 person assist;verbal cues (P)   -ZB     Assistive Device (Sit-Stand Transfers) walker, front-wheeled (P)   -ZB     Row Name 04/10/23 1110          Gait/Stairs (Locomotion)    [place] Level (Gait) contact guard;minimum assist (75% patient effort);1 person assist;verbal cues (P)   -ZB     Assistive Device (Gait) walker, front-wheeled (P)   -ZB     Distance in Feet (Gait) 180 (P)   -ZB     Deviations/Abnormal Patterns (Gait) guille decreased (P)   -ZB     Comment, (Gait/Stairs) no overt LOB w/ amb but demo's general unsteadiness; gait path deviated when asked to complete cognitive task (P)   -ZB           User Key  (r) = Recorded By, (t) = Taken By, (c) = Cosigned By    Initials Name Provider Type    ZB Perez Escobar, PT Student PT Student               Obj/Interventions     Row Name 04/10/23 1116          Range of Motion Comprehensive    General Range of Motion bilateral lower extremity ROM WFL (P)   -ZB     Row Name 04/10/23 1116          Strength Comprehensive (MMT)    General Manual Muscle Testing (MMT) Assessment lower extremity strength deficits identified (P)   -ZB     Comment, General Manual Muscle Testing (MMT) Assessment gross B LE MMT at least 3+/5 (P)   -ZB     Row Name 04/10/23 1116          Motor Skills    Motor Skills functional endurance (P)   -ZB     Functional Endurance poor (P)   -ZB     Row Name 04/10/23 1116          Balance    Balance Assessment sitting static balance;sitting dynamic balance;standing static balance;standing dynamic balance (P)   -ZB     Static Sitting Balance supervision (P)   -ZB     Dynamic Sitting Balance supervision (P)   -ZB     Position, Sitting Balance unsupported;sitting edge of bed (P)   -ZB     Static Standing Balance contact guard (P)   -ZB     Dynamic Standing Balance contact guard;minimal assist (P)   -ZB     Position/Device Used, Standing  Balance walker, rolling (P)   -ZB     Balance Interventions sitting;standing;sit to stand;dynamic;supported;static (P)   -ZB           User Key  (r) = Recorded By, (t) = Taken By, (c) = Cosigned By    Initials Name Provider Type    Perez Dominguez, PT Student PT Student               Goals/Plan     Row Name 04/10/23 1124          Bed Mobility Goal 1 (PT)    Activity/Assistive Device (Bed Mobility Goal 1, PT) bed mobility activities, all (P)   -ZB     [place] Level/Cues Needed (Bed Mobility Goal 1, PT) independent (P)   -ZB     Time Frame (Bed Mobility Goal 1, PT) short term goal (STG);2 weeks (P)   -ZB     Row Name 04/10/23 1124          Transfer Goal 1 (PT)    Activity/Assistive Device (Transfer Goal 1, PT) transfers, all (P)   -ZB     [place] Level/Cues Needed (Transfer Goal 1, PT) modified independence (P)   -ZB     Time Frame (Transfer Goal 1, PT) 2 weeks;short term goal (STG) (P)   -ZB     Row Name 04/10/23 1124          Gait Training Goal 1 (PT)    Activity/Assistive Device (Gait Training Goal 1, PT) gait (walking locomotion) (P)   -ZB     [place] Level (Gait Training Goal 1, PT) modified independence (P)   -ZB     Time Frame (Gait Training Goal 1, PT) short term goal (STG);2 weeks (P)   -ZB     Row Name 04/10/23 1124          Therapy Assessment/Plan (PT)    Planned Therapy Interventions (PT) balance training;gait training;bed mobility training;home exercise program;patient/family education;strengthening;transfer training (P)   -ZB           User Key  (r) = Recorded By, (t) = Taken By, (c) = Cosigned By    Initials Name Provider Type    Perez Dominguez, PT Student PT Student               Clinical Impression     Row Name 04/10/23 1117          Pain    Pretreatment Pain Rating 0/10 - no pain (P)   -ZB     Posttreatment Pain Rating 0/10 - no pain (P)   -ZB     Pain Intervention(s) Repositioned;Ambulation/increased activity (P)   -ZB     Row Name 04/10/23 1117          Plan of Care Review    Plan  of Care Reviewed With patient;daughter (P)   -ZB     Progress improving (P)   -ZB     Outcome Evaluation Pt is an 83 y/o female admitted to [facility] w/ c/o numbness and weakness in the R thumb in addition to increased confusion as well as onset of tremors and shaking. Pt Dx w/ acute CVA. The pt has a PMHx that includes but is not limited to: anemia, s/p AVR, CAD, DM, GERD, HLD, HTN, and macular degeneration. Pt lives alone in a condo and was IND w/ ADLs and mobility at baseline using a walker. The pt came to EOB w/ cga, STS w/ cga-Nicol, amb 180' w/ cga-Nicol + rwx, and returned to chair. Pt demos higher level cognitive deficits t/o session w/ obvious confusion. Pt is a high fall risk d/t combination of cognitive deficits and visual impairments cause by macular degeneration. [facility] PT will continue to monitor and progress as appropriate. (P)   -ZB     Row Name 04/10/23 1117          Therapy Assessment/Plan (PT)    Rehab Potential (PT) good, to achieve stated therapy goals (P)   -ZB     Therapy Frequency (PT) 5 times/wk (P)   -ZB     Row Name 04/10/23 1117          Vital Signs    O2 Delivery Pre Treatment room air (P)   -ZB     O2 Delivery Intra Treatment room air (P)   -ZB     O2 Delivery Post Treatment room air (P)   -ZB     Pre Patient Position Supine (P)   -ZB     Intra Patient Position Standing (P)   -ZB     Post Patient Position Sitting (P)   -ZB     Row Name 04/10/23 1117          Positioning and Restraints    Pre-Treatment Position in bed (P)   -ZB     Post Treatment Position chair (P)   -ZB     In Chair notified nsg;reclined;call light within reach;encouraged to call for assist;exit alarm on;with family/caregiver (P)   -ZB           User Key  (r) = Recorded By, (t) = Taken By, (c) = Cosigned By    Initials Name Provider Type    Perez Dominguez, PT Student PT Student               Outcome Measures     Row Name 04/10/23 1128          How much help from another person do you currently need...     Turning from your back to your side while in flat bed without using bedrails? 4 (P)   -ZB     Moving from lying on back to sitting on the side of a flat bed without bedrails? 4 (P)   -ZB     Moving to and from a bed to a chair (including a wheelchair)? 3 (P)   -ZB     Standing up from a chair using your arms (e.g., wheelchair, bedside chair)? 3 (P)   -ZB     Climbing 3-5 steps with a railing? 2 (P)   -ZB     To walk in hospital room? 3 (P)   -ZB     AM-PAC 6 Clicks Score (PT) 19 (P)   -ZB     Highest level of mobility 6 --> Walked 10 steps or more (P)   -ZB     Row Name 04/10/23 1125          Modified [place] Scale    Modified [place] Scale 3 - Moderate disability.  Requiring some help, but able to walk without assistance. (P)   -ZB     Row Name 04/10/23 1125          Functional Assessment    Outcome Measure Options AM-PAC 6 Clicks Basic Mobility (PT);Modified [place] (P)   -ZB           User Key  (r) = Recorded By, (t) = Taken By, (c) = Cosigned By    Initials Name Provider Type    Perez Dominguez, PT Student PT Student                             Physical Therapy Education     Title: PT OT SLP Therapies (In Progress)     Topic: Physical Therapy (In Progress)     Point: Mobility training (In Progress)     Learning Progress Summary           Patient Acceptance, E, NR by ZB at 4/10/2023 1125   Family Acceptance, E, NR by ZB at 4/10/2023 1125                   Point: Home exercise program (Not Started)     Learner Progress:  Not documented in this visit.          Point: Body mechanics (In Progress)     Learning Progress Summary           Patient Acceptance, E, NR by ZB at 4/10/2023 1125   Family Acceptance, E, NR by ZB at 4/10/2023 1125                   Point: Precautions (In Progress)     Learning Progress Summary           Patient Acceptance, E, NR by ZB at 4/10/2023 1125   Family Acceptance, E, NR by ZB at 4/10/2023 1125                               User Key     Initials Effective Dates Name Provider Type  Discipline    ZB 03/10/23 -  Perez Escobar, PT Student PT Student PT              PT Recommendation and Plan  Planned Therapy Interventions (PT): (P) balance training, gait training, bed mobility training, home exercise program, patient/family education, strengthening, transfer training  Plan of Care Reviewed With: (P) patient, daughter  Progress: (P) improving  Outcome Evaluation: (P) Pt is an 81 y/o female admitted to [facility] w/ c/o numbness and weakness in the R thumb in addition to increased confusion as well as onset of tremors and shaking. Pt Dx w/ acute CVA. The pt has a PMHx that includes but is not limited to: anemia, s/p AVR, CAD, DM, GERD, HLD, HTN, and macular degeneration. Pt lives alone in a condo and was IND w/ ADLs and mobility at baseline using a walker. The pt came to EOB w/ cga, STS w/ cga-Nicol, amb 180' w/ cga-Nicol + rwx, and returned to chair. Pt demos higher level cognitive deficits t/o session w/ obvious confusion. Pt is a high fall risk d/t combination of cognitive deficits and visual impairments cause by macular degeneration. Anticipated DC to [facility]. PT will continue to monitor and progress as appropriate.     Time Calculation:    PT Charges     Row Name 04/10/23 1127             Time Calculation    Start Time 0900 (P)   -ZB      Stop Time 0928 (P)   -ZB      Time Calculation (min) 28 min (P)   -ZB      PT Received On 04/10/23 (P)   -ZB      PT - Next Appointment 04/11/23 (P)   -ZB      PT Goal Re-Cert Due Date 04/24/23 (P)   -ZB         Time Calculation- PT    Total Timed Code Minutes- PT 19 minute(s) (P)   -ZB         Timed Charges    93517 - PT Therapeutic Activity Minutes 19 (P)   -ZB         Total Minutes    Timed Charges Total Minutes 19 (P)   -ZB       Total Minutes 19 (P)   -ZB            User Key  (r) = Recorded By, (t) = Taken By, (c) = Cosigned By    Initials Name Provider Type    ZB Perez Escobar, PT Student PT Student              Therapy Charges for Today     Code Description Service  Date Service Provider Modifiers Qty    [id number]  PT THERAPEUTIC ACT EA 15 MIN 4/10/2023 Perez Escobar, PT Student GP 1    [id number]  PT EVAL MOD COMPLEXITY 3 4/10/2023 Perez Escobar, PT Student GP 1          PT G-Codes  Outcome Measure Options: (P) AM-PAC 6 Clicks Basic Mobility (PT), Modified [place]  AM-PAC 6 Clicks Score (PT): (P) 19  Modified [place] Scale: (P) 3 - Moderate disability.  Requiring some help, but able to walk without assistance.  PT Discharge Summary  Anticipated Discharge Disposition (PT): (P) assisted living    Perez Escobar PT Student  4/10/2023

## 2023-04-10 NOTE — PROGRESS NOTES
[facility] Acute Inpt Rehab Note     Referral received via stroke order set.  Please note this is a screening only, rehab admissions will not actively be evaluating this patient.  If felt patient is appropriate for our services once therapies begin, please call our office at [phone], to initiate a full referral.    Thank you,   Citlali Mcdaniel RN   Rehab Admission Nurse

## 2023-04-10 NOTE — CONSULTS
Date of Consultation: 04/10/23    Referral Provider: Dr. Viera    Reason for Consultation: Paroxysmal atrial fibrillation, CVA.    Encounter Provider: Kamari Dia MD    Group of Service: [facility]     Patient Name: Dior Pettit    :1940    Chief complaint:  Right hand numbness, weakness and confusion    History of Present Illness:  Dior Pettit is a 81 yo female who was brought to the ED by her family for worsening confusion and complaints of right hand numbness and weakness.  Family reports she has had confusion and behavioral changes in the last 2-3 weeks.    She has multiple medical problems including, CAD,SP porcine AVR , atrial fibrillation (on Eliquis), hypertension, pulmonary hypertension, NNEKA, type 2 diabetes, high cholesterol, and macular degeneration.     She was last seen in our office  by Justin GIVENS.  Her atrial fibrillation is well controlled on Tikosyn which was started in 2022. (She has had CV x2)  Her metoprolol was reduced to 50mg twice daily at that time.    We have been asked to see her for new CVA.  MRI shows 0% carotid stenosis and an acute cortical infact involving the left middle frontal gyrus.  She is still confused, although neurology does not feel that the confusion is accounted for by the current stroke.           Stress Test 10-20-21  • Myocardial perfusion imaging indicates a normal myocardial perfusion study with no evidence of ischemia.  • Left ventricular ejection fraction is hyperdynamic (Calculated EF > 70%). .  • Impressions are consistent with a low risk study.  • Please ignore an prevous reported PET studies on this day        ECHO 3-24-23  •  Left ventricular systolic function is normal. Calculated left ventricular EF = 67.2%  •  Left ventricular wall thickness is consistent with moderate concentric hypertrophy.  •  Left ventricular diastolic function is consistent with (grade Ia w/high LAP) impaired  relaxation.  •  The right atrial cavity is moderately  dilated.  •  Peak velocity of the flow distal to the aortic valve is 308.9 cm/s. Aortic valve mean pressure gradient is 20 mmHg.  •  There is a bioprosthetic aortic valve present. The prosthetic aortic valve peak and mean gradients are elevated.  •  Moderate tricuspid valve regurgitation is present.  •  Estimated right ventricular systolic pressure from tricuspid regurgitation is normal (<35 mmHg).          Past Medical History:   Diagnosis Date   • Allergic rhinitis    • Anemia    • Anxiety     Controlled w/Meds   • Aortic root dilatation 10/02/2017    Borderline--Noted on Echo   • Aortic valve calcification 10/02/2017    Noted on Echo   • Aortic valve insufficiency Dx in 07    w/AVR    • Aortic valve prosthesis present 11/02/2018    Noted on CTA Chest   • Ascending aorta dilatation 10/02/2017    Borderline--Noted on Echo   • Asthma    • Atypical chest pain    • Breast pain, right Hx   • CAD (coronary artery disease)    • Cardiomegaly 2017   • Cervicalgia    • Chest pain due to CAD    • 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    Borderline--Noted on Echo & Measured @ 2.6CM and Mid Descending @ 2.5CM on CTA Chest-11/2/18   • DM (diabetes mellitus)     T2   • Esophagitis    • GERD (gastroesophageal reflux disease)     Controlled w/Meds   • Headache    • Health care maintenance    • Heart murmur    • History of echocardiogram 10/2/17-[facility]    EF 66%; Borderline Concentric Hypertrophy; Mild Calcification in AV; Mild AVS; Mild AVR; Moderate TVR; Borderline Dilation of Aortic Root/Arch & Borderline Dilation of Ascending/Proximal Aorta Present   • Hyperlipidemia     Controlled w/Meds   • Hypertension     Controlled w/Meds   • Hypothyroidism     Controlled w/Synthroid   • Insomnia    • Macular degeneration, left eye    • Mild aortic valve regurgitation 10/02/2017    Noted on Echo   • Mild aortic valve stenosis 10/02/2017    Noted on Echo   • Mild dilation of  ascending aorta 10/02/2017    Borderline--Noted on Echo   • Moderate tricuspid valve regurgitation 10/02/2017    Noted on Echo   • Mood disorder in conditions classified elsewhere     Controlled w/Meds   • Near syncope 03/2017-[facility] ER   • Neuropathy    • NNEKA (obstructive sleep apnea)     Untreated   • Osteoarthritis     Ankles/Feet   • Pulmonary hypertension 2017   • Renal insufficiency    • RLS (restless legs syndrome)    • Thoracic ascending aortic aneurysm Dx in 2007 @ 3.8CM & 1/02/2018    Noted @ 4CM on CTA Chest;   • Visual impairment     macular degeneratuin         Past Surgical History:   Procedure Laterality Date   • AORTIC VALVE REPAIR/REPLACEMENT  2007    Dr. Perry   • APPENDECTOMY     • AUGMENTATION MAMMAPLASTY  1976   • BREAST AUGMENTATION  2014   • BUNIONECTOMY     • CARDIAC CATHETERIZATION  2007   • CARDIAC VALVE REPLACEMENT  2007    porcine   • COLONOSCOPY  2010   • COLONOSCOPY  03/02/2012   • EYE SURGERY      cataracts and ens implants   • HYSTERECTOMY  1972   • SIGMOIDOSCOPY  2001   • TOTAL HIP ARTHROPLASTY Right 11/15/2022    Procedure: Right anterior total hip arthroplasty;  Surgeon: Joao Martinez MD;  Location: [facility];  Service: Orthopedics;  Laterality: Right;         No Known Allergies      No current facility-administered medications on file prior to encounter.     Current Outpatient Medications on File Prior to Encounter   Medication Sig Dispense Refill   • acetaminophen (TYLENOL) 325 MG tablet Take 2 tablets by mouth Every 4 (Four) Hours As Needed for Mild Pain.     • amitriptyline (ELAVIL) 50 MG tablet Take 1 tablet by mouth Every Night. 90 tablet 1   • ascorbic acid (VITAMIN C) 1000 MG tablet Take 1 tablet by mouth Daily.     • atorvastatin (LIPITOR) 20 MG tablet TAKE 1 TABLET EVERY NIGHT 90 tablet 1   • azelastine (ASTELIN) 0.1 % nasal spray 2 sprays into the nostril(s) as directed by provider 2 (Two) Times a Day As Needed for Allergies or Rhinitis. Use in each nostril as  directed     • dofetilide (Tikosyn) 250 MCG capsule Take 1 capsule by mouth Every 12 (Twelve) Hours. 180 capsule 1   • Eliquis 5 MG tablet tablet TAKE 1 TABLET BY MOUTH EVERY TWELVE HOURS 180 tablet 0   • fenofibrate micronized (LOFIBRA) 134 MG capsule Take 1 capsule by mouth Every Morning Before Breakfast. 90 capsule 3   • glucosamine-chondroitin 500-400 MG capsule capsule Take 2 capsules by mouth Daily.     • levothyroxine (SYNTHROID, LEVOTHROID) 50 MCG tablet TAKE 1 TABLET EVERY OTHER  DAY 45 tablet 1   • levothyroxine (SYNTHROID, LEVOTHROID) 75 MCG tablet TAKE ONE TABLET EVERY OTHER DAY 45 tablet 3   • losartan (COZAAR) 50 MG tablet TAKE 1 TABLET DAILY 90 tablet 1   • metFORMIN ER (GLUCOPHAGE-XR) 750 MG 24 hr tablet Take 1 tablet by mouth 2 (Two) Times a Day. 180 tablet 3   • metoprolol tartrate (LOPRESSOR) 50 MG tablet Take 0.5 tablets by mouth 2 (Two) Times a Day. 90 tablet 2   • Multiple Vitamins-Minerals (MULTIVITAL PO) Take  by mouth Daily.     • omeprazole (priLOSEC) 40 MG capsule TAKE 1 CAPSULE DAILY 90 capsule 1   • valACYclovir (VALTREX) 1000 MG tablet TAKE 2 TABLETS 4 TIMES     DAILY FOR MOUTH ULCER (Patient taking differently: TAKE 2 TABLETS 4 TIMES     DAILY FOR MOUTH ULCER as needed) 16 tablet 4   • venlafaxine XR (Effexor XR) 75 MG 24 hr capsule Take 1 capsule by mouth Daily. 90 capsule 1   • venlafaxine XR (EFFEXOR-XR) 150 MG 24 hr capsule TAKE 1 CAPSULE DAILY 90 capsule 3   • bisacodyl (DULCOLAX) 10 MG suppository Insert 1 suppository into the rectum Daily As Needed for Constipation.     • Blood Glucose Monitoring Suppl (Prodigy No Coding Blood Gluc) w/Device kit 1 each Daily. 1 kit 0   • glucose blood (Prodigy No Coding Blood Gluc) test strip TEST BLOOD SUGAR DAILY 50 each 0   • Prodigy Safety Lancets 26G misc CHECK BLOOD SUGAR ONE TIME PER  each 1         Social History     Socioeconomic History   • Marital status:    Tobacco Use   • Smoking status: Never   • Smokeless tobacco:  "Never   • Tobacco comments:     caffeine use - 1 cup coffee daily    Vaping Use   • Vaping Use: Never used   Substance and Sexual Activity   • Alcohol use: Yes     Comment: less than 1 glass of wine   • Drug use: Never   • Sexual activity: Not Currently     Partners: Male     Birth control/protection: Surgical     Comment: Hyst         Family History   Problem Relation Age of Onset   • Hypertension Mother    • Stroke Mother    • Cancer Mother    • Diabetes Sister    • Coronary artery disease Brother    • Diabetes Brother    • Valvular heart disease Brother         TAVR   • Coronary artery disease Brother    • Cancer Brother    • Birth defects Daughter    • Skin cancer Neg Hx        REVIEW OF SYSTEMS:   Pertinent positives are noted in the HPI above.  Otherwise, all other systems were reviewed, and are negative.     Objective:     Vitals:    04/10/23 0931 04/10/23 1204 04/10/23 1245 04/10/23 1649   BP: 144/77 152/77  156/85   BP Location: Left arm Left arm  Right arm   Patient Position: Sitting Sitting  Lying   Pulse: 88 84  82   Resp: 18 18  18   Temp: 97.8 °F (36.6 °C) 98.5 °F (36.9 °C)  98.2 °F (36.8 °C)   TempSrc: Oral Oral  Oral   SpO2: 93% 97%  93%   Weight:   67.1 kg (148 lb)    Height:   170.2 cm (67\")      Body mass index is 23.18 kg/m².  Flowsheet Rows    Flowsheet Row First Filed Value   Admission Height 170.2 cm (67\") Documented at 04/09/2023 1515   Admission Weight 63.5 kg (140 lb) Documented at 04/09/2023 1515           General:    No acute distress, alert but appears confused at times, pleasant                   Head:    Normocephalic, atraumatic.   Eyes:          Conjunctivae and sclerae normal, no icterus   Throat:   No oral lesions, no thrush, oral mucosa moist.    Neck:   Supple, trachea midline.   Lungs:     Clear to auscultation bilaterally     Heart:    Regular rhythm and normal rhythm.  III/VI SM RUSB and LUSB noted.   Abdomen:     Soft, non-tender, non-distended, positive bowel sounds.  "   Extremities:   No clubbing, cyanosis, or edema.     Pulses:   Pulses palpable and equal bilaterally.    Skin:   No bleeding or rash.   Neuro:   Occasional aphasia noted.   Psychiatric:   Normal mood and affect.         Lab Review:                Results from last 7 days   Lab Units 04/10/23  0518   SODIUM mmol/L 138   POTASSIUM mmol/L 4.1   CHLORIDE mmol/L 102   CO2 mmol/L 28.7   BUN mg/dL 15   CREATININE mg/dL 0.75   GLUCOSE mg/dL 99   CALCIUM mg/dL 9.9     Results from last 7 days   Lab Units 04/09/23  1514   HSTROP T ng/L 10*     Results from last 7 days   Lab Units 04/10/23  0518   WBC 10*3/mm3 5.84   HEMOGLOBIN g/dL 14.2   HEMATOCRIT % 45.4   PLATELETS 10*3/mm3 245     Results from last 7 days   Lab Units 04/09/23  1514   INR  1.20*   APTT seconds 39.0*     Results from last 7 days   Lab Units 04/10/23  0518   CHOLESTEROL mg/dL 163     Results from last 7 days   Lab Units 04/10/23  0518   MAGNESIUM mg/dL 1.7     Results from last 7 days   Lab Units 04/10/23  0518   CHOLESTEROL mg/dL 163   TRIGLYCERIDES mg/dL 125   HDL CHOL mg/dL 50   LDL CHOL mg/dL 91       EKG (reviewed by me personally):                          Assessment:   1.  Acute CVA  2.  Altered mental status, not accounted for by CVA  3.  Status post porcine AVR in 2007  4.  Paroxysmal atrial fibrillation, on Tikosyn and Eliquis  5.  Inadvertent medical noncompliance  6.  Hypertension  7.  Diabetes    Plan:       The patient told me that she does occasionally inadvertently miss medications, including Eliquis.  I suspect that this may be the cause for the stroke.  Aspirin is not needed from a cardiac standpoint, and I would simply keep her on the Eliquis at 5 mg twice a day.  I would also keep her on the dofetilide at the current dose.  Her renal function is normal, and her QT interval is not prolonged significantly on the EKG.  A repeat limited echocardiogram showed a normal bubble study.      She had elevated gradients across her bioprosthetic AVR  recently, although it was in the moderate range, and should not be enough to cause the shortness of breath.  Continue metoprolol 25 mg twice a day as well.  A CT scan of the chest has been ordered and is pending.    Thank you very much for this consult.    Forest Dia MD

## 2023-04-10 NOTE — THERAPY EVALUATION
Patient Name: Dior Pettit  : 1940    MRN: [id number]                              Today's Date: 4/10/2023       Admit Date: 2023    Visit Dx:     ICD-10-CM ICD-9-CM   1. Acute stroke due to ischemia  I63.9 434.91   2. Altered mental status, unspecified altered mental status type  R41.82 780.97     Patient Active Problem List   Diagnosis   • Essential hypertension   • Pulmonary hypertension (HCC)   • NNEKA (obstructive sleep apnea)   • Gastroesophageal reflux disease   • Anxiety   • Restless leg syndrome   • Controlled diabetes mellitus type 2 with complications   • Hypothyroidism   • Hypercholesteremia   • Seasonal allergic rhinitis   • Mild intermittent asthma without complication   • S/P AVR   • Dilated aortic root   • Thoracic aortic aneurysm without rupture    • Primary insomnia   • Renal insufficiency   • Macular degeneration of both eyes   • Coronary artery disease involving native coronary artery with angina pectoris   • Rectocele   • Nonrheumatic mitral valve stenosis   • Slow transit constipation   • Dizziness   • Daily headache   • Symptomatic bradycardia   • Closed displaced fracture of right femoral neck   • Atrial fibrillation, persistent   • Hip pain   • PAF (paroxysmal atrial fibrillation)   • Acute stroke due to ischemia   • Shortness of breath     Past Medical History:   Diagnosis Date   • Allergic rhinitis    • Anemia    • Anxiety     Controlled w/Meds   • Aortic root dilatation 10/02/2017    Borderline--Noted on Echo   • Aortic valve calcification 10/02/2017    Noted on Echo   • Aortic valve insufficiency Dx in     w/AVR    • Aortic valve prosthesis present 2018    Noted on CTA Chest   • Ascending aorta dilatation 10/02/2017    Borderline--Noted on Echo   • Asthma    • Atypical chest pain    • Breast pain, right Hx   • CAD (coronary artery disease)    • Cardiomegaly 2017   • Cervicalgia    • Chest pain due to CAD    • 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     Borderline--Noted on Echo & Measured @ 2.6CM and Mid Descending @ 2.5CM on CTA Chest-11/2/18   • DM (diabetes mellitus)     T2   • Esophagitis    • GERD (gastroesophageal reflux disease)     Controlled w/Meds   • Headache    • Health care maintenance    • Heart murmur    • History of echocardiogram 10/2/17-[facility]    EF 66%; Borderline Concentric Hypertrophy; Mild Calcification in AV; Mild AVS; Mild AVR; Moderate TVR; Borderline Dilation of Aortic Root/Arch & Borderline Dilation of Ascending/Proximal Aorta Present   • Hyperlipidemia     Controlled w/Meds   • Hypertension     Controlled w/Meds   • Hypothyroidism     Controlled w/Synthroid   • Insomnia    • Macular degeneration, left eye    • Mild aortic valve regurgitation 10/02/2017    Noted on Echo   • Mild aortic valve stenosis 10/02/2017    Noted on Echo   • Mild dilation of ascending aorta 10/02/2017    Borderline--Noted on Echo   • Moderate tricuspid valve regurgitation 10/02/2017    Noted on Echo   • Mood disorder in conditions classified elsewhere     Controlled w/Meds   • Near syncope 03/2017-[facility] ER   • Neuropathy    • NNEKA (obstructive sleep apnea)     Untreated   • Osteoarthritis     Ankles/Feet   • Pulmonary hypertension 2017   • Renal insufficiency    • RLS (restless legs syndrome)    • Thoracic ascending aortic aneurysm Dx in 2007 @ 3.8CM & 1/02/2018    Noted @ 4CM on CTA Chest;   • Visual impairment     macular degeneratuin     Past Surgical History:   Procedure Laterality Date   • AORTIC VALVE REPAIR/REPLACEMENT  2007    Dr. Perry   • APPENDECTOMY     • AUGMENTATION MAMMAPLASTY  1976   • BREAST AUGMENTATION  2014   • BUNIONECTOMY     • CARDIAC CATHETERIZATION  2007   • CARDIAC VALVE REPLACEMENT  2007    porcine   • COLONOSCOPY  2010   • COLONOSCOPY  03/02/2012   • EYE SURGERY      cataracts and ens implants   • HYSTERECTOMY  1972   • SIGMOIDOSCOPY  2001   • TOTAL HIP ARTHROPLASTY Right 11/15/2022    Procedure: Right anterior total hip arthroplasty;   Surgeon: Joao Martinez MD;  Location: [facility] OR;  Service: Orthopedics;  Laterality: Right;      General Information     Row Name 04/10/23 1333          OT Time and Intention    Document Type evaluation  -RB     Mode of Treatment occupational therapy  -RB     Row Name 04/10/23 1333          General Information    Patient Profile Reviewed yes  -RB     Prior Level of Function independent:;ADL's;transfer  pt was in the process of transitioning to a TODD for medication help only  -RB     Existing Precautions/Restrictions fall  -RB     Barriers to Rehab medically complex;cognitive status  -RB     Row Name 04/10/23 1333          Living Environment    People in Home alone  -RB     Row Name 04/10/23 1333          Cognition    Orientation Status (Cognition) oriented to;person  -RB     Row Name 04/10/23 1333          Safety Issues, Functional Mobility    Safety Issues Affecting Function (Mobility) safety precautions follow-through/compliance;safety precaution awareness;awareness of need for assistance;insight into deficits/self-awareness;judgment;problem-solving;sequencing abilities;impulsivity  -RB     Impairments Affecting Function (Mobility) balance;cognition;endurance/activity tolerance;sensation/sensory awareness  -RB           User Key  (r) = Recorded By, (t) = Taken By, (c) = Cosigned By    Initials Name Provider Type    RB Cammy Soria OT Occupational Therapist                 Mobility/ADL's     Row Name 04/10/23 1334          Bed Mobility    Bed Mobility supine-sit  -RB     Supine-Sit [place] (Bed Mobility) minimum assist (75% patient effort);verbal cues  -RB     Assistive Device (Bed Mobility) bed rails  -RB     Row Name 04/10/23 1334          Transfers    Transfers sit-stand transfer;stand-sit transfer;toilet transfer;bed-chair transfer  -RB     Row Name 04/10/23 1334          Bed-Chair Transfer    Bed-Chair [place] (Transfers) verbal cues;contact guard  -RB     Assistive Device (Bed-Chair  Transfers) walker, front-wheeled  -RB     Row Name 04/10/23 1334          Sit-Stand Transfer    Sit-Stand [place] (Transfers) contact guard;verbal cues  -RB     Assistive Device (Sit-Stand Transfers) walker, front-wheeled  -RB     Row Name 04/10/23 1334          Stand-Sit Transfer    Stand-Sit [place] (Transfers) contact guard;verbal cues  -RB     Row Name 04/10/23 1334          Toilet Transfer    Type (Toilet Transfer) sit-stand;stand-sit  -RB     [place] Level (Toilet Transfer) minimum assist (75% patient effort);verbal cues;nonverbal cues (demo/gesture)  -RB     Assistive Device (Toilet Transfer) grab bars/safety frame;walker, front-wheeled  -RB     Row Name 04/10/23 1334          Functional Mobility    Functional Mobility- Ind. Level contact guard assist;1 person;verbal cues required  -RB     Functional Mobility- Device walker, front-wheeled  -RB     Row Name 04/10/23 1334          Activities of Daily Living    BADL Assessment/Intervention lower body dressing;grooming;toileting  -RB     Row Name 04/10/23 1334          Lower Body Dressing Assessment/Training    [place] Level (Lower Body Dressing) lower body dressing skills;minimum assist (75% patient effort)  -RB     Position (Lower Body Dressing) edge of bed sitting  -RB     Row Name 04/10/23 1334          Grooming Assessment/Training    [place] Level (Grooming) grooming skills;minimum assist (75% patient effort);verbal cues  -RB     Position (Grooming) sink side;supported standing  -RB     Row Name 04/10/23 1334          Toileting Assessment/Training    [place] Level (Toileting) toileting skills;moderate assist (50% patient effort);verbal cues;nonverbal cues (demo/gesture)  -RB     Position (Toileting) supported sitting;supported standing  -RB           User Key  (r) = Recorded By, (t) = Taken By, (c) = Cosigned By    Initials Name Provider Type    Cammy Trinidad OT Occupational Therapist               Obj/Interventions      Row Name 04/10/23 1336          Sensory Assessment (Somatosensory)    Sensory Assessment R thumb numbness  -RB     Row Name 04/10/23 1336          Vision Assessment/Intervention    Vision Assessment Comment Pt hallucinating and also reports seeing double  -RB     Row Name 04/10/23 1336          Range of Motion Comprehensive    Comment, General Range of Motion BUE WFL  -RB     Row Name 04/10/23 1336          Strength Comprehensive (MMT)    Comment, General Manual Muscle Testing (MMT) Assessment Generalized weakness, BUE equal  -RB     Row Name 04/10/23 1336          Balance    Comment, Balance CGA/Min A for standing balance. The pt is impulsive and leaves her walker and reaches for household surfaces.  -RB           User Key  (r) = Recorded By, (t) = Taken By, (c) = Cosigned By    Initials Name Provider Type    RB Cammy Soria OT Occupational Therapist               Goals/Plan     Row Name 04/10/23 1339          Bed Mobility Goal 1 (OT)    Activity/Assistive Device (Bed Mobility Goal 1, OT) bed mobility activities, all  -RB     [place] Level/Cues Needed (Bed Mobility Goal 1, OT) supervision required  -RB     Time Frame (Bed Mobility Goal 1, OT) short term goal (STG);2 weeks  -RB     Progress/Outcomes (Bed Mobility Goal 1, OT) continuing progress toward goal  -RB     Row Name 04/10/23 1339          Transfer Goal 1 (OT)    Activity/Assistive Device (Transfer Goal 1, OT) transfers, all  -RB     [place] Level/Cues Needed (Transfer Goal 1, OT) supervision required  -RB     Time Frame (Transfer Goal 1, OT) short term goal (STG);2 weeks  -RB     Progress/Outcome (Transfer Goal 1, OT) continuing progress toward goal  -RB     Row Name 04/10/23 1339          Dressing Goal 1 (OT)    Activity/Device (Dressing Goal 1, OT) dressing skills, all  -RB     [place]/Cues Needed (Dressing Goal 1, OT) supervision required  -RB     Time Frame (Dressing Goal 1, OT) short term goal (STG);2 weeks  -RB     Progress/Outcome  (Dressing Goal 1, OT) continuing progress toward goal  -RB     Row Name 04/10/23 1339          Toileting Goal 1 (OT)    Activity/Device (Toileting Goal 1, OT) toileting skills, all  -RB     [place] Level/Cues Needed (Toileting Goal 1, OT) supervision required  -RB     Time Frame (Toileting Goal 1, OT) short term goal (STG);2 weeks  -RB     Progress/Outcome (Toileting Goal 1, OT) continuing progress toward goal  -RB     Row Name 04/10/23 1339          Grooming Goal 1 (OT)    Activity/Device (Grooming Goal 1, OT) grooming skills, all  -RB     [place] (Grooming Goal 1, OT) supervision required  -RB     Time Frame (Grooming Goal 1, OT) short term goal (STG);2 weeks  -RB     Progress/Outcome (Grooming Goal 1, OT) continuing progress toward goal  -RB     Row Name 04/10/23 1339          Therapy Assessment/Plan (OT)    Planned Therapy Interventions (OT) transfer/mobility retraining;strengthening exercise;ROM/therapeutic exercise;activity tolerance training;adaptive equipment training;BADL retraining;functional balance retraining;occupation/activity based interventions;patient/caregiver education/training;cognitive/visual perception retraining  -RB           User Key  (r) = Recorded By, (t) = Taken By, (c) = Cosigned By    Initials Name Provider Type    RB Cammy Soria, OT Occupational Therapist               Clinical Impression     Row Name 04/10/23 1335          Pain Assessment    Pretreatment Pain Rating 0/10 - no pain  -RB     Posttreatment Pain Rating 0/10 - no pain  -RB     Row Name 04/10/23 1338          Plan of Care Review    Plan of Care Reviewed With patient;daughter  -RB     Progress no change  -RB     Row Name 04/10/23 1337          Therapy Assessment/Plan (OT)    Rehab Potential (OT) good, to achieve stated therapy goals  -RB     Criteria for Skilled Therapeutic Interventions Met (OT) yes;skilled treatment is necessary  -RB     Therapy Frequency (OT) 5 times/wk  -RB     Row Name 04/10/23 2391           Therapy Plan Review/Discharge Plan (OT)    Anticipated Discharge Disposition (OT) skilled nursing facility;home with 24/7 care;assisted living  -RB     Row Name 04/10/23 1338          Vital Signs    O2 Delivery Pre Treatment room air  -RB     O2 Delivery Intra Treatment room air  -RB     O2 Delivery Post Treatment room air  -RB     Pre Patient Position Supine  -RB     Intra Patient Position Standing  -RB     Post Patient Position Sitting  -RB     Row Name 04/10/23 1338          Positioning and Restraints    Pre-Treatment Position in bed  -RB     Post Treatment Position chair  -RB     In Chair notified nsg;reclined;sitting;call light within reach;encouraged to call for assist;exit alarm on;with family/caregiver;legs elevated  -RB           User Key  (r) = Recorded By, (t) = Taken By, (c) = Cosigned By    Initials Name Provider Type    RB Cammy Soria, [place] Occupational Therapist               Outcome Measures     Row Name 04/10/23 1339          How much help from another is currently needed...    Putting on and taking off regular lower body clothing? 1  -RB     Bathing (including washing, rinsing, and drying) 1  -RB     Toileting (which includes using toilet bed pan or urinal) 2  -RB     Putting on and taking off regular upper body clothing 2  -RB     Taking care of personal grooming (such as brushing teeth) 3  -RB     Eating meals 3  -RB     AM-PAC 6 Clicks Score (OT) 12  -RB     Row Name 04/10/23 1125 04/10/23 0805       How much help from another person do you currently need...    Turning from your back to your side while in flat bed without using bedrails? 4 (P)   -ZB 3  -LC    Moving from lying on back to sitting on the side of a flat bed without bedrails? 4 (P)   -ZB 3  -LC    Moving to and from a bed to a chair (including a wheelchair)? 3 (P)   -ZB 3  -LC    Standing up from a chair using your arms (e.g., wheelchair, bedside chair)? 3 (P)   -ZB 3  -LC    Climbing 3-5 steps with a railing? 2 (P)   -ZB  3  -LC    To walk in hospital room? 3 (P)   -ZB 3  -LC    AM-PAC 6 Clicks Score (PT) 19 (P)   -ZB 18  -LC    Highest level of mobility 6 --> Walked 10 steps or more (P)   -ZB 6 --> Walked 10 steps or more  -LC    Row Name 04/10/23 1339 04/10/23 1125       Modified Barbara Scale    Modified Barbara Scale 4 - Moderately severe disability.  Unable to walk without assistance, and unable to attend to own bodily needs without assistance.  -RB 3 - Moderate disability.  Requiring some help, but able to walk without assistance. (P)   -ZB    Row Name 04/10/23 1339 04/10/23 1125       Functional Assessment    Outcome Measure Options AM-PAC 6 Clicks Daily Activity (OT);Modified Barbara  -RB AM-PAC 6 Clicks Basic Mobility (PT);Modified [place] (P)   -ZB          User Key  (r) = Recorded By, (t) = Taken By, (c) = Cosigned By    Initials Name Provider Type    RB Cammy Soria, OT Occupational Therapist    Raquel Mariscal, RN Registered Nurse    Perez Dominguez, PT Student PT Student                Occupational Therapy Education     Title: PT OT SLP Therapies (In Progress)     Topic: Occupational Therapy (Not Started)     Point: ADL training (Not Started)     Description:   Instruct learner(s) on proper safety adaptation and remediation techniques during self care or transfers.   Instruct in proper use of assistive devices.              Learner Progress:  Not documented in this visit.          Point: Home exercise program (Not Started)     Description:   Instruct learner(s) on appropriate technique for monitoring, assisting and/or progressing therapeutic exercises/activities.              Learner Progress:  Not documented in this visit.          Point: Precautions (Not Started)     Description:   Instruct learner(s) on prescribed precautions during self-care and functional transfers.              Learner Progress:  Not documented in this visit.          Point: Body mechanics (Not Started)     Description:   Instruct learner(s) on  proper positioning and spine alignment during self-care, functional mobility activities and/or exercises.              Learner Progress:  Not documented in this visit.                          OT Recommendation and Plan  Planned Therapy Interventions (OT): transfer/mobility retraining, strengthening exercise, ROM/therapeutic exercise, activity tolerance training, adaptive equipment training, BADL retraining, functional balance retraining, occupation/activity based interventions, patient/caregiver education/training, cognitive/visual perception retraining  Therapy Frequency (OT): 5 times/wk  Plan of Care Review  Plan of Care Reviewed With: patient, daughter  Progress: no change     Time Calculation:    Time Calculation- OT     Row Name 04/10/23 1331             Time Calculation- OT    OT Start Time 0900  -RB      OT Stop Time 0930  -RB      OT Time Calculation (min) 30 min  -RB      Total Timed Code Minutes- OT 23 minute(s)  -RB      OT Received On 04/10/23  -RB      OT - Next Appointment 04/11/23  -RB      OT Goal Re-Cert Due Date 04/24/23  -RB         Timed Charges    63846 - OT Self Care/Mgmt Minutes 23  -RB         Untimed Charges    OT Eval/Re-eval Minutes 7  -RB         Total Minutes    Timed Charges Total Minutes 23  -RB      Untimed Charges Total Minutes 7  -RB       Total Minutes 30  -RB            User Key  (r) = Recorded By, (t) = Taken By, (c) = Cosigned By    Initials Name Provider Type    RB Cammy Soria OT Occupational Therapist              Therapy Charges for Today     Code Description Service Date Service Provider Modifiers Qty    [id number]  OT SELF CARE/MGMT/TRAIN EA 15 MIN 4/10/2023 Cammy Soria OT GO 2    [id number] HC OT EVAL MOD COMPLEXITY 2 4/10/2023 Cammy Soria OT GO 1               Cammy Soria OT  4/10/2023

## 2023-04-10 NOTE — ED NOTES
Nursing report ED to floor  Dior Pettit  82 y.o.  female    HPI (triage note):   Chief Complaint   Patient presents with    Extremity Weakness       Admitting doctor:   Justin Mason MD    Admitting diagnosis:   There were no encounter diagnoses.    Code status:   Current Code Status       Date Active Code Status Order ID Comments User Context       Prior            Allergies:   Patient has no known allergies.    Past Medical History:  Past Medical History:   Diagnosis Date    Allergic rhinitis     Anemia     Anxiety     Controlled w/Meds    Aortic root dilatation 10/02/2017    Borderline--Noted on Echo    Aortic valve calcification 10/02/2017    Noted on Echo    Aortic valve insufficiency Dx in 07    w/AVR     Aortic valve prosthesis present 11/02/2018    Noted on CTA Chest    Ascending aorta dilatation 10/02/2017    Borderline--Noted on Echo    Asthma     Atypical chest pain     Breast pain, right Hx    CAD (coronary artery disease)     Cardiomegaly 2017    Cervicalgia     Chest pain due to CAD     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    Borderline--Noted on Echo & Measured @ 2.6CM and Mid Descending @ 2.5CM on CTA Chest-11/2/18    DM (diabetes mellitus)     T2    Esophagitis     GERD (gastroesophageal reflux disease)     Controlled w/Meds    Headache     Health care maintenance     Heart murmur     [facility] of echocardiogram 10/2/17-[facility]    EF 66%; Borderline Concentric Hypertrophy; Mild Calcification in AV; Mild AVS; Mild AVR; Moderate TVR; Borderline Dilation of Aortic Root/Arch & Borderline Dilation of Ascending/Proximal Aorta Present    Hyperlipidemia     Controlled w/Meds    Hypertension     Controlled w/Meds    Hypothyroidism     Controlled w/Synthroid    Insomnia     Macular degeneration, left eye     Mild aortic valve regurgitation 10/02/2017    Noted on Echo    Mild aortic valve stenosis 10/02/2017    Noted on Echo    Mild dilation of ascending aorta 10/02/2017    Borderline--Noted on Echo     Moderate tricuspid valve regurgitation 10/02/2017    Noted on Echo    Mood disorder in conditions classified elsewhere     Controlled w/Meds    Near syncope 03/2017-[facility]    Neuropathy     NNEKA (obstructive sleep apnea)     Untreated    Osteoarthritis     Ankles/Feet    Pulmonary hypertension 2017    Renal insufficiency     RLS (restless legs syndrome)     Thoracic ascending aortic aneurysm Dx in 2007 @ 3.8CM & 1/02/2018    Noted @ 4CM on CTA Chest;    Visual impairment     macular degeneratuin        Weight:       04/09/23  1515   Weight: 63.5 kg (140 lb)       Most recent vitals:   Vitals:    04/09/23 1531 04/09/23 1601 04/09/23 1846 04/09/23 2011   BP: 142/86 (!) 149/106 (!) 153/121 (!) 154/103   Pulse: 106 98 106 107   Resp:       Temp:       SpO2: 96% 94%  98%   Weight:       Height:           Active LDAs/IV Access:   Lines, Drains & Airways       Active LDAs       Name Placement date Placement time Site Days    Peripheral IV 04/09/23 1513 Anterior;Left Forearm 04/09/23  1513  Forearm  less than 1    External Urinary Catheter 11/15/22  1336  --  145                    Labs (abnormal labs have a star):   Labs Reviewed   COMPREHENSIVE METABOLIC PANEL - Abnormal; Notable for the following components:       Result Value    Sodium 134 (*)     Calcium 11.1 (*)     All other components within normal limits    Narrative:     GFR Normal >60  Chronic Kidney Disease <60  Kidney Failure <15    The GFR formula is only valid for adults with stable renal function between ages 18 and 70.   PROTIME-INR - Abnormal; Notable for the following components:    Protime 15.4 (*)     INR 1.20 (*)     All other components within normal limits   APTT - Abnormal; Notable for the following components:    PTT 39.0 (*)     All other components within normal limits   SINGLE HSTROPONIN T - Abnormal; Notable for the following components:    HS Troponin T 10 (*)     All other components within normal limits    Narrative:     High Sensitive  Troponin T Reference Range:  <10.0 ng/L- Negative Female for AMI  <15.0 ng/L- Negative Male for AMI  >=10 - Abnormal Female indicating possible myocardial injury.  >=15 - Abnormal Male indicating possible myocardial injury.   Clinicians would have to utilize clinical acumen, EKG, Troponin, and serial changes to determine if it is an Acute Myocardial Infarction or myocardial injury due to an underlying chronic condition.        CBC WITH AUTO DIFFERENTIAL - Abnormal; Notable for the following components:    RBC 5.52 (*)     Hematocrit 48.7 (*)     MCHC 31.4 (*)     Monocyte % 12.7 (*)     Monocytes, Absolute 0.91 (*)     All other components within normal limits   MAGNESIUM - Abnormal; Notable for the following components:    Magnesium 1.4 (*)     All other components within normal limits   URINALYSIS W/ MICROSCOPIC IF INDICATED (NO CULTURE) - Normal    Narrative:     Urine microscopic not indicated.   POCT GLUCOSE FINGERSTICK - Normal   RAINBOW DRAW    Narrative:     The following orders were created for panel order [place] Draw.  Procedure                               Abnormality         Status                     ---------                               -----------         ------                     Green Top (Gel)[[id number]]                                  Final result               Lavender Top[[id number]]                                     Final result               Gold Top - SST[[id number]]                                   Final result               Light Blue Top[[id number]]                                   Final result                 Please view results for these tests on the individual orders.   POCT GLUCOSE FINGERSTICK   TYPE AND SCREEN   CBC AND DIFFERENTIAL    Narrative:     The following orders were created for panel order CBC & Differential.  Procedure                               Abnormality         Status                     ---------                               -----------         ------                      CBC Auto Differential[[id number]]        Abnormal            Final result                 Please view results for these tests on the individual orders.   GREEN TOP   LAVENDER TOP   GOLD TOP - SST   LIGHT BLUE TOP       EKG:   ECG 12 Lead ED Triage Standing Order; Acute Stroke (Onset <24 hrs)   Final Result   HEART RATE= 108  bpm   RR Interval= 556  ms   [place] Interval= 143  ms   P Horizontal Axis= 247  deg   P Front Axis= -52  deg   QRSD Interval= 94  ms   QT Interval= 346  ms   QRS Axis= -51  deg   T Wave Axis= 90  deg   - ABNORMAL ECG -   Sinus or ectopic atrial tachycardia   Left anterior fascicular block   Abnormal R-wave progression, late transition   Left ventricular hypertrophy   Nonspecific T abnormalities, lateral leads   No change from previous tracing   Electronically Signed By: Saad BaptisteSTEFFEN) ([facility]) 09-Apr-2023 20:07:57   Date and Time of Study: 2023-04-09 15:17:23      ECG 12 Lead Other; Possible stroke    (Results Pending)       Meds given in ED:   Medications   sodium chloride 0.9 % flush 10 mL (has no administration in time range)   sodium chloride 0.9 % flush 10 mL (has no administration in time range)   sodium chloride 0.9 % infusion (125 mL/hr Intravenous Currently Infusing 4/9/23 2014)   sodium chloride 0.9 % bolus 500 mL (0 mL Intravenous Stopped 4/9/23 1609)   LORazepam (ATIVAN) injection 0.25 mg (0.25 mg Intravenous Given 4/9/23 1640)   gadobenate dimeglumine (MULTIHANCE) injection 13 mL (13 mL Intravenous Given 4/9/23 1754)   aspirin tablet 325 mg (325 mg Oral Given 4/9/23 2013)       Imaging results:  MRI Angiogram Head Without Contrast    Result Date: 4/9/2023  The study is hampered by patient motion. There is a 2-3 mm acute cortical infarct involving the left middle frontal gyrus superiorly. Mild-to-moderate small vessel ischemic disease is noted.   MRA of the head without contrast:  The study is hampered by patient motion. There is signal present within the distal aspect of  the vertebral arteries bilaterally. The left vertebral artery is larger than that of the right. The basilar artery and the proximal aspects of the posterior cerebral arteries appear unremarkable. The distal aspects of the internal carotid arteries are of normal caliber. The right A1 segment is hypoplastic. The proximal aspects of the anterior and middle cerebral arteries appear unremarkable.  IMPRESSION: Study is hampered by patient motion. The right A1 segment is hypoplastic. The proximal aspects of the anterior, middle and posterior cerebral arteries appear otherwise unremarkable.   MRA of the neck with and without contrast:  The great vessels are arranged in a classic configuration. There is 0% stenosis of the internal carotid arteries using NASCET criteria. It is slightly larger than that of the right. There is no evidence of a vertebral ostial stenosis. The cervical segments of the vertebral arteries are of relatively uniform caliber.  IMPRESSION: There is 0% stenosis of the internal carotid arteries using NASCET criteria.      MRI Angiogram Neck With & Without Contrast    Result Date: 4/9/2023  The study is hampered by patient motion. There is a 2-3 mm acute cortical infarct involving the left middle frontal gyrus superiorly. Mild-to-moderate small vessel ischemic disease is noted.   MRA of the head without contrast:  The study is hampered by patient motion. There is signal present within the distal aspect of the vertebral arteries bilaterally. The left vertebral artery is larger than that of the right. The basilar artery and the proximal aspects of the posterior cerebral arteries appear unremarkable. The distal aspects of the internal carotid arteries are of normal caliber. The right A1 segment is hypoplastic. The proximal aspects of the anterior and middle cerebral arteries appear unremarkable.  IMPRESSION: Study is hampered by patient motion. The right A1 segment is hypoplastic. The proximal aspects of the  anterior, middle and posterior cerebral arteries appear otherwise unremarkable.   MRA of the neck with and without contrast:  The great vessels are arranged in a classic configuration. There is 0% stenosis of the internal carotid arteries using NASCET criteria. It is slightly larger than that of the right. There is no evidence of a vertebral ostial stenosis. The cervical segments of the vertebral arteries are of relatively uniform caliber.  IMPRESSION: There is 0% stenosis of the internal carotid arteries using NASCET criteria.      MRI Brain With & Without Contrast    Result Date: 4/9/2023  The study is hampered by patient motion. There is a 2-3 mm acute cortical infarct involving the left middle frontal gyrus superiorly. Mild-to-moderate small vessel ischemic disease is noted.   MRA of the head without contrast:  The study is hampered by patient motion. There is signal present within the distal aspect of the vertebral arteries bilaterally. The left vertebral artery is larger than that of the right. The basilar artery and the proximal aspects of the posterior cerebral arteries appear unremarkable. The distal aspects of the internal carotid arteries are of normal caliber. The right A1 segment is hypoplastic. The proximal aspects of the anterior and middle cerebral arteries appear unremarkable.  IMPRESSION: Study is hampered by patient motion. The right A1 segment is hypoplastic. The proximal aspects of the anterior, middle and posterior cerebral arteries appear otherwise unremarkable.   MRA of the neck with and without contrast:  The great vessels are arranged in a classic configuration. There is 0% stenosis of the internal carotid arteries using NASCET criteria. It is slightly larger than that of the right. There is no evidence of a vertebral ostial stenosis. The cervical segments of the vertebral arteries are of relatively uniform caliber.  IMPRESSION: There is 0% stenosis of the internal carotid arteries using  NASCET criteria.       Ambulatory status:   - stand by    Social issues:   Social History     Socioeconomic History    Marital status:    Tobacco Use    Smoking status: Never    Smokeless tobacco: Never    Tobacco comments:     caffeine use - 1 cup coffee daily    Vaping Use    Vaping Use: Never used   Substance and Sexual Activity    Alcohol use: Yes     Comment: less than 1 glass of wine    Drug use: Never    Sexual activity: Not Currently     Partners: Male     Birth control/protection: Surgical     Comment: Hyst          NIH Stroke Scale:  Interval: 24 hrs post onset of symptoms +/- 20 mins      Vitor Owens RN  04/09/23 21:12 EDT

## 2023-04-10 NOTE — CONSULTS
Neurology Consult Note  Consult Date: 4/10/2023  Referring MD: No ref. provider found  Reason for Consult I have been asked to see the patient in neurological consultation to render advice and opinion regarding new AIS    Dior Pettit is a 82 y.o. female with a medical history of HTN, HLD, DM, NNEKA, hypothyroidism, and atrial fibrillation on eliquis, presenting to the stroke service for evaluation of left frontal punctate stroke.     Briefly, pt presented to the ED for evaluation of confusion and right sided numbness and tingling. LKW unclear but it seems pt has not been at her mental status baseline for apprx two weeks. In the setting of this confusion pt was noncompliant with her medications including her eliquis. She was admitted and MRI brain found her to have an acute left frontal punctate stroke. MRA without large vessel occlusion or flow limiting stenosis.     Past Medical/Surgical Hx:  Past Medical History:   Diagnosis Date   • Allergic rhinitis    • Anemia    • Anxiety     Controlled w/Meds   • Aortic root dilatation 10/02/2017    Borderline--Noted on Echo   • Aortic valve calcification 10/02/2017    Noted on Echo   • Aortic valve insufficiency Dx in 07    w/AVR    • Aortic valve prosthesis present 11/02/2018    Noted on CTA Chest   • Ascending aorta dilatation 10/02/2017    Borderline--Noted on Echo   • Asthma    • Atypical chest pain    • Breast pain, right Hx   • CAD (coronary artery disease)    • Cardiomegaly 2017   • Cervicalgia    • Chest pain due to CAD    • 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    Borderline--Noted on Echo & Measured @ 2.6CM and Mid Descending @ 2.5CM on CTA Chest-11/2/18   • DM (diabetes mellitus)     T2   • Esophagitis    • GERD (gastroesophageal reflux disease)     Controlled w/Meds   • Headache    • Health care maintenance    • Heart murmur    • History of echocardiogram 10/2/17-[facility]    EF 66%; Borderline Concentric Hypertrophy; Mild Calcification in AV; Mild AVS; Mild  AVR; Moderate TVR; Borderline Dilation of Aortic Root/Arch & Borderline Dilation of Ascending/Proximal Aorta Present   • Hyperlipidemia     Controlled w/Meds   • Hypertension     Controlled w/Meds   • Hypothyroidism     Controlled w/Synthroid   • Insomnia    • Macular degeneration, left eye    • Mild aortic valve regurgitation 10/02/2017    Noted on Echo   • Mild aortic valve stenosis 10/02/2017    Noted on Echo   • Mild dilation of ascending aorta 10/02/2017    Borderline--Noted on Echo   • Moderate tricuspid valve regurgitation 10/02/2017    Noted on Echo   • Mood disorder in conditions classified elsewhere     Controlled w/Meds   • Near syncope 03/2017-[facility] ER   • Neuropathy    • NNEKA (obstructive sleep apnea)     Untreated   • Osteoarthritis     Ankles/Feet   • Pulmonary hypertension 2017   • Renal insufficiency    • RLS (restless legs syndrome)    • Thoracic ascending aortic aneurysm Dx in 2007 @ 3.8CM & 1/02/2018    Noted @ 4CM on CTA Chest;   • Visual impairment     macular degeneratuin     Past Surgical History:   Procedure Laterality Date   • AORTIC VALVE REPAIR/REPLACEMENT  2007    Dr. Perry   • APPENDECTOMY     • AUGMENTATION MAMMAPLASTY  1976   • BREAST AUGMENTATION  2014   • BUNIONECTOMY     • CARDIAC CATHETERIZATION  2007   • CARDIAC VALVE REPLACEMENT  2007    porcine   • COLONOSCOPY  2010   • COLONOSCOPY  03/02/2012   • EYE SURGERY      cataracts and ens implants   • HYSTERECTOMY  1972   • SIGMOIDOSCOPY  2001   • TOTAL HIP ARTHROPLASTY Right 11/15/2022    Procedure: Right anterior total hip arthroplasty;  Surgeon: Joao Martinez MD;  Location: [facility];  Service: Orthopedics;  Laterality: Right;     Medications On Admission  Medications Prior to Admission   Medication Sig Dispense Refill Last Dose   • acetaminophen (TYLENOL) 325 MG tablet Take 2 tablets by mouth Every 4 (Four) Hours As Needed for Mild Pain.      • amitriptyline (ELAVIL) 50 MG tablet Take 1 tablet by mouth Every Night. 90  tablet 1 4/8/2023   • ascorbic acid (VITAMIN C) 1000 MG tablet Take 1 tablet by mouth Daily.   4/9/2023   • atorvastatin (LIPITOR) 20 MG tablet TAKE 1 TABLET EVERY NIGHT 90 tablet 1 4/8/2023   • azelastine (ASTELIN) 0.1 % nasal spray 2 sprays into the nostril(s) as directed by provider 2 (Two) Times a Day As Needed for Allergies or Rhinitis. Use in each nostril as directed      • dofetilide (Tikosyn) 250 MCG capsule Take 1 capsule by mouth Every 12 (Twelve) Hours. 180 capsule 1 4/9/2023   • Eliquis 5 MG tablet tablet TAKE 1 TABLET BY MOUTH EVERY TWELVE HOURS 180 tablet 0 4/9/2023 at 0900   • fenofibrate micronized (LOFIBRA) 134 MG capsule Take 1 capsule by mouth Every Morning Before Breakfast. 90 capsule 3 4/9/2023   • glucosamine-chondroitin 500-400 MG capsule capsule Take 2 capsules by mouth Daily.   4/9/2023   • levothyroxine (SYNTHROID, LEVOTHROID) 50 MCG tablet TAKE 1 TABLET EVERY OTHER  DAY 45 tablet 1 4/9/2023   • levothyroxine (SYNTHROID, LEVOTHROID) 75 MCG tablet TAKE ONE TABLET EVERY OTHER DAY 45 tablet 3 4/8/2023   • losartan (COZAAR) 50 MG tablet TAKE 1 TABLET DAILY 90 tablet 1 4/9/2023   • metFORMIN ER (GLUCOPHAGE-XR) 750 MG 24 hr tablet Take 1 tablet by mouth 2 (Two) Times a Day. 180 tablet 3 4/9/2023   • metoprolol tartrate (LOPRESSOR) 50 MG tablet Take 0.5 tablets by mouth 2 (Two) Times a Day. 90 tablet 2 4/9/2023   • Multiple Vitamins-Minerals (MULTIVITAL PO) Take  by mouth Daily.   4/9/2023   • omeprazole (priLOSEC) 40 MG capsule TAKE 1 CAPSULE DAILY 90 capsule 1 4/9/2023   • valACYclovir (VALTREX) 1000 MG tablet TAKE 2 TABLETS 4 TIMES     DAILY FOR MOUTH ULCER (Patient taking differently: TAKE 2 TABLETS 4 TIMES     DAILY FOR MOUTH ULCER as needed) 16 tablet 4    • venlafaxine XR (Effexor XR) 75 MG 24 hr capsule Take 1 capsule by mouth Daily. 90 capsule 1 4/9/2023   • venlafaxine XR (EFFEXOR-XR) 150 MG 24 hr capsule TAKE 1 CAPSULE DAILY 90 capsule 3 4/9/2023   • bisacodyl (DULCOLAX) 10 MG  "suppository Insert 1 suppository into the rectum Daily As Needed for Constipation.      • Blood Glucose Monitoring Suppl (Prodigy No Coding Blood Gluc) w/Device kit 1 each Daily. 1 kit 0    • glucose blood (Prodigy No Coding Blood Gluc) test strip TEST BLOOD SUGAR DAILY 50 each 0    • Prodigy Safety Lancets 26G misc CHECK BLOOD SUGAR ONE TIME PER  each 1      Allergies:  No Known Allergies  Social Hx:  Social History     Socioeconomic History   • Marital status:    Tobacco Use   • Smoking status: Never   • Smokeless tobacco: Never   • Tobacco comments:     caffeine use - 1 cup coffee daily    Vaping Use   • Vaping Use: Never used   Substance and Sexual Activity   • Alcohol use: Yes     Comment: less than 1 glass of wine   • Drug use: Never   • Sexual activity: Not Currently     Partners: Male     Birth control/protection: Surgical     Comment: Hyst     Family Hx:  Family History   Problem Relation Age of Onset   • Hypertension Mother    • Stroke Mother    • Cancer Mother    • Diabetes Sister    • Coronary artery disease Brother    • Diabetes Brother    • Valvular heart disease Brother         TAVR   • Coronary artery disease Brother    • Cancer Brother    • Birth defects Daughter    • Skin cancer Neg Hx      Review of Systems  Exam  /77 (BP Location: Left arm, Patient Position: Sitting)   Pulse 84   Temp 98.5 °F (36.9 °C) (Oral)   Resp 18   Ht 170.2 cm (67\")   Wt 67.1 kg (148 lb)   LMP  (LMP Unknown)   SpO2 97%   BMI 23.18 kg/m²     Current Facility-Administered Medications:   •  acetaminophen (TYLENOL) tablet 650 mg, 650 mg, Oral, Q4H PRN, 650 mg at 04/09/23 0386 **OR** acetaminophen (TYLENOL) suppository 650 mg, 650 mg, Rectal, Q4H PRN, Ortega Morgan, APRN  •  amitriptyline (ELAVIL) tablet 50 mg, 50 mg, Oral, Nightly, Abby Jurado APRN, 50 mg at 04/10/23 0134  •  apixaban (ELIQUIS) tablet 5 mg, 5 mg, Oral, BID, Abby Jurado APRN, 5 mg at 04/10/23 0939  •  aspirin " tablet 325 mg, 325 mg, Oral, Daily, 325 mg at 04/10/23 0938 **OR** aspirin suppository 300 mg, 300 mg, Rectal, Daily, Ortega Morgan APRN  •  atorvastatin (LIPITOR) tablet 80 mg, 80 mg, Oral, Nightly, Ortega Morgan APRN, 80 mg at 04/09/23 2223  •  bisacodyl (DULCOLAX) suppository 10 mg, 10 mg, Rectal, Daily PRN, Ortega Morgan APRN  •  dextrose (D50W) (25 g/50 mL) IV injection 25 g, 25 g, Intravenous, Q15 Min PRN, Abby Jurado APRN  •  dextrose (GLUTOSE) oral gel 15 g, 15 g, Oral, Q15 Min PRN, Abby Jurado APRN  •  dofetilide (TIKOSYN) capsule 250 mcg, 250 mcg, Oral, BID, Abby Jurado APRN, 250 mcg at 04/10/23 0939  •  glucagon (GLUCAGEN) injection 1 mg, 1 mg, Intramuscular, Q15 Min PRN, Abby Jurado APRN  •  insulin lispro (ADMELOG) injection 0-7 Units, 0-7 Units, Subcutaneous, TID AC, Abby Jurado APRN  •  labetalol (NORMODYNE,TRANDATE) injection 10 mg, 10 mg, Intravenous, Q10 Min PRN, Ortega Morgan APRN  •  [START ON 4/11/2023] levothyroxine (SYNTHROID, LEVOTHROID) tablet 50 mcg, 50 mcg, Oral, Every Other Day, Abby Jurado APRN  •  levothyroxine (SYNTHROID, LEVOTHROID) tablet 75 mcg, 75 mcg, Oral, Every Other Day, Abby Jurado APRN, 75 mcg at 04/10/23 0938  •  losartan (COZAAR) tablet 50 mg, 50 mg, Oral, Daily, Abby Jurado APRN, 50 mg at 04/10/23 0939  •  metoprolol tartrate (LOPRESSOR) tablet 25 mg, 25 mg, Oral, BID, Abby Jurado APRN, 25 mg at 04/10/23 0939  •  ondansetron (ZOFRAN) injection 4 mg, 4 mg, Intravenous, Q6H PRN, Ortega Morgan APRN  •  sodium chloride 0.9 % flush 10 mL, 10 mL, Intravenous, PRN, Kelby Alford MD  •  Insert Peripheral IV, , , Once **AND** sodium chloride 0.9 % flush 10 mL, 10 mL, Intravenous, PRN, Kelby Alford MD  •  sodium chloride 0.9 % infusion, 75 mL/hr, Intravenous, Continuous, Ortega Morgan APRN, Last Rate: 75 mL/hr at 04/09/23 2225, 75 mL/hr at 04/09/23 2225  •   venlafaxine XR (EFFEXOR-XR) 24 hr capsule 150 mg, 150 mg, Oral, Daily, Abby Jurado APRN, 150 mg at 04/10/23 0938  •  venlafaxine XR (EFFEXOR-XR) 24 hr capsule 75 mg, 75 mg, Oral, Daily, Abby Jurado, APRN, 75 mg at 04/10/23 0941    PRN meds  •  acetaminophen **OR** acetaminophen  •  bisacodyl  •  dextrose  •  dextrose  •  glucagon (human recombinant)  •  labetalol  •  ondansetron  •  sodium chloride  •  Insert Peripheral IV **AND** sodium chloride    No current facility-administered medications on file prior to encounter.     Current Outpatient Medications on File Prior to Encounter   Medication Sig   • acetaminophen (TYLENOL) 325 MG tablet Take 2 tablets by mouth Every 4 (Four) Hours As Needed for Mild Pain.   • amitriptyline (ELAVIL) 50 MG tablet Take 1 tablet by mouth Every Night.   • ascorbic acid (VITAMIN C) 1000 MG tablet Take 1 tablet by mouth Daily.   • atorvastatin (LIPITOR) 20 MG tablet TAKE 1 TABLET EVERY NIGHT   • azelastine (ASTELIN) 0.1 % nasal spray 2 sprays into the nostril(s) as directed by provider 2 (Two) Times a Day As Needed for Allergies or Rhinitis. Use in each nostril as directed   • dofetilide (Tikosyn) 250 MCG capsule Take 1 capsule by mouth Every 12 (Twelve) Hours.   • Eliquis 5 MG tablet tablet TAKE 1 TABLET BY MOUTH EVERY TWELVE HOURS   • fenofibrate micronized (LOFIBRA) 134 MG capsule Take 1 capsule by mouth Every Morning Before Breakfast.   • glucosamine-chondroitin 500-400 MG capsule capsule Take 2 capsules by mouth Daily.   • levothyroxine (SYNTHROID, LEVOTHROID) 50 MCG tablet TAKE 1 TABLET EVERY OTHER  DAY   • levothyroxine (SYNTHROID, LEVOTHROID) 75 MCG tablet TAKE ONE TABLET EVERY OTHER DAY   • losartan (COZAAR) 50 MG tablet TAKE 1 TABLET DAILY   • metFORMIN ER (GLUCOPHAGE-XR) 750 MG 24 hr tablet Take 1 tablet by mouth 2 (Two) Times a Day.   • metoprolol tartrate (LOPRESSOR) 50 MG tablet Take 0.5 tablets by mouth 2 (Two) Times a Day.   • Multiple Vitamins-Minerals  (MULTIVITAL PO) Take  by mouth Daily.   • omeprazole (priLOSEC) 40 MG capsule TAKE 1 CAPSULE DAILY   • valACYclovir (VALTREX) 1000 MG tablet TAKE 2 TABLETS 4 TIMES     DAILY FOR MOUTH ULCER (Patient taking differently: TAKE 2 TABLETS 4 TIMES     DAILY FOR MOUTH ULCER as needed)   • venlafaxine XR (Effexor XR) 75 MG 24 hr capsule Take 1 capsule by mouth Daily.   • venlafaxine XR (EFFEXOR-XR) 150 MG 24 hr capsule TAKE 1 CAPSULE DAILY   • bisacodyl (DULCOLAX) 10 MG suppository Insert 1 suppository into the rectum Daily As Needed for Constipation.   • Blood Glucose Monitoring Suppl (Prodigy No Coding Blood Gluc) w/Device kit 1 each Daily.   • glucose blood (Prodigy No Coding Blood Gluc) test strip TEST BLOOD SUGAR DAILY   • Prodigy Safety Lancets 26G misc CHECK BLOOD SUGAR ONE TIME PER DAY       General appearance: NAD, alert and cooperative, well groomed  HEENT: Normocephalic, atraumatic  Resp: Even and unlabored  Extremities: No obvious edema.  Skin: warm, dry    Neurological:   MS: oriented x2, recall 0/3, recent/remote memory diminished, diminished attention/concentration, language fluent but with errors, she is able to repeat and name, some problems with two step commands, no neglect, normal fund of knowledge  CN: Bilateral BTT but pt reports that she is unable to see the upper quadrants bilaterally, visual fields full, EOMI, facial sensation equal, no facial droop, hearing symmetric, palate elevates symmetrically, shoulder shrug equal, tongue midline  Motor: 5/5 in all 4 ext., normal tone  Sensory: light touch sensation intact in all 4 ext.  Coordination: Normal finger to nose test    Laboratory results:  Lab Results   Component Value Date    GLUCOSE 99 04/10/2023    CALCIUM 9.9 04/10/2023     04/10/2023    K 4.1 04/10/2023    CO2 28.7 04/10/2023     04/10/2023    BUN 15 04/10/2023    CREATININE 0.75 04/10/2023    EGFRIFAFRI 63 05/06/2021    EGFRIFNONA 63 12/11/2021    BCR 20.0 04/10/2023    ANIONGAP  7.3 04/10/2023     Lab Results   Component Value Date    WBC 5.84 04/10/2023    HGB 14.2 04/10/2023    HCT 45.4 04/10/2023    MCV 86.1 04/10/2023     04/10/2023     Lab Results   Component Value Date    CHOL 163 04/10/2023    CHOL 158 04/15/2019    CHOL 170 03/16/2018     Lab Results   Component Value Date    HDL 50 04/10/2023    HDL 51 11/09/2022    HDL 44 04/14/2022     Lab Results   Component Value Date    LDL 91 04/10/2023     (H) 11/09/2022    LDL 69 04/14/2022     Lab Results   Component Value Date    TRIG 125 04/10/2023    TRIG 133 11/09/2022    TRIG 233 (H) 04/14/2022     Lab Results   Component Value Date    HGBA1C 6.80 (H) 04/10/2023     Lab Results   Component Value Date    INR 1.20 (H) 04/09/2023    INR 1.08 11/13/2022    PROTIME 15.4 (H) 04/09/2023    PROTIME 14.2 11/13/2022     No results found for: [id number]  Lab Results   Component Value Date    TSH 3.950 04/10/2023     Pain Management Panel     Pain Management Panel Latest Ref Rng & Units 1/11/2023 1/13/2020    CREATININE UR Not Estab. mg/dL 37.4 68.9         Brief Urine Lab Results  (Last result in the past 365 days)      Color   Clarity   Blood   Leuk Est   Nitrite   Protein   CREAT   Urine HCG        04/09/23 1615 Yellow   Clear   Negative   Negative   Negative   Negative                 Lab review: I personally reviewed all labs as documented above.    Imaging review:   MRI Angiogram Head Without Contrast    Result Date: 4/9/2023  The study is hampered by patient motion. There is a 2-3 mm acute cortical infarct involving the left middle frontal gyrus superiorly. Mild-to-moderate small vessel ischemic disease is noted.   MRA OF THE HEAD WITHOUT CONTRAST:  The study is hampered by patient motion. There is signal present within the distal aspect of the vertebral arteries bilaterally. The left vertebral artery is larger than that of the right. The basilar artery and the proximal aspects of the posterior cerebral arteries appear  unremarkable. The distal aspects of the internal carotid arteries are of normal caliber. The right A1 segment is hypoplastic. The proximal aspects of the anterior and middle cerebral arteries appear unremarkable.  IMPRESSION: Study is hampered by patient motion. The right A1 segment is hypoplastic. The proximal aspects of the anterior, middle and posterior cerebral arteries appear otherwise unremarkable.   MRA OF THE NECK WITH AND WITHOUT CONTRAST:  The great vessels are arranged in a classic configuration. There is 0% stenosis of the internal carotid arteries using NASCET criteria. It is slightly larger than that of the right. There is no evidence of a vertebral ostial stenosis. The cervical segments of the vertebral arteries are of relatively uniform caliber.  IMPRESSION: There is 0% stenosis of the internal carotid arteries using NASCET criteria.  This report was finalized on 4/9/2023 11:49 PM by Dr. Tae Morrissey M.D.      MRI Angiogram Neck With & Without Contrast    Result Date: 4/9/2023  The study is hampered by patient motion. There is a 2-3 mm acute cortical infarct involving the left middle frontal gyrus superiorly. Mild-to-moderate small vessel ischemic disease is noted.   MRA OF THE HEAD WITHOUT CONTRAST:  The study is hampered by patient motion. There is signal present within the distal aspect of the vertebral arteries bilaterally. The left vertebral artery is larger than that of the right. The basilar artery and the proximal aspects of the posterior cerebral arteries appear unremarkable. The distal aspects of the internal carotid arteries are of normal caliber. The right A1 segment is hypoplastic. The proximal aspects of the anterior and middle cerebral arteries appear unremarkable.  IMPRESSION: Study is hampered by patient motion. The right A1 segment is hypoplastic. The proximal aspects of the anterior, middle and posterior cerebral arteries appear otherwise unremarkable.   MRA OF THE NECK WITH AND  WITHOUT CONTRAST:  The great vessels are arranged in a classic configuration. There is 0% stenosis of the internal carotid arteries using NASCET criteria. It is slightly larger than that of the right. There is no evidence of a vertebral ostial stenosis. The cervical segments of the vertebral arteries are of relatively uniform caliber.  IMPRESSION: There is 0% stenosis of the internal carotid arteries using NASCET criteria.  This report was finalized on 4/9/2023 11:49 PM by Dr. Tae Morrissey M.D.      MRI Brain With & Without Contrast    Result Date: 4/9/2023  The study is hampered by patient motion. There is a 2-3 mm acute cortical infarct involving the left middle frontal gyrus superiorly. Mild-to-moderate small vessel ischemic disease is noted.   MRA OF THE HEAD WITHOUT CONTRAST:  The study is hampered by patient motion. There is signal present within the distal aspect of the vertebral arteries bilaterally. The left vertebral artery is larger than that of the right. The basilar artery and the proximal aspects of the posterior cerebral arteries appear unremarkable. The distal aspects of the internal carotid arteries are of normal caliber. The right A1 segment is hypoplastic. The proximal aspects of the anterior and middle cerebral arteries appear unremarkable.  IMPRESSION: Study is hampered by patient motion. The right A1 segment is hypoplastic. The proximal aspects of the anterior, middle and posterior cerebral arteries appear otherwise unremarkable.   MRA OF THE NECK WITH AND WITHOUT CONTRAST:  The great vessels are arranged in a classic configuration. There is 0% stenosis of the internal carotid arteries using NASCET criteria. It is slightly larger than that of the right. There is no evidence of a vertebral ostial stenosis. The cervical segments of the vertebral arteries are of relatively uniform caliber.  IMPRESSION: There is 0% stenosis of the internal carotid arteries using NASCET criteria.  This report was  finalized on 4/9/2023 11:49 PM by Dr. Tae Morrissey M.D.      Results for orders placed during the hospital encounter of 04/09/23    Adult Transthoracic Echo Limited W/ Cont if Necessary Per Protocol    Interpretation Summary  •  Limited echocardiogram for agitated saline study only  •  Saline test results are negative.      I personally reviewed images and agree with radiology report.    Pt with acute IS in the setting of medication noncompliance. As for her confusion, this is not explained by the stroke. No headache or nuchal rigidity to suggest encephalitis, and pt afebrile and without leukocytosis. Query toxic metabolic process vs infection vs some other process. Further work-up is warranted.      PLAN:   Aspirin not warranted from a stroke/neruology perspective.   C/w pt's home eliquis 5mg q12hrs.   Recommend social work consult for home health aid  Follow-up CT Chest  Toxic metabolic workup per primary team  If toxic metabolic work-up is unrevealing, will consider lumbar puncture for CNS assessment  Lipitor  CPAP while asleep  Antidiabetics for a goal A1C < 7%  Delirium precautions  Neurochecks per stroke protocol  BP goal normotension   Stroke Education  GUERO/SCDs  PT/OT/ST  Therapies as written. CCP for discharge planning. Call RRT for any acute neurological changes. We will continue to follow and advise.        Анна Mata MD

## 2023-04-10 NOTE — PROGRESS NOTES
Discharge Planning Assessment  [facility]     Patient Name: Dior Pettit  MRN: [id number]  Today's Date: 4/10/2023    Admit Date: 4/9/2023    Plan: SNF referral pending   Discharge Needs Assessment     Row Name 04/10/23 1554       Living Environment    People in Home alone    Current Living Arrangements home    Primary Care Provided by self    Provides Primary Care For no one    Family Caregiver if Needed child(nelia), adult    Family Caregiver Names Sons Stephan and Jose Alejandro, dtrs Severiano and Abby    Quality of Family Relationships helpful;involved;supportive    Able to Return to Prior Arrangements yes       Resource/Environmental Concerns    Resource/Environmental Concerns none       Transition Planning    Patient/Family Anticipates Transition to inpatient rehabilitation facility    Patient/Family Anticipated Services at Transition skilled nursing    Transportation Anticipated agency;family or friend will provide       Discharge Needs Assessment    Equipment Currently Used at Home walker, rolling;rollator    Concerns to be Addressed discharge planning    Outpatient/Agency/Support Group Needs skilled nursing facility    Discharge Facility/Level of Care Needs nursing facility, skilled    Provided Post Acute Provider List? Yes    Post Acute Provider List Nursing Home    Discharge Coordination/Progress SNF               Discharge Plan     Row Name 04/10/23 1337       Plan    Plan SNF referral pending    Patient/Family in Agreement with Plan yes    Plan Comments CCP met with pt, daughter (Abby) and son (Stephan) at bedside to discuss d/c planning. Pt resides alone in a single level home, uses walker or rollator, is unable to recall past HH agency used, and has been to rehab at [facility] (and prefers again, referral pending). Pt is in the process of moving into assisted living at [facility] where she will have medication management and can access on site therapies following short term rehab. CCP to follow  for SNF eval. Jasmyn Dorantes LCSW              Continued Care and Services - Admitted Since 4/9/2023     Destination     Service Provider Request Status Selected Services Address Phone Fax Patient Preferred    [facility] Pending - Request Sent N/A [id number] [facility] [address] [phone] --              Expected Discharge Date and Time     Expected Discharge Date Expected Discharge Time    Apr 12, 2023          Demographic Summary     Row Name 04/10/23 1554       General Information    Admission Type inpatient    Arrived From home    Required Notices Provided Important Message from Medicare    Referral Source admission list    Reason for Consult discharge planning    Preferred Language English               Functional Status     Row Name 04/10/23 1554       Functional Status    Usual Activity Tolerance good    Current Activity Tolerance moderate       Functional Status, IADL    Medications independent    Meal Preparation independent    Housekeeping independent    Laundry independent    Shopping independent       Mental Status Summary    Recent Changes in Mental Status/Cognitive Functioning no changes               Psychosocial    No documentation.                Abuse/Neglect    No documentation.                Legal    No documentation.                Substance Abuse    No documentation.                Patient Forms    No documentation.                   Lisa Dorantes LCSW

## 2023-04-11 ENCOUNTER — APPOINTMENT (OUTPATIENT)
Dept: CT IMAGING | Facility: HOSPITAL | Age: 83
DRG: 42 | End: 2023-04-11
Payer: MEDICARE

## 2023-04-11 LAB
ANION GAP SERPL CALCULATED.3IONS-SCNC: 7 MMOL/L (ref 5–15)
BUN SERPL-MCNC: 15 MG/DL (ref 8–23)
BUN/CREAT SERPL: 26.3 (ref 7–25)
CALCIUM SPEC-SCNC: 10.1 MG/DL (ref 8.6–10.5)
CHLORIDE SERPL-SCNC: 102 MMOL/L (ref 98–107)
CO2 SERPL-SCNC: 27 MMOL/L (ref 22–29)
CREAT SERPL-MCNC: 0.57 MG/DL (ref 0.57–1)
CRP SERPL-MCNC: 0.98 MG/DL (ref 0–0.5)
EGFRCR SERPLBLD CKD-EPI 2021: 89.7 ML/MIN/1.73
ERYTHROCYTE [SEDIMENTATION RATE] IN BLOOD: 44 MM/HR (ref 0–30)
GLUCOSE BLDC GLUCOMTR-MCNC: 103 MG/DL (ref 70–130)
GLUCOSE BLDC GLUCOMTR-MCNC: 116 MG/DL (ref 70–130)
GLUCOSE BLDC GLUCOMTR-MCNC: 120 MG/DL (ref 70–130)
GLUCOSE BLDC GLUCOMTR-MCNC: 222 MG/DL (ref 70–130)
GLUCOSE SERPL-MCNC: 137 MG/DL (ref 65–99)
POTASSIUM SERPL-SCNC: 4.4 MMOL/L (ref 3.5–5.2)
SODIUM SERPL-SCNC: 136 MMOL/L (ref 136–145)

## 2023-04-11 PROCEDURE — 86140 C-REACTIVE PROTEIN: CPT | Performed by: HOSPITALIST

## 2023-04-11 PROCEDURE — 85652 RBC SED RATE AUTOMATED: CPT | Performed by: HOSPITALIST

## 2023-04-11 PROCEDURE — 82962 GLUCOSE BLOOD TEST: CPT

## 2023-04-11 PROCEDURE — 99232 SBSQ HOSP IP/OBS MODERATE 35: CPT | Performed by: NURSE PRACTITIONER

## 2023-04-11 PROCEDURE — 71250 CT THORAX DX C-: CPT

## 2023-04-11 PROCEDURE — 99232 SBSQ HOSP IP/OBS MODERATE 35: CPT | Performed by: PHYSICIAN ASSISTANT

## 2023-04-11 PROCEDURE — 80048 BASIC METABOLIC PNL TOTAL CA: CPT | Performed by: HOSPITALIST

## 2023-04-11 RX ORDER — LOSARTAN POTASSIUM 100 MG/1
100 TABLET ORAL DAILY
Status: DISCONTINUED | OUTPATIENT
Start: 2023-04-11 | End: 2023-04-15 | Stop reason: HOSPADM

## 2023-04-11 RX ORDER — AMLODIPINE BESYLATE 5 MG/1
5 TABLET ORAL
Status: DISCONTINUED | OUTPATIENT
Start: 2023-04-11 | End: 2023-04-15 | Stop reason: HOSPADM

## 2023-04-11 RX ADMIN — Medication 3 MG: at 20:46

## 2023-04-11 RX ADMIN — VALACYCLOVIR HYDROCHLORIDE 2000 MG: 500 TABLET, FILM COATED ORAL at 09:28

## 2023-04-11 RX ADMIN — VENLAFAXINE HYDROCHLORIDE 150 MG: 150 CAPSULE, EXTENDED RELEASE ORAL at 09:28

## 2023-04-11 RX ADMIN — VALACYCLOVIR HYDROCHLORIDE 2000 MG: 500 TABLET, FILM COATED ORAL at 00:04

## 2023-04-11 RX ADMIN — VENLAFAXINE HYDROCHLORIDE 75 MG: 75 CAPSULE, EXTENDED RELEASE ORAL at 09:30

## 2023-04-11 RX ADMIN — METOPROLOL TARTRATE 25 MG: 25 TABLET, FILM COATED ORAL at 20:47

## 2023-04-11 RX ADMIN — DOFETILIDE 250 MCG: 0.25 CAPSULE ORAL at 20:47

## 2023-04-11 RX ADMIN — METOPROLOL TARTRATE 25 MG: 25 TABLET, FILM COATED ORAL at 09:28

## 2023-04-11 RX ADMIN — ACETAMINOPHEN 650 MG: 325 TABLET, FILM COATED ORAL at 16:42

## 2023-04-11 RX ADMIN — ATORVASTATIN CALCIUM 80 MG: 80 TABLET, FILM COATED ORAL at 20:47

## 2023-04-11 RX ADMIN — AMLODIPINE BESYLATE 5 MG: 5 TABLET ORAL at 16:48

## 2023-04-11 RX ADMIN — AMITRIPTYLINE HYDROCHLORIDE 50 MG: 50 TABLET, FILM COATED ORAL at 20:47

## 2023-04-11 RX ADMIN — Medication 3 MG: at 00:04

## 2023-04-11 RX ADMIN — DOFETILIDE 250 MCG: 0.25 CAPSULE ORAL at 09:27

## 2023-04-11 RX ADMIN — APIXABAN 5 MG: 5 TABLET, FILM COATED ORAL at 09:27

## 2023-04-11 RX ADMIN — LEVOTHYROXINE SODIUM 50 MCG: 0.05 TABLET ORAL at 09:27

## 2023-04-11 RX ADMIN — ACETAMINOPHEN 650 MG: 325 TABLET, FILM COATED ORAL at 00:04

## 2023-04-11 RX ADMIN — LOSARTAN POTASSIUM 100 MG: 100 TABLET, FILM COATED ORAL at 10:49

## 2023-04-11 NOTE — PLAN OF CARE
Goal Outcome Evaluation:  Plan of Care Reviewed With: patient, daughter        Progress: no change      No new neuro deficits noted or reported

## 2023-04-11 NOTE — PROGRESS NOTES
"    Patient Name: Dior Pettit  :1940  82 y.o.      Patient Care Team:  Ashanti Hong APRN as PCP - General (Internal Medicine)  Gerardo Rodriguez Jr., MD as Consulting Physician (Pulmonary Disease)  Abbi Mitchell MD as Consulting Physician (Cardiology)  Luis Miguel Parker MD as Consulting Physician (Ophthalmology)  Tae Burton MD as Consulting Physician (Dermatology)  Addis Gomes MD as Consulting Physician (Plastic Surgery)    Chief Complaint: follow up stroke, AF    Interval History: sitting up in the chair. Felt breathless when getting cleaned up and walking to the bathroom. HR in the 90's (SR). She says at home often her HR is low. Down to 50s.        Objective   Vital Signs  Temp:  [98 °F (36.7 °C)-98.5 °F (36.9 °C)] 98 °F (36.7 °C)  Heart Rate:  [] 100  Resp:  [18-20] 18  BP: (127-189)/() 148/93    Intake/Output Summary (Last 24 hours) at 2023 1050  Last data filed at 2023 0001  Gross per 24 hour   Intake 1000 ml   Output 325 ml   Net 675 ml     Flowsheet Rows    Flowsheet Row First Filed Value   Admission Height 170.2 cm (67\") Documented at 2023 1515   Admission Weight 63.5 kg (140 lb) Documented at 2023 1515          Physical Exam:   General Appearance:    Alert, cooperative, in no acute distress   Lungs:     Clear to auscultation.  Normal respiratory effort and rate.      Heart:    irregular rhythm and normal rate, normal S1 and S2, no murmurs, gallops or rubs.     Chest Wall:    No abnormalities observed   Abdomen:     Soft, nontender, positive bowel sounds.     Extremities:   no cyanosis, clubbing or edema.  No marked joint deformities.  Adequate musculoskeletal strength.       Results Review:    Results from last 7 days   Lab Units 23  0629   SODIUM mmol/L 136   POTASSIUM mmol/L 4.4   CHLORIDE mmol/L 102   CO2 mmol/L 27.0   BUN mg/dL 15   CREATININE mg/dL 0.57   GLUCOSE mg/dL 137*   CALCIUM mg/dL 10.1     Results from last 7 " days   Lab Units 04/09/23  1514   HSTROP T ng/L 10*     Results from last 7 days   Lab Units 04/10/23  0518   WBC 10*3/mm3 5.84   HEMOGLOBIN g/dL 14.2   HEMATOCRIT % 45.4   PLATELETS 10*3/mm3 245     Results from last 7 days   Lab Units 04/09/23  1514   INR  1.20*   APTT seconds 39.0*     Results from last 7 days   Lab Units 04/10/23  0518   MAGNESIUM mg/dL 1.7     Results from last 7 days   Lab Units 04/10/23  0518   CHOLESTEROL mg/dL 163   TRIGLYCERIDES mg/dL 125   HDL CHOL mg/dL 50   LDL CHOL mg/dL 91               Medication Review:   amitriptyline, 50 mg, Oral, Nightly  apixaban, 5 mg, Oral, BID  atorvastatin, 80 mg, Oral, Nightly  dofetilide, 250 mcg, Oral, BID  insulin lispro, 0-7 Units, Subcutaneous, TID AC  levothyroxine, 50 mcg, Oral, Every Other Day  levothyroxine, 75 mcg, Oral, Every Other Day  losartan, 100 mg, Oral, Daily  metoprolol tartrate, 25 mg, Oral, BID  venlafaxine XR, 150 mg, Oral, Daily  venlafaxine XR, 75 mg, Oral, Daily              Assessment & Plan   1. Acute CVA  2. Altered mental status, not accounted for by CVA - neurology to consider LP  3. Paroxysmal atrial fibrillation (symptomatic) on Tikosyn and apixaban  4. Inadvertent medical noncompliance, missed medication doses  5. Hypertension  6. Diabetes mellitus type II  7. Hypothyroidism  8. Dyspnea on exertion - ongoing and present prior to admission. CT Chest pending. Outpatient stress test recently ordered and not done yet. She had normal cors in 2007.  9. Sleep apnea, intolerant of therapy in the past. No recent evaluation.  10. Status post AVR 2007    CT chest pending.   She remains in SR> continue dofetilide and apixaban.     TOSHA Younger  [facility]  04/11/23  10:50 EDT

## 2023-04-11 NOTE — PROGRESS NOTES
"DOS: 2023  NAME: Dior Pettit   : 1940  PCP: Ashanti Hong APRN  Chief Complaint   Patient presents with   • Extremity Weakness       Chief complaint: confusion  Subjective: Son and grandson at bedside.  She is having visual hallucinations, seeing kids in the room, and did not sleep well.  She does not know why she is in the hospital.  Been having issues with ADLs and needing increased supervision at home for the last 2-weeks, possibly longer.  Family has noticed unpaid bills    Objective:  Vital signs: /75 (BP Location: Left arm, Patient Position: Sitting)   Pulse 81   Temp 98.2 °F (36.8 °C) (Oral)   Resp 18   Ht 170.2 cm (67\")   Wt 67.1 kg (148 lb)   LMP  (LMP Unknown)   SpO2 94%   BMI 23.18 kg/m²      Gen: NAD, vitals reviewed  MS: Poor attention/concentration, perseverating, cannot name 15 animals, poor clock draw-poor contour, numbering, placing him hands, names repeats, no overt neglect  CN: visual acuity grossly normal, PERRL, EOMI, no facial droop, no dysarthria  Motor: Normal tone, no abnormal movements, symmetric power  Sensory: intact to light touch and vibration intact all 4 ext.  Reflexes: Intact, negative Babinski    ROS:  No weakness, numbness  No fevers, chills      Laboratory results:  Lab Results   Component Value Date    GLUCOSE 137 (H) 2023    CALCIUM 10.1 2023     2023    K 4.4 2023    CO2 27.0 2023     2023    BUN 15 2023    CREATININE 0.57 2023    EGFRIFAFRI 63 2021    EGFRIFNONA 63 2021    BCR 26.3 (H) 2023    ANIONGAP 7.0 2023     Lab Results   Component Value Date    WBC 5.84 04/10/2023    HGB 14.2 04/10/2023    HCT 45.4 04/10/2023    MCV 86.1 04/10/2023     04/10/2023     Lab Results   Component Value Date    LDL 91 04/10/2023     (H) 2022    LDL 69 2022         Lab 04/10/23  0518   HEMOGLOBIN A1C 6.80*        Review of labs: TSH normal at 3.9, " sed rate elevated 44, CRP elevated 0.98    Review and interpretation of imaging: MRI brain with and without acute finding, motion artifact, tiny acute stroke of the left middle frontal gyrus.  MRA head and neck without significant stenosis    Workup to date:    Diagnoses:  1.  Encephalopathy  2.  Incident acute left frontal stroke  3.  A-fib    Impression: 82-year-old female who is right hand with past medical history of hypertension, pulmonary hypertension, GERD, anxiety, restless leg syndrome, diabetes, A-fib on Eliquis, AVR, hip fracture and replacement about a year ago who came in with family for several week history of increasing confusion, paranoia, complaints of right-sided numbness and tingling.  Work-up revealing tiny acute left frontal infarct not explaining her presentation and fairly unremarkable labs.  Medication adherence a problem at home with missed doses including her SSRI/tricyclic antidepressants.  Toxic metabolic encephalopathy related to this on differential.  But also dementia plus minus delirium, rapidly progressive dementia, encephalitis autoimmune less likely infectious.  Family and patient agreeable to LP    Plan:  1.  Follow-up on spot EEG and LP orders placed  2.  B12 /folate  3.  Delirium precautions including family at bedside as able  4.  Continue Eliquis and statin for primary stroke prevention    I spent at least 40 minutes interviewing, examining, and counseling patient.  I independently reviewed documentation, laboratory and diagnostic findings, external documentation where applicable, and formulated treatment plan which was discussed with the patient.

## 2023-04-11 NOTE — PROGRESS NOTES
"    Name: Dior Pettit ADMIT: 2023   : 1940  PCP: Ashanti Hong APRN    MRN: [id number] LOS: 2 days   AGE/SEX: 82 y.o. female  ROOM: [facility]     Subjective   Subjective   Several different complaints.  Family says she has been having \"prisms\" of vision changes.  They state that she has pretty severe macular degeneration.  Apparently still pretty off balance.  They think her confusion is maybe a little better today.    Patient having some headaches, BP elevated.    Review of Systems     Objective   Objective   Vital Signs  Temp:  [98 °F (36.7 °C)-98.5 °F (36.9 °C)] 98.2 °F (36.8 °C)  Heart Rate:  [] 90  Resp:  [18-20] 20  BP: (127-189)/() 175/99  SpO2:  [93 %-97 %] 97 %  on   ;   Device (Oxygen Therapy): room air  Body mass index is 23.18 kg/m².  Physical Exam  Vitals reviewed.   Constitutional:       General: She is not in acute distress.     Appearance: She is not ill-appearing.   Cardiovascular:      Rate and Rhythm: Normal rate and regular rhythm.   Pulmonary:      Effort: Pulmonary effort is normal.   Abdominal:      Palpations: Abdomen is soft.      Tenderness: There is no abdominal tenderness.   Musculoskeletal:      Right lower leg: No edema.      Left lower leg: No edema.   Skin:     General: Skin is warm and dry.   Neurological:      Mental Status: She is alert and oriented to person, place, and time.      Sensory: No sensory deficit.      Motor: No weakness.      Comments: Some word finding difficulty displayed   Psychiatric:         Mood and Affect: Mood normal.       Results Review     I reviewed the patient's new clinical results.  Results from last 7 days   Lab Units 04/10/23  0518 23  1514   WBC 10*3/mm3 5.84 7.14   HEMOGLOBIN g/dL 14.2 15.3   PLATELETS 10*3/mm3 245 228     Results from last 7 days   Lab Units 23  0629 04/10/23  0518 23  1514   SODIUM mmol/L 136 138 134*   POTASSIUM mmol/L 4.4 4.1 4.3   CHLORIDE mmol/L 102 102 99   CO2 mmol/L 27.0 28.7 " 23.8   BUN mg/dL 15 15 19   CREATININE mg/dL 0.57 0.75 0.91   GLUCOSE mg/dL 137* 99 85   EGFR mL/min/1.73 89.7 78.6 62.3     Results from last 7 days   Lab Units 04/10/23  0518 04/09/23  1514   ALBUMIN g/dL 4.2 4.5   BILIRUBIN mg/dL 0.6 0.7   ALK PHOS U/L 95 112   AST (SGOT) U/L 24 28   ALT (SGPT) U/L 16 18     Results from last 7 days   Lab Units 04/11/23  0629 04/10/23  0518 04/09/23  1514   CALCIUM mg/dL 10.1 9.9 11.1*   ALBUMIN g/dL  --  4.2 4.5   MAGNESIUM mg/dL  --  1.7 1.4*       Hemoglobin A1C   Date/Time Value Ref Range Status   04/10/2023 0518 6.80 (H) 4.80 - 5.60 % Final     Glucose   Date/Time Value Ref Range Status   04/11/2023 1624 116 70 - 130 mg/dL Final     Comment:     Meter: [id number] : [id number] Altamirano Chemell NA   04/11/2023 1108 120 70 - 130 mg/dL Final     Comment:     Meter: [id number] : [id number] Altamirano Chemell NA   04/11/2023 0734 103 70 - 130 mg/dL Final     Comment:     Meter: [id number] : [id number] Altamirano Chemell NA   04/10/2023 1648 121 70 - 130 mg/dL Final     Comment:     Meter: [id number] : [id number] Altamirano Chemell NA   04/10/2023 1137 92 70 - 130 mg/dL Final     Comment:     Meter: [id number] : [id number] Altamirano Chemell NA   04/10/2023 0612 83 70 - 130 mg/dL Final     Comment:     Meter: [id number] : [id number] Rodneyario Nabila  NA   04/09/2023 2332 75 70 - 130 mg/dL Final     Comment:     Meter: [id number] : [id number] Rodneyario Nabila  NA       MRI Angiogram Head Without Contrast    Result Date: 4/9/2023  The study is hampered by patient motion. There is a 2-3 mm acute cortical infarct involving the left middle frontal gyrus superiorly. Mild-to-moderate small vessel ischemic disease is noted.   MRA OF THE HEAD WITHOUT CONTRAST:  The study is hampered by patient motion. There is signal present within the distal aspect of the vertebral arteries bilaterally. The left vertebral artery is larger than that of the right. The basilar artery and the proximal aspects of  the posterior cerebral arteries appear unremarkable. The distal aspects of the internal carotid arteries are of normal caliber. The right A1 segment is hypoplastic. The proximal aspects of the anterior and middle cerebral arteries appear unremarkable.  IMPRESSION: Study is hampered by patient motion. The right A1 segment is hypoplastic. The proximal aspects of the anterior, middle and posterior cerebral arteries appear otherwise unremarkable.   MRA OF THE NECK WITH AND WITHOUT CONTRAST:  The great vessels are arranged in a classic configuration. There is 0% stenosis of the internal carotid arteries using NASCET criteria. It is slightly larger than that of the right. There is no evidence of a vertebral ostial stenosis. The cervical segments of the vertebral arteries are of relatively uniform caliber.  IMPRESSION: There is 0% stenosis of the internal carotid arteries using NASCET criteria.  This report was finalized on 4/9/2023 11:49 PM by Dr. Tae Morrissey M.D.      MRI Angiogram Neck With & Without Contrast    Result Date: 4/9/2023  The study is hampered by patient motion. There is a 2-3 mm acute cortical infarct involving the left middle frontal gyrus superiorly. Mild-to-moderate small vessel ischemic disease is noted.   MRA OF THE HEAD WITHOUT CONTRAST:  The study is hampered by patient motion. There is signal present within the distal aspect of the vertebral arteries bilaterally. The left vertebral artery is larger than that of the right. The basilar artery and the proximal aspects of the posterior cerebral arteries appear unremarkable. The distal aspects of the internal carotid arteries are of normal caliber. The right A1 segment is hypoplastic. The proximal aspects of the anterior and middle cerebral arteries appear unremarkable.  IMPRESSION: Study is hampered by patient motion. The right A1 segment is hypoplastic. The proximal aspects of the anterior, middle and posterior cerebral arteries appear otherwise  unremarkable.   MRA OF THE NECK WITH AND WITHOUT CONTRAST:  The great vessels are arranged in a classic configuration. There is 0% stenosis of the internal carotid arteries using NASCET criteria. It is slightly larger than that of the right. There is no evidence of a vertebral ostial stenosis. The cervical segments of the vertebral arteries are of relatively uniform caliber.  IMPRESSION: There is 0% stenosis of the internal carotid arteries using NASCET criteria.  This report was finalized on 4/9/2023 11:49 PM by Dr. Tae Morrissey M.D.      MRI Brain With & Without Contrast    Result Date: 4/9/2023  The study is hampered by patient motion. There is a 2-3 mm acute cortical infarct involving the left middle frontal gyrus superiorly. Mild-to-moderate small vessel ischemic disease is noted.   MRA OF THE HEAD WITHOUT CONTRAST:  The study is hampered by patient motion. There is signal present within the distal aspect of the vertebral arteries bilaterally. The left vertebral artery is larger than that of the right. The basilar artery and the proximal aspects of the posterior cerebral arteries appear unremarkable. The distal aspects of the internal carotid arteries are of normal caliber. The right A1 segment is hypoplastic. The proximal aspects of the anterior and middle cerebral arteries appear unremarkable.  IMPRESSION: Study is hampered by patient motion. The right A1 segment is hypoplastic. The proximal aspects of the anterior, middle and posterior cerebral arteries appear otherwise unremarkable.   MRA OF THE NECK WITH AND WITHOUT CONTRAST:  The great vessels are arranged in a classic configuration. There is 0% stenosis of the internal carotid arteries using NASCET criteria. It is slightly larger than that of the right. There is no evidence of a vertebral ostial stenosis. The cervical segments of the vertebral arteries are of relatively uniform caliber.  IMPRESSION: There is 0% stenosis of the internal carotid arteries  using NASCET criteria.  This report was finalized on 4/9/2023 11:49 PM by Dr. Tae Morrissey M.D.      I have personally reviewed all medications:  Scheduled Medications  amitriptyline, 50 mg, Oral, Nightly  amLODIPine, 5 mg, Oral, Q24H  apixaban, 5 mg, Oral, BID  atorvastatin, 80 mg, Oral, Nightly  dofetilide, 250 mcg, Oral, BID  insulin lispro, 0-7 Units, Subcutaneous, TID AC  levothyroxine, 50 mcg, Oral, Every Other Day  levothyroxine, 75 mcg, Oral, Every Other Day  losartan, 100 mg, Oral, Daily  metoprolol tartrate, 25 mg, Oral, BID  venlafaxine XR, 150 mg, Oral, Daily  venlafaxine XR, 75 mg, Oral, Daily    Infusions  hold, 1 each    Diet  Diet: Cardiac Diets, Diabetic Diets; Healthy Heart (2-3 Na+); Consistent Carbohydrate; Texture: Regular Texture (IDDSI 7); Fluid Consistency: Thin (IDDSI 0)    I have personally reviewed:  [x]  Laboratory   [x]  Microbiology   [x]  Radiology   [x]  EKG/Telemetry  [x]  Cardiology/Vascular   []  Pathology    [x]  Records       Assessment/Plan     Active Hospital Problems    Diagnosis  POA   • **Acute stroke due to ischemia [I63.9]  Yes   • Shortness of breath [R06.02]  Unknown   • PAF (paroxysmal atrial fibrillation) [I48.0]  Yes   • S/P AVR [Z95.2]  Not Applicable   • Restless leg syndrome [G25.81]  Yes   • 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       82 y.o. female with h/o PAF, GERD, DM2 admitted with confusion and balance issues and found to have small acute stroke on MRI but neurology concerned for secondary issue as well.    Stroke etiology most likely related to noncompliance with Eliquis although patient does not seem to think she missed that many doses.   - Continue Eliquis and statin.   - Repeat limited echo without  PFO  - PT/OT/speech following.  Will follow for any rehab needs    Work-up in progress for any other underlying metabolic cause for decompensation.  My suspicion is she may have previously undiagnosed dementia.  I do have some concern if she could potentially have something like TA given her vague complaints along with headaches and visual changes though admittedly vision changes tough to sort out with her severe macular degeneration.  - ESR elevated at 44.  CRP 0.98  - LP ordered by neurology  - Discussed with neurology PA.  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    CT chest pending.  Had previously been ordered outpatient.  Apparently was having some difficulty with shortness of breath.  CXR unremarkable.    Mild DM 2-on metformin at home.  A1c 6.8.  Patient requesting regular diet and sugars have been within normal limits here so will allow.    Discussed with family at bedside.    CCP following for potential rehab.  Likely stable 2-3 days depending on results        Luigi Viera MD  [facility]  04/11/23  16:37 EDT

## 2023-04-11 NOTE — PLAN OF CARE
Goal Outcome Evaluation:      Hypertensive at start of the shift with headache. Did get some rest after receiving melatonin. BP normalizing- received labetalol x1 last night.  Problem: Adult Inpatient Plan of Care  Goal: Plan of Care Review  Outcome: Ongoing, Progressing  Goal: Patient-Specific Goal (Individualized)  Outcome: Ongoing, Progressing  Goal: Absence of Hospital-Acquired Illness or Injury  Outcome: Ongoing, Progressing  Intervention: Identify and Manage Fall Risk  Recent Flowsheet Documentation  Taken 4/11/2023 0428 by Marvin Cevallos RN  Safety Promotion/Fall Prevention: safety round/check completed  Taken 4/11/2023 0247 by Marvin Cevallos RN  Safety Promotion/Fall Prevention: safety round/check completed  Taken 4/11/2023 0004 by Marvin Cevallos RN  Safety Promotion/Fall Prevention: safety round/check completed  Taken 4/10/2023 2245 by Marvin Cevallos RN  Safety Promotion/Fall Prevention: safety round/check completed  Taken 4/10/2023 2003 by Marvin Cevallos RN  Safety Promotion/Fall Prevention:   activity supervised   fall prevention program maintained   nonskid shoes/slippers when out of bed   safety round/check completed  Intervention: Prevent Skin Injury  Recent Flowsheet Documentation  Taken 4/11/2023 0247 by Marvin Cevallos RN  Body Position:   position changed independently   right  Taken 4/10/2023 2003 by Marvin Cevallos RN  Body Position:   position changed independently   sitting up in bed  Taken 4/10/2023 1951 by Marvin Cevallos RN  Skin Protection:   adhesive use limited   incontinence pads utilized   tubing/devices free from skin contact  Intervention: Prevent and Manage VTE (Venous Thromboembolism) Risk  Recent Flowsheet Documentation  Taken 4/10/2023 1951 by Marvin Cevallos RN  Activity Management: activity encouraged  VTE Prevention/Management:   bilateral   sequential compression devices off  Goal: Optimal Comfort and Wellbeing  Outcome: Ongoing,  Progressing  Intervention: Monitor Pain and Promote Comfort  Recent Flowsheet Documentation  Taken 4/11/2023 0004 by Marvin Cevallos RN  Pain Management Interventions: see MAR  Taken 4/10/2023 2103 by Marvin Cevallos RN  Pain Management Interventions:   quiet environment facilitated   pillow support provided  Taken 4/10/2023 2003 by Marvin Cevallos RN  Pain Management Interventions: see MAR  Intervention: Provide Person-Centered Care  Recent Flowsheet Documentation  Taken 4/10/2023 1951 by Marvin Cevallos RN  Trust Relationship/Rapport:   care explained   thoughts/feelings acknowledged  Goal: Readiness for Transition of Care  Outcome: Ongoing, Progressing     Problem: Skin Injury Risk Increased  Goal: Skin Health and Integrity  Outcome: Ongoing, Progressing  Intervention: Optimize Skin Protection  Recent Flowsheet Documentation  Taken 4/10/2023 1951 by Marvin Cevallos RN  Pressure Reduction Techniques:   frequent weight shift encouraged   weight shift assistance provided  Pressure Reduction Devices:   alternating pressure pump (ADD)   positioning supports utilized  Skin Protection:   adhesive use limited   incontinence pads utilized   tubing/devices free from skin contact     Problem: Fall Injury Risk  Goal: Absence of Fall and Fall-Related Injury  Outcome: Ongoing, Progressing  Intervention: Promote Injury-Free Environment  Recent Flowsheet Documentation  Taken 4/11/2023 0428 by Marvin Cevallos RN  Safety Promotion/Fall Prevention: safety round/check completed  Taken 4/11/2023 0247 by Marvin Cevallos RN  Safety Promotion/Fall Prevention: safety round/check completed  Taken 4/11/2023 0004 by Marvin Cevallos RN  Safety Promotion/Fall Prevention: safety round/check completed  Taken 4/10/2023 2245 by Marvin Cevallos RN  Safety Promotion/Fall Prevention: safety round/check completed  Taken 4/10/2023 2003 by Marvin Cevallos RN  Safety Promotion/Fall Prevention:   activity supervised   fall  prevention program maintained   nonskid shoes/slippers when out of bed   safety round/check completed     Problem: Diabetes Comorbidity  Goal: Blood Glucose Level Within Targeted Range  Outcome: Ongoing, Progressing     Problem: Hypertension Comorbidity  Goal: Blood Pressure in Desired Range  Outcome: Ongoing, Progressing     Problem: Cerebral Tissue Perfusion (Stroke, Ischemic/Transient Ischemic Attack)  Goal: Optimal Cerebral Tissue Perfusion  Outcome: Ongoing, Progressing     Problem: Cognitive Impairment (Stroke, Ischemic/Transient Ischemic Attack)  Goal: Optimal Cognitive Function  Outcome: Ongoing, Progressing     Problem: Communication Impairment (Stroke, Ischemic/Transient Ischemic Attack)  Goal: Improved Communication Skills  Outcome: Ongoing, Progressing

## 2023-04-12 ENCOUNTER — APPOINTMENT (OUTPATIENT)
Dept: GENERAL RADIOLOGY | Facility: HOSPITAL | Age: 83
DRG: 42 | End: 2023-04-12
Payer: MEDICARE

## 2023-04-12 LAB
APPEARANCE CSF: CLEAR
APTT PPP: 33.7 SECONDS (ref 22.7–35.4)
BASOPHILS # BLD AUTO: 0.02 10*3/MM3 (ref 0–0.2)
BASOPHILS NFR BLD AUTO: 0.3 % (ref 0–1.5)
C GATTII+NEOFOR DNA CSF QL NAA+NON-PROBE: NOT DETECTED
CMV DNA CSF QL NAA+PROBE: NOT DETECTED
COLOR CSF: COLORLESS
DEPRECATED RDW RBC AUTO: 45.9 FL (ref 37–54)
E COLI K1 DNA CSF QL NAA+NON-PROBE: NOT DETECTED
EOSINOPHIL # BLD AUTO: 0.01 10*3/MM3 (ref 0–0.4)
EOSINOPHIL NFR BLD AUTO: 0.2 % (ref 0.3–6.2)
ERYTHROCYTE [DISTWIDTH] IN BLOOD BY AUTOMATED COUNT: 14.6 % (ref 12.3–15.4)
EV RNA CSF QL NAA+PROBE: NOT DETECTED
FOLATE SERPL-MCNC: >20 NG/ML (ref 4.78–24.2)
GLUCOSE BLDC GLUCOMTR-MCNC: 108 MG/DL (ref 70–130)
GLUCOSE BLDC GLUCOMTR-MCNC: 197 MG/DL (ref 70–130)
GLUCOSE BLDC GLUCOMTR-MCNC: 225 MG/DL (ref 70–130)
GLUCOSE CSF-MCNC: 83 MG/DL (ref 40–70)
GP B STREP DNA SPEC QL NAA+PROBE: NOT DETECTED
HAEM INFLU SEROTYP DNA SPEC NAA+PROBE: NOT DETECTED
HCT VFR BLD AUTO: 49.4 % (ref 34–46.6)
HGB BLD-MCNC: 15.7 G/DL (ref 12–15.9)
HHV6 DNA CSF QL NAA+PROBE: NOT DETECTED
HSV1 DNA CSF QL NAA+PROBE: NOT DETECTED
HSV2 DNA CSF QL NAA+PROBE: NOT DETECTED
IMM GRANULOCYTES # BLD AUTO: 0.02 10*3/MM3 (ref 0–0.05)
IMM GRANULOCYTES NFR BLD AUTO: 0.3 % (ref 0–0.5)
INR PPP: 0.99 (ref 0.9–1.1)
L MONOCYTOG RRNA SPEC QL PROBE: NOT DETECTED
LYMPHOCYTES # BLD AUTO: 0.72 10*3/MM3 (ref 0.7–3.1)
LYMPHOCYTES NFR BLD AUTO: 12.2 % (ref 19.6–45.3)
MCH RBC QN AUTO: 27.7 PG (ref 26.6–33)
MCHC RBC AUTO-ENTMCNC: 31.8 G/DL (ref 31.5–35.7)
MCV RBC AUTO: 87.1 FL (ref 79–97)
METHOD: ABNORMAL
MONOCYTES # BLD AUTO: 0.07 10*3/MM3 (ref 0.1–0.9)
MONOCYTES NFR BLD AUTO: 1.2 % (ref 5–12)
N MEN DNA SPEC QL NAA+PROBE: NOT DETECTED
NEUTROPHILS NFR BLD AUTO: 5.06 10*3/MM3 (ref 1.7–7)
NEUTROPHILS NFR BLD AUTO: 85.8 % (ref 42.7–76)
NRBC BLD AUTO-RTO: 0 /100 WBC (ref 0–0.2)
NUC CELL # CSF MANUAL: 1 /MM3 (ref 0–5)
PARECHOVIRUS A RNA CSF QL NAA+NON-PROBE: NOT DETECTED
PLATELET # BLD AUTO: 261 10*3/MM3 (ref 140–450)
PMV BLD AUTO: 9.6 FL (ref 6–12)
PROT CSF-MCNC: 42.6 MG/DL (ref 15–45)
PROTHROMBIN TIME: 13.2 SECONDS (ref 11.7–14.2)
RBC # BLD AUTO: 5.67 10*6/MM3 (ref 3.77–5.28)
RBC # CSF MANUAL: 3 /MM3 (ref 0–0)
S PNEUM DNA CSF QL NAA+NON-PROBE: NOT DETECTED
TUBE # CSF: 4
VIT B12 BLD-MCNC: 707 PG/ML (ref 211–946)
VZV DNA CSF QL NAA+PROBE: NOT DETECTED
WBC NRBC COR # BLD: 5.9 10*3/MM3 (ref 3.4–10.8)

## 2023-04-12 PROCEDURE — 86255 FLUORESCENT ANTIBODY SCREEN: CPT | Performed by: RADIOLOGY

## 2023-04-12 PROCEDURE — B01B1ZZ FLUOROSCOPY OF SPINAL CORD USING LOW OSMOLAR CONTRAST: ICD-10-PCS | Performed by: RADIOLOGY

## 2023-04-12 PROCEDURE — 86255 FLUORESCENT ANTIBODY SCREEN: CPT

## 2023-04-12 PROCEDURE — 82962 GLUCOSE BLOOD TEST: CPT

## 2023-04-12 PROCEDURE — 88108 CYTOPATH CONCENTRATE TECH: CPT | Performed by: PHYSICIAN ASSISTANT

## 2023-04-12 PROCEDURE — 25010000002 HEPARIN (PORCINE) 25000-0.45 UT/250ML-% SOLUTION: Performed by: STUDENT IN AN ORGANIZED HEALTH CARE EDUCATION/TRAINING PROGRAM

## 2023-04-12 PROCEDURE — 82945 GLUCOSE OTHER FLUID: CPT | Performed by: PHYSICIAN ASSISTANT

## 2023-04-12 PROCEDURE — 87483 CNS DNA AMP PROBE TYPE 12-25: CPT | Performed by: PHYSICIAN ASSISTANT

## 2023-04-12 PROCEDURE — 63710000001 INSULIN LISPRO (HUMAN) PER 5 UNITS: Performed by: NURSE PRACTITIONER

## 2023-04-12 PROCEDURE — 84157 ASSAY OF PROTEIN OTHER: CPT | Performed by: PHYSICIAN ASSISTANT

## 2023-04-12 PROCEDURE — 89050 BODY FLUID CELL COUNT: CPT | Performed by: PHYSICIAN ASSISTANT

## 2023-04-12 PROCEDURE — 82746 ASSAY OF FOLIC ACID SERUM: CPT | Performed by: PHYSICIAN ASSISTANT

## 2023-04-12 PROCEDURE — 86255 FLUORESCENT ANTIBODY SCREEN: CPT | Performed by: PHYSICIAN ASSISTANT

## 2023-04-12 PROCEDURE — 99232 SBSQ HOSP IP/OBS MODERATE 35: CPT | Performed by: PHYSICIAN ASSISTANT

## 2023-04-12 PROCEDURE — 85730 THROMBOPLASTIN TIME PARTIAL: CPT | Performed by: STUDENT IN AN ORGANIZED HEALTH CARE EDUCATION/TRAINING PROGRAM

## 2023-04-12 PROCEDURE — 85610 PROTHROMBIN TIME: CPT | Performed by: STUDENT IN AN ORGANIZED HEALTH CARE EDUCATION/TRAINING PROGRAM

## 2023-04-12 PROCEDURE — 82607 VITAMIN B-12: CPT | Performed by: PHYSICIAN ASSISTANT

## 2023-04-12 PROCEDURE — 86341 ISLET CELL ANTIBODY: CPT | Performed by: RADIOLOGY

## 2023-04-12 PROCEDURE — 0 LIDOCAINE 1 % SOLUTION: Performed by: HOSPITALIST

## 2023-04-12 PROCEDURE — 97116 GAIT TRAINING THERAPY: CPT

## 2023-04-12 PROCEDURE — 63710000001 PREDNISONE PER 1 MG: Performed by: PHYSICIAN ASSISTANT

## 2023-04-12 PROCEDURE — 009U3ZX DRAINAGE OF SPINAL CANAL, PERCUTANEOUS APPROACH, DIAGNOSTIC: ICD-10-PCS | Performed by: RADIOLOGY

## 2023-04-12 PROCEDURE — 99231 SBSQ HOSP IP/OBS SF/LOW 25: CPT | Performed by: NURSE PRACTITIONER

## 2023-04-12 PROCEDURE — 97530 THERAPEUTIC ACTIVITIES: CPT

## 2023-04-12 PROCEDURE — 97535 SELF CARE MNGMENT TRAINING: CPT

## 2023-04-12 PROCEDURE — 85025 COMPLETE CBC W/AUTO DIFF WBC: CPT | Performed by: STUDENT IN AN ORGANIZED HEALTH CARE EDUCATION/TRAINING PROGRAM

## 2023-04-12 RX ORDER — PREDNISONE 20 MG/1
40 TABLET ORAL DAILY
Status: DISCONTINUED | OUTPATIENT
Start: 2023-04-12 | End: 2023-04-12

## 2023-04-12 RX ORDER — LIDOCAINE HYDROCHLORIDE 10 MG/ML
10 INJECTION, SOLUTION INFILTRATION; PERINEURAL ONCE
Status: COMPLETED | OUTPATIENT
Start: 2023-04-12 | End: 2023-04-12

## 2023-04-12 RX ORDER — ALPRAZOLAM 0.5 MG/1
0.5 TABLET ORAL 2 TIMES DAILY PRN
Status: DISCONTINUED | OUTPATIENT
Start: 2023-04-12 | End: 2023-04-15 | Stop reason: HOSPADM

## 2023-04-12 RX ORDER — HEPARIN SODIUM 10000 [USP'U]/100ML
18 INJECTION, SOLUTION INTRAVENOUS
Status: DISCONTINUED | OUTPATIENT
Start: 2023-04-12 | End: 2023-04-14

## 2023-04-12 RX ORDER — OLANZAPINE 5 MG/1
5 TABLET ORAL ONCE AS NEEDED
Status: DISCONTINUED | OUTPATIENT
Start: 2023-04-12 | End: 2023-04-15 | Stop reason: HOSPADM

## 2023-04-12 RX ADMIN — LOSARTAN POTASSIUM 100 MG: 100 TABLET, FILM COATED ORAL at 08:49

## 2023-04-12 RX ADMIN — ACETAMINOPHEN 650 MG: 325 TABLET, FILM COATED ORAL at 06:14

## 2023-04-12 RX ADMIN — LEVOTHYROXINE SODIUM 75 MCG: 0.07 TABLET ORAL at 08:48

## 2023-04-12 RX ADMIN — VENLAFAXINE HYDROCHLORIDE 75 MG: 75 CAPSULE, EXTENDED RELEASE ORAL at 08:48

## 2023-04-12 RX ADMIN — ATORVASTATIN CALCIUM 80 MG: 80 TABLET, FILM COATED ORAL at 20:56

## 2023-04-12 RX ADMIN — DOFETILIDE 250 MCG: 0.25 CAPSULE ORAL at 20:56

## 2023-04-12 RX ADMIN — METOPROLOL TARTRATE 25 MG: 25 TABLET, FILM COATED ORAL at 08:48

## 2023-04-12 RX ADMIN — AMLODIPINE BESYLATE 5 MG: 5 TABLET ORAL at 08:49

## 2023-04-12 RX ADMIN — INSULIN LISPRO 2 UNITS: 100 INJECTION, SOLUTION INTRAVENOUS; SUBCUTANEOUS at 18:40

## 2023-04-12 RX ADMIN — METOPROLOL TARTRATE 25 MG: 25 TABLET, FILM COATED ORAL at 20:56

## 2023-04-12 RX ADMIN — VENLAFAXINE HYDROCHLORIDE 150 MG: 150 CAPSULE, EXTENDED RELEASE ORAL at 08:49

## 2023-04-12 RX ADMIN — INSULIN LISPRO 3 UNITS: 100 INJECTION, SOLUTION INTRAVENOUS; SUBCUTANEOUS at 12:06

## 2023-04-12 RX ADMIN — DOFETILIDE 250 MCG: 0.25 CAPSULE ORAL at 08:49

## 2023-04-12 RX ADMIN — ACETAMINOPHEN 650 MG: 325 TABLET, FILM COATED ORAL at 20:56

## 2023-04-12 RX ADMIN — LIDOCAINE HYDROCHLORIDE 10 ML: 10 INJECTION, SOLUTION INFILTRATION; PERINEURAL at 15:16

## 2023-04-12 RX ADMIN — PREDNISONE 40 MG: 20 TABLET ORAL at 12:06

## 2023-04-12 RX ADMIN — AMITRIPTYLINE HYDROCHLORIDE 50 MG: 50 TABLET, FILM COATED ORAL at 20:56

## 2023-04-12 RX ADMIN — HEPARIN SODIUM 18 UNITS/KG/HR: 10000 INJECTION, SOLUTION INTRAVENOUS at 18:32

## 2023-04-12 NOTE — PLAN OF CARE
Goal Outcome Evaluation:      VSS. Patient oriented x3-4. Patient reports visual hallucinations. She stated she sometimes sees a girl in the room with her. NIH of 1. Patient slept in between care.              Problem: Adult Inpatient Plan of Care  Goal: Plan of Care Review  Outcome: Ongoing, Progressing  Goal: Patient-Specific Goal (Individualized)  Outcome: Ongoing, Progressing  Goal: Absence of Hospital-Acquired Illness or Injury  Outcome: Ongoing, Progressing  Intervention: Identify and Manage Fall Risk  Recent Flowsheet Documentation  Taken 4/12/2023 0429 by Grace Cevallos RN  Safety Promotion/Fall Prevention: safety round/check completed  Taken 4/12/2023 0223 by Grace Cevallos RN  Safety Promotion/Fall Prevention: safety round/check completed  Taken 4/12/2023 0000 by Grace Cevallos RN  Safety Promotion/Fall Prevention: safety round/check completed  Taken 4/11/2023 2230 by Grace Cevallos RN  Safety Promotion/Fall Prevention: safety round/check completed  Taken 4/11/2023 2045 by Grace Cevallos RN  Safety Promotion/Fall Prevention:   activity supervised   assistive device/personal items within reach   clutter free environment maintained   fall prevention program maintained   nonskid shoes/slippers when out of bed   safety round/check completed  Intervention: Prevent Skin Injury  Recent Flowsheet Documentation  Taken 4/12/2023 0429 by Grace Cevallos RN  Body Position: position changed independently  Taken 4/12/2023 0223 by Grace Cevallos RN  Body Position: position changed independently  Taken 4/12/2023 0000 by Grace Cevallos RN  Body Position: position changed independently  Taken 4/11/2023 2230 by Grace Cevallos RN  Body Position: position changed independently  Taken 4/11/2023 2045 by Grace Cevallos RN  Body Position: position changed independently  Intervention: Prevent and Manage VTE (Venous Thromboembolism) Risk  Recent Flowsheet Documentation  Taken 4/11/2023 2045 by Grace Cevallos  RN  Activity Management: activity encouraged  Goal: Optimal Comfort and Wellbeing  Outcome: Ongoing, Progressing  Goal: Readiness for Transition of Care  Outcome: Ongoing, Progressing     Problem: Skin Injury Risk Increased  Goal: Skin Health and Integrity  Outcome: Ongoing, Progressing  Intervention: Optimize Skin Protection  Recent Flowsheet Documentation  Taken 4/12/2023 0429 by Grace Cevallos RN  Head of Bed (HOB) Positioning: HOB at 20 degrees  Taken 4/12/2023 0223 by Grace Cevallos RN  Head of Bed (HOB) Positioning: HOB at 20 degrees  Taken 4/11/2023 2230 by Grace Cevallos RN  Head of Bed (HOB) Positioning: HOB at 20 degrees  Taken 4/11/2023 2045 by Grace Cevallos RN  Pressure Reduction Techniques: frequent weight shift encouraged  Head of Bed (HOB) Positioning: HOB at 20-30 degrees  Pressure Reduction Devices: alternating pressure pump (ADD)     Problem: Fall Injury Risk  Goal: Absence of Fall and Fall-Related Injury  Outcome: Ongoing, Progressing  Intervention: Identify and Manage Contributors  Recent Flowsheet Documentation  Taken 4/11/2023 2045 by Grace Cevallos RN  Medication Review/Management: medications reviewed  Intervention: Promote Injury-Free Environment  Recent Flowsheet Documentation  Taken 4/12/2023 0429 by Grace Cevallos RN  Safety Promotion/Fall Prevention: safety round/check completed  Taken 4/12/2023 0223 by Grace Cevallos RN  Safety Promotion/Fall Prevention: safety round/check completed  Taken 4/12/2023 0000 by Grace Cevallos RN  Safety Promotion/Fall Prevention: safety round/check completed  Taken 4/11/2023 2230 by Grace Cevallos RN  Safety Promotion/Fall Prevention: safety round/check completed  Taken 4/11/2023 2045 by Grace Cevallos RN  Safety Promotion/Fall Prevention:   activity supervised   assistive device/personal items within reach   clutter free environment maintained   fall prevention program maintained   nonskid shoes/slippers when out of bed   safety  round/check completed     Problem: Diabetes Comorbidity  Goal: Blood Glucose Level Within Targeted Range  Outcome: Ongoing, Progressing     Problem: Hypertension Comorbidity  Goal: Blood Pressure in Desired Range  Outcome: Ongoing, Progressing  Intervention: Maintain Blood Pressure Management  Recent Flowsheet Documentation  Taken 4/11/2023 2045 by Grace Cevallos RN  Medication Review/Management: medications reviewed     Problem: Cerebral Tissue Perfusion (Stroke, Ischemic/Transient Ischemic Attack)  Goal: Optimal Cerebral Tissue Perfusion  Outcome: Ongoing, Progressing     Problem: Cognitive Impairment (Stroke, Ischemic/Transient Ischemic Attack)  Goal: Optimal Cognitive Function  Outcome: Ongoing, Progressing     Problem: Communication Impairment (Stroke, Ischemic/Transient Ischemic Attack)  Goal: Improved Communication Skills  Outcome: Ongoing, Progressing

## 2023-04-12 NOTE — PLAN OF CARE
Goal Outcome Evaluation:  Plan of Care Reviewed With: patient           Outcome Evaluation: Pt participated with PT this AM. Pt remains pleasantly confused, is highly distractable and requires frequent cues to stay on task for safety. She mobilizes with cga using a walker. Cues to keep hands on walker for safety. Cog task while ambulating appears challenging. PT will continue to follow and progress as able. Will require increased supervision and assist at next level of care for safety.

## 2023-04-12 NOTE — CONSULTS
Name: Dior Pettit ADMIT: 2023   : 1940  PCP: Ashanti Hong APRN    MRN: [id number] LOS: 3 days   AGE/SEX: 82 y.o. female  ROOM: [address]      Patient Care Team:  Ashanti Hong APRN as PCP - General (Internal Medicine)  Gerardo Rodriguez Jr., MD as Consulting Physician (Pulmonary Disease)  Abbi Mitchell MD as Consulting Physician (Cardiology)  Luis Miguel Parker MD as Consulting Physician (Ophthalmology)  Tae Burton MD as Consulting Physician (Dermatology)  Addis Gomes MD as Consulting Physician (Plastic Surgery)  Chief Complaint   Patient presents with   • Extremity Weakness     CC: altered mental status, hallucinations, headache, vision changes; consult for temporal artery biopsy    Subjective     Inpatient Vascular Surgery Consult  Consult performed by: Tristan Bermeo II, MD  Consult ordered by: Consuelo Guillory PA        History of Present Illness   Patient is a pleasant 82-year-old lady with a history of aortic valve replacement, hypertension, hyperlipidemia, diabetes, hypothyroidism, GERD who presented here with an episode of confusion and visual hallucinations of seeing children and other figures around her.  The patient also reports history of prior headaches that had improved but has started having worsening headaches over the last week as well.  The patient also describes some numbness and tightness in her right thumb extending up to her right neck.  The patient was admitted and worked up for stroke and was found to have a small left frontal stroke.  Her carotid arteries on MRA showed no stenosis which is consistent with the prior carotid duplex from .   The patient reports that starting  she began to have vision changes in both eyes worse on the left than right where she felt like her peripheral vision, which she reports is always quite poor due to macular degeneration, got considerably worse in her field of vision constricted down  "to a \"pinpoint\".  Again she reports this is worse in the left eye than the right eye.  She reports it is improved slightly over the last few days but is still not normal.  Per my review of the electronic medical record the patient's family has noted her having increased confusion at home and having missed some doses of her medications.  As the patient has no significant cerebrovascular disease it is presumed that her stroke is from her aortic valve due to her having missed doses of her Eliquis.  She has been on Eliquis here.  She does have some elevated inflammatory markers as well.    At the time of my evaluation the patient is resting comfortably in the hospital bed in no acute distress.  She provides most of the history but does have some assistance from her family members.    Review of Systems  As noted in HPI    Past Medical History:   Diagnosis Date   • Allergic rhinitis    • Anemia    • Anxiety     Controlled w/Meds   • Aortic root dilatation 10/02/2017    Borderline--Noted on Echo   • Aortic valve calcification 10/02/2017    Noted on Echo   • Aortic valve insufficiency Dx in 07    w/AVR    • Aortic valve prosthesis present 11/02/2018    Noted on CTA Chest   • Ascending aorta dilatation 10/02/2017    Borderline--Noted on Echo   • Asthma    • Atypical chest pain    • Breast pain, right Hx   • CAD (coronary artery disease)    • Cardiomegaly 2017   • Cervicalgia    • Chest pain due to CAD    • 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    Borderline--Noted on Echo & Measured @ 2.6CM and Mid Descending @ 2.5CM on CTA Chest-11/2/18   • DM (diabetes mellitus)     T2   • Esophagitis    • GERD (gastroesophageal reflux disease)     Controlled w/Meds   • Headache    • Health care maintenance    • Heart murmur    • History of echocardiogram 10/2/17-[facility]    EF 66%; Borderline Concentric Hypertrophy; Mild Calcification in AV; Mild AVS; Mild AVR; Moderate TVR; Borderline Dilation of Aortic Root/Arch & Borderline Dilation " of Ascending/Proximal Aorta Present   • Hyperlipidemia     Controlled w/Meds   • Hypertension     Controlled w/Meds   • Hypothyroidism     Controlled w/Synthroid   • Insomnia    • Macular degeneration, left eye    • Mild aortic valve regurgitation 10/02/2017    Noted on Echo   • Mild aortic valve stenosis 10/02/2017    Noted on Echo   • Mild dilation of ascending aorta 10/02/2017    Borderline--Noted on Echo   • Moderate tricuspid valve regurgitation 10/02/2017    Noted on Echo   • Mood disorder in conditions classified elsewhere     Controlled w/Meds   • Near syncope 03/2017-[facility]   • Neuropathy    • NNEKA (obstructive sleep apnea)     Untreated   • Osteoarthritis     Ankles/Feet   • Pulmonary hypertension 2017   • Renal insufficiency    • RLS (restless legs syndrome)    • Thoracic ascending aortic aneurysm Dx in 2007 @ 3.8CM & 1/02/2018    Noted @ 4CM on CTA Chest;   • Visual impairment     macular degeneratuin     Past Surgical History:   Procedure Laterality Date   • AORTIC VALVE REPAIR/REPLACEMENT  2007    Dr. Perry   • APPENDECTOMY     • AUGMENTATION MAMMAPLASTY  1976   • BREAST AUGMENTATION  2014   • BUNIONECTOMY     • CARDIAC CATHETERIZATION  2007   • CARDIAC VALVE REPLACEMENT  2007    porcine   • COLONOSCOPY  2010   • COLONOSCOPY  03/02/2012   • EYE SURGERY      cataracts and ens implants   • HYSTERECTOMY  1972   • SIGMOIDOSCOPY  2001   • TOTAL HIP ARTHROPLASTY Right 11/15/2022    Procedure: Right anterior total hip arthroplasty;  Surgeon: Joao Martinez MD;  Location: [facility];  Service: Orthopedics;  Laterality: Right;     Family History   Problem Relation Age of Onset   • Hypertension Mother    • Stroke Mother    • Cancer Mother    • Diabetes Sister    • Coronary artery disease Brother    • Diabetes Brother    • Valvular heart disease Brother         TAVR   • Coronary artery disease Brother    • Cancer Brother    • Birth defects Daughter    • Skin cancer Neg Hx      Social History     Tobacco  Use   • Smoking status: Never   • Smokeless tobacco: Never   • Tobacco comments:     caffeine use - 1 cup coffee daily    Vaping Use   • Vaping Use: Never used   Substance Use Topics   • Alcohol use: Yes     Comment: less than 1 glass of wine   • Drug use: Never     Medications Prior to Admission   Medication Sig Dispense Refill Last Dose   • acetaminophen (TYLENOL) 325 MG tablet Take 2 tablets by mouth Every 4 (Four) Hours As Needed for Mild Pain.      • amitriptyline (ELAVIL) 50 MG tablet Take 1 tablet by mouth Every Night. 90 tablet 1 4/8/2023   • ascorbic acid (VITAMIN C) 1000 MG tablet Take 1 tablet by mouth Daily.   4/9/2023   • atorvastatin (LIPITOR) 20 MG tablet TAKE 1 TABLET EVERY NIGHT 90 tablet 1 4/8/2023   • azelastine (ASTELIN) 0.1 % nasal spray 2 sprays into the nostril(s) as directed by provider 2 (Two) Times a Day As Needed for Allergies or Rhinitis. Use in each nostril as directed      • dofetilide (Tikosyn) 250 MCG capsule Take 1 capsule by mouth Every 12 (Twelve) Hours. 180 capsule 1 4/9/2023   • Eliquis 5 MG tablet tablet TAKE 1 TABLET BY MOUTH EVERY TWELVE HOURS 180 tablet 0 4/9/2023 at 0900   • fenofibrate micronized (LOFIBRA) 134 MG capsule Take 1 capsule by mouth Every Morning Before Breakfast. 90 capsule 3 4/9/2023   • glucosamine-chondroitin 500-400 MG capsule capsule Take 2 capsules by mouth Daily.   4/9/2023   • levothyroxine (SYNTHROID, LEVOTHROID) 50 MCG tablet TAKE 1 TABLET EVERY OTHER  DAY 45 tablet 1 4/9/2023   • levothyroxine (SYNTHROID, LEVOTHROID) 75 MCG tablet TAKE ONE TABLET EVERY OTHER DAY 45 tablet 3 4/8/2023   • losartan (COZAAR) 50 MG tablet TAKE 1 TABLET DAILY 90 tablet 1 4/9/2023   • metFORMIN ER (GLUCOPHAGE-XR) 750 MG 24 hr tablet Take 1 tablet by mouth 2 (Two) Times a Day. 180 tablet 3 4/9/2023   • metoprolol tartrate (LOPRESSOR) 50 MG tablet Take 0.5 tablets by mouth 2 (Two) Times a Day. 90 tablet 2 4/9/2023   • Multiple Vitamins-Minerals (MULTIVITAL PO) Take  by mouth  Daily.   4/9/2023   • omeprazole (priLOSEC) 40 MG capsule TAKE 1 CAPSULE DAILY 90 capsule 1 4/9/2023   • valACYclovir (VALTREX) 1000 MG tablet TAKE 2 TABLETS 4 TIMES     DAILY FOR MOUTH ULCER (Patient taking differently: TAKE 2 TABLETS 4 TIMES     DAILY FOR MOUTH ULCER as needed) 16 tablet 4    • venlafaxine XR (Effexor XR) 75 MG 24 hr capsule Take 1 capsule by mouth Daily. 90 capsule 1 4/9/2023   • venlafaxine XR (EFFEXOR-XR) 150 MG 24 hr capsule TAKE 1 CAPSULE DAILY 90 capsule 3 4/9/2023   • bisacodyl (DULCOLAX) 10 MG suppository Insert 1 suppository into the rectum Daily As Needed for Constipation.      • Blood Glucose Monitoring Suppl (Prodigy No Coding Blood Gluc) w/Device kit 1 each Daily. 1 kit 0    • glucose blood (Prodigy No Coding Blood Gluc) test strip TEST BLOOD SUGAR DAILY 50 each 0    • Prodigy Safety Lancets 26G misc CHECK BLOOD SUGAR ONE TIME PER  each 1      amitriptyline, 50 mg, Oral, Nightly  amLODIPine, 5 mg, Oral, Q24H  atorvastatin, 80 mg, Oral, Nightly  dofetilide, 250 mcg, Oral, BID  insulin lispro, 0-7 Units, Subcutaneous, TID AC  levothyroxine, 50 mcg, Oral, Every Other Day  levothyroxine, 75 mcg, Oral, Every Other Day  losartan, 100 mg, Oral, Daily  metoprolol tartrate, 25 mg, Oral, BID  [START ON 4/13/2023] predniSONE, 60 mg, Oral, Daily  venlafaxine XR, 150 mg, Oral, Daily  venlafaxine XR, 75 mg, Oral, Daily      heparin, 18 Units/kg/hr  hold, 1 each      •  acetaminophen **OR** acetaminophen  •  ALPRAZolam  •  bisacodyl  •  dextrose  •  dextrose  •  glucagon (human recombinant)  •  hold  •  labetalol  •  melatonin  •  OLANZapine  •  ondansetron  •  sodium chloride  •  [COMPLETED] Insert Peripheral IV **AND** sodium chloride  Patient has no known allergies.    Objective     Physical Exam:  Physical Exam  Vitals reviewed.   Constitutional:       General: She is not in acute distress.  Eyes:      General: No scleral icterus.     Extraocular Movements: Extraocular movements intact.    Cardiovascular:      Rate and Rhythm: Normal rate and regular rhythm.      Comments: Palpable radial and DP pulses bilaterally  Pulmonary:      Effort: Pulmonary effort is normal. No respiratory distress.   Abdominal:      Palpations: Abdomen is soft.      Tenderness: There is no abdominal tenderness.   Skin:     General: Skin is warm and dry.   Neurological:      General: No focal deficit present.      Mental Status: She is alert and oriented to person, place, and time.      Comments: 5/5 strength x4  CN II-XII intact  Sensation grossly intact   Psychiatric:         Mood and Affect: Mood normal.         Behavior: Behavior normal.          Vital Signs and Labs:  Vital Signs Patient Vitals for the past 24 hrs:   BP Temp Temp src Pulse Resp SpO2   04/12/23 1518 122/80 -- -- 96 16 97 %   04/12/23 1435 135/74 -- -- 100 16 97 %   04/12/23 1251 122/75 97.8 °F (36.6 °C) Oral 79 18 --   04/12/23 1015 145/86 97.8 °F (36.6 °C) Oral 95 18 96 %   04/12/23 0534 151/89 98.1 °F (36.7 °C) Oral 91 18 96 %   04/12/23 0057 144/90 98.2 °F (36.8 °C) Oral 85 18 97 %   04/11/23 2038 160/99 98.2 °F (36.8 °C) Oral 99 18 95 %   04/11/23 1855 165/92 -- -- 98 -- 95 %     I/O:  I/O last 3 completed shifts:  In: -   Out: 525 [Urine:525]    CBC    Results from last 7 days   Lab Units 04/10/23  0518 04/09/23  1514   WBC 10*3/mm3 5.84 7.14   HEMOGLOBIN g/dL 14.2 15.3   PLATELETS 10*3/mm3 245 228     BMP   Results from last 7 days   Lab Units 04/11/23  0629 04/10/23  0518 04/09/23  1514   SODIUM mmol/L 136 138 134*   POTASSIUM mmol/L 4.4 4.1 4.3   CHLORIDE mmol/L 102 102 99   CO2 mmol/L 27.0 28.7 23.8   BUN mg/dL 15 15 19   CREATININE mg/dL 0.57 0.75 0.91   GLUCOSE mg/dL 137* 99 85   MAGNESIUM mg/dL  --  1.7 1.4*     Cr Clearance Estimated Creatinine Clearance: 77.8 mL/min (by C-G formula based on SCr of 0.57 mg/dL).  Coag   Results from last 7 days   Lab Units 04/09/23  1514   INR  1.20*   APTT seconds 39.0*     HbA1C   Lab Results   Component  Value Date    HGBA1C 6.80 (H) 04/10/2023    HGBA1C 5.50 01/11/2023    HGBA1C 6.10 (H) 11/13/2022     Blood Glucose   Glucose   Date/Time Value Ref Range Status   04/12/2023 1130 225 (H) 70 - 130 mg/dL Final     Comment:     Meter: [id number] : [id number] Altamirano Chemell NA   04/12/2023 0756 108 70 - 130 mg/dL Final     Comment:     Meter: [id number] : [id number] Altamirano Chemell NA   04/11/2023 2028 222 (H) 70 - 130 mg/dL Final     Comment:     Meter: [id number] : [id number] Neda Israfil NA   04/11/2023 1624 116 70 - 130 mg/dL Final     Comment:     Meter: [id number] : [id number] Altamirano Chemell NA   04/11/2023 1108 120 70 - 130 mg/dL Final     Comment:     Meter: [id number] : [id number] Altamirano Chemell NA   04/11/2023 0734 103 70 - 130 mg/dL Final     Comment:     Meter: [id number] : [id number] Altamirano Chemell NA   04/10/2023 1648 121 70 - 130 mg/dL Final     Comment:     Meter: [id number] : [id number] Altamirano Chemell NA   04/10/2023 1137 92 70 - 130 mg/dL Final     Comment:     Meter: [id number] : [id number] Zuvvu Chemell NA     Infection     CMP   Results from last 7 days   Lab Units 04/11/23  0629 04/10/23  0518 04/09/23  1514   SODIUM mmol/L 136 138 134*   POTASSIUM mmol/L 4.4 4.1 4.3   CHLORIDE mmol/L 102 102 99   CO2 mmol/L 27.0 28.7 23.8   BUN mg/dL 15 15 19   CREATININE mg/dL 0.57 0.75 0.91   GLUCOSE mg/dL 137* 99 85   ALBUMIN g/dL  --  4.2 4.5   BILIRUBIN mg/dL  --  0.6 0.7   ALK PHOS U/L  --  95 112   AST (SGOT) U/L  --  24 28   ALT (SGPT) U/L  --  16 18     ABG      UA    Results from last 7 days   Lab Units 04/09/23  1615   NITRITE UA  Negative     ANA  No results found for: POCMETH, POCAMPHET, POCBARBITUR, POCBENZO, POCCOCAINE, POCOPIATES, POCOXYCODO, POCPHENCYC, POCPROPOXY, POCTHC, POCTRICYC  Lysis Labs   Results from last 7 days   Lab Units 04/11/23  0629 04/10/23  0518 04/09/23  1514   INR   --   --  1.20*   APTT seconds  --   --  39.0*   HEMOGLOBIN g/dL  --  14.2 15.3    PLATELETS 10*3/mm3  --  245 228   CREATININE mg/dL 0.57 0.75 0.91     Radiology(recent) CT Chest Without Contrast Diagnostic    Result Date: 4/11/2023  1. Linear subsegmental proximal atelectasis or scarring best demonstrated in the right upper lobe 2. Enlargement of the ascending thoracic aorta measuring 4.1 cm in diameter. Previous aortic valve replacement. 3. Cholelithiasis. 4. Compression deformity at T12 with 30% height loss and this is without change compared to prior exam 07/07/2021.  Radiation dose reduction techniques were utilized, including automated exposure control and exposure modulation based on body size.  This report was finalized on 4/11/2023 7:34 PM by Dr. Gustavo Walden M.D.        Active Hospital Problems    Diagnosis  POA   • **Acute stroke due to ischemia [I63.9]  Yes   • Shortness of breath [R06.02]  Unknown   • PAF (paroxysmal atrial fibrillation) [I48.0]  Yes   • S/P AVR [Z95.2]  Not Applicable   • Restless leg syndrome [G25.81]  Yes   • 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     Problem Points:  4:  Patient has a new problem, with additional work-up planned  Total problem points:4 or more    Data Points:  1:  I have reviewed or order clinical lab test  1:  I have reviewed or order radiology test (except heart catheterization or echo)  2:  I have personally and independently review of image, tracing, or specimen  2:  I have reviewed and summation of old records and/or discussed the patients care with another health care provider  Total data points:4 or more    Risk Points:  Moderate: New diagnosis with unknown prognosis    MDM requires 2/3 (Problem points, Data points and Risk)  MDM Prob point Data point Risk   SF 1 1 Minimal   Low 2 2  Low   Mod 3 3 Moderate   High 4 4 High     Code requires 3/3 (MDM, History and Exam)  Code MDM History Exam Time   44082 SF/Low Detailed Detailed 30   61675 Mod Comprehensive Comprehensive 50   24097 High Comprehensive Comprehensive 70     Detailed history:  4 elements HPI or status of 3 chronic problems; 2-9 system ROS  Comprehensive:  4 elements HPI or status of 3 chronic problems;  10 system ROS    Detailed Exam:  12 findings from any organ system  Comprehensive Exam:  2 findings from each of 9 systems.   42007    Assessment & Plan       Acute stroke due to ischemia    Essential hypertension    Pulmonary hypertension (HCC)    Gastroesophageal reflux disease    Anxiety    Restless leg syndrome    Controlled diabetes mellitus type 2 with complications    S/P AVR    PAF (paroxysmal atrial fibrillation)    Shortness of breath      82 y.o. female here with an acute stroke, this has been associated with numerous symptoms including headaches, vision changes in both eyes worse on the left than right, hallucinations, and headaches.  Patient has inflammatory markers that are elevated as well.  Overall there is some concern for temporal arteritis/GCA.  I reviewed the images of her MRI as well as the previous carotid duplex.  She does not appear to have any significant cerebrovascular atherosclerotic disease as a possible source of her stroke.  We will defer stroke management to the neurology service.    I discussed the nature of temporal arteritis and temporal artery biopsy with the patient and her family and they verbalized understanding and are amenable to having the procedure done.  We will have to hold the patient's Eliquis for 2 days.  I have ordered a heparin drip.  Plan for likely temporal artery biopsy on Friday.    I discussed the patients findings and my recommendations with patient and family.    Tristan Bermeo II, MD  04/12/23  16:41 EDT    Please call my office with any question: [phone]

## 2023-04-12 NOTE — THERAPY TREATMENT NOTE
Patient Name: Dior Pettit  : 1940    MRN: [id number]                              Today's Date: 2023       Admit Date: 2023    Visit Dx:     ICD-10-CM ICD-9-CM   1. Acute stroke due to ischemia  I63.9 434.91   2. Altered mental status, unspecified altered mental status type  R41.82 780.97     Patient Active Problem List   Diagnosis   • Essential hypertension   • Pulmonary hypertension (HCC)   • NNEKA (obstructive sleep apnea)   • Gastroesophageal reflux disease   • Anxiety   • Restless leg syndrome   • Controlled diabetes mellitus type 2 with complications   • Hypothyroidism   • Hypercholesteremia   • Seasonal allergic rhinitis   • Mild intermittent asthma without complication   • S/P AVR   • Dilated aortic root   • Thoracic aortic aneurysm without rupture    • Primary insomnia   • Renal insufficiency   • Macular degeneration of both eyes   • Coronary artery disease involving native coronary artery with angina pectoris   • Rectocele   • Nonrheumatic mitral valve stenosis   • Slow transit constipation   • Dizziness   • Daily headache   • Symptomatic bradycardia   • Closed displaced fracture of right femoral neck   • Atrial fibrillation, persistent   • Hip pain   • PAF (paroxysmal atrial fibrillation)   • Acute stroke due to ischemia   • Shortness of breath     Past Medical History:   Diagnosis Date   • Allergic rhinitis    • Anemia    • Anxiety     Controlled w/Meds   • Aortic root dilatation 10/02/2017    Borderline--Noted on Echo   • Aortic valve calcification 10/02/2017    Noted on Echo   • Aortic valve insufficiency Dx in     w/AVR    • Aortic valve prosthesis present 2018    Noted on CTA Chest   • Ascending aorta dilatation 10/02/2017    Borderline--Noted on Echo   • Asthma    • Atypical chest pain    • Breast pain, right Hx   • CAD (coronary artery disease)    • Cardiomegaly 2017   • Cervicalgia    • Chest pain due to CAD    • 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     Borderline--Noted on Echo & Measured @ 2.6CM and Mid Descending @ 2.5CM on CTA Chest-11/2/18   • DM (diabetes mellitus)     T2   • Esophagitis    • GERD (gastroesophageal reflux disease)     Controlled w/Meds   • Headache    • Health care maintenance    • Heart murmur    • History of echocardiogram 10/2/17-[facility]    EF 66%; Borderline Concentric Hypertrophy; Mild Calcification in AV; Mild AVS; Mild AVR; Moderate TVR; Borderline Dilation of Aortic Root/Arch & Borderline Dilation of Ascending/Proximal Aorta Present   • Hyperlipidemia     Controlled w/Meds   • Hypertension     Controlled w/Meds   • Hypothyroidism     Controlled w/Synthroid   • Insomnia    • Macular degeneration, left eye    • Mild aortic valve regurgitation 10/02/2017    Noted on Echo   • Mild aortic valve stenosis 10/02/2017    Noted on Echo   • Mild dilation of ascending aorta 10/02/2017    Borderline--Noted on Echo   • Moderate tricuspid valve regurgitation 10/02/2017    Noted on Echo   • Mood disorder in conditions classified elsewhere     Controlled w/Meds   • Near syncope 03/2017-[facility] ER   • Neuropathy    • NNEKA (obstructive sleep apnea)     Untreated   • Osteoarthritis     Ankles/Feet   • Pulmonary hypertension 2017   • Renal insufficiency    • RLS (restless legs syndrome)    • Thoracic ascending aortic aneurysm Dx in 2007 @ 3.8CM & 1/02/2018    Noted @ 4CM on CTA Chest;   • Visual impairment     macular degeneratuin     Past Surgical History:   Procedure Laterality Date   • AORTIC VALVE REPAIR/REPLACEMENT  2007    Dr. Perry   • APPENDECTOMY     • AUGMENTATION MAMMAPLASTY  1976   • BREAST AUGMENTATION  2014   • BUNIONECTOMY     • CARDIAC CATHETERIZATION  2007   • CARDIAC VALVE REPLACEMENT  2007    porcine   • COLONOSCOPY  2010   • COLONOSCOPY  03/02/2012   • EYE SURGERY      cataracts and ens implants   • HYSTERECTOMY  1972   • SIGMOIDOSCOPY  2001   • TOTAL HIP ARTHROPLASTY Right 11/15/2022    Procedure: Right anterior total hip arthroplasty;   Surgeon: Joao Martinez MD;  Location: [facility] MAIN OR;  Service: Orthopedics;  Laterality: Right;      General Information     Row Name 04/12/23 1311          OT Time and Intention    Document Type therapy note (daily note)  -     Mode of Treatment occupational therapy;individual therapy  -     Row Name 04/12/23 1311          General Information    Patient Profile Reviewed yes  -     Row Name 04/12/23 1311          Cognition    Orientation Status (Cognition) oriented to;person  -     Row Name 04/12/23 1311          Safety Issues, Functional Mobility    Safety Issues Affecting Function (Mobility) ability to follow commands;awareness of need for assistance;impulsivity;safety precautions follow-through/compliance;safety precaution awareness  -     Impairments Affecting Function (Mobility) balance;cognition;endurance/activity tolerance;visual/perceptual;strength  -     Cognitive Impairments, Mobility Safety/Performance attention;awareness, need for assistance;impulsivity  -           User Key  (r) = Recorded By, (t) = Taken By, (c) = Cosigned By    Initials Name Provider Type     Lashon Purdy, BEVERLY Occupational Therapist                 Mobility/ADL's     Row Name 04/12/23 1312          Bed Mobility    Bed Mobility supine-sit  -     Supine-Sit Lares (Bed Mobility) minimum assist (75% patient effort);contact guard;verbal cues  -     Assistive Device (Bed Mobility) bed rails  -     Row Name 04/12/23 1312          Transfers    Transfers sit-stand transfer;stand-sit transfer;toilet transfer;bed-chair transfer  -     Row Name 04/12/23 1312          Bed-Chair Transfer    Bed-Chair Lares (Transfers) contact guard;verbal cues;nonverbal cues (demo/gesture)  -     Assistive Device (Bed-Chair Transfers) walker, front-wheeled  -     Row Name 04/12/23 1312          Sit-Stand Transfer    Sit-Stand Lares (Transfers) contact guard;verbal cues  -     Assistive Device (Sit-Stand  Transfers) walker, front-wheeled  -KB     Row Name 04/12/23 1312          Toilet Transfer    Type (Toilet Transfer) sit-stand;stand-sit  -KB     Finney Level (Toilet Transfer) contact guard  -KB     Assistive Device (Toilet Transfer) grab bars/safety frame;walker, front-wheeled  -KB     Comment, (Toilet Transfer) cues for tech, safety  -KB     Row Name 04/12/23 1312          Activities of Daily Living    BADL Assessment/Intervention toileting;grooming  -KB     Row Name 04/12/23 1312          Grooming Assessment/Training    Finney Level (Grooming) oral care regimen;minimum assist (75% patient effort)  -KB     Position (Grooming) sink side;supported standing  -KB     Comment, (Grooming) pt approached the sink for grooming, turned away from sink, required cues to face sink in order to complete task  -KB     Row Name 04/12/23 1312          Toileting Assessment/Training    Finney Level (Toileting) minimum assist (75% patient effort);adjust/manage clothing;perform perineal hygiene  -KB           User Key  (r) = Recorded By, (t) = Taken By, (c) = Cosigned By    Initials Name Provider Type    Lashon Reid OT Occupational Therapist               Obj/Interventions     Row Name 04/12/23 1314          Balance    Balance Assessment sitting static balance;sitting dynamic balance;standing static balance;standing dynamic balance  -KB     Static Sitting Balance supervision  -KB     Dynamic Sitting Balance supervision  -KB     Static Standing Balance contact guard  -KB     Dynamic Standing Balance contact guard  -KB           User Key  (r) = Recorded By, (t) = Taken By, (c) = Cosigned By    Initials Name Provider Type    Lashon Reid OT Occupational Therapist               Goals/Plan    No documentation.                Clinical Impression     Row Name 04/12/23 1315          Pain Assessment    Pretreatment Pain Rating 0/10 - no pain  -KB     Posttreatment Pain Rating 0/10 - no pain  -KB     Row Name  04/12/23 1315          Plan of Care Review    Plan of Care Reviewed With patient  -KB     Outcome Evaluation Pt seen for OT treatment this date. Pt was agreeable to participate. Pt remains confused and highly distractable, requiring cues to stay on task. She required min assist with grooming to complete task in order. Assist with toileting with diaper management and manage clothing. Pt cont to demo functional performance below baseline, will cont to benefit from acute OT. Will cont to need OT upon dc to address adls, safety and mobility.  -KB     Row Name 04/12/23 1315          Therapy Plan Review/Discharge Plan (OT)    Anticipated Discharge Disposition (OT) skilled nursing facility;home with 24/7 care;assisted living  -KB     Row Name 04/12/23 1315          Positioning and Restraints    Pre-Treatment Position in bed  -KB     Post Treatment Position chair  -KB     In Chair notified nsg;reclined;sitting;call light within reach;encouraged to call for assist;exit alarm on;with family/caregiver  -KB           User Key  (r) = Recorded By, (t) = Taken By, (c) = Cosigned By    Initials Name Provider Type    KB Lashon Purdy, OT Occupational Therapist               Outcome Measures     Row Name 04/12/23 1318          How much help from another is currently needed...    Putting on and taking off regular lower body clothing? 2  -KB     Bathing (including washing, rinsing, and drying) 2  -KB     Toileting (which includes using toilet bed pan or urinal) 2  -KB     Putting on and taking off regular upper body clothing 3  -KB     Taking care of personal grooming (such as brushing teeth) 3  -KB     Eating meals 3  -KB     AM-PAC 6 Clicks Score (OT) 15  -KB     Row Name 04/12/23 1117 04/12/23 0850       How much help from another person do you currently need...    Turning from your back to your side while in flat bed without using bedrails? 3  -CW 3  -GK    Moving from lying on back to sitting on the side of a flat bed without  bedrails? 3  -CW 3  -GK    Moving to and from a bed to a chair (including a wheelchair)? 3  -CW 3  -GK    Standing up from a chair using your arms (e.g., wheelchair, bedside chair)? 3  -CW 3  -GK    Climbing 3-5 steps with a railing? 2  -CW 3  -GK    To walk in hospital room? 3  -CW 3  -GK    AM-PAC 6 Clicks Score (PT) 17  -CW 18  -GK    Highest level of mobility 5 --> Static standing  -CW 6 --> Walked 10 steps or more  -    Row Name 04/12/23 1117          Functional Assessment    Outcome Measure Options AM-PAC 6 Clicks Basic Mobility (PT)  -CW           User Key  (r) = Recorded By, (t) = Taken By, (c) = Cosigned By    Initials Name Provider Type    CW Kim Almonte, PT Physical Therapist    Jackie Kemp, RN Registered Nurse    Lashon Reid, OT Occupational Therapist                Occupational Therapy Education     Title: PT OT SLP Therapies (In Progress)     Topic: Occupational Therapy (Not Started)     Point: ADL training (Not Started)     Description:   Instruct learner(s) on proper safety adaptation and remediation techniques during self care or transfers.   Instruct in proper use of assistive devices.              Learner Progress:  Not documented in this visit.          Point: Home exercise program (Not Started)     Description:   Instruct learner(s) on appropriate technique for monitoring, assisting and/or progressing therapeutic exercises/activities.              Learner Progress:  Not documented in this visit.          Point: Precautions (Not Started)     Description:   Instruct learner(s) on prescribed precautions during self-care and functional transfers.              Learner Progress:  Not documented in this visit.          Point: Body mechanics (Not Started)     Description:   Instruct learner(s) on proper positioning and spine alignment during self-care, functional mobility activities and/or exercises.              Learner Progress:  Not documented in this visit.                           OT Recommendation and Plan     Plan of Care Review  Plan of Care Reviewed With: patient  Outcome Evaluation: Pt seen for OT treatment this date. Pt was agreeable to participate. Pt remains confused and highly distractable, requiring cues to stay on task. She required min assist with grooming to complete task in order. Assist with toileting with diaper management and manage clothing. Pt cont to demo functional performance below baseline, will cont to benefit from acute OT. Will cont to need OT upon dc to address adls, safety and mobility.     Time Calculation:    Time Calculation- OT     Row Name 04/12/23 1319             Time Calculation- OT    OT Start Time 0915  -KB      OT Stop Time 0940  -KB      OT Time Calculation (min) 25 min  -KB      Total Timed Code Minutes- OT 25 minute(s)  -KB      OT Received On 04/12/23  -KB      OT - Next Appointment 04/13/23  -KB      OT Goal Re-Cert Due Date 04/24/23  -KB         Timed Charges    02569 - OT Self Care/Mgmt Minutes 25  -KB         Total Minutes    Timed Charges Total Minutes 25  -KB       Total Minutes 25  -KB            User Key  (r) = Recorded By, (t) = Taken By, (c) = Cosigned By    Initials Name Provider Type    KB Lashon Purdy OT Occupational Therapist              Therapy Charges for Today     Code Description Service Date Service Provider Modifiers Qty    [id number] HC OT SELF CARE/MGMT/TRAIN EA 15 MIN 4/12/2023 Lashon Purdy OT GO 2               Lashon Purdy OT  4/12/2023

## 2023-04-12 NOTE — PROGRESS NOTES
"DOS: 2023  NAME: Dior Pettit   : 1940  PCP: Ashanti Hong APRN  Chief Complaint   Patient presents with   • Extremity Weakness       Chief complaint: ams  Subjective: Family at bedside.  Patient more attentive today.  She slept better.  She is still seeing children and shadows in her periphery.  She has long history of vision problems said to be macular degeneration.  She got prism glasses about 3 weeks ago for worsening of her condition.  Family member says that she was hallucinating about 3 days prior to arrival.  Patient has long history of headaches had a neurologist previously and really no change in those of late.  She is anxious about upcoming LP.  We discussed need for steroids and biopsy    Objective:  Vital signs: /86 (BP Location: Left arm, Patient Position: Sitting)   Pulse 95   Temp 97.8 °F (36.6 °C) (Oral)   Resp 18   Ht 170.2 cm (67\")   Wt 67.1 kg (148 lb)   LMP  (LMP Unknown)   SpO2 96%   BMI 23.18 kg/m²      Gen: NAD, vitals reviewed  MS: oriented x3, recent/remote memory intact, normal attention/concentration, language intact, no neglect.  CN: Reduced visual acuity poor peripheral vision, funduscopic exam difficult but no blurring of optic discs or other abnormality seen, PERRL, EOMI, no facial droop, no dysarthria  Motor: Symmetric power, no drift, normal tone  Sensory: intact to light touch all 4 ext.    ROS:  No weakness, numbness  No fevers, chills      Laboratory results:  Lab Results   Component Value Date    GLUCOSE 137 (H) 2023    CALCIUM 10.1 2023     2023    K 4.4 2023    CO2 27.0 2023     2023    BUN 15 2023    CREATININE 0.57 2023    EGFRIFAFRI 63 2021    EGFRIFNONA 63 2021    BCR 26.3 (H) 2023    ANIONGAP 7.0 2023     Lab Results   Component Value Date    WBC 5.84 04/10/2023    HGB 14.2 04/10/2023    HCT 45.4 04/10/2023    MCV 86.1 04/10/2023     04/10/2023 "     Lab Results   Component Value Date    LDL 91 04/10/2023     (H) 11/09/2022    LDL 69 04/14/2022         Lab 04/10/23  0518   HEMOGLOBIN A1C 6.80*        Review of labs: B12 707, folate greater than 20, sed rate 44, CRP 0.98    Review and interpretation of imaging: Reviewed MRI brain with and without and there is a tiny punctate left frontal lobe acute infarct and mild to moderate small vessel disease.  MRA head and neck without significant stenosis    Workup to date:    Diagnoses:  1.  Encephalopathy  2.  Incident acute left frontal stroke  3.  A-fib     Impression: 82-year-old female who is right hand with past medical history of hypertension, pulmonary hypertension, GERD, anxiety, restless leg syndrome, diabetes, A-fib on Eliquis, AVR, hip fracture and replacement about a year ago who came in with family for several week history of increasing confusion, paranoia, complaints of right-sided numbness and tingling.    She also has complaints of worsened vision and hallucinations.  Work-up revealing tiny acute left frontal infarct but not explaining her presentation.  Inflammatory markers are elevated.  Started steroids empirically for arteritis.  LP is planned for further evaluation.  Eliquis on hold.  And patient requesting anxiolytic prior     Plan:  1.  Continue empiric steroids.  Vascular c/s for TA bx  2.  LP ordered  3.  Better attention today, continue delirium precautions including family at bedside as able  4.  Needs AC and statin for primary stroke prevention long term     I spent at least 40 minutes interviewing, examining, and counseling patient.  I independently reviewed documentation, laboratory and diagnostic findings, external documentation where applicable, and formulated treatment plan which was discussed with the patient.      Thank you for this consultation.  Discussed above plan with neuro attending, Dr. Mata who agrees with above plan.  Neurology team is available for concerns or  questions.

## 2023-04-12 NOTE — PROGRESS NOTES
Name: Dior Pettit ADMIT: 2023   : 1940  PCP: Ashanti Hong APRN    MRN: [id number] LOS: 3 days   AGE/SEX: 82 y.o. female  ROOM: [facility]     Subjective   Subjective   Feels and looks better.  Not as much headache today with lower BP.  Family reports relatively acute onset of headaches about 3 weeks ago when all of these other issues started.    Review of Systems     Objective   Objective   Vital Signs  Temp:  [97.8 °F (36.6 °C)-98.2 °F (36.8 °C)] 97.8 °F (36.6 °C)  Heart Rate:  [] 96  Resp:  [16-20] 16  BP: (122-175)/() 122/80  SpO2:  [94 %-97 %] 97 %  on   ;   Device (Oxygen Therapy): room air  Body mass index is 23.18 kg/m².  Physical Exam  Vitals reviewed.   Constitutional:       General: She is not in acute distress.     Appearance: She is not ill-appearing.   Cardiovascular:      Rate and Rhythm: Normal rate and regular rhythm.   Pulmonary:      Effort: Pulmonary effort is normal.   Abdominal:      Palpations: Abdomen is soft.      Tenderness: There is no abdominal tenderness.   Musculoskeletal:      Right lower leg: No edema.      Left lower leg: No edema.   Skin:     General: Skin is warm and dry.   Neurological:      Mental Status: She is alert and oriented to person, place, and time.      Sensory: No sensory deficit.      Motor: No weakness.      Comments: More conversant, no word finding problems.  Exam otherwise nonfocal   Psychiatric:         Mood and Affect: Mood normal.       Results Review     I reviewed the patient's new clinical results.  Results from last 7 days   Lab Units 04/10/23  0518 23  1514   WBC 10*3/mm3 5.84 7.14   HEMOGLOBIN g/dL 14.2 15.3   PLATELETS 10*3/mm3 245 228     Results from last 7 days   Lab Units 23  0629 04/10/23  0518 23  1514   SODIUM mmol/L 136 138 134*   POTASSIUM mmol/L 4.4 4.1 4.3   CHLORIDE mmol/L 102 102 99   CO2 mmol/L 27.0 28.7 23.8   BUN mg/dL 15 15 19   CREATININE mg/dL 0.57 0.75 0.91   GLUCOSE mg/dL 137* 99 85    EGFR mL/min/1.73 89.7 78.6 62.3     Results from last 7 days   Lab Units 04/10/23  0518 04/09/23  1514   ALBUMIN g/dL 4.2 4.5   BILIRUBIN mg/dL 0.6 0.7   ALK PHOS U/L 95 112   AST (SGOT) U/L 24 28   ALT (SGPT) U/L 16 18     Results from last 7 days   Lab Units 04/11/23  0629 04/10/23  0518 04/09/23  1514   CALCIUM mg/dL 10.1 9.9 11.1*   ALBUMIN g/dL  --  4.2 4.5   MAGNESIUM mg/dL  --  1.7 1.4*       Hemoglobin A1C   Date/Time Value Ref Range Status   04/10/2023 0518 6.80 (H) 4.80 - 5.60 % Final     Glucose   Date/Time Value Ref Range Status   04/12/2023 1130 225 (H) 70 - 130 mg/dL Final     Comment:     Meter: [id number] : [id number] Altamirano Chemell NA   04/12/2023 0756 108 70 - 130 mg/dL Final     Comment:     Meter: [id number] : [id number] Altamirano Chemell NA   04/11/2023 2028 222 (H) 70 - 130 mg/dL Final     Comment:     Meter: [id number] : [id number] Neda Israfil NA   04/11/2023 1624 116 70 - 130 mg/dL Final     Comment:     Meter: [id number] : [id number] Altamirano Chemell NA   04/11/2023 1108 120 70 - 130 mg/dL Final     Comment:     Meter: [id number] : [id number] Altamirano Chemell NA   04/11/2023 0734 103 70 - 130 mg/dL Final     Comment:     Meter: [id number] : [id number] Altamirano Chemell NA   04/10/2023 1648 121 70 - 130 mg/dL Final     Comment:     Meter: [id number] : [id number] Altamirano Chemell NA       CT Chest Without Contrast Diagnostic    Result Date: 4/11/2023  1. Linear subsegmental proximal atelectasis or scarring best demonstrated in the right upper lobe 2. Enlargement of the ascending thoracic aorta measuring 4.1 cm in diameter. Previous aortic valve replacement. 3. Cholelithiasis. 4. Compression deformity at T12 with 30% height loss and this is without change compared to prior exam 07/07/2021.  Radiation dose reduction techniques were utilized, including automated exposure control and exposure modulation based on body size.  This report was finalized on 4/11/2023 7:34 PM by   Gustavo Walden M.D.      I have personally reviewed all medications:  Scheduled Medications  amitriptyline, 50 mg, Oral, Nightly  amLODIPine, 5 mg, Oral, Q24H  apixaban, 5 mg, Oral, BID  atorvastatin, 80 mg, Oral, Nightly  dofetilide, 250 mcg, Oral, BID  insulin lispro, 0-7 Units, Subcutaneous, TID AC  levothyroxine, 50 mcg, Oral, Every Other Day  levothyroxine, 75 mcg, Oral, Every Other Day  losartan, 100 mg, Oral, Daily  metoprolol tartrate, 25 mg, Oral, BID  [START ON 4/13/2023] predniSONE, 60 mg, Oral, Daily  venlafaxine XR, 150 mg, Oral, Daily  venlafaxine XR, 75 mg, Oral, Daily    Infusions  hold, 1 each    Diet  Diet: Regular/House Diet; Texture: Regular Texture (IDDSI 7); Fluid Consistency: Thin (IDDSI 0)    I have personally reviewed:  [x]  Laboratory   [x]  Microbiology   [x]  Radiology   [x]  EKG/Telemetry  [x]  Cardiology/Vascular   []  Pathology    []  Records       Assessment/Plan     Active Hospital Problems    Diagnosis  POA   • **Acute stroke due to ischemia [I63.9]  Yes   • Shortness of breath [R06.02]  Unknown   • PAF (paroxysmal atrial fibrillation) [I48.0]  Yes   • S/P AVR [Z95.2]  Not Applicable   • Restless leg syndrome [G25.81]  Yes   • 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       82 y.o. female with h/o PAF, GERD, DM2 admitted with confusion and balance issues and found to have small acute stroke on MRI but neurology concerned for secondary issue as well.    Stroke etiology most likely related to noncompliance with Eliquis although patient does not seem to think she missed that many doses.   - Continue statin.  Eliquis on hold due to below  - Repeat limited echo without PFO  - PT/OT/speech following.      Work-up in progress for any other  underlying metabolic cause for decompensation.  My suspicion is she may have previously undiagnosed dementia.  I do have some concern if she could potentially have something like TA given her vague complaints along with headaches and visual changes though admittedly vision changes tough to sort out with her severe macular degeneration.  - ESR elevated at 44.  CRP 0.98  - LP ordered by neurology, still pending.  Follow-up CSF result  - Discussed again today with neurology PA.  Apparently they agree with concern for TA and started empiric steroids.  Planning vascular consult for temporal artery biopsy following LP today.      CT chest reviewed and unremarkable.  Had previously been ordered outpatient.  Apparently was having some difficulty with shortness of breath.      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      Discussed with family at bedside.    Disposition: No bed available at rehab tomorrow.  Potentially could go after temporal artery biopsy versus waiting until Friday for results to determine whether to continue steroids.      Luigi Viera MD  [facility]  04/12/23  15:47 EDT

## 2023-04-12 NOTE — PROGRESS NOTES
"    Patient Name: Dior Pettit  :1940  82 y.o.      Patient Care Team:  Ashanti Hong APRN as PCP - General (Internal Medicine)  Gerardo Rodriguez Jr., MD as Consulting Physician (Pulmonary Disease)  Abbi Mitchell MD as Consulting Physician (Cardiology)  Luis Miguel Parker MD as Consulting Physician (Ophthalmology)  Tae Burton MD as Consulting Physician (Dermatology)  Addis Gomes MD as Consulting Physician (Plastic Surgery)    Chief Complaint: follow up stroke, AF    Interval History: she is walking in the [place]ringle with therapy. Conversational and not SOA while ambulating.        Objective   Vital Signs  Temp:  [98.1 °F (36.7 °C)-98.2 °F (36.8 °C)] 98.1 °F (36.7 °C)  Heart Rate:  [81-99] 91  Resp:  [18-20] 18  BP: (144-175)/() 151/89    Intake/Output Summary (Last 24 hours) at 2023 0956  Last data filed at 2023 1829  Gross per 24 hour   Intake --   Output 200 ml   Net -200 ml     Flowsheet Rows    Flowsheet Row First Filed Value   Admission Height 170.2 cm (67\") Documented at 2023 1515   Admission Weight 63.5 kg (140 lb) Documented at 2023 1515          Physical Exam:   General Appearance:    Alert, cooperative, in no acute distress   Lungs:     Clear to auscultation.  Normal respiratory effort and rate.      Heart:    irregular rhythm and normal rate, normal S1 and S2, no murmurs, gallops or rubs.     Chest Wall:    No abnormalities observed   Abdomen:     Soft, nontender, positive bowel sounds.     Extremities:   no cyanosis, clubbing or edema.  No marked joint deformities.  Adequate musculoskeletal strength.       Results Review:    Results from last 7 days   Lab Units 23  0629   SODIUM mmol/L 136   POTASSIUM mmol/L 4.4   CHLORIDE mmol/L 102   CO2 mmol/L 27.0   BUN mg/dL 15   CREATININE mg/dL 0.57   GLUCOSE mg/dL 137*   CALCIUM mg/dL 10.1     Results from last 7 days   Lab Units 23  1514   HSTROP T ng/L 10*     Results from last 7 days "   Lab Units 04/10/23  0518   WBC 10*3/mm3 5.84   HEMOGLOBIN g/dL 14.2   HEMATOCRIT % 45.4   PLATELETS 10*3/mm3 245     Results from last 7 days   Lab Units 04/09/23  1514   INR  1.20*   APTT seconds 39.0*     Results from last 7 days   Lab Units 04/10/23  0518   MAGNESIUM mg/dL 1.7     Results from last 7 days   Lab Units 04/10/23  0518   CHOLESTEROL mg/dL 163   TRIGLYCERIDES mg/dL 125   HDL CHOL mg/dL 50   LDL CHOL mg/dL 91               Medication Review:   amitriptyline, 50 mg, Oral, Nightly  amLODIPine, 5 mg, Oral, Q24H  apixaban, 5 mg, Oral, BID  atorvastatin, 80 mg, Oral, Nightly  dofetilide, 250 mcg, Oral, BID  insulin lispro, 0-7 Units, Subcutaneous, TID AC  levothyroxine, 50 mcg, Oral, Every Other Day  levothyroxine, 75 mcg, Oral, Every Other Day  losartan, 100 mg, Oral, Daily  metoprolol tartrate, 25 mg, Oral, BID  predniSONE, 40 mg, Oral, Daily  venlafaxine XR, 150 mg, Oral, Daily  venlafaxine XR, 75 mg, Oral, Daily         hold, 1 each        Assessment & Plan   1. Acute CVA  2. Altered mental status, not accounted for by CVA - LP today.   3. Paroxysmal atrial fibrillation (symptomatic) on Tikosyn and apixaban  4. Inadvertent medical noncompliance, missed medication doses (family frequently finds pills on the floor)  5. Hypertension  6. Diabetes mellitus type II  7. Hypothyroidism  8. Dyspnea on exertion - this is better. Outpatient stress test recently ordered and not done yet. No indication to do inpatient. She had normal cors in 2007.  9. Sleep apnea, intolerant of therapy in the past. No recent evaluation.  10. Status post AVR 2007    Her breathing is much better. She is very comfortable when ambulating in the [place].   She remains in SR.  continue dofetilide and apixaban.     Outpatient stress test already planned.     Nothing further to add from a cardiac standpoint. Will see as needed.    TOSHA Younger  [facility]  04/12/23  09:56 EDT

## 2023-04-12 NOTE — PROGRESS NOTES
Continued Stay Note  [facility]     Patient Name: Dior Pettit  MRN: [id number]  Today's Date: 4/12/2023    Admit Date: 4/9/2023    Plan: [facility], bed available tomorrow, 4/13, and Friday, 4/14   Discharge Plan     Row Name 04/12/23 1434       Plan    Plan [facility], bed available tomorrow, 4/13, and Friday, 4/14    Patient/Family in Agreement with Plan yes    Plan Comments Per alissa [facility] bed availability at [facility], [facility] a bed available tomorrow, 4/13, and Friday, 4/14. Pharmacy updated and packet in CCP office. Jasmyn Dorantes LCSW               Discharge Codes    No documentation.               Expected Discharge Date and Time     Expected Discharge Date Expected Discharge Time    Apr 13, 2023             Lisa Dorantes LCSW

## 2023-04-12 NOTE — PLAN OF CARE
Goal Outcome Evaluation:  Plan of Care Reviewed With: patient           Outcome Evaluation: Pt seen for OT treatment this date. Pt was agreeable to participate. Pt remains confused and highly distractable, requiring cues to stay on task. She required min assist with grooming to complete task in order. Assist with toileting with diaper management and manage clothing. Pt cont to demo functional performance below baseline, will cont to benefit from acute OT. Will cont to need OT upon dc to address adls, safety and mobility.OT wore appropriate PPE during session and performed hand hygiene before/after session.

## 2023-04-12 NOTE — THERAPY TREATMENT NOTE
Patient Name: Dior Pettit  : 1940    MRN: [id number]                              Today's Date: 2023       Admit Date: 2023    Visit Dx:     ICD-10-CM ICD-9-CM   1. Acute stroke due to ischemia  I63.9 434.91   2. Altered mental status, unspecified altered mental status type  R41.82 780.97     Patient Active Problem List   Diagnosis   • Essential hypertension   • Pulmonary hypertension (HCC)   • NNEKA (obstructive sleep apnea)   • Gastroesophageal reflux disease   • Anxiety   • Restless leg syndrome   • Controlled diabetes mellitus type 2 with complications   • Hypothyroidism   • Hypercholesteremia   • Seasonal allergic rhinitis   • Mild intermittent asthma without complication   • S/P AVR   • Dilated aortic root   • Thoracic aortic aneurysm without rupture    • Primary insomnia   • Renal insufficiency   • Macular degeneration of both eyes   • Coronary artery disease involving native coronary artery with angina pectoris   • Rectocele   • Nonrheumatic mitral valve stenosis   • Slow transit constipation   • Dizziness   • Daily headache   • Symptomatic bradycardia   • Closed displaced fracture of right femoral neck   • Atrial fibrillation, persistent   • Hip pain   • PAF (paroxysmal atrial fibrillation)   • Acute stroke due to ischemia   • Shortness of breath     Past Medical History:   Diagnosis Date   • Allergic rhinitis    • Anemia    • Anxiety     Controlled w/Meds   • Aortic root dilatation 10/02/2017    Borderline--Noted on Echo   • Aortic valve calcification 10/02/2017    Noted on Echo   • Aortic valve insufficiency Dx in     w/AVR    • Aortic valve prosthesis present 2018    Noted on CTA Chest   • Ascending aorta dilatation 10/02/2017    Borderline--Noted on Echo   • Asthma    • Atypical chest pain    • Breast pain, right Hx   • CAD (coronary artery disease)    • Cardiomegaly 2017   • Cervicalgia    • Chest pain due to CAD    • 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     Borderline--Noted on Echo & Measured @ 2.6CM and Mid Descending @ 2.5CM on CTA Chest-11/2/18   • DM (diabetes mellitus)     T2   • Esophagitis    • GERD (gastroesophageal reflux disease)     Controlled w/Meds   • Headache    • Health care maintenance    • Heart murmur    • History of echocardiogram 10/2/17-[facility]    EF 66%; Borderline Concentric Hypertrophy; Mild Calcification in AV; Mild AVS; Mild AVR; Moderate TVR; Borderline Dilation of Aortic Root/Arch & Borderline Dilation of Ascending/Proximal Aorta Present   • Hyperlipidemia     Controlled w/Meds   • Hypertension     Controlled w/Meds   • Hypothyroidism     Controlled w/Synthroid   • Insomnia    • Macular degeneration, left eye    • Mild aortic valve regurgitation 10/02/2017    Noted on Echo   • Mild aortic valve stenosis 10/02/2017    Noted on Echo   • Mild dilation of ascending aorta 10/02/2017    Borderline--Noted on Echo   • Moderate tricuspid valve regurgitation 10/02/2017    Noted on Echo   • Mood disorder in conditions classified elsewhere     Controlled w/Meds   • Near syncope 03/2017-[facility] ER   • Neuropathy    • NNEKA (obstructive sleep apnea)     Untreated   • Osteoarthritis     Ankles/Feet   • Pulmonary hypertension 2017   • Renal insufficiency    • RLS (restless legs syndrome)    • Thoracic ascending aortic aneurysm Dx in 2007 @ 3.8CM & 1/02/2018    Noted @ 4CM on CTA Chest;   • Visual impairment     macular degeneratuin     Past Surgical History:   Procedure Laterality Date   • AORTIC VALVE REPAIR/REPLACEMENT  2007    Dr. Perry   • APPENDECTOMY     • AUGMENTATION MAMMAPLASTY  1976   • BREAST AUGMENTATION  2014   • BUNIONECTOMY     • CARDIAC CATHETERIZATION  2007   • CARDIAC VALVE REPLACEMENT  2007    porcine   • COLONOSCOPY  2010   • COLONOSCOPY  03/02/2012   • EYE SURGERY      cataracts and ens implants   • HYSTERECTOMY  1972   • SIGMOIDOSCOPY  2001   • TOTAL HIP ARTHROPLASTY Right 11/15/2022    Procedure: Right anterior total hip arthroplasty;   Surgeon: Jaoo Martinez MD;  Location: [facility] MAIN OR;  Service: Orthopedics;  Laterality: Right;      General Information     Row Name 04/12/23 1110          Physical Therapy Time and Intention    Document Type therapy note (daily note)  -CW     Mode of Treatment physical therapy;individual therapy  -CW     Row Name 04/12/23 1110          General Information    Patient Profile Reviewed yes  -CW     Existing Precautions/Restrictions fall  -CW     Row Name 04/12/23 1110          Cognition    Orientation Status (Cognition) oriented to;person  -CW     Row Name 04/12/23 1110          Safety Issues, Functional Mobility    Safety Issues Affecting Function (Mobility) insight into deficits/self-awareness;awareness of need for assistance;judgment;problem-solving;impulsivity;safety precautions follow-through/compliance;safety precaution awareness  -CW     Impairments Affecting Function (Mobility) balance;cognition;endurance/activity tolerance;visual/perceptual;strength  -CW           User Key  (r) = Recorded By, (t) = Taken By, (c) = Cosigned By    Initials Name Provider Type    CW Kim Almonte, PT Physical Therapist               Mobility     Row Name 04/12/23 1111          Bed Mobility    Bed Mobility supine-sit  -CW     Supine-Sit Brinson (Bed Mobility) minimum assist (75% patient effort);contact guard;verbal cues  -CW     Assistive Device (Bed Mobility) bed rails  -CW     Row Name 04/12/23 1111          Bed-Chair Transfer    Bed-Chair Brinson (Transfers) contact guard;verbal cues;nonverbal cues (demo/gesture)  -     Assistive Device (Bed-Chair Transfers) walker, front-wheeled  -CW     Row Name 04/12/23 1111          Sit-Stand Transfer    Sit-Stand Brinson (Transfers) contact guard;verbal cues  -CW     Assistive Device (Sit-Stand Transfers) walker, front-wheeled  -CW     Row Name 04/12/23 1111          Gait/Stairs (Locomotion)    Brinson Level (Gait) contact guard;verbal cues  -CW      Assistive Device (Gait) walker, front-wheeled  -CW     Distance in Feet (Gait) 120'  -CW     Deviations/Abnormal Patterns (Gait) guille decreased;gait speed decreased  -CW     Comment, (Gait/Stairs) cog task while ambulating, cues to stay focused. Pt easily distractable. Cues for direction  -CW           User Key  (r) = Recorded By, (t) = Taken By, (c) = Cosigned By    Initials Name Provider Type    Kim Zepeda PT Physical Therapist               Obj/Interventions    No documentation.                Goals/Plan    No documentation.                Clinical Impression     Row Name 04/12/23 1114          Pain    Pretreatment Pain Rating 0/10 - no pain  -CW     Posttreatment Pain Rating 0/10 - no pain  -CW     Row Name 04/12/23 1114          Plan of Care Review    Plan of Care Reviewed With patient  -CW     Outcome Evaluation Pt participated with PT this AM. Pt remains pleasantly confused, is highly distractable and requires frequent cues to stay on task for safety. She mobilizes with cga using a walker. Cues to keep hands on walker for safety. Cog task while ambulating appears challenging. PT will continue to follow and progress as able. Will require increased supervision and assist at next level of care for safety.  -CW     Row Name 04/12/23 1114          Vital Signs    O2 Delivery Pre Treatment room air  -CW     Row Name 04/12/23 1114          Positioning and Restraints    Pre-Treatment Position in bed  -CW     Post Treatment Position chair  -CW     In Chair reclined;call light within reach;encouraged to call for assist;exit alarm on;notified nsg;with family/caregiver;legs elevated  -CW           User Key  (r) = Recorded By, (t) = Taken By, (c) = Cosigned By    Initials Name Provider Type    Kim Zepeda, KINDRA Physical Therapist               Outcome Measures     Row Name 04/12/23 1117 04/12/23 0850       How much help from another person do you currently need...    Turning from your back to your side  while in flat bed without using bedrails? 3  -CW 3  -GK    Moving from lying on back to sitting on the side of a flat bed without bedrails? 3  -CW 3  -GK    Moving to and from a bed to a chair (including a wheelchair)? 3  -CW 3  -GK    Standing up from a chair using your arms (e.g., wheelchair, bedside chair)? 3  -CW 3  -GK    Climbing 3-5 steps with a railing? 2  -CW 3  -GK    To walk in hospital room? 3  -CW 3  -GK    AM-PAC 6 Clicks Score (PT) 17  -CW 18  -GK    Highest level of mobility 5 --> Static standing  -CW 6 --> Walked 10 steps or more  -GK    Row Name 04/12/23 1117          Functional Assessment    Outcome Measure Options AM-PAC 6 Clicks Basic Mobility (PT)  -CW           User Key  (r) = Recorded By, (t) = Taken By, (c) = Cosigned By    Initials Name Provider Type    CW Kim Almonte, PT Physical Therapist    Jackie Kemp, RN Registered Nurse                             Physical Therapy Education     Title: PT OT SLP Therapies (In Progress)     Topic: Physical Therapy (In Progress)     Point: Mobility training (In Progress)     Learning Progress Summary           Patient Acceptance, E, NR by CW at 4/12/2023 1117    Acceptance, E, NR by ZB at 4/10/2023 1125   Family Acceptance, E, NR by ZB at 4/10/2023 1125                   Point: Home exercise program (Not Started)     Learner Progress:  Not documented in this visit.          Point: Body mechanics (In Progress)     Learning Progress Summary           Patient Acceptance, E, NR by ZB at 4/10/2023 1125   Family Acceptance, E, NR by ZB at 4/10/2023 1125                   Point: Precautions (In Progress)     Learning Progress Summary           Patient Acceptance, E, NR by ZB at 4/10/2023 1125   Family Acceptance, E, NR by ZB at 4/10/2023 1125                               User Key     Initials Effective Dates Name Provider Type Discipline    CW 12/13/22 -  Kim Almonte, PT Physical Therapist PT    ZB 03/10/23 -  Perez Escobar, PT Student PT  Student PT              PT Recommendation and Plan     Plan of Care Reviewed With: patient  Outcome Evaluation: Pt participated with PT this AM. Pt remains pleasantly confused, is highly distractable and requires frequent cues to stay on task for safety. She mobilizes with cga using a walker. Cues to keep hands on walker for safety. Cog task while ambulating appears challenging. PT will continue to follow and progress as able. Will require increased supervision and assist at next level of care for safety.     Time Calculation:    PT Charges     Row Name 04/12/23 1110             Time Calculation    Start Time 0919  -CW      Stop Time 0942  -CW      Time Calculation (min) 23 min  -CW      PT Received On 04/12/23  -CW      PT - Next Appointment 04/13/23  -CW            User Key  (r) = Recorded By, (t) = Taken By, (c) = Cosigned By    Initials Name Provider Type    Kim Zepeda, PT Physical Therapist              Therapy Charges for Today     Code Description Service Date Service Provider Modifiers Qty    89273953702 HC PT THERAPEUTIC ACT EA 15 MIN 4/12/2023 Kim Almonte, PT GP 1    05115165736 HC GAIT TRAINING EA 15 MIN 4/12/2023 Kim Almonte, PT GP 1          PT G-Codes  Outcome Measure Options: AM-PAC 6 Clicks Basic Mobility (PT)  AM-PAC 6 Clicks Score (PT): 17  AM-PAC 6 Clicks Score (OT): 12  Modified Harford Scale: 4 - Moderately severe disability.  Unable to walk without assistance, and unable to attend to own bodily needs without assistance.  PT Discharge Summary  Anticipated Discharge Disposition (PT): assisted living, home with home health, extended care facility    Kim Almonte PT  4/12/2023

## 2023-04-13 ENCOUNTER — APPOINTMENT (OUTPATIENT)
Dept: GENERAL RADIOLOGY | Facility: HOSPITAL | Age: 83
DRG: 42 | End: 2023-04-13
Payer: MEDICARE

## 2023-04-13 LAB
APTT PPP: 100.7 SECONDS (ref 22.7–35.4)
APTT PPP: 105.4 SECONDS (ref 22.7–35.4)
APTT PPP: 135.9 SECONDS (ref 22.7–35.4)
APTT PPP: 174.9 SECONDS (ref 22.7–35.4)
BASOPHILS # BLD AUTO: 0.03 10*3/MM3 (ref 0–0.2)
BASOPHILS NFR BLD AUTO: 0.3 % (ref 0–1.5)
DEPRECATED RDW RBC AUTO: 43.7 FL (ref 37–54)
EOSINOPHIL # BLD AUTO: 0.02 10*3/MM3 (ref 0–0.4)
EOSINOPHIL NFR BLD AUTO: 0.2 % (ref 0.3–6.2)
ERYTHROCYTE [DISTWIDTH] IN BLOOD BY AUTOMATED COUNT: 14.4 % (ref 12.3–15.4)
GLUCOSE BLDC GLUCOMTR-MCNC: 115 MG/DL (ref 70–130)
GLUCOSE BLDC GLUCOMTR-MCNC: 140 MG/DL (ref 70–130)
GLUCOSE BLDC GLUCOMTR-MCNC: 170 MG/DL (ref 70–130)
HCT VFR BLD AUTO: 46.2 % (ref 34–46.6)
HGB BLD-MCNC: 15.3 G/DL (ref 12–15.9)
IMM GRANULOCYTES # BLD AUTO: 0.03 10*3/MM3 (ref 0–0.05)
IMM GRANULOCYTES NFR BLD AUTO: 0.3 % (ref 0–0.5)
LYMPHOCYTES # BLD AUTO: 2.14 10*3/MM3 (ref 0.7–3.1)
LYMPHOCYTES NFR BLD AUTO: 22.1 % (ref 19.6–45.3)
MCH RBC QN AUTO: 27.8 PG (ref 26.6–33)
MCHC RBC AUTO-ENTMCNC: 33.1 G/DL (ref 31.5–35.7)
MCV RBC AUTO: 84 FL (ref 79–97)
MONOCYTES # BLD AUTO: 1.04 10*3/MM3 (ref 0.1–0.9)
MONOCYTES NFR BLD AUTO: 10.7 % (ref 5–12)
NEUTROPHILS NFR BLD AUTO: 6.42 10*3/MM3 (ref 1.7–7)
NEUTROPHILS NFR BLD AUTO: 66.4 % (ref 42.7–76)
NRBC BLD AUTO-RTO: 0 /100 WBC (ref 0–0.2)
PLATELET # BLD AUTO: 270 10*3/MM3 (ref 140–450)
PMV BLD AUTO: 9.9 FL (ref 6–12)
RBC # BLD AUTO: 5.5 10*6/MM3 (ref 3.77–5.28)
WBC NRBC COR # BLD: 9.68 10*3/MM3 (ref 3.4–10.8)

## 2023-04-13 PROCEDURE — 63710000001 PREDNISONE PER 5 MG: Performed by: PHYSICIAN ASSISTANT

## 2023-04-13 PROCEDURE — 85730 THROMBOPLASTIN TIME PARTIAL: CPT | Performed by: HOSPITALIST

## 2023-04-13 PROCEDURE — 85025 COMPLETE CBC W/AUTO DIFF WBC: CPT | Performed by: STUDENT IN AN ORGANIZED HEALTH CARE EDUCATION/TRAINING PROGRAM

## 2023-04-13 PROCEDURE — 82962 GLUCOSE BLOOD TEST: CPT

## 2023-04-13 PROCEDURE — 63710000001 INSULIN LISPRO (HUMAN) PER 5 UNITS: Performed by: NURSE PRACTITIONER

## 2023-04-13 PROCEDURE — 97530 THERAPEUTIC ACTIVITIES: CPT

## 2023-04-13 PROCEDURE — 74220 X-RAY XM ESOPHAGUS 1CNTRST: CPT

## 2023-04-13 PROCEDURE — 85730 THROMBOPLASTIN TIME PARTIAL: CPT | Performed by: STUDENT IN AN ORGANIZED HEALTH CARE EDUCATION/TRAINING PROGRAM

## 2023-04-13 PROCEDURE — 99233 SBSQ HOSP IP/OBS HIGH 50: CPT | Performed by: NURSE PRACTITIONER

## 2023-04-13 PROCEDURE — 92610 EVALUATE SWALLOWING FUNCTION: CPT | Performed by: SPEECH-LANGUAGE PATHOLOGIST

## 2023-04-13 PROCEDURE — 25010000002 HEPARIN (PORCINE) 25000-0.45 UT/250ML-% SOLUTION: Performed by: STUDENT IN AN ORGANIZED HEALTH CARE EDUCATION/TRAINING PROGRAM

## 2023-04-13 RX ORDER — CEFAZOLIN SODIUM 2 G/100ML
2 INJECTION, SOLUTION INTRAVENOUS
Status: COMPLETED | OUTPATIENT
Start: 2023-04-14 | End: 2023-04-14

## 2023-04-13 RX ADMIN — HEPARIN SODIUM 11 UNITS/KG/HR: 10000 INJECTION, SOLUTION INTRAVENOUS at 20:58

## 2023-04-13 RX ADMIN — DOFETILIDE 250 MCG: 0.25 CAPSULE ORAL at 20:30

## 2023-04-13 RX ADMIN — VENLAFAXINE HYDROCHLORIDE 150 MG: 150 CAPSULE, EXTENDED RELEASE ORAL at 09:45

## 2023-04-13 RX ADMIN — LEVOTHYROXINE SODIUM 50 MCG: 0.05 TABLET ORAL at 09:43

## 2023-04-13 RX ADMIN — ALPRAZOLAM 0.5 MG: 0.5 TABLET ORAL at 20:31

## 2023-04-13 RX ADMIN — VENLAFAXINE HYDROCHLORIDE 75 MG: 75 CAPSULE, EXTENDED RELEASE ORAL at 09:43

## 2023-04-13 RX ADMIN — DOFETILIDE 250 MCG: 0.25 CAPSULE ORAL at 09:44

## 2023-04-13 RX ADMIN — ATORVASTATIN CALCIUM 80 MG: 80 TABLET, FILM COATED ORAL at 20:30

## 2023-04-13 RX ADMIN — AMLODIPINE BESYLATE 5 MG: 5 TABLET ORAL at 09:43

## 2023-04-13 RX ADMIN — AMITRIPTYLINE HYDROCHLORIDE 50 MG: 50 TABLET, FILM COATED ORAL at 20:30

## 2023-04-13 RX ADMIN — METOPROLOL TARTRATE 25 MG: 25 TABLET, FILM COATED ORAL at 09:43

## 2023-04-13 RX ADMIN — Medication 3 MG: at 00:19

## 2023-04-13 RX ADMIN — BARIUM SULFATE 183 ML: 960 POWDER, FOR SUSPENSION ORAL at 15:56

## 2023-04-13 RX ADMIN — PREDNISONE 60 MG: 10 TABLET ORAL at 09:43

## 2023-04-13 RX ADMIN — ALPRAZOLAM 0.5 MG: 0.5 TABLET ORAL at 00:29

## 2023-04-13 RX ADMIN — METOPROLOL TARTRATE 25 MG: 25 TABLET, FILM COATED ORAL at 20:31

## 2023-04-13 RX ADMIN — ACETAMINOPHEN 650 MG: 325 TABLET, FILM COATED ORAL at 20:53

## 2023-04-13 RX ADMIN — INSULIN LISPRO 2 UNITS: 100 INJECTION, SOLUTION INTRAVENOUS; SUBCUTANEOUS at 17:34

## 2023-04-13 RX ADMIN — ACETAMINOPHEN 650 MG: 325 TABLET, FILM COATED ORAL at 00:29

## 2023-04-13 RX ADMIN — LOSARTAN POTASSIUM 100 MG: 100 TABLET, FILM COATED ORAL at 09:43

## 2023-04-13 RX ADMIN — Medication 3 MG: at 20:31

## 2023-04-13 NOTE — THERAPY EVALUATION
Acute Care - Speech Language Pathology   Swallow Initial Evaluation Psychiatric     Patient Name: Dior Pettit  : 1940  MRN: [id number]  Today's Date: 2023               Admit Date: 2023    Visit Dx:     ICD-10-CM ICD-9-CM   1. Acute stroke due to ischemia  I63.9 434.91   2. Altered mental status, unspecified altered mental status type  R41.82 780.97     Patient Active Problem List   Diagnosis   • Essential hypertension   • Pulmonary hypertension (HCC)   • NNEKA (obstructive sleep apnea)   • Gastroesophageal reflux disease   • Anxiety   • Restless leg syndrome   • Controlled diabetes mellitus type 2 with complications   • Hypothyroidism   • Hypercholesteremia   • Seasonal allergic rhinitis   • Mild intermittent asthma without complication   • S/P AVR   • Dilated aortic root   • Thoracic aortic aneurysm without rupture    • Primary insomnia   • Renal insufficiency   • Macular degeneration of both eyes   • Coronary artery disease involving native coronary artery with angina pectoris   • Rectocele   • Nonrheumatic mitral valve stenosis   • Slow transit constipation   • Dizziness   • Daily headache   • Symptomatic bradycardia   • Closed displaced fracture of right femoral neck   • Atrial fibrillation, persistent   • Hip pain   • PAF (paroxysmal atrial fibrillation)   • Acute stroke due to ischemia   • Shortness of breath     Past Medical History:   Diagnosis Date   • Allergic rhinitis    • Anemia    • Anxiety     Controlled w/Meds   • Aortic root dilatation 10/02/2017    Borderline--Noted on Echo   • Aortic valve calcification 10/02/2017    Noted on Echo   • Aortic valve insufficiency Dx in     w/AVR    • Aortic valve prosthesis present 2018    Noted on CTA Chest   • Ascending aorta dilatation 10/02/2017    Borderline--Noted on Echo   • Asthma    • Atypical chest pain    • Breast pain, right Hx   • CAD (coronary artery disease)    • Cardiomegaly 2017   • Cervicalgia    • Chest pain due to  CAD    • 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    Borderline--Noted on Echo & Measured @ 2.6CM and Mid Descending @ 2.5CM on CTA Chest-11/2/18   • DM (diabetes mellitus)     T2   • Esophagitis    • GERD (gastroesophageal reflux disease)     Controlled w/Meds   • Headache    • Health care maintenance    • Heart murmur    • History of echocardiogram 10/2/17-[facility]    EF 66%; Borderline Concentric Hypertrophy; Mild Calcification in AV; Mild AVS; Mild AVR; Moderate TVR; Borderline Dilation of Aortic Root/Arch & Borderline Dilation of Ascending/Proximal Aorta Present   • Hyperlipidemia     Controlled w/Meds   • Hypertension     Controlled w/Meds   • Hypothyroidism     Controlled w/Synthroid   • Insomnia    • Macular degeneration, left eye    • Mild aortic valve regurgitation 10/02/2017    Noted on Echo   • Mild aortic valve stenosis 10/02/2017    Noted on Echo   • Mild dilation of ascending aorta 10/02/2017    Borderline--Noted on Echo   • Moderate tricuspid valve regurgitation 10/02/2017    Noted on Echo   • Mood disorder in conditions classified elsewhere     Controlled w/Meds   • Near syncope 03/2017-[facility] ER   • Neuropathy    • NNEKA (obstructive sleep apnea)     Untreated   • Osteoarthritis     Ankles/Feet   • Pulmonary hypertension 2017   • Renal insufficiency    • RLS (restless legs syndrome)    • Thoracic ascending aortic aneurysm Dx in 2007 @ 3.8CM & 1/02/2018    Noted @ 4CM on CTA Chest;   • Visual impairment     macular degeneratuin     Past Surgical History:   Procedure Laterality Date   • AORTIC VALVE REPAIR/REPLACEMENT  2007    Dr. Perry   • APPENDECTOMY     • AUGMENTATION MAMMAPLASTY  1976   • BREAST AUGMENTATION  2014   • BUNIONECTOMY     • CARDIAC CATHETERIZATION  2007   • CARDIAC VALVE REPLACEMENT  2007    porcine   • COLONOSCOPY  2010   • COLONOSCOPY  03/02/2012   • EYE SURGERY      cataracts and ens implants   • HYSTERECTOMY  1972   • SIGMOIDOSCOPY  2001   • TOTAL HIP ARTHROPLASTY Right 11/15/2022     Procedure: Right anterior total hip arthroplasty;  Surgeon: Joao Martinez MD;  Location: [facility];  Service: Orthopedics;  Laterality: Right;       SLP Recommendation and Plan  SLP Swallowing Diagnosis: R/O pharyngeal dysphagia (04/13/23 1300)  SLP Diet Recommendation: regular textures, thin liquids (04/13/23 1300)  Recommended Precautions and Strategies: upright posture during/after eating, small bites of food and sips of liquid (04/13/23 1300)  SLP Rec. for Method of Medication Administration: meds whole, as tolerated (04/13/23 1300)     Monitor for Signs of Aspiration: yes, notify SLP if any concerns (04/13/23 1300)  Recommended Diagnostics: SLE/Cog/Motor Speech Evaluation (04/13/23 1300)  Swallow Criteria for Skilled Therapeutic Interventions Met: demonstrates skilled criteria (04/13/23 1300)  Anticipated Discharge Disposition (SLP): unknown (04/13/23 1300)  Rehab Potential/Prognosis, Swallowing: good, to achieve stated therapy goals (04/13/23 1300)  Therapy Frequency (Swallow): PRN (04/13/23 1300)  Predicted Duration Therapy Intervention (Days): until discharge (04/13/23 1300)                                        Plan of Care Reviewed With: patient, son  Outcome Evaluation: Clinical swallow evaluation completed recommend pt continue with regular solids and thin liquids, meds whole as tolerated and small bites and sips. Will follow pt for need for VFSS.      SWALLOW EVALUATION (last 72 hours)     SLP Adult Swallow Evaluation     Row Name 04/13/23 1300                   Rehab Evaluation    Document Type evaluation  -KA        Subjective Information no complaints  -KA        Patient Observations alert;cooperative  -KA        Patient Effort good  -KA        Symptoms Noted During/After Treatment none  -KA           General Information    Patient Profile Reviewed yes  -KA        Pertinent History Of Current Problem admited with confusion, visual distrubances, paranoia.Ttiny acute left frontal infarct  "\"but  not explaining her symptoms.\" LP completed yesterday.  -KA        Current Method of Nutrition regular textures;thin liquids  -KA        Precautions/Limitations, Vision WFL;for purposes of eval;other (see comments)  hx of macular degeneration  -KA        Precautions/Limitations, Hearing WFL;for purposes of eval  -KA        Prior Level of Function-Swallowing no diet consistency restrictions  -KA        Plans/Goals Discussed with patient and family;agreed upon  -KA        Barriers to Rehab none identified  -KA        Patient's Goals for Discharge return home  -KA        Family Goals for Discharge family did not state  -KA           Oral Motor Structure and Function    Dentition Assessment natural, present and adequate  -KA        Secretion Management WNL/WFL  -KA        Mucosal Quality moist, healthy  -KA        Volitional Swallow WFL  -KA        Volitional Cough WFL  -KA           Oral Musculature and Cranial Nerve Assessment    Oral Motor General Assessment WFL  -KA           General Eating/Swallowing Observations    Eating/Swallowing Skills self-fed  -KA        Positioning During Eating upright in chair  -KA        Utensils Used spoon;cup;straw  -KA        Consistencies Trialed regular textures;soft to chew textures;chopped;mixed consistency;pureed;thin liquids  -KA           Clinical Swallow Eval    Clinical Swallow Evaluation Summary Patient reports she coughs sometimes when drinking cold liquids and it feels \"like my throat is closing on me.\" She reports occasionally solids feels stuck in throat however reports most difficulty drinking liquids. Denies history of PNA. Patient demonstrated no overt s/s ofpen/asp with repetitive sips of thin liquids (cold water and milk) via straw, mechanical soft mixed and regular solids. Laryngeal elevation felt adequate upon neck palpation. Patient has esophagram orders and per RN to be completed today. Will f/u and need for VFSS.  -KA           SLP Evaluation Clinical " Impression    SLP Swallowing Diagnosis R/O pharyngeal dysphagia  -        Functional Impact risk of aspiration/pneumonia  -KA        Rehab Potential/Prognosis, Swallowing good, to achieve stated therapy goals  -        Swallow Criteria for Skilled Therapeutic Interventions Met demonstrates skilled criteria  -           SLP Treatment Clinical Impressions    Care Plan Review evaluation/treatment results reviewed  -        Care Plan Review, Other Participant(s) son  -           Recommendations    Therapy Frequency (Swallow) PRN  -KA        Predicted Duration Therapy Intervention (Days) until discharge  -        SLP Diet Recommendation regular textures;thin liquids  -        Recommended Diagnostics SLE/Cog/Motor Speech Evaluation  -        Recommended Precautions and Strategies upright posture during/after eating;small bites of food and sips of liquid  -        Oral Care Recommendations Oral Care BID/PRN  -KA        SLP Rec. for Method of Medication Administration meds whole;as tolerated  -        Monitor for Signs of Aspiration yes;notify SLP if any concerns  -        Anticipated Discharge Disposition (SLP) unknown  -              User Key  (r) = Recorded By, (t) = Taken By, (c) = Cosigned By    Initials Name Effective Dates    KA Jose Fitzpatrick MA,[facility]-SLP 06/02/22 -                 EDUCATION  The patient has been educated in the following areas:   Dysphagia (Swallowing Impairment).              Time Calculation:    Time Calculation- SLP     Row Name 04/13/23 1359             Time Calculation- SLP    SLP Start Time 1245  -KA      SLP Received On 04/13/23  -         Untimed Charges    SLP Eval/Re-eval  ST Eval Oral Pharyng Swallow - [id number]  -KA      [id number] Eval Oral Pharyng Swallow Minutes 60  -KA         Total Minutes    Untimed Charges Total Minutes 60  -KA       Total Minutes 60  -KA            User Key  (r) = Recorded By, (t) = Taken By, (c) = Cosigned By    Initials Name Provider Type     KA Jose Fitzpatrick MA,CCC-SLP Speech and Language Pathologist                Therapy Charges for Today     Code Description Service Date Service Provider Modifiers Qty    [id number]  ST EVAL ORAL PHARYNG SWALLOW 4 4/13/2023 Jose Fitzpatrick MA,CCC-SLP GN 1               Jose Fitzpatrick MA,CCC-SLP  4/13/2023

## 2023-04-13 NOTE — PLAN OF CARE
Goal Outcome Evaluation:      VSS throughout shift, put esophagram completed. Heparin drip maintained throughout shift, adjusted to meet aPtt parameters. Neuro status remained stable throughout shift. Temporal artery biopsy planned for AM.        Problem: Adult Inpatient Plan of Care  Goal: Absence of Hospital-Acquired Illness or Injury  Intervention: Identify and Manage Fall Risk  Recent Flowsheet Documentation  Taken 4/13/2023 1627 by Myriam Osorio, RN  Safety Promotion/Fall Prevention: safety round/check completed  Taken 4/13/2023 1600 by Myriam Osorio RN  Safety Promotion/Fall Prevention: patient off unit  Taken 4/13/2023 1440 by Myriam Osorio, RN  Safety Promotion/Fall Prevention: safety round/check completed  Taken 4/13/2023 1253 by Myriam Osorio, RN  Safety Promotion/Fall Prevention: safety round/check completed  Taken 4/13/2023 1011 by Myriam Osorio, RN  Safety Promotion/Fall Prevention: safety round/check completed  Taken 4/13/2023 0835 by Myriam Osorio, RN  Safety Promotion/Fall Prevention:   activity supervised   assistive device/personal items within reach   fall prevention program maintained   nonskid shoes/slippers when out of bed   room organization consistent   safety round/check completed

## 2023-04-13 NOTE — PROGRESS NOTES
Name: Dior Pettit ADMIT: 2023   : 1940  PCP: Ashanti Hong APRN    MRN: [id number] LOS: 4 days   AGE/SEX: 82 y.o. female  ROOM:  [facility] P593/1     CC: Hallucinations, headaches, vision changes; consult for temporal artery biopsy  Interval History: Patient denies any acute events overnight.  Per family she is still getting intermittently confused.  Was ambulating with a walker at the time of my evaluation    Subjective   Subjective     Review of Systems  Objective   Objective     Vitals:   Temp:  [97.7 °F (36.5 °C)-98.7 °F (37.1 °C)] 98 °F (36.7 °C)  Heart Rate:  [] 85  Resp:  [16-20] 20  BP: (129-163)/(82-99) 134/82    I/O this shift:  In: 960 [P.O.:960]  Out: -     Scheduled Meds:     amitriptyline, 50 mg, Oral, Nightly  amLODIPine, 5 mg, Oral, Q24H  atorvastatin, 80 mg, Oral, Nightly  dofetilide, 250 mcg, Oral, BID  insulin lispro, 0-7 Units, Subcutaneous, TID AC  levothyroxine, 50 mcg, Oral, Every Other Day  levothyroxine, 75 mcg, Oral, Every Other Day  losartan, 100 mg, Oral, Daily  metoprolol tartrate, 25 mg, Oral, BID  predniSONE, 60 mg, Oral, Daily  venlafaxine XR, 150 mg, Oral, Daily  venlafaxine XR, 75 mg, Oral, Daily      IV Meds:   heparin, 18 Units/kg/hr, Last Rate: 11 Units/kg/hr (23 1729)  hold, 1 each        Physical Exam  NAD  Ambulating with walker  No temporal tenderness    Data Reviewed:  CBC    Results from last 7 days   Lab Units 23  0428 23  1747 04/10/23  0518 23  1514   WBC 10*3/mm3 9.68 5.90 5.84 7.14   HEMOGLOBIN g/dL 15.3 15.7 14.2 15.3   PLATELETS 10*3/mm3 270 261 245 228     BMP   Results from last 7 days   Lab Units 23  0629 04/10/23  0518 23  1514   SODIUM mmol/L 136 138 134*   POTASSIUM mmol/L 4.4 4.1 4.3   CHLORIDE mmol/L 102 102 99   CO2 mmol/L 27.0 28.7 23.8   BUN mg/dL 15 15 19   CREATININE mg/dL 0.57 0.75 0.91   GLUCOSE mg/dL 137* 99 85   MAGNESIUM mg/dL  --  1.7 1.4*     Cr Clearance Estimated Creatinine  Clearance: 77.8 mL/min (by C-G formula based on SCr of 0.57 mg/dL).  Coag   Results from last 7 days   Lab Units 04/13/23  1451 04/13/23  0803 04/13/23  0428 04/13/23  0000 04/12/23  1747 04/09/23  1514   INR   --   --   --   --  0.99 1.20*   APTT seconds 100.7* 174.9* 135.9* 105.4* 33.7 39.0*     HbA1C   Lab Results   Component Value Date    HGBA1C 6.80 (H) 04/10/2023    HGBA1C 5.50 01/11/2023    HGBA1C 6.10 (H) 11/13/2022     Blood Glucose   Glucose   Date/Time Value Ref Range Status   04/13/2023 1730 170 (H) 70 - 130 mg/dL Final     Comment:     Meter: [id number] : [id number] Hayden Eda JUSTYN   04/13/2023 1231 140 (H) 70 - 130 mg/dL Final     Comment:     Meter: [id number] : [id number] Hayden Eda JUSTYN   04/13/2023 0754 115 70 - 130 mg/dL Final     Comment:     Meter: [id number] : [id number] Hayden Eda JUSTYN   04/12/2023 1650 197 (H) 70 - 130 mg/dL Final     Comment:     Meter: [id number] : [id number] Altamirano Chemell NA   04/12/2023 1130 225 (H) 70 - 130 mg/dL Final     Comment:     Meter: [id number] : [id number] Altamirano Chemell NA   04/12/2023 0756 108 70 - 130 mg/dL Final     Comment:     Meter: [id number] : [id number] Altamirano Chemell NA   04/11/2023 2028 222 (H) 70 - 130 mg/dL Final     Comment:     Meter: [id number] : [id number] Neda Israfil NA   04/11/2023 1624 116 70 - 130 mg/dL Final     Comment:     Meter: [id number] : [id number] Altamirano Chemell NA     Infection     CMP   Results from last 7 days   Lab Units 04/11/23  0629 04/10/23  0518 04/09/23  1514   SODIUM mmol/L 136 138 134*   POTASSIUM mmol/L 4.4 4.1 4.3   CHLORIDE mmol/L 102 102 99   CO2 mmol/L 27.0 28.7 23.8   BUN mg/dL 15 15 19   CREATININE mg/dL 0.57 0.75 0.91   GLUCOSE mg/dL 137* 99 85   ALBUMIN g/dL  --  4.2 4.5   BILIRUBIN mg/dL  --  0.6 0.7   ALK PHOS U/L  --  95 112   AST (SGOT) U/L  --  24 28   ALT (SGPT) U/L  --  16 18     ABG      UA    Results from last 7 days   Lab Units 04/09/23  1615   NITRITE UA   Negative     ANA  No results found for: POCMETH, POCAMPHET, POCBARBITUR, POCBENZO, POCCOCAINE, POCOPIATES, POCOXYCODO, POCPHENCYC, POCPROPOXY, POCTHC, POCTRICYC  Lysis Labs   Results from last 7 days   Lab Units 23  1451 23  0803 23  0428 23  0000 23  1747 23  0629 04/10/23  0518 23  1514   INR   --   --   --   --  0.99  --   --  1.20*   APTT seconds 100.7* 174.9* 135.9* 105.4* 33.7  --   --  39.0*   HEMOGLOBIN g/dL  --   --  15.3  --  15.7  --  14.2 15.3   PLATELETS 10*3/mm3  --   --  270  --  261  --  245 228   CREATININE mg/dL  --   --   --   --   --  0.57 0.75 0.91     Radiology(recent) FL Esophagram Complete Single Contrast    Result Date: 2023  Single contrast esophagram is limited due to limited mobility of the patient and images were only acquired in a single obliquity. No abnormality is identified. If further evaluation is desired, a double contrast esophagram could be performed as an outpatient.            Active Hospital Problems:   Active Hospital Problems    Diagnosis  POA   • **Acute stroke due to ischemia [I63.9]  Yes   • Shortness of breath [R06.02]  Unknown   • PAF (paroxysmal atrial fibrillation) [I48.0]  Yes   • S/P AVR [Z95.2]  Not Applicable   • Restless leg syndrome [G25.81]  Yes   • 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      Assessment & Plan   Billin, Subsequent Hospital Care  Assessment / Plan     82 y.o. female here with an acute stroke, this has been associated with numerous symptoms including headaches, vision changes in both eyes worse on the left than right, hallucinations, and headaches; also with elevated inflammatory markers.  Planning for bilateral temporal artery biopsy  tomorrow with Dr. Shepherd  Nature of the procedure as well as risk benefits alternatives arianna with the patient and her family and they verbalized understanding and agreed to proceed.  Specifically noted was that this is a diagnostic procedure would not be expected to make any of her symptoms better.  N.p.o. after midnight  Hold heparin drip tomorrow morning  We will place consent order and perioperative antibiotics.    Tristan Bermeo II, MD  04/13/23  18:17 EDT  Office Number [phone]

## 2023-04-13 NOTE — PROGRESS NOTES
"DOS: 2023  NAME: Dior Pettit   : 1940  PCP: Ashanti Hong APRN  Chief Complaint   Patient presents with   • Extremity Weakness     Stroke    Subjective: States vision may be \"a little better.\" C/O headache, feels like her chronic headache. Occasional word findings trouble. Denies unilateral weakness/numbness. Pt seen in follow up today, however the problem is new to the examiner.      Objective:  Vital signs: /86 (BP Location: Right arm, Patient Position: Lying)   Pulse 82   Temp 98 °F (36.7 °C) (Oral)   Resp 20   Ht 170.2 cm (67\")   Wt 67.1 kg (148 lb)   LMP  (LMP Unknown)   SpO2 94%   BMI 23.18 kg/m²       General appearance: Well developed, well nourished, alert and cooperative.   HEENT: Normocephalic. No temporal tenderness to palpation.  Cardiac: Regular rate and rhythm.   Chest Exam: Clear to auscultation bilaterally, no wheezes, no rhonchi.  Extremities: Normal, no edema.   Skin: No rashes or birthmarks.     Higher integrative function: Oriented to time, place, person, intact recent and remote memory, attention span, concentration and language. Spontaneous speech, fund of vocabulary are normal.   CN II: Decreased visual acuity bilaterally with poor peripheral vision, EOMI, PERRL, normal facial sensation, no facial droop, tongue midline, no dysarthria.   CN III IV VI: Extraocular movements are full without nystagmus. Pupils are equal, round, and reactive to light.   CN V: Normal facial sensation.  CN VII: Facial movements are symmetric, no weakness.   CN VIII: Auditory acuity is normal.   CN IX & X: Symmetric palatal movement.   CN XI: Sternocleidomastoid and trapezius are normal. No weakness.   CN XII: The tongue is midline.   Motor: Normal muscle strength, bulk, and tone in upper and lower extremities. No fasciculations, rigidity, spasticity or abnormal movements.   Sensation: Intact to LT x4.  Station and gait: Not assessed.   Coordination: Finger to nose test showed no " dysmetria.     Scheduled Meds:amitriptyline, 50 mg, Oral, Nightly  amLODIPine, 5 mg, Oral, Q24H  atorvastatin, 80 mg, Oral, Nightly  dofetilide, 250 mcg, Oral, BID  insulin lispro, 0-7 Units, Subcutaneous, TID AC  levothyroxine, 50 mcg, Oral, Every Other Day  levothyroxine, 75 mcg, Oral, Every Other Day  losartan, 100 mg, Oral, Daily  metoprolol tartrate, 25 mg, Oral, BID  predniSONE, 60 mg, Oral, Daily  venlafaxine XR, 150 mg, Oral, Daily  venlafaxine XR, 75 mg, Oral, Daily      Continuous Infusions:heparin, 18 Units/kg/hr, Last Rate: 13 Units/kg/hr (04/13/23 0947)  hold, 1 each      PRN Meds:.•  acetaminophen **OR** acetaminophen  •  ALPRAZolam  •  bisacodyl  •  dextrose  •  dextrose  •  glucagon (human recombinant)  •  hold  •  labetalol  •  melatonin  •  OLANZapine  •  ondansetron  •  sodium chloride  •  [COMPLETED] Insert Peripheral IV **AND** sodium chloride    Laboratory results:  Lab Results   Component Value Date    GLUCOSE 137 (H) 04/11/2023    CALCIUM 10.1 04/11/2023     04/11/2023    K 4.4 04/11/2023    CO2 27.0 04/11/2023     04/11/2023    BUN 15 04/11/2023    CREATININE 0.57 04/11/2023    EGFRIFAFRI 63 05/06/2021    EGFRIFNONA 63 12/11/2021    BCR 26.3 (H) 04/11/2023    ANIONGAP 7.0 04/11/2023     Lab Results   Component Value Date    WBC 9.68 04/13/2023    HGB 15.3 04/13/2023    HCT 46.2 04/13/2023    MCV 84.0 04/13/2023     04/13/2023     Lab Results   Component Value Date    CHOL 163 04/10/2023    CHOL 158 04/15/2019    CHOL 170 03/16/2018     Lab Results   Component Value Date    HDL 50 04/10/2023    HDL 51 11/09/2022    HDL 44 04/14/2022     Lab Results   Component Value Date    LDL 91 04/10/2023     (H) 11/09/2022    LDL 69 04/14/2022     Lab Results   Component Value Date    TRIG 125 04/10/2023    TRIG 133 11/09/2022    TRIG 233 (H) 04/14/2022         Lab 04/10/23  0518   HEMOGLOBIN A1C 6.80*      Review and interpretation of imaging: MR brain images viewed in me, shows  punctate left frontal cortical stroke.  CT Chest Without Contrast Diagnostic    Result Date: 4/11/2023  CT CHEST WITHOUT CONTRAST  HISTORY: Shortness of breath. Weakness.  TECHNIQUE: Chest CT includes axial imaging from the thoracic inlet to the upper abdomen without IV contrast  COMPARISON: CT chest without contrast 07/07/2021.  FINDINGS: There has been previous median sternotomy and there is a prosthetic aortic valve. The ascending thoracic aorta measures 4.1 cm which is slightly increased from 4 cm on the prior exam 07/07/2021. This tapers smoothly to the level of the top of the aortic arch and the descending thoracic aorta measures 2.8 cm. The descending thoracic aorta is tortuous. Bilateral breast implants are present. There is mild linear parenchymal scar or atelectasis within both upper and lobes best demonstrated within the right upper lobe. There is no pleural effusion. Biapical pleural parenchymal scarring is present. There are bilateral breast implants. Imaging through the upper abdomen demonstrates a 1.8 cm gallstone. There is multilevel degenerative disc disease with endplate spur formation within the thoracic spine. There is a compression deformity at T12 with 30% height loss.      1. Linear subsegmental proximal atelectasis or scarring best demonstrated in the right upper lobe 2. Enlargement of the ascending thoracic aorta measuring 4.1 cm in diameter. Previous aortic valve replacement. 3. Cholelithiasis. 4. Compression deformity at T12 with 30% height loss and this is without change compared to prior exam 07/07/2021.  Radiation dose reduction techniques were utilized, including automated exposure control and exposure modulation based on body size.  This report was finalized on 4/11/2023 7:34 PM by Dr. Gustavo Walden M.D.      MRI Angiogram Head Without Contrast    Result Date: 4/9/2023  MRI EXAMINATION OF BRAIN WITH AND WITHOUT CONTRAST, MRA OF THE NECK WITH AND WITHOUT CONTRAST AND MRA OF THE HEAD  WITHOUT CONTRAST  HISTORY: Word finding difficulty.  COMPARISON: CT head 11/13/2022.  MRI EXAMINATION OF THE BRAIN WITH AND WITHOUT CONTRAST:  TECHNIQUE: A MRI examination of the brain was performed utilizing sagittal T1, axial diffusion, T1, T2, T2 FLAIR, susceptibility weighted imaging as well as axial and coronal T1 postcontrast weighted sequences.  FINDINGS: The study is hampered somewhat by patient motion. The diffusion sequence demonstrates a small cortical acute infarct involving the left middle frontal gyrus superiorly measuring approximately 2-3 mm in size. No other areas of restricted diffusion are noted. Mild-to-moderate small vessel ischemic disease is appreciated on the axial T2 FLAIR sequence. After contrast administration, there was no evidence of abnormal enhancement.      The study is hampered by patient motion. There is a 2-3 mm acute cortical infarct involving the left middle frontal gyrus superiorly. Mild-to-moderate small vessel ischemic disease is noted.   MRA OF THE HEAD WITHOUT CONTRAST:  The study is hampered by patient motion. There is signal present within the distal aspect of the vertebral arteries bilaterally. The left vertebral artery is larger than that of the right. The basilar artery and the proximal aspects of the posterior cerebral arteries appear unremarkable. The distal aspects of the internal carotid arteries are of normal caliber. The right A1 segment is hypoplastic. The proximal aspects of the anterior and middle cerebral arteries appear unremarkable.  IMPRESSION: Study is hampered by patient motion. The right A1 segment is hypoplastic. The proximal aspects of the anterior, middle and posterior cerebral arteries appear otherwise unremarkable.   MRA OF THE NECK WITH AND WITHOUT CONTRAST:  The great vessels are arranged in a classic configuration. There is 0% stenosis of the internal carotid arteries using NASCET criteria. It is slightly larger than that of the right. There is  no evidence of a vertebral ostial stenosis. The cervical segments of the vertebral arteries are of relatively uniform caliber.  IMPRESSION: There is 0% stenosis of the internal carotid arteries using NASCET criteria.  This report was finalized on 4/9/2023 11:49 PM by Dr. Tae Morrissey M.D.      MRI Angiogram Neck With & Without Contrast    Result Date: 4/9/2023  MRI EXAMINATION OF BRAIN WITH AND WITHOUT CONTRAST, MRA OF THE NECK WITH AND WITHOUT CONTRAST AND MRA OF THE HEAD WITHOUT CONTRAST  HISTORY: Word finding difficulty.  COMPARISON: CT head 11/13/2022.  MRI EXAMINATION OF THE BRAIN WITH AND WITHOUT CONTRAST:  TECHNIQUE: A MRI examination of the brain was performed utilizing sagittal T1, axial diffusion, T1, T2, T2 FLAIR, susceptibility weighted imaging as well as axial and coronal T1 postcontrast weighted sequences.  FINDINGS: The study is hampered somewhat by patient motion. The diffusion sequence demonstrates a small cortical acute infarct involving the left middle frontal gyrus superiorly measuring approximately 2-3 mm in size. No other areas of restricted diffusion are noted. Mild-to-moderate small vessel ischemic disease is appreciated on the axial T2 FLAIR sequence. After contrast administration, there was no evidence of abnormal enhancement.      The study is hampered by patient motion. There is a 2-3 mm acute cortical infarct involving the left middle frontal gyrus superiorly. Mild-to-moderate small vessel ischemic disease is noted.   MRA OF THE HEAD WITHOUT CONTRAST:  The study is hampered by patient motion. There is signal present within the distal aspect of the vertebral arteries bilaterally. The left vertebral artery is larger than that of the right. The basilar artery and the proximal aspects of the posterior cerebral arteries appear unremarkable. The distal aspects of the internal carotid arteries are of normal caliber. The right A1 segment is hypoplastic. The proximal aspects of the anterior and  middle cerebral arteries appear unremarkable.  IMPRESSION: Study is hampered by patient motion. The right A1 segment is hypoplastic. The proximal aspects of the anterior, middle and posterior cerebral arteries appear otherwise unremarkable.   MRA OF THE NECK WITH AND WITHOUT CONTRAST:  The great vessels are arranged in a classic configuration. There is 0% stenosis of the internal carotid arteries using NASCET criteria. It is slightly larger than that of the right. There is no evidence of a vertebral ostial stenosis. The cervical segments of the vertebral arteries are of relatively uniform caliber.  IMPRESSION: There is 0% stenosis of the internal carotid arteries using NASCET criteria.  This report was finalized on 4/9/2023 11:49 PM by Dr. Tae Morrissey M.D.      MRI Brain With & Without Contrast    Result Date: 4/9/2023  MRI EXAMINATION OF BRAIN WITH AND WITHOUT CONTRAST, MRA OF THE NECK WITH AND WITHOUT CONTRAST AND MRA OF THE HEAD WITHOUT CONTRAST  HISTORY: Word finding difficulty.  COMPARISON: CT head 11/13/2022.  MRI EXAMINATION OF THE BRAIN WITH AND WITHOUT CONTRAST:  TECHNIQUE: A MRI examination of the brain was performed utilizing sagittal T1, axial diffusion, T1, T2, T2 FLAIR, susceptibility weighted imaging as well as axial and coronal T1 postcontrast weighted sequences.  FINDINGS: The study is hampered somewhat by patient motion. The diffusion sequence demonstrates a small cortical acute infarct involving the left middle frontal gyrus superiorly measuring approximately 2-3 mm in size. No other areas of restricted diffusion are noted. Mild-to-moderate small vessel ischemic disease is appreciated on the axial T2 FLAIR sequence. After contrast administration, there was no evidence of abnormal enhancement.      The study is hampered by patient motion. There is a 2-3 mm acute cortical infarct involving the left middle frontal gyrus superiorly. Mild-to-moderate small vessel ischemic disease is noted.   MRA OF  THE HEAD WITHOUT CONTRAST:  The study is hampered by patient motion. There is signal present within the distal aspect of the vertebral arteries bilaterally. The left vertebral artery is larger than that of the right. The basilar artery and the proximal aspects of the posterior cerebral arteries appear unremarkable. The distal aspects of the internal carotid arteries are of normal caliber. The right A1 segment is hypoplastic. The proximal aspects of the anterior and middle cerebral arteries appear unremarkable.  IMPRESSION: Study is hampered by patient motion. The right A1 segment is hypoplastic. The proximal aspects of the anterior, middle and posterior cerebral arteries appear otherwise unremarkable.   MRA OF THE NECK WITH AND WITHOUT CONTRAST:  The great vessels are arranged in a classic configuration. There is 0% stenosis of the internal carotid arteries using NASCET criteria. It is slightly larger than that of the right. There is no evidence of a vertebral ostial stenosis. The cervical segments of the vertebral arteries are of relatively uniform caliber.  IMPRESSION: There is 0% stenosis of the internal carotid arteries using NASCET criteria.  This report was finalized on 4/9/2023 11:49 PM by Dr. Tae Morrissey M.D.      XR Chest 1 View    Result Date: 4/9/2023  PORTABLE CHEST  HISTORY: Stroke.  COMPARISON: 11/13/2022.  FINDINGS: A single portable view of the chest demonstrates the heart to be within normal limits in size. There is no evidence of infiltrate, effusion or of congestive failure.  This report was finalized on 4/9/2023 11:53 PM by Dr. Tae Morrissey M.D.      IR LUMBAR PUNCTURE DIAGNOSTIC    Result Date: 4/12/2023  FLUOROSCOPIC GUIDED LUMBAR PUNCTURE 04/12/2023  HISTORY: History of difficult word finding. Evaluate for CSF abnormality or abnormal pressure.  After signed informed consent was obtained, patient was prepped and draped in the prone position. Lidocaine was used for local anesthesia.  A  20-gauge needle was introduced into the thecal sac at a midlumbar level by Dr. Meraz. Opening pressure is normal measuring approximately 5 cm. Approximately 12 mL of CSF was removed. Confirmatory images were obtained. Closing pressure is less than 5 cm.  Patient tolerated the procedure well with no complications.  FLUOROSCOPY TIME:  Forty-three seconds, 2 images.  This report was finalized on 4/12/2023 4:11 PM by Dr. Best Meraz M.D.      Adult Transthoracic Echo Limited W/ Cont if Necessary Per Protocol    Result Date: 4/10/2023  •  Limited echocardiogram for agitated saline study only •  Saline test results are negative.       Impression:  81 yo RH female with HTN, pulm HTN, DM, hypothyroidism, NNEKA, intolerance to PAP, afib on eliquis, h/o AVR, hip fracture s/p replacement ~1 year ago,  RLS, and anxiety who presented 4/9 with c/o confusion and right sided numbness. Confusion had been present for a couple of weeks and patient suspected to have inadvertently missed medications including eliquis. MRI brain showed punctate L frontal stroke, not felt to explain the degree of confusion patient exhibiting.  She also has visual hallucinations and reports worsening vision.    Work-up:  MRI brain w/wo: 2-3 mm acute cortical infact in the L frontal gyrus  MRA head wo: Motion degraded, hypoplastic R A1  MRA neck w/wo: no significant stenosis  CSF: Glucose 83, total nucleated cells 1, RBC 3, protein 42, meningitis/encephalitis panel negative  2D echo 3/24/2023: EF 67.2%, moderate concentric hypertrophy of the LV.  Left atrial cavity mildly dilated.  No aortic valve regurgitation.  Moderate tricuspid valve regurgitation.  Limited echo 4/10/23: saline test result negative.   Labs: B12 707, folate >20, CRP 0.98, TSH 3.95, sed rate 44, a1c 6.8%, LDL 91    Diagnosis:  Encephalopathy  Incidental acute left frontal stroke  A-fib  Vision loss  Inadvertent medical noncompliance.     Plan:  From a stroke standpoint recommend  continuing Eliquis long-term with increased compliance, no need for antiplatelet.  Currently on heparin drip due to pending biopsy.  On lipitor 80 mg daily.   Follow-up CSF cytology, paraneoplastic panel and autoimmune panel, prion testing  Vascular surgery planning TA biopsy tomorrow  Due to complaints of swallowing difficulty primary team is ordered an esophagram.  Discussed with Dr. Mata today.  Will follow.

## 2023-04-13 NOTE — THERAPY TREATMENT NOTE
Patient Name: Dior Pettit  : 1940    MRN: [id number]                              Today's Date: 2023       Admit Date: 2023    Visit Dx:     ICD-10-CM ICD-9-CM   1. Acute stroke due to ischemia  I63.9 434.91   2. Altered mental status, unspecified altered mental status type  R41.82 780.97     Patient Active Problem List   Diagnosis   • Essential hypertension   • Pulmonary hypertension (HCC)   • NNEKA (obstructive sleep apnea)   • Gastroesophageal reflux disease   • Anxiety   • Restless leg syndrome   • Controlled diabetes mellitus type 2 with complications   • Hypothyroidism   • Hypercholesteremia   • Seasonal allergic rhinitis   • Mild intermittent asthma without complication   • S/P AVR   • Dilated aortic root   • Thoracic aortic aneurysm without rupture    • Primary insomnia   • Renal insufficiency   • Macular degeneration of both eyes   • Coronary artery disease involving native coronary artery with angina pectoris   • Rectocele   • Nonrheumatic mitral valve stenosis   • Slow transit constipation   • Dizziness   • Daily headache   • Symptomatic bradycardia   • Closed displaced fracture of right femoral neck   • Atrial fibrillation, persistent   • Hip pain   • PAF (paroxysmal atrial fibrillation)   • Acute stroke due to ischemia   • Shortness of breath     Past Medical History:   Diagnosis Date   • Allergic rhinitis    • Anemia    • Anxiety     Controlled w/Meds   • Aortic root dilatation 10/02/2017    Borderline--Noted on Echo   • Aortic valve calcification 10/02/2017    Noted on Echo   • Aortic valve insufficiency Dx in     w/AVR    • Aortic valve prosthesis present 2018    Noted on CTA Chest   • Ascending aorta dilatation 10/02/2017    Borderline--Noted on Echo   • Asthma    • Atypical chest pain    • Breast pain, right Hx   • CAD (coronary artery disease)    • Cardiomegaly 2017   • Cervicalgia    • Chest pain due to CAD    • 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     Borderline--Noted on Echo & Measured @ 2.6CM and Mid Descending @ 2.5CM on CTA Chest-11/2/18   • DM (diabetes mellitus)     T2   • Esophagitis    • GERD (gastroesophageal reflux disease)     Controlled w/Meds   • Headache    • Health care maintenance    • Heart murmur    • History of echocardiogram 10/2/17-[facility]    EF 66%; Borderline Concentric Hypertrophy; Mild Calcification in AV; Mild AVS; Mild AVR; Moderate TVR; Borderline Dilation of Aortic Root/Arch & Borderline Dilation of Ascending/Proximal Aorta Present   • Hyperlipidemia     Controlled w/Meds   • Hypertension     Controlled w/Meds   • Hypothyroidism     Controlled w/Synthroid   • Insomnia    • Macular degeneration, left eye    • Mild aortic valve regurgitation 10/02/2017    Noted on Echo   • Mild aortic valve stenosis 10/02/2017    Noted on Echo   • Mild dilation of ascending aorta 10/02/2017    Borderline--Noted on Echo   • Moderate tricuspid valve regurgitation 10/02/2017    Noted on Echo   • Mood disorder in conditions classified elsewhere     Controlled w/Meds   • Near syncope 03/2017-[facility] ER   • Neuropathy    • NNEKA (obstructive sleep apnea)     Untreated   • Osteoarthritis     Ankles/Feet   • Pulmonary hypertension 2017   • Renal insufficiency    • RLS (restless legs syndrome)    • Thoracic ascending aortic aneurysm Dx in 2007 @ 3.8CM & 1/02/2018    Noted @ 4CM on CTA Chest;   • Visual impairment     macular degeneratuin     Past Surgical History:   Procedure Laterality Date   • AORTIC VALVE REPAIR/REPLACEMENT  2007    Dr. Perry   • APPENDECTOMY     • AUGMENTATION MAMMAPLASTY  1976   • BREAST AUGMENTATION  2014   • BUNIONECTOMY     • CARDIAC CATHETERIZATION  2007   • CARDIAC VALVE REPLACEMENT  2007    porcine   • COLONOSCOPY  2010   • COLONOSCOPY  03/02/2012   • EYE SURGERY      cataracts and ens implants   • HYSTERECTOMY  1972   • SIGMOIDOSCOPY  2001   • TOTAL HIP ARTHROPLASTY Right 11/15/2022    Procedure: Right anterior total hip arthroplasty;   Surgeon: Joao Martinez MD;  Location: [facility] MAIN OR;  Service: Orthopedics;  Laterality: Right;      General Information     Row Name 04/13/23 1454          Physical Therapy Time and Intention    Document Type therapy note (daily note)  -CW     Mode of Treatment physical therapy;individual therapy  -CW     Row Name 04/13/23 1454          General Information    Patient Profile Reviewed yes  -CW     Existing Precautions/Restrictions fall  -CW     Row Name 04/13/23 1454          Cognition    Orientation Status (Cognition) oriented to;person  -CW     Row Name 04/13/23 1454          Safety Issues, Functional Mobility    Safety Issues Affecting Function (Mobility) insight into deficits/self-awareness;safety precautions follow-through/compliance;impulsivity  -CW     Impairments Affecting Function (Mobility) balance;cognition;endurance/activity tolerance;visual/perceptual;strength  -CW           User Key  (r) = Recorded By, (t) = Taken By, (c) = Cosigned By    Initials Name Provider Type    CW Kim Almonte, PT Physical Therapist               Mobility     Row Name 04/13/23 1454          Bed Mobility    Comment, (Bed Mobility) UIC at arrival and departure  -CW     Row Name 04/13/23 1454          Bed-Chair Transfer    Bed-Chair [place] (Transfers) contact guard;verbal cues  -CW     Assistive Device (Bed-Chair Transfers) walker, front-wheeled  -CW     Row Name 04/13/23 1454          Sit-Stand Transfer    Sit-Stand [place] (Transfers) contact guard;verbal cues;1 person assist  -CW     Assistive Device (Sit-Stand Transfers) walker, front-wheeled  -CW     Row Name 04/13/23 1454          Gait/Stairs (Locomotion)    [place] Level (Gait) contact guard;verbal cues  -CW     Assistive Device (Gait) walker, front-wheeled  -CW     Distance in Feet (Gait) 200'  -CW     Deviations/Abnormal Patterns (Gait) guille decreased;gait speed decreased  -CW     Comment, (Gait/Stairs) Dual tasking while ambulating - pt with  difficulty and increased unsteadiness. Path varied and does run into a few obstacles on R side  -CW           User Key  (r) = Recorded By, (t) = Taken By, (c) = Cosigned By    Initials Name Provider Type    Kim Zepeda PT Physical Therapist               Obj/Interventions    No documentation.                Goals/Plan    No documentation.                Clinical Impression     Row Name 04/13/23 1456          Pain    Pretreatment Pain Rating 0/10 - no pain  -CW     Posttreatment Pain Rating 0/10 - no pain  -CW     Row Name 04/13/23 1456          Plan of Care Review    Plan of Care Reviewed With patient;daughter  -CW     Outcome Evaluation Pt seen for PT this PM. Pt pleasant and agreeable to mobilize this afternoon. Pt up in chair at arrival with family present. She continues to demo cog deficits. Completed dual tasking while ambulating in hallway. Pt demo difficulty completing and is more unsteady. Tendency to run into obstacles on R side today. will continue to follow and progress as able.  -CW     Row Name 04/13/23 1456          Vital Signs    O2 Delivery Pre Treatment room air  -CW     Row Name 04/13/23 1456          Positioning and Restraints    Pre-Treatment Position sitting in chair/recliner  -CW     Post Treatment Position chair  no alarm at arrival, family present during the day and is aware to notify RN before leaving pt unsupervised  -CW     In Chair notified nsg;with family/caregiver;reclined;call light within reach;encouraged to call for assist;legs elevated  -CW           User Key  (r) = Recorded By, (t) = Taken By, (c) = Cosigned By    Initials Name Provider Type    Kim Zepeda PT Physical Therapist               Outcome Measures     Row Name 04/13/23 1510 04/13/23 0835       How much help from another person do you currently need...    Turning from your back to your side while in flat bed without using bedrails? 3  -CW 3  -LC    Moving from lying on back to sitting on the side of a  flat bed without bedrails? 3  -CW 3  -LC    Moving to and from a bed to a chair (including a wheelchair)? 3  -CW 3  -LC    Standing up from a chair using your arms (e.g., wheelchair, bedside chair)? 3  -CW 3  -LC    Climbing 3-5 steps with a railing? 3  -CW 3  -LC    To walk in hospital room? 3  -CW 3  -LC    AM-PAC 6 Clicks Score (PT) 18  -CW 18  -LC    Highest level of mobility 6 --> Walked 10 steps or more  -CW 6 --> Walked 10 steps or more  -LC    Row Name 04/13/23 1510          Functional Assessment    Outcome Measure Options AM-PAC 6 Clicks Basic Mobility (PT)  -CW           User Key  (r) = Recorded By, (t) = Taken By, (c) = Cosigned By    Initials Name Provider Type    LC Myriam Osorio RN Registered Nurse    Kim Zepeda, PT Physical Therapist                             Physical Therapy Education     Title: PT OT SLP Therapies (In Progress)     Topic: Physical Therapy (In Progress)     Point: Mobility training (In Progress)     Learning Progress Summary           Patient Acceptance, E, NR by  at 4/13/2023 1512    Acceptance, E, NR by CW at 4/12/2023 1117    Acceptance, E, NR by ZB at 4/10/2023 1125   Family Acceptance, E, NR by ZB at 4/10/2023 1125                   Point: Home exercise program (Not Started)     Learner Progress:  Not documented in this visit.          Point: Body mechanics (In Progress)     Learning Progress Summary           Patient Acceptance, E, NR by ZB at 4/10/2023 1125   Family Acceptance, E, NR by ZB at 4/10/2023 1125                   Point: Precautions (In Progress)     Learning Progress Summary           Patient Acceptance, E, NR by ZB at 4/10/2023 1125   Family Acceptance, E, NR by ZB at 4/10/2023 1125                               User Key     Initials Effective Dates Name Provider Type Discipline     12/13/22 -  Kim Almonte, PT Physical Therapist PT    ZB 03/10/23 -  Perez Escobar, PT Student PT Student PT              PT Recommendation and Plan     Plan of  Care Reviewed With: patient, daughter  Outcome Evaluation: Pt seen for PT this PM. Pt pleasant and agreeable to mobilize this afternoon. Pt up in chair at arrival with family present. She continues to demo cog deficits. Completed dual tasking while ambulating in hallway. Pt demo difficulty completing and is more unsteady. Tendency to run into obstacles on R side today. will continue to follow and progress as able.     Time Calculation:    PT Charges     Row Name 04/13/23 1451             Time Calculation    Start Time 1436  -CW      Stop Time 1450  -CW      Time Calculation (min) 14 min  -CW      PT Received On 04/13/23  -CW      PT - Next Appointment 04/14/23  -CW            User Key  (r) = Recorded By, (t) = Taken By, (c) = Cosigned By    Initials Name Provider Type    Kim Zepeda, PT Physical Therapist              Therapy Charges for Today     Code Description Service Date Service Provider Modifiers Qty    63807080037  PT THERAPEUTIC ACT EA 15 MIN 4/12/2023 Kim Almonte, PT GP 1    36115718795 HC GAIT TRAINING EA 15 MIN 4/12/2023 Kim Almonte, PT GP 1    [id number]  PT THERAPEUTIC ACT EA 15 MIN 4/13/2023 Kim Almonte, PT GP 1          PT G-Codes  Outcome Measure Options: AM-PAC 6 Clicks Basic Mobility (PT)  AM-PAC 6 Clicks Score (PT): 18  AM-PAC 6 Clicks Score (OT): 15  Modified Barbara Scale: 4 - Moderately severe disability.  Unable to walk without assistance, and unable to attend to own bodily needs without assistance.  PT Discharge Summary  Anticipated Discharge Disposition (PT): assisted living, home with home health, extended care facility    Kim Almonte PT  4/13/2023

## 2023-04-13 NOTE — PLAN OF CARE
Goal Outcome Evaluation:  Plan of Care Reviewed With: patient        Progress: improving  Outcome Evaluation: Patient alert, makes needs known.  difficulty finding words at times.   intermittent confusion noted.  Heparin gtt as ordered, monitoring PTT.      Problem: Adult Inpatient Plan of Care  Goal: Plan of Care Review  4/13/2023 0137 by Mary Jurado RN  Outcome: Ongoing, Progressing  Flowsheets (Taken 4/13/2023 0133)  Outcome Evaluation: Patient alert, makes needs known.  difficulty finding words at times.   intermittent confusion noted.  Heparin gtt as ordered, monitoring PTT.  4/13/2023 0136 by Mary Jurado RN  Outcome: Ongoing, Progressing  4/13/2023 0133 by Mary Jurado RN  Outcome: Ongoing, Progressing  Flowsheets (Taken 4/13/2023 0133)  Progress: improving  Plan of Care Reviewed With: patient  Outcome Evaluation: Patient alert, makes needs known.  difficulty finding words at times.   intermittent confusion noted.  Heparin gtt as ordered, monitoring PTT.  Goal: Patient-Specific Goal (Individualized)  4/13/2023 0137 by Mary Jurado RN  Outcome: Ongoing, Progressing  4/13/2023 0136 by Mary Jurado RN  Outcome: Ongoing, Progressing  4/13/2023 0133 by Mary Jurado RN  Outcome: Ongoing, Progressing  Goal: Absence of Hospital-Acquired Illness or Injury  4/13/2023 0137 by Mary Jurado RN  Outcome: Ongoing, Progressing  4/13/2023 0136 by Mary Jurado RN  Outcome: Ongoing, Progressing  4/13/2023 0133 by Mary Jurado RN  Outcome: Ongoing, Progressing  Intervention: Identify and Manage Fall Risk  Recent Flowsheet Documentation  Taken 4/13/2023 0000 by Mary Jurado RN  Safety Promotion/Fall Prevention: safety round/check completed  Taken 4/12/2023 2200 by Mary Jurado RN  Safety Promotion/Fall Prevention: safety round/check completed  Taken 4/12/2023 2024 by Mary Jurado RN  Safety Promotion/Fall Prevention: safety round/check  completed  Intervention: Prevent Skin Injury  Recent Flowsheet Documentation  Taken 4/13/2023 0000 by Mary Jurado RN  Body Position: sitting up in bed  Taken 4/12/2023 2200 by Mary Jurado RN  Body Position: supine  Taken 4/12/2023 2024 by Mary Jurado RN  Body Position: supine  Intervention: Prevent and Manage VTE (Venous Thromboembolism) Risk  Recent Flowsheet Documentation  Taken 4/13/2023 0000 by Mary Jurado RN  Activity Management: other (see comments)  Taken 4/12/2023 2200 by Mary Jurado RN  Activity Management: bedrest  Taken 4/12/2023 2024 by Mary Jurado RN  Activity Management: bedrest  VTE Prevention/Management: (patient on heparin gtt)   bilateral   sequential compression devices off   other (see comments)  Range of Motion: active ROM (range of motion) encouraged  Intervention: Prevent Infection  Recent Flowsheet Documentation  Taken 4/13/2023 0000 by Mary Jurado RN  Infection Prevention:   rest/sleep promoted   single patient room provided  Taken 4/12/2023 2200 by Mary Jurado RN  Infection Prevention:   rest/sleep promoted   single patient room provided  Goal: Optimal Comfort and Wellbeing  4/13/2023 0137 by Mary Jurado RN  Outcome: Ongoing, Progressing  4/13/2023 0136 by Mary Jurado RN  Outcome: Ongoing, Progressing  4/13/2023 0133 by Mary Jurado RN  Outcome: Ongoing, Progressing  Intervention: Monitor Pain and Promote Comfort  Recent Flowsheet Documentation  Taken 4/12/2023 2056 by Mary Jurado RN  Pain Management Interventions: see MAR  Intervention: Provide Person-Centered Care  Recent Flowsheet Documentation  Taken 4/12/2023 2024 by Mary Jurado RN  Trust Relationship/Rapport:   care explained   reassurance provided  Goal: Readiness for Transition of Care  4/13/2023 0137 by Mary Jurado RN  Outcome: Ongoing, Progressing  4/13/2023 0136 by Mary Jurado RN  Outcome: Ongoing,  Progressing  4/13/2023 0133 by Mary Jurado RN  Outcome: Ongoing, Progressing     Problem: Skin Injury Risk Increased  Goal: Skin Health and Integrity  4/13/2023 0137 by Mary Jurado RN  Outcome: Ongoing, Progressing  4/13/2023 0136 by Mary Jurado RN  Outcome: Ongoing, Progressing  4/13/2023 0133 by Mary Jurado RN  Outcome: Ongoing, Progressing  Intervention: Optimize Skin Protection  Recent Flowsheet Documentation  Taken 4/13/2023 0000 by Mary Jurado RN  Head of Bed (HOB) Positioning: HOB at 30-45 degrees  Taken 4/12/2023 2024 by Mary Jurado RN  Head of Bed (HOB) Positioning: HOB flat     Problem: Fall Injury Risk  Goal: Absence of Fall and Fall-Related Injury  4/13/2023 0137 by Mary Jurado RN  Outcome: Ongoing, Progressing  4/13/2023 0136 by Mary Jurado RN  Outcome: Ongoing, Progressing  4/13/2023 0133 by Mary Jurado RN  Outcome: Ongoing, Progressing  Intervention: Identify and Manage Contributors  Recent Flowsheet Documentation  Taken 4/12/2023 2200 by Mary Jurado RN  Medication Review/Management: medications reviewed  Taken 4/12/2023 2024 by Mary Jurado RN  Medication Review/Management: medications reviewed  Intervention: Promote Injury-Free Environment  Recent Flowsheet Documentation  Taken 4/13/2023 0000 by Mary Jurado RN  Safety Promotion/Fall Prevention: safety round/check completed  Taken 4/12/2023 2200 by Mary Jurado RN  Safety Promotion/Fall Prevention: safety round/check completed  Taken 4/12/2023 2024 by Mary Jurado RN  Safety Promotion/Fall Prevention: safety round/check completed     Problem: Diabetes Comorbidity  Goal: Blood Glucose Level Within Targeted Range  4/13/2023 0137 by Mary Jurado RN  Outcome: Ongoing, Progressing  4/13/2023 0136 by Mary Jurado RN  Outcome: Ongoing, Progressing  4/13/2023 0133 by Mary Jurado RN  Outcome: Ongoing, Progressing     Problem:  Hypertension Comorbidity  Goal: Blood Pressure in Desired Range  4/13/2023 0137 by Mary Jurado RN  Outcome: Ongoing, Progressing  4/13/2023 0136 by Mary Jurado RN  Outcome: Ongoing, Progressing  4/13/2023 0133 by Mary Jurado RN  Outcome: Ongoing, Progressing  Intervention: Maintain Blood Pressure Management  Recent Flowsheet Documentation  Taken 4/12/2023 2200 by Mary Jurado RN  Medication Review/Management: medications reviewed  Taken 4/12/2023 2024 by Mary Jurado RN  Medication Review/Management: medications reviewed     Problem: Cerebral Tissue Perfusion (Stroke, Ischemic/Transient Ischemic Attack)  Goal: Optimal Cerebral Tissue Perfusion  4/13/2023 0137 by Mary Jurado RN  Outcome: Ongoing, Progressing  4/13/2023 0136 by Mary Jurado RN  Outcome: Ongoing, Progressing  4/13/2023 0133 by Mary Jurado RN  Outcome: Ongoing, Progressing     Problem: Cognitive Impairment (Stroke, Ischemic/Transient Ischemic Attack)  Goal: Optimal Cognitive Function  4/13/2023 0137 by Mary Jurado RN  Outcome: Ongoing, Progressing  4/13/2023 0136 by Mary Jurado RN  Outcome: Ongoing, Progressing  4/13/2023 0133 by Mary Jurado RN  Outcome: Ongoing, Progressing     Problem: Communication Impairment (Stroke, Ischemic/Transient Ischemic Attack)  Goal: Improved Communication Skills  4/13/2023 0137 by Mary Jurado RN  Outcome: Ongoing, Progressing  4/13/2023 0136 by Mary Jurado RN  Outcome: Ongoing, Progressing  4/13/2023 0133 by Mary Jurado RN  Outcome: Ongoing, Progressing     Problem: VTE (Venous Thromboembolism)  Goal: VTE (Venous Thromboembolism) Symptom Resolution  4/13/2023 0137 by Mary Jurado RN  Outcome: Ongoing, Progressing  4/13/2023 0136 by Mary Jurado RN  Outcome: Ongoing, Progressing  Intervention: Prevent or Manage VTE (Venous Thromboembolism)  Recent Flowsheet Documentation  Taken 4/12/2023 2024 by Adrien  Mary RN  VTE Prevention/Management: (patient on heparin gtt)   bilateral   sequential compression devices off   other (see comments)

## 2023-04-13 NOTE — PROGRESS NOTES
Dedicated to Hospital Care    [phone]   LOS: 4 days     Name: Dior Pettit  Age/Sex: 82 y.o. female  :  1940        PCP: Ashanti Hong APRN  Chief Complaint   Patient presents with   • Extremity Weakness      Subjective   She denies new issues or complaints still having issues with her eyes and still having a slight headache.  Denies any nausea or vomiting or abdominal discomfort today.  No respiratory issues  General: No Fever or Chills, Cardiac: No Chest Pain or Palpitations, Resp: No Cough or SOA, GI: No Nausea, Vomiting, or Diarrhea and Other: No bleeding    amitriptyline, 50 mg, Oral, Nightly  amLODIPine, 5 mg, Oral, Q24H  atorvastatin, 80 mg, Oral, Nightly  dofetilide, 250 mcg, Oral, BID  insulin lispro, 0-7 Units, Subcutaneous, TID AC  levothyroxine, 50 mcg, Oral, Every Other Day  levothyroxine, 75 mcg, Oral, Every Other Day  losartan, 100 mg, Oral, Daily  metoprolol tartrate, 25 mg, Oral, BID  predniSONE, 60 mg, Oral, Daily  venlafaxine XR, 150 mg, Oral, Daily  venlafaxine XR, 75 mg, Oral, Daily      heparin, 18 Units/kg/hr, Last Rate: 13 Units/kg/hr (23 0947)  hold, 1 each        Objective   Vital Signs  Temp:  [97.7 °F (36.5 °C)-98.7 °F (37.1 °C)] 98 °F (36.7 °C)  Heart Rate:  [] 82  Resp:  [16-20] 20  BP: (122-163)/(74-99) 152/86  Body mass index is 23.18 kg/m².    Intake/Output Summary (Last 24 hours) at 2023 1321  Last data filed at 2023 1253  Gross per 24 hour   Intake 960 ml   Output 700 ml   Net 260 ml       Physical Exam  Vitals and nursing note reviewed.   Constitutional:       General: She is not in acute distress.     Appearance: She is ill-appearing.   Cardiovascular:      Rate and Rhythm: Normal rate and regular rhythm.   Pulmonary:      Effort: No respiratory distress.      Breath sounds: Normal breath sounds.   Abdominal:      General: Bowel sounds are normal. There is no distension.      Palpations: Abdomen is soft.   Skin:     General: Skin is  warm and dry.   Neurological:      Mental Status: She is alert.           Results Review:       I reviewed the patient's new clinical results.  Results from last 7 days   Lab Units 04/13/23  0428 04/12/23  1747 04/10/23  0518 04/09/23  1514   WBC 10*3/mm3 9.68 5.90 5.84 7.14   HEMOGLOBIN g/dL 15.3 15.7 14.2 15.3   PLATELETS 10*3/mm3 270 261 245 228     Results from last 7 days   Lab Units 04/11/23  0629 04/10/23  0518 04/09/23  1514   SODIUM mmol/L 136 138 134*   POTASSIUM mmol/L 4.4 4.1 4.3   CHLORIDE mmol/L 102 102 99   CO2 mmol/L 27.0 28.7 23.8   BUN mg/dL 15 15 19   CREATININE mg/dL 0.57 0.75 0.91   CALCIUM mg/dL 10.1 9.9 11.1*   MAGNESIUM mg/dL  --  1.7 1.4*   Estimated Creatinine Clearance: 77.8 mL/min (by C-G formula based on SCr of 0.57 mg/dL).      Assessment & Plan   Active Hospital Problems    Diagnosis  POA   • **Acute stroke due to ischemia [I63.9]  Yes   • Shortness of breath [R06.02]  Unknown   • PAF (paroxysmal atrial fibrillation) [I48.0]  Yes   • S/P AVR [Z95.2]  Not Applicable   • Restless leg syndrome [G25.81]  Yes   • 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       PLAN  This is an 82-year-old lady with a history of paroxysmal A-fib gastroesophageal reflux type 2 diabetes hypertension and pulmonary hypertension who presents to the hospital with confusion and impaired balance and was found to have small acute stroke on MRI and concern for underlying secondary etiology.  -Lumbar puncture reviewed from yesterday relatively unremarkable  -Vascular surgery evaluated the patient and plans to take her for temporal artery biopsy tomorrow  -Continue to hold off Eliquis for the time being awaiting procedure.  -Encourage out of bed activity  -Patient  describes some episodes of feeling like food is hollowing or choking.  This has been ongoing since after her hip surgery.  Plan had been for a video swallow to be done in the outpatient setting about speech therapy to evaluate her here and have also ordered an esophagram.  She could probably benefit from a video swallow study.  -Labs reviewed from today renal function electrolytes stable  -Mechanical DVT prophylaxis  -Full code    Disposition  Expected Discharge Date and Time     Expected Discharge Date Expected Discharge Time    Apr 13, 2023              Tae Hobbs MD  [facility]  04/13/23  13:21 EDT

## 2023-04-13 NOTE — PLAN OF CARE
Goal Outcome Evaluation:  Plan of Care Reviewed With: patient, daughter           Outcome Evaluation: Pt seen for PT this PM. Pt pleasant and agreeable to mobilize this afternoon. Pt up in chair at arrival with family present. She continues to demo cog deficits. Completed dual tasking while ambulating in hallway. Pt demo difficulty completing and is more unsteady. Tendency to run into obstacles on R side today. will continue to follow and progress as able.

## 2023-04-13 NOTE — PLAN OF CARE
Goal Outcome Evaluation:  Plan of Care Reviewed With: patient, son           Outcome Evaluation: Clinical swallow evaluation completed recommend pt continue with regular solids and thin liquids, meds whole as tolerated and small bites and sips. Will follow pt for need for VFSS.

## 2023-04-14 ENCOUNTER — HOSPITAL ENCOUNTER (OUTPATIENT)
Dept: CT IMAGING | Facility: HOSPITAL | Age: 83
End: 2023-04-14
Payer: MEDICARE

## 2023-04-14 ENCOUNTER — ANESTHESIA (OUTPATIENT)
Dept: PERIOP | Facility: HOSPITAL | Age: 83
DRG: 42 | End: 2023-04-14
Payer: MEDICARE

## 2023-04-14 ENCOUNTER — ANESTHESIA EVENT (OUTPATIENT)
Dept: PERIOP | Facility: HOSPITAL | Age: 83
DRG: 42 | End: 2023-04-14
Payer: MEDICARE

## 2023-04-14 LAB
ALBUMIN SERPL-MCNC: 4.1 G/DL (ref 3.5–5.2)
ANION GAP SERPL CALCULATED.3IONS-SCNC: 10.9 MMOL/L (ref 5–15)
APTT PPP: 58.7 SECONDS (ref 22.7–35.4)
APTT PPP: 82.3 SECONDS (ref 22.7–35.4)
BASOPHILS # BLD AUTO: 0.04 10*3/MM3 (ref 0–0.2)
BASOPHILS NFR BLD AUTO: 0.5 % (ref 0–1.5)
BUN SERPL-MCNC: 19 MG/DL (ref 8–23)
BUN/CREAT SERPL: 27.5 (ref 7–25)
CALCIUM SPEC-SCNC: 9.9 MG/DL (ref 8.6–10.5)
CHLORIDE SERPL-SCNC: 101 MMOL/L (ref 98–107)
CO2 SERPL-SCNC: 28.1 MMOL/L (ref 22–29)
CREAT SERPL-MCNC: 0.69 MG/DL (ref 0.57–1)
CYTO UR: NORMAL
DEPRECATED RDW RBC AUTO: 45.3 FL (ref 37–54)
EGFRCR SERPLBLD CKD-EPI 2021: 85.7 ML/MIN/1.73
EOSINOPHIL # BLD AUTO: 0.06 10*3/MM3 (ref 0–0.4)
EOSINOPHIL NFR BLD AUTO: 0.7 % (ref 0.3–6.2)
ERYTHROCYTE [DISTWIDTH] IN BLOOD BY AUTOMATED COUNT: 14.5 % (ref 12.3–15.4)
GLUCOSE BLDC GLUCOMTR-MCNC: 72 MG/DL (ref 70–130)
GLUCOSE BLDC GLUCOMTR-MCNC: 77 MG/DL (ref 70–130)
GLUCOSE BLDC GLUCOMTR-MCNC: 88 MG/DL (ref 70–130)
GLUCOSE BLDC GLUCOMTR-MCNC: 90 MG/DL (ref 70–130)
GLUCOSE SERPL-MCNC: 114 MG/DL (ref 65–99)
HCT VFR BLD AUTO: 43.6 % (ref 34–46.6)
HGB BLD-MCNC: 14.3 G/DL (ref 12–15.9)
IMM GRANULOCYTES # BLD AUTO: 0.04 10*3/MM3 (ref 0–0.05)
IMM GRANULOCYTES NFR BLD AUTO: 0.5 % (ref 0–0.5)
LAB AP CASE REPORT: NORMAL
LYMPHOCYTES # BLD AUTO: 2.72 10*3/MM3 (ref 0.7–3.1)
LYMPHOCYTES NFR BLD AUTO: 31.1 % (ref 19.6–45.3)
MCH RBC QN AUTO: 27.9 PG (ref 26.6–33)
MCHC RBC AUTO-ENTMCNC: 32.8 G/DL (ref 31.5–35.7)
MCV RBC AUTO: 85.2 FL (ref 79–97)
MONOCYTES # BLD AUTO: 0.91 10*3/MM3 (ref 0.1–0.9)
MONOCYTES NFR BLD AUTO: 10.4 % (ref 5–12)
NEUTROPHILS NFR BLD AUTO: 4.99 10*3/MM3 (ref 1.7–7)
NEUTROPHILS NFR BLD AUTO: 56.8 % (ref 42.7–76)
NRBC BLD AUTO-RTO: 0 /100 WBC (ref 0–0.2)
PATH REPORT.ADDENDUM SPEC: NORMAL
PATH REPORT.FINAL DX SPEC: NORMAL
PATH REPORT.GROSS SPEC: NORMAL
PHOSPHATE SERPL-MCNC: 3.2 MG/DL (ref 2.5–4.5)
PLATELET # BLD AUTO: 266 10*3/MM3 (ref 140–450)
PMV BLD AUTO: 9.8 FL (ref 6–12)
POTASSIUM SERPL-SCNC: 4.3 MMOL/L (ref 3.5–5.2)
RBC # BLD AUTO: 5.12 10*6/MM3 (ref 3.77–5.28)
SODIUM SERPL-SCNC: 140 MMOL/L (ref 136–145)
WBC NRBC COR # BLD: 8.76 10*3/MM3 (ref 3.4–10.8)

## 2023-04-14 PROCEDURE — 85025 COMPLETE CBC W/AUTO DIFF WBC: CPT | Performed by: STUDENT IN AN ORGANIZED HEALTH CARE EDUCATION/TRAINING PROGRAM

## 2023-04-14 PROCEDURE — 97535 SELF CARE MNGMENT TRAINING: CPT

## 2023-04-14 PROCEDURE — 80069 RENAL FUNCTION PANEL: CPT | Performed by: HOSPITALIST

## 2023-04-14 PROCEDURE — 25010000002 DEXAMETHASONE SODIUM PHOSPHATE 20 MG/5ML SOLUTION: Performed by: NURSE ANESTHETIST, CERTIFIED REGISTERED

## 2023-04-14 PROCEDURE — 82962 GLUCOSE BLOOD TEST: CPT

## 2023-04-14 PROCEDURE — 25010000002 PROPOFOL 10 MG/ML EMULSION: Performed by: NURSE ANESTHETIST, CERTIFIED REGISTERED

## 2023-04-14 PROCEDURE — 25010000002 FENTANYL CITRATE (PF) 50 MCG/ML SOLUTION: Performed by: ANESTHESIOLOGY

## 2023-04-14 PROCEDURE — 25010000002 CEFAZOLIN IN DEXTROSE 2-4 GM/100ML-% SOLUTION: Performed by: STUDENT IN AN ORGANIZED HEALTH CARE EDUCATION/TRAINING PROGRAM

## 2023-04-14 PROCEDURE — 97530 THERAPEUTIC ACTIVITIES: CPT

## 2023-04-14 PROCEDURE — 85730 THROMBOPLASTIN TIME PARTIAL: CPT | Performed by: STUDENT IN AN ORGANIZED HEALTH CARE EDUCATION/TRAINING PROGRAM

## 2023-04-14 PROCEDURE — 83519 RIA NONANTIBODY: CPT | Performed by: NURSE PRACTITIONER

## 2023-04-14 PROCEDURE — 0 LIDOCAINE 1 % SOLUTION 20 ML VIAL: Performed by: SURGERY

## 2023-04-14 PROCEDURE — 25010000002 ONDANSETRON PER 1 MG: Performed by: NURSE ANESTHETIST, CERTIFIED REGISTERED

## 2023-04-14 PROCEDURE — 25010000002 CEFAZOLIN PER 500 MG: Performed by: SURGERY

## 2023-04-14 PROCEDURE — 03BS0ZX EXCISION OF RIGHT TEMPORAL ARTERY, OPEN APPROACH, DIAGNOSTIC: ICD-10-PCS | Performed by: SURGERY

## 2023-04-14 PROCEDURE — 25010000002 FENTANYL CITRATE (PF) 50 MCG/ML SOLUTION: Performed by: NURSE ANESTHETIST, CERTIFIED REGISTERED

## 2023-04-14 PROCEDURE — 03BT0ZX EXCISION OF LEFT TEMPORAL ARTERY, OPEN APPROACH, DIAGNOSTIC: ICD-10-PCS | Performed by: SURGERY

## 2023-04-14 PROCEDURE — 25010000002 MIDAZOLAM PER 1 MG: Performed by: ANESTHESIOLOGY

## 2023-04-14 PROCEDURE — 99233 SBSQ HOSP IP/OBS HIGH 50: CPT | Performed by: NURSE PRACTITIONER

## 2023-04-14 PROCEDURE — 88305 TISSUE EXAM BY PATHOLOGIST: CPT | Performed by: SURGERY

## 2023-04-14 RX ORDER — HYDRALAZINE HYDROCHLORIDE 20 MG/ML
5 INJECTION INTRAMUSCULAR; INTRAVENOUS
Status: DISCONTINUED | OUTPATIENT
Start: 2023-04-14 | End: 2023-04-14 | Stop reason: HOSPADM

## 2023-04-14 RX ORDER — DEXAMETHASONE SODIUM PHOSPHATE 4 MG/ML
INJECTION, SOLUTION INTRA-ARTICULAR; INTRALESIONAL; INTRAMUSCULAR; INTRAVENOUS; SOFT TISSUE AS NEEDED
Status: DISCONTINUED | OUTPATIENT
Start: 2023-04-14 | End: 2023-04-14 | Stop reason: SURG

## 2023-04-14 RX ORDER — SODIUM CHLORIDE, SODIUM LACTATE, POTASSIUM CHLORIDE, CALCIUM CHLORIDE 600; 310; 30; 20 MG/100ML; MG/100ML; MG/100ML; MG/100ML
9 INJECTION, SOLUTION INTRAVENOUS CONTINUOUS
Status: DISCONTINUED | OUTPATIENT
Start: 2023-04-14 | End: 2023-04-14

## 2023-04-14 RX ORDER — LIDOCAINE HYDROCHLORIDE 10 MG/ML
0.5 INJECTION, SOLUTION EPIDURAL; INFILTRATION; INTRACAUDAL; PERINEURAL ONCE AS NEEDED
Status: DISCONTINUED | OUTPATIENT
Start: 2023-04-14 | End: 2023-04-14 | Stop reason: HOSPADM

## 2023-04-14 RX ORDER — PROMETHAZINE HYDROCHLORIDE 25 MG/1
25 SUPPOSITORY RECTAL ONCE AS NEEDED
Status: DISCONTINUED | OUTPATIENT
Start: 2023-04-14 | End: 2023-04-14 | Stop reason: HOSPADM

## 2023-04-14 RX ORDER — IPRATROPIUM BROMIDE AND ALBUTEROL SULFATE 2.5; .5 MG/3ML; MG/3ML
3 SOLUTION RESPIRATORY (INHALATION) ONCE AS NEEDED
Status: DISCONTINUED | OUTPATIENT
Start: 2023-04-14 | End: 2023-04-14 | Stop reason: HOSPADM

## 2023-04-14 RX ORDER — DROPERIDOL 2.5 MG/ML
0.62 INJECTION, SOLUTION INTRAMUSCULAR; INTRAVENOUS
Status: DISCONTINUED | OUTPATIENT
Start: 2023-04-14 | End: 2023-04-14 | Stop reason: HOSPADM

## 2023-04-14 RX ORDER — FENTANYL CITRATE 50 UG/ML
25 INJECTION, SOLUTION INTRAMUSCULAR; INTRAVENOUS
Status: DISCONTINUED | OUTPATIENT
Start: 2023-04-14 | End: 2023-04-14 | Stop reason: HOSPADM

## 2023-04-14 RX ORDER — SODIUM CHLORIDE 0.9 % (FLUSH) 0.9 %
3-10 SYRINGE (ML) INJECTION AS NEEDED
Status: DISCONTINUED | OUTPATIENT
Start: 2023-04-14 | End: 2023-04-14 | Stop reason: HOSPADM

## 2023-04-14 RX ORDER — FAMOTIDINE 10 MG/ML
20 INJECTION, SOLUTION INTRAVENOUS ONCE
Status: COMPLETED | OUTPATIENT
Start: 2023-04-14 | End: 2023-04-14

## 2023-04-14 RX ORDER — LABETALOL HYDROCHLORIDE 5 MG/ML
5 INJECTION, SOLUTION INTRAVENOUS
Status: DISCONTINUED | OUTPATIENT
Start: 2023-04-14 | End: 2023-04-14 | Stop reason: HOSPADM

## 2023-04-14 RX ORDER — ONDANSETRON 2 MG/ML
INJECTION INTRAMUSCULAR; INTRAVENOUS AS NEEDED
Status: DISCONTINUED | OUTPATIENT
Start: 2023-04-14 | End: 2023-04-14 | Stop reason: SURG

## 2023-04-14 RX ORDER — HYDROCODONE BITARTRATE AND ACETAMINOPHEN 5; 325 MG/1; MG/1
1 TABLET ORAL ONCE AS NEEDED
Status: DISCONTINUED | OUTPATIENT
Start: 2023-04-14 | End: 2023-04-14 | Stop reason: HOSPADM

## 2023-04-14 RX ORDER — SODIUM CHLORIDE, SODIUM LACTATE, POTASSIUM CHLORIDE, CALCIUM CHLORIDE 600; 310; 30; 20 MG/100ML; MG/100ML; MG/100ML; MG/100ML
INJECTION, SOLUTION INTRAVENOUS CONTINUOUS PRN
Status: DISCONTINUED | OUTPATIENT
Start: 2023-04-14 | End: 2023-04-14 | Stop reason: SURG

## 2023-04-14 RX ORDER — HYDROCODONE BITARTRATE AND ACETAMINOPHEN 7.5; 325 MG/1; MG/1
1 TABLET ORAL EVERY 4 HOURS PRN
Status: DISCONTINUED | OUTPATIENT
Start: 2023-04-14 | End: 2023-04-14 | Stop reason: HOSPADM

## 2023-04-14 RX ORDER — FENTANYL CITRATE 50 UG/ML
50 INJECTION, SOLUTION INTRAMUSCULAR; INTRAVENOUS
Status: DISCONTINUED | OUTPATIENT
Start: 2023-04-14 | End: 2023-04-14 | Stop reason: HOSPADM

## 2023-04-14 RX ORDER — PROMETHAZINE HYDROCHLORIDE 25 MG/1
25 TABLET ORAL ONCE AS NEEDED
Status: DISCONTINUED | OUTPATIENT
Start: 2023-04-14 | End: 2023-04-14 | Stop reason: HOSPADM

## 2023-04-14 RX ORDER — SIMETHICONE 80 MG
80 TABLET,CHEWABLE ORAL 4 TIMES DAILY PRN
Status: DISCONTINUED | OUTPATIENT
Start: 2023-04-14 | End: 2023-04-15 | Stop reason: HOSPADM

## 2023-04-14 RX ORDER — LIDOCAINE HYDROCHLORIDE 20 MG/ML
INJECTION, SOLUTION INFILTRATION; PERINEURAL AS NEEDED
Status: DISCONTINUED | OUTPATIENT
Start: 2023-04-14 | End: 2023-04-14 | Stop reason: SURG

## 2023-04-14 RX ORDER — FENTANYL CITRATE 50 UG/ML
INJECTION, SOLUTION INTRAMUSCULAR; INTRAVENOUS AS NEEDED
Status: DISCONTINUED | OUTPATIENT
Start: 2023-04-14 | End: 2023-04-14 | Stop reason: SURG

## 2023-04-14 RX ORDER — PHENYLEPHRINE HCL IN 0.9% NACL 1 MG/10 ML
SYRINGE (ML) INTRAVENOUS AS NEEDED
Status: DISCONTINUED | OUTPATIENT
Start: 2023-04-14 | End: 2023-04-14 | Stop reason: SURG

## 2023-04-14 RX ORDER — FLUMAZENIL 0.1 MG/ML
0.2 INJECTION INTRAVENOUS AS NEEDED
Status: DISCONTINUED | OUTPATIENT
Start: 2023-04-14 | End: 2023-04-14 | Stop reason: HOSPADM

## 2023-04-14 RX ORDER — MIDAZOLAM HYDROCHLORIDE 1 MG/ML
0.5 INJECTION INTRAMUSCULAR; INTRAVENOUS
Status: DISCONTINUED | OUTPATIENT
Start: 2023-04-14 | End: 2023-04-14 | Stop reason: HOSPADM

## 2023-04-14 RX ORDER — ONDANSETRON 2 MG/ML
4 INJECTION INTRAMUSCULAR; INTRAVENOUS ONCE AS NEEDED
Status: DISCONTINUED | OUTPATIENT
Start: 2023-04-14 | End: 2023-04-14 | Stop reason: HOSPADM

## 2023-04-14 RX ORDER — DIPHENHYDRAMINE HYDROCHLORIDE 50 MG/ML
12.5 INJECTION INTRAMUSCULAR; INTRAVENOUS
Status: DISCONTINUED | OUTPATIENT
Start: 2023-04-14 | End: 2023-04-14 | Stop reason: HOSPADM

## 2023-04-14 RX ORDER — EPHEDRINE SULFATE 50 MG/ML
5 INJECTION, SOLUTION INTRAVENOUS ONCE AS NEEDED
Status: DISCONTINUED | OUTPATIENT
Start: 2023-04-14 | End: 2023-04-14 | Stop reason: HOSPADM

## 2023-04-14 RX ORDER — PROPOFOL 10 MG/ML
VIAL (ML) INTRAVENOUS AS NEEDED
Status: DISCONTINUED | OUTPATIENT
Start: 2023-04-14 | End: 2023-04-14 | Stop reason: SURG

## 2023-04-14 RX ORDER — NALOXONE HCL 0.4 MG/ML
0.2 VIAL (ML) INJECTION AS NEEDED
Status: DISCONTINUED | OUTPATIENT
Start: 2023-04-14 | End: 2023-04-14 | Stop reason: HOSPADM

## 2023-04-14 RX ORDER — HYDROMORPHONE HYDROCHLORIDE 1 MG/ML
0.25 INJECTION, SOLUTION INTRAMUSCULAR; INTRAVENOUS; SUBCUTANEOUS
Status: DISCONTINUED | OUTPATIENT
Start: 2023-04-14 | End: 2023-04-14 | Stop reason: HOSPADM

## 2023-04-14 RX ORDER — SODIUM CHLORIDE 0.9 % (FLUSH) 0.9 %
3 SYRINGE (ML) INJECTION EVERY 12 HOURS SCHEDULED
Status: DISCONTINUED | OUTPATIENT
Start: 2023-04-14 | End: 2023-04-14 | Stop reason: HOSPADM

## 2023-04-14 RX ADMIN — Medication 200 MCG: at 15:35

## 2023-04-14 RX ADMIN — Medication 100 MCG: at 15:30

## 2023-04-14 RX ADMIN — AMITRIPTYLINE HYDROCHLORIDE 50 MG: 50 TABLET, FILM COATED ORAL at 21:52

## 2023-04-14 RX ADMIN — VENLAFAXINE HYDROCHLORIDE 150 MG: 150 CAPSULE, EXTENDED RELEASE ORAL at 08:09

## 2023-04-14 RX ADMIN — Medication 3 MG: at 21:52

## 2023-04-14 RX ADMIN — Medication 100 MCG: at 16:17

## 2023-04-14 RX ADMIN — AMLODIPINE BESYLATE 5 MG: 5 TABLET ORAL at 08:08

## 2023-04-14 RX ADMIN — FENTANYL CITRATE 50 MCG: 50 INJECTION, SOLUTION INTRAMUSCULAR; INTRAVENOUS at 14:35

## 2023-04-14 RX ADMIN — FAMOTIDINE 20 MG: 10 INJECTION, SOLUTION INTRAVENOUS at 14:35

## 2023-04-14 RX ADMIN — LEVOTHYROXINE SODIUM 75 MCG: 0.07 TABLET ORAL at 08:08

## 2023-04-14 RX ADMIN — SODIUM CHLORIDE, POTASSIUM CHLORIDE, SODIUM LACTATE AND CALCIUM CHLORIDE: 600; 310; 30; 20 INJECTION, SOLUTION INTRAVENOUS at 15:14

## 2023-04-14 RX ADMIN — FENTANYL CITRATE 50 MCG: 50 INJECTION, SOLUTION INTRAMUSCULAR; INTRAVENOUS at 15:47

## 2023-04-14 RX ADMIN — METOPROLOL TARTRATE 25 MG: 25 TABLET, FILM COATED ORAL at 08:08

## 2023-04-14 RX ADMIN — DEXAMETHASONE SODIUM PHOSPHATE 8 MG: 4 INJECTION, SOLUTION INTRAMUSCULAR; INTRAVENOUS at 15:17

## 2023-04-14 RX ADMIN — LIDOCAINE HYDROCHLORIDE 100 MG: 20 INJECTION, SOLUTION INFILTRATION; PERINEURAL at 15:17

## 2023-04-14 RX ADMIN — METOPROLOL TARTRATE 25 MG: 25 TABLET, FILM COATED ORAL at 21:37

## 2023-04-14 RX ADMIN — FENTANYL CITRATE 25 MCG: 50 INJECTION, SOLUTION INTRAMUSCULAR; INTRAVENOUS at 16:05

## 2023-04-14 RX ADMIN — LOSARTAN POTASSIUM 100 MG: 100 TABLET, FILM COATED ORAL at 08:08

## 2023-04-14 RX ADMIN — APIXABAN 5 MG: 5 TABLET, FILM COATED ORAL at 21:39

## 2023-04-14 RX ADMIN — ATORVASTATIN CALCIUM 80 MG: 80 TABLET, FILM COATED ORAL at 21:52

## 2023-04-14 RX ADMIN — VENLAFAXINE HYDROCHLORIDE 75 MG: 75 CAPSULE, EXTENDED RELEASE ORAL at 08:08

## 2023-04-14 RX ADMIN — DOFETILIDE 250 MCG: 0.25 CAPSULE ORAL at 21:38

## 2023-04-14 RX ADMIN — ONDANSETRON 4 MG: 2 INJECTION INTRAMUSCULAR; INTRAVENOUS at 15:17

## 2023-04-14 RX ADMIN — FENTANYL CITRATE 25 MCG: 50 INJECTION, SOLUTION INTRAMUSCULAR; INTRAVENOUS at 15:50

## 2023-04-14 RX ADMIN — Medication 200 MCG: at 16:22

## 2023-04-14 RX ADMIN — DOFETILIDE 250 MCG: 0.25 CAPSULE ORAL at 08:08

## 2023-04-14 RX ADMIN — PROPOFOL 100 MG: 10 INJECTION, EMULSION INTRAVENOUS at 15:17

## 2023-04-14 RX ADMIN — MIDAZOLAM 0.5 MG: 1 INJECTION INTRAMUSCULAR; INTRAVENOUS at 14:36

## 2023-04-14 RX ADMIN — CEFAZOLIN SODIUM 2 G: 2 INJECTION, SOLUTION INTRAVENOUS at 15:22

## 2023-04-14 NOTE — ANESTHESIA PROCEDURE NOTES
Airway  Urgency: elective    Date/Time: 4/14/2023 3:19 PM  Airway not difficult    General Information and Staff    Patient location during procedure: OR  CRNA/CAA: Aidee Carreno CRNA    Indications and Patient Condition    Preoxygenated: yes  Mask difficulty assessment: 1 - vent by mask    Final Airway Details  Final airway type: supraglottic airway      Successful airway: LMA  Size 4     Number of attempts at approach: 1  Assessment: lips, teeth, and gum same as pre-op

## 2023-04-14 NOTE — PLAN OF CARE
The pt participated in OT this AM. Min A provided for bed mobility. CGA to stand with a walker and mobilize into the bathroom for sinkside grooming ADL's. Cues provided throughout to scan her environment due to visual impairments. She completed further functional mobility in her room/into hallway - she was instructed to locate various items and education provided on head turns and safety awareness. SNF vs. [facility] planned.

## 2023-04-14 NOTE — ANESTHESIA POSTPROCEDURE EVALUATION
Patient: Dior Pettit    Procedure Summary     Date: 04/14/23 Room / Location: [facility] OR  / [facility] MAIN OR    Anesthesia Start: 1514 Anesthesia Stop: 1649    Procedure: BILATERAL TEMPORAL ARTERY BIOPSY (Bilateral: Head) Diagnosis:     Surgeons: Stewart Shepherd MD Provider: Yahaira Troy MD    Anesthesia Type: general ASA Status: 3          Anesthesia Type: general    Vitals  Vitals Value Taken Time   /94 04/14/23 1646   Temp     Pulse 79 04/14/23 1649   Resp     SpO2 99 % 04/14/23 1649   Vitals shown include unvalidated device data.        Post Anesthesia Care and Evaluation    Patient location during evaluation: bedside  Patient participation: complete - patient participated  Level of consciousness: awake  Pain management: adequate    Airway patency: patent  Anesthetic complications: No anesthetic complications    Cardiovascular status: acceptable  Respiratory status: acceptable  Hydration status: acceptable

## 2023-04-14 NOTE — PROGRESS NOTES
Continued Stay Note  [facility]     Patient Name: Dior Pettit  MRN: [id number]  Today's Date: 4/14/2023    Admit Date: 4/9/2023    Plan: Home with HH, referral pending   Discharge Plan     Row Name 04/14/23 1537       Plan    Plan Home with HH, referral pending    Patient/Family in Agreement with Plan yes    Plan Comments Pt now ambulating 200 ft yesterday and 150 ft today with PT and thus ineligible for sub-acute rehab. CCP updated pt's daughter at bedside who states they are still in the process of arranging assisted living at [facility] and pt will d/c home. Pt has walkers and daughter is agreeable to HH referral, preference for [facility] (pending). CCP to follow for [facility] eval. Jasmyn Dorantes LCSW               Discharge Codes    No documentation.               Expected Discharge Date and Time     Expected Discharge Date Expected Discharge Time    Apr 15, 2023             Lisa Dorantes LCSW

## 2023-04-14 NOTE — PROGRESS NOTES
"DOS: 2023  NAME: Dior Pettit   : 1940  PCP: Ashanti Hong APRN  Chief Complaint   Patient presents with   • Extremity Weakness   Patient seen in follow-up today; new to me        Neurology      Subjective: No acute events overnight.  Patient denies any new complaints or concerns on my exam.  She is essentially back to baseline although reports some diplopia/blurred vision.  Of note patient does report chronic migraine history and states that headaches are not any different in intensity/location than prior headaches.      Daughter at bedside      Objective:  Vital signs: /92 (BP Location: Right arm, Patient Position: Lying)   Pulse 95   Temp 97.7 °F (36.5 °C) (Oral)   Resp 16   Ht 170.2 cm (67\")   Wt 67.1 kg (148 lb)   LMP  (LMP Unknown)   SpO2 100%   BMI 23.18 kg/m²       HEENT: Normocephalic, atraumatic.  No occipital or temporal tenderness.  COR: RRR  Resp: Even and unlabored  Extremities: Equal pulses, nondistal embolization    Neurological:   MS: AO. Language normal. No neglect. Higher integrative function normal  CN: II-XII normal eyelid ptosis  Motor: 5/5, normal tone  Reflexes: Toes downgoing except mild  Sensory: Intact-to light touch  Coordination: Normal-finger-to-nose     Laboratory results:  Lab Results   Component Value Date    GLUCOSE 114 (H) 2023    CALCIUM 9.9 2023     2023    K 4.3 2023    CO2 28.1 2023     2023    BUN 19 2023    CREATININE 0.69 2023    EGFRIFAFRI 63 2021    EGFRIFNONA 63 2021    BCR 27.5 (H) 2023    ANIONGAP 10.9 2023     Lab Results   Component Value Date    WBC 8.76 2023    HGB 14.3 2023    HCT 43.6 2023    MCV 85.2 2023     2023     Lab Results   Component Value Date    CHOL 163 04/10/2023    CHOL 158 04/15/2019    CHOL 170 2018     Lab Results   Component Value Date    HDL 50 04/10/2023    HDL 51 2022    HDL 44 " 04/14/2022     Lab Results   Component Value Date    LDL 91 04/10/2023     (H) 11/09/2022    LDL 69 04/14/2022     Lab Results   Component Value Date    TRIG 125 04/10/2023    TRIG 133 11/09/2022    TRIG 233 (H) 04/14/2022         Lab 04/10/23  0518   HEMOGLOBIN A1C 6.80*     Review and interpretation of imaging:           MRI EXAMINATION OF BRAIN WITH AND WITHOUT CONTRAST, MRA OF THE NECK WITH  AND WITHOUT CONTRAST AND MRA OF THE HEAD WITHOUT CONTRAST     HISTORY: Word finding difficulty.     COMPARISON: CT head 11/13/2022.     MRI EXAMINATION OF THE BRAIN WITH AND WITHOUT CONTRAST:     TECHNIQUE: A MRI examination of the brain was performed utilizing  sagittal T1, axial diffusion, T1, T2, T2 FLAIR, susceptibility weighted  imaging as well as axial and coronal T1 postcontrast weighted sequences.     FINDINGS: The study is hampered somewhat by patient motion. The  diffusion sequence demonstrates a small cortical acute infarct involving  the left middle frontal gyrus superiorly measuring approximately 2-3 mm  in size. No other areas of restricted diffusion are noted.  Mild-to-moderate small vessel ischemic disease is appreciated on the  axial T2 FLAIR sequence. After contrast administration, there was no  evidence of abnormal enhancement.     IMPRESSION:  The study is hampered by patient motion. There is a 2-3 mm  acute cortical infarct involving the left middle frontal gyrus  superiorly. Mild-to-moderate small vessel ischemic disease is noted.        MRA OF THE HEAD WITHOUT CONTRAST:     The study is hampered by patient motion. There is signal present within  the distal aspect of the vertebral arteries bilaterally. The left  vertebral artery is larger than that of the right. The basilar artery  and the proximal aspects of the posterior cerebral arteries appear  unremarkable. The distal aspects of the internal carotid arteries are of  normal caliber. The right A1 segment is hypoplastic. The proximal  aspects  of the anterior and middle cerebral arteries appear  unremarkable.     IMPRESSION: Study is hampered by patient motion. The right A1 segment is  hypoplastic. The proximal aspects of the anterior, middle and posterior  cerebral arteries appear otherwise unremarkable.        MRA OF THE NECK WITH AND WITHOUT CONTRAST:      The great vessels are arranged in a classic configuration. There is 0%  stenosis of the internal carotid arteries using NASCET criteria. It is  slightly larger than that of the right. There is no evidence of a  vertebral ostial stenosis. The cervical segments of the vertebral  arteries are of relatively uniform caliber.     IMPRESSION: There is 0% stenosis of the internal carotid arteries using  NASCET criteria.     This report was finalized on 4/9/2023 11:49 PM by Dr. Tae Morrissey M.D.     SINGLE CONTRAST ESOPHAGRAM     HISTORY: Recent stroke, dysphagia.     TECHNIQUE:  chest x-ray was acquired. With the head of the  fluoroscopy table tilted up to about 30 degrees, a sip of clear water  was given to the patient which she drank without difficulty. She was  then given several drinks of thin barium which she drank without  difficulty while fluoroscopic images were acquired. Twelve seconds  fluoroscopy was used and 38 images were saved.     FINDINGS: There are sternal wires and prosthetic cardiac valve hardware.  The cardiomediastinal contours are normal and the lungs are clear.     As expected for the age group, there are some tertiary esophageal  contractions. The esophagus extends Lopez and appears normally pliable  with no stricture or ulceration identified. Contrast passes readily to  the stomach.     IMPRESSION:  Single contrast esophagram is limited due to limited  mobility of the patient and images were only acquired in a single  obliquity. No abnormality is identified. If further evaluation is  desired, a double contrast esophagram could be performed as an  outpatient.     This report  was finalized on 4/14/2023 6:44 AM by Dr. Stephan Ordoñez M.D.     CT CHEST WITHOUT CONTRAST     HISTORY: Shortness of breath. Weakness.     TECHNIQUE: Chest CT includes axial imaging from the thoracic inlet to  the upper abdomen without IV contrast     COMPARISON: CT chest without contrast 07/07/2021.     FINDINGS: There has been previous median sternotomy and there is a  prosthetic aortic valve. The ascending thoracic aorta measures 4.1 cm  which is slightly increased from 4 cm on the prior exam 07/07/2021. This  tapers smoothly to the level of the top of the aortic arch and the  descending thoracic aorta measures 2.8 cm. The descending thoracic aorta  is tortuous. Bilateral breast implants are present. There is mild linear  parenchymal scar or atelectasis within both upper and lobes best  demonstrated within the right upper lobe. There is no pleural effusion.  Biapical pleural parenchymal scarring is present. There are bilateral  breast implants. Imaging through the upper abdomen demonstrates a 1.8 cm  gallstone. There is multilevel degenerative disc disease with endplate  spur formation within the thoracic spine. There is a compression  deformity at T12 with 30% height loss.     IMPRESSION:  1. Linear subsegmental proximal atelectasis or scarring best  demonstrated in the right upper lobe  2. Enlargement of the ascending thoracic aorta measuring 4.1 cm in  diameter. Previous aortic valve replacement.  3. Cholelithiasis.  4. Compression deformity at T12 with 30% height loss and this is without  change compared to prior exam 07/07/2021.     Radiation dose reduction techniques were utilized, including automated  exposure control and exposure modulation based on body size.     This report was finalized on 4/11/2023 7:34 PM by Dr. Gustavo Walden M.D.     PORTABLE CHEST     HISTORY: Stroke.     COMPARISON: 11/13/2022.     FINDINGS: A single portable view of the chest demonstrates the heart to  be within normal  limits in size. There is no evidence of infiltrate,  effusion or of congestive failure.     This report was finalized on 4/9/2023 11:53 PM by Dr. Tae Morrissey M.D.      Impression: This is an 82-year-old right-handed  female with known history of diabetes mellitus, hypothyroidism, obstructive sleep apnea-intolerance to treatment, pulmonary hypertension/hypertension, A-fib (on Eliquis), prior history of AVR, hip replacement 1 year ago, anxiety, RLS who presented to [facility] 4/9 due to report of confusion and right sided numbness for which our service was consulted.  Chart review reflects that confusion has been present for several weeks-patient suspected to have missed medications including anticoagulation/Eliquis.  MRI of the brain showed punctate left frontal stroke which our team did not feel explain the degree of confusion that patient was exhibiting.  Patient was also noted to have some visual hallucinations and worsening vision reporting diplopia and blurred vision.  Other stroke work-up included: TTE-agitated saline test only which was negative.  MRA of the head somewhat hindered by motion- known acute left middle frontal gyrus infarct redemonstrated.  Mild to moderate small vessel ischemic disease noted.  No other concern for dissection/aneurysm or occlusion.  MRA of the neck unremarkable no evidence of high-grade stenosis.  Patient on atorvastatin 80 mg daily and heparin drip along with prednisone 60 mgs which was initiated due to elevated inflammatory markers.  CSF completed which showed RBCs 3/nucleated cells 1/glucose slightly elevated at 83 and protein 42.6.  Meningitis/encephalitis panel negative.      On exam today,  patient reports she has chronic migraine headache history and headaches are typical in intensity/location- today, she continues to report diplopia and blurred vision-exam without presence of occipital or temporal tenderness.     Neurologically, stable  essentially at baseline.  Awaiting temporal artery biopsy.  Will add temporal artery ultrasound to further assess with excluding the possibility of temporal arteritis.  Exam revealed left eyelid ptosis on further discussion patient reports some difficulty swallowing/chewing at times/fatigue/dyspnea although not specifically with the exertion and questionable change in tone of voice (lower) therefore we will add myasthenia gravis panel to further exclude MG.  Other recommendations below. PT/OT/ST. CCP to assist with discharge planning. Call RRT for any acute neurological changes and/or concerns. We will continue to follow and advise.     Diagnosis:  1.  Encephalopathy; improving  2.  Acute left frontal stroke; incidental etiology felt to be due to noncompliance with Eliquis/cardioembolic source  3.  Atrial fibrillation; on full dose Eliquis 5 mg twice daily prior to arrival- reported recent noncompliance  4.  Vision loss/diplopia/blurred vision  5.  Eyelid ptosis/diplopia/blurred vision/dysphagia/change in tone of voice- advised myasthenia gravis panel to further exclude MG  6.  Reported recent medication noncompliance  7.  Tobacco abuse; smoking cessation discussed-quit now [place] information provided in discharge paperwork      Plan:  · Heparin drip for now due to pending temporal artery biopsy; okay from neurology perspective to resume Eliquis 5 mg twice daily when team in agreement  · Lipitor 80 mg daily-LDL 91  · Prednisone 60 mg daily due to acute vision changes specifically blurred vision and diplopia will recommend 60 mg daily x4 weeks then taper by 10 mg q. 7 days thereafter-outpatient follow-up with rheumatology advised  At discharge: to prevent recurrent stroke we recommend :  Blood pressure < 130/80  B12 > 500   TSH in normal range   LDL < 70; HbA1c < 6.5 and smoking and alcohol cessation if applicable.  · Follow-up with neurology in 3 months      Case reviewed with attending neurologist Dr. Mata  and he agrees with treatment plan above.     Case reviewed with hospitalist Dr. Hobbs and he agrees with treatment plan above.    TOSHA Wilson APRN

## 2023-04-14 NOTE — PROGRESS NOTES
[facility]    [phone]   LOS: 5 days     Name: Dior Pettit  Age/Sex: 82 y.o. female  :  1940        PCP: Ashanti Hong APRN  Chief Complaint   Patient presents with   • Extremity Weakness      Subjective   Working with physical therapy this morning.  Still having some issues with her eyes but otherwise is feeling better.  Denies other new issues other than some sharp intermittent abdominal discomfort.  It seems to come and go and is not constant with no aggravating or relieving factors that she is aware of.  General: No Fever or Chills, Cardiac: No Chest Pain or Palpitations, Resp: No Cough or SOA, GI: No Nausea, Vomiting, or Diarrhea and Other: No bleeding    amitriptyline, 50 mg, Oral, Nightly  amLODIPine, 5 mg, Oral, Q24H  atorvastatin, 80 mg, Oral, Nightly  ceFAZolin, 2 g, Intravenous, On Call to OR  dofetilide, 250 mcg, Oral, BID  insulin lispro, 0-7 Units, Subcutaneous, TID AC  levothyroxine, 50 mcg, Oral, Every Other Day  levothyroxine, 75 mcg, Oral, Every Other Day  losartan, 100 mg, Oral, Daily  metoprolol tartrate, 25 mg, Oral, BID  predniSONE, 60 mg, Oral, Daily  venlafaxine XR, 150 mg, Oral, Daily  venlafaxine XR, 75 mg, Oral, Daily      heparin, 18 Units/kg/hr, Last Rate: Stopped (23 0559)  hold, 1 each        Objective   Vital Signs  Temp:  [97.7 °F (36.5 °C)-98.7 °F (37.1 °C)] 97.7 °F (36.5 °C)  Heart Rate:  [] 95  Resp:  [16-20] 16  BP: (134-160)/(82-97) 141/92  Body mass index is 23.18 kg/m².    Intake/Output Summary (Last 24 hours) at 2023 1024  Last data filed at 2023 1253  Gross per 24 hour   Intake 480 ml   Output --   Net 480 ml       Physical Exam  Vitals and nursing note reviewed.   Constitutional:       General: She is not in acute distress.     Appearance: She is ill-appearing.   Cardiovascular:      Rate and Rhythm: Normal rate and regular rhythm.   Pulmonary:      Effort: No respiratory distress.      Breath sounds: Normal  breath sounds.   Abdominal:      General: Bowel sounds are normal. There is no distension.      Palpations: Abdomen is soft.   Skin:     General: Skin is warm and dry.   Neurological:      Mental Status: She is alert.           Results Review:       I reviewed the patient's new clinical results.  Results from last 7 days   Lab Units 04/14/23  0625 04/13/23  0428 04/12/23  1747 04/10/23  0518 04/09/23  1514   WBC 10*3/mm3 8.76 9.68 5.90 5.84 7.14   HEMOGLOBIN g/dL 14.3 15.3 15.7 14.2 15.3   PLATELETS 10*3/mm3 266 270 261 245 228     Results from last 7 days   Lab Units 04/14/23  0625 04/11/23  0629 04/10/23  0518 04/09/23  1514   SODIUM mmol/L 140 136 138 134*   POTASSIUM mmol/L 4.3 4.4 4.1 4.3   CHLORIDE mmol/L 101 102 102 99   CO2 mmol/L 28.1 27.0 28.7 23.8   BUN mg/dL 19 15 15 19   CREATININE mg/dL 0.69 0.57 0.75 0.91   CALCIUM mg/dL 9.9 10.1 9.9 11.1*   MAGNESIUM mg/dL  --   --  1.7 1.4*   PHOSPHORUS mg/dL 3.2  --   --   --    Estimated Creatinine Clearance: 64.3 mL/min (by C-G formula based on SCr of 0.69 mg/dL).      Assessment & Plan   Active Hospital Problems    Diagnosis  POA   • **Acute stroke due to ischemia [I63.9]  Yes   • Shortness of breath [R06.02]  Unknown   • PAF (paroxysmal atrial fibrillation) [I48.0]  Yes   • S/P AVR [Z95.2]  Not Applicable   • Restless leg syndrome [G25.81]  Yes   • 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       PLAN  This is an 82-year-old lady with a history of paroxysmal A-fib gastroesophageal reflux type 2 diabetes hypertension and pulmonary hypertension who presents to the hospital with confusion and impaired balance and was found to have small acute stroke on MRI and concern for underlying secondary etiology.  -Lumbar  puncture reviewed from yesterday relatively unremarkable  -Vascular surgery following the patient and plans to take her for temporal artery biopsy this afternoon  -Continue to hold off Eliquis/heparin for the time being awaiting procedure.  -Encourage out of bed activity  -Patient describes some episodes of feeling like food is hanging up or choking.  This has been ongoing since after her hip surgery.  Speech therapy evaluated the patient yesterday and will continue to follow along the hospitalization.  She had an esophagram done that was essentially normal.  Will defer further ongoing work-up to the outpatient setting if she has further symptoms.  -Tolerating steroids without significant issue, empirically treating for temporal arteritis  -Labs reviewed from today renal function electrolytes stable  -Mechanical DVT prophylaxis  -Full code    Disposition  Expected Discharge Date and Time     Expected Discharge Date Expected Discharge Time    Apr 15, 2023              Tae Hobbs MD  [facility]  04/14/23  10:24 EDT    Addendum:  discussed with neurology today.  They ordered a temporal artery ultrasound today while we are waiting on the biopsy.  Also given her issues with nausea eyelid ptosis and other symptoms of myasthenia panel was added.  They will follow her in the office for the results of these.  They recommend she stay on steroids and follow-up with rheumatology in the outpatient setting.

## 2023-04-14 NOTE — PLAN OF CARE
Goal Outcome Evaluation:  Plan of Care Reviewed With: patient           Outcome Evaluation: Patient seen for PT session this AM. Patient supine in bed upon arrival. Patient sat up to EOB with CGA. Patient performed STS from EOB with CGA. Patient ambulated to BR to complete self care needs with use of rwx and CGA. Patient requires frequent cues for safety. Patient worked on visual scanning while ambulating in hallway with date. Worked on head turns during ambulation. No overt LOB. Patient has to often stop to turn head and read sign therapist is asking about. Patient continues to progress with functional mobility but remains most limited by cognitive deficits at this time. PT will continue to monitor. Recommend patient continue ambulating with nsg several times per day.

## 2023-04-14 NOTE — THERAPY TREATMENT NOTE
Patient Name: Dior Pettit  : 1940    MRN: [id number]                              Today's Date: 2023       Admit Date: 2023    Visit Dx:     ICD-10-CM ICD-9-CM   1. Acute stroke due to ischemia  I63.9 434.91   2. Altered mental status, unspecified altered mental status type  R41.82 780.97     Patient Active Problem List   Diagnosis   • Essential hypertension   • Pulmonary hypertension (HCC)   • NNEKA (obstructive sleep apnea)   • Gastroesophageal reflux disease   • Anxiety   • Restless leg syndrome   • Controlled diabetes mellitus type 2 with complications   • Hypothyroidism   • Hypercholesteremia   • Seasonal allergic rhinitis   • Mild intermittent asthma without complication   • S/P AVR   • Dilated aortic root   • Thoracic aortic aneurysm without rupture    • Primary insomnia   • Renal insufficiency   • Macular degeneration of both eyes   • Coronary artery disease involving native coronary artery with angina pectoris   • Rectocele   • Nonrheumatic mitral valve stenosis   • Slow transit constipation   • Dizziness   • Daily headache   • Symptomatic bradycardia   • Closed displaced fracture of right femoral neck   • Atrial fibrillation, persistent   • Hip pain   • PAF (paroxysmal atrial fibrillation)   • Acute stroke due to ischemia   • Shortness of breath     Past Medical History:   Diagnosis Date   • Allergic rhinitis    • Anemia    • Anxiety     Controlled w/Meds   • Aortic root dilatation 10/02/2017    Borderline--Noted on Echo   • Aortic valve calcification 10/02/2017    Noted on Echo   • Aortic valve insufficiency Dx in     w/AVR    • Aortic valve prosthesis present 2018    Noted on CTA Chest   • Ascending aorta dilatation 10/02/2017    Borderline--Noted on Echo   • Asthma    • Atypical chest pain    • Breast pain, right Hx   • CAD (coronary artery disease)    • Cardiomegaly 2017   • Cervicalgia    • Chest pain due to CAD    • 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     Borderline--Noted on Echo & Measured @ 2.6CM and Mid Descending @ 2.5CM on CTA Chest-11/2/18   • DM (diabetes mellitus)     T2   • Esophagitis    • GERD (gastroesophageal reflux disease)     Controlled w/Meds   • Headache    • Health care maintenance    • Heart murmur    • History of echocardiogram 10/2/17-[facility]    EF 66%; Borderline Concentric Hypertrophy; Mild Calcification in AV; Mild AVS; Mild AVR; Moderate TVR; Borderline Dilation of Aortic Root/Arch & Borderline Dilation of Ascending/Proximal Aorta Present   • Hyperlipidemia     Controlled w/Meds   • Hypertension     Controlled w/Meds   • Hypothyroidism     Controlled w/Synthroid   • Insomnia    • Macular degeneration, left eye    • Mild aortic valve regurgitation 10/02/2017    Noted on Echo   • Mild aortic valve stenosis 10/02/2017    Noted on Echo   • Mild dilation of ascending aorta 10/02/2017    Borderline--Noted on Echo   • Moderate tricuspid valve regurgitation 10/02/2017    Noted on Echo   • Mood disorder in conditions classified elsewhere     Controlled w/Meds   • Near syncope 03/2017-[facility] ER   • Neuropathy    • NNEKA (obstructive sleep apnea)     Untreated   • Osteoarthritis     Ankles/Feet   • Pulmonary hypertension 2017   • Renal insufficiency    • RLS (restless legs syndrome)    • Thoracic ascending aortic aneurysm Dx in 2007 @ 3.8CM & 1/02/2018    Noted @ 4CM on CTA Chest;   • Visual impairment     macular degeneratuin     Past Surgical History:   Procedure Laterality Date   • AORTIC VALVE REPAIR/REPLACEMENT  2007    Dr. Perry   • APPENDECTOMY     • AUGMENTATION MAMMAPLASTY  1976   • BREAST AUGMENTATION  2014   • BUNIONECTOMY     • CARDIAC CATHETERIZATION  2007   • CARDIAC VALVE REPLACEMENT  2007    porcine   • COLONOSCOPY  2010   • COLONOSCOPY  03/02/2012   • EYE SURGERY      cataracts and ens implants   • HYSTERECTOMY  1972   • SIGMOIDOSCOPY  2001   • TOTAL HIP ARTHROPLASTY Right 11/15/2022    Procedure: Right anterior total hip arthroplasty;   Surgeon: Joao Martinez MD;  Location: [facility] MAIN OR;  Service: Orthopedics;  Laterality: Right;      General Information     Row Name 04/14/23 1313          OT Time and Intention    Document Type therapy note (daily note)  -RB     Mode of Treatment occupational therapy  -RB     Row Name 04/14/23 1313          General Information    Patient Profile Reviewed yes  -RB     Existing Precautions/Restrictions fall  -RB     Row Name 04/14/23 1313          Cognition    Orientation Status (Cognition) oriented to;person;situation;place;other (see comments)  general confusion  -RB           User Key  (r) = Recorded By, (t) = Taken By, (c) = Cosigned By    Initials Name Provider Type    RB Cammy Soria OT Occupational Therapist                 Mobility/ADL's     Row Name 04/14/23 1313          Bed Mobility    Bed Mobility supine-sit  -RB     Supine-Sit Walsh (Bed Mobility) verbal cues;contact guard  -RB     Assistive Device (Bed Mobility) bed rails  -RB     Row Name 04/14/23 1313          Transfers    Transfers sit-stand transfer;stand-sit transfer;bed-chair transfer  -RB     Row Name 04/14/23 1313          Bed-Chair Transfer    Bed-Chair Walsh (Transfers) contact guard;verbal cues  -RB     Assistive Device (Bed-Chair Transfers) walker, front-wheeled  -RB     Row Name 04/14/23 1313          Sit-Stand Transfer    Sit-Stand Walsh (Transfers) contact guard;verbal cues  -RB     Assistive Device (Sit-Stand Transfers) walker, front-wheeled  -RB     Row Name 04/14/23 1313          Stand-Sit Transfer    Stand-Sit Walsh (Transfers) contact guard;verbal cues  -RB     Assistive Device (Stand-Sit Transfers) walker, front-wheeled  -RB     Row Name 04/14/23 1313          Toilet Transfer    Type (Toilet Transfer) sit-stand;stand-sit  -RB     Walsh Level (Toilet Transfer) contact guard;verbal cues  -RB     Assistive Device (Toilet Transfer) grab bars/safety frame;walker, front-wheeled  -RB     Row  Name 04/14/23 1313          Functional Mobility    Functional Mobility- Ind. Level contact guard assist;verbal cues required  -RB     Functional Mobility- Device walker, front-wheeled  -RB     Functional Mobility- Comment cues for visual scanning.  -RB     Row Name 04/14/23 1313          Activities of Daily Living    BADL Assessment/Intervention grooming  -RB     Row Name 04/14/23 1313          Grooming Assessment/Training    Forest Level (Grooming) grooming skills;verbal cues;minimum assist (75% patient effort);oral care regimen;wash face, hands  -RB     Position (Grooming) sink side;supported standing  -RB     Comment, (Grooming) cues to locate items, small items difficult to open/close. the pt requires cues for sequencing steps such as turning the water off and being aware of sink overflowing  -RB           User Key  (r) = Recorded By, (t) = Taken By, (c) = Cosigned By    Initials Name Provider Type    Cammy Trinidad OT Occupational Therapist               Obj/Interventions     Row Name 04/14/23 1315          Vision Assessment/Intervention    Vision Assessment Comment The pt mobilized with CGA + walker in the hallway. Cues to visual scan and locate items in her surrounding  -RB           User Key  (r) = Recorded By, (t) = Taken By, (c) = Cosigned By    Initials Name Provider Type    Cammy Trinidad OT Occupational Therapist               Goals/Plan    No documentation.                Clinical Impression     Row Name 04/14/23 1315          Pain Assessment    Pretreatment Pain Rating 0/10 - no pain  -RB     Posttreatment Pain Rating 0/10 - no pain  -RB     Row Name 04/14/23 1315          Therapy Assessment/Plan (OT)    Rehab Potential (OT) good, to achieve stated therapy goals  -RB     Criteria for Skilled Therapeutic Interventions Met (OT) yes;skilled treatment is necessary  -RB     Therapy Frequency (OT) 5 times/wk  -RB     Row Name 04/14/23 1315          Therapy Plan Review/Discharge Plan (OT)     Anticipated Discharge Disposition (OT) skilled nursing facility;assisted living  -RB     Row Name 04/14/23 1315          Vital Signs    Pre SpO2 (%) 99  -RB     O2 Delivery Pre Treatment supplemental O2  -RB     Post SpO2 (%) 96  -RB     O2 Delivery Post Treatment room air  -RB     Pre Patient Position Supine  -RB     Intra Patient Position Standing  -RB     Post Patient Position Sitting  -RB     Row Name 04/14/23 1315          Positioning and Restraints    Pre-Treatment Position in bed  -RB     Post Treatment Position chair  -RB     In Chair notified nsg;reclined;sitting;call light within reach;encouraged to call for assist;exit alarm on;with family/caregiver  -RB           User Key  (r) = Recorded By, (t) = Taken By, (c) = Cosigned By    Initials Name Provider Type    Cammy Trinidad OT Occupational Therapist               Outcome Measures     Row Name 04/14/23 1048 04/14/23 0808       How much help from another person do you currently need...    Turning from your back to your side while in flat bed without using bedrails? 3  -SM 3  -LC    Moving from lying on back to sitting on the side of a flat bed without bedrails? 3  -SM 3  -LC    Moving to and from a bed to a chair (including a wheelchair)? 3  -SM 3  -LC    Standing up from a chair using your arms (e.g., wheelchair, bedside chair)? 3  -SM 3  -LC    Climbing 3-5 steps with a railing? 3  -SM 3  -LC    To walk in hospital room? 3  -SM 3  -LC    AM-PAC 6 Clicks Score (PT) 18  -SM 18  -LC    Highest level of mobility 6 --> Walked 10 steps or more  - 6 --> Walked 10 steps or more  -    Row Name 04/14/23 1048          Functional Assessment    Outcome Measure Options AM-PAC 6 Clicks Basic Mobility (PT)  -           User Key  (r) = Recorded By, (t) = Taken By, (c) = Cosigned By    Initials Name Provider Type    Myriam Rocha RN Registered Nurse    Alexa Sultana, KINDRA Physical Therapist                Occupational Therapy Education     Title:  PT OT SLP Therapies (In Progress)     Topic: Occupational Therapy (Not Started)     Point: ADL training (Not Started)     Description:   Instruct learner(s) on proper safety adaptation and remediation techniques during self care or transfers.   Instruct in proper use of assistive devices.              Learner Progress:  Not documented in this visit.          Point: Home exercise program (Not Started)     Description:   Instruct learner(s) on appropriate technique for monitoring, assisting and/or progressing therapeutic exercises/activities.              Learner Progress:  Not documented in this visit.          Point: Precautions (Not Started)     Description:   Instruct learner(s) on prescribed precautions during self-care and functional transfers.              Learner Progress:  Not documented in this visit.          Point: Body mechanics (Not Started)     Description:   Instruct learner(s) on proper positioning and spine alignment during self-care, functional mobility activities and/or exercises.              Learner Progress:  Not documented in this visit.                          OT Recommendation and Plan  Planned Therapy Interventions (OT): transfer/mobility retraining, strengthening exercise, ROM/therapeutic exercise, activity tolerance training, adaptive equipment training, BADL retraining, functional balance retraining, occupation/activity based interventions, patient/caregiver education/training, cognitive/visual perception retraining  Therapy Frequency (OT): 5 times/wk  Plan of Care Review  Plan of Care Reviewed With: patient, daughter  Progress: no change     Time Calculation:    Time Calculation- OT     Row Name 04/14/23 1311             Time Calculation- OT    OT Start Time 0930  -RB      OT Stop Time 0956  -RB      OT Time Calculation (min) 26 min  -RB      Total Timed Code Minutes- OT 26 minute(s)  -RB      OT Received On 04/14/23  -RB      OT - Next Appointment 04/17/23  -RB         Timed Charges     96134 - OT Therapeutic Activity Minutes 10  -RB      94316 - OT Self Care/Mgmt Minutes 16  -RB         Total Minutes    Timed Charges Total Minutes 26  -RB       Total Minutes 26  -RB            User Key  (r) = Recorded By, (t) = Taken By, (c) = Cosigned By    Initials Name Provider Type    Cammy Trinidad OT Occupational Therapist              Therapy Charges for Today     Code Description Service Date Service Provider Modifiers Qty    41642692835 HC OT THERAPEUTIC ACT EA 15 MIN 4/14/2023 Cammy Soria OT GO 1    22141568812 HC OT SELF CARE/MGMT/TRAIN EA 15 MIN 4/14/2023 Cammy Soria OT GO 1               Cammy Soria OT  4/14/2023

## 2023-04-14 NOTE — DISCHARGE PLACEMENT REQUEST
"Dior Dacosta (82 y.o. Female)     Date of Birth   1940    Social Security Number       Address   [address] [place] [address]    Home Phone   [phone]    MRN   [id number]       B[employer]ism   Latter-day    Marital Status                               Admission Date   4/9/23    Admission Type   Emergency    Admitting Provider   Justin Mason MD    Attending Provider   Tae Hobbs MD    Department, Room/Bed   [facility] MAIN OR, [facility] Main OR/MAIN OR       Discharge Date       Discharge Disposition       Discharge Destination                               Attending Provider: Tae Hobbs MD    Allergies: No Known Allergies    Isolation: None   Infection: None   Code Status: CPR    Ht: 170.2 cm (67\")   Wt: 67.1 kg (148 lb)    Admission Cmt: None   Principal Problem: Acute stroke due to ischemia [I63.9]                 Active Insurance as of 4/9/2023     Primary Coverage     Payor Plan Insurance Group Employer/Plan Group    MEDICARE MEDICARE A & B      Payor Plan Address Payor Plan Phone Number Payor Plan Fax Number Effective Dates    [address] [phone]  6/1/2003 - None Entered    [facility] [id number]       Subscriber Name Subscriber Birth Date Member ID       DIOR DACOSTA 1940 [id number]           Secondary Coverage     Payor Plan Insurance Group Employer/Plan Group    [facility] SUPP [id number]     Payor Plan Address Payor Plan Phone Number Payor Plan Fax Number Effective Dates    [address]   12/1/2016 - None Entered    [facility] [id number]       Subscriber Name Subscriber Birth Date Member ID       DIOR DACOSTA 1940 [id number]                 Emergency Contacts      (Rel.) Home Phone Work Phone Mobile Phone    Stephan Dacosta (Son) [phone] -- --    Severiano Dacosta (Daughter) [phone] -- [phone]    ARMANI RODRIGUEZ (Daughter) [phone] -- [phone]    Jose Alejandro Dacosta (Son) " [phone] -- --

## 2023-04-14 NOTE — PLAN OF CARE
Problem: Adult Inpatient Plan of Care  Goal: Plan of Care Review  Outcome: Ongoing, Progressing  Flowsheets (Taken 4/14/2023 0130)  Progress: improving  Plan of Care Reviewed With: patient  Outcome Evaluation: Pt NPO for precedure tomorrorw. Continues on heparin gtt, currently therapuetic.  assist x 1 with a walker for adls.   NIH remains a 1 as patient has some difficulty with word finding at times.  Goal: Patient-Specific Goal (Individualized)  Outcome: Ongoing, Progressing  Goal: Absence of Hospital-Acquired Illness or Injury  Outcome: Ongoing, Progressing  Intervention: Identify and Manage Fall Risk  Recent Flowsheet Documentation  Taken 4/13/2023 2200 by Mary Jurado RN  Safety Promotion/Fall Prevention:   assistive device/personal items within reach   safety round/check completed  Taken 4/13/2023 2031 by Mary Jurado RN  Safety Promotion/Fall Prevention:   activity supervised   safety round/check completed  Intervention: Prevent Skin Injury  Recent Flowsheet Documentation  Taken 4/13/2023 2200 by Mary Jurado RN  Body Position: position changed independently  Taken 4/13/2023 2031 by Mary Jurado RN  Body Position:   position changed independently   side-lying  Intervention: Prevent and Manage VTE (Venous Thromboembolism) Risk  Recent Flowsheet Documentation  Taken 4/13/2023 2031 by Mary Jurado RN  Activity Management: ambulated to bathroom  Range of Motion: active ROM (range of motion) encouraged  Intervention: Prevent Infection  Recent Flowsheet Documentation  Taken 4/13/2023 2200 by Mary Jurado RN  Infection Prevention:   single patient room provided   rest/sleep promoted  Taken 4/13/2023 2031 by Mary Jurado RN  Infection Prevention:   rest/sleep promoted   single patient room provided  Goal: Optimal Comfort and Wellbeing  Outcome: Ongoing, Progressing  Intervention: Monitor Pain and Promote Comfort  Recent Flowsheet Documentation  Taken 4/13/2023 2053 by  Mary Jurado RN  Pain Management Interventions: see MAR  Intervention: Provide Person-Centered Care  Recent Flowsheet Documentation  Taken 4/13/2023 2031 by Mary Jurado RN  Trust Relationship/Rapport:   questions answered   questions encouraged   reassurance provided  Goal: Readiness for Transition of Care  Outcome: Ongoing, Progressing     Problem: Skin Injury Risk Increased  Goal: Skin Health and Integrity  Outcome: Ongoing, Progressing  Intervention: Optimize Skin Protection  Recent Flowsheet Documentation  Taken 4/13/2023 2200 by Mary Jurado RN  Head of Bed (HOB) Positioning: HOB at 30 degrees  Taken 4/13/2023 2031 by Mary Jurado RN  Head of Bed (HOB) Positioning: HOB elevated     Problem: Fall Injury Risk  Goal: Absence of Fall and Fall-Related Injury  Outcome: Ongoing, Progressing  Intervention: Identify and Manage Contributors  Recent Flowsheet Documentation  Taken 4/13/2023 2200 by Mary Jurado RN  Medication Review/Management: medications reviewed  Taken 4/13/2023 2031 by Mary Jurado RN  Medication Review/Management: medications reviewed  Intervention: Promote Injury-Free Environment  Recent Flowsheet Documentation  Taken 4/13/2023 2200 by Mary Jurado RN  Safety Promotion/Fall Prevention:   assistive device/personal items within reach   safety round/check completed  Taken 4/13/2023 2031 by Mary Jurado RN  Safety Promotion/Fall Prevention:   activity supervised   safety round/check completed     Problem: Diabetes Comorbidity  Goal: Blood Glucose Level Within Targeted Range  Outcome: Ongoing, Progressing     Problem: Hypertension Comorbidity  Goal: Blood Pressure in Desired Range  Outcome: Ongoing, Progressing  Intervention: Maintain Blood Pressure Management  Recent Flowsheet Documentation  Taken 4/13/2023 2200 by Mary Jurado RN  Medication Review/Management: medications reviewed  Taken 4/13/2023 2031 by Mary Jurado RN  Medication  Review/Management: medications reviewed     Problem: Cerebral Tissue Perfusion (Stroke, Ischemic/Transient Ischemic Attack)  Goal: Optimal Cerebral Tissue Perfusion  Outcome: Ongoing, Progressing     Problem: Cognitive Impairment (Stroke, Ischemic/Transient Ischemic Attack)  Goal: Optimal Cognitive Function  Outcome: Ongoing, Progressing     Problem: Communication Impairment (Stroke, Ischemic/Transient Ischemic Attack)  Goal: Improved Communication Skills  Outcome: Ongoing, Progressing     Problem: VTE (Venous Thromboembolism)  Goal: VTE (Venous Thromboembolism) Symptom Resolution  Outcome: Ongoing, Progressing   Goal Outcome Evaluation:  Plan of Care Reviewed With: patient        Progress: improving  Outcome Evaluation: Pt NPO for precedure tomorrorw. Continues on heparin gtt, currently therapuetic.  assist x 1 with a walker for adls.   NIH remains a 1 as patient has some difficulty with word finding at times.

## 2023-04-14 NOTE — THERAPY TREATMENT NOTE
Patient Name: Dior Pettit  : 1940    MRN: [id number]                              Today's Date: 2023       Admit Date: 2023    Visit Dx:     ICD-10-CM ICD-9-CM   1. Acute stroke due to ischemia  I63.9 434.91   2. Altered mental status, unspecified altered mental status type  R41.82 780.97     Patient Active Problem List   Diagnosis   • Essential hypertension   • Pulmonary hypertension (HCC)   • NNEKA (obstructive sleep apnea)   • Gastroesophageal reflux disease   • Anxiety   • Restless leg syndrome   • Controlled diabetes mellitus type 2 with complications   • Hypothyroidism   • Hypercholesteremia   • Seasonal allergic rhinitis   • Mild intermittent asthma without complication   • S/P AVR   • Dilated aortic root   • Thoracic aortic aneurysm without rupture    • Primary insomnia   • Renal insufficiency   • Macular degeneration of both eyes   • Coronary artery disease involving native coronary artery with angina pectoris   • Rectocele   • Nonrheumatic mitral valve stenosis   • Slow transit constipation   • Dizziness   • Daily headache   • Symptomatic bradycardia   • Closed displaced fracture of right femoral neck   • Atrial fibrillation, persistent   • Hip pain   • PAF (paroxysmal atrial fibrillation)   • Acute stroke due to ischemia   • Shortness of breath     Past Medical History:   Diagnosis Date   • Allergic rhinitis    • Anemia    • Anxiety     Controlled w/Meds   • Aortic root dilatation 10/02/2017    Borderline--Noted on Echo   • Aortic valve calcification 10/02/2017    Noted on Echo   • Aortic valve insufficiency Dx in     w/AVR    • Aortic valve prosthesis present 2018    Noted on CTA Chest   • Ascending aorta dilatation 10/02/2017    Borderline--Noted on Echo   • Asthma    • Atypical chest pain    • Breast pain, right Hx   • CAD (coronary artery disease)    • Cardiomegaly 2017   • Cervicalgia    • Chest pain due to CAD    • 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     Borderline--Noted on Echo & Measured @ 2.6CM and Mid Descending @ 2.5CM on CTA Chest-11/2/18   • DM (diabetes mellitus)     T2   • Esophagitis    • GERD (gastroesophageal reflux disease)     Controlled w/Meds   • Headache    • Health care maintenance    • Heart murmur    • History of echocardiogram 10/2/17-[facility]    EF 66%; Borderline Concentric Hypertrophy; Mild Calcification in AV; Mild AVS; Mild AVR; Moderate TVR; Borderline Dilation of Aortic Root/Arch & Borderline Dilation of Ascending/Proximal Aorta Present   • Hyperlipidemia     Controlled w/Meds   • Hypertension     Controlled w/Meds   • Hypothyroidism     Controlled w/Synthroid   • Insomnia    • Macular degeneration, left eye    • Mild aortic valve regurgitation 10/02/2017    Noted on Echo   • Mild aortic valve stenosis 10/02/2017    Noted on Echo   • Mild dilation of ascending aorta 10/02/2017    Borderline--Noted on Echo   • Moderate tricuspid valve regurgitation 10/02/2017    Noted on Echo   • Mood disorder in conditions classified elsewhere     Controlled w/Meds   • Near syncope 03/2017-[facility] ER   • Neuropathy    • NNEKA (obstructive sleep apnea)     Untreated   • Osteoarthritis     Ankles/Feet   • Pulmonary hypertension 2017   • Renal insufficiency    • RLS (restless legs syndrome)    • Thoracic ascending aortic aneurysm Dx in 2007 @ 3.8CM & 1/02/2018    Noted @ 4CM on CTA Chest;   • Visual impairment     macular degeneratuin     Past Surgical History:   Procedure Laterality Date   • AORTIC VALVE REPAIR/REPLACEMENT  2007    Dr. Perry   • APPENDECTOMY     • AUGMENTATION MAMMAPLASTY  1976   • BREAST AUGMENTATION  2014   • BUNIONECTOMY     • CARDIAC CATHETERIZATION  2007   • CARDIAC VALVE REPLACEMENT  2007    porcine   • COLONOSCOPY  2010   • COLONOSCOPY  03/02/2012   • EYE SURGERY      cataracts and ens implants   • HYSTERECTOMY  1972   • SIGMOIDOSCOPY  2001   • TOTAL HIP ARTHROPLASTY Right 11/15/2022    Procedure: Right anterior total hip arthroplasty;   Surgeon: Joao Martinez MD;  Location: [facility] MAIN OR;  Service: Orthopedics;  Laterality: Right;      General Information     Row Name 04/14/23 1042          Physical Therapy Time and Intention    Document Type therapy note (daily note)  -     Mode of Treatment physical therapy  -     Row Name 04/14/23 1042          General Information    Patient Profile Reviewed yes  -     Existing Precautions/Restrictions fall  -     Row Name 04/14/23 1042          Cognition    Orientation Status (Cognition) oriented to;person;situation  -     Row Name 04/14/23 1042          Safety Issues, Functional Mobility    Safety Issues Affecting Function (Mobility) impulsivity;insight into deficits/self-awareness  -     Impairments Affecting Function (Mobility) balance;cognition;endurance/activity tolerance;visual/perceptual;strength  -     Cognitive Impairments, Mobility Safety/Performance awareness, need for assistance;impulsivity;insight into deficits/self-awareness  -           User Key  (r) = Recorded By, (t) = Taken By, (c) = Cosigned By    Initials Name Provider Type     Alexa Lieberman, PT Physical Therapist               Mobility     Row Name 04/14/23 1042          Bed Mobility    Bed Mobility supine-sit  -     Supine-Sit [place] (Bed Mobility) contact guard  -     Assistive Device (Bed Mobility) head of bed elevated;bed rails  -     Comment, (Bed Mobility) Patient UIC at end of session  -     Row Name 04/14/23 1042          Sit-Stand Transfer    Sit-Stand [place] (Transfers) contact guard;verbal cues  -     Assistive Device (Sit-Stand Transfers) walker, front-wheeled  -     Row Name 04/14/23 1042          Gait/Stairs (Locomotion)    [place] Level (Gait) contact guard;verbal cues  -     Assistive Device (Gait) walker, front-wheeled  -     Distance in Feet (Gait) 150ft  -     Deviations/Abnormal Patterns (Gait) guille decreased;gait speed decreased  -     Comment,  (Gait/Stairs) Worked on head turns during ambulation. No overt LOB. Patient has to often stop to turn head and read sign therapist is asking about.  -SM           User Key  (r) = Recorded By, (t) = Taken By, (c) = Cosigned By    Initials Name Provider Type    Alexa Sultana PT Physical Therapist               Obj/Interventions     Row Name 04/14/23 1044          Balance    Balance Assessment sitting static balance;sitting dynamic balance;sit to stand dynamic balance;standing static balance;standing dynamic balance  -     Static Sitting Balance supervision  -SM     Dynamic Sitting Balance supervision  -SM     Position, Sitting Balance sitting edge of bed;sitting in chair  -     Sit to Stand Dynamic Balance contact guard  -SM     Static Standing Balance contact guard  -SM     Dynamic Standing Balance contact guard  -SM     Position/Device Used, Standing Balance supported;walker, front-wheeled  -     Balance Interventions sitting;standing;sit to stand;supported;dynamic;static  -           User Key  (r) = Recorded By, (t) = Taken By, (c) = Cosigned By    Initials Name Provider Type     Alexa Lieberman PT Physical Therapist               Goals/Plan    No documentation.                Clinical Impression     Row Name 04/14/23 1045          Pain    Pretreatment Pain Rating 0/10 - no pain  -SM     Posttreatment Pain Rating 0/10 - no pain  -SM     Row Name 04/14/23 1045          Plan of Care Review    Plan of Care Reviewed With patient  -     Outcome Evaluation Patient seen for PT session this AM. Patient supine in bed upon arrival. Patient sat up to EOB with CGA. Patient performed STS from EOB with CGA. Patient ambulated to  to complete self care needs with use of rwx and CGA. Patient requires frequent cues for safety. Patient worked on visual scanning while ambulating in hallway with date. Worked on head turns during ambulation. No overt LOB. Patient has to often stop to turn head and read sign  therapist is asking about. Patient continues to progress with functional mobility but remains most limited by cognitive deficits at this time. PT will continue to monitor. Recommend patient continue ambulating with nsg several times per day.  -     Row Name 04/14/23 1045          Therapy Assessment/Plan (PT)    Rehab Potential (PT) good, to achieve stated therapy goals  -     Criteria for Skilled Interventions Met (PT) yes  -SM     Therapy Frequency (PT) 6 times/wk  -     Row Name 04/14/23 1045          Vital Signs    O2 Delivery Pre Treatment room air  -SM     O2 Delivery Intra Treatment room air  -SM     O2 Delivery Post Treatment room air  -SM     Pre Patient Position Supine  -SM     Intra Patient Position Standing  -SM     Post Patient Position Sitting  -SM     Row Name 04/14/23 1045          Positioning and Restraints    Pre-Treatment Position in bed  -SM     Post Treatment Position chair  -SM     In Chair notified nsg;reclined;call light within reach;encouraged to call for assist;exit alarm on;with family/caregiver  -           User Key  (r) = Recorded By, (t) = Taken By, (c) = Cosigned By    Initials Name Provider Type     Alexa Lieberman, PT Physical Therapist               Outcome Measures     Row Name 04/14/23 1044          How much help from another person do you currently need...    Turning from your back to your side while in flat bed without using bedrails? 3  -SM     Moving from lying on back to sitting on the side of a flat bed without bedrails? 3  -SM     Moving to and from a bed to a chair (including a wheelchair)? 3  -SM     Standing up from a chair using your arms (e.g., wheelchair, bedside chair)? 3  -SM     Climbing 3-5 steps with a railing? 3  -SM     To walk in hospital room? 3  -SM     AM-PAC 6 Clicks Score (PT) 18  -SM     Highest level of mobility 6 --> Walked 10 steps or more  -     Row Name 04/14/23 1040          Functional Assessment    Outcome Measure Options AM-PAC 6  Clicks Basic Mobility (PT)  -           User Key  (r) = Recorded By, (t) = Taken By, (c) = Cosigned By    Initials Name Provider Type     Alexa Lieberman, KINDRA Physical Therapist                             Physical Therapy Education     Title: PT OT SLP Therapies (In Progress)     Topic: Physical Therapy (In Progress)     Point: Mobility training (In Progress)     Learning Progress Summary           Patient Acceptance, E, VU,NR by  at 4/14/2023 1049    Acceptance, E, NR by  at 4/13/2023 1512    Acceptance, E, NR by CW at 4/12/2023 1117    Acceptance, E, NR by ZB at 4/10/2023 1125   Family Acceptance, E, NR by ZB at 4/10/2023 1125                   Point: Home exercise program (Not Started)     Learner Progress:  Not documented in this visit.          Point: Body mechanics (In Progress)     Learning Progress Summary           Patient Acceptance, E, VU,NR by  at 4/14/2023 1049    Acceptance, E, NR by ZB at 4/10/2023 1125   Family Acceptance, E, NR by ZB at 4/10/2023 1125                   Point: Precautions (In Progress)     Learning Progress Summary           Patient Acceptance, E, VU,NR by  at 4/14/2023 1049    Acceptance, E, NR by ZB at 4/10/2023 1125   Family Acceptance, E, NR by ZB at 4/10/2023 1125                               User Key     Initials Effective Dates Name Provider Type Discipline     12/13/22 -  Kim Almonte, PT Physical Therapist PT     05/02/22 -  Alexa Lieberman, PT Physical Therapist PT    ZB 03/10/23 -  Perez Escobar, PT Student PT Student PT              PT Recommendation and Plan     Plan of Care Reviewed With: patient  Outcome Evaluation: Patient seen for PT session this AM. Patient supine in bed upon arrival. Patient sat up to EOB with CGA. Patient performed STS from EOB with CGA. Patient ambulated to  to complete self care needs with use of rwx and CGA. Patient requires frequent cues for safety. Patient worked on visual scanning while ambulating in hallway with  date. Worked on head turns during ambulation. No overt LOB. Patient has to often stop to turn head and read sign therapist is asking about. Patient continues to progress with functional mobility but remains most limited by cognitive deficits at this time. PT will continue to monitor. Recommend patient continue ambulating with nsg several times per day.     Time Calculation:    PT Charges     Row Name 04/14/23 1049             Time Calculation    Start Time 0943  -SM      Stop Time 1006  -SM      Time Calculation (min) 23 min  -SM      PT Received On 04/14/23  -      PT - Next Appointment 04/15/23  -SM         Time Calculation- PT    Total Timed Code Minutes- PT 23 minute(s)  -SM         Timed Charges    57870 - PT Therapeutic Activity Minutes 23  -SM         Total Minutes    Timed Charges Total Minutes 23  -SM       Total Minutes 23  -SM            User Key  (r) = Recorded By, (t) = Taken By, (c) = Cosigned By    Initials Name Provider Type     Alexa Lieberman, KINDRA Physical Therapist              Therapy Charges for Today     Code Description Service Date Service Provider Modifiers Qty    [id number]  PT THERAPEUTIC ACT EA 15 MIN 4/14/2023 Alexa Lieberman, PT GP 2          PT G-Codes  Outcome Measure Options: AM-PAC 6 Clicks Basic Mobility (PT)  AM-PAC 6 Clicks Score (PT): 18  AM-PAC 6 Clicks Score (OT): 15  Modified Ventura Scale: 4 - Moderately severe disability.  Unable to walk without assistance, and unable to attend to own bodily needs without assistance.       Alexa Lieberman PT  4/14/2023

## 2023-04-14 NOTE — PLAN OF CARE
Goal Outcome Evaluation:      VSS throughout shift. No neuro changes throughout shift. Pt surgery completed and returned to floor. Pt stable at this time.

## 2023-04-14 NOTE — OP NOTE
Operative Note  Location: [facility]  Date of Admission:  4/9/2023  OR Date: 4/14/2023    Pre-op Diagnosis: Headache    Post-op Diagnosis: Same    Procedure:   1) bilateral temporal artery biopsy    Surgeon: Stewart Shepherd MD    Assistant: Nadia Michael    Anesthesia: General    Staff:   Circulator: Sharyn Richardson RN  Scrub Person: Katty Thao PCT  Assistant: Nadia Michael, CST    Estimated Blood Loss: minimal    Specimen:   Order Name Source Comment Collection Info Order Time   TISSUE PATHOLOGY EXAM Temporal Artery  Collected By: Stewart Shepherd MD 4/14/2023  4:05 PM     Release to patient   Routine Release            Complications: None    Findings: Arteries were nonpalpable bilaterally but did have Doppler signals.  Left-sided specimen about 3 cm.  Right-sided specimen smaller in diameter and length at about 2 cm.    Implants: Nothing was implanted during the procedure    Indications:    The patient is an 82 y.o. female seen for evaluation by neurology who asked us for temporal artery biopsy.       Procedure:    The patient was taken to the operating room.  The bilateral head was shaved appropriately and prepped with Betadine.  Timeout was observed.  I marked out the Doppler course on the patient's temple with a marking pen as neither was palpable.  15 blade scalpel was used to incise the skin and then the fascia was opened.  The temporal artery on the left was done first.  The artery was freed up for about 2-1/2 cm and then clamped and retracted.  A 3 cm specimen was passed off the field and ties were used for hemostasis.  The wound was then irrigated and closed in 2 layers with Vicryl suture.  We then turned our attention to the right sided specimen.  In the same fashion, we identified it with a Doppler and then incised the skin and then the fascia.  The vessel here was much smaller in diameter and tortuous.  It was confirmed to be a arterial structure with Doppler again and then we  excised a 2 cm portion of this.  The wound was irrigated and closed in 2 layers with Vicryl sutures.  Dermal glue was used for the skin bilaterally.    Active Hospital Problems    Diagnosis  POA   • **Acute stroke due to ischemia [I63.9]  Yes   • Shortness of breath [R06.02]  Unknown   • PAF (paroxysmal atrial fibrillation) [I48.0]  Yes   • S/P AVR [Z95.2]  Not Applicable   • Restless leg syndrome [G25.81]  Yes   • 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      Stewart Shepherd MD     Date: 4/14/2023  Time: 16:41 EDT

## 2023-04-14 NOTE — DISCHARGE INSTRUCTIONS
At discharge: to prevent recurrent stroke we recommend :  Blood pressure < 130/80  B12 > 500   TSH in normal range   LDL < 70; HbA1c < 6.5 and smoking and alcohol cessation if applicable.

## 2023-04-15 ENCOUNTER — TRANSCRIBE ORDERS (OUTPATIENT)
Dept: HOME HEALTH SERVICES | Facility: HOME HEALTHCARE | Age: 83
End: 2023-04-15
Payer: MEDICARE

## 2023-04-15 ENCOUNTER — HOME HEALTH ADMISSION (OUTPATIENT)
Dept: HOME HEALTH SERVICES | Facility: HOME HEALTHCARE | Age: 83
End: 2023-04-15
Payer: MEDICARE

## 2023-04-15 ENCOUNTER — READMISSION MANAGEMENT (OUTPATIENT)
Dept: CALL CENTER | Facility: HOSPITAL | Age: 83
End: 2023-04-15
Payer: MEDICARE

## 2023-04-15 VITALS
DIASTOLIC BLOOD PRESSURE: 86 MMHG | OXYGEN SATURATION: 97 % | SYSTOLIC BLOOD PRESSURE: 148 MMHG | RESPIRATION RATE: 18 BRPM | HEIGHT: 67 IN | BODY MASS INDEX: 23.23 KG/M2 | HEART RATE: 86 BPM | WEIGHT: 148 LBS | TEMPERATURE: 98.3 F

## 2023-04-15 DIAGNOSIS — I63.9 ACUTE STROKE DUE TO ISCHEMIA: Primary | ICD-10-CM

## 2023-04-15 LAB
AMPHIPHYSIN AB CSF QL IF: NEGATIVE
BASOPHILS # BLD AUTO: 0.01 10*3/MM3 (ref 0–0.2)
BASOPHILS NFR BLD AUTO: 0.1 % (ref 0–1.5)
CASPR2 AB CSF QL CBA IFA: NEGATIVE
CV2 AB CSF QL IF: NEGATIVE
DEPRECATED RDW RBC AUTO: 46.9 FL (ref 37–54)
EOSINOPHIL # BLD AUTO: 0.01 10*3/MM3 (ref 0–0.4)
EOSINOPHIL NFR BLD AUTO: 0.1 % (ref 0.3–6.2)
ERYTHROCYTE [DISTWIDTH] IN BLOOD BY AUTOMATED COUNT: 14.9 % (ref 12.3–15.4)
GLIAL NUC TYPE 1 AB CSF QL IF: NEGATIVE
GLUCOSE BLDC GLUCOMTR-MCNC: 116 MG/DL (ref 70–130)
GLUCOSE BLDC GLUCOMTR-MCNC: 136 MG/DL (ref 70–130)
HCT VFR BLD AUTO: 41.9 % (ref 34–46.6)
HGB BLD-MCNC: 13.5 G/DL (ref 12–15.9)
HU1 AB CSF QL IF: NEGATIVE
HU2 AB CSF QL IF: NEGATIVE
HU3 AB CSF QL IF: NEGATIVE
IMM GRANULOCYTES # BLD AUTO: 0.04 10*3/MM3 (ref 0–0.05)
IMM GRANULOCYTES NFR BLD AUTO: 0.5 % (ref 0–0.5)
IMP & REVIEW OF LAB RESULTS: NEGATIVE
LGI1 AB CSF QL CBA IFA: NEGATIVE
LYMPHOCYTES # BLD AUTO: 1.65 10*3/MM3 (ref 0.7–3.1)
LYMPHOCYTES NFR BLD AUTO: 19.5 % (ref 19.6–45.3)
MCH RBC QN AUTO: 28.1 PG (ref 26.6–33)
MCHC RBC AUTO-ENTMCNC: 32.2 G/DL (ref 31.5–35.7)
MCV RBC AUTO: 87.1 FL (ref 79–97)
MONOCYTES # BLD AUTO: 0.63 10*3/MM3 (ref 0.1–0.9)
MONOCYTES NFR BLD AUTO: 7.5 % (ref 5–12)
NEUTROPHILS NFR BLD AUTO: 6.11 10*3/MM3 (ref 1.7–7)
NEUTROPHILS NFR BLD AUTO: 72.3 % (ref 42.7–76)
NRBC BLD AUTO-RTO: 0 /100 WBC (ref 0–0.2)
PCA-2 AB CSF QL IF: NEGATIVE
PCA-TR AB CSF QL IF: NEGATIVE
PLATELET # BLD AUTO: 272 10*3/MM3 (ref 140–450)
PMV BLD AUTO: 9.9 FL (ref 6–12)
PURKINJE CELLS AB CSF QL IF: NEGATIVE
RBC # BLD AUTO: 4.81 10*6/MM3 (ref 3.77–5.28)
WBC NRBC COR # BLD: 8.45 10*3/MM3 (ref 3.4–10.8)

## 2023-04-15 PROCEDURE — 63710000001 PREDNISONE PER 5 MG: Performed by: SURGERY

## 2023-04-15 PROCEDURE — 82962 GLUCOSE BLOOD TEST: CPT

## 2023-04-15 PROCEDURE — 85025 COMPLETE CBC W/AUTO DIFF WBC: CPT | Performed by: SURGERY

## 2023-04-15 PROCEDURE — 99233 SBSQ HOSP IP/OBS HIGH 50: CPT | Performed by: NURSE PRACTITIONER

## 2023-04-15 RX ORDER — ATORVASTATIN CALCIUM 80 MG/1
80 TABLET, FILM COATED ORAL NIGHTLY
Qty: 90 TABLET | Refills: 0 | Status: SHIPPED | OUTPATIENT
Start: 2023-04-15

## 2023-04-15 RX ORDER — AMLODIPINE BESYLATE 5 MG/1
5 TABLET ORAL DAILY
Qty: 30 TABLET | Refills: 0 | Status: SHIPPED | OUTPATIENT
Start: 2023-04-15

## 2023-04-15 RX ORDER — PREDNISONE 20 MG/1
60 TABLET ORAL DAILY
Qty: 84 TABLET | Refills: 0 | Status: SHIPPED | OUTPATIENT
Start: 2023-04-16 | End: 2023-05-14

## 2023-04-15 RX ORDER — LOSARTAN POTASSIUM 100 MG/1
100 TABLET ORAL DAILY
Qty: 30 TABLET | Refills: 0 | Status: SHIPPED | OUTPATIENT
Start: 2023-04-16

## 2023-04-15 RX ORDER — PREDNISONE 10 MG/1
TABLET ORAL
Qty: 109 TABLET | Refills: 0 | Status: SHIPPED | OUTPATIENT
Start: 2023-05-14 | End: 2023-06-25

## 2023-04-15 RX ADMIN — PREDNISONE 60 MG: 10 TABLET ORAL at 09:09

## 2023-04-15 RX ADMIN — LOSARTAN POTASSIUM 100 MG: 100 TABLET, FILM COATED ORAL at 09:08

## 2023-04-15 RX ADMIN — VENLAFAXINE HYDROCHLORIDE 150 MG: 150 CAPSULE, EXTENDED RELEASE ORAL at 09:13

## 2023-04-15 RX ADMIN — APIXABAN 5 MG: 5 TABLET, FILM COATED ORAL at 09:09

## 2023-04-15 RX ADMIN — LEVOTHYROXINE SODIUM 50 MCG: 0.05 TABLET ORAL at 09:09

## 2023-04-15 RX ADMIN — DOFETILIDE 250 MCG: 0.25 CAPSULE ORAL at 09:08

## 2023-04-15 RX ADMIN — AMLODIPINE BESYLATE 5 MG: 5 TABLET ORAL at 09:09

## 2023-04-15 RX ADMIN — METOPROLOL TARTRATE 25 MG: 25 TABLET, FILM COATED ORAL at 09:08

## 2023-04-15 RX ADMIN — ACETAMINOPHEN 650 MG: 325 TABLET, FILM COATED ORAL at 02:16

## 2023-04-15 RX ADMIN — VENLAFAXINE HYDROCHLORIDE 75 MG: 75 CAPSULE, EXTENDED RELEASE ORAL at 09:09

## 2023-04-15 NOTE — PROGRESS NOTES
Name: Dior Pettit ADMIT: 2023   : 1940  PCP: Ashanti Hong APRN    MRN: [id number] LOS: 6 days   AGE/SEX: 82 y.o. female  ROOM: [address]    [place], Subsequent Hospital Care    82 y.o. female postop day 1 status post bilateral temporal artery biopsy    No acute events overnight, afebrile and hemodynamically stable.  She is sitting up in bed with her son at bedside.  She denies any bleeding issues overnight.    Scheduled Medications:   amitriptyline, 50 mg, Oral, Nightly  amLODIPine, 5 mg, Oral, Q24H  apixaban, 5 mg, Oral, Q12H  atorvastatin, 80 mg, Oral, Nightly  dofetilide, 250 mcg, Oral, BID  insulin lispro, 0-7 Units, Subcutaneous, TID AC  levothyroxine, 50 mcg, Oral, Every Other Day  levothyroxine, 75 mcg, Oral, Every Other Day  losartan, 100 mg, Oral, Daily  metoprolol tartrate, 25 mg, Oral, BID  predniSONE, 60 mg, Oral, Daily  venlafaxine XR, 150 mg, Oral, Daily  venlafaxine XR, 75 mg, Oral, Daily      Active Infusions:  hold, 1 each      Vital Signs  Vital Signs Patient Vitals for the past 24 hrs:   BP Temp Temp src Pulse Resp SpO2   04/15/23 0908 148/86 98.3 °F (36.8 °C) Oral 86 18 97 %   04/15/23 0509 136/85 98 °F (36.7 °C) Oral 82 16 97 %   23 2337 140/91 97.7 °F (36.5 °C) Oral 82 16 96 %   23 1940 128/71 98.3 °F (36.8 °C) Oral 93 16 93 %   23 1815 151/86 -- -- 85 12 95 %   23 1800 152/90 98.3 °F (36.8 °C) Oral 85 14 92 %   23 1745 151/83 97.4 °F (36.3 °C) -- 83 14 95 %   23 1730 155/97 -- -- 83 14 97 %   23 1715 164/88 -- -- 82 14 95 %   23 1710 152/89 -- -- 82 14 99 %   23 1705 161/94 -- -- 80 14 100 %   23 1700 165/91 -- -- 78 12 99 %   23 1655 154/91 -- -- 78 12 99 %   23 1650 163/93 -- -- 78 12 99 %   23 1645 162/94 97.5 °F (36.4 °C) Oral 79 12 97 %   23 1401 147/85 98.2 °F (36.8 °C) Oral 81 17 99 %     I/O:  No intake/output data recorded.  Physical Exam:    Physical  Exam  Vitals reviewed.   Constitutional:       General: She is not in acute distress.     Appearance: Normal appearance.   HENT:      Head: Normocephalic and atraumatic.      Comments: Bilateral temporal incisions clean, dry, and intact and well approximated with Dermabond in place     Right Ear: External ear normal.      Left Ear: External ear normal.      Nose: Nose normal.      Mouth/Throat:      Mouth: Mucous membranes are moist.      Pharynx: Oropharynx is clear.   Eyes:      Extraocular Movements: Extraocular movements intact.      Conjunctiva/sclera: Conjunctivae normal.      Pupils: Pupils are equal, round, and reactive to light.   Cardiovascular:      Rate and Rhythm: Normal rate and regular rhythm.   Pulmonary:      Effort: Pulmonary effort is normal. No respiratory distress.   Abdominal:      General: Abdomen is flat.      Palpations: Abdomen is soft.   Musculoskeletal:         General: No swelling or deformity.      Cervical back: Normal range of motion. No rigidity.   Skin:     General: Skin is warm and dry.      Capillary Refill: Capillary refill takes less than 2 seconds.   Neurological:      General: No focal deficit present.      Mental Status: She is alert and oriented to person, place, and time.   Psychiatric:         Mood and Affect: Mood normal.         Behavior: Behavior normal.            Imaging:  FL Esophagram Complete Single Contrast    Result Date: 4/14/2023  Single contrast esophagram is limited due to limited mobility of the patient and images were only acquired in a single obliquity. No abnormality is identified. If further evaluation is desired, a double contrast esophagram could be performed as an outpatient.  This report was finalized on 4/14/2023 6:44 AM by Dr. Stephan Ordoñez M.D.         CBC    Results from last 7 days   Lab Units 04/15/23  0529 04/14/23  0625 04/13/23  0428 04/12/23  1747 04/10/23  0518 04/09/23  1514   WBC 10*3/mm3 8.45 8.76 9.68 5.90 5.84 7.14   HEMOGLOBIN g/dL  13.5 14.3 15.3 15.7 14.2 15.3   PLATELETS 10*3/mm3 272 266 270 261 245 228     BMP   Results from last 7 days   Lab Units 04/14/23  0625 04/11/23  0629 04/10/23  0518 04/09/23  1514   SODIUM mmol/L 140 136 138 134*   POTASSIUM mmol/L 4.3 4.4 4.1 4.3   CHLORIDE mmol/L 101 102 102 99   CO2 mmol/L 28.1 27.0 28.7 23.8   BUN mg/dL 19 15 15 19   CREATININE mg/dL 0.69 0.57 0.75 0.91   GLUCOSE mg/dL 114* 137* 99 85   MAGNESIUM mg/dL  --   --  1.7 1.4*   PHOSPHORUS mg/dL 3.2  --   --   --      Coag   Results from last 7 days   Lab Units 04/14/23  0625 04/13/23  2314 04/13/23  1451 04/13/23  0803 04/13/23  0428 04/13/23  0000 04/12/23  1747 04/09/23  1514   INR   --   --   --   --   --   --  0.99 1.20*   APTT seconds 58.7* 82.3* 100.7* 174.9* 135.9* 105.4* 33.7 39.0*     Assessment & Plan   Assessment & Plan    Acute stroke due to ischemia    Essential hypertension    Pulmonary hypertension (HCC)    Gastroesophageal reflux disease    Anxiety    Restless leg syndrome    Controlled diabetes mellitus type 2 with complications    S/P AVR    PAF (paroxysmal atrial fibrillation)    Shortness of breath    82 y.o. female  postoperative day 1 status post bilateral temporal artery biopsy    -Overall, doing well status post temporal artery biopsy.  She has been restarted on her home Eliquis and there is no evidence of bleeding or hematoma at the operative sites.  I discussed postoperative care with her and her son at the bedside.  We will follow-up with her as needed.  They understood.    Personal protective equipment used for this patient encounter:  Patient wearing surgical mask []    Provider wearing a surgical mask [x]    Gloves []    Eye protection []    Face Shield []    Gown []    N 95 respirator or CAPR/PAPR []   Duration of interaction 10 mins    Norma Mcgovern MD  Vascular Surgery  [facility]  O: [phone]  F: [phone]      Please call my office with any question: [phone]    Active Hospital  Problems    Diagnosis  POA   • **Acute stroke due to ischemia [I63.9]  Yes   • Shortness of breath [R06.02]  Unknown   • PAF (paroxysmal atrial fibrillation) [I48.0]  Yes   • S/P AVR [Z95.2]  Not Applicable   • Restless leg syndrome [G25.81]  Yes   • 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

## 2023-04-15 NOTE — PROGRESS NOTES
Continued Stay Note  [facility]     Patient Name: Dior Pettit  MRN: [id number]  Today's Date: 4/15/2023    Admit Date: 4/9/2023    Plan: Home with family and [facility]........Candida RN   Discharge Plan     Row Name 04/15/23 1303       Plan    Plan Home with family and [facility]........Candida RN    Patient/Family in Agreement with Plan yes    Plan Comments Inbound call from RN stating patient to DC home today and inquiring about HH care arrangements. S/W Jenni/[facility] and states patient accepted and they will follow at home after DC. Update to RN........Candida RN               Discharge Codes    No documentation.               Expected Discharge Date and Time     Expected Discharge Date Expected Discharge Time    Apr 15, 2023             Lisa Keith, RN

## 2023-04-15 NOTE — PROGRESS NOTES
Patient is discharging today. Spoke to son and patient who are agreeable to home health and have no current home health . Face sheet information is correct and prefers we call home number please for visit scheduling- [phone]. Will transcribe home health orders for SN and PT  per verbal order PCP - Donna/ TOSHA Tamez. Thank you !

## 2023-04-15 NOTE — OUTREACH NOTE
Prep Survey    Flowsheet Row Responses   [facility] patient discharged from? [place]   Is LACE score < 7 ? No   Eligibility [facility]   Date of Admission 04/09/23   Date of Discharge 04/15/23   Discharge Disposition Home or Self Care   Discharge diagnosis Acute stroke due to ischemia   Does the patient have one of the following disease processes/diagnoses(primary or secondary)? Stroke   Does the patient have Home health ordered? Yes   What is the Home health agency?  [facility]    Is there a DME ordered? No   Prep survey completed? Yes          Cassy COREAS - Registered Nurse

## 2023-04-15 NOTE — PROGRESS NOTES
"DOS: 4/15/2023  NAME: Dior Pettit   : 1940  PCP: Ashanti Hong APRN  Chief Complaint   Patient presents with   • Extremity Weakness       Neurology      Subjective: No acute events overnight.  Patient denies any new complaints or concerns on my exam.  Completed temporal artery biopsy yesterday-results pending.  Patient denies any new complaints or concerns she reports no diplopia today still has presence of blurred vision.      Son at bedside; all questions answered recommended outpatient follow-up.        Objective:  Vital signs: /86   Pulse 86   Temp 98 °F (36.7 °C) (Oral)   Resp 16   Ht 170.2 cm (67\")   Wt 67.1 kg (148 lb)   LMP  (LMP Unknown)   SpO2 97%   BMI 23.18 kg/m²       HEENT: Normocephalic, atraumatic.  No occipital or temporal tenderness.  COR: RRR  Resp: Even and unlabored  Extremities: Equal pulses, nondistal embolization  Skin: Bilateral temporal incisions from temporal artery biopsy    Neurological:   MS: AO. Language normal. No neglect. Higher integrative function normal  CN: II-XII normal eyelid ptosis  Motor: 5/5, normal tone  Reflexes: Toes downgoing except mild  Sensory: Intact-to light touch  Coordination: Normal-finger-to-nose   Exam unchanged from yesterday  other than incisional scar from temporal artery biopsy    Laboratory results:  Lab Results   Component Value Date    GLUCOSE 114 (H) 2023    CALCIUM 9.9 2023     2023    K 4.3 2023    CO2 28.1 2023     2023    BUN 19 2023    CREATININE 0.69 2023    EGFRIFAFRI 63 2021    EGFRIFNONA 63 2021    BCR 27.5 (H) 2023    ANIONGAP 10.9 2023     Lab Results   Component Value Date    WBC 8.45 04/15/2023    HGB 13.5 04/15/2023    HCT 41.9 04/15/2023    MCV 87.1 04/15/2023     04/15/2023     Lab Results   Component Value Date    CHOL 163 04/10/2023    CHOL 158 04/15/2019    CHOL 170 2018     Lab Results   Component Value " Date    HDL 50 04/10/2023    HDL 51 11/09/2022    HDL 44 04/14/2022     Lab Results   Component Value Date    LDL 91 04/10/2023     (H) 11/09/2022    LDL 69 04/14/2022     Lab Results   Component Value Date    TRIG 125 04/10/2023    TRIG 133 11/09/2022    TRIG 233 (H) 04/14/2022         Lab 04/10/23  0518   HEMOGLOBIN A1C 6.80*     Review and interpretation of imaging:           MRI EXAMINATION OF BRAIN WITH AND WITHOUT CONTRAST, MRA OF THE NECK WITH  AND WITHOUT CONTRAST AND MRA OF THE HEAD WITHOUT CONTRAST     HISTORY: Word finding difficulty.     COMPARISON: CT head 11/13/2022.     MRI EXAMINATION OF THE BRAIN WITH AND WITHOUT CONTRAST:     TECHNIQUE: A MRI examination of the brain was performed utilizing  sagittal T1, axial diffusion, T1, T2, T2 FLAIR, susceptibility weighted  imaging as well as axial and coronal T1 postcontrast weighted sequences.     FINDINGS: The study is hampered somewhat by patient motion. The  diffusion sequence demonstrates a small cortical acute infarct involving  the left middle frontal gyrus superiorly measuring approximately 2-3 mm  in size. No other areas of restricted diffusion are noted.  Mild-to-moderate small vessel ischemic disease is appreciated on the  axial T2 FLAIR sequence. After contrast administration, there was no  evidence of abnormal enhancement.     IMPRESSION:  The study is hampered by patient motion. There is a 2-3 mm  acute cortical infarct involving the left middle frontal gyrus  superiorly. Mild-to-moderate small vessel ischemic disease is noted.        MRA OF THE HEAD WITHOUT CONTRAST:     The study is hampered by patient motion. There is signal present within  the distal aspect of the vertebral arteries bilaterally. The left  vertebral artery is larger than that of the right. The basilar artery  and the proximal aspects of the posterior cerebral arteries appear  unremarkable. The distal aspects of the internal carotid arteries are of  normal caliber. The  right A1 segment is hypoplastic. The proximal  aspects of the anterior and middle cerebral arteries appear  unremarkable.     IMPRESSION: Study is hampered by patient motion. The right A1 segment is  hypoplastic. The proximal aspects of the anterior, middle and posterior  cerebral arteries appear otherwise unremarkable.        MRA OF THE NECK WITH AND WITHOUT CONTRAST:      The great vessels are arranged in a classic configuration. There is 0%  stenosis of the internal carotid arteries using NASCET criteria. It is  slightly larger than that of the right. There is no evidence of a  vertebral ostial stenosis. The cervical segments of the vertebral  arteries are of relatively uniform caliber.     IMPRESSION: There is 0% stenosis of the internal carotid arteries using  NASCET criteria.     This report was finalized on 4/9/2023 11:49 PM by Dr. Tae Morrissey M.D.     SINGLE CONTRAST ESOPHAGRAM     HISTORY: Recent stroke, dysphagia.     TECHNIQUE:  chest x-ray was acquired. With the head of the  fluoroscopy table tilted up to about 30 degrees, a sip of clear water  was given to the patient which she drank without difficulty. She was  then given several drinks of thin barium which she drank without  difficulty while fluoroscopic images were acquired. Twelve seconds  fluoroscopy was used and 38 images were saved.     FINDINGS: There are sternal wires and prosthetic cardiac valve hardware.  The cardiomediastinal contours are normal and the lungs are clear.     As expected for the age group, there are some tertiary esophageal  contractions. The esophagus extends Lopez and appears normally pliable  with no stricture or ulceration identified. Contrast passes readily to  the stomach.     IMPRESSION:  Single contrast esophagram is limited due to limited  mobility of the patient and images were only acquired in a single  obliquity. No abnormality is identified. If further evaluation is  desired, a double contrast esophagram  could be performed as an  outpatient.     This report was finalized on 4/14/2023 6:44 AM by Dr. Stephan Ordoñez M.D.     CT CHEST WITHOUT CONTRAST     HISTORY: Shortness of breath. Weakness.     TECHNIQUE: Chest CT includes axial imaging from the thoracic inlet to  the upper abdomen without IV contrast     COMPARISON: CT chest without contrast 07/07/2021.     FINDINGS: There has been previous median sternotomy and there is a  prosthetic aortic valve. The ascending thoracic aorta measures 4.1 cm  which is slightly increased from 4 cm on the prior exam 07/07/2021. This  tapers smoothly to the level of the top of the aortic arch and the  descending thoracic aorta measures 2.8 cm. The descending thoracic aorta  is tortuous. Bilateral breast implants are present. There is mild linear  parenchymal scar or atelectasis within both upper and lobes best  demonstrated within the right upper lobe. There is no pleural effusion.  Biapical pleural parenchymal scarring is present. There are bilateral  breast implants. Imaging through the upper abdomen demonstrates a 1.8 cm  gallstone. There is multilevel degenerative disc disease with endplate  spur formation within the thoracic spine. There is a compression  deformity at T12 with 30% height loss.     IMPRESSION:  1. Linear subsegmental proximal atelectasis or scarring best  demonstrated in the right upper lobe  2. Enlargement of the ascending thoracic aorta measuring 4.1 cm in  diameter. Previous aortic valve replacement.  3. Cholelithiasis.  4. Compression deformity at T12 with 30% height loss and this is without  change compared to prior exam 07/07/2021.     Radiation dose reduction techniques were utilized, including automated  exposure control and exposure modulation based on body size.     This report was finalized on 4/11/2023 7:34 PM by Dr. Gustavo Walden M.D.     PORTABLE CHEST     HISTORY: Stroke.     COMPARISON: 11/13/2022.     FINDINGS: A single portable view of the  chest demonstrates the heart to  be within normal limits in size. There is no evidence of infiltrate,  effusion or of congestive failure.     This report was finalized on 4/9/2023 11:53 PM by Dr. Tae Morrissey M.D.      Impression: This is an 82-year-old right-handed  female with known history of diabetes mellitus, hypothyroidism, obstructive sleep apnea-intolerance to treatment, pulmonary hypertension/hypertension, A-fib (on Eliquis), prior history of AVR, hip replacement 1 year ago, anxiety, RLS who presented to [facility] 4/9 due to report of confusion and right sided numbness for which our service was consulted.  Chart review reflects that confusion has been present for several weeks-patient suspected to have missed medications including anticoagulation/Eliquis.  MRI of the brain showed punctate left frontal stroke which our team did not feel explain the degree of confusion that patient was exhibiting.  Patient was also noted to have some visual hallucinations and worsening vision reporting diplopia and blurred vision.  Other stroke work-up included: TTE-agitated saline test only which was negative.  MRA of the head somewhat hindered by motion- known acute left middle frontal gyrus infarct redemonstrated.  Mild to moderate small vessel ischemic disease noted.  No other concern for dissection/aneurysm or occlusion.  MRA of the neck unremarkable no evidence of high-grade stenosis.  Patient on atorvastatin 80 mg daily and heparin drip along with prednisone 60 mgs which was initiated due to elevated inflammatory markers.  CSF completed which showed RBCs 3/nucleated cells 1/glucose slightly elevated at 83 and protein 42.6.  Meningitis/encephalitis panel negative.      Patient reports she has chronic migraine headache history and headaches are typical in intensity/location.    Neurologically, stable with presence of blurred vision no diplopia or headache today.  Temporal artery biopsy  completed-results pending.  Myasthenia gravis panel ordered-pending.  No change in current recommendations: Continue steroid taper outpatient rheumatology.  Follow-up with neurology per recommendations below.No further neurology workup indicated. We will sign off and see again upon request.        Diagnosis:  1.  Encephalopathy; improving  2.  Acute left frontal stroke; incidental etiology felt to be due to noncompliance with Eliquis/cardioembolic source  3.  Atrial fibrillation; on full dose Eliquis 5 mg twice daily prior to arrival- reported recent noncompliance  4.  Vision loss/diplopia/blurred vision  5.  Eyelid ptosis/diplopia/blurred vision/dysphagia/change in tone of voice- advised myasthenia gravis panel to further exclude MG  6.  Reported recent medication noncompliance  7.  Tobacco abuse; smoking cessation discussed-quit now [place] information provided in discharge paperwork      Plan:  · Eliquis 5 mg twice daily   · Lipitor 80 mg daily-LDL 91  · Prednisone 60 mg daily due to acute vision changes specifically blurred vision and diplopia- temporal artery biopsy pending-will recommend 60 mg daily x4 weeks then taper by 10 mg q. 7 days thereafter-outpatient follow-up with rheumatology advised- prednisone side effects discussed at length with patient and son at bedside  At discharge: to prevent recurrent stroke we recommend :  Blood pressure < 130/80  B12 > 500   TSH in normal range   LDL < 70; HbA1c < 6.5 and smoking and alcohol cessation if applicable.  · Follow-up with neurology in 3 months  · Follow-up with Rheumatology in 1-2 months       Case reviewed with attending neurologist Dr. Mata 04/15 and he agrees with treatment plan above.     Spoke with hospitalist regarding discharge plan    TOSHA Wilson

## 2023-04-15 NOTE — DISCHARGE SUMMARY
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        Hospital Course     Ms. Pettit is a 82 y.o. female with a history of diabetes mellitus, hypothyroidism, obstructive sleep apnea-intolerance to treatment, pulmonary hypertension, A-fib (on Eliquis), prior history of AVR, hip replacement 1 year ago, anxiety and RLS who presented to [facility] initially complaining of confusion and right-sided numbness.  Please see the admitting history and physical for further details.  She was found to have stroke on initial MRI brain and was admitted to the hospital for further evaluation and treatment.  Neurology was consulted for additional stroke work-up.  Other stroke work-up included: TTE-agitated saline test only which was negative.  MRA of the head somewhat hindered by motion- known acute left middle frontal gyrus infarct redemonstrated.  Mild to moderate small vessel ischemic disease noted.  No other concern for dissection/aneurysm or occlusion.  MRA of the neck unremarkable no evidence of high-grade stenosis.  She was noted to have an elevated inflammatory markers.  She underwent lumbar puncture with relatively unremarkable CSF studies.   Meningitis/encephalitis panel negative.    She did have altered vision and headache during her stay along with elevated inflammatory markers raised suspicion for temporal arteritis.  She underwent temporal artery biopsy results of which are pending at time of discharge.  Neurology recommends empiric treatment with prednisone 60 mg daily for 4 weeks then taper.  Is been recommended she follow-up with rheumatology as outpatient and information has been provided.  Neurology will review results of temporal artery biopsy once pathology available.  She also has a myasthenia gravis panel is still pending.  Neurology will see her in 3 months in their office but as stated will contact patient next week regarding pathology.    Compliance with medications was stressed with the patient.  There has been some reported noncompliance prior to admission.  Patient takes Eliquis due to history of atrial fibrillation which certainly could have increased risk for stroke.  Tobacco cessation recommended.  Discharged on increased dose Lipitor.     Plan per neurology  • Eliquis 5 mg twice daily   • Lipitor 80 mg daily-LDL 91  • Prednisone 60 mg daily due to acute vision changes specifically blurred vision and diplopia- temporal artery biopsy pending-will recommend 60 mg daily x4 weeks then taper by 10 mg q. 7 days thereafter-outpatient follow-up with rheumatology advised- prednisone side effects discussed at length with patient and son at bedside  • At discharge: to prevent recurrent stroke we recommend :  • Blood pressure < 130/80  • B12 > 500   • TSH in normal range   • LDL < 70; HbA1c < 6.5 and smoking and alcohol cessation if applicable.  • Follow-up with neurology in 3 months  • Follow-up with Rheumatology in 1-2 months         Day of Discharge     Subjective:    She denies any chest pain, SOA, nausea, vomiting or diarrhea.     Physical Exam:  Temp:  [97.4 °F (36.3 °C)-98.6 °F (37 °C)] 98.3 °F (36.8 °C)  Heart Rate:  [] 86  Resp:   [12-18] 18  BP: (128-165)/(70-97) 148/86  Body mass index is 23.18 kg/m².  Physical Exam  Vitals and nursing note reviewed.   Constitutional:       Appearance: Normal appearance.   Pulmonary:      Effort: Pulmonary effort is normal.   Neurological:      Mental Status: She is alert. Mental status is at baseline.   Psychiatric:         Mood and Affect: Mood normal.         Consultants     Consult Orders (all) (From admission, onward)     Start     Ordered    04/12/23 1413  Inpatient Vascular Surgery Consult  Once        Specialty:  Vascular Surgery  Provider:  Tristan Bermeo II, MD    04/12/23 1412    04/09/23 2324  Inpatient Cardiology Consult  Once        Specialty:  Cardiology  Provider:  Abbi Mitchell MD    04/09/23 2324 04/09/23 2211  Notify Stroke Coordinator  Once        Provider:  (Not yet assigned)    04/09/23 2210 04/09/23 2211  Inpatient Rehab Admission Consult  Once        Provider:  (Not yet assigned)    04/09/23 2210 04/09/23 2211  Consult to Case Management   Once        Provider:  (Not yet assigned)    04/09/23 2210 04/09/23 2211  Consult to Diabetes Educator  Once,   Status:  Canceled        Provider:  (Not yet assigned)    04/09/23 2210 04/09/23 1511  Inpatient Neurology Consult Stroke  Once        Specialty:  Neurology  Provider:  Vitor Ponce MD    04/09/23 1511              Procedures     BILATERAL TEMPORAL ARTERY BIOPSY      Imaging Results (All)     Procedure Component Value Units Date/Time    FL Esophagram Complete Single Contrast [[id number]] Collected: 04/13/23 1659     Updated: 04/14/23 0647    Narrative:      SINGLE CONTRAST ESOPHAGRAM     HISTORY: Recent stroke, dysphagia.     TECHNIQUE:  chest x-ray was acquired. With the head of the  fluoroscopy table tilted up to about 30 degrees, a sip of clear water  was given to the patient which she drank without difficulty. She was  then given several drinks of thin barium which she drank without  difficulty while  fluoroscopic images were acquired. Twelve seconds  fluoroscopy was used and 38 images were saved.     FINDINGS: There are sternal wires and prosthetic cardiac valve hardware.  The cardiomediastinal contours are normal and the lungs are clear.     As expected for the age group, there are some tertiary esophageal  contractions. The esophagus extends Lopez and appears normally pliable  with no stricture or ulceration identified. Contrast passes readily to  the stomach.       Impression:      Single contrast esophagram is limited due to limited  mobility of the patient and images were only acquired in a single  obliquity. No abnormality is identified. If further evaluation is  desired, a double contrast esophagram could be performed as an  outpatient.     This report was finalized on 4/14/2023 6:44 AM by Dr. Stephan Ordoñez M.D.       IR LUMBAR PUNCTURE DIAGNOSTIC [[id number]] Collected: 04/12/23 1554     Updated: 04/12/23 1614    Narrative:      FLUOROSCOPIC GUIDED LUMBAR PUNCTURE 04/12/2023     HISTORY: History of difficult word finding. Evaluate for CSF abnormality  or abnormal pressure.     After signed informed consent was obtained, patient was prepped and  draped in the prone position. Lidocaine was used for local anesthesia.     A 20-gauge needle was introduced into the thecal sac at a midlumbar  level by Dr. Meraz. Opening pressure is normal measuring  approximately 5 cm. Approximately 12 mL of CSF was removed. Confirmatory  images were obtained. Closing pressure is less than 5 cm.     Patient tolerated the procedure well with no complications.     FLUOROSCOPY TIME:  Forty-three seconds, 2 images.     This report was finalized on 4/12/2023 4:11 PM by Dr. Best Meraz M.D.       CT Chest Without Contrast Diagnostic [[id number]] Collected: 04/11/23 1741     Updated: 04/11/23 1937    Narrative:      CT CHEST WITHOUT CONTRAST     HISTORY: Shortness of breath. Weakness.     TECHNIQUE: Chest CT includes axial  imaging from the thoracic inlet to  the upper abdomen without IV contrast     COMPARISON: CT chest without contrast 07/07/2021.     FINDINGS: There has been previous median sternotomy and there is a  prosthetic aortic valve. The ascending thoracic aorta measures 4.1 cm  which is slightly increased from 4 cm on the prior exam 07/07/2021. This  tapers smoothly to the level of the top of the aortic arch and the  descending thoracic aorta measures 2.8 cm. The descending thoracic aorta  is tortuous. Bilateral breast implants are present. There is mild linear  parenchymal scar or atelectasis within both upper and lobes best  demonstrated within the right upper lobe. There is no pleural effusion.  Biapical pleural parenchymal scarring is present. There are bilateral  breast implants. Imaging through the upper abdomen demonstrates a 1.8 cm  gallstone. There is multilevel degenerative disc disease with endplate  spur formation within the thoracic spine. There is a compression  deformity at T12 with 30% height loss.       Impression:      1. Linear subsegmental proximal atelectasis or scarring best  demonstrated in the right upper lobe  2. Enlargement of the ascending thoracic aorta measuring 4.1 cm in  diameter. Previous aortic valve replacement.  3. Cholelithiasis.  4. Compression deformity at T12 with 30% height loss and this is without  change compared to prior exam 07/07/2021.     Radiation dose reduction techniques were utilized, including automated  exposure control and exposure modulation based on body size.     This report was finalized on 4/11/2023 7:34 PM by Dr. Gustavo Walden M.D.       XR Chest 1 View [[id number]] Collected: 04/09/23 1608     Updated: 04/09/23 4485    Narrative:      PORTABLE CHEST     HISTORY: Stroke.     COMPARISON: 11/13/2022.     FINDINGS: A single portable view of the chest demonstrates the heart to  be within normal limits in size. There is no evidence of infiltrate,  effusion or of  congestive failure.     This report was finalized on 4/9/2023 11:53 PM by Dr. Tae Morrissey M.D.       MRI Angiogram Neck With & Without Contrast [[id number]] Collected: 04/09/23 1914     Updated: 04/09/23 2352    Narrative:      MRI EXAMINATION OF BRAIN WITH AND WITHOUT CONTRAST, MRA OF THE NECK WITH  AND WITHOUT CONTRAST AND MRA OF THE HEAD WITHOUT CONTRAST     HISTORY: Word finding difficulty.     COMPARISON: CT head 11/13/2022.     MRI EXAMINATION OF THE BRAIN WITH AND WITHOUT CONTRAST:     TECHNIQUE: A MRI examination of the brain was performed utilizing  sagittal T1, axial diffusion, T1, T2, T2 FLAIR, susceptibility weighted  imaging as well as axial and coronal T1 postcontrast weighted sequences.     FINDINGS: The study is hampered somewhat by patient motion. The  diffusion sequence demonstrates a small cortical acute infarct involving  the left middle frontal gyrus superiorly measuring approximately 2-3 mm  in size. No other areas of restricted diffusion are noted.  Mild-to-moderate small vessel ischemic disease is appreciated on the  axial T2 FLAIR sequence. After contrast administration, there was no  evidence of abnormal enhancement.       Impression:      The study is hampered by patient motion. There is a 2-3 mm  acute cortical infarct involving the left middle frontal gyrus  superiorly. Mild-to-moderate small vessel ischemic disease is noted.        MRA OF THE HEAD WITHOUT CONTRAST:     The study is hampered by patient motion. There is signal present within  the distal aspect of the vertebral arteries bilaterally. The left  vertebral artery is larger than that of the right. The basilar artery  and the proximal aspects of the posterior cerebral arteries appear  unremarkable. The distal aspects of the internal carotid arteries are of  normal caliber. The right A1 segment is hypoplastic. The proximal  aspects of the anterior and middle cerebral arteries appear  unremarkable.     IMPRESSION: Study is  hampered by patient motion. The right A1 segment is  hypoplastic. The proximal aspects of the anterior, middle and posterior  cerebral arteries appear otherwise unremarkable.        MRA OF THE NECK WITH AND WITHOUT CONTRAST:      The great vessels are arranged in a classic configuration. There is 0%  stenosis of the internal carotid arteries using NASCET criteria. It is  slightly larger than that of the right. There is no evidence of a  vertebral ostial stenosis. The cervical segments of the vertebral  arteries are of relatively uniform caliber.     IMPRESSION: There is 0% stenosis of the internal carotid arteries using  NASCET criteria.     This report was finalized on 4/9/2023 11:49 PM by Dr. Tae Morrissey M.D.       MRI Angiogram Head Without Contrast [[id number]] Collected: 04/09/23 1914     Updated: 04/09/23 2352    Narrative:      MRI EXAMINATION OF BRAIN WITH AND WITHOUT CONTRAST, MRA OF THE NECK WITH  AND WITHOUT CONTRAST AND MRA OF THE HEAD WITHOUT CONTRAST     HISTORY: Word finding difficulty.     COMPARISON: CT head 11/13/2022.     MRI EXAMINATION OF THE BRAIN WITH AND WITHOUT CONTRAST:     TECHNIQUE: A MRI examination of the brain was performed utilizing  sagittal T1, axial diffusion, T1, T2, T2 FLAIR, susceptibility weighted  imaging as well as axial and coronal T1 postcontrast weighted sequences.     FINDINGS: The study is hampered somewhat by patient motion. The  diffusion sequence demonstrates a small cortical acute infarct involving  the left middle frontal gyrus superiorly measuring approximately 2-3 mm  in size. No other areas of restricted diffusion are noted.  Mild-to-moderate small vessel ischemic disease is appreciated on the  axial T2 FLAIR sequence. After contrast administration, there was no  evidence of abnormal enhancement.       Impression:      The study is hampered by patient motion. There is a 2-3 mm  acute cortical infarct involving the left middle frontal gyrus  superiorly.  Mild-to-moderate small vessel ischemic disease is noted.        MRA OF THE HEAD WITHOUT CONTRAST:     The study is hampered by patient motion. There is signal present within  the distal aspect of the vertebral arteries bilaterally. The left  vertebral artery is larger than that of the right. The basilar artery  and the proximal aspects of the posterior cerebral arteries appear  unremarkable. The distal aspects of the internal carotid arteries are of  normal caliber. The right A1 segment is hypoplastic. The proximal  aspects of the anterior and middle cerebral arteries appear  unremarkable.     IMPRESSION: Study is hampered by patient motion. The right A1 segment is  hypoplastic. The proximal aspects of the anterior, middle and posterior  cerebral arteries appear otherwise unremarkable.        MRA OF THE NECK WITH AND WITHOUT CONTRAST:      The great vessels are arranged in a classic configuration. There is 0%  stenosis of the internal carotid arteries using NASCET criteria. It is  slightly larger than that of the right. There is no evidence of a  vertebral ostial stenosis. The cervical segments of the vertebral  arteries are of relatively uniform caliber.     IMPRESSION: There is 0% stenosis of the internal carotid arteries using  NASCET criteria.     This report was finalized on 4/9/2023 11:49 PM by Dr. Tae Morrissey M.D.       MRI Brain With & Without Contrast [[id number]] Collected: 04/09/23 1914     Updated: 04/09/23 0139    Narrative:      MRI EXAMINATION OF BRAIN WITH AND WITHOUT CONTRAST, MRA OF THE NECK WITH  AND WITHOUT CONTRAST AND MRA OF THE HEAD WITHOUT CONTRAST     HISTORY: Word finding difficulty.     COMPARISON: CT head 11/13/2022.     MRI EXAMINATION OF THE BRAIN WITH AND WITHOUT CONTRAST:     TECHNIQUE: A MRI examination of the brain was performed utilizing  sagittal T1, axial diffusion, T1, T2, T2 FLAIR, susceptibility weighted  imaging as well as axial and coronal T1 postcontrast weighted  sequences.     FINDINGS: The study is hampered somewhat by patient motion. The  diffusion sequence demonstrates a small cortical acute infarct involving  the left middle frontal gyrus superiorly measuring approximately 2-3 mm  in size. No other areas of restricted diffusion are noted.  Mild-to-moderate small vessel ischemic disease is appreciated on the  axial T2 FLAIR sequence. After contrast administration, there was no  evidence of abnormal enhancement.       Impression:      The study is hampered by patient motion. There is a 2-3 mm  acute cortical infarct involving the left middle frontal gyrus  superiorly. Mild-to-moderate small vessel ischemic disease is noted.        MRA OF THE HEAD WITHOUT CONTRAST:     The study is hampered by patient motion. There is signal present within  the distal aspect of the vertebral arteries bilaterally. The left  vertebral artery is larger than that of the right. The basilar artery  and the proximal aspects of the posterior cerebral arteries appear  unremarkable. The distal aspects of the internal carotid arteries are of  normal caliber. The right A1 segment is hypoplastic. The proximal  aspects of the anterior and middle cerebral arteries appear  unremarkable.     IMPRESSION: Study is hampered by patient motion. The right A1 segment is  hypoplastic. The proximal aspects of the anterior, middle and posterior  cerebral arteries appear otherwise unremarkable.        MRA OF THE NECK WITH AND WITHOUT CONTRAST:      The great vessels are arranged in a classic configuration. There is 0%  stenosis of the internal carotid arteries using NASCET criteria. It is  slightly larger than that of the right. There is no evidence of a  vertebral ostial stenosis. The cervical segments of the vertebral  arteries are of relatively uniform caliber.     IMPRESSION: There is 0% stenosis of the internal carotid arteries using  NASCET criteria.     This report was finalized on 4/9/2023 11:49 PM by   Tae Morrissey M.D.           Results for orders placed during the hospital encounter of 04/09/23    Adult Transthoracic Echo Limited W/ Cont if Necessary Per Protocol    Interpretation Summary  •  Limited echocardiogram for agitated saline study only  •  Saline test results are negative.    Pertinent Labs     Results from last 7 days   Lab Units 04/15/23  0529 04/14/23  0625 04/13/23  0428 04/12/23  1747   WBC 10*3/mm3 8.45 8.76 9.68 5.90   HEMOGLOBIN g/dL 13.5 14.3 15.3 15.7   PLATELETS 10*3/mm3 272 266 270 261     Results from last 7 days   Lab Units 04/14/23  0625 04/11/23  0629 04/10/23  0518 04/09/23  1514   SODIUM mmol/L 140 136 138 134*   POTASSIUM mmol/L 4.3 4.4 4.1 4.3   CHLORIDE mmol/L 101 102 102 99   CO2 mmol/L 28.1 27.0 28.7 23.8   BUN mg/dL 19 15 15 19   CREATININE mg/dL 0.69 0.57 0.75 0.91   GLUCOSE mg/dL 114* 137* 99 85   EGFR mL/min/1.73 85.7 89.7 78.6 62.3     Results from last 7 days   Lab Units 04/14/23  0625 04/10/23  0518 04/09/23  1514   ALBUMIN g/dL 4.1 4.2 4.5   BILIRUBIN mg/dL  --  0.6 0.7   ALK PHOS U/L  --  95 112   AST (SGOT) U/L  --  24 28   ALT (SGPT) U/L  --  16 18     Results from last 7 days   Lab Units 04/14/23  0625 04/11/23  0629 04/10/23  0518 04/09/23  1514   CALCIUM mg/dL 9.9 10.1 9.9 11.1*   ALBUMIN g/dL 4.1  --  4.2 4.5   MAGNESIUM mg/dL  --   --  1.7 1.4*   PHOSPHORUS mg/dL 3.2  --   --   --        Results from last 7 days   Lab Units 04/09/23  1514   HSTROP T ng/L 10*       Results from last 7 days   Lab Units 04/10/23  0518   CHOLESTEROL mg/dL 163   TRIGLYCERIDES mg/dL 125   HDL CHOL mg/dL 50   LDL CHOL mg/dL 91             Test Results Pending at Discharge     Pending Labs     Order Current Status    Acetylcholine Receptor Antibody Panel In process    Acetylcholine Receptor Antibody Panel In process    Encephalopathy, Autoimmune Evaluation, Spinal Fluid - Cerebrospinal Fluid, Lumbar Puncture In process    Tissue Pathology Exam In process          Discharge Details         Discharge Medications      New Medications      Instructions Start Date   amLODIPine 5 MG tablet  Commonly known as: NORVASC   5 mg, Oral, Daily      predniSONE 20 MG tablet  Commonly known as: DELTASONE   60 mg, Oral, Daily   Start Date: April 16, 2023     predniSONE 10 MG tablet  Commonly known as: DELTASONE   Take 5 tablets by mouth Daily for 7 days, THEN 4 tablets Daily for 7 days, THEN 3 tablets Daily for 7 days, THEN 2 tablets Daily for 7 days, THEN 1 tablet Daily for 7 days, THEN 0.5 tablets Daily for 7 days.   Start Date: May 14, 2023        Changes to Medications      Instructions Start Date   atorvastatin 80 MG tablet  Commonly known as: LIPITOR  What changed:   · medication strength  · how much to take   80 mg, Oral, Nightly      losartan 100 MG tablet  Commonly known as: COZAAR  What changed:   · medication strength  · how much to take   100 mg, Oral, Daily   Start Date: April 16, 2023     valACYclovir 1000 MG tablet  Commonly known as: VALTREX  What changed: additional instructions   TAKE 2 TABLETS 4 TIMES     DAILY FOR MOUTH ULCER         Continue These Medications      Instructions Start Date   acetaminophen 325 MG tablet  Commonly known as: TYLENOL   650 mg, Oral, Every 4 Hours PRN      amitriptyline 50 MG tablet  Commonly known as: ELAVIL   50 mg, Oral, Nightly      ascorbic acid 1000 MG tablet  Commonly known as: VITAMIN C   1,000 mg, Oral, Daily      azelastine 0.1 % nasal spray  Commonly known as: ASTELIN   2 sprays, Nasal, 2 Times Daily PRN, Use in each nostril as directed      bisacodyl 10 MG suppository  Commonly known as: DULCOLAX   10 mg, Rectal, Daily PRN      dofetilide 250 MCG capsule  Commonly known as: Tikosyn   250 mcg, Oral, Every 12 Hours      Eliquis 5 MG tablet tablet  Generic drug: apixaban   TAKE 1 TABLET BY MOUTH EVERY TWELVE HOURS      fenofibrate micronized 134 MG capsule  Commonly known as: LOFIBRA   134 mg, Oral, Every Morning Before Breakfast      glucosamine-chondroitin  500-400 MG capsule capsule   2 capsules, Oral, Daily      glucose blood test strip  Commonly known as: Prodigy No Coding Blood Gluc   TEST BLOOD SUGAR DAILY      levothyroxine 50 MCG tablet  Commonly known as: SYNTHROID, LEVOTHROID   TAKE 1 TABLET EVERY OTHER  DAY      levothyroxine 75 MCG tablet  Commonly known as: SYNTHROID, LEVOTHROID   TAKE ONE TABLET EVERY OTHER DAY      metFORMIN  MG 24 hr tablet  Commonly known as: GLUCOPHAGE-XR   750 mg, Oral, 2 Times Daily      metoprolol tartrate 50 MG tablet  Commonly known as: LOPRESSOR   25 mg, Oral, 2 Times Daily      multivitamin with minerals tablet tablet   Oral, Daily      omeprazole 40 MG capsule  Commonly known as: priLOSEC   TAKE 1 CAPSULE DAILY      Prodigy No Coding Blood Gluc w/Device kit   1 each, Does not apply, Daily      Prodigy Safety Lancets 26G misc   CHECK BLOOD SUGAR ONE TIME PER DAY      venlafaxine  MG 24 hr capsule  Commonly known as: EFFEXOR-XR   TAKE 1 CAPSULE DAILY      venlafaxine XR 75 MG 24 hr capsule  Commonly known as: Effexor XR   75 mg, Oral, Daily             No Known Allergies    Discharge Disposition:  Home or Self Care      Discharge Diet:  Diet Order   Procedures   • Diet: Diabetic Diets; Consistent Carbohydrate; Texture: Regular Texture (IDDSI 7); Fluid Consistency: Thin (IDDSI 0)       Discharge Activity:   Activity Instructions     Activity as Tolerated            CODE STATUS:    Code Status and Medical Interventions:   Ordered at: 04/09/23 2211     Code Status (Patient has no pulse and is not breathing):    CPR (Attempt to Resuscitate)     Medical Interventions (Patient has pulse or is breathing):    Full Support     Release to patient:    Routine Release       Future Appointments   Date Time Provider Department Center   5[facility]2023  [facility]   5/26/2023  1:45 PM Ashanti Hong APRN [facility]   10/10/2023 10:20 AM [facility]      Follow-up Information     Paula  TOSHA Melton Follow up in 3 month(s).    Specialties: Neurology, Nurse Practitioner, Emergency Medicine  Contact information:  [address]  [place] [address]  [phone]             hospitals RHEUMATOLOGY Follow up in 1 month(s).    Contact information:  [address]  [place] [address]  [phone]           [facility]    Contact information:  [address]  [facility] [address] [phone]           [facility] Follow up in 1 month(s).    Contact information:  [address] [place] [address]  [phone]           Ashanti Hong APRN Follow up in 2 week(s).    Specialty: Internal Medicine  Contact information:  [address] [facility]  [place] [address]  [phone]                         Time Spent on Discharge:  Greater than 30 minutes      Deonte Mckeon MD  [facility]  04/15/23  12:27 EDT

## 2023-04-16 ENCOUNTER — HOME CARE VISIT (OUTPATIENT)
Dept: HOME HEALTH SERVICES | Facility: HOME HEALTHCARE | Age: 83
End: 2023-04-16
Payer: MEDICARE

## 2023-04-16 ENCOUNTER — HOME CARE VISIT (OUTPATIENT)
Dept: HOME HEALTH SERVICES | Facility: HOME HEALTHCARE | Age: 83
End: 2023-04-16

## 2023-04-16 NOTE — CASE COMMUNICATION
SN CALLED PT NUMBER SEVERAL TIMES TO SET [facility] SOC APPT, NO ANSWER NO VOICE MAIL, SN CALLED PT' S DAUGHTER-NO ANSWER BUT WAS ABLE TO LEAVE VOICE MESSAGE TO RETURN SN CALL.      CHAVO DOMÍNGUEZ MANAGER NOTIFIED

## 2023-04-17 ENCOUNTER — TRANSITIONAL CARE MANAGEMENT TELEPHONE ENCOUNTER (OUTPATIENT)
Dept: CALL CENTER | Facility: HOSPITAL | Age: 83
End: 2023-04-17
Payer: MEDICARE

## 2023-04-17 ENCOUNTER — HOME CARE VISIT (OUTPATIENT)
Dept: HOME HEALTH SERVICES | Facility: HOME HEALTHCARE | Age: 83
End: 2023-04-17
Payer: MEDICARE

## 2023-04-17 DIAGNOSIS — F41.9 ANXIETY: ICD-10-CM

## 2023-04-17 LAB
LAB AP CASE REPORT: NORMAL
PATH REPORT.FINAL DX SPEC: NORMAL
PATH REPORT.GROSS SPEC: NORMAL

## 2023-04-17 PROCEDURE — G0299 HHS/HOSPICE OF RN EA 15 MIN: HCPCS

## 2023-04-17 RX ORDER — VENLAFAXINE HYDROCHLORIDE 150 MG/1
150 CAPSULE, EXTENDED RELEASE ORAL DAILY
Qty: 90 CAPSULE | Refills: 3 | Status: SHIPPED | OUTPATIENT
Start: 2023-04-17

## 2023-04-17 RX ORDER — VENLAFAXINE HYDROCHLORIDE 75 MG/1
75 CAPSULE, EXTENDED RELEASE ORAL DAILY
Qty: 90 CAPSULE | Refills: 1 | Status: SHIPPED | OUTPATIENT
Start: 2023-04-17 | End: 2023-04-18

## 2023-04-17 NOTE — OUTREACH NOTE
Call Center TCM Note    Flowsheet Row Responses   [facility] patient discharged from? [place]   Does the patient have one of the following disease processes/diagnoses(primary or secondary)? Stroke   TCM attempt successful? No   Unsuccessful attempts Attempt 2          Areli Aleman MA    4/17/2023, 16:30 EDT

## 2023-04-17 NOTE — PROGRESS NOTES
Case Management Discharge Note      Final Note: Home with [facility]     Provided Post Acute Provider List?: Yes  Post Acute Provider List: Nursing Home    Selected Continued Care - Discharged on 4/15/2023 Admission date: 4/9/2023 - Discharge disposition: Home or Self Care    Destination    No services have been selected for the patient.              Durable Medical Equipment    No services have been selected for the patient.              Dialysis/Infusion    No services have been selected for the patient.              Home Medical Care     Service Provider Selected Services Address Phone Fax Patient Preferred    [facility] [address] [address] [phone] --          Therapy    No services have been selected for the patient.              Community Resources    No services have been selected for the patient.              Community & DME    No services have been selected for the patient.                  Transportation Services  Private: Car    Final Discharge Disposition Code: 06 - home with home health care

## 2023-04-17 NOTE — OUTREACH NOTE
Call Center TCM Note    Flowsheet Row Responses   [facility] patient discharged from? [place]   Does the patient have one of the following disease processes/diagnoses(primary or secondary)? Stroke   TCM attempt successful? No   Unsuccessful attempts Attempt 1          Areli Aleman MA    4/17/2023, 11:43 EDT

## 2023-04-17 NOTE — CASE COMMUNICATION
Patient missed a SOC SNV  visit from [facility] on 4/16/23.     Reason: UNABLE TO CONTACT PT OR CAREGIVERS.       For your records only.   As per home health protocol, MD must be notified of missed/cancelled visits; therefore the prescribed frequency was not met. 
None

## 2023-04-18 ENCOUNTER — TRANSITIONAL CARE MANAGEMENT TELEPHONE ENCOUNTER (OUTPATIENT)
Dept: CALL CENTER | Facility: HOSPITAL | Age: 83
End: 2023-04-18
Payer: MEDICARE

## 2023-04-18 VITALS
BODY MASS INDEX: 21.97 KG/M2 | DIASTOLIC BLOOD PRESSURE: 72 MMHG | HEIGHT: 67 IN | RESPIRATION RATE: 16 BRPM | SYSTOLIC BLOOD PRESSURE: 118 MMHG | TEMPERATURE: 97.3 F | HEART RATE: 88 BPM | WEIGHT: 140 LBS | OXYGEN SATURATION: 97 %

## 2023-04-18 DIAGNOSIS — F41.9 ANXIETY: Primary | ICD-10-CM

## 2023-04-18 NOTE — OUTREACH NOTE
Call Center TCM Note    Flowsheet Row Responses   [facility] patient discharged from? [place]   Does the patient have one of the following disease processes/diagnoses(primary or secondary)? Stroke   TCM attempt successful? No   Unsuccessful attempts Attempt 3          Lashell Noonan LPN    4/18/2023, 12:02 EDT

## 2023-04-19 ENCOUNTER — HOME CARE VISIT (OUTPATIENT)
Dept: HOME HEALTH SERVICES | Facility: HOME HEALTHCARE | Age: 83
End: 2023-04-19
Payer: MEDICARE

## 2023-04-19 VITALS
TEMPERATURE: 97 F | HEART RATE: 73 BPM | SYSTOLIC BLOOD PRESSURE: 120 MMHG | RESPIRATION RATE: 18 BRPM | DIASTOLIC BLOOD PRESSURE: 70 MMHG | OXYGEN SATURATION: 100 %

## 2023-04-19 PROCEDURE — G0151 HHCP-SERV OF PT,EA 15 MIN: HCPCS

## 2023-04-19 NOTE — CASE COMMUNICATION
SOC Note: Hospital Discharge Summary: Ms. Pettit is a 82 y.o. female with a history of diabetes mellitus, hypothyroidism, obstructive sleep apnea–intolerance to treatment, pulmonary hypertension, A-fib (on Eliquis), prior history of AVR, hip replacement 1 year ago, anxiety and RLS who presented to [facility] initially complaining of confusion and right-sided numbness.  Please see the admitting history and physical for f urther details.  She was found to have stroke on initial MRI brain and was admitted to the hospital for further evaluation and treatment.  Neurology was consulted for additional stroke work-up.  Other stroke work-up included: TTE–agitated saline test only which was negative.  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  Mild to moderate small vessel ischemic disease noted.   No other concern for dissection/aneurysm or occlusion.  MRA of the neck unremarkable no evidence of high-grade stenosis.  She was noted to have an elevated inflammatory markers.  She underwent lumbar puncture with relatively unremarkable CSF studies.  Meningitis/encephalitis panel negative.    She did have altered vision and headache during her stay along with elevated inflammatory markers raised suspicion for temporal arteritis.  She u nderwent temporal artery biopsy results of which are pending at time of discharge.  Neurology recommends empiric treatment with prednisone 60 mg daily for 4 weeks then taper.  Is been recommended she follow-up with rheumatology as outpatient and information has been provided.  Neurology will review results of temporal artery biopsy once pathology available.  She also has a myasthenia gravis panel is still pending.  Neurology will see he r in 3 months in their office but as stated will contact patient next week regarding pathology.    Compliance with medications was stressed with the patient.  There has been some  reported noncompliance prior to admission.  Patient takes Eliquis due to history of atrial fibrillation which certainly could have increased risk for stroke.  Tobacco cessation recommended.  Discharged on increased dose Lipitor.    This visit, pt adamantly merrill es smoking cigarettes or using any kind of tobacco. Pt reportedly took both her morning and night meds at the same time a few days ago. Pt's children are aware, pt states someone comes over now in the morning to give her morning pills, and someone also comes at night to give her night pills. This RN recommended getting a pill box with both morning and night containers. Pt was found to have many  medications in her home. Pt did be come agitated when this RN educated the importance of not having  medications in the home that are no longer prescribed. Pt has bilateral temporal incisions closed with liquid skin adhesive, no s/s of infection observed. Pt states she has macular degeneration and her vision has quickly gotten worse over the past month, she reports she could see me during the visit but that I was blurry, it took pt several seconds to guess this RN 's eye color and pt's guess was incorrect. Pt also reports depression which is treated with antidepressants. Pt reports her son's sons have both passed in the last ten years: one in a car accident and one by suicide. Pt's  passed of brain cancer in . Pt is forgetful and is difficult to follow at times. Pt reports she has fallen several times since the beginning of the year.     Home Health ordered for: SN PT, also requesting  MSW eval for depression and possible community resources to help pt with household chores due to vision impairment    Reason for Hosp/Primary Dx/Co-morbidities: See above.    Focus of Care: Neuro assess, med education, CP assess    Skilled Need: Neuro assess, CP assess     Current Functional status/mobility/DME: Has front wheeled walker, rollator, and quad cane  in the home. Pt also has a scale, shower/wall grab bars, and BP cuff. Pt ha s glucometer and accompanying supplies in the home.    HB status/Living Arrangements: Pt currently lives alone in a single story patio home but reports she is moving to [facility] on [address] [place] next month and reports her older sister is there now.    Skin Integrity/wound status: Skin warm, dry, intact; bilateral temporal incisions covered with liquid adhesive, healing well.    Code Status: DNR    Fall Risk: Yes    Plan fo r next visit: Neuro assess, CP assess, med education, psych assess      Interacting Medications/Orders:    busPIRone (BUSPAR) 10 MG tablet   amitriptyline (ELAVIL) 50 MG tablet   venlafaxine XR (EFFEXOR-XR) 150 MG 24 hr capsule   venlafaxine XR (Effexor XR) 75 MG 24 hr capsule     Significance: Major    Warning: Additive serotonergic effects may occur during coadministration of buspirone and amitriptyline, venlafaxine XR, and the risk o f developing serotonin syndrome may be increased.    Onset: Rapid    Documentation Level: Possible    Management Code: Potential interaction risk - additional information available    Effects: Additive serotonergic effects may occur during coadministration of buspirone and amitriptyline, venlafaxine XR, and the risk of developing serotonin syndrome may be increased.    Mechanism: Additive serotonergic effects may occur.    Management: M onitor for signs and symptoms of serotonin syndrome/serotonin toxicity (eg, hyperreflexia, clonus, hyperthermia, diaphoresis, tremor, autonomic instability, mental status changes) when these drugs are combined. Patients with other risk factors (eg, higher drug concentrations/doses, greater numbers of serotonergic agents) are likely at greater risk for these potentially life-threatening toxicities.

## 2023-04-19 NOTE — HOME HEALTH
Pt is an 83 yo female who suffered a CVA.  She presented c/o confusion and R sided weakness.  Pt also has DM, pulmonary HTN, afib, AVR and falls.  She lives in patio home by herself and has children who come at night to assist with meds.  Pt has intermittent confusion/word finding and questioned need for speech therapy.  She said she would decide whether she wanted it soon.  Pt denied need for PT.  She reports she has had falls previously but this morning has cleaned her house and scrubbed her floors.  She reports she is going to an [facility] next week.  PT IE only completed.

## 2023-04-19 NOTE — HOME HEALTH
SOC Note: Hospital Discharge Summary: Ms. Pettit is a 82 y.o. female with a history of diabetes mellitus, hypothyroidism, obstructive sleep apnea–intolerance to treatment, pulmonary hypertension, A-fib (on Eliquis), prior history of AVR, hip replacement 1 year ago, anxiety and RLS who presented to [facility] initially complaining of confusion and right-sided numbness.  Please see the admitting history and physical for further details.  She was found to have stroke on initial MRI brain and was admitted to the hospital for further evaluation and treatment.  Neurology was consulted for additional stroke work-up.  Other stroke work-up included: TTE–agitated saline test only which was negative.  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  Mild to moderate small vessel ischemic disease noted.  No other concern for dissection/aneurysm or occlusion.  MRA of the neck unremarkable no evidence of high-grade stenosis.  She was noted to have an elevated inflammatory markers.  She underwent lumbar puncture with relatively unremarkable CSF studies.  Meningitis/encephalitis panel negative.    She did have altered vision and headache during her stay along with elevated inflammatory markers raised suspicion for temporal arteritis.  She underwent temporal artery biopsy results of which are pending at time of discharge.  Neurology recommends empiric treatment with prednisone 60 mg daily for 4 weeks then taper.  Is been recommended she follow-up with rheumatology as outpatient and information has been provided.  Neurology will review results of temporal artery biopsy once pathology available.  She also has a myasthenia gravis panel is still pending.  Neurology will see her in 3 months in their office but as stated will contact patient next week regarding pathology.    Compliance with medications was stressed with the patient.  There has been some reported  noncompliance prior to admission.  Patient takes Eliquis due to history of atrial fibrillation which certainly could have increased risk for stroke.  Tobacco cessation recommended.  Discharged on increased dose Lipitor.    This visit, pt adamantly denies smoking cigarettes or using any kind of tobacco. Pt reportedly took both her morning and night meds at the same time a few days ago. Pt's children are aware, pt states someone comes over now in the morning to give her morning pills, and someone also comes at night to give her night pills. This RN recommended getting a pill box with both morning and night containers. Pt was found to have many  medications in her home. Pt did become agitated when this RN educated the importance of not having  medications in the home that are no longer prescribed. Pt has bilateral temporal incisions closed with liquid skin adhesive, no s/s of infection observed. Pt states she has macular degeneration and her vision has quickly gotten worse over the past month, she reports she could see me during the visit but that I was blurry, it took pt several seconds to guess this RN's eye color and pt's guess was incorrect. Pt also reports depression which is treated with antidepressants. Pt reports her son's sons have both passed in the last ten years: one in a car accident and one by suicide. Pt's  passed of brain cancer in . Pt is forgetful and is difficult to follow at times. Pt reports she has fallen several times since the beginning of the year.     Home Health ordered for:  PT, also requesting MSW eval for depression and possible community resources to help pt with household chores due to vision impairment    Reason for Hosp/Primary Dx/Co-morbidities: See above.    Focus of Care: CVA with right sided numbness and blurred vision    Skilled Need: Neuro assess, CP assess     Current Functional status/mobility/DME: Has front wheeled walker, rollator, and quad cane in  the home. Pt also has a scale, shower/wall grab bars, and BP cuff. Pt has glucometer and accompanying supplies in the home.    HB status/Living Arrangements: Pt currently lives alone in a single story patio home but reports she is moving to [facility] [address] next month and reports her older sister is there now.    Skin Integrity/wound status: Skin warm, dry, intact; bilateral temporal incisions covered with liquid adhesive, healing well.    Code Status: DNR    Fall Risk: Yes    Plan for next visit: Neuro assess, CP assess, med education, psych assess

## 2023-04-20 ENCOUNTER — HOME CARE VISIT (OUTPATIENT)
Dept: HOME HEALTH SERVICES | Facility: HOME HEALTHCARE | Age: 83
End: 2023-04-20
Payer: MEDICARE

## 2023-04-20 VITALS
RESPIRATION RATE: 18 BRPM | HEART RATE: 60 BPM | TEMPERATURE: 96.5 F | DIASTOLIC BLOOD PRESSURE: 70 MMHG | SYSTOLIC BLOOD PRESSURE: 122 MMHG | OXYGEN SATURATION: 99 %

## 2023-04-20 PROCEDURE — G0155 HHCP-SVS OF CSW,EA 15 MIN: HCPCS

## 2023-04-20 PROCEDURE — G0495 RN CARE TRAIN/EDU IN HH: HCPCS

## 2023-04-20 NOTE — HOME HEALTH
MSW jasmeet on this date with patient. Patient currently living alone in [facility] but has plan to move to [facility] at the end of the month. Patient has macular degeneration and coping with continued vision loss. Referral to [facility] for programs for available adaptations and technology. Patient reports strong family support and daily visits and meals provided. Patient has positive attitude and denies issues with symptoms related to depression/anxiety. Patient denies needing help at this time due to upcoming move. Patient has all necessary equipment and medications. Agreeable to contact MSW with additional needs or concerns.

## 2023-04-21 LAB — REF LAB TEST METHOD: NORMAL

## 2023-04-21 NOTE — HOME HEALTH
Routine Visit Note: Lungs are clear, no cough, no edema, vitals are WNL, A&O x4, equal grasps, no numbness/tingling, no dizziness since coming home from hospital, History of headaches but nothing out of abnorma.    Skill/education provided: Discussed medication and how being administered. Offered several different ways of managing medications. Patient declined want to try of anything different. She had a problem with each way. Teaching on recovering from stroke- verbalized understanding. teaching on ways to prevent stroke - teach back provided. Neuro assessment performed with no changes seen. Emotional support provided. Teaching on pain control with headaches - teach back provided    Patient/caregiver response:     Plan for next visit: neuro and cardiopulmonary assessments. assess incisions. Stroke education needed. Medication education/follow up. Assess for coping after stroke    Other pertinent info: Awaiting order for ST add on

## 2023-04-25 ENCOUNTER — TELEPHONE (OUTPATIENT)
Dept: NEUROLOGY | Facility: CLINIC | Age: 83
End: 2023-04-25
Payer: MEDICARE

## 2023-04-25 NOTE — TELEPHONE ENCOUNTER
----- Message from TOSHA Wilson sent at 4/21/2023  2:10 PM EDT -----  Regarding: FW: Path results  Called patient and notified of negative temporal artery biopsy per Dr. Sancho Shepherd's message noted below.  Patient reports that her daughter does her medications and would prefer that I call her-I attempted to call patient's daughter and she did not answer.  Please notify daughter to taper prednisone 10 mg every 3 days till tapered off.  Ask her to notify us if she has any further questions and/or concerns.  MG panel still pending.      ----- Message -----  From: Stewart Shepherd MD  Sent: 4/17/2023   2:48 PM EDT  To: TOSHA Coto, TOSHA Wilson  Subject: Path results                                     We performed this biopsy while she was in the hospital.  This was negative but wanted to forward on the pathology.    ----- Message -----  From: Lab, Background User  Sent: 4/17/2023  12:45 PM EDT  To: Stewart Shepherd MD

## 2023-04-25 NOTE — TELEPHONE ENCOUNTER
Attempted to call patient's daughter Severiano.  Both numbers on file are not in service.  Called patient's daughter, Abby.  Call went directly to voicemail.  Left message to call back.

## 2023-04-27 ENCOUNTER — HOME CARE VISIT (OUTPATIENT)
Dept: HOME HEALTH SERVICES | Facility: HOME HEALTHCARE | Age: 83
End: 2023-04-27
Payer: MEDICARE

## 2023-04-27 ENCOUNTER — TELEPHONE (OUTPATIENT)
Dept: INTERNAL MEDICINE | Facility: CLINIC | Age: 83
End: 2023-04-27

## 2023-04-27 VITALS
DIASTOLIC BLOOD PRESSURE: 62 MMHG | HEART RATE: 76 BPM | TEMPERATURE: 98.7 F | OXYGEN SATURATION: 99 % | RESPIRATION RATE: 20 BRPM | SYSTOLIC BLOOD PRESSURE: 102 MMHG

## 2023-04-27 PROCEDURE — G0299 HHS/HOSPICE OF RN EA 15 MIN: HCPCS

## 2023-04-27 NOTE — TELEPHONE ENCOUNTER
Caller: GÓMEZ    Relationship:     Best call back number: [phone]    What orders are you requesting (i.e. lab or imaging): PHYSICAL THERAPY RE EVALUATION     In what timeframe would the patient need to come in: AS SOON AS POSSIBLE     Where will you receive your lab/imaging services: IN PATIENT'S HOME

## 2023-04-28 NOTE — HOME HEALTH
PT SEEN BY [facility] STAY FOR CVA  WEANING PREDNISONE FOR TEMPEROL ARTERITIS     BIOPSY NEGATIVE

## 2023-05-02 ENCOUNTER — HOME CARE VISIT (OUTPATIENT)
Dept: HOME HEALTH SERVICES | Facility: HOME HEALTHCARE | Age: 83
End: 2023-05-02
Payer: MEDICARE

## 2023-05-04 ENCOUNTER — HOME CARE VISIT (OUTPATIENT)
Dept: HOME HEALTH SERVICES | Facility: HOME HEALTHCARE | Age: 83
End: 2023-05-04
Payer: MEDICARE

## 2023-05-05 ENCOUNTER — TELEPHONE (OUTPATIENT)
Dept: NEUROLOGY | Facility: CLINIC | Age: 83
End: 2023-05-05

## 2023-05-05 ENCOUNTER — HOME CARE VISIT (OUTPATIENT)
Dept: HOME HEALTH SERVICES | Facility: HOME HEALTHCARE | Age: 83
End: 2023-05-05
Payer: MEDICARE

## 2023-05-05 LAB
ACHR BIND AB SER-SCNC: 0.04 NMOL/L (ref 0–0.24)
ACHR BLOCK AB SER-ACNC: 24 % (ref 0–25)
ACHR MOD AB SER QL FC: 0 % (ref 0–45)

## 2023-05-05 PROCEDURE — G0300 HHS/HOSPICE OF LPN EA 15 MIN: HCPCS

## 2023-05-05 NOTE — CASE COMMUNICATION
Therapist had scheduled visit with patient today. Arrived at home and patient not at home. Attempted to call patient and daughter and did not get answer on phone. Left message that therapist would call to reschedule visit.

## 2023-05-05 NOTE — TELEPHONE ENCOUNTER
Provider: KIET  Caller: LORI  Phone Number: [phone]  Reason for Call: PT DAUGHTER CALLED AND STATES RHEUMATOLOGIST CALLED AND STATED THAT A REFERRAL WAS SENT TO THEM FOR PT TO BE SEEN. DAUGHTER WOULD LIKE TO KNOW IF PT NEEDS TO BE SEEN BY THEM AND IF SHE DOES WHAT DOES SHE NEED BE SEEN FOR. DAUGHTER STATES Temporal arteritis TEST WAS NEGATIVE.    PLEASE REVIEW AND ADVISE.  THANK YOU

## 2023-05-07 VITALS
HEART RATE: 71 BPM | DIASTOLIC BLOOD PRESSURE: 64 MMHG | TEMPERATURE: 97.7 F | OXYGEN SATURATION: 96 % | RESPIRATION RATE: 18 BRPM | SYSTOLIC BLOOD PRESSURE: 118 MMHG

## 2023-05-08 ENCOUNTER — HOME CARE VISIT (OUTPATIENT)
Dept: HOME HEALTH SERVICES | Facility: HOME HEALTHCARE | Age: 83
End: 2023-05-08
Payer: MEDICARE

## 2023-05-08 DIAGNOSIS — F51.01 PRIMARY INSOMNIA: ICD-10-CM

## 2023-05-08 RX ORDER — AMITRIPTYLINE HYDROCHLORIDE 50 MG/1
TABLET, FILM COATED ORAL
Qty: 90 TABLET | Refills: 1 | Status: SHIPPED | OUTPATIENT
Start: 2023-05-08

## 2023-05-08 RX ORDER — DOFETILIDE 0.25 MG/1
CAPSULE ORAL
Qty: 180 CAPSULE | Refills: 0 | Status: SHIPPED | OUTPATIENT
Start: 2023-05-08

## 2023-05-08 NOTE — HOME HEALTH
SNV for assessment and monitor and teach on CVA  Nurse reviewed Home bound status d/t patient shopping and going out to eat   Patient do not want HC servises at this time . Discharge paper signed

## 2023-05-08 NOTE — TELEPHONE ENCOUNTER
Rx Refill Note  Requested Prescriptions     Pending Prescriptions Disp Refills   • losartan (COZAAR) 100 MG tablet 30 tablet 0     Sig: Take 1 tablet by mouth Daily. Indications: High Blood Pressure Disorder   • amLODIPine (NORVASC) 5 MG tablet 30 tablet 0     Sig: Take 1 tablet by mouth Daily. Indications: High Blood Pressure Disorder      Last office visit with prescribing clinician: 1/11/2023   Last telemedicine visit with prescribing clinician: 5/26/2023   Next office visit with prescribing clinician: 5/26/2023                         Would you like a call back once the refill request has been completed: [] Yes [] No    If the office needs to give you a call back, can they leave a voicemail: [] Yes [] No    Cinda Miranda MA  05/08/23, 09:20 EDT

## 2023-05-09 ENCOUNTER — TELEPHONE (OUTPATIENT)
Dept: NEUROLOGY | Facility: CLINIC | Age: 83
End: 2023-05-09
Payer: MEDICARE

## 2023-05-09 RX ORDER — AMLODIPINE BESYLATE 5 MG/1
5 TABLET ORAL DAILY
Qty: 90 TABLET | Refills: 1 | Status: SHIPPED | OUTPATIENT
Start: 2023-05-09

## 2023-05-09 RX ORDER — LOSARTAN POTASSIUM 100 MG/1
100 TABLET ORAL DAILY
Qty: 90 TABLET | Refills: 1 | Status: SHIPPED | OUTPATIENT
Start: 2023-05-09

## 2023-05-09 NOTE — TELEPHONE ENCOUNTER
----- Message from TOSHA Wilson sent at 5/8/2023  1:45 PM EDT -----  Myasthenia gravis panel negative.  No change in current plan-keep planned follow-up.

## 2023-05-12 NOTE — TELEPHONE ENCOUNTER
Spoke with patient regarding MG panel lab results.      Patient asks that neurology follow up be cancelled.  She states that she is doing well and will not be here.

## 2023-05-15 RX ORDER — APIXABAN 5 MG/1
TABLET, FILM COATED ORAL
Qty: 180 TABLET | Refills: 1 | Status: SHIPPED | OUTPATIENT
Start: 2023-05-15

## 2023-05-26 ENCOUNTER — OFFICE VISIT (OUTPATIENT)
Dept: INTERNAL MEDICINE | Facility: CLINIC | Age: 83
End: 2023-05-26
Payer: MEDICARE

## 2023-05-26 VITALS
BODY MASS INDEX: 21.66 KG/M2 | OXYGEN SATURATION: 97 % | DIASTOLIC BLOOD PRESSURE: 74 MMHG | HEIGHT: 67 IN | HEART RATE: 68 BPM | TEMPERATURE: 97.8 F | WEIGHT: 138 LBS | SYSTOLIC BLOOD PRESSURE: 142 MMHG

## 2023-05-26 DIAGNOSIS — I63.9 ACUTE STROKE DUE TO ISCHEMIA: Primary | ICD-10-CM

## 2023-05-26 DIAGNOSIS — I48.0 PAF (PAROXYSMAL ATRIAL FIBRILLATION): Chronic | ICD-10-CM

## 2023-05-26 DIAGNOSIS — R51.9 DAILY HEADACHE: ICD-10-CM

## 2023-05-26 DIAGNOSIS — I10 ESSENTIAL HYPERTENSION: Chronic | ICD-10-CM

## 2023-05-26 DIAGNOSIS — E11.8 CONTROLLED TYPE 2 DIABETES MELLITUS WITH COMPLICATION, WITHOUT LONG-TERM CURRENT USE OF INSULIN: Chronic | ICD-10-CM

## 2023-05-26 DIAGNOSIS — Z12.31 ENCOUNTER FOR SCREENING MAMMOGRAM FOR MALIGNANT NEOPLASM OF BREAST: ICD-10-CM

## 2023-05-26 DIAGNOSIS — J30.2 SEASONAL ALLERGIC RHINITIS, UNSPECIFIED TRIGGER: Chronic | ICD-10-CM

## 2023-06-11 PROBLEM — R06.02 SHORTNESS OF BREATH: Status: RESOLVED | Noted: 2023-04-09 | Resolved: 2023-06-11

## 2023-06-11 PROBLEM — M25.559 HIP PAIN: Status: RESOLVED | Noted: 2023-01-17 | Resolved: 2023-06-11

## 2023-06-11 PROBLEM — I48.19 ATRIAL FIBRILLATION, PERSISTENT: Status: RESOLVED | Noted: 2023-01-17 | Resolved: 2023-06-11

## 2023-06-11 NOTE — ASSESSMENT & PLAN NOTE
BP is mildly elevated in the office which she will continue to monitor; continue amlodipine, losartan and metoprolol daily

## 2023-06-11 NOTE — ASSESSMENT & PLAN NOTE
4/9/23: atorvastatin increased to 80 mg daily which she is tolerating well, has f/u appt with neurology.

## 2023-06-11 NOTE — ASSESSMENT & PLAN NOTE
She denies episodes of palpitations; managed on metoprolol and Tikosyn daily. She is also on Eliquis, denies any bleeding episodes.

## 2023-06-11 NOTE — PROGRESS NOTES
"Chief Complaint  Hypertension and Diabetes    Subjective        Dior Pettit presents to [facility]IMARY [facility]  History of Present Illness for f/u regarding recent hospitalization for acute stroke.    She presented to the ER 4/9/23 due to right-sided numbness with increased confusion, MRI brain showed an acute left middle frontal gyrus infarct with mild to moderate small vessel ischemic disease. MRA of the neck unremarkable. Lumbar puncture showed negative CSF studies.    She continues to c/o a temporal headache so underwent biopsy to rule out temporal arteritis, biopsy of temporal arteries negative. She was started on prednisone 60 mg daily for 4 weeks and then tapering dosage. She has been scheduled with rheumatology for further monitoring.    Myasthenia gravis panel was negative.    Her dose of Lipitor was also increased which she is tolerating well.    She reports feeling well since discharge without chest pain or shortness of breath.    Objective   Vital Signs:  /74   Pulse 68   Temp 97.8 °F (36.6 °C)   Ht 170.2 cm (67\")   Wt 62.6 kg (138 lb)   SpO2 97%   BMI 21.61 kg/m²   Estimated body mass index is 21.61 kg/m² as calculated from the following:    Height as of this encounter: 170.2 cm (67\").    Weight as of this encounter: 62.6 kg (138 lb).       BMI is within normal parameters. No other follow-up for BMI required.      Physical Exam  Constitutional:       Appearance: She is well-developed. She is not ill-appearing.   HENT:      Head: Normocephalic.      Right Ear: Hearing, tympanic membrane and external ear normal.      Left Ear: Hearing, tympanic membrane and external ear normal.      Nose: Nose normal. No nasal deformity, mucosal edema or rhinorrhea.      Right Sinus: No maxillary sinus tenderness or frontal sinus tenderness.      Left Sinus: No maxillary sinus tenderness or frontal sinus tenderness.      Mouth/Throat:      Dentition: Normal dentition.   Eyes:      " General: Lids are normal.         Right eye: No discharge.         Left eye: No discharge.      Conjunctiva/sclera: Conjunctivae normal.      Right eye: No exudate.     Left eye: No exudate.  Neck:      Thyroid: No thyroid mass or thyromegaly.      Vascular: No carotid bruit.      Trachea: Trachea normal.   Cardiovascular:      Rate and Rhythm: Regular rhythm.      Pulses: Normal pulses.      Heart sounds: Normal heart sounds. No murmur heard.  Pulmonary:      Effort: No respiratory distress.      Breath sounds: Normal breath sounds. No decreased breath sounds, wheezing, rhonchi or rales.   Abdominal:      General: Bowel sounds are normal.      Palpations: Abdomen is soft.      Tenderness: There is no abdominal tenderness.   Musculoskeletal:      Cervical back: Normal range of motion. No edema.   Lymphadenopathy:      Head:      Right side of head: No submental, submandibular, tonsillar, preauricular, posterior auricular or occipital adenopathy.      Left side of head: No submental, submandibular, tonsillar, preauricular, posterior auricular or occipital adenopathy.   Skin:     General: Skin is warm and dry.      Nails: There is no clubbing.   Neurological:      Mental Status: She is alert.   Psychiatric:         Behavior: Behavior is cooperative.      Result Review :                   Assessment and Plan   Diagnoses and all orders for this visit:    1. Acute stroke due to ischemia (Primary)  Assessment & Plan:  4/9/23: atorvastatin increased to 80 mg daily which she is tolerating well, has f/u appt with neurology.       2. Essential hypertension  Assessment & Plan:  BP is mildly elevated in the office which she will continue to monitor; continue amlodipine, losartan and metoprolol daily      3. Seasonal allergic rhinitis, unspecified trigger  Assessment & Plan:  Hx allergy shots, managed on Astelin NS daily      4. PAF (paroxysmal atrial fibrillation)  Assessment & Plan:  She denies episodes of palpitations;  managed on metoprolol and Tikosyn daily. She is also on Eliquis, denies any bleeding episodes.      5. Controlled type 2 diabetes mellitus with complication, without long-term current use of insulin  Assessment & Plan:  A1c 6.8 with 4/10/23; managed on metformin daily.      6. Daily headache  Assessment & Plan:  Improved (temporal artery biopsy performed during hospitalization), no longer on prednisone      7. Encounter for screening mammogram for malignant neoplasm of breast  -     Mammo Screening Digital Tomosynthesis Bilateral With CAD; Future    Current outpatient and discharge medications have been reconciled for the patient.  Reviewed by: TOSHA oCto         Follow Up   Return in about 3 months (around 8/26/2023) for Wellness.  Patient was given instructions and counseling regarding her condition or for health maintenance advice. Please see specific information pulled into the AVS if appropriate.

## 2023-08-02 RX ORDER — IPRATROPIUM BROMIDE 42 UG/1
SPRAY, METERED NASAL
Qty: 15 ML | Refills: 1 | Status: SHIPPED | OUTPATIENT
Start: 2023-08-02

## 2023-08-03 RX ORDER — DOFETILIDE 0.25 MG/1
CAPSULE ORAL
Qty: 180 CAPSULE | Refills: 1 | Status: SHIPPED | OUTPATIENT
Start: 2023-08-03

## 2023-08-10 ENCOUNTER — TELEPHONE (OUTPATIENT)
Dept: CARDIOLOGY | Facility: CLINIC | Age: 83
End: 2023-08-10
Payer: MEDICARE

## 2023-08-10 NOTE — TELEPHONE ENCOUNTER
Caller: Dior Pettit    Relationship: Self    Best call back number:[phone]     What is the best time to reach you: ANY    Who are you requesting to speak with (clinical staff, provider,  specific staff member): CLINICAL        What was the call regarding: PATIENT STATED THAT SHE FEELS SHE HAS BEEN IN AFIB AND THIS IS BEEN GOING ON FOR A COUPLE OF WEEKS. PATIENT STATED THAT SHE WOULD LIKE A CALL BACK TO DISCUSS MEDICATION AND WHAT SHE NEEDS TO DO.     Is it okay if the provider responds through [employer]: YES OR CALL.

## 2023-09-01 ENCOUNTER — OFFICE VISIT (OUTPATIENT)
Dept: INTERNAL MEDICINE | Facility: CLINIC | Age: 83
End: 2023-09-01
Payer: MEDICARE

## 2023-09-01 VITALS
DIASTOLIC BLOOD PRESSURE: 74 MMHG | SYSTOLIC BLOOD PRESSURE: 130 MMHG | HEART RATE: 63 BPM | HEIGHT: 67 IN | OXYGEN SATURATION: 100 % | BODY MASS INDEX: 21.94 KG/M2 | TEMPERATURE: 97.8 F | WEIGHT: 139.8 LBS

## 2023-09-01 DIAGNOSIS — Z00.00 MEDICARE ANNUAL WELLNESS VISIT, SUBSEQUENT: Primary | ICD-10-CM

## 2023-09-01 DIAGNOSIS — I10 ESSENTIAL HYPERTENSION: Chronic | ICD-10-CM

## 2023-09-01 DIAGNOSIS — E78.00 HYPERCHOLESTEREMIA: Chronic | ICD-10-CM

## 2023-09-01 DIAGNOSIS — E03.9 ACQUIRED HYPOTHYROIDISM: Chronic | ICD-10-CM

## 2023-09-01 DIAGNOSIS — E11.8 CONTROLLED TYPE 2 DIABETES MELLITUS WITH COMPLICATION, WITHOUT LONG-TERM CURRENT USE OF INSULIN: Chronic | ICD-10-CM

## 2023-09-11 LAB
ALBUMIN SERPL-MCNC: 4.9 G/DL (ref 3.5–5.2)
ALBUMIN/GLOB SERPL: 2.6 G/DL
ALP SERPL-CCNC: 76 U/L (ref 39–117)
ALT SERPL-CCNC: 22 U/L (ref 1–33)
AST SERPL-CCNC: 21 U/L (ref 1–32)
BILIRUB SERPL-MCNC: 0.5 MG/DL (ref 0–1.2)
BUN SERPL-MCNC: 19 MG/DL (ref 8–23)
BUN/CREAT SERPL: 20.9 (ref 7–25)
CALCIUM SERPL-MCNC: 10.1 MG/DL (ref 8.6–10.5)
CHLORIDE SERPL-SCNC: 104 MMOL/L (ref 98–107)
CHOLEST SERPL-MCNC: 148 MG/DL (ref 0–200)
CO2 SERPL-SCNC: 27.3 MMOL/L (ref 22–29)
CREAT SERPL-MCNC: 0.91 MG/DL (ref 0.57–1)
EGFRCR SERPLBLD CKD-EPI 2021: 62 ML/MIN/1.73
ERYTHROCYTE [DISTWIDTH] IN BLOOD BY AUTOMATED COUNT: 12.3 % (ref 12.3–15.4)
GLOBULIN SER CALC-MCNC: 1.9 GM/DL
GLUCOSE SERPL-MCNC: 98 MG/DL (ref 65–99)
HBA1C MFR BLD: 5.6 % (ref 4.8–5.6)
HCT VFR BLD AUTO: 38.7 % (ref 34–46.6)
HDLC SERPL-MCNC: 56 MG/DL (ref 40–60)
HGB BLD-MCNC: 13 G/DL (ref 12–15.9)
LDLC SERPL CALC-MCNC: 78 MG/DL (ref 0–100)
MCH RBC QN AUTO: 30.7 PG (ref 26.6–33)
MCHC RBC AUTO-ENTMCNC: 33.6 G/DL (ref 31.5–35.7)
MCV RBC AUTO: 91.5 FL (ref 79–97)
PLATELET # BLD AUTO: 265 10*3/MM3 (ref 140–450)
POTASSIUM SERPL-SCNC: 5.5 MMOL/L (ref 3.5–5.2)
PROT SERPL-MCNC: 6.8 G/DL (ref 6–8.5)
RBC # BLD AUTO: 4.23 10*6/MM3 (ref 3.77–5.28)
SODIUM SERPL-SCNC: 141 MMOL/L (ref 136–145)
TRIGL SERPL-MCNC: 72 MG/DL (ref 0–150)
TSH SERPL DL<=0.005 MIU/L-ACNC: 1.02 UIU/ML (ref 0.27–4.2)
VLDLC SERPL CALC-MCNC: 14 MG/DL (ref 5–40)
WBC # BLD AUTO: 5.51 10*3/MM3 (ref 3.4–10.8)

## 2023-09-13 ENCOUNTER — APPOINTMENT (OUTPATIENT)
Dept: GENERAL RADIOLOGY | Facility: HOSPITAL | Age: 83
End: 2023-09-13
Payer: MEDICARE

## 2023-09-13 ENCOUNTER — HOSPITAL ENCOUNTER (EMERGENCY)
Facility: HOSPITAL | Age: 83
Discharge: HOME | End: 2023-09-13
Attending: EMERGENCY MEDICINE
Payer: MEDICARE

## 2023-09-13 VITALS
HEART RATE: 59 BPM | TEMPERATURE: 97.2 F | OXYGEN SATURATION: 98 % | WEIGHT: 139 LBS | DIASTOLIC BLOOD PRESSURE: 70 MMHG | SYSTOLIC BLOOD PRESSURE: 122 MMHG | HEIGHT: 68 IN | BODY MASS INDEX: 21.07 KG/M2 | RESPIRATION RATE: 16 BRPM

## 2023-09-13 DIAGNOSIS — S90.31XA CONTUSION OF RIGHT FOOT, INITIAL ENCOUNTER: Primary | ICD-10-CM

## 2023-09-13 DIAGNOSIS — K21.9 GASTROESOPHAGEAL REFLUX DISEASE: ICD-10-CM

## 2023-09-13 PROCEDURE — 99282 EMERGENCY DEPT VISIT SF MDM: CPT

## 2023-09-13 PROCEDURE — 73630 X-RAY EXAM OF FOOT: CPT

## 2023-09-13 RX ORDER — OMEPRAZOLE 40 MG/1
CAPSULE, DELAYED RELEASE ORAL
Qty: 90 CAPSULE | Refills: 0 | Status: SHIPPED | OUTPATIENT
Start: 2023-09-13

## 2023-09-13 NOTE — ED PROVIDER NOTES
MD ATTESTATION NOTE    The BACILIO and I have discussed this patient's history, physical exam, and treatment plan.    I provided a substantive portion of the care of this patient. I personally performed the physical exam, in its entirety. The attached note describes my personal findings.      Dior Pettit is a 83 y.o. female who presents to the ED c/o pain to the right foot after table fell on it Monday evening.  She is on Eliquis.      On exam:  GENERAL: not distressed  HENT: nares patent  EYES: no scleral icterus  CV: regular rhythm, regular rate  RESPIRATORY: normal effort  MUSCULOSKELETAL:   Right leg   No proximal lower leg pain.  Soft compartments to lower leg.  No medial malleolar tenderness.  No lateral malleolar tenderness.   No fifth metatarsal pain.  Negative squeeze test.   2+ DP/PT pulses  5/5 strength to dorsiflexion and plantarflexion.  SILT to sural, saphenous, deep peroneal and superficial peroneal nerves.  Central area of ecchymosis to the dorsal proximal right foot  NEURO: alert, moves all extremities, follows commands  SKIN: warm, dry    Labs  No results found for this or any previous visit (from the past 24 hour(s)).    Radiology  No Radiology Exams Resulted Within Past 24 Hours    Medications given in the ED:  Medications   ibuprofen (ADVIL,MOTRIN) tablet 600 mg (has no administration in time range)       Orders placed during this visit:  Orders Placed This Encounter   Procedures    XR Foot 3+ View Right       Medical Decision Making:  ED Course as of 09/16/23 1104   Wed Sep 13, 2023   1516 X-ray of the right foot independently interpreted by myself.  I see no acute fracture.  No dislocation. [TD]      ED Course User Index  [TD] Joao Joseph II, MD           Diagnosis  Final diagnoses:   Contusion of right foot, initial encounter          Joao Joseph II, MD  09/16/23 1104

## 2023-09-13 NOTE — ED PROVIDER NOTES
EMERGENCY DEPARTMENT ENCOUNTER    Room Number:  T02/02  Date of encounter:  9/13/2023  PCP: Ashanti Hong APRN  Historian: Patient  Full history not obtainable due to: None    HPI:  Chief Complaint: R foot pain    Context: Dior Pettit is a 83 y.o. female with a PMH significant for hypertension, obstructive sleep apnea, GERD, anxiety, type 2 diabetes, hypothyroidism, atrial fibrillation who presents to the ED c/o right foot pain.  Patient states on Monday evening she dropped a table on her right foot.  She reports that since then she has been having right foot pain, swelling, and bruising.  She reports she has been taking Tylenol for symptoms affect she states she has been able to ambulate but is putting more pressure on her heel as opposed to her foot.  She denies any pain to her right knee and right hip.  She denies any numbness, tingling, weakness.  She denies any recent falls or head-strike.  She is on Eliquis.  She denies any recent illness and otherwise has been well.  She reports no other complaints at this time.      MEDICAL RECORD REVIEW:    Upon review of the medical record it appears the patient was evaluated by urgent care on 6/17/2023 for UTI.  The patient had a normal TSH on 9/11/2023 and a normal POC on 4/14/2023.     PAST MEDICAL HISTORY    Active Ambulatory Problems     Diagnosis Date Noted    Essential hypertension 08/22/2016    Pulmonary hypertension 08/22/2016    NNEKA (obstructive sleep apnea) 08/22/2016    Gastroesophageal reflux disease 08/24/2016    Anxiety     Restless leg syndrome     Controlled diabetes mellitus type 2 with complications     Hypothyroidism     Hypercholesteremia     Seasonal allergic rhinitis 11/11/2016    Mild intermittent asthma without complication 11/11/2016    S/P AVR 03/05/2017    Dilated aortic root 10/02/2017    Thoracic aortic aneurysm without rupture  10/02/2017    Primary insomnia 06/12/2018    Renal insufficiency 10/28/2018    Macular degeneration of  both eyes 04/11/2019    Coronary artery disease involving native coronary artery with angina pectoris 04/11/2019    Rectocele 07/15/2021    Nonrheumatic mitral valve stenosis 09/08/2021    Slow transit constipation 09/14/2021    Dizziness 09/27/2021    Daily headache 04/17/2022    Symptomatic bradycardia 09/11/2022    Closed displaced fracture of right femoral neck 11/13/2022    PAF (paroxysmal atrial fibrillation) 01/31/2023    Acute stroke due to ischemia 04/09/2023     Resolved Ambulatory Problems     Diagnosis Date Noted    HARSHA (acute kidney injury) (HCC) 09/14/2021    Exudative age-related macular degeneration, right eye, with active choroidal neovascularization 01/15/2022    Fall 11/13/2022    Atrial fibrillation, persistent 01/17/2023    Hip pain 01/17/2023    Shortness of breath 04/09/2023     Past Medical History:   Diagnosis Date    Allergic rhinitis     Anemia     Aortic root dilatation 10/02/2017    Aortic valve calcification 10/02/2017    Aortic valve insufficiency Dx in 07    Aortic valve prosthesis present 11/02/2018    Ascending aorta dilatation 10/02/2017    Asthma     Atypical chest pain     Breast pain, right Hx    CAD (coronary artery disease)     Cardiomegaly 2017    Cervicalgia     Chest pain due to CAD     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    Hyperlipidemia     Hypertension     Insomnia     Macular degeneration, left eye     Mild aortic valve regurgitation 10/02/2017    Mild aortic valve stenosis 10/02/2017    Mild dilation of ascending aorta 10/02/2017    Moderate tricuspid valve regurgitation 10/02/2017    Mood disorder in conditions classified elsewhere     Near syncope 03/2017-[facility] ER    Neuropathy     Osteoarthritis     RLS (restless legs syndrome)     Thoracic ascending aortic aneurysm Dx in  2007 @ 3.8CM & 1/02/2018    Visual impairment          PAST SURGICAL HISTORY  Past Surgical History:   Procedure Laterality Date    AORTIC VALVE REPAIR/REPLACEMENT  2007    Dr. Perry    APPENDECTOMY      AUGMENTATION MAMMAPLASTY  1976    BREAST AUGMENTATION  2014    BUNIONECTOMY      CARDIAC CATHETERIZATION  2007    CARDIAC VALVE REPLACEMENT  2007    porcine    COLONOSCOPY  2010    COLONOSCOPY  03/02/2012    EYE SURGERY      cataracts and ens implants    HYSTERECTOMY  1972    SIGMOIDOSCOPY  2001    TEMPORAL ARTERY BIOPSY Bilateral 4/14/2023    Procedure: BILATERAL TEMPORAL ARTERY BIOPSY;  Surgeon: Stewart Shepherd MD;  Location[facility] MAIN OR;  Service: Vascular;  Laterality: Bilateral;    TOTAL HIP ARTHROPLASTY Right 11/15/2022    Procedure: Right anterior total hip arthroplasty;  Surgeon: Joao Martinez MD;  [facility];  Service: Orthopedics;  Laterality: Right;         FAMILY HISTORY  Family History   Problem Relation Age of Onset    Hypertension Mother     Stroke Mother     Cancer Mother     Diabetes Sister     Coronary artery disease Brother     Diabetes Brother     Valvular heart disease Brother         TAVR    Coronary artery disease Brother     Cancer Brother     Birth defects Daughter     Skin cancer Neg Hx     Malig Hyperthermia Neg Hx          SOCIAL HISTORY  Social History     Socioeconomic History    Marital status:    Tobacco Use    Smoking status: Never    Smokeless tobacco: Never    Tobacco comments:     caffeine use - 1 cup coffee daily    Vaping Use    Vaping Use: Never used   Substance and Sexual Activity    Alcohol use: Yes     Comment: less than 1 glass of wine    Drug use: Never    Sexual activity: Not Currently     Partners: Male     Birth control/protection: Surgical     Comment: Hyst         ALLERGIES  Patient has no known allergies.        REVIEW OF SYSTEMS    All systems reviewed and marked as negative except as listed in HPI     PHYSICAL EXAM    I have  reviewed the triage vital signs and nursing notes.    ED Triage Vitals   Temp Heart Rate Resp BP SpO2   09/13/23 1335 09/13/23 1335 09/13/23 1341 09/13/23 1341 09/13/23 1335   97.2 °F (36.2 °C) 60 16 127/71 95 %      Temp src Heart Rate Source Patient Position BP Location FiO2 (%)   09/13/23 1335 09/13/23 1335 09/13/23 1341 09/13/23 1341 --   Tympanic Monitor Sitting Left arm        Physical Exam  Constitutional:       General: She is not in acute distress.     Appearance: She is not ill-appearing.   HENT:      Head: Normocephalic and atraumatic.   Eyes:      General: No scleral icterus.  Pulmonary:      Effort: Pulmonary effort is normal.   Musculoskeletal:      Right ankle: No deformity. No tenderness (of the medial or lateral mallelous).      Right foot: Swelling and tenderness (Over the dorsum of the R foot) present. No deformity. Normal pulse.      Left foot: No swelling.      Comments: Ecchymosis over the dorsal aspect of the R foot    Skin:     General: Skin is warm and dry.   Neurological:      Mental Status: She is alert and oriented to person, place, and time.   Psychiatric:         Mood and Affect: Mood normal.         Speech: Speech normal.         Behavior: Behavior normal.       Vital signs and nursing notes reviewed.            LAB RESULTS  No results found for this or any previous visit (from the past 24 hour(s)).    Ordered the above labs and independently reviewed the results.        RADIOLOGY  XR Foot 3+ View Right    Result Date: 9/13/2023  XR FOOT 3+ VW RIGHT-  INDICATIONS: Pain and bruising.  TECHNIQUE: 3 VIEWS OF THE RIGHT FOOT  COMPARISON: None available  FINDINGS:  The bones are diffusely osteopenic. Surgical change of the first metatarsal is noted. Degenerative changes are present, especially at the first and second metatarsal phalangeal joints, with lateral subluxation of the base of the first proximal phalanx. No acute fracture, erosion, or dislocation is identified. Soft tissue swelling  of the foot is seen, especially dorsally.       Degenerative and postsurgical changes. No acute fracture identified. Follow-up/further evaluation can be obtained as indications persist.    This report was finalized on 9/13/2023 2:59 PM by Dr. Pieter Blake M.D.       I ordered the above noted radiological studies. Independently reviewed by me and discussed with radiologist.  See dictation above for official radiology interpretation.      PROCEDURES    Procedures        MEDICATIONS GIVEN IN ER    Medications   ibuprofen (ADVIL,MOTRIN) tablet 600 mg (600 mg Oral Not Given 9/13/23 1418)         PROGRESS, DATA ANALYSIS, CONSULTS, AND MEDICAL DECISION MAKING    All labs have been independently interpreted by me.  All radiology studies have been interpreted by me.  Discussion below represents my analysis of pertinent findings related to patient's condition, differential diagnosis, treatment plan and final disposition.    Patient presentation and evaluation consistent with uncomplicated foot contusion appropriate for outpatient management with symptom control and close follow-up with PCP.  Close return precautions given at this time and all questions answered.     - Chronic or social conditions impacting care: None      DIFFERENTIAL DIAGNOSIS INCLUDE BUT NOT LIMITED TO:     Ankle sprain, DVT, SVT      Orders placed during this visit:  Orders Placed This Encounter   Procedures    XR Foot 3+ View Right         ED Course as of 09/13/23 1621   Wed Sep 13, 2023   1516 X-ray of the right foot independently interpreted by myself.  I see no acute fracture.  No dislocation. [TD]      ED Course User Index  [TD] Joao Joseph II, MD       AS OF 15:17 EDT VITALS:    BP - 127/71  HR - 60  TEMP - 97.2 °F (36.2 °C) (Tympanic)  02 SATS - 95%    1004 I rechecked the patient.  I discussed the patient's labs, radiology findings (including all incidental findings), diagnosis, and plan for discharge.  A repeat exam reveals no new  worrisome changes from my initial exam findings.  The patient understands that the fact that they are being discharged does not denote that nothing is abnormal, it indicates that no clinical emergency is present and that they must follow-up as directed in order to properly maintain their health.  Follow-up instructions (specifically listed below) and return to ER precautions were given at this time.  I specifically instructed the patient to follow-up with their PCP.  The patient understands and agrees with the plan, and is ready for discharge.  All questions answered.      DIAGNOSIS  Final diagnoses:   Contusion of right foot, initial encounter         DISPOSITION  D/c    Pt masked in first look. I wore a surgical mask throughout my encounters with the pt. I performed hand hygiene on entry into the pt room and upon exit.     Dictated utilizing Dragon dictation     Note Disclaimer: At [facility], we believe that sharing information builds trust and better relationships. You are receiving this note because you recently visited [facility]. It is possible you will see health information before a provider has talked with you about it. This kind of information can be easy to misunderstand. To help you fully understand what it means for your health, we urge you to discuss this note with your provider.      Fabio Winter PA  09/14/23 7617

## 2023-09-21 ENCOUNTER — TELEPHONE (OUTPATIENT)
Dept: CASE MANAGEMENT | Facility: OTHER | Age: 83
End: 2023-09-21
Payer: MEDICARE

## 2023-09-22 ENCOUNTER — PATIENT OUTREACH (OUTPATIENT)
Dept: CASE MANAGEMENT | Facility: OTHER | Age: 83
End: 2023-09-22
Payer: MEDICARE

## 2023-09-22 NOTE — OUTREACH NOTE
AMBULATORY CASE MANAGEMENT NOTE    Name and Relationship of Patient/Support Person: Dior Pettit - Self    Patient Outreach    Spoke with patient at this time regarding recent ER visit, identified self and role.  Patient states a table fell on her foot and she went to the ER.  She says that since she is on a blood thinner that her foot is still very bruised.  Offered ER f/u appt with PCP, patient declines at this time.  Advised patient to contact PCP office if she would like to make an appt.  Patient denies further needs, no needs noted by CM.  Will close program.      Candace WAGONER  Ambulatory Case Management    9/22/2023, 09:15 EDT

## 2023-09-24 NOTE — ASSESSMENT & PLAN NOTE
Denies myalgias with atorvastatin and fenofibrate which she will continue along with a low-fat, low-cholesterol diet.

## 2023-09-24 NOTE — PROGRESS NOTES
The ABCs of the Annual Wellness Visit  Subsequent Medicare Wellness Visit    Subjective    Dior Pettit is a 83 y.o. female who presents for a Subsequent Medicare Wellness Visit.    The following portions of the patient's history were reviewed and   updated as appropriate: allergies, current medications, past family history, past medical history, past social history, past surgical history, and problem list.    Compared to one year ago, the patient feels her physical   health is the same.    Compared to one year ago, the patient feels her mental   health is the same.    Recent Hospitalizations:  This patient has had a [facility] admission record on file within the last 365 days.    Current Medical Providers:  Patient Care Team:  Ashanti Hong APRN as PCP - General (Internal Medicine)  Gerardo Rodriguez Jr., MD as Consulting Physician (Pulmonary Disease)  Abbi Mitchell MD as Consulting Physician (Cardiology)  Luis Miguel Parker MD as Consulting Physician (Ophthalmology)  Tae Burton MD as Consulting Physician (Dermatology)  Addis Gomes MD as Consulting Physician (Plastic Surgery)    Outpatient Medications Prior to Visit   Medication Sig Dispense Refill    acetaminophen (TYLENOL) 325 MG tablet Take 2 tablets by mouth Every 4 (Four) Hours As Needed for Mild Pain.      amitriptyline (ELAVIL) 50 MG tablet TAKE 1 TABLET EVERY NIGHT 90 tablet 1    amLODIPine (NORVASC) 5 MG tablet Take 1 tablet by mouth Daily. 90 tablet 1    ascorbic acid (VITAMIN C) 1000 MG tablet Take 1 tablet by mouth Daily. Indications: Inadequate Vitamin C      atorvastatin (LIPITOR) 80 MG tablet Take 1 tablet by mouth Every Night. Indications: High Amount of Fats in the Blood 90 tablet 1    azelastine (ASTELIN) 0.1 % nasal spray 2 sprays into the nostril(s) as directed by provider 2 (Two) Times a Day As Needed for Allergies or Rhinitis. Use in each nostril as directed  Indications: Hayfever      bisacodyl  (DULCOLAX) 10 MG suppository Insert 1 suppository into the rectum Daily As Needed for Constipation.      Blood Glucose Monitoring Suppl (Prodigy No Coding Blood Gluc) w/Device kit 1 each Daily. 1 kit 0    dofetilide (TIKOSYN) 250 MCG capsule TAKE 1 CAPSULE BY MOUTH EVERY 12 HOURS. 180 capsule 1    Eliquis 5 MG tablet tablet TAKE 1 TABLET BY MOUTH EVERY 12 HOURS 180 tablet 1    fenofibrate micronized (LOFIBRA) 134 MG capsule TAKE 1 CAPSULE EVERY       MORNING BEFORE BREAKFAST 90 capsule 3    glucosamine-chondroitin 500-400 MG capsule capsule Take 2 capsules by mouth Daily.      glucose blood (Prodigy No Coding Blood Gluc) test strip TEST BLOOD SUGAR DAILY 50 each 0    ipratropium (ATROVENT) 0.06 % nasal spray INSTILL 2 SPRAYS IN EACH NOSTRIL EVERY SIX HOURS AS NEEDED 15 mL 1    levothyroxine (SYNTHROID, LEVOTHROID) 50 MCG tablet TAKE 1 TABLET EVERY OTHER  DAY 45 tablet 1    levothyroxine (SYNTHROID, LEVOTHROID) 75 MCG tablet TAKE ONE TABLET EVERY OTHER DAY (Patient taking differently: Take 1 tablet by mouth Daily. TAKE ONE TABLET EVERY OTHER DAY  Indications: Underactive Thyroid) 45 tablet 3    losartan (COZAAR) 100 MG tablet Take 1 tablet by mouth Daily. 90 tablet 1    metFORMIN ER (GLUCOPHAGE-XR) 750 MG 24 hr tablet Take 1 tablet by mouth 2 (Two) Times a Day. 180 tablet 3    metoprolol tartrate (LOPRESSOR) 50 MG tablet Take 0.5 tablets by mouth 2 (Two) Times a Day. 90 tablet 2    Multiple Vitamins-Minerals (MULTIVITAL PO) Take 1 tablet by mouth Daily. Indications: potential deficiency      Prodigy Safety Lancets 26G misc CHECK BLOOD SUGAR ONE TIME PER  each 1    valACYclovir (VALTREX) 1000 MG tablet TAKE 2 TABLETS 4 TIMES     DAILY FOR MOUTH ULCER (Patient taking differently: TAKE 2 TABLETS 4 TIMES     DAILY FOR MOUTH ULCER as needed) 16 tablet 4    venlafaxine XR (EFFEXOR-XR) 150 MG 24 hr capsule Take 1 capsule by mouth Daily. 90 capsule 3    omeprazole (priLOSEC) 40 MG capsule TAKE 1 CAPSULE DAILY (Patient  "taking differently: No sig reported) 90 capsule 1     No facility-administered medications prior to visit.       No opioid medication identified on active medication list. I have reviewed chart for other potential  high risk medication/s and harmful drug interactions in the elderly.        Aspirin is not on active medication list.  Aspirin use is not indicated based on review of current medical condition/s. Risk of harm outweighs potential benefits.  .    Patient Active Problem List   Diagnosis    Essential hypertension    Pulmonary hypertension    NNEKA (obstructive sleep apnea)    Gastroesophageal reflux disease    Anxiety    Restless leg syndrome    Controlled diabetes mellitus type 2 with complications    Hypothyroidism    Hypercholesteremia    Seasonal allergic rhinitis    Mild intermittent asthma without complication    S/P AVR    Dilated aortic root    Thoracic aortic aneurysm without rupture     Primary insomnia    Renal insufficiency    Macular degeneration of both eyes    Coronary artery disease involving native coronary artery with angina pectoris    Rectocele    Nonrheumatic mitral valve stenosis    Slow transit constipation    Dizziness    Daily headache    Symptomatic bradycardia    Closed displaced fracture of right femoral neck    PAF (paroxysmal atrial fibrillation)    Acute stroke due to ischemia     Advance Care Planning   Advance Care Planning     Advance Directive is on file.  ACP discussion was held with the patient during this visit. Patient has an advance directive in EMR which is still valid.      Objective    Vitals:    09/01/23 1418   BP: 130/74   BP Location: Left arm   Patient Position: Sitting   Cuff Size: Adult   Pulse: 63   Temp: 97.8 °F (36.6 °C)   TempSrc: Temporal   SpO2: 100%   Weight: 63.4 kg (139 lb 12.8 oz)   Height: 170.2 cm (67\")   PainSc: 0-No pain     Estimated body mass index is 21.9 kg/m² as calculated from the following:    Height as of this encounter: 170.2 cm (67\").    " Weight as of this encounter: 63.4 kg (139 lb 12.8 oz).    BMI is within normal parameters. No other follow-up for BMI required.      Does the patient have evidence of cognitive impairment? No    Lab Results   Component Value Date    CHLPL 148 2023    TRIG 72 2023    HDL 56 2023    LDL 78 2023    VLDL 14 2023    HGBA1C 5.60 2023        HEALTH RISK ASSESSMENT    Smoking Status:  Social History     Tobacco Use   Smoking Status Never   Smokeless Tobacco Never   Tobacco Comments    caffeine use - 1 cup coffee daily      Alcohol Consumption:  Social History     Substance and Sexual Activity   Alcohol Use Yes    Comment: less than 1 glass of wine     Fall Risk Screen:    SANJANA Fall Risk Assessment was completed, and patient is at HIGH risk for falls. Assessment completed on:2023    Depression Screenin/1/2023     2:25 PM   PHQ-2/PHQ-9 Depression Screening   Little Interest or Pleasure in Doing Things 0-->not at all   Feeling Down, Depressed or Hopeless 0-->not at all   PHQ-9: Brief Depression Severity Measure Score 0       Health Habits and Functional and Cognitive Screenin/1/2023     2:25 PM   Functional & Cognitive Status   Do you have difficulty preparing food and eating? No   Do you have difficulty bathing yourself, getting dressed or grooming yourself? No   Do you have difficulty using the toilet? No   Do you have difficulty moving around from place to place? No   Do you have trouble with steps or getting out of a bed or a chair? No   Current Diet Unhealthy Diet   Dental Exam Up to date   Eye Exam Up to date   Exercise (times per week) 0 times per week   Current Exercises Include No Regular Exercise   Do you need help using the phone?  No   Are you deaf or do you have serious difficulty hearing?  No   Do you need help to go to places out of walking distance? No   Do you need help shopping? No   Do you need help preparing meals?  No   Do you need help with  housework?  No   Do you need help with laundry? No   Do you need help taking your medications? No   Do you need help managing money? No   Do you ever drive or ride in a car without wearing a seat belt? No   Have you felt unusual stress, anger or loneliness in the last month? No   Who do you live with? Alone   If you need help, do you have trouble finding someone available to you? No   Have you been bothered in the last four weeks by sexual problems? No   Do you have difficulty concentrating, remembering or making decisions? Yes       Age-appropriate Screening Schedule:  Refer to the list below for future screening recommendations based on patient's age, sex and/or medical conditions. Orders for these recommended tests are listed in the plan section. The patient has been provided with a written plan.    Health Maintenance   Topic Date Due    TDAP/TD VACCINES (2 - Tdap) 01/01/2004    ZOSTER VACCINE (2 of 3) 02/26/2012    COVID-19 Vaccine (5 - Pfizer series) 08/19/2022    INFLUENZA VACCINE  10/01/2023    URINE MICROALBUMIN  01/11/2024    HEMOGLOBIN A1C  03/11/2024    ANNUAL WELLNESS VISIT  09/01/2024    LIPID PANEL  09/11/2024    DIABETIC EYE EXAM  09/12/2024    Pneumococcal Vaccine 65+  Completed    MAMMOGRAM  Discontinued    DXA SCAN  Discontinued    COLORECTAL CANCER SCREENING  Discontinued                  CMS Preventative Services Quick Reference  Risk Factors Identified During Encounter  Immunizations Discussed/Encouraged: Tdap, Shingrix, and COVID19  The above risks/problems have been discussed with the patient.  Pertinent information has been shared with the patient in the After Visit Summary.  An After Visit Summary and PPPS were made available to the patient.    Follow Up:   Next Medicare Wellness visit to be scheduled in 1 year.       Additional E&M Note during same encounter follows:  Patient has multiple medical problems which are significant and separately identifiable that require additional work above  "and beyond the Medicare Wellness Visit.      Chief Complaint  Medicare Wellness-subsequent, Hypertension, Hyperlipidemia, Diabetes, and Hypothyroidism    Subjective        HPI  Dior Pettit is also being seen today for f/u regarding HTN, hypercholesteremia, DM2 and hypothyroidism.    She reports feeling well with improvement in recurrent episodes of feeling dizzy and lightheaded.     She has a hx of asthma, increased to Advair daily (2/2023) whish she is tolerating well, denies wheezing. No chest pain or shortness of breath.    Review of Systems   Constitutional:  Positive for fatigue.   HENT:  Positive for congestion, postnasal drip and rhinorrhea.    Respiratory:  Negative for cough, chest tightness and shortness of breath.    Cardiovascular:  Negative for chest pain, palpitations and leg swelling.   Gastrointestinal:  Negative for abdominal pain.   Musculoskeletal:  Positive for arthralgias.     Objective   Vital Signs:  /74 (BP Location: Left arm, Patient Position: Sitting, Cuff Size: Adult)   Pulse 63   Temp 97.8 °F (36.6 °C) (Temporal)   Ht 170.2 cm (67\")   Wt 63.4 kg (139 lb 12.8 oz)   SpO2 100%   BMI 21.90 kg/m²     Physical Exam  Constitutional:       Appearance: She is well-developed. She is not ill-appearing.   HENT:      Head: Normocephalic.      Right Ear: Hearing, tympanic membrane and external ear normal.      Left Ear: Hearing, tympanic membrane and external ear normal.      Nose: Nose normal. No nasal deformity, mucosal edema or rhinorrhea.      Right Sinus: No maxillary sinus tenderness or frontal sinus tenderness.      Left Sinus: No maxillary sinus tenderness or frontal sinus tenderness.      Mouth/Throat:      Dentition: Normal dentition.   Eyes:      General: Lids are normal.         Right eye: No discharge.         Left eye: No discharge.      Conjunctiva/sclera: Conjunctivae normal.      Right eye: No exudate.     Left eye: No exudate.  Neck:      Thyroid: No thyroid mass or " thyromegaly.      Vascular: No carotid bruit.      Trachea: Trachea normal.   Cardiovascular:      Rate and Rhythm: Regular rhythm.      Pulses: Normal pulses.      Heart sounds: Normal heart sounds. No murmur heard.  Pulmonary:      Effort: No respiratory distress.      Breath sounds: Normal breath sounds. No decreased breath sounds, wheezing, rhonchi or rales.   Abdominal:      General: Bowel sounds are normal.      Palpations: Abdomen is soft.      Tenderness: There is no abdominal tenderness.   Musculoskeletal:      Cervical back: Normal range of motion. No edema.   Lymphadenopathy:      Head:      Right side of head: No submental, submandibular, tonsillar, preauricular, posterior auricular or occipital adenopathy.      Left side of head: No submental, submandibular, tonsillar, preauricular, posterior auricular or occipital adenopathy.   Skin:     General: Skin is warm and dry.      Nails: There is no clubbing.   Neurological:      Mental Status: She is alert.   Psychiatric:         Behavior: Behavior is cooperative.        The following data was reviewed by: TOSHA Houser on 09/01/2023:  Common labs          4/14/2023    06:25 4/15/2023    05:29 9/11/2023    11:30   Common Labs   Glucose 114   98    BUN 19   19    Creatinine 0.69   0.91    Sodium 140   141    Potassium 4.3   5.5    Chloride 101   104    Calcium 9.9   10.1    Total Protein   6.8    Albumin 4.1   4.9    Total Bilirubin   0.5    Alkaline Phosphatase   76    AST (SGOT)   21    ALT (SGPT)   22    WBC 8.76  8.45  5.51    Hemoglobin 14.3  13.5  13.0    Hematocrit 43.6  41.9  38.7    Platelets 266  272  265    Total Cholesterol   148    Triglycerides   72    HDL Cholesterol   56    LDL Cholesterol    78    Hemoglobin A1C   5.60                 Assessment and Plan   Diagnoses and all orders for this visit:    1. Medicare annual wellness visit, subsequent (Primary)    2. Essential hypertension  Assessment & Plan:  BP is well-controlled on  amlodipine, losartan and metoprolol which she will continue along with a low sodium diet.    Orders:  -     Comprehensive Metabolic Panel  -     CBC (No Diff)    3. Hypercholesteremia  Assessment & Plan:  Denies myalgias with atorvastatin and fenofibrate which she will continue along with a low-fat, low-cholesterol diet.    Orders:  -     Lipid Panel    4. Controlled type 2 diabetes mellitus with complication, without long-term current use of insulin  Assessment & Plan:  She is managed on metformin for glucose control, recheck A1c. Continue a low-carb, low-sugar diet.    Orders:  -     Hemoglobin A1c    5. Acquired hypothyroidism  Assessment & Plan:  She is on Synthroid 50 mcg and 75 mcg (alternating) for thyroid replacement, recheck TSH.    Orders:  -     TSH           Follow Up   Return in about 4 months (around 1/1/2024).  Patient was given instructions and counseling regarding her condition or for health maintenance advice. Please see specific information pulled into the AVS if appropriate.

## 2023-09-24 NOTE — ASSESSMENT & PLAN NOTE
BP is well-controlled on amlodipine, losartan and metoprolol which she will continue along with a low sodium diet.

## 2023-09-24 NOTE — PATIENT INSTRUCTIONS
Medicare Wellness  Personal Prevention Plan of Service     Date of Office Visit:    Encounter Provider:  TOSHA Houser  Place of Service:  [facility]  Patient Name: Dior Pettit  :  1940    As part of the Medicare Wellness portion of your visit today, we are providing you with this personalized preventive plan of services (PPPS). This plan is based upon recommendations of the [place] Preventive Services Task Force (USPSTF) and the Advisory Committee on Immunization Practices (ACIP).    This lists the preventive care services that should be considered, and provides dates of when you are due. Items listed as completed are up-to-date and do not require any further intervention.    Health Maintenance   Topic Date Due    TDAP/TD VACCINES (2 - Tdap) 2004    ZOSTER VACCINE (2 of 3) 2012    COVID-19 Vaccine (5 - Pfizer series) 2022    INFLUENZA VACCINE  10/01/2023    URINE MICROALBUMIN  2024    HEMOGLOBIN A1C  2024    ANNUAL WELLNESS VISIT  2024    LIPID PANEL  2024    DIABETIC EYE EXAM  2024    Pneumococcal Vaccine 65+  Completed    MAMMOGRAM  Discontinued    DXA SCAN  Discontinued    COLORECTAL CANCER SCREENING  Discontinued       Orders Placed This Encounter   Procedures    Hemoglobin A1c     Order Specific Question:   Release to patient     Answer:   Routine Release [[id number]]     Order Specific Question:   [facility] Has the patient fasted?     Answer:   Yes    Comprehensive Metabolic Panel     Order Specific Question:   Release to patient     Answer:   Routine Release [[id number]]     Order Specific Question:   [facility] Has the patient fasted?     Answer:   Yes    CBC (No Diff)     Order Specific Question:   Release to patient     Answer:   Routine Release [[id number]]     Order Specific Question:   [facility] Has the patient fasted?     Answer:   Yes    Lipid Panel     Order Specific Question:   Release to patient      Answer:   Routine Release [[id number]]     Order Specific Question:   [facility] Has the patient fasted?     Answer:   Yes    TSH     Order Specific Question:   Release to patient     Answer:   Routine Release [[id number]]     Order Specific Question:   [facility] Has the patient fasted?     Answer:   Yes       Return in about 4 months (around 1/1/2024).

## 2023-09-24 NOTE — ASSESSMENT & PLAN NOTE
She is managed on metformin for glucose control, recheck A1c. Continue a low-carb, low-sugar diet.

## 2023-09-27 DIAGNOSIS — E11.9 DIABETES MELLITUS WITHOUT COMPLICATION: ICD-10-CM

## 2023-09-27 DIAGNOSIS — I10 ESSENTIAL HYPERTENSION: ICD-10-CM

## 2023-10-10 ENCOUNTER — OFFICE VISIT (OUTPATIENT)
Dept: CARDIOLOGY | Facility: CLINIC | Age: 83
End: 2023-10-10
Payer: MEDICARE

## 2023-10-10 VITALS
HEART RATE: 58 BPM | BODY MASS INDEX: 21.07 KG/M2 | WEIGHT: 139 LBS | SYSTOLIC BLOOD PRESSURE: 138 MMHG | DIASTOLIC BLOOD PRESSURE: 76 MMHG | HEIGHT: 68 IN

## 2023-10-10 DIAGNOSIS — I71.22 ANEURYSM OF AORTIC ARCH WITHOUT RUPTURE: Chronic | ICD-10-CM

## 2023-10-10 DIAGNOSIS — I10 ESSENTIAL HYPERTENSION: Chronic | ICD-10-CM

## 2023-10-10 DIAGNOSIS — N28.9 RENAL INSUFFICIENCY: Chronic | ICD-10-CM

## 2023-10-10 DIAGNOSIS — I48.0 PAF (PAROXYSMAL ATRIAL FIBRILLATION): Primary | ICD-10-CM

## 2023-10-10 DIAGNOSIS — Z95.2 S/P AVR: ICD-10-CM

## 2023-10-10 DIAGNOSIS — G47.33 OSA (OBSTRUCTIVE SLEEP APNEA): Chronic | ICD-10-CM

## 2023-10-10 PROCEDURE — 99214 OFFICE O/P EST MOD 30 MIN: CPT | Performed by: INTERNAL MEDICINE

## 2023-10-10 PROCEDURE — 1159F MED LIST DOCD IN RCRD: CPT | Performed by: INTERNAL MEDICINE

## 2023-10-10 PROCEDURE — 1160F RVW MEDS BY RX/DR IN RCRD: CPT | Performed by: INTERNAL MEDICINE

## 2023-10-10 PROCEDURE — 3075F SYST BP GE 130 - 139MM HG: CPT | Performed by: INTERNAL MEDICINE

## 2023-10-10 PROCEDURE — 93000 ELECTROCARDIOGRAM COMPLETE: CPT | Performed by: INTERNAL MEDICINE

## 2023-10-10 PROCEDURE — 3078F DIAST BP <80 MM HG: CPT | Performed by: INTERNAL MEDICINE

## 2023-10-10 NOTE — PROGRESS NOTES
Date of Office Visit: 10/10/2023  Encounter Provider: Abbi Mitchell MD  Place of Service: [facility]  Patient Name: Dior Pettit  :1940    Chief complaint  Pulmonary hypertension, atrial fibrillation valvular heart disease    History of Present Illness  Patient is a 83-year-old female with history of hypertension, hyperlipidemia, diabetes, aortic valve disease and subaortic membrane.  In  she underwent aortic valve replacement with porcine valve as well as subaortic membrane resection. Cardiac catheterization was negative for coronary artery disease.  She was then found to have significant sleep apnea but was been intolerant of therapy for this.  She is also noted to have pulmonary hypertension with an RV systolic pressure 44 mmHg in .  By 2015 she developed chest tightness while shoveling snow in a stress perfusion study was negative for ischemia.  A follow-up echocardiogram in 2017 that showed normal systolic function with mild left ventricular hypertrophy aortic valve calcification with mild stenosis with mild mitral regurgitation moderate tricuspid valve regurgitation with an RV systolic pressure 52 mmHg and an ascending aorta measuring 3.9 cm.  In 2019 with complaints of chest pain a stress echocardiogram showed normal systolic function grade 1 diastolic dysfunction, mild concentric left ventricular hypertrophy there is mild to moderate bioprosthetic aortic valve stenosis with mild tricuspid regurgitation and an RV systolic pressure 36 mmHg.  There was no ischemia at 6 METs.  By 2021 she was noted to have atrial fibrillation with rapid rates with dyspnea and dizziness.  She was admitted to hospital and underwent BRAULIO as subsequent electrical cardioversion to sinus rhythm.  BRAULIO showed normal sinus rhythm with left ventricular hypertrophy with mild pulmonary hypertension with RVSP pressure 36 mmHg, left atrial volume was  increased.  There was no significant aortic or subaortic valve disease noted.  She was discharged on amiodarone and Eliquis.  She continued to have symptoms of fatigue dizziness.  proBNP was normal.  Amiodarone was decreased.  A stress perfusion study was negative for ischemia.  Carotid Doppler showed mild right carotid artery stenosis.  A 24-hour Holter showed sinus rhythm with rare PACs PVCs with no significant tachycardia or bradycardia arrhythmia noted.  She had a noncontrast CT chest 7/2021 that showed an ascending aorta measuring 4 cm.  She then saw Dr Jj on 11/2021.  After further discussion amiodarone was discontinued.  By September 2022 she went back to symptomatic atrial fibrillation and was seen by Dr Jj and had repeat electrical cardioversion on 9/9/2022.  She was seen again at [facility] on 9/11 with fatigue with bradycardia.  Digoxin and metoprolol held however with hypertension metoprolol was resumed.  Of note his hemoglobin dropped almost 2 g overnight presumably from hydration.  She also appears to been slightly dehydrated with elevated creatinine that also improved.  She had a hard time remembering she was actually dismissed following cardioversion return to the ER for the second visit with bradycardia.  On 4/2023 patient was admitted with stroke.  Saline injection was negative.      Since last visit patient is using her CPAP.  She has no palpitation shortness of breath or chest pain.  She has right edema and dizziness that is unchanged and occurs when she stands.  Blood pressure at home is lower than it is today.      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    Borderline--Noted on Echo & Measured @ 2.6CM and Mid Descending @ 2.5CM on CTA Chest-11/2/18    DM (diabetes mellitus)     T2    Esophagitis     GERD (gastroesophageal reflux disease)     Controlled w/Meds    Headache     Health care maintenance     Heart murmur     History of echocardiogram 10/2/17-[facility]    EF 66%; Borderline Concentric Hypertrophy; Mild Calcification in AV; Mild AVS; Mild AVR; Moderate TVR; Borderline Dilation of Aortic Root/Arch & Borderline Dilation of Ascending/Proximal Aorta Present    Hyperlipidemia     Controlled w/Meds    Hypertension     Controlled w/Meds    Hypothyroidism     Controlled w/Synthroid    Insomnia     Macular degeneration, left eye     Mild aortic valve regurgitation 10/02/2017    Noted on Echo    Mild aortic valve stenosis 10/02/2017    Noted on Echo    Mild dilation of ascending aorta 10/02/2017    Borderline--Noted on Echo    Moderate tricuspid valve regurgitation 10/02/2017    Noted on Echo    Mood disorder in conditions classified elsewhere     Controlled w/Meds    Near syncope 03/2017-[facility] ER    Neuropathy     NNEKA (obstructive sleep apnea)     Untreated    Osteoarthritis     Ankles/Feet    Pulmonary hypertension 2017    Renal insufficiency     RLS (restless legs syndrome)     Thoracic ascending aortic aneurysm Dx in 2007 @ 3.8CM & 1/02/2018    Noted @ 4CM on CTA Chest;    Visual impairment     macular degeneratuin     Past Surgical History:   Procedure Laterality Date    AORTIC VALVE REPAIR/REPLACEMENT  2007    Dr. Perry    APPENDECTOMY      AUGMENTATION MAMMAPLASTY  1976    BREAST AUGMENTATION  2014    BUNIONECTOMY      CARDIAC CATHETERIZATION  2007    CARDIAC VALVE REPLACEMENT  2007    porcine    COLONOSCOPY  2010     COLONOSCOPY  03/02/2012    EYE SURGERY      cataracts and ens implants    HYSTERECTOMY  1972    SIGMOIDOSCOPY  2001    TEMPORAL ARTERY BIOPSY Bilateral 4/14/2023    Procedure: BILATERAL TEMPORAL ARTERY BIOPSY;  Surgeon: Stewart Shepherd MD;  Location: [facility] MAIN OR;  Service: Vascular;  Laterality: Bilateral;    TOTAL HIP ARTHROPLASTY Right 11/15/2022    Procedure: Right anterior total hip arthroplasty;  Surgeon: Joao Martinez MD;  Location: [facility] MAIN OR;  Service: Orthopedics;  Laterality: Right;     Outpatient Medications Prior to Visit   Medication Sig Dispense Refill    acetaminophen (TYLENOL) 325 MG tablet Take 2 tablets by mouth Every 4 (Four) Hours As Needed for Mild Pain.      amitriptyline (ELAVIL) 50 MG tablet TAKE 1 TABLET EVERY NIGHT 90 tablet 1    amLODIPine (NORVASC) 5 MG tablet Take 1 tablet by mouth Daily. 90 tablet 1    ascorbic acid (VITAMIN C) 1000 MG tablet Take 1 tablet by mouth Daily. Indications: Inadequate Vitamin C      atorvastatin (LIPITOR) 80 MG tablet Take 1 tablet by mouth Every Night. Indications: High Amount of Fats in the Blood 90 tablet 1    azelastine (ASTELIN) 0.1 % nasal spray 2 sprays into the nostril(s) as directed by provider 2 (Two) Times a Day As Needed for Allergies or Rhinitis. Use in each nostril as directed  Indications: Hayfever      Blood Glucose Monitoring Suppl (Prodigy No Coding Blood Gluc) w/Device kit 1 each Daily. 1 kit 0    dofetilide (TIKOSYN) 250 MCG capsule TAKE 1 CAPSULE BY MOUTH EVERY 12 HOURS. 180 capsule 1    Eliquis 5 MG tablet tablet TAKE 1 TABLET BY MOUTH EVERY 12 HOURS 180 tablet 1    fenofibrate micronized (LOFIBRA) 134 MG capsule TAKE 1 CAPSULE EVERY       MORNING BEFORE BREAKFAST 90 capsule 3    glucosamine-chondroitin 500-400 MG capsule capsule Take 2 capsules by mouth Daily.      glucose blood (Prodigy No Coding Blood Gluc) test strip TEST BLOOD SUGAR DAILY 50 each 0    ipratropium (ATROVENT) 0.06 % nasal spray INSTILL 2 SPRAYS IN  EACH NOSTRIL EVERY SIX HOURS AS NEEDED 15 mL 1    levothyroxine (SYNTHROID, LEVOTHROID) 50 MCG tablet TAKE 1 TABLET EVERY OTHER  DAY 45 tablet 1    levothyroxine (SYNTHROID, LEVOTHROID) 75 MCG tablet TAKE ONE TABLET EVERY OTHER DAY (Patient taking differently: Take 1 tablet by mouth Daily. TAKE ONE TABLET EVERY OTHER DAY  Indications: Underactive Thyroid) 45 tablet 3    Multiple Vitamins-Minerals (MULTIVITAL PO) Take 1 tablet by mouth Daily. Indications: potential deficiency      omeprazole (priLOSEC) 40 MG capsule TAKE 1 CAPSULE DAILY *DR LOERA MFR* 90 capsule 0    Prodigy Safety Lancets 26G misc CHECK BLOOD SUGAR ONE TIME PER  each 1    valACYclovir (VALTREX) 1000 MG tablet TAKE 2 TABLETS 4 TIMES     DAILY FOR MOUTH ULCER (Patient taking differently: TAKE 2 TABLETS 4 TIMES     DAILY FOR MOUTH ULCER as needed) 16 tablet 4    venlafaxine XR (EFFEXOR-XR) 150 MG 24 hr capsule Take 1 capsule by mouth Daily. 90 capsule 3    losartan (COZAAR) 100 MG tablet Take 1 tablet by mouth Daily. 90 tablet 1    metFORMIN ER (GLUCOPHAGE-XR) 750 MG 24 hr tablet Take 1 tablet by mouth 2 (Two) Times a Day. 180 tablet 3    metoprolol tartrate (LOPRESSOR) 50 MG tablet Take 0.5 tablets by mouth 2 (Two) Times a Day. 90 tablet 2    bisacodyl (DULCOLAX) 10 MG suppository Insert 1 suppository into the rectum Daily As Needed for Constipation. (Patient not taking: Reported on 10/10/2023)       No facility-administered medications prior to visit.       Allergies as of 10/10/2023    (No Known Allergies)     Social History     Socioeconomic History    Marital status:    Tobacco Use    Smoking status: Never    Smokeless tobacco: Never    Tobacco comments:     caffeine use - 1 cup coffee daily    Vaping Use    Vaping Use: Never used   Substance and Sexual Activity    Alcohol use: Yes     Comment: less than 1 glass of wine    Drug use: Never    Sexual activity: Not Currently     Partners: Male     Birth control/protection: Surgical  "    Comment: Hyst     Family History   Problem Relation Age of Onset    Hypertension Mother     Stroke Mother     Cancer Mother     Diabetes Sister     Coronary artery disease Brother     Diabetes Brother     Valvular heart disease Brother         TAVR    Coronary artery disease Brother     Cancer Brother     Birth defects Daughter     Skin cancer Neg Hx     Malig Hyperthermia Neg Hx      Review of Systems   Constitutional: Negative for chills, fever, weight gain and weight loss.   Cardiovascular:  Negative for leg swelling.   Respiratory:  Negative for cough, snoring and wheezing.    Hematologic/Lymphatic: Negative for bleeding problem. Does not bruise/bleed easily.   Skin:  Negative for color change.   Musculoskeletal:  Negative for falls, joint pain and myalgias.   Gastrointestinal:  Negative for melena.   Genitourinary:  Negative for hematuria.   Neurological:  Negative for excessive daytime sleepiness.   Psychiatric/Behavioral:  Negative for depression. The patient is not nervous/anxious.         Objective:     Vitals:    10/10/23 1036   BP: 138/76   Pulse: 58   Weight: 63 kg (139 lb)   Height: 172.7 cm (68\")     Body mass index is 21.13 kg/m².    Vitals reviewed.   Constitutional:       Appearance: Well-developed.   Eyes:      General: No scleral icterus.        Right eye: No discharge.      Conjunctiva/sclera: Conjunctivae normal.      Pupils: Pupils are equal, round, and reactive to light.   HENT:      Head: Normocephalic.      Nose: Nose normal.   Neck:      Thyroid: No thyromegaly.      Vascular: No JVD.   Pulmonary:      Effort: Pulmonary effort is normal. No respiratory distress.      Breath sounds: Normal breath sounds. No wheezing. No rales.   Cardiovascular:      Normal rate. Regular rhythm. Normal S1. Normal S2.       Murmurs: There is a grade 2/6 systolic murmur at the URSB and ULSB.      No gallop.    Pulses:     Intact distal pulses.      Carotid: 2+ bilaterally.     Radial: 2+ bilaterally.     " Femoral: 2+ bilaterally.     Popliteal: 2+ bilaterally.     Dorsalis pedis: 2+ bilaterally.     Posterior tibial: 2+ bilaterally.  Edema:     Peripheral edema present.     Feet: trace edema of the right foot.  Abdominal:      General: Bowel sounds are normal. There is no distension.      Palpations: Abdomen is soft.      Tenderness: There is no abdominal tenderness. There is no rebound.   Musculoskeletal: Normal range of motion.         General: No tenderness.      Cervical back: Normal range of motion and neck supple. Skin:     General: Skin is warm and dry.      Findings: No erythema or rash.   Neurological:      Mental Status: Alert and oriented to person, place, and time.   Psychiatric:         Behavior: Behavior normal.         Thought Content: Thought content normal.         Judgment: Judgment normal.       Lab Review:   Lab Results - Last 18 Months   Lab Units 09/11/23  1130 04/15/23  0529 04/14/23  0625 04/09/23  1514 01/11/23  1159   WBC 10*3/mm3 5.51 8.45 8.76   < > 5.61   RBC 10*6/mm3 4.23 4.81 5.12   < > 4.14   HEMOGLOBIN g/dL 13.0 13.5 14.3   < > 12.1   HEMATOCRIT % 38.7 41.9 43.6   < > 38.1   MCV fL 91.5 87.1 85.2   < > 92.0   MCH pg 30.7 28.1 27.9   < > 29.2   MCHC g/dL 33.6 32.2 32.8   < > 31.8   RDW % 12.3 14.9 14.5   < > 13.5   PLATELETS 10*3/mm3 265 272 266   < > 295   NEUTROPHIL % %  --  72.3 56.8   < > 49.9   LYMPHOCYTE % %  --  19.5* 31.1   < > 32.6   MONOCYTES % %  --  7.5 10.4   < > 13.5*   EOSINOPHIL % %  --  0.1* 0.7   < > 2.9   BASOPHIL % %  --  0.1 0.5   < > 0.7   NEUTROS ABS 10*3/mm3  --  6.11 4.99   < > 2.80   LYMPHS ABS 10*3/mm3  --  1.65 2.72   < > 1.83   MONOS ABS 10*3/mm3  --  0.63 0.91*   < > 0.76   EOS ABS 10*3/mm3  --  0.01 0.06   < > 0.16   BASOS ABS 10*3/mm3  --  0.01 0.04   < > 0.04   IMM GRAN % %  --   --   --   --  0.4   IMMATURE GRANS (ABS) 10*3/mm3  --   --   --   --  0.02   RDW-SD fl  --  46.9 45.3   < >  --    MPV fL  --  9.9 9.8   < >  --     < > = values in this  interval not displayed.       Lab Results - Last 18 Months   Lab Units 09/11/23  1130 04/14/23  0625 04/11/23  0629 04/10/23  0518 04/09/23  1514   GLUCOSE mg/dL 98 114* 137* 99 85   BUN mg/dL 19 19 15 15 19   CREATININE mg/dL 0.91 0.69 0.57 0.75 0.91   SODIUM mmol/L 141 140 136 138 134*   POTASSIUM mmol/L 5.5* 4.3 4.4 4.1 4.3   CHLORIDE mmol/L 104 101 102 102 99   CO2 mmol/L 27.3 28.1 27.0 28.7 23.8   CALCIUM mg/dL 10.1 9.9 10.1 9.9 11.1*   TOTAL PROTEIN g/dL  --   --   --  7.1 8.1   ALBUMIN g/dL 4.9 4.1  --  4.2 4.5   ALT (SGPT) U/L 22  --   --  16 18   AST (SGOT) U/L 21  --   --  24 28   ALK PHOS U/L 76  --   --  95 112   BILIRUBIN mg/dL 0.5  --   --  0.6 0.7   GLOBULIN gm/dL  --   --   --  2.9 3.6   A/G RATIO g/dL  --   --   --  1.4 1.3   BUN / CREAT RATIO  20.9 27.5* 26.3* 20.0 20.9   ANION GAP mmol/L  --  10.9 7.0 7.3 11.2   EGFR mL/min/1.73  --  85.7 89.7 78.6 62.3     Lab Results - Last 18 Months   Lab Units 09/11/23  1130 04/10/23  0518   CHOLESTEROL mg/dL  --  163   TRIGLYCERIDES mg/dL 72 125   HDL CHOL mg/dL 56 50   LDL CHOL mg/dL 78 91   VLDL CHOL mg/dL  --  22   VLDL CHOLESTEROL RAFAEL mg/dL 14  --    LDL/HDL RATIO   --  1.76     Lab Results - Last 18 Months   Lab Units 10/03/22  0650 09/11/22  1648   PROBNP pg/mL 4,969.0* 1,110.0     Lab Results - Last 18 Months   Lab Units 04/09/23  1514 10/03/22  0805   TROPONIN T ng/mL  --  <0.010   HSTROP T ng/L 10*  --      Lab Results - Last 18 Months   Lab Units 09/11/23  1130 04/10/23  0518   TSH uIU/mL 1.020 3.950     Lab Results - Last 18 Months   Lab Units 04/14/23  0625 04/13/23  2314 04/13/23  0000 04/12/23  1747 04/09/23  1514   PROTIME Seconds  --   --   --  13.2 15.4*   APTT seconds 58.7* 82.3*   < > 33.7 39.0*    < > = values in this interval not displayed.         ECG 12 Lead    Date/Time: 10/10/2023 10:47 AM  Performed by: Abbi Mitchell MD    Authorized by: Abbi Mitchell MD  Comparison: compared with previous ECG   Rhythm: sinus rhythm  Conduction: left  anterior fascicular block  Other findings: left ventricular hypertrophy    Clinical impression: abnormal EKG            Diagnosis Plan   1. PAF (paroxysmal atrial fibrillation)  ECG 12 Lead      2. S/P AVR  ECG 12 Lead    Adult Transthoracic Echo Complete W/ Cont if Necessary Per Protocol      3. Aneurysm of aortic arch without rupture        4. Essential hypertension        5. Renal insufficiency        6. NNEKA (obstructive sleep apnea)          Plan:       1.  Paroxysmal atrial fibrillation, status post electrical cardioversion x 2.  Remains in sinus rhythm.  Continue with the same regimen.  2.  Vision changes.  Resolved  3.  Hypertension.  Slightly elevated at home.  She thinks she can make changes in her diet.  She will call if blood pressure samm greater than 130/80 mmHg.  4.  Aortic valve replacement with bioprosthetic valve and subaortic membrane resection in 2007.  Subsequent echoes have shown residual mildly elevated gradient of 20 to 28 mmHg.  Plan to repeat echo in 6 months at follow-up.  5.  Dilated aortic root.  Ascending aorta 4.1 cm on CT scan 4/2023  6.  Pulmonary hypertension, RVSP was elevated to 46 mmHg on echo 1/2022.  Echo 3/2023 showed normal LV function and no pulmonary hypertension   7.  Mild mitral valve stenosis stable and did not appear stenotic on echo 3/2023   8.  Anemia.   9.  Diabetes  10.  Dyslipidemia      Time Spent: I spent 35 minutes caring for Dior on this date of service. This time includes time spent by me in the following activities: preparing for the visit, reviewing tests, obtaining and/or reviewing a separately obtained history, performing a medically appropriate examination and/or evaluation, counseling and educating the patient/family/caregiver, ordering medications, tests, or procedures, documenting information in the medical record, and independently interpreting results and communicating that information with the patient/family/caregiver.   I spent 1 minutes on the  separately reported service of ECG. This time is not included in the time used to support the E/M service also reported today.        Your medication list            Accurate as of October 10, 2023 11:59 PM. If you have any questions, ask your nurse or doctor.                CHANGE how you take these medications        Instructions Last Dose Given Next Dose Due   levothyroxine 75 MCG tablet  Commonly known as: SYNTHROID, LEVOTHROID  What changed:   how much to take  how to take this  when to take this      TAKE ONE TABLET EVERY OTHER DAY       levothyroxine 50 MCG tablet  Commonly known as: SYNTHROID, LEVOTHROID  What changed: Another medication with the same name was changed. Make sure you understand how and when to take each.      TAKE 1 TABLET EVERY OTHER  DAY       valACYclovir 1000 MG tablet  Commonly known as: VALTREX  What changed: additional instructions      TAKE 2 TABLETS 4 TIMES     DAILY FOR MOUTH ULCER              CONTINUE taking these medications        Instructions Last Dose Given Next Dose Due   acetaminophen 325 MG tablet  Commonly known as: TYLENOL      Take 2 tablets by mouth Every 4 (Four) Hours As Needed for Mild Pain.       amitriptyline 50 MG tablet  Commonly known as: ELAVIL      TAKE 1 TABLET EVERY NIGHT       amLODIPine 5 MG tablet  Commonly known as: NORVASC      Take 1 tablet by mouth Daily.       ascorbic acid 1000 MG tablet  Commonly known as: VITAMIN C      Take 1 tablet by mouth Daily. Indications: Inadequate Vitamin C       atorvastatin 80 MG tablet  Commonly known as: LIPITOR      Take 1 tablet by mouth Every Night. Indications: High Amount of Fats in the Blood       azelastine 0.1 % nasal spray  Commonly known as: ASTELIN      2 sprays into the nostril(s) as directed by provider 2 (Two) Times a Day As Needed for Allergies or Rhinitis. Use in each nostril as directed  Indications: Hayfever       dofetilide 250 MCG capsule  Commonly known as: TIKOSYN      TAKE 1 CAPSULE BY MOUTH  EVERY 12 HOURS.       Eliquis 5 MG tablet tablet  Generic drug: apixaban      TAKE 1 TABLET BY MOUTH EVERY 12 HOURS       fenofibrate micronized 134 MG capsule  Commonly known as: LOFIBRA      TAKE 1 CAPSULE EVERY       MORNING BEFORE BREAKFAST       glucosamine-chondroitin 500-400 MG capsule capsule      Take 2 capsules by mouth Daily.       glucose blood test strip  Commonly known as: Prodigy No Coding Blood Gluc      TEST BLOOD SUGAR DAILY       ipratropium 0.06 % nasal spray  Commonly known as: ATROVENT      INSTILL 2 SPRAYS IN EACH NOSTRIL EVERY SIX HOURS AS NEEDED       losartan 100 MG tablet  Commonly known as: COZAAR      Take 1 tablet by mouth Daily.       metFORMIN  MG 24 hr tablet  Commonly known as: GLUCOPHAGE-XR      TAKE 1 TABLET TWICE A DAY       metoprolol tartrate 50 MG tablet  Commonly known as: LOPRESSOR      Take 0.5 tablets by mouth 2 (Two) Times a Day.       multivitamin with minerals tablet tablet      Take 1 tablet by mouth Daily. Indications: potential deficiency       omeprazole 40 MG capsule  Commonly known as: priLOSEC      TAKE 1 CAPSULE DAILY *DR LOERA MFR*       Prodigy No Coding Blood Gluc w/Device kit      1 each Daily.       Prodigy Safety Lancets 26G misc      CHECK BLOOD SUGAR ONE TIME PER DAY       venlafaxine  MG 24 hr capsule  Commonly known as: EFFEXOR-XR      Take 1 capsule by mouth Daily.                Patient is no longer taking -.  I corrected the med list to reflect this.  I did not stop these medications.      Dictated utilizing Dragon dictation

## 2023-10-11 RX ORDER — LOSARTAN POTASSIUM 50 MG/1
TABLET ORAL
Qty: 90 TABLET | Refills: 1 | OUTPATIENT
Start: 2023-10-11

## 2023-10-11 RX ORDER — METFORMIN HYDROCHLORIDE 750 MG/1
TABLET, EXTENDED RELEASE ORAL
Qty: 180 TABLET | Refills: 3 | Status: SHIPPED | OUTPATIENT
Start: 2023-10-11

## 2023-10-18 DIAGNOSIS — I10 ESSENTIAL HYPERTENSION: Primary | ICD-10-CM

## 2023-10-18 DIAGNOSIS — I48.19 ATRIAL FIBRILLATION, PERSISTENT: ICD-10-CM

## 2023-10-18 RX ORDER — LOSARTAN POTASSIUM 100 MG/1
100 TABLET ORAL DAILY
Qty: 90 TABLET | Refills: 1 | Status: SHIPPED | OUTPATIENT
Start: 2023-10-18

## 2023-10-18 RX ORDER — METOPROLOL TARTRATE 50 MG/1
25 TABLET, FILM COATED ORAL 2 TIMES DAILY
Qty: 90 TABLET | Refills: 1 | Status: SHIPPED | OUTPATIENT
Start: 2023-10-18

## 2023-10-18 NOTE — TELEPHONE ENCOUNTER
Spoke with patient to clarify Losartan - patient advised that we have regular Losartan on med list - not the one with potassium. Patient expressed understanding. Patient states she needs this called in as well as metoprolol. Prescriptions pending

## 2023-10-18 NOTE — TELEPHONE ENCOUNTER
Caller: Dior Pettit    Relationship: Self    Best call back number: [phone]*    What is the best time to reach you: ANYTIME    Who are you requesting to speak with (clinical staff, provider,  specific staff member): CLINICAL    What was the call regarding: PATIENT CALLING NEEDING TO KNOW WHY THE REFILL REQUEST FOR LOSARTAN POTASSIUM 50 MG TABLET, HAS BEEN DENIED BY DANTE MYERS. THE PATIENT STATES THAT THE BOTTLE SHE HAS OF THIS MEDICATION IS AN OLD BOTTLE, AND ONLY HAS A FEW DAYS OF MEDICATION REMAINING. THE PATIENT IS REQUESTING A CALL BACK TO ADVISE WHY THIS MEDICATION WAS DENIED, AND SHOULD SHE BE TAKING SOMETHING ELSE.     Is it okay if the provider responds through [facility]: NO. PATIENT WANTS A TELEPHONE CALL PLEASE.

## 2023-10-31 ENCOUNTER — HOSPITAL ENCOUNTER (OUTPATIENT)
Dept: CARDIOLOGY | Facility: HOSPITAL | Age: 83
Discharge: HOME | End: 2023-10-31
Admitting: INTERNAL MEDICINE
Payer: MEDICARE

## 2023-10-31 VITALS
HEIGHT: 68 IN | WEIGHT: 138.89 LBS | DIASTOLIC BLOOD PRESSURE: 70 MMHG | SYSTOLIC BLOOD PRESSURE: 118 MMHG | BODY MASS INDEX: 21.05 KG/M2 | HEART RATE: 67 BPM

## 2023-10-31 DIAGNOSIS — Z95.2 S/P AVR: ICD-10-CM

## 2023-10-31 LAB
AORTIC DIMENSIONLESS INDEX: 0.7 (DI)
ASCENDING AORTA: 2.9 CM
BH CV ECHO MEAS - ACS: 1.19 CM
BH CV ECHO MEAS - AI P1/2T: 468.5 MSEC
BH CV ECHO MEAS - AO MAX PG: 37.5 MMHG
BH CV ECHO MEAS - AO MEAN PG: 21.6 MMHG
BH CV ECHO MEAS - AO ROOT DIAM: 2.7 CM
BH CV ECHO MEAS - AO V2 MAX: 306.2 CM/SEC
BH CV ECHO MEAS - AO V2 VTI: 72.7 CM
BH CV ECHO MEAS - AVA(I,D): 2.11 CM2
BH CV ECHO MEAS - EDV(CUBED): 48.6 ML
BH CV ECHO MEAS - EDV(MOD-SP2): 71 ML
BH CV ECHO MEAS - EDV(MOD-SP4): 89 ML
BH CV ECHO MEAS - EF(MOD-BP): 68.1 %
BH CV ECHO MEAS - EF(MOD-SP2): 69 %
BH CV ECHO MEAS - EF(MOD-SP4): 67.4 %
BH CV ECHO MEAS - ESV(CUBED): 15.8 ML
BH CV ECHO MEAS - ESV(MOD-SP2): 22 ML
BH CV ECHO MEAS - ESV(MOD-SP4): 29 ML
BH CV ECHO MEAS - FS: 31.2 %
BH CV ECHO MEAS - IVS/LVPW: 1.04 CM
BH CV ECHO MEAS - IVSD: 1.23 CM
BH CV ECHO MEAS - LAT PEAK E' VEL: 9.9 CM/SEC
BH CV ECHO MEAS - LV DIASTOLIC VOL/BSA (35-75): 51 CM2
BH CV ECHO MEAS - LV MASS(C)D: 145.8 GRAMS
BH CV ECHO MEAS - LV MAX PG: 22.8 MMHG
BH CV ECHO MEAS - LV MEAN PG: 11.7 MMHG
BH CV ECHO MEAS - LV SYSTOLIC VOL/BSA (12-30): 16.6 CM2
BH CV ECHO MEAS - LV V1 MAX: 238.7 CM/SEC
BH CV ECHO MEAS - LV V1 VTI: 52.6 CM
BH CV ECHO MEAS - LVIDD: 3.6 CM
BH CV ECHO MEAS - LVIDS: 2.5 CM
BH CV ECHO MEAS - LVOT AREA: 2.9 CM2
BH CV ECHO MEAS - LVOT DIAM: 1.93 CM
BH CV ECHO MEAS - LVPWD: 1.18 CM
BH CV ECHO MEAS - MED PEAK E' VEL: 7.3 CM/SEC
BH CV ECHO MEAS - MR MAX PG: 113.8 MMHG
BH CV ECHO MEAS - MR MAX VEL: 533.4 CM/SEC
BH CV ECHO MEAS - MV A DUR: 0.14 SEC
BH CV ECHO MEAS - MV A MAX VEL: 93.8 CM/SEC
BH CV ECHO MEAS - MV DEC SLOPE: 432.6 CM/SEC2
BH CV ECHO MEAS - MV DEC TIME: 0.35 SEC
BH CV ECHO MEAS - MV E MAX VEL: 124.3 CM/SEC
BH CV ECHO MEAS - MV E/A: 1.33
BH CV ECHO MEAS - MV MAX PG: 10.2 MMHG
BH CV ECHO MEAS - MV MEAN PG: 3.1 MMHG
BH CV ECHO MEAS - MV P1/2T: 107.5 MSEC
BH CV ECHO MEAS - MV V2 VTI: 48.3 CM
BH CV ECHO MEAS - MVA(P1/2T): 2.05 CM2
BH CV ECHO MEAS - MVA(VTI): 3.2 CM2
BH CV ECHO MEAS - PA ACC TIME: 0.13 SEC
BH CV ECHO MEAS - PA V2 MAX: 86.9 CM/SEC
BH CV ECHO MEAS - PULM A REVS DUR: 0.12 SEC
BH CV ECHO MEAS - PULM A REVS VEL: 19.3 CM/SEC
BH CV ECHO MEAS - PULM DIAS VEL: 34.6 CM/SEC
BH CV ECHO MEAS - PULM S/D: 1.27
BH CV ECHO MEAS - PULM SYS VEL: 44 CM/SEC
BH CV ECHO MEAS - QP/QS: 0.25
BH CV ECHO MEAS - RAP SYSTOLE: 8 MMHG
BH CV ECHO MEAS - RV MAX PG: 1.39 MMHG
BH CV ECHO MEAS - RV V1 MAX: 58.7 CM/SEC
BH CV ECHO MEAS - RV V1 VTI: 14.4 CM
BH CV ECHO MEAS - RVOT DIAM: 1.85 CM
BH CV ECHO MEAS - RVSP: 56 MMHG
BH CV ECHO MEAS - SI(MOD-SP2): 28.1 ML/M2
BH CV ECHO MEAS - SI(MOD-SP4): 34.4 ML/M2
BH CV ECHO MEAS - SV(LVOT): 153.5 ML
BH CV ECHO MEAS - SV(MOD-SP2): 49 ML
BH CV ECHO MEAS - SV(MOD-SP4): 60 ML
BH CV ECHO MEAS - SV(RVOT): 38.6 ML
BH CV ECHO MEAS - TR MAX PG: 48.1 MMHG
BH CV ECHO MEAS - TR MAX VEL: 346.7 CM/SEC
BH CV ECHO MEASUREMENTS AVERAGE E/E' RATIO: 14.45
BH CV XLRA - RV BASE: 3.4 CM
BH CV XLRA - RV LENGTH: 6.2 CM
BH CV XLRA - RV MID: 3.7 CM
BH CV XLRA - TDI S': 9.9 CM/SEC
SINUS: 2.9 CM
STJ: 2.35 CM

## 2023-10-31 PROCEDURE — 93306 TTE W/DOPPLER COMPLETE: CPT | Performed by: INTERNAL MEDICINE

## 2023-10-31 PROCEDURE — 25510000001 PERFLUTREN (DEFINITY) 8.476 MG IN SODIUM CHLORIDE (PF) 0.9 % 10 ML INJECTION: Performed by: INTERNAL MEDICINE

## 2023-10-31 PROCEDURE — 93306 TTE W/DOPPLER COMPLETE: CPT

## 2023-10-31 RX ADMIN — PERFLUTREN 3 ML: 6.52 INJECTION, SUSPENSION INTRAVENOUS at 16:13

## 2023-11-02 ENCOUNTER — TELEPHONE (OUTPATIENT)
Dept: CARDIOLOGY | Facility: CLINIC | Age: 83
End: 2023-11-02
Payer: MEDICARE

## 2023-11-02 DIAGNOSIS — R06.09 DYSPNEA ON EXERTION: ICD-10-CM

## 2023-11-02 DIAGNOSIS — G47.33 OSA (OBSTRUCTIVE SLEEP APNEA): Primary | ICD-10-CM

## 2023-11-02 DIAGNOSIS — I27.20 PULMONARY HYPERTENSION: ICD-10-CM

## 2023-11-02 NOTE — TELEPHONE ENCOUNTER
Please let her know that echo overall looks good.  The pressures in her lungs are higher than before which could be related to higher blood pressure overall but also may be related to extra fluid on board.  We would like her to get proBNP drawn.  She should also maintain a low-salt diet and monitor blood pressure at home.  Call if consistently greater than 130/80.  Would also make sure she is following up with sleep medicine to make sure that sleep apnea is adequately treated.  I have entered the orders for the labs

## 2023-11-02 NOTE — TELEPHONE ENCOUNTER
Attempted to call Dior Pettit, no answer.  Left a voicemail for patient to call back.  Will continue to try to reach patient.    Suzette Ramirez RN  [facility]  11/02/23 08:45 EDT

## 2023-11-03 DIAGNOSIS — F51.01 PRIMARY INSOMNIA: ICD-10-CM

## 2023-11-03 RX ORDER — AMITRIPTYLINE HYDROCHLORIDE 50 MG/1
TABLET, FILM COATED ORAL
Qty: 90 TABLET | Refills: 1 | Status: SHIPPED | OUTPATIENT
Start: 2023-11-03

## 2023-11-06 RX ORDER — APIXABAN 5 MG/1
TABLET, FILM COATED ORAL
Qty: 180 TABLET | Refills: 0 | Status: SHIPPED | OUTPATIENT
Start: 2023-11-06

## 2023-11-10 DIAGNOSIS — E11.8 CONTROLLED TYPE 2 DIABETES MELLITUS WITH COMPLICATION, WITHOUT LONG-TERM CURRENT USE OF INSULIN: ICD-10-CM

## 2023-11-10 RX ORDER — BLOOD SUGAR DIAGNOSTIC
STRIP MISCELLANEOUS
Qty: 50 EACH | Refills: 0 | Status: SHIPPED | OUTPATIENT
Start: 2023-11-10

## 2023-11-10 NOTE — TELEPHONE ENCOUNTER
Caller: Dior Pettit    Relationship: Self    Best call back number: [phone]     Requested Prescriptions:   Requested Prescriptions     Pending Prescriptions Disp Refills    glucose blood (Prodigy No Coding Blood Gluc) test strip 50 each 0     Sig: TEST BLOOD SUGAR DAILY        Pharmacy where request should be sent: [id number] - [facility] [address] - [phone] PH - [phone] FX     Last office visit with prescribing clinician: 9/1/2023   Last telemedicine visit with prescribing clinician: Visit date not found   Next office visit with prescribing clinician: 1/5/2024     Additional details provided by patient: PATIENT IS OUT OF THESE TEST STRIPS.     Does the patient have less than a 3 day supply:  [x] Yes  [] No    Would you like a call back once the refill request has been completed: [] Yes [x] No    If the office needs to give you a call back, can they leave a voicemail: [x] Yes [] No    Ritesh Nino Rep   11/10/23 11:58 EST

## 2023-11-14 RX ORDER — AMLODIPINE BESYLATE 5 MG/1
5 TABLET ORAL DAILY
Qty: 90 TABLET | Refills: 1 | Status: SHIPPED | OUTPATIENT
Start: 2023-11-14

## 2023-11-27 NOTE — TELEPHONE ENCOUNTER
Rx Refill Note  Requested Prescriptions     Pending Prescriptions Disp Refills    ipratropium (ATROVENT) 0.06 % nasal spray [Pharmacy Med Name: Ipratropium Bromide Nasal Solution 0.06 %] 15 mL 0     Sig: INSTILL 2 SPRAYS IN EACH NOSTRIL EVERY SIX HOURS AS NEEDED      Last office visit with prescribing clinician: 9/1/2023   Last telemedicine visit with prescribing clinician: Visit date not found   Next office visit with prescribing clinician: 1/5/2024

## 2023-11-28 RX ORDER — IPRATROPIUM BROMIDE 42 UG/1
SPRAY, METERED NASAL
Qty: 15 ML | Refills: 0 | Status: SHIPPED | OUTPATIENT
Start: 2023-11-28

## 2023-12-25 DIAGNOSIS — K21.9 GASTROESOPHAGEAL REFLUX DISEASE: ICD-10-CM

## 2023-12-26 RX ORDER — OMEPRAZOLE 40 MG/1
CAPSULE, DELAYED RELEASE ORAL
Qty: 90 CAPSULE | Refills: 0 | Status: SHIPPED | OUTPATIENT
Start: 2023-12-26

## 2024-01-05 ENCOUNTER — OFFICE VISIT (OUTPATIENT)
Dept: INTERNAL MEDICINE | Facility: CLINIC | Age: 84
End: 2024-01-05
Payer: MEDICARE

## 2024-01-05 VITALS
WEIGHT: 136.8 LBS | BODY MASS INDEX: 20.73 KG/M2 | DIASTOLIC BLOOD PRESSURE: 84 MMHG | HEIGHT: 68 IN | OXYGEN SATURATION: 100 % | SYSTOLIC BLOOD PRESSURE: 118 MMHG | HEART RATE: 63 BPM

## 2024-01-05 DIAGNOSIS — E03.9 ACQUIRED HYPOTHYROIDISM: Chronic | ICD-10-CM

## 2024-01-05 DIAGNOSIS — I48.0 PAROXYSMAL ATRIAL FIBRILLATION: ICD-10-CM

## 2024-01-05 DIAGNOSIS — E11.8 CONTROLLED TYPE 2 DIABETES MELLITUS WITH COMPLICATION, WITHOUT LONG-TERM CURRENT USE OF INSULIN: Primary | Chronic | ICD-10-CM

## 2024-01-05 DIAGNOSIS — I10 ESSENTIAL HYPERTENSION: Chronic | ICD-10-CM

## 2024-01-05 DIAGNOSIS — E78.00 HYPERCHOLESTEREMIA: Chronic | ICD-10-CM

## 2024-01-05 DIAGNOSIS — I35.9 AORTIC VALVE DISEASE: ICD-10-CM

## 2024-01-05 PROCEDURE — 1159F MED LIST DOCD IN RCRD: CPT | Performed by: NURSE PRACTITIONER

## 2024-01-05 PROCEDURE — 3079F DIAST BP 80-89 MM HG: CPT | Performed by: NURSE PRACTITIONER

## 2024-01-05 PROCEDURE — 99214 OFFICE O/P EST MOD 30 MIN: CPT | Performed by: NURSE PRACTITIONER

## 2024-01-05 PROCEDURE — 1160F RVW MEDS BY RX/DR IN RCRD: CPT | Performed by: NURSE PRACTITIONER

## 2024-01-05 PROCEDURE — 3074F SYST BP LT 130 MM HG: CPT | Performed by: NURSE PRACTITIONER

## 2024-01-05 RX ORDER — FLUTICASONE PROPIONATE AND SALMETEROL XINAFOATE 45; 21 UG/1; UG/1
AEROSOL, METERED RESPIRATORY (INHALATION)
COMMUNITY
Start: 2023-12-02

## 2024-01-05 NOTE — PATIENT INSTRUCTIONS
You may have your labs at the lab on the first floor, open Monday-Friday 8-4 (no appointment necessary).

## 2024-01-05 NOTE — PROGRESS NOTES
"Chief Complaint  Diabetes (4 month follow up), Hypertension, and Hypothyroidism  Subjective        Dior Pettit presents to [facility] for f/u regarding DM2, hypothyroidism and HTN.    History of Present Illness     She is accompanied by her daughter Abby.    She has lost about 3 pounds which she attributes to decreasing her sugar intake.    She continues to have intermittent episodes of feeling lightheaded, last episode approximately one week ago which occurred with a change in position.  She attributes it to not drinking enough water the day before. She did have 1 episode of atrial fibrillation, but it did not last too long. She is on metoprolol twice a day as well as Eliquis daily.    She notes intermittent swelling of lower extremities, improved with use of compression socks. She does note discoloration of her right foot    She is taking glucosamine for joint pain which she feels has been helpful.    Supplemental Information  She is taking venlafaxine. She is taking Valtrex as needed for cold sores. She is taking omeprazole for heartburn and indigestion, which is well controlled. She is taking a multivitamin. She is using Atrovent nasal spray for runny nose which has been very helpful. She is using Advair inhaler.  She denies any wheezing or chest tightness. She denies any headaches or pressure. She denies any falls.    Objective   Vital Signs:  /84 (BP Location: Left arm, Patient Position: Sitting, Cuff Size: Adult)   Pulse 63   Ht 172 cm (67.72\")   Wt 62.1 kg (136 lb 12.8 oz)   SpO2 100%   BMI 20.97 kg/m²   Estimated body mass index is 20.97 kg/m² as calculated from the following:    Height as of this encounter: 172 cm (67.72\").    Weight as of this encounter: 62.1 kg (136 lb 12.8 oz).    BMI is within normal parameters. No other follow-up for BMI required.      Physical Exam  Vitals (Heart rate is 66.) reviewed.   Constitutional:       Appearance: She is " well-developed. She is not ill-appearing.   HENT:      Head: Normocephalic.      Right Ear: Hearing, tympanic membrane and external ear normal.      Left Ear: Hearing, tympanic membrane and external ear normal.      Nose: Nose normal. No nasal deformity, mucosal edema or rhinorrhea.      Right Sinus: No maxillary sinus tenderness or frontal sinus tenderness.      Left Sinus: No maxillary sinus tenderness or frontal sinus tenderness.      Mouth/Throat:      Dentition: Normal dentition.   Eyes:      General: Lids are normal.         Right eye: No discharge.         Left eye: No discharge.      Conjunctiva/sclera: Conjunctivae normal.      Right eye: No exudate.     Left eye: No exudate.  Neck:      Thyroid: No thyroid mass or thyromegaly.      Vascular: No carotid bruit.      Trachea: Trachea normal.   Cardiovascular:      Rate and Rhythm: Regular rhythm.      Pulses: Normal pulses.      Heart sounds: Normal heart sounds. No murmur heard.      with a grade of 2/6.   Pulmonary:      Effort: No respiratory distress.      Breath sounds: Normal breath sounds. No decreased breath sounds, wheezing, rhonchi or rales.   Abdominal:      General: Bowel sounds are normal.      Palpations: Abdomen is soft.      Tenderness: There is no abdominal tenderness.   Musculoskeletal:      Cervical back: Normal range of motion. No edema.   Lymphadenopathy:      Head:      Right side of head: No submental, submandibular, tonsillar, preauricular, posterior auricular or occipital adenopathy.      Left side of head: No submental, submandibular, tonsillar, preauricular, posterior auricular or occipital adenopathy.   Skin:     General: Skin is warm and dry.      Nails: There is no clubbing.   Neurological:      Mental Status: She is alert.   Psychiatric:         Behavior: Behavior is cooperative.        Result Review :  The following data was reviewed by: TOSHA Houser on 01/05/2024:  Common labs          4/14/2023    06:25 4/15/2023     05:29 9/11/2023    11:30   Common Labs   Glucose 114   98    BUN 19   19    Creatinine 0.69   0.91    Sodium 140   141    Potassium 4.3   5.5    Chloride 101   104    Calcium 9.9   10.1    Total Protein   6.8    Albumin 4.1   4.9    Total Bilirubin   0.5    Alkaline Phosphatase   76    AST (SGOT)   21    ALT (SGPT)   22    WBC 8.76  8.45  5.51    Hemoglobin 14.3  13.5  13.0    Hematocrit 43.6  41.9  38.7    Platelets 266  272  265    Total Cholesterol   148    Triglycerides   72    HDL Cholesterol   56    LDL Cholesterol    78    Hemoglobin A1C   5.60      Data reviewed : Cardiology studies echo 10/31/23               Assessment and Plan   Diagnoses and all orders for this visit:    1. Controlled type 2 diabetes mellitus with complication, without long-term current use of insulin (Primary)  Assessment & Plan:  She is managed on metformin daily which she is tolerating well, recheck A1c.    Orders:  -     Hemoglobin A1c; Future    2. Acquired hypothyroidism  Assessment & Plan:  She is managed on Synthroid 50 mcg alternating with Synthroid 75 mcg daily for replacement, recheck TSH.    Orders:  -     TSH; Future    3. Essential hypertension  Assessment & Plan:  BP is well-controlled on amlodipine, losartan and metoprolol which she will continue along with a low sodium diet.    Orders:  -     Comprehensive Metabolic Panel; Future  -     CBC (No Diff); Future    4. Hypercholesteremia  Assessment & Plan:  She denies myalgias with atorvastatin which she will continue along with a low-fat, low-cholesterol diet.    Orders:  -     Lipid Panel; Future    5. Paroxysmal atrial fibrillation  Assessment & Plan:  She is managed on Eliquis (denies bleeding) and metoprolol (report recent episode of atrial fibrillation which listed only a few minutes).      6. Aortic valve disease  Assessment & Plan:  Hx bioprosthetic valve and subaortic membrane resection in 2007.  Subsequent echoes have shown residual mildly elevated gradient of 20  to 28 mmHg.  Followed by cardiology.               Follow Up   Return in about 3 months (around 4/5/2024).  Patient was given instructions and counseling regarding her condition or for health maintenance advice. Please see specific information pulled into the AVS if appropriate.      Transcribed from ambient dictation for TOSHA Houser by La Gallegos.  01/05/24   17:18 EST    Patient or patient representative verbalized consent to the visit recording.  I have personally performed the services described in this document as transcribed by the above individual, and it is both accurate and complete.

## 2024-01-07 PROBLEM — I35.9 AORTIC VALVE DISEASE: Chronic | Status: ACTIVE | Noted: 2024-01-07

## 2024-01-07 PROBLEM — I25.119 CORONARY ARTERY DISEASE INVOLVING NATIVE CORONARY ARTERY WITH ANGINA PECTORIS: Chronic | Status: RESOLVED | Noted: 2019-04-11 | Resolved: 2024-01-07

## 2024-01-07 PROBLEM — I48.0 PAROXYSMAL ATRIAL FIBRILLATION: Status: ACTIVE | Noted: 2023-01-31

## 2024-01-07 PROBLEM — I35.9 AORTIC VALVE DISEASE: Status: ACTIVE | Noted: 2024-01-07

## 2024-01-07 NOTE — ASSESSMENT & PLAN NOTE
She is managed on Eliquis (denies bleeding) and metoprolol (report recent episode of atrial fibrillation which listed only a few minutes).

## 2024-01-07 NOTE — ASSESSMENT & PLAN NOTE
She is managed on Synthroid 50 mcg alternating with Synthroid 75 mcg daily for replacement, recheck TSH.

## 2024-01-07 NOTE — ASSESSMENT & PLAN NOTE
Hx bioprosthetic valve and subaortic membrane resection in 2007.  Subsequent echoes have shown residual mildly elevated gradient of 20 to 28 mmHg.  Followed by cardiology.

## 2024-01-08 DIAGNOSIS — E03.8 OTHER SPECIFIED HYPOTHYROIDISM: ICD-10-CM

## 2024-01-08 RX ORDER — LEVOTHYROXINE SODIUM 0.05 MG/1
TABLET ORAL
Qty: 45 TABLET | Refills: 1 | Status: SHIPPED | OUTPATIENT
Start: 2024-01-08

## 2024-01-22 ENCOUNTER — TELEPHONE (OUTPATIENT)
Dept: CARDIOLOGY | Facility: CLINIC | Age: 84
End: 2024-01-22
Payer: MEDICARE

## 2024-01-22 NOTE — TELEPHONE ENCOUNTER
Caller: [facility] - [facility] - [address] [facility] AT Portal to Registered [facility] - [phone] PH - [phone] FX    Relationship: Pharmacy    Best call back number: [phone]    Requested Prescriptions:   Requested Prescriptions     Pending Prescriptions Disp Refills    dofetilide (TIKOSYN) 250 MCG capsule 180 capsule 1     Sig: Take 1 capsule by mouth Every 12 (Twelve) Hours.        Pharmacy where request should be sent: [facility] - [facility] - [address] [facility] AT PORTAL TO REGISTERED [facility] - [phone] PH - [phone] FX     Last office visit with prescribing clinician: 9/6/2022   Last telemedicine visit with prescribing clinician: Visit date not found   Next office visit with prescribing clinician: Visit date not found     Additional details provided by patient: PT IS COMPLETELY OUT OF REFILLS    Does the patient have less than a 3 day supply:  [x] Yes  [] No    Would you like a call back once the refill request has been completed: [] Yes [x] No    If the office needs to give you a call back, can they leave a voicemail: [] Yes [x] No    Ritesh Rojas Rep   01/22/24 15:55 EST

## 2024-01-23 RX ORDER — DOFETILIDE 0.25 MG/1
250 CAPSULE ORAL EVERY 12 HOURS
Qty: 180 CAPSULE | Refills: 1 | OUTPATIENT
Start: 2024-01-23

## 2024-01-23 RX ORDER — DOFETILIDE 0.25 MG/1
250 CAPSULE ORAL EVERY 12 HOURS
Qty: 180 CAPSULE | Refills: 0 | Status: SHIPPED | OUTPATIENT
Start: 2024-01-23

## 2024-01-24 DIAGNOSIS — E03.8 OTHER SPECIFIED HYPOTHYROIDISM: ICD-10-CM

## 2024-01-24 DIAGNOSIS — E03.9 ACQUIRED HYPOTHYROIDISM: ICD-10-CM

## 2024-01-24 RX ORDER — LEVOTHYROXINE SODIUM 0.05 MG/1
50 TABLET ORAL EVERY OTHER DAY
Qty: 45 TABLET | Refills: 1 | Status: SHIPPED | OUTPATIENT
Start: 2024-01-24

## 2024-01-24 NOTE — TELEPHONE ENCOUNTER
Caller: Dior Pettit    Relationship: Self    Best call back number: [phone]     Requested Prescriptions:   Requested Prescriptions     Pending Prescriptions Disp Refills    levothyroxine (SYNTHROID, LEVOTHROID) 50 MCG tablet 45 tablet 1     Sig: Take 1 tablet by mouth Every Other Day. Indications: Underactive Thyroid        Pharmacy where request should be sent: [facility] - [address] [phone] [facility] [phone] FX     Last office visit with prescribing clinician: 1/5/2024   Last telemedicine visit with prescribing clinician: Visit date not found   Next office visit with prescribing clinician: 4/5/2024     Additional details provided by patient: PATIENT IS NEEDING THIS MEDICATION CALLED INTO MAIL ORDER AND ALSO SHE WOULD LIKE A WEEK SUPPLY SENT TO [id number] - [place], [place] - [address] [place] [address] [phone] [facility] [phone] FX     Does the patient have less than a 3 day supply:  [x] Yes  [] No    Would you like a call back once the refill request has been completed: [] Yes [x] No    If the office needs to give you a call back, can they leave a voicemail: [] Yes [x] No    Ritesh Conrad Rep   01/24/24 12:33 EST

## 2024-01-31 RX ORDER — IPRATROPIUM BROMIDE 42 UG/1
SPRAY, METERED NASAL
Qty: 15 ML | Refills: 0 | Status: SHIPPED | OUTPATIENT
Start: 2024-01-31

## 2024-02-05 RX ORDER — APIXABAN 5 MG/1
TABLET, FILM COATED ORAL
Qty: 180 TABLET | Refills: 0 | Status: SHIPPED | OUTPATIENT
Start: 2024-02-05

## 2024-02-05 NOTE — TELEPHONE ENCOUNTER
Rx Refill Note  Requested Prescriptions     Pending Prescriptions Disp Refills    Eliquis 5 MG tablet tablet [Pharmacy Med Name: Eliquis Oral Tablet 5 MG] 180 tablet 0     Sig: TAKE 1 TABLET BY MOUTH EVERY 12 HOURS      Last office visit with prescribing clinician: 3/31/2023   Last telemedicine visit with prescribing clinician: Visit date not found   Next office visit with prescribing clinician: Visit date not found                         Would you like a call back once the refill request has been completed: [] Yes [] No    If the office needs to give you a call back, can they leave a voicemail: [] Yes [] No    Ade Benedict, [facility]  02/05/24, 15:35 EST

## 2024-02-12 DIAGNOSIS — I48.19 ATRIAL FIBRILLATION, PERSISTENT: ICD-10-CM

## 2024-02-12 RX ORDER — ATORVASTATIN CALCIUM 80 MG/1
80 TABLET, FILM COATED ORAL NIGHTLY
Qty: 90 TABLET | Refills: 1 | Status: SHIPPED | OUTPATIENT
Start: 2024-02-12

## 2024-02-13 RX ORDER — AMLODIPINE BESYLATE 5 MG/1
5 TABLET ORAL DAILY
Qty: 90 TABLET | Refills: 1 | Status: SHIPPED | OUTPATIENT
Start: 2024-02-13

## 2024-02-13 RX ORDER — METOPROLOL TARTRATE 50 MG/1
25 TABLET, FILM COATED ORAL 2 TIMES DAILY
Qty: 90 TABLET | Refills: 1 | Status: SHIPPED | OUTPATIENT
Start: 2024-02-13

## 2024-02-28 ENCOUNTER — TELEPHONE (OUTPATIENT)
Dept: INTERNAL MEDICINE | Facility: CLINIC | Age: 84
End: 2024-02-28
Payer: MEDICARE

## 2024-02-28 NOTE — TELEPHONE ENCOUNTER
Hub staff attempted to follow warm transfer process and was unsuccessful     Caller: Dior Pettit    Relationship to patient: Self    Best call back number: [phone]     Patient is needing: PATIENT STATED SHE SPOKE WITH SOMEONE IN THE OFFICE THIS MORNING ABOUT COMING IN FOR LABS TODAY AT 3. PATIENT DOES NOT HAVE APPOINTMENT SCHEDULED, BUT WAS CALLING TO RESCHEDULE THE LABS BECAUSE SHE THINKS THE LABS ARE SUPPOSED TO BE FASTING AND SHE  ATE BREAKFAST THIS MORNING     PLEASE ADVISE

## 2024-03-01 NOTE — TELEPHONE ENCOUNTER
I am faxing records to that office and they will call the pt to schedule.  Thanks, Alexa   01-Mar-2024 20:17

## 2024-03-04 DIAGNOSIS — E03.9 ACQUIRED HYPOTHYROIDISM: ICD-10-CM

## 2024-03-04 RX ORDER — LEVOTHYROXINE SODIUM 0.07 MG/1
TABLET ORAL
Qty: 45 TABLET | Refills: 0 | Status: SHIPPED | OUTPATIENT
Start: 2024-03-04

## 2024-03-04 NOTE — TELEPHONE ENCOUNTER
Caller: Dior Pettit    Relationship: Self    Best call back number:     [phone] (Home)     Requested Prescriptions:   Requested Prescriptions     Pending Prescriptions Disp Refills    levothyroxine (SYNTHROID, LEVOTHROID) 75 MCG tablet 45 tablet 3     Sig: TAKE ONE TABLET EVERY OTHER DAY  Indications: Underactive Thyroid        Pharmacy where request should be sent: [id number] - [place], [place] - [address] - [phone]  - [phone] FX       Last office visit with prescribing clinician: 1/5/2024   Last telemedicine visit with prescribing clinician: Visit date not found   Next office visit with prescribing clinician: 4/5/2024     Additional details provided by patient: PATIENT IS ASKING FOR THE WHOLE 90 DAY PRESCRIPTION TO BE SENT TO [facility] ASAP    PATIENT ALSO STATES THIS PRESCRIPTION FOR MAIL ORDER WAS TO BE SENT TO [facility] NOT [facility] AND WOULD LIKE THIS SENT THERE ASAP FOR NEXT TIME    Does the patient have less than a 3 day supply:  [x] Yes  [] No    Would you like a call back once the refill request has been completed: [x] Yes [] No    If the office needs to give you a call back, can they leave a voicemail: [x] Yes [] No    Ritesh Mcdonald Rep   03/04/24 11:50 EST

## 2024-03-05 DIAGNOSIS — E03.9 ACQUIRED HYPOTHYROIDISM: Chronic | ICD-10-CM

## 2024-03-05 DIAGNOSIS — E78.00 HYPERCHOLESTEREMIA: Chronic | ICD-10-CM

## 2024-03-05 DIAGNOSIS — E11.8 CONTROLLED TYPE 2 DIABETES MELLITUS WITH COMPLICATION, WITHOUT LONG-TERM CURRENT USE OF INSULIN: Chronic | ICD-10-CM

## 2024-03-05 DIAGNOSIS — I10 ESSENTIAL HYPERTENSION: Chronic | ICD-10-CM

## 2024-03-06 DIAGNOSIS — E78.00 HYPERCHOLESTEREMIA: ICD-10-CM

## 2024-03-06 DIAGNOSIS — E03.9 ACQUIRED HYPOTHYROIDISM: ICD-10-CM

## 2024-03-06 DIAGNOSIS — E11.8 CONTROLLED TYPE 2 DIABETES MELLITUS WITH COMPLICATION, WITHOUT LONG-TERM CURRENT USE OF INSULIN: Primary | ICD-10-CM

## 2024-03-06 DIAGNOSIS — I10 ESSENTIAL HYPERTENSION: ICD-10-CM

## 2024-03-06 LAB
ALBUMIN SERPL-MCNC: 4.6 G/DL (ref 3.5–5.2)
ALBUMIN/GLOB SERPL: 1.9 G/DL
ALP SERPL-CCNC: 62 U/L (ref 39–117)
ALT SERPL-CCNC: 20 U/L (ref 1–33)
AST SERPL-CCNC: 21 U/L (ref 1–32)
BILIRUB SERPL-MCNC: 0.4 MG/DL (ref 0–1.2)
BUN SERPL-MCNC: 25 MG/DL (ref 8–23)
BUN/CREAT SERPL: 25.8 (ref 7–25)
CALCIUM SERPL-MCNC: 9.8 MG/DL (ref 8.6–10.5)
CHLORIDE SERPL-SCNC: 102 MMOL/L (ref 98–107)
CHOLEST SERPL-MCNC: 152 MG/DL (ref 0–200)
CO2 SERPL-SCNC: 25.7 MMOL/L (ref 22–29)
CREAT SERPL-MCNC: 0.97 MG/DL (ref 0.57–1)
EGFRCR SERPLBLD CKD-EPI 2021: 57.4 ML/MIN/1.73
ERYTHROCYTE [DISTWIDTH] IN BLOOD BY AUTOMATED COUNT: 12.8 % (ref 12.3–15.4)
GLOBULIN SER CALC-MCNC: 2.4 GM/DL
GLUCOSE SERPL-MCNC: 117 MG/DL (ref 65–99)
HBA1C MFR BLD: 5.4 % (ref 4.8–5.6)
HCT VFR BLD AUTO: 37.4 % (ref 34–46.6)
HDLC SERPL-MCNC: 50 MG/DL (ref 40–60)
HGB BLD-MCNC: 12.2 G/DL (ref 12–15.9)
LDLC SERPL CALC-MCNC: 82 MG/DL (ref 0–100)
MCH RBC QN AUTO: 29.7 PG (ref 26.6–33)
MCHC RBC AUTO-ENTMCNC: 32.6 G/DL (ref 31.5–35.7)
MCV RBC AUTO: 91 FL (ref 79–97)
PLATELET # BLD AUTO: 297 10*3/MM3 (ref 140–450)
POTASSIUM SERPL-SCNC: 4.6 MMOL/L (ref 3.5–5.2)
PROT SERPL-MCNC: 7 G/DL (ref 6–8.5)
RBC # BLD AUTO: 4.11 10*6/MM3 (ref 3.77–5.28)
SODIUM SERPL-SCNC: 137 MMOL/L (ref 136–145)
TRIGL SERPL-MCNC: 109 MG/DL (ref 0–150)
TSH SERPL DL<=0.005 MIU/L-ACNC: 1.82 UIU/ML (ref 0.27–4.2)
VLDLC SERPL CALC-MCNC: 20 MG/DL (ref 5–40)
WBC # BLD AUTO: 5.61 10*3/MM3 (ref 3.4–10.8)

## 2024-04-05 ENCOUNTER — OFFICE VISIT (OUTPATIENT)
Dept: INTERNAL MEDICINE | Facility: CLINIC | Age: 84
End: 2024-04-05
Payer: MEDICARE

## 2024-04-05 VITALS
BODY MASS INDEX: 20.73 KG/M2 | HEART RATE: 70 BPM | WEIGHT: 136.8 LBS | DIASTOLIC BLOOD PRESSURE: 82 MMHG | SYSTOLIC BLOOD PRESSURE: 130 MMHG | OXYGEN SATURATION: 94 % | HEIGHT: 68 IN

## 2024-04-05 DIAGNOSIS — I10 ESSENTIAL HYPERTENSION: Chronic | ICD-10-CM

## 2024-04-05 DIAGNOSIS — R06.02 SHORTNESS OF BREATH: ICD-10-CM

## 2024-04-05 DIAGNOSIS — E78.00 HYPERCHOLESTEREMIA: Chronic | ICD-10-CM

## 2024-04-05 DIAGNOSIS — E03.9 ACQUIRED HYPOTHYROIDISM: Chronic | ICD-10-CM

## 2024-04-05 DIAGNOSIS — I48.0 PAROXYSMAL ATRIAL FIBRILLATION: Primary | Chronic | ICD-10-CM

## 2024-04-05 DIAGNOSIS — I71.22 ANEURYSM OF AORTIC ARCH WITHOUT RUPTURE: Chronic | ICD-10-CM

## 2024-04-21 NOTE — ASSESSMENT & PLAN NOTE
She is in normal sinus rhythm today but notes increased dyspnea with intermittent episodes of palpitations, will obtain Holter monitor to further evaluate.

## 2024-04-21 NOTE — PROGRESS NOTES
"Chief Complaint  Diabetes (3 month follow up) and Atrial Fibrillation  Subjective        Dior Pettit presents to [facility]  Diabetes    Atrial Fibrillation  Past medical history includes atrial fibrillation.     History of Present Illness  The patient is an 83-year-old female who presents for evaluation of multiple medical concerns. She is accompanied by her daughter.    The patient suspects a recurrence of her atrial fibrillation (AFib). She experiences dyspnea upon changing positions in bed, which has progressively worsened over the past 2 to 3 weeks. She denies experiencing tachycardia.     Her sleep pattern is disrupted, averaging 5 hours of sleep per night. She administers Tikosyn twice daily, although she admits to inconsistent timing. She has experienced dizziness on a few occasions, which she attributes to inadequate water intake.    She denies dyspnea at rest, but reports occasional headaches. She denies edema, heartburn, indigestion, or bowel irregularities, including regular bowel movements without hematochezia.     Objective   Vital Signs:  /82 (BP Location: Left arm, Patient Position: Sitting, Cuff Size: Adult)   Pulse 70   Ht 172 cm (67.72\")   Wt 62.1 kg (136 lb 12.8 oz)   SpO2 94%   BMI 20.97 kg/m²   Estimated body mass index is 20.97 kg/m² as calculated from the following:    Height as of this encounter: 172 cm (67.72\").    Weight as of this encounter: 62.1 kg (136 lb 12.8 oz).    BMI is within normal parameters. No other follow-up for BMI required.      Physical Exam  Constitutional:       Appearance: She is well-developed. She is not ill-appearing.   HENT:      Head: Normocephalic.      Right Ear: Hearing, tympanic membrane and external ear normal.      Left Ear: Hearing, tympanic membrane and external ear normal.      Nose: Nose normal. No nasal deformity, mucosal edema or rhinorrhea.      Right Sinus: No maxillary sinus tenderness or frontal sinus " tenderness.      Left Sinus: No maxillary sinus tenderness or frontal sinus tenderness.      Mouth/Throat:      Dentition: Normal dentition.   Eyes:      General: Lids are normal.         Right eye: No discharge.         Left eye: No discharge.      Conjunctiva/sclera: Conjunctivae normal.      Right eye: No exudate.     Left eye: No exudate.  Neck:      Thyroid: No thyroid mass or thyromegaly.      Vascular: No carotid bruit.      Trachea: Trachea normal.   Cardiovascular:      Rate and Rhythm: Regular rhythm.      Pulses: Normal pulses.      Heart sounds: Murmur heard.      Systolic murmur is present with a grade of 2/6.   Pulmonary:      Effort: No respiratory distress.      Breath sounds: Normal breath sounds. No decreased breath sounds, wheezing, rhonchi or rales.   Abdominal:      General: Bowel sounds are normal.      Palpations: Abdomen is soft.      Tenderness: There is no abdominal tenderness.   Musculoskeletal:      Cervical back: Normal range of motion. No edema.   Lymphadenopathy:      Head:      Right side of head: No submental, submandibular, tonsillar, preauricular, posterior auricular or occipital adenopathy.      Left side of head: No submental, submandibular, tonsillar, preauricular, posterior auricular or occipital adenopathy.   Skin:     General: Skin is warm and dry.      Nails: There is no clubbing.   Neurological:      Mental Status: She is alert.   Psychiatric:         Behavior: Behavior is cooperative.        Physical Exam  Lungs are clear.    Result Review :  The following data was reviewed by: TOSHA Houser on 04/05/2024:  Common labs          9/11/2023    11:30 3/6/2024    09:55   Common Labs   Glucose 98  117    BUN 19  25    Creatinine 0.91  0.97    Sodium 141  137    Potassium 5.5  4.6    Chloride 104  102    Calcium 10.1  9.8    Total Protein 6.8  7.0    Albumin 4.9  4.6    Total Bilirubin 0.5  0.4    Alkaline Phosphatase 76  62    AST (SGOT) 21  21    ALT (SGPT) 22  20     WBC 5.51  5.61    Hemoglobin 13.0  12.2    Hematocrit 38.7  37.4    Platelets 265  297    Total Cholesterol 148  152    Triglycerides 72  109    HDL Cholesterol 56  50    LDL Cholesterol  78  82    Hemoglobin A1C 5.60  5.40           Results  Laboratory Studies  Blood sugar was 117. Hemoglobin A1c was 5.4. Creatinine was 0.97. Total cholesterol was 152. LDL was 82.             Assessment and Plan   Diagnoses and all orders for this visit:    1. Paroxysmal atrial fibrillation (Primary)  Assessment & Plan:  She is in normal sinus rhythm today but notes increased dyspnea with intermittent episodes of palpitations, will obtain Holter monitor to further evaluate.    Orders:  -     Holter Monitor - 72 Hour Up To 15 Days; Future    2. Shortness of breath  -     Holter Monitor - 72 Hour Up To 15 Days; Future    3. Acquired hypothyroidism  Assessment & Plan:  She is on alternating Synthroid 50 mcg and 75 mcg daily for replacement, recheck TSH.      4. Aneurysm of aortic arch without rupture  Assessment & Plan:  Noted to be 4.1 cm on 4/2023 CT chest      5. Hypercholesteremia  Assessment & Plan:   Lipid abnormalities are stable    Plan:  Continue same medication/s without change.      Discussed medication dosage, use, side effects, and goals of treatment in detail.    Counseled patient on lifestyle modifications to help control hyperlipidemia.   Continue atorvastatin daily.    Patient Treatment Goals:   LDL goal is less than 70    Followup in 6 months.      6. Essential hypertension  Assessment & Plan:  Hypertension is stable and controlled  Continue current treatment regimen.  Blood pressure will be reassessed in 6 months.  Continue amlodipine, losartan and metoprolol daily.        Assessment & Plan           Follow Up   Return in about 6 months (around 10/5/2024).  Patient was given instructions and counseling regarding her condition or for health maintenance advice. Please see specific information pulled into the AVS if  appropriate.     Patient or patient representative verbalized consent for the use of Ambient Listening during the visit with  TOSHA Houser for chart documentation. 4/21/2024  09:20 EDT

## 2024-04-21 NOTE — ASSESSMENT & PLAN NOTE
Hypertension is stable and controlled  Continue current treatment regimen.  Blood pressure will be reassessed in 6 months.  Continue amlodipine, losartan and metoprolol daily.

## 2024-04-25 RX ORDER — DOFETILIDE 0.25 MG/1
250 CAPSULE ORAL EVERY 12 HOURS
Qty: 180 CAPSULE | Refills: 0 | Status: SHIPPED | OUTPATIENT
Start: 2024-04-25

## 2024-05-06 RX ORDER — APIXABAN 5 MG/1
TABLET, FILM COATED ORAL
Qty: 180 TABLET | Refills: 0 | Status: SHIPPED | OUTPATIENT
Start: 2024-05-06

## 2024-05-08 ENCOUNTER — TELEPHONE (OUTPATIENT)
Dept: CARDIOLOGY | Facility: CLINIC | Age: 84
End: 2024-05-08
Payer: MEDICARE

## 2024-05-09 DIAGNOSIS — F51.01 PRIMARY INSOMNIA: ICD-10-CM

## 2024-05-09 RX ORDER — AMITRIPTYLINE HYDROCHLORIDE 50 MG/1
50 TABLET, FILM COATED ORAL NIGHTLY
Qty: 90 TABLET | Refills: 1 | Status: SHIPPED | OUTPATIENT
Start: 2024-05-09

## 2024-05-10 ENCOUNTER — HOSPITAL ENCOUNTER (INPATIENT)
Facility: HOSPITAL | Age: 84
LOS: 2 days | Discharge: HOME | DRG: 314 | End: 2024-05-12
Attending: EMERGENCY MEDICINE | Admitting: INTERNAL MEDICINE
Payer: MEDICARE

## 2024-05-10 ENCOUNTER — APPOINTMENT (OUTPATIENT)
Dept: CARDIOLOGY | Facility: HOSPITAL | Age: 84
DRG: 314 | End: 2024-05-10
Payer: MEDICARE

## 2024-05-10 ENCOUNTER — APPOINTMENT (OUTPATIENT)
Dept: GENERAL RADIOLOGY | Facility: HOSPITAL | Age: 84
DRG: 314 | End: 2024-05-10
Payer: MEDICARE

## 2024-05-10 DIAGNOSIS — I50.9 ACUTE CONGESTIVE HEART FAILURE, UNSPECIFIED HEART FAILURE TYPE: Primary | ICD-10-CM

## 2024-05-10 DIAGNOSIS — J81.0 ACUTE PULMONARY EDEMA: ICD-10-CM

## 2024-05-10 PROBLEM — I35.0 AORTIC VALVE STENOSIS: Status: ACTIVE | Noted: 2024-05-10

## 2024-05-10 PROBLEM — R06.09 EXERTIONAL DYSPNEA: Status: ACTIVE | Noted: 2024-05-10

## 2024-05-10 LAB
ALBUMIN SERPL-MCNC: 4.4 G/DL (ref 3.5–5.2)
ALBUMIN/GLOB SERPL: 1.8 G/DL
ALP SERPL-CCNC: 65 U/L (ref 39–117)
ALT SERPL W P-5'-P-CCNC: 19 U/L (ref 1–33)
ANION GAP SERPL CALCULATED.3IONS-SCNC: 11.5 MMOL/L (ref 5–15)
AORTIC ARCH: 3.1 CM
AORTIC DIMENSIONLESS INDEX: 0.2 (DI)
AST SERPL-CCNC: 20 U/L (ref 1–32)
B PARAPERT DNA SPEC QL NAA+PROBE: NOT DETECTED
B PERT DNA SPEC QL NAA+PROBE: NOT DETECTED
BASOPHILS # BLD AUTO: 0.05 10*3/MM3 (ref 0–0.2)
BASOPHILS NFR BLD AUTO: 0.7 % (ref 0–1.5)
BH CV ECHO AV AORTIC VALVE AT ACCEL TIME CALCULATED: 127 MSEC
BH CV ECHO MEAS - AO MAX PG: 72.9 MMHG
BH CV ECHO MEAS - AO MAX PG: 91.3 MMHG
BH CV ECHO MEAS - AO MEAN PG: 47.9 MMHG
BH CV ECHO MEAS - AO MEAN PG: 55.1 MMHG
BH CV ECHO MEAS - AO ROOT DIAM: 2.6 CM
BH CV ECHO MEAS - AO V2 MAX: 426.9 CM/SEC
BH CV ECHO MEAS - AO V2 MAX: 477.8 CM/SEC
BH CV ECHO MEAS - AO V2 VTI: 116.7 CM
BH CV ECHO MEAS - AO V2 VTI: 118.5 CM
BH CV ECHO MEAS - AT: 0.13 SEC
BH CV ECHO MEAS - AVA(I,D): 0.54 CM2
BH CV ECHO MEAS - EDV(CUBED): 79.6 ML
BH CV ECHO MEAS - EDV(MOD-SP2): 115 ML
BH CV ECHO MEAS - EDV(MOD-SP2): 96 ML
BH CV ECHO MEAS - EDV(MOD-SP4): 49 ML
BH CV ECHO MEAS - EDV(MOD-SP4): 55 ML
BH CV ECHO MEAS - EF(MOD-BP): 53 %
BH CV ECHO MEAS - EF(MOD-BP): 64 %
BH CV ECHO MEAS - EF(MOD-SP2): 49.6 %
BH CV ECHO MEAS - EF(MOD-SP2): 65.6 %
BH CV ECHO MEAS - EF(MOD-SP4): 50.9 %
BH CV ECHO MEAS - EF(MOD-SP4): 65.3 %
BH CV ECHO MEAS - ESV(CUBED): 24.9 ML
BH CV ECHO MEAS - ESV(MOD-SP2): 33 ML
BH CV ECHO MEAS - ESV(MOD-SP2): 58 ML
BH CV ECHO MEAS - ESV(MOD-SP4): 17 ML
BH CV ECHO MEAS - ESV(MOD-SP4): 27 ML
BH CV ECHO MEAS - FS: 32.1 %
BH CV ECHO MEAS - IVS/LVPW: 1.36 CM
BH CV ECHO MEAS - IVSD: 1.15 CM
BH CV ECHO MEAS - LA DIMENSION: 3.3 CM
BH CV ECHO MEAS - LAT PEAK E' VEL: 7.9 CM/SEC
BH CV ECHO MEAS - LV DIASTOLIC VOL/BSA (35-75): 28.4 CM2
BH CV ECHO MEAS - LV MASS(C)D: 142.3 GRAMS
BH CV ECHO MEAS - LV MAX PG: 3.3 MMHG
BH CV ECHO MEAS - LV MEAN PG: 1.99 MMHG
BH CV ECHO MEAS - LV SYSTOLIC VOL/BSA (12-30): 9.8 CM2
BH CV ECHO MEAS - LV V1 MAX: 90.3 CM/SEC
BH CV ECHO MEAS - LV V1 VTI: 25.4 CM
BH CV ECHO MEAS - LVIDD: 4.3 CM
BH CV ECHO MEAS - LVIDS: 2.9 CM
BH CV ECHO MEAS - LVOT AREA: 2.5 CM2
BH CV ECHO MEAS - LVOT DIAM: 1.79 CM
BH CV ECHO MEAS - LVPWD: 0.85 CM
BH CV ECHO MEAS - MED PEAK E' VEL: 5.7 CM/SEC
BH CV ECHO MEAS - MR MAX PG: 115.4 MMHG
BH CV ECHO MEAS - MR MAX PG: 136.8 MMHG
BH CV ECHO MEAS - MR MAX VEL: 537 CM/SEC
BH CV ECHO MEAS - MR MAX VEL: 584.9 CM/SEC
BH CV ECHO MEAS - MV A MAX VEL: 101.2 CM/SEC
BH CV ECHO MEAS - MV DEC SLOPE: 1097 CM/SEC2
BH CV ECHO MEAS - MV DEC TIME: 136 SEC
BH CV ECHO MEAS - MV E MAX VEL: 164 CM/SEC
BH CV ECHO MEAS - MV E/A: 1.62
BH CV ECHO MEAS - MV MAX PG: 14.4 MMHG
BH CV ECHO MEAS - MV MEAN PG: 4.4 MMHG
BH CV ECHO MEAS - MV P1/2T: 55.4 MSEC
BH CV ECHO MEAS - MV V2 VTI: 43.2 CM
BH CV ECHO MEAS - MVA(P1/2T): 4 CM2
BH CV ECHO MEAS - MVA(VTI): 1.47 CM2
BH CV ECHO MEAS - PA ACC TIME: 0.09 SEC
BH CV ECHO MEAS - PA V2 MAX: 118.1 CM/SEC
BH CV ECHO MEAS - RAP SYSTOLE: 8 MMHG
BH CV ECHO MEAS - RAP SYSTOLE: 8 MMHG
BH CV ECHO MEAS - RV MAX PG: 0.66 MMHG
BH CV ECHO MEAS - RV V1 MAX: 40.7 CM/SEC
BH CV ECHO MEAS - RV V1 VTI: 10.9 CM
BH CV ECHO MEAS - RVDD: 2.7 CM
BH CV ECHO MEAS - RVSP: 62 MMHG
BH CV ECHO MEAS - RVSP: 89 MMHG
BH CV ECHO MEAS - SV(LVOT): 63.5 ML
BH CV ECHO MEAS - SV(MOD-SP2): 57 ML
BH CV ECHO MEAS - SV(MOD-SP2): 63 ML
BH CV ECHO MEAS - SV(MOD-SP4): 28 ML
BH CV ECHO MEAS - SV(MOD-SP4): 32 ML
BH CV ECHO MEAS - SVI(LVOT): 36.8 ML/M2
BH CV ECHO MEAS - SVI(MOD-SP2): 36.5 ML/M2
BH CV ECHO MEAS - SVI(MOD-SP4): 18.5 ML/M2
BH CV ECHO MEAS - TAPSE (>1.6): 2.11 CM
BH CV ECHO MEAS - TR MAX PG: 54 MMHG
BH CV ECHO MEAS - TR MAX PG: 80.7 MMHG
BH CV ECHO MEAS - TR MAX VEL: 366.4 CM/SEC
BH CV ECHO MEAS - TR MAX VEL: 449.2 CM/SEC
BH CV ECHO MEASUREMENTS AVERAGE E/E' RATIO: 24.12
BH CV ECHO SHUNT ASSESSMENT PERFORMED (HIDDEN SCRIPTING): 1
BH CV XLRA - RV BASE: 3.4 CM
BH CV XLRA - RV LENGTH: 5.1 CM
BH CV XLRA - RV MID: 2.5 CM
BH CV XLRA - TDI S': 9 CM/SEC
BILIRUB SERPL-MCNC: 0.4 MG/DL (ref 0–1.2)
BUN SERPL-MCNC: 20 MG/DL (ref 8–23)
BUN/CREAT SERPL: 20 (ref 7–25)
C PNEUM DNA NPH QL NAA+NON-PROBE: NOT DETECTED
CALCIUM SPEC-SCNC: 9.6 MG/DL (ref 8.6–10.5)
CHLORIDE SERPL-SCNC: 104 MMOL/L (ref 98–107)
CO2 SERPL-SCNC: 23.5 MMOL/L (ref 22–29)
CREAT SERPL-MCNC: 1 MG/DL (ref 0.57–1)
DEPRECATED RDW RBC AUTO: 43.6 FL (ref 37–54)
EGFRCR SERPLBLD CKD-EPI 2021: 55.3 ML/MIN/1.73
EOSINOPHIL # BLD AUTO: 0.27 10*3/MM3 (ref 0–0.4)
EOSINOPHIL NFR BLD AUTO: 3.8 % (ref 0.3–6.2)
ERYTHROCYTE [DISTWIDTH] IN BLOOD BY AUTOMATED COUNT: 12.8 % (ref 12.3–15.4)
FLUAV SUBTYP SPEC NAA+PROBE: NOT DETECTED
FLUBV RNA ISLT QL NAA+PROBE: NOT DETECTED
GLOBULIN UR ELPH-MCNC: 2.4 GM/DL
GLUCOSE SERPL-MCNC: 123 MG/DL (ref 65–99)
HADV DNA SPEC NAA+PROBE: NOT DETECTED
HCOV 229E RNA SPEC QL NAA+PROBE: NOT DETECTED
HCOV HKU1 RNA SPEC QL NAA+PROBE: NOT DETECTED
HCOV NL63 RNA SPEC QL NAA+PROBE: NOT DETECTED
HCOV OC43 RNA SPEC QL NAA+PROBE: NOT DETECTED
HCT VFR BLD AUTO: 33 % (ref 34–46.6)
HGB BLD-MCNC: 10.6 G/DL (ref 12–15.9)
HMPV RNA NPH QL NAA+NON-PROBE: NOT DETECTED
HOLD SPECIMEN: NORMAL
HOLD SPECIMEN: NORMAL
HPIV1 RNA ISLT QL NAA+PROBE: NOT DETECTED
HPIV2 RNA SPEC QL NAA+PROBE: NOT DETECTED
HPIV3 RNA NPH QL NAA+PROBE: NOT DETECTED
HPIV4 P GENE NPH QL NAA+PROBE: NOT DETECTED
IMM GRANULOCYTES # BLD AUTO: 0.03 10*3/MM3 (ref 0–0.05)
IMM GRANULOCYTES NFR BLD AUTO: 0.4 % (ref 0–0.5)
LEFT ATRIUM VOLUME INDEX: 30.9 ML/M2
LYMPHOCYTES # BLD AUTO: 2.11 10*3/MM3 (ref 0.7–3.1)
LYMPHOCYTES NFR BLD AUTO: 29.4 % (ref 19.6–45.3)
M PNEUMO IGG SER IA-ACNC: NOT DETECTED
MAGNESIUM SERPL-MCNC: 1.5 MG/DL (ref 1.6–2.4)
MAGNESIUM SERPL-MCNC: 2 MG/DL (ref 1.6–2.4)
MCH RBC QN AUTO: 29.9 PG (ref 26.6–33)
MCHC RBC AUTO-ENTMCNC: 32.1 G/DL (ref 31.5–35.7)
MCV RBC AUTO: 93 FL (ref 79–97)
MONOCYTES # BLD AUTO: 0.79 10*3/MM3 (ref 0.1–0.9)
MONOCYTES NFR BLD AUTO: 11 % (ref 5–12)
NEUTROPHILS NFR BLD AUTO: 3.92 10*3/MM3 (ref 1.7–7)
NEUTROPHILS NFR BLD AUTO: 54.7 % (ref 42.7–76)
NRBC BLD AUTO-RTO: 0 /100 WBC (ref 0–0.2)
NT-PROBNP SERPL-MCNC: 2180 PG/ML (ref 0–1800)
PLATELET # BLD AUTO: 220 10*3/MM3 (ref 140–450)
PMV BLD AUTO: 9.6 FL (ref 6–12)
POTASSIUM SERPL-SCNC: 4.6 MMOL/L (ref 3.5–5.2)
PROCALCITONIN SERPL-MCNC: 0.05 NG/ML (ref 0–0.25)
PROT SERPL-MCNC: 6.8 G/DL (ref 6–8.5)
QT INTERVAL: 473 MS
QT INTERVAL: 476 MS
QTC INTERVAL: 464 MS
QTC INTERVAL: 469 MS
RBC # BLD AUTO: 3.55 10*6/MM3 (ref 3.77–5.28)
RHINOVIRUS RNA SPEC NAA+PROBE: NOT DETECTED
RSV RNA NPH QL NAA+NON-PROBE: NOT DETECTED
SARS-COV-2 RNA NPH QL NAA+NON-PROBE: NOT DETECTED
SINUS: 3.1 CM
SODIUM SERPL-SCNC: 139 MMOL/L (ref 136–145)
STJ: 2.8 CM
TROPONIN T SERPL HS-MCNC: 12 NG/L
WBC NRBC COR # BLD AUTO: 7.17 10*3/MM3 (ref 3.4–10.8)
WHOLE BLOOD HOLD COAG: NORMAL
WHOLE BLOOD HOLD SPECIMEN: NORMAL

## 2024-05-10 PROCEDURE — 80053 COMPREHEN METABOLIC PANEL: CPT | Performed by: EMERGENCY MEDICINE

## 2024-05-10 PROCEDURE — 93312 ECHO TRANSESOPHAGEAL: CPT | Performed by: INTERNAL MEDICINE

## 2024-05-10 PROCEDURE — 25010000002 MIDAZOLAM PER 1 MG: Performed by: INTERNAL MEDICINE

## 2024-05-10 PROCEDURE — 71045 X-RAY EXAM CHEST 1 VIEW: CPT

## 2024-05-10 PROCEDURE — 25010000002 MAGNESIUM SULFATE 2 GM/50ML SOLUTION: Performed by: NURSE PRACTITIONER

## 2024-05-10 PROCEDURE — 25510000001 PERFLUTREN (DEFINITY) 8.476 MG IN SODIUM CHLORIDE (PF) 0.9 % 10 ML INJECTION: Performed by: NURSE PRACTITIONER

## 2024-05-10 PROCEDURE — 83735 ASSAY OF MAGNESIUM: CPT | Performed by: EMERGENCY MEDICINE

## 2024-05-10 PROCEDURE — 93320 DOPPLER ECHO COMPLETE: CPT | Performed by: INTERNAL MEDICINE

## 2024-05-10 PROCEDURE — 25010000002 FENTANYL CITRATE (PF) 50 MCG/ML SOLUTION: Performed by: INTERNAL MEDICINE

## 2024-05-10 PROCEDURE — 99222 1ST HOSP IP/OBS MODERATE 55: CPT | Performed by: INTERNAL MEDICINE

## 2024-05-10 PROCEDURE — 93325 DOPPLER ECHO COLOR FLOW MAPG: CPT | Performed by: INTERNAL MEDICINE

## 2024-05-10 PROCEDURE — 85025 COMPLETE CBC W/AUTO DIFF WBC: CPT | Performed by: EMERGENCY MEDICINE

## 2024-05-10 PROCEDURE — 93306 TTE W/DOPPLER COMPLETE: CPT

## 2024-05-10 PROCEDURE — 25010000002 FUROSEMIDE PER 20 MG: Performed by: INTERNAL MEDICINE

## 2024-05-10 PROCEDURE — 25010000002 FUROSEMIDE PER 20 MG: Performed by: EMERGENCY MEDICINE

## 2024-05-10 PROCEDURE — 83735 ASSAY OF MAGNESIUM: CPT | Performed by: INTERNAL MEDICINE

## 2024-05-10 PROCEDURE — 93325 DOPPLER ECHO COLOR FLOW MAPG: CPT

## 2024-05-10 PROCEDURE — 99285 EMERGENCY DEPT VISIT HI MDM: CPT

## 2024-05-10 PROCEDURE — 0202U NFCT DS 22 TRGT SARS-COV-2: CPT | Performed by: EMERGENCY MEDICINE

## 2024-05-10 PROCEDURE — 93312 ECHO TRANSESOPHAGEAL: CPT

## 2024-05-10 PROCEDURE — 84484 ASSAY OF TROPONIN QUANT: CPT | Performed by: EMERGENCY MEDICINE

## 2024-05-10 PROCEDURE — 93010 ELECTROCARDIOGRAM REPORT: CPT | Performed by: INTERNAL MEDICINE

## 2024-05-10 PROCEDURE — 93306 TTE W/DOPPLER COMPLETE: CPT | Performed by: INTERNAL MEDICINE

## 2024-05-10 PROCEDURE — 25010000002 MAGNESIUM SULFATE 2 GM/50ML SOLUTION: Performed by: INTERNAL MEDICINE

## 2024-05-10 PROCEDURE — 83880 ASSAY OF NATRIURETIC PEPTIDE: CPT | Performed by: EMERGENCY MEDICINE

## 2024-05-10 PROCEDURE — 93320 DOPPLER ECHO COMPLETE: CPT

## 2024-05-10 PROCEDURE — 84145 PROCALCITONIN (PCT): CPT | Performed by: EMERGENCY MEDICINE

## 2024-05-10 PROCEDURE — 93005 ELECTROCARDIOGRAM TRACING: CPT | Performed by: EMERGENCY MEDICINE

## 2024-05-10 RX ORDER — ATORVASTATIN CALCIUM 80 MG/1
80 TABLET, FILM COATED ORAL NIGHTLY
Status: DISCONTINUED | OUTPATIENT
Start: 2024-05-10 | End: 2024-05-12 | Stop reason: HOSPADM

## 2024-05-10 RX ORDER — LABETALOL HYDROCHLORIDE 5 MG/ML
10 INJECTION, SOLUTION INTRAVENOUS
Status: DISCONTINUED | OUTPATIENT
Start: 2024-05-10 | End: 2024-05-12 | Stop reason: HOSPADM

## 2024-05-10 RX ORDER — SODIUM CHLORIDE 9 MG/ML
INJECTION, SOLUTION INTRAVENOUS
Status: COMPLETED | OUTPATIENT
Start: 2024-05-10 | End: 2024-05-10

## 2024-05-10 RX ORDER — FENTANYL CITRATE 50 UG/ML
INJECTION, SOLUTION INTRAMUSCULAR; INTRAVENOUS
Status: COMPLETED | OUTPATIENT
Start: 2024-05-10 | End: 2024-05-10

## 2024-05-10 RX ORDER — LEVOTHYROXINE SODIUM 0.07 MG/1
75 TABLET ORAL EVERY OTHER DAY
Status: DISCONTINUED | OUTPATIENT
Start: 2024-05-11 | End: 2024-05-12 | Stop reason: HOSPADM

## 2024-05-10 RX ORDER — ONDANSETRON 4 MG/1
4 TABLET, ORALLY DISINTEGRATING ORAL EVERY 6 HOURS PRN
Status: DISCONTINUED | OUTPATIENT
Start: 2024-05-10 | End: 2024-05-12 | Stop reason: HOSPADM

## 2024-05-10 RX ORDER — AMITRIPTYLINE HYDROCHLORIDE 50 MG/1
50 TABLET, FILM COATED ORAL NIGHTLY
Status: DISCONTINUED | OUTPATIENT
Start: 2024-05-10 | End: 2024-05-12 | Stop reason: HOSPADM

## 2024-05-10 RX ORDER — FUROSEMIDE 10 MG/ML
40 INJECTION INTRAMUSCULAR; INTRAVENOUS ONCE
Status: DISCONTINUED | OUTPATIENT
Start: 2024-05-10 | End: 2024-05-10

## 2024-05-10 RX ORDER — MULTIPLE VITAMINS W/ MINERALS TAB 9MG-400MCG
1 TAB ORAL DAILY
Status: DISCONTINUED | OUTPATIENT
Start: 2024-05-10 | End: 2024-05-12 | Stop reason: HOSPADM

## 2024-05-10 RX ORDER — PANTOPRAZOLE SODIUM 40 MG/1
40 TABLET, DELAYED RELEASE ORAL
Status: DISCONTINUED | OUTPATIENT
Start: 2024-05-11 | End: 2024-05-12 | Stop reason: HOSPADM

## 2024-05-10 RX ORDER — FUROSEMIDE 10 MG/ML
40 INJECTION INTRAMUSCULAR; INTRAVENOUS ONCE
Status: COMPLETED | OUTPATIENT
Start: 2024-05-10 | End: 2024-05-10

## 2024-05-10 RX ORDER — SODIUM CHLORIDE 9 MG/ML
40 INJECTION, SOLUTION INTRAVENOUS AS NEEDED
Status: DISCONTINUED | OUTPATIENT
Start: 2024-05-10 | End: 2024-05-10

## 2024-05-10 RX ORDER — MIDAZOLAM HYDROCHLORIDE 1 MG/ML
INJECTION INTRAMUSCULAR; INTRAVENOUS
Status: COMPLETED | OUTPATIENT
Start: 2024-05-10 | End: 2024-05-10

## 2024-05-10 RX ORDER — SODIUM CHLORIDE 0.9 % (FLUSH) 0.9 %
10 SYRINGE (ML) INJECTION AS NEEDED
Status: DISCONTINUED | OUTPATIENT
Start: 2024-05-10 | End: 2024-05-12 | Stop reason: HOSPADM

## 2024-05-10 RX ORDER — DOFETILIDE 0.25 MG/1
250 CAPSULE ORAL EVERY 12 HOURS
Status: DISCONTINUED | OUTPATIENT
Start: 2024-05-10 | End: 2024-05-12 | Stop reason: HOSPADM

## 2024-05-10 RX ORDER — LEVOTHYROXINE SODIUM 0.05 MG/1
50 TABLET ORAL EVERY OTHER DAY
Status: DISCONTINUED | OUTPATIENT
Start: 2024-05-10 | End: 2024-05-12 | Stop reason: HOSPADM

## 2024-05-10 RX ORDER — MAGNESIUM SULFATE HEPTAHYDRATE 40 MG/ML
2 INJECTION, SOLUTION INTRAVENOUS
Status: COMPLETED | OUTPATIENT
Start: 2024-05-10 | End: 2024-05-10

## 2024-05-10 RX ORDER — ONDANSETRON 2 MG/ML
4 INJECTION INTRAMUSCULAR; INTRAVENOUS EVERY 6 HOURS PRN
Status: DISCONTINUED | OUTPATIENT
Start: 2024-05-10 | End: 2024-05-12 | Stop reason: HOSPADM

## 2024-05-10 RX ORDER — ACETAMINOPHEN 325 MG/1
650 TABLET ORAL EVERY 4 HOURS PRN
Status: DISCONTINUED | OUTPATIENT
Start: 2024-05-10 | End: 2024-05-12 | Stop reason: HOSPADM

## 2024-05-10 RX ORDER — ASCORBIC ACID 500 MG
1000 TABLET ORAL DAILY
Status: DISCONTINUED | OUTPATIENT
Start: 2024-05-10 | End: 2024-05-12 | Stop reason: HOSPADM

## 2024-05-10 RX ORDER — MAGNESIUM SULFATE HEPTAHYDRATE 40 MG/ML
2 INJECTION, SOLUTION INTRAVENOUS
Status: DISPENSED | OUTPATIENT
Start: 2024-05-10 | End: 2024-05-10

## 2024-05-10 RX ORDER — AMLODIPINE BESYLATE 5 MG/1
5 TABLET ORAL DAILY
Status: DISCONTINUED | OUTPATIENT
Start: 2024-05-10 | End: 2024-05-12 | Stop reason: HOSPADM

## 2024-05-10 RX ORDER — LOSARTAN POTASSIUM 100 MG/1
100 TABLET ORAL DAILY
Status: DISCONTINUED | OUTPATIENT
Start: 2024-05-10 | End: 2024-05-12 | Stop reason: HOSPADM

## 2024-05-10 RX ORDER — IPRATROPIUM BROMIDE 42 UG/1
1 SPRAY, METERED NASAL 3 TIMES DAILY
Status: DISCONTINUED | OUTPATIENT
Start: 2024-05-10 | End: 2024-05-12 | Stop reason: HOSPADM

## 2024-05-10 RX ORDER — SODIUM CHLORIDE 0.9 % (FLUSH) 0.9 %
10 SYRINGE (ML) INJECTION EVERY 12 HOURS SCHEDULED
Status: DISCONTINUED | OUTPATIENT
Start: 2024-05-10 | End: 2024-05-10

## 2024-05-10 RX ORDER — SODIUM CHLORIDE 0.9 % (FLUSH) 0.9 %
10 SYRINGE (ML) INJECTION AS NEEDED
Status: DISCONTINUED | OUTPATIENT
Start: 2024-05-10 | End: 2024-05-10

## 2024-05-10 RX ORDER — CHOLECALCIFEROL (VITAMIN D3) 125 MCG
5 CAPSULE ORAL NIGHTLY PRN
Status: DISCONTINUED | OUTPATIENT
Start: 2024-05-10 | End: 2024-05-12 | Stop reason: HOSPADM

## 2024-05-10 RX ORDER — VENLAFAXINE HYDROCHLORIDE 150 MG/1
150 CAPSULE, EXTENDED RELEASE ORAL DAILY
Status: DISCONTINUED | OUTPATIENT
Start: 2024-05-10 | End: 2024-05-12 | Stop reason: HOSPADM

## 2024-05-10 RX ADMIN — AMLODIPINE BESYLATE 5 MG: 5 TABLET ORAL at 16:57

## 2024-05-10 RX ADMIN — VENLAFAXINE HYDROCHLORIDE 150 MG: 150 CAPSULE, EXTENDED RELEASE ORAL at 20:47

## 2024-05-10 RX ADMIN — FUROSEMIDE 40 MG: 10 INJECTION, SOLUTION INTRAMUSCULAR; INTRAVENOUS at 15:52

## 2024-05-10 RX ADMIN — FENTANYL CITRATE 25 MCG: 50 INJECTION, SOLUTION INTRAMUSCULAR; INTRAVENOUS at 13:18

## 2024-05-10 RX ADMIN — Medication 5 MG: at 22:46

## 2024-05-10 RX ADMIN — MAGNESIUM SULFATE HEPTAHYDRATE 2 G: 40 INJECTION, SOLUTION INTRAVENOUS at 10:08

## 2024-05-10 RX ADMIN — MIDAZOLAM 2 MG: 1 INJECTION INTRAMUSCULAR; INTRAVENOUS at 13:16

## 2024-05-10 RX ADMIN — MAGNESIUM SULFATE HEPTAHYDRATE 2 G: 2 INJECTION, SOLUTION INTRAVENOUS at 15:52

## 2024-05-10 RX ADMIN — DOFETILIDE 250 MCG: 0.25 CAPSULE ORAL at 20:48

## 2024-05-10 RX ADMIN — MIDAZOLAM 2 MG: 1 INJECTION INTRAMUSCULAR; INTRAVENOUS at 13:20

## 2024-05-10 RX ADMIN — ACETAMINOPHEN 650 MG: 325 TABLET, FILM COATED ORAL at 15:53

## 2024-05-10 RX ADMIN — SODIUM CHLORIDE 50 ML/HR: 9 INJECTION, SOLUTION INTRAVENOUS at 13:09

## 2024-05-10 RX ADMIN — ACETAMINOPHEN 650 MG: 325 TABLET, FILM COATED ORAL at 22:46

## 2024-05-10 RX ADMIN — APIXABAN 5 MG: 5 TABLET, FILM COATED ORAL at 15:53

## 2024-05-10 RX ADMIN — ATORVASTATIN CALCIUM 80 MG: 80 TABLET, FILM COATED ORAL at 20:49

## 2024-05-10 RX ADMIN — MIDAZOLAM 1 MG: 1 INJECTION INTRAMUSCULAR; INTRAVENOUS at 13:26

## 2024-05-10 RX ADMIN — AMITRIPTYLINE HYDROCHLORIDE 50 MG: 50 TABLET, FILM COATED ORAL at 20:47

## 2024-05-10 RX ADMIN — MAGNESIUM OXIDE 400 MG (241.3 MG MAGNESIUM) TABLET 400 MG: TABLET at 06:31

## 2024-05-10 RX ADMIN — METOPROLOL TARTRATE 25 MG: 25 TABLET, FILM COATED ORAL at 20:49

## 2024-05-10 RX ADMIN — LOSARTAN POTASSIUM 100 MG: 100 TABLET, FILM COATED ORAL at 16:57

## 2024-05-10 RX ADMIN — FENTANYL CITRATE 25 MCG: 50 INJECTION, SOLUTION INTRAMUSCULAR; INTRAVENOUS at 13:20

## 2024-05-10 RX ADMIN — FUROSEMIDE 40 MG: 10 INJECTION, SOLUTION INTRAMUSCULAR; INTRAVENOUS at 06:31

## 2024-05-10 RX ADMIN — MAGNESIUM SULFATE HEPTAHYDRATE 2 G: 40 INJECTION, SOLUTION INTRAVENOUS at 12:11

## 2024-05-10 RX ADMIN — IPRATROPIUM BROMIDE 1 SPRAY: 42 SPRAY, METERED NASAL at 20:48

## 2024-05-10 RX ADMIN — PERFLUTREN 2 ML: 6.52 INJECTION, SUSPENSION INTRAVENOUS at 09:44

## 2024-05-10 RX ADMIN — Medication 10 ML: at 10:08

## 2024-05-10 NOTE — NURSING NOTE
Report received from cardiology, and given to receiving RN. Report called to receiving RN and patient will transport to receiving unit.    Ivy TILLEY

## 2024-05-10 NOTE — CONSULTS
Date of Consultation: 05/10/24    Referral Provider: Joao Sharma, *     Reason for Consultation: CHF.    Encounter Provider: Kamari Dia MD    Group of Service: [facility]     Patient Name: Dior Pettit    :1940    Chief complaint: Shortness of breath.    History of Present Illness:      This is a very pleasant 83 year-old female who is normally followed by Dr. Santso and our group.  She has a history of a bioprosthetic aortic valve replacement in , paroxysmal atrial fibrillation on Tikosyn, and a prior acute CVA in 2023.    The patient presented to the ER on 5/10/2024 with several weeks of shortness of breath.  She states that she could no longer walk any significant distances without having to stop and rest because of the shortness of breath.  She also has been having significant orthopnea over the last several days.  She was found to be in congestive heart failure with pulmonary edema.  She is better after IV Lasix.  She underwent an echocardiogram earlier today and a subsequent BRAULIO.  These confirmed severe bioprosthetic aortic stenosis.    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    Borderline--Noted on Echo & Measured @ 2.6CM and Mid Descending @ 2.5CM on CTA Chest-18    DM (diabetes mellitus)     T2    Esophagitis     GERD (gastroesophageal reflux disease)     Controlled w/Meds     Headache     Health care maintenance     Heart murmur     History of echocardiogram 10/2/17-[facility]    EF 66%; Borderline Concentric Hypertrophy; Mild Calcification in AV; Mild AVS; Mild AVR; Moderate TVR; Borderline Dilation of Aortic Root/Arch & Borderline Dilation of Ascending/Proximal Aorta Present    Hyperlipidemia     Controlled w/Meds    Hypertension     Controlled w/Meds    Hypothyroidism     Controlled w/Synthroid    Insomnia     Macular degeneration, left eye     Mild aortic valve regurgitation 10/02/2017    Noted on Echo    Mild aortic valve stenosis 10/02/2017    Noted on Echo    Mild dilation of ascending aorta 10/02/2017    Borderline--Noted on Echo    Moderate tricuspid valve regurgitation 10/02/2017    Noted on Echo    Mood disorder in conditions classified elsewhere     Controlled w/Meds    Near syncope 03/2017-[facility] ER    Neuropathy     NNEKA (obstructive sleep apnea)     Untreated    Osteoarthritis     Ankles/Feet    Pulmonary hypertension 2017    Renal insufficiency     RLS (restless legs syndrome)     Thoracic ascending aortic aneurysm Dx in 2007 @ 3.8CM & 1/02/2018    Noted @ 4CM on CTA Chest;    Visual impairment     macular degeneratuin         Past Surgical History:   Procedure Laterality Date    AORTIC VALVE REPAIR/REPLACEMENT  2007    Dr. Perry    APPENDECTOMY      AUGMENTATION MAMMAPLASTY  1976    BREAST AUGMENTATION  2014    BUNIONECTOMY      CARDIAC CATHETERIZATION  2007    CARDIAC VALVE REPLACEMENT  2007    porcine    COLONOSCOPY  2010    COLONOSCOPY  03/02/2012    EYE SURGERY      cataracts and ens implants    HYSTERECTOMY  1972    SIGMOIDOSCOPY  2001    TEMPORAL ARTERY BIOPSY Bilateral 4/14/2023    Procedure: BILATERAL TEMPORAL ARTERY BIOPSY;  Surgeon: Stewart Shepherd MD;  Location: [facility];  Service: Vascular;  Laterality: Bilateral;    TOTAL HIP ARTHROPLASTY Right 11/15/2022    Procedure: Right anterior total hip arthroplasty;  Surgeon: Joao Martinez MD;  Location:   [facility];  Service: Orthopedics;  Laterality: Right;         No Known Allergies      No current facility-administered medications on file prior to encounter.     Current Outpatient Medications on File Prior to Encounter   Medication Sig Dispense Refill    acetaminophen (TYLENOL) 325 MG tablet Take 2 tablets by mouth Every 4 (Four) Hours As Needed for Mild Pain.      Advair HFA 45-21 MCG/ACT inhaler       amitriptyline (ELAVIL) 50 MG tablet Take 1 tablet by mouth Every Night. 90 tablet 1    amLODIPine (NORVASC) 5 MG tablet Take 1 tablet by mouth Daily. 90 tablet 1    ascorbic acid (VITAMIN C) 1000 MG tablet Take 1 tablet by mouth Daily. Indications: Inadequate Vitamin C      atorvastatin (LIPITOR) 80 MG tablet Take 1 tablet by mouth Every Night. Indications: High Amount of Fats in the Blood 90 tablet 1    Blood Glucose Monitoring Suppl (Prodigy No Coding Blood Gluc) w/Device kit 1 each Daily. 1 kit 0    dofetilide (TIKOSYN) 250 MCG capsule TAKE 1 CAPSULE BY MOUTH EVERY 12 HOURS 180 capsule 0    Eliquis 5 MG tablet tablet TAKE 1 TABLET BY MOUTH EVERY 12 HOURS 180 tablet 0    fenofibrate micronized (LOFIBRA) 134 MG capsule TAKE 1 CAPSULE EVERY       MORNING BEFORE BREAKFAST 90 capsule 3    glucosamine-chondroitin 500-400 MG capsule capsule Take 2 capsules by mouth Daily.      glucose blood (Prodigy No Coding Blood Gluc) test strip TEST BLOOD SUGAR DAILY 50 each 0    ipratropium (ATROVENT) 0.06 % nasal spray INSTILL 2 SPRAYS IN EACH NOSTRIL EVERY SIX HOURS AS NEEDED 15 mL 0    losartan (COZAAR) 100 MG tablet Take 1 tablet by mouth Daily. 90 tablet 1    metFORMIN ER (GLUCOPHAGE-XR) 750 MG 24 hr tablet TAKE 1 TABLET TWICE A  tablet 3    metoprolol tartrate (LOPRESSOR) 50 MG tablet Take 0.5 tablets by mouth 2 (Two) Times a Day. Indications: High Blood Pressure Disorder 90 tablet 1    Multiple Vitamins-Minerals (MULTIVITAL PO) Take 1 tablet by mouth Daily. Indications: potential deficiency      omeprazole  (priLOSEC) 40 MG capsule TAKE 1 CAPSULE DAILY *DR LOERA MFR* 90 capsule 0    Prodigy Safety Lancets 26G misc CHECK BLOOD SUGAR ONE TIME PER  each 1    valACYclovir (VALTREX) 1000 MG tablet TAKE 2 TABLETS 4 TIMES     DAILY FOR MOUTH ULCER (Patient taking differently: TAKE 2 TABLETS 4 TIMES     DAILY FOR MOUTH ULCER as needed) 16 tablet 4    venlafaxine XR (EFFEXOR-XR) 150 MG 24 hr capsule Take 1 capsule by mouth Daily. 90 capsule 3    levothyroxine (SYNTHROID, LEVOTHROID) 50 MCG tablet Take 1 tablet by mouth Every Other Day. Indications: Underactive Thyroid 45 tablet 1    levothyroxine (SYNTHROID, LEVOTHROID) 75 MCG tablet TAKE ONE TABLET EVERY OTHER DAY  Indications: Underactive Thyroid 45 tablet 0         Social History     Socioeconomic History    Marital status:    Tobacco Use    Smoking status: Never    Smokeless tobacco: Never    Tobacco comments:     caffeine use - 1 cup coffee daily    Vaping Use    Vaping status: Never Used   Substance and Sexual Activity    Alcohol use: Yes     Comment: less than 1 glass of wine    Drug use: Never    Sexual activity: Not Currently     Partners: Male     Birth control/protection: Surgical     Comment: Hyst         Family History   Problem Relation Age of Onset    Hypertension Mother     Stroke Mother     Cancer Mother     Diabetes Sister     Coronary artery disease Brother     Diabetes Brother     Valvular heart disease Brother         TAVR    Coronary artery disease Brother     Cancer Brother     Birth defects Daughter     Skin cancer Neg Hx     Malig Hyperthermia Neg Hx        REVIEW OF SYSTEMS:   Pertinent positives are noted in the HPI above.  Otherwise, all other systems were reviewed, and are negative.     Objective:     Vitals:    05/10/24 1345 05/10/24 1350 05/10/24 1411 05/10/24 1657   BP: 141/69 140/66 168/68 138/68   BP Location:    Left arm   Patient Position:    Lying   Pulse: 77 77 77 71   Resp: 16 18  18   Temp:    97.8 °F (36.6 °C)   TempSrc:  "   Oral   SpO2: 95% 98%  92%   Weight:   62 kg (136 lb 11 oz)    Height:   170 cm (66.93\")      Body mass index is 21.45 kg/m².  Flowsheet Rows      Flowsheet Row First Filed Value   Admission Height 172 cm (67.72\") Documented at 05/10/2024 0445   Admission Weight 62.1 kg (136 lb 14.5 oz) Documented at 05/10/2024 0445             General:    No acute distress, alert and oriented x4, pleasant                   Head:    Normocephalic, atraumatic.   Eyes:          Conjunctivae and sclerae normal, no icterus.   Throat:   No oral lesions, no thrush, oral mucosa moist.    Neck:   Supple, trachea midline.   Lungs:     Faint rales at the bases bilaterally.    Heart:    Regular rhythm and normal rate.  III/VI late-peaking SM RUSB.   Abdomen:     Soft, non-tender, non-distended, positive bowel sounds.    Extremities:   Trace edema of the lower extremities bilaterally.   Pulses:   Pulses palpable and equal bilaterally.    Skin:   No bleeding or rash.   Neuro:   Non-focal.  Moves all extremities well.    Psychiatric:   Normal mood and affect.         Lab Review:                Results from last 7 days   Lab Units 05/10/24  0324   SODIUM mmol/L 139   POTASSIUM mmol/L 4.6   CHLORIDE mmol/L 104   CO2 mmol/L 23.5   BUN mg/dL 20   CREATININE mg/dL 1.00   GLUCOSE mg/dL 123*   CALCIUM mg/dL 9.6     Results from last 7 days   Lab Units 05/10/24  0324   HSTROP T ng/L 12     Results from last 7 days   Lab Units 05/10/24  0324   WBC 10*3/mm3 7.17   HEMOGLOBIN g/dL 10.6*   HEMATOCRIT % 33.0*   PLATELETS 10*3/mm3 220             Results from last 7 days   Lab Units 05/10/24  1528   MAGNESIUM mg/dL 2.0           EKG (reviewed by me personally):Sinus rhythm, incomplete left bundle branch block.      Assessment:   1.  Severe bioprosthetic aortic stenosis (tissue AVR in 2007 by Dr. Perry)  2.  Acute on chronic valvular and diastolic CHF, secondary to #1  3.  Paroxysmal atrial fibrillation, on Tikosyn  4.  History of CVA in April 2023  5.  " Multifactorial chronic anemia  6.  Moderate mitral regurgitation  7.  Severe tricuspid regurgitation with suspected pulmonary hypertension  8.  Systemic hypertension  9.  Diabetes    Plan:       Again, she is better after IV Lasix.  She has evidence of pulmonary edema and congestive heart failure, which is almost certainly secondary to her severe bioprosthetic aortic stenosis.  I reviewed her echocardiogram and then performed a BRAULIO.  The BRAULIO confirmed significant thickening and restriction in the tissue AVR leaflets.  The peak gradient was 91 mmHg, and the mean gradient was 55 mmHg.  She also had moderate mitral regurgitation, severe tricuspid regurgitation, and suspected pulmonary hypertension with RVSP of 62 mmHg.     The patient would be a poor candidate for a redo aortic valve replacement surgery because of her age and comorbidities.  She also stated that she does not desire any type of open heart surgery.  I have consulted the structural heart team for a potential TAVR evaluation.  This would be a valve in valve TAVR.    In the meantime, she will continue with IV Lasix.  I gave her another 40 mg IV this afternoon.  We will reevaluate her volume status tomorrow and redose her Lasix at that time.  She is in sinus rhythm on the dofetilide, and she will continue on Eliquis 5 mg twice daily for now.  She has had a history of a stroke in April 2023.  Her blood pressure has been reasonable on the losartan and amlodipine.    Discussed with the patient and her daughter in detail.    Thank you very much for this consult.    Forest Dia MD.

## 2024-05-10 NOTE — H&P
[facility]   HISTORY AND PHYSICAL    Patient Name: Dior Pettit  : 1940  MRN: [id number]  Primary Care Physician:  Ashanti Hong APRN  Date of admission: 5/10/2024    Subjective   Subjective     Chief Complaint:   Chief Complaint   Patient presents with    Shortness of Breath         HPI:    Dior Pettit is an 83 y.o. female with a history of hypertension, NNEKA, GERD, Anxiety, Diabetes Mellitus, Hypothyroidism, Paroxysmal A-Fib, Pulmonary hypertension, and Aortic valve replacement who presented to the emergency department with a complaint of chest heaviness that started around 7pm last night. States she has had a cough the past couple of days that became productive yesterday. States she took Mucinex and took a puff of her Advair inhaler without improvement. States she is notably more short of air with exertion and has increased chest pressure when laying back on the stretcher. HS Troponin was 12, BNP 2180, Chest x-ray reported bilateral airspace opacities may represent pulmonary edema and or   atypical infection. Cardiomegaly. Small bilateral pleural effusions. EKG reported Sinus Rhythm. Patient was admitted to the ED Observation Unit for further evaluation and work-up.      Review of Systems   All systems were reviewed and negative except for: as documented in HPI    Personal History     Past Medical History:   Diagnosis Date    Allergic rhinitis     Anemia     Anxiety     Controlled w/Meds    Aortic root dilatation 10/02/2017    Borderline--Noted on Echo    Aortic valve calcification 10/02/2017    Noted on Echo    Aortic valve insufficiency Dx in 07    w/AVR     Aortic valve prosthesis present 2018    Noted on CTA Chest    Ascending aorta dilatation 10/02/2017    Borderline--Noted on Echo    Asthma     Atypical chest pain     Breast pain, right Hx    CAD (coronary artery disease)     Cardiomegaly 2017    Cervicalgia     Chest pain due to CAD     Congenital dilation of aortic arch  10/02/2017    Borderline--Noted on Echo & Measured @ 2.6CM and Mid Descending @ 2.5CM on CTA Chest-11/2/18    DM (diabetes mellitus)     T2    Esophagitis     GERD (gastroesophageal reflux disease)     Controlled w/Meds    Headache     Health care maintenance     Heart murmur     History of echocardiogram 10/2/17-[facility]    EF 66%; Borderline Concentric Hypertrophy; Mild Calcification in AV; Mild AVS; Mild AVR; Moderate TVR; Borderline Dilation of Aortic Root/Arch & Borderline Dilation of Ascending/Proximal Aorta Present    Hyperlipidemia     Controlled w/Meds    Hypertension     Controlled w/Meds    Hypothyroidism     Controlled w/Synthroid    Insomnia     Macular degeneration, left eye     Mild aortic valve regurgitation 10/02/2017    Noted on Echo    Mild aortic valve stenosis 10/02/2017    Noted on Echo    Mild dilation of ascending aorta 10/02/2017    Borderline--Noted on Echo    Moderate tricuspid valve regurgitation 10/02/2017    Noted on Echo    Mood disorder in conditions classified elsewhere     Controlled w/Meds    Near syncope 03/2017-[facility] ER    Neuropathy     NNEKA (obstructive sleep apnea)     Untreated    Osteoarthritis     Ankles/Feet    Pulmonary hypertension 2017    Renal insufficiency     RLS (restless legs syndrome)     Thoracic ascending aortic aneurysm Dx in 2007 @ 3.8CM & 1/02/2018    Noted @ 4CM on CTA Chest;    Visual impairment     macular degeneratuin       Past Surgical History:   Procedure Laterality Date    AORTIC VALVE REPAIR/REPLACEMENT  2007    Dr. Perry    APPENDECTOMY      AUGMENTATION MAMMAPLASTY  1976    BREAST AUGMENTATION  2014    BUNIONECTOMY      CARDIAC CATHETERIZATION  2007    CARDIAC VALVE REPLACEMENT  2007    porcine    COLONOSCOPY  2010    COLONOSCOPY  03/02/2012    EYE SURGERY      cataracts and ens implants    HYSTERECTOMY  1972    SIGMOIDOSCOPY  2001    TEMPORAL ARTERY BIOPSY Bilateral 4/14/2023    Procedure: BILATERAL TEMPORAL ARTERY BIOPSY;  Surgeon: Stewart Shepherd  MD Curtis;  Location: [facility];  Service: Vascular;  Laterality: Bilateral;    TOTAL HIP ARTHROPLASTY Right 11/15/2022    Procedure: Right anterior total hip arthroplasty;  Surgeon: Joao Martinez MD;  Location: [facility];  Service: Orthopedics;  Laterality: Right;       Family History: family history includes Birth defects in her daughter; Cancer in her brother and mother; Coronary artery disease in her brother and brother; Diabetes in her brother and sister; Hypertension in her mother; Stroke in her mother; Valvular heart disease in her brother. Otherwise pertinent FHx was reviewed and not pertinent to current issue.    Social History:  reports that she has never smoked. She has never used smokeless tobacco. She reports current alcohol use. She reports that she does not use drugs.    Home Medications:  Prodigy No Coding Blood Gluc, Prodigy Safety Lancets 26G, acetaminophen, amLODIPine, amitriptyline, apixaban, ascorbic acid, atorvastatin, dofetilide, fenofibrate micronized, fluticasone-salmeterol, glucosamine-chondroitin, glucose blood, ipratropium, levothyroxine, losartan, metFORMIN ER, metoprolol tartrate, multivitamin with minerals, omeprazole, valACYclovir, and venlafaxine XR    Allergies:  No Known Allergies    Objective   Objective     Vitals:   Temp:  [97.2 °F (36.2 °C)] 97.2 °F (36.2 °C)  Heart Rate:  [54-61] 60  Resp:  [16] 16  BP: (145-155)/(54-77) 155/77  Physical Exam    Constitutional: Awake, alert   Eyes: PERRLA, sclerae anicteric, no conjunctival injection   HENT: NCAT, mucous membranes moist   Neck: Supple, no thyromegaly, no lymphadenopathy, trachea midline   Respiratory: Clear to auscultation bilaterally, nonlabored respirations    Cardiovascular: RRR, + murmurs, rubs, or gallops, palpable pedal pulses bilaterally   Gastrointestinal: Soft, nontender, nondistended   Musculoskeletal: No bilateral ankle edema, no clubbing or cyanosis to extremities   Psychiatric: Appropriate affect,  cooperative   Neurologic: Oriented x 3, strength symmetric in all extremities, Cranial Nerves grossly intact to confrontation, speech clear   Skin: No rashes     Result Review    Result Review:  I have personally reviewed the results from the time of this admission to 5/10/2024 06:33 EDT and agree with these findings:  [x]  Laboratory list / accordion  []  Microbiology  [x]  Radiology  [x]  EKG/Telemetry   []  Cardiology/Vascular   []  Pathology  []  Old records  []  Other:  Most notable findings include: BNP 2180; Chest x-ray reported pulmonary edema      Assessment & Plan   Assessment / Plan     Brief Patient Summary:  Dior Pettit is a 83 y.o. female who was admitted to the ED Observation Unit for further evaluation and work-up for her complaint of chest heaviness and dyspnea on exertion with Cardiology consult.    Active Hospital Problems:  Active Hospital Problems    Diagnosis     **Exertional dyspnea      Plan:     Orthopnea  Telemetry monitoring  VS Q4H  BNP 2180  Troponin 12, trending  Echocardiogram pending  NPO  Dr. Love with cardiology evaluated patient and after reviewing her echo has concluded patient has bioprosthetic aortic valve stenosis and the patient will need a TAVR therefore she will need to be admitted to inpatient    Hypomagnesemia  Mag 1.5  Magnesium replacement per protocol      DVT prophylaxis:  Mechanical DVT prophylaxis orders are present.      CODE STATUS:    Level Of Support Discussed With: Patient  Code Status (Patient has no pulse and is not breathing): CPR (Attempt to Resuscitate)  Medical Interventions (Patient has pulse or is breathing): Full Support    Admission Status:  I believe this patient meets observation status.      Electronically signed by TOSHA Kitchen, 05/10/24, 6:33 AM EDT.      75 minutes has been spent by [facility] providers in the care of this patient while under observation status      I have worn appropriate PPE  during this patient encounter, sanitized my hands both with entering and exiting patient's room.

## 2024-05-10 NOTE — PLAN OF CARE
Goal Outcome Evaluation:              Outcome Evaluation: Pt had a BRAULIO today. SBP was a little high when she came to me. 167. Treated per MAR. Pt takes Tikosyn at 1030am/1030pm at home. VSS, NSR on tele, on RA. Some c/o mild pain, treated per MAR. Pt has severe chronic headaches. SBA when ambulating. Will continue POC and update as needed.

## 2024-05-11 ENCOUNTER — APPOINTMENT (OUTPATIENT)
Dept: CT IMAGING | Facility: HOSPITAL | Age: 84
DRG: 314 | End: 2024-05-11
Payer: MEDICARE

## 2024-05-11 LAB
ANION GAP SERPL CALCULATED.3IONS-SCNC: 12.3 MMOL/L (ref 5–15)
BUN SERPL-MCNC: 23 MG/DL (ref 8–23)
BUN/CREAT SERPL: 24 (ref 7–25)
CALCIUM SPEC-SCNC: 9.9 MG/DL (ref 8.6–10.5)
CHLORIDE SERPL-SCNC: 98 MMOL/L (ref 98–107)
CO2 SERPL-SCNC: 25.7 MMOL/L (ref 22–29)
CREAT SERPL-MCNC: 0.96 MG/DL (ref 0.57–1)
DEPRECATED RDW RBC AUTO: 41.1 FL (ref 37–54)
EGFRCR SERPLBLD CKD-EPI 2021: 58.1 ML/MIN/1.73
ERYTHROCYTE [DISTWIDTH] IN BLOOD BY AUTOMATED COUNT: 12.8 % (ref 12.3–15.4)
GLUCOSE SERPL-MCNC: 100 MG/DL (ref 65–99)
HCT VFR BLD AUTO: 36.2 % (ref 34–46.6)
HGB BLD-MCNC: 11.8 G/DL (ref 12–15.9)
MAGNESIUM SERPL-MCNC: 2 MG/DL (ref 1.6–2.4)
MCH RBC QN AUTO: 29.3 PG (ref 26.6–33)
MCHC RBC AUTO-ENTMCNC: 32.6 G/DL (ref 31.5–35.7)
MCV RBC AUTO: 89.8 FL (ref 79–97)
PLATELET # BLD AUTO: 256 10*3/MM3 (ref 140–450)
PMV BLD AUTO: 9.9 FL (ref 6–12)
POTASSIUM SERPL-SCNC: 4 MMOL/L (ref 3.5–5.2)
QT INTERVAL: 464 MS
QTC INTERVAL: 487 MS
RBC # BLD AUTO: 4.03 10*6/MM3 (ref 3.77–5.28)
SODIUM SERPL-SCNC: 136 MMOL/L (ref 136–145)
WBC NRBC COR # BLD AUTO: 6.87 10*3/MM3 (ref 3.4–10.8)

## 2024-05-11 PROCEDURE — 93010 ELECTROCARDIOGRAM REPORT: CPT | Performed by: INTERNAL MEDICINE

## 2024-05-11 PROCEDURE — 93005 ELECTROCARDIOGRAM TRACING: CPT | Performed by: INTERNAL MEDICINE

## 2024-05-11 PROCEDURE — 36415 COLL VENOUS BLD VENIPUNCTURE: CPT | Performed by: INTERNAL MEDICINE

## 2024-05-11 PROCEDURE — 74174 CTA ABD&PLVS W/CONTRAST: CPT

## 2024-05-11 PROCEDURE — 85027 COMPLETE CBC AUTOMATED: CPT | Performed by: INTERNAL MEDICINE

## 2024-05-11 PROCEDURE — 80048 BASIC METABOLIC PNL TOTAL CA: CPT | Performed by: INTERNAL MEDICINE

## 2024-05-11 PROCEDURE — 99222 1ST HOSP IP/OBS MODERATE 55: CPT | Performed by: INTERNAL MEDICINE

## 2024-05-11 PROCEDURE — 71275 CT ANGIOGRAPHY CHEST: CPT

## 2024-05-11 PROCEDURE — 25510000001 IOPAMIDOL PER 1 ML: Performed by: INTERNAL MEDICINE

## 2024-05-11 PROCEDURE — 83735 ASSAY OF MAGNESIUM: CPT | Performed by: INTERNAL MEDICINE

## 2024-05-11 RX ORDER — FUROSEMIDE 40 MG/1
40 TABLET ORAL DAILY
Status: DISCONTINUED | OUTPATIENT
Start: 2024-05-11 | End: 2024-05-12 | Stop reason: HOSPADM

## 2024-05-11 RX ADMIN — AMLODIPINE BESYLATE 5 MG: 5 TABLET ORAL at 17:20

## 2024-05-11 RX ADMIN — LOSARTAN POTASSIUM 100 MG: 100 TABLET, FILM COATED ORAL at 17:20

## 2024-05-11 RX ADMIN — IPRATROPIUM BROMIDE 1 SPRAY: 42 SPRAY, METERED NASAL at 21:10

## 2024-05-11 RX ADMIN — LEVOTHYROXINE SODIUM 75 MCG: 50 TABLET ORAL at 06:31

## 2024-05-11 RX ADMIN — METOPROLOL TARTRATE 25 MG: 25 TABLET, FILM COATED ORAL at 21:06

## 2024-05-11 RX ADMIN — APIXABAN 5 MG: 5 TABLET, FILM COATED ORAL at 08:42

## 2024-05-11 RX ADMIN — ATORVASTATIN CALCIUM 80 MG: 80 TABLET, FILM COATED ORAL at 21:06

## 2024-05-11 RX ADMIN — OXYCODONE HYDROCHLORIDE AND ACETAMINOPHEN 1000 MG: 500 TABLET ORAL at 08:43

## 2024-05-11 RX ADMIN — APIXABAN 5 MG: 5 TABLET, FILM COATED ORAL at 21:05

## 2024-05-11 RX ADMIN — Medication 1 TABLET: at 08:42

## 2024-05-11 RX ADMIN — VENLAFAXINE HYDROCHLORIDE 150 MG: 150 CAPSULE, EXTENDED RELEASE ORAL at 08:41

## 2024-05-11 RX ADMIN — PANTOPRAZOLE SODIUM 40 MG: 40 TABLET, DELAYED RELEASE ORAL at 06:28

## 2024-05-11 RX ADMIN — AMITRIPTYLINE HYDROCHLORIDE 50 MG: 50 TABLET, FILM COATED ORAL at 21:05

## 2024-05-11 RX ADMIN — DOFETILIDE 250 MCG: 0.25 CAPSULE ORAL at 08:41

## 2024-05-11 RX ADMIN — FUROSEMIDE 40 MG: 40 TABLET ORAL at 14:29

## 2024-05-11 RX ADMIN — ACETAMINOPHEN 650 MG: 325 TABLET, FILM COATED ORAL at 21:06

## 2024-05-11 RX ADMIN — ACETAMINOPHEN 650 MG: 325 TABLET, FILM COATED ORAL at 08:26

## 2024-05-11 RX ADMIN — METOPROLOL TARTRATE 25 MG: 25 TABLET, FILM COATED ORAL at 08:43

## 2024-05-11 RX ADMIN — IOPAMIDOL 95 ML: 755 INJECTION, SOLUTION INTRAVENOUS at 18:37

## 2024-05-11 RX ADMIN — DOFETILIDE 250 MCG: 0.25 CAPSULE ORAL at 21:05

## 2024-05-11 RX ADMIN — Medication 5 MG: at 21:06

## 2024-05-11 NOTE — CONSULTS
Referring Provider:       Patient Care Team:  Ashanti Hong APRN as PCP - General (Internal Medicine)  Gerardo Rodriguez Jr., MD as Consulting Physician (Pulmonary Disease)  Abbi Mitchell MD as Consulting Physician (Cardiology)  Luis Miguel Parker MD as Consulting Physician (Ophthalmology)  Tae Burton MD as Consulting Physician (Dermatology)  Addis Gomes MD as Consulting Physician (Plastic Surgery)      Reason for Consultation:   Severe bioprosthetic aortic valve stenosis  Acute on chronic heart failure with preserved ejection fraction  Paroxysmal atrial fibrillation      History of present illness:    Very pleasant 83-year-old female with a medical history of prior bioprosthetic aortic valve replacement and subaortic membrane resection in 2007, paroxysmal atrial fibrillation, prior CVA, who presented to the emergency room on 5/10 with several weeks of worsening shortness of breath and orthopnea.  She states that this is gotten worse over the past few weeks and she subsequently came in for evaluation.    She was noted in the emergency room to have an elevated proBNP at 2100 and normal high-sensitivity troponin.  She has been given IV Lasix with significant clinical improvement.  She is currently on room air.  Her blood pressure and heart rate are well-controlled.  She had a thorough evaluation by Dr. Dia during her inpatient stay including a transthoracic and transesophageal echocardiogram.  She has normal left ventricular size and systolic function with grade 2 diastolic dysfunction.  Normal right ventricular function noted.  Severe bioprosthetic aortic valve stenosis with a mean gradient of 48 mmHg across the aortic valve.  She is also noted at moderate mitral valve regurgitation and moderate to severe tricuspid valve regurgitation    Transesophageal echocardiogram was then performed which confirmed severe tricuspid valve regurgitation and moderate mitral valve regurgitation.   The mean gradient on that echo was 55 mmHg across the bioprosthetic aortic valve.    Review of Systems  All other systems reviewed and negative.     Past Medical History:   Past Medical History:   Diagnosis Date    Allergic rhinitis     Anemia     Anxiety     Controlled w/Meds    Aortic root dilatation 10/02/2017    Borderline--Noted on Echo    Aortic valve calcification 10/02/2017    Noted on Echo    Aortic valve insufficiency Dx in 07    w/AVR     Aortic valve prosthesis present 11/02/2018    Noted on CTA Chest    Ascending aorta dilatation 10/02/2017    Borderline--Noted on Echo    Asthma     Atypical chest pain     Breast pain, right Hx    CAD (coronary artery disease)     Cardiomegaly 2017    Cervicalgia     Chest pain due to CAD     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    Borderline--Noted on Echo & Measured @ 2.6CM and Mid Descending @ 2.5CM on CTA Chest-11/2/18    DM (diabetes mellitus)     T2    Esophagitis     GERD (gastroesophageal reflux disease)     Controlled w/Meds    Headache     Health care maintenance     Heart murmur     History of echocardiogram 10/2/17-[facility]    EF 66%; Borderline Concentric Hypertrophy; Mild Calcification in AV; Mild AVS; Mild AVR; Moderate TVR; Borderline Dilation of Aortic Root/Arch & Borderline Dilation of Ascending/Proximal Aorta Present    Hyperlipidemia     Controlled w/Meds    Hypertension     Controlled w/Meds    Hypothyroidism     Controlled w/Synthroid    Insomnia     Macular degeneration, left eye     Mild aortic valve regurgitation 10/02/2017    Noted on Echo    Mild aortic valve stenosis 10/02/2017    Noted on Echo    Mild dilation of ascending aorta 10/02/2017    Borderline--Noted on Echo    Moderate tricuspid valve regurgitation 10/02/2017    Noted on Echo    Mood disorder in conditions classified elsewhere     Controlled w/Meds    Near syncope 03/2017-[facility] ER    Neuropathy     NNEKA (obstructive sleep apnea)     Untreated    Osteoarthritis     Ankles/Feet     Pulmonary hypertension 2017    Renal insufficiency     RLS (restless legs syndrome)     Thoracic ascending aortic aneurysm Dx in 2007 @ 3.8CM & 1/02/2018    Noted @ 4CM on CTA Chest;    Visual impairment     macular degeneratuin       Past Surgical History:   Past Surgical History:   Procedure Laterality Date    AORTIC VALVE REPAIR/REPLACEMENT  2007    Dr. Perry    APPENDECTOMY      AUGMENTATION MAMMAPLASTY  1976    BREAST AUGMENTATION  2014    BUNIONECTOMY      CARDIAC CATHETERIZATION  2007    CARDIAC VALVE REPLACEMENT  2007    porcine    COLONOSCOPY  2010    COLONOSCOPY  03/02/2012    EYE SURGERY      cataracts and ens implants    HYSTERECTOMY  1972    SIGMOIDOSCOPY  2001    TEMPORAL ARTERY BIOPSY Bilateral 4/14/2023    Procedure: BILATERAL TEMPORAL ARTERY BIOPSY;  Surgeon: Stewart Shepherd MD;  Location: [facility] MAIN OR;  Service: Vascular;  Laterality: Bilateral;    TOTAL HIP ARTHROPLASTY Right 11/15/2022    Procedure: Right anterior total hip arthroplasty;  Surgeon: Joao Martinez MD;  Location: [facility] MAIN OR;  Service: Orthopedics;  Laterality: Right;       Family History:   Family History   Problem Relation Age of Onset    Hypertension Mother     Stroke Mother     Cancer Mother     Diabetes Sister     Coronary artery disease Brother     Diabetes Brother     Valvular heart disease Brother         TAVR    Coronary artery disease Brother     Cancer Brother     Birth defects Daughter     Skin cancer Neg Hx     Malig Hyperthermia Neg Hx        Social History:   Social History     Tobacco Use    Smoking status: Never    Smokeless tobacco: Never    Tobacco comments:     caffeine use - 1 cup coffee daily    Vaping Use    Vaping status: Never Used   Substance Use Topics    Alcohol use: Yes     Comment: less than 1 glass of wine    Drug use: Never       Home Medications:   Medications Prior to Admission   Medication Sig Dispense Refill Last Dose    acetaminophen (TYLENOL) 325 MG tablet Take 2 tablets by  mouth Every 4 (Four) Hours As Needed for Mild Pain.   5/9/2024 at 2300    Advair HFA 45-21 MCG/ACT inhaler    5/9/2024 at 2200    amitriptyline (ELAVIL) 50 MG tablet Take 1 tablet by mouth Every Night. 90 tablet 1 5/9/2024 at 2230    amLODIPine (NORVASC) 5 MG tablet Take 1 tablet by mouth Daily. 90 tablet 1 5/9/2024 at 2200    ascorbic acid (VITAMIN C) 1000 MG tablet Take 1 tablet by mouth Daily. Indications: Inadequate Vitamin C   5/9/2024 at 1000    atorvastatin (LIPITOR) 80 MG tablet Take 1 tablet by mouth Every Night. Indications: High Amount of Fats in the Blood 90 tablet 1 5/9/2024 at 2200    Blood Glucose Monitoring Suppl (Prodigy No Coding Blood Gluc) w/Device kit 1 each Daily. 1 kit 0 5/9/2024 at 2200    dofetilide (TIKOSYN) 250 MCG capsule TAKE 1 CAPSULE BY MOUTH EVERY 12 HOURS 180 capsule 0 5/9/2024 at 2200    Eliquis 5 MG tablet tablet TAKE 1 TABLET BY MOUTH EVERY 12 HOURS 180 tablet 0 5/9/2024 at 2200    fenofibrate micronized (LOFIBRA) 134 MG capsule TAKE 1 CAPSULE EVERY       MORNING BEFORE BREAKFAST 90 capsule 3 5/9/2024 at 2200    glucosamine-chondroitin 500-400 MG capsule capsule Take 2 capsules by mouth Daily.   5/9/2024 at 1600    glucose blood (Prodigy No Coding Blood Gluc) test strip TEST BLOOD SUGAR DAILY 50 each 0 5/9/2024 at 2200    ipratropium (ATROVENT) 0.06 % nasal spray INSTILL 2 SPRAYS IN EACH NOSTRIL EVERY SIX HOURS AS NEEDED 15 mL 0 Past Week    losartan (COZAAR) 100 MG tablet Take 1 tablet by mouth Daily. 90 tablet 1 5/9/2024 at 1000    metFORMIN ER (GLUCOPHAGE-XR) 750 MG 24 hr tablet TAKE 1 TABLET TWICE A  tablet 3 5/9/2024 at 2200    metoprolol tartrate (LOPRESSOR) 50 MG tablet Take 0.5 tablets by mouth 2 (Two) Times a Day. Indications: High Blood Pressure Disorder 90 tablet 1 5/9/2024 at 1000    Multiple Vitamins-Minerals (MULTIVITAL PO) Take 1 tablet by mouth Daily. Indications: potential deficiency   5/9/2024 at 1400    omeprazole (priLOSEC) 40 MG capsule TAKE 1 CAPSULE  DAILY *DR LOERA MFR* 90 capsule 0 5/9/2024 at 1000    Prodigy Safety Lancets 26G misc CHECK BLOOD SUGAR ONE TIME PER  each 1 5/9/2024 at 2200    valACYclovir (VALTREX) 1000 MG tablet TAKE 2 TABLETS 4 TIMES     DAILY FOR MOUTH ULCER (Patient taking differently: TAKE 2 TABLETS 4 TIMES     DAILY FOR MOUTH ULCER as needed) 16 tablet 4     venlafaxine XR (EFFEXOR-XR) 150 MG 24 hr capsule Take 1 capsule by mouth Daily. 90 capsule 3 5/9/2024 at 1000    levothyroxine (SYNTHROID, LEVOTHROID) 50 MCG tablet Take 1 tablet by mouth Every Other Day. Indications: Underactive Thyroid 45 tablet 1     levothyroxine (SYNTHROID, LEVOTHROID) 75 MCG tablet TAKE ONE TABLET EVERY OTHER DAY  Indications: Underactive Thyroid 45 tablet 0        Current Medications:   Current Facility-Administered Medications:     acetaminophen (TYLENOL) tablet 650 mg, 650 mg, Oral, Q4H PRN, Kamari Dia MD, 650 mg at 05/11/24 0826    amitriptyline (ELAVIL) tablet 50 mg, 50 mg, Oral, Nightly, Kamari Dia MD, 50 mg at 05/10/24 2047    amLODIPine (NORVASC) tablet 5 mg, 5 mg, Oral, Daily, Kamari Dia MD, 5 mg at 05/10/24 1657    apixaban (ELIQUIS) tablet 5 mg, 5 mg, Oral, BID, Kamari Dia MD, 5 mg at 05/11/24 0842    ascorbic acid (VITAMIN C) tablet 1,000 mg, 1,000 mg, Oral, Daily, Kamari Dia MD, 1,000 mg at 05/11/24 0843    atorvastatin (LIPITOR) tablet 80 mg, 80 mg, Oral, Nightly, Kamari Dia MD, 80 mg at 05/10/24 2049    dofetilide (TIKOSYN) capsule 250 mcg, 250 mcg, Oral, Q12H, Kamari Dia MD, 250 mcg at 05/11/24 0841    furosemide (LASIX) tablet 40 mg, 40 mg, Oral, Daily, Ranjan Hernadez MD    ipratropium (ATROVENT) nasal spray 0.06%, 1 spray, Each Nare, TID, Kamari Dia MD, 1 spray at 05/10/24 2048    labetalol (NORMODYNE,TRANDATE) injection 10 mg, 10 mg, Intravenous, Q3H PRN, Kamari Dia MD    levothyroxine (SYNTHROID, LEVOTHROID) tablet 50 mcg, 50 mcg, Oral,  "Every Other Day, Kamari Dia MD    levothyroxine (SYNTHROID, LEVOTHROID) tablet 75 mcg, 75 mcg, Oral, Every Other Day, Kamari Dia MD, 75 mcg at 05/11/24 0631    losartan (COZAAR) tablet 100 mg, 100 mg, Oral, Daily, Kamari Dia MD, 100 mg at 05/10/24 1657    Magnesium Standard Dose Replacement - Follow Nurse / BPA Driven Protocol, , Does not apply, PRN, Breana Mason, APRSANG    melatonin tablet 5 mg, 5 mg, Oral, Nightly PRN, Janice Bauman APRN, 5 mg at 05/10/24 2246    metoprolol tartrate (LOPRESSOR) tablet 25 mg, 25 mg, Oral, BID, Kamari Dia MD, 25 mg at 05/11/24 0843    multivitamin with minerals 1 tablet, 1 tablet, Oral, Daily, Kamari Dia MD, 1 tablet at 05/11/24 0842    ondansetron ODT (ZOFRAN-ODT) disintegrating tablet 4 mg, 4 mg, Oral, Q6H PRN **OR** ondansetron (ZOFRAN) injection 4 mg, 4 mg, Intravenous, Q6H PRN, Kamari Dia MD    pantoprazole (PROTONIX) EC tablet 40 mg, 40 mg, Oral, Q AM, Kamari Dia MD, 40 mg at 05/11/24 0628    Phosphorus Replacement - Follow Nurse / BPA Driven Protocol, , Does not apply, PRN, Kamari Dia MD    Potassium Replacement - Follow Nurse / BPA Driven Protocol, , Does not apply, PRN, Kamari Dia MD    sodium chloride 0.9 % flush 10 mL, 10 mL, Intravenous, PRN, Kamari Dia MD    venlafaxine XR (EFFEXOR-XR) 24 hr capsule 150 mg, 150 mg, Oral, Daily, Kamari Dia MD, 150 mg at 05/11/24 0841     Allergies: Patient has no known allergies.      Vital Signs   Temp:  [97.5 °F (36.4 °C)-98.5 °F (36.9 °C)] 98.4 °F (36.9 °C)  Heart Rate:  [57-80] 57  Resp:  [18] 18  BP: (108-168)/(62-88) 129/63  Flowsheet Rows      Flowsheet Row First Filed Value   Admission Height 172 cm (67.72\") Documented at 05/10/2024 0445   Admission Weight 62.1 kg (136 lb 14.5 oz) Documented at 05/10/2024 0445            General Appearance:    Alert, cooperative, in no acute distress   Head:    Normocephalic, " without obvious abnormality, atraumatic       Neck:   No adenopathy, supple, no thyromegaly, no carotid bruit, no    JVD   Lungs:     Clear to auscultation bilaterally, no wheezes, rales, or     rhonchi    Heart:    Normal rate, regular rhythm, systolic murmur late, no rub, no gallop   Chest Wall:    No abnormalities observed   Abdomen:     Normal bowel sounds, soft, nontender, nondistended,            no rebound tenderness   Extremities:   No cyanosis, clubbing, or edema   Pulses:   Pulses palpable and equal bilaterally   Skin:   No bleeding or rash   Lymph nodes:   No cervical adenopathy   Neurologic:   Cranial nerves 2 - 12 grossly intact, sensation intact               Results Review: I personally viewed and interpreted the patient's EKG/Telemetry data    Results from last 7 days   Lab Units 05/11/24  0339   WBC 10*3/mm3 6.87   HEMOGLOBIN g/dL 11.8*   HEMATOCRIT % 36.2   PLATELETS 10*3/mm3 256     Results from last 7 days   Lab Units 05/11/24  0339   SODIUM mmol/L 136   POTASSIUM mmol/L 4.0   CHLORIDE mmol/L 98   CO2 mmol/L 25.7   BUN mg/dL 23   CREATININE mg/dL 0.96   GLUCOSE mg/dL 100*   CALCIUM mg/dL 9.9     Lab Results   Lab Value Date/Time    TROPONINT 12 05/10/2024 0324    TROPONINT 10 (H) 04/09/2023 1514    TROPONINT <0.010 10/03/2022 0805    TROPONINT <0.010 09/11/2022 1926    TROPONINT <0.010 09/11/2022 1648    TROPONINT <0.010 12/11/2021 1505    TROPONINT <0.010 01/31/2019 2331    TROPONINT <0.010 03/07/2017 2037           Assessment & Plan   1.  Severe bioprosthetic aortic valve stenosis: Symptomatic  2.  Acute on chronic heart failure with preserved ejection fraction  3.  Hypoxia: Resolved  4.  Moderate mitral valve regurgitation  5.  Severe tricuspid valve regurgitation  6.  Paroxysmal atrial fibrillation: On Tikosyn and Eliquis therapy.    -I do think she looks euvolemic currently.  My current recommendation would be to place her on p.o. Lasix of 40 mg daily.  - I think that the most appropriate  neck step is to try to get a transcatheter aortic valve replacement CTA today.  She should be able to go home after that and follow-up with me for outpatient coronary angiography and surgical evaluation by the surgical arm of her transcatheter aortic valve replacement team    I discussed the patient's findings and my recommendations with the patient

## 2024-05-12 ENCOUNTER — READMISSION MANAGEMENT (OUTPATIENT)
Dept: CALL CENTER | Facility: HOSPITAL | Age: 84
End: 2024-05-12
Payer: MEDICARE

## 2024-05-12 VITALS
BODY MASS INDEX: 20.69 KG/M2 | RESPIRATION RATE: 16 BRPM | TEMPERATURE: 97.8 F | HEART RATE: 69 BPM | SYSTOLIC BLOOD PRESSURE: 103 MMHG | WEIGHT: 131.8 LBS | OXYGEN SATURATION: 100 % | HEIGHT: 67 IN | DIASTOLIC BLOOD PRESSURE: 87 MMHG

## 2024-05-12 LAB
QT INTERVAL: 464 MS
QTC INTERVAL: 490 MS

## 2024-05-12 PROCEDURE — 99239 HOSP IP/OBS DSCHRG MGMT >30: CPT

## 2024-05-12 PROCEDURE — 93010 ELECTROCARDIOGRAM REPORT: CPT | Performed by: INTERNAL MEDICINE

## 2024-05-12 PROCEDURE — 93005 ELECTROCARDIOGRAM TRACING: CPT | Performed by: INTERNAL MEDICINE

## 2024-05-12 RX ORDER — FUROSEMIDE 40 MG/1
40 TABLET ORAL DAILY
Qty: 30 TABLET | Refills: 2 | Status: SHIPPED | OUTPATIENT
Start: 2024-05-13

## 2024-05-12 RX ADMIN — DOFETILIDE 250 MCG: 0.25 CAPSULE ORAL at 08:50

## 2024-05-12 RX ADMIN — FUROSEMIDE 40 MG: 40 TABLET ORAL at 08:49

## 2024-05-12 RX ADMIN — APIXABAN 5 MG: 5 TABLET, FILM COATED ORAL at 08:49

## 2024-05-12 RX ADMIN — METOPROLOL TARTRATE 25 MG: 25 TABLET, FILM COATED ORAL at 08:49

## 2024-05-12 RX ADMIN — AMLODIPINE BESYLATE 5 MG: 5 TABLET ORAL at 08:49

## 2024-05-12 RX ADMIN — IPRATROPIUM BROMIDE 1 SPRAY: 42 SPRAY, METERED NASAL at 08:50

## 2024-05-12 RX ADMIN — Medication 1 TABLET: at 08:49

## 2024-05-12 RX ADMIN — LEVOTHYROXINE SODIUM 50 MCG: 75 TABLET ORAL at 06:32

## 2024-05-12 RX ADMIN — OXYCODONE HYDROCHLORIDE AND ACETAMINOPHEN 1000 MG: 500 TABLET ORAL at 08:49

## 2024-05-12 RX ADMIN — VENLAFAXINE HYDROCHLORIDE 150 MG: 150 CAPSULE, EXTENDED RELEASE ORAL at 08:49

## 2024-05-12 RX ADMIN — PANTOPRAZOLE SODIUM 40 MG: 40 TABLET, DELAYED RELEASE ORAL at 06:32

## 2024-05-12 NOTE — DISCHARGE SUMMARY
Discharge instructions reviewed with patient and son . All questions answered vss awaiting transport

## 2024-05-12 NOTE — DISCHARGE SUMMARY
Hospital Discharge    Patient Name: Dior Pettit  Age/Sex: 83 y.o. female  : 1940  MRN: [id number]    Encounter Provider: TOSHA Dick  Referring Provider: Joao NICOLE MD  Place of Service: [facility] CARDIOLOGY  Patient Care Team:  Ashanti Hong APRN as PCP - General (Internal Medicine)  Gerardo Rodriguez Jr., MD as Consulting Physician (Pulmonary Disease)  Abbi Mitchell MD as Consulting Physician (Cardiology)  Luis Miguel Parker MD as Consulting Physician (Ophthalmology)  Tae Burton MD as Consulting Physician (Dermatology)  Addis Gomes MD as Consulting Physician (Plastic Surgery)         Date of Discharge:  2024   Date of Admit: 5/10/2024    Discharge Condition: Good  Discharge Diagnosis:    Exertional dyspnea    Aortic valve stenosis      Hospital Course:   Dior Pettit is a 83 y.o. female who presented with complaints of several weeks of shortness of breath. She follows with Dr. Santos and has a past medical history that is significant for a history of bioprosthetic aortic valve replacement in , paroxysmal atrial fibrillation on Tikosyn, and prior acute CVA in 2023.     She presented to the ER on 5/10/2024 with complaints of several weeks of shortness of breath.  She reported that she could no longer walk any significant distances without having to stop and rest.  Additionally she was having significant orthopnea.  She was found to be in congestive heart failure with pulmonary edema, she improved after IV Lasix.  She underwent an echocardiogram and a subsequent BRAULIO.  These confirm severe bioprosthetic aortic stenosis with a mean gradient of 55 mmHg across the aortic valve.  She was also noted to have moderate mitral valve regurgitation and moderate to severe tricuspid valve regurgitation    Yesterday she was evaluated by Dr. Hernadez who felt that she was appropriate to proceed with TAVR workup.  She  underwent transcatheter aortic valve replacement CTA yesterday.    She is euvolemic on exam today, she will be discharged on p.o. furosemide 40 mg daily.  I will arrange follow-up with Dr. Hernadez who plans for outpatient coronary angiography and surgical evaluation by the surgical arm of the TAVR team. She should remain off of her metformin until 5/13/24.     Objective:  Temp:  [97.6 °F (36.4 °C)-98.9 °F (37.2 °C)] 97.8 °F (36.6 °C)  Heart Rate:  [56-78] 69  Resp:  [16-18] 16  BP: (103-143)/(40-87) 103/87    Intake/Output Summary (Last 24 hours) at 5/12/2024 0945  Last data filed at 5/12/2024 0600  Gross per 24 hour   Intake 240 ml   Output 1400 ml   Net -1160 ml     Body mass index is 20.69 kg/m².      05/10/24  1411 05/11/24  0639 05/12/24  0635   Weight: 62 kg (136 lb 11 oz) 57.9 kg (127 lb 9.6 oz) 59.8 kg (131 lb 12.8 oz)     Weight change: -2.812 kg (-6 lb 3.2 oz)    Physical Exam:   General:    No acute distress, alert and oriented x4, pleasant                   Head:    Normocephalic, atraumatic.   Eyes:          Conjunctivae and sclerae normal, no icterus.   Throat:   No oral lesions, no thrush, oral mucosa moist.    Neck:   Supple, trachea midline.   Lungs:     Faint rales at the bases bilaterally.    Heart:    Regular rhythm and normal rate.  III/VI late-peaking SM RUSB.   Abdomen:     Soft, non-tender, non-distended, positive bowel sounds.    Extremities:   Trace edema of the lower extremities bilaterally.   Pulses:   Pulses palpable and equal bilaterally.    Skin:   No bleeding or rash.   Neuro:   Non-focal.  Moves all extremities well.    Psychiatric:   Normal mood and affect.     Procedures Performed         Consults:  Consults       Date and Time Order Name Status Description    5/10/2024  8:39 AM Inpatient Cardiology Consult Completed             Pertinent Test Results:  Results from last 7 days   Lab Units 05/11/24  0339 05/10/24  0324   SODIUM mmol/L 136 139   POTASSIUM mmol/L 4.0 4.6   CHLORIDE  "mmol/L 98 104   CO2 mmol/L 25.7 23.5   BUN mg/dL 23 20   CREATININE mg/dL 0.96 1.00   GLUCOSE mg/dL 100* 123*   CALCIUM mg/dL 9.9 9.6   AST (SGOT) U/L  --  20   ALT (SGPT) U/L  --  19     Results from last 7 days   Lab Units 05/10/24  0324   HSTROP T ng/L 12     Results from last 7 days   Lab Units 05/11/24  0339 05/10/24  0324   WBC 10*3/mm3 6.87 7.17   HEMOGLOBIN g/dL 11.8* 10.6*   HEMATOCRIT % 36.2 33.0*   PLATELETS 10*3/mm3 256 220         Results from last 7 days   Lab Units 05/11/24  0339 05/10/24  1528 05/10/24  0324   MAGNESIUM mg/dL 2.0 2.0 1.5*           Invalid input(s): \"LDLCALC\"  Results from last 7 days   Lab Units 05/10/24  0324   PROBNP pg/mL 2,180.0*           Discharge Medications     Discharge Medications        New Medications        Instructions Start Date   furosemide 40 MG tablet  Commonly known as: LASIX   40 mg, Oral, Daily   Start Date: May 13, 2024            Changes to Medications        Instructions Start Date   metoprolol tartrate 25 MG tablet  Commonly known as: LOPRESSOR  What changed: medication strength   25 mg, Oral, 2 Times Daily      valACYclovir 1000 MG tablet  Commonly known as: VALTREX  What changed: additional instructions   TAKE 2 TABLETS 4 TIMES     DAILY FOR MOUTH ULCER             Continue These Medications        Instructions Start Date   acetaminophen 325 MG tablet  Commonly known as: TYLENOL   650 mg, Oral, Every 4 Hours PRN      Advair HFA 45-21 MCG/ACT inhaler  Generic drug: fluticasone-salmeterol       amitriptyline 50 MG tablet  Commonly known as: ELAVIL   50 mg, Oral, Nightly      amLODIPine 5 MG tablet  Commonly known as: NORVASC   Take 1 tablet by mouth Daily.      ascorbic acid 1000 MG tablet  Commonly known as: VITAMIN C   1 tablet, Oral, Daily      atorvastatin 80 MG tablet  Commonly known as: LIPITOR   80 mg, Oral, Nightly      dofetilide 250 MCG capsule  Commonly known as: TIKOSYN   250 mcg, Oral, Every 12 Hours      Eliquis 5 MG tablet tablet  Generic " drug: apixaban   TAKE 1 TABLET BY MOUTH EVERY 12 HOURS      fenofibrate micronized 134 MG capsule  Commonly known as: LOFIBRA   TAKE 1 CAPSULE EVERY       MORNING BEFORE BREAKFAST      glucosamine-chondroitin 500-400 MG capsule capsule   2 capsules, Oral, Daily      ipratropium 0.06 % nasal spray  Commonly known as: ATROVENT   INSTILL 2 SPRAYS IN EACH NOSTRIL EVERY SIX HOURS AS NEEDED      levothyroxine 50 MCG tablet  Commonly known as: SYNTHROID, LEVOTHROID   50 mcg, Oral, Every Other Day      levothyroxine 75 MCG tablet  Commonly known as: SYNTHROID, LEVOTHROID   TAKE ONE TABLET EVERY OTHER DAY      losartan 100 MG tablet  Commonly known as: COZAAR   Take 1 tablet by mouth Daily.      metFORMIN  MG 24 hr tablet  Commonly known as: GLUCOPHAGE-XR   TAKE 1 TABLET TWICE A DAY      multivitamin with minerals tablet tablet   1 tablet, Oral, Daily      omeprazole 40 MG capsule  Commonly known as: priLOSEC   TAKE 1 CAPSULE DAILY *DR LOERA MFR*      Prodigy No Coding Blood Gluc test strip  Generic drug: glucose blood   TEST BLOOD SUGAR DAILY      Prodigy No Coding Blood Gluc w/Device kit   1 each, Does not apply, Daily      Prodigy Safety Lancets 26G misc   CHECK BLOOD SUGAR ONE TIME PER DAY      venlafaxine  MG 24 hr capsule  Commonly known as: EFFEXOR-XR   150 mg, Oral, Daily               Discharge Diet:    Dietary Orders (From admission, onward)       Start     Ordered    05/10/24 1447  Diet: Regular/House; Fluid Consistency: Thin (IDDSI 0)  Diet Effective Now        References:    Diet Order [facility]walk   Question Answer Comment   Diets: Regular/House    Fluid Consistency: Thin (IDDSI 0)        05/10/24 1446                    Activity at Discharge:  as tolerated     Discharge disposition: home     Discharge Instructions and Follow ups:  Future Appointments   Date Time Provider Department Center   5/13/2024  3:00 PM Mary Romero APRN [facility]   8/5/2024  3:30 PM Ashanti Hong APRN [facility]  [facility]      Follow-up Information       Ashanti Hong APRN .    Specialty: Internal Medicine  Contact information:  [facility] [place] [address]  [phone]                             Test Results Pending at Discharge: None     TOSHA Dick  05/12/24  09:45 EDT    Time: Discharge 35 min    EMR Dragon/Transcription disclaimer:   Part of this note may be an electronic transcription/translation of spoken language to printed text using the Dragon dictation system.

## 2024-05-13 ENCOUNTER — TRANSITIONAL CARE MANAGEMENT TELEPHONE ENCOUNTER (OUTPATIENT)
Dept: CALL CENTER | Facility: HOSPITAL | Age: 84
End: 2024-05-13
Payer: MEDICARE

## 2024-05-13 ENCOUNTER — TELEPHONE (OUTPATIENT)
Dept: CARDIOLOGY | Facility: HOSPITAL | Age: 84
End: 2024-05-13
Payer: MEDICARE

## 2024-05-13 NOTE — OUTREACH NOTE
Call Center TCM Note      Flowsheet Row Responses   [facility] patient discharged from? [place]   Does the patient have one of the following disease processes/diagnoses(primary or secondary)? Other   TCM attempt successful? No  [Daughter, Severiano, on PCP office JAYDON. Reached patient-requested call back later.]   Unsuccessful attempts Attempt 1   Call Status Left message            Katarina Antonio RN    5/13/2024, 11:20 EDT

## 2024-05-13 NOTE — CASE MANAGEMENT/SOCIAL WORK
Case Management Discharge Note      Final Note: Discharge to home. Jasmin VAZQUEZ RN CCP         Selected Continued Care - Discharged on 5/12/2024 Admission date: 5/10/2024 - Discharge disposition: Home or Self Care      Destination    No services have been selected for the patient.                Durable Medical Equipment    No services have been selected for the patient.                Dialysis/Infusion    No services have been selected for the patient.                Home Medical Care    No services have been selected for the patient.                Therapy    No services have been selected for the patient.                Community Resources    No services have been selected for the patient.                Community & DME    No services have been selected for the patient.                    Transportation Services  Private: Car    Final Discharge Disposition Code: 01 - home or self-care

## 2024-05-13 NOTE — TELEPHONE ENCOUNTER
I spoke with Mrs Pettit and introduced the structural heart program We discussed the evaluation process We have been unable to determine her prosthetic valve type but she has the implant card and provided me with the information The valve is an Poon 21mm [id number] Bovine Pericardial heart valve ( [id number]) We discussed the TAVR procedure at length I have mailed her our introductory packet including information on shared decision making and provided her with our contact information I let her know our team will review her recent testing and I'll call her with their recommendations for moving forward sometime in the next few days

## 2024-05-13 NOTE — OUTREACH NOTE
Call Center TCM Note      Flowsheet Row Responses   [facility] patient discharged from? [facility]   Does the patient have one of the following disease processes/diagnoses(primary or secondary)? Other   TCM attempt successful? Yes   Call start time 1248   Call end time 1250   Is patient permission given to speak with other caregiver? Yes   Person spoke with today (if not patient) and relationship Severiano-daughter.   Meds reviewed with patient/caregiver? Yes   Is the patient having any side effects they believe may be caused by any medication additions or changes? No   Does the patient have all medications ordered at discharge? Yes   Is the patient taking all medications as directed (includes completed medication regime)? Yes   Comments Daughter states patient will see cardiologist today. States plan is for patient to be admitted tomorrow for possible TAVR.   Does the patient have an appointment with their PCP within 7-14 days of discharge? No   Nursing Interventions Patient declined scheduling/rescheduling appointment at this time, Routed TCM call to PCP office   Has home health visited the patient within 72 hours of discharge? N/A   Psychosocial issues? No   Did the patient receive a copy of their discharge instructions? Yes   Nursing interventions Reviewed instructions with patient   What is the patient's perception of their health status since discharge? Returned to baseline/stable   Is the patient/caregiver able to teach back signs and symptoms related to disease process for when to call PCP? Yes   Is the patient/caregiver able to teach back signs and symptoms related to disease process for when to call 911? Yes   Is the patient/caregiver able to teach back the hierarchy of who to call/visit for symptoms/problems? PCP, Specialist, Home health nurse, Urgent Care, ED, 911 Yes   If the patient is a current smoker, are they able to teach back resources for cessation? Not a smoker   TCM call completed? Yes   Wrap  up additional comments Daughter states patient is stable, and will attend cardiology appt this afternoon. States plan is for patient to be admitted tomorrow for possible TAVR. Denies any needs at this time. TCM complete.   Call end time 1250   Would this patient benefit from a Referral to [facility] Social Work? No   Is the patient interested in additional calls from an ambulatory ? No            Katarina Antonio RN    5/13/2024, 12:52 EDT

## 2024-05-14 NOTE — ED PROVIDER NOTES
EMERGENCY DEPARTMENT ENCOUNTER    Room Number:  2210/1  PCP: Ashanti Hong APRN  Patient Care Team:  Ashanti Hong APRN as PCP - General (Internal Medicine)  Gerardo Rodriguez Jr., MD as Consulting Physician (Pulmonary Disease)  Abbi Mitchell MD as Consulting Physician (Cardiology)  Luis Miguel Parker MD as Consulting Physician (Ophthalmology)  Tae Burton MD as Consulting Physician (Dermatology)  Addis Gomes MD as Consulting Physician (Plastic Surgery)   Independent Historians: Patient    HPI:  Chief Complaint: Dyspnea    A complete HPI/ROS/PMH/PSH/SH/FH are unobtainable due to: None    Chronic or social conditions impacting patient care (Social Determinants of Health): None  (Financial Resource Strain / Food Insecurity / Transportation Needs / Physical Activity / Stress / Social Connections / Intimate Partner Violence / Housing Stability)    Context: Dior Pettit is a 83 y.o. female who presents to the ED c/o acute worsening shortness of breath over the past couple days.  Patient has history of atrial fibrillation.  Is concerned she may have been in A-fib.  Reports dyspnea on exertion.  States that since of fullness in her chest.  No chest pain no fever or chills.  Reports minimal cough.    Review of prior external notes (non-ED) -and- Review of prior external test results outside of this encounter: Patient's primary care note from 4/5/2024    Prescription drug monitoring program review:         PAST MEDICAL HISTORY  Active Ambulatory Problems     Diagnosis Date Noted    Essential hypertension 08/22/2016    Pulmonary hypertension 08/22/2016    NNEKA (obstructive sleep apnea) 08/22/2016    Gastroesophageal reflux disease 08/24/2016    Anxiety     Restless leg syndrome     Controlled diabetes mellitus type 2 with complications     Hypothyroidism     Hypercholesteremia     Seasonal allergic rhinitis 11/11/2016    Mild intermittent asthma without complication 11/11/2016    S/P AVR  03/05/2017    Dilated aortic root 10/02/2017    Thoracic aortic aneurysm without rupture  10/02/2017    Primary insomnia 06/12/2018    Renal insufficiency 10/28/2018    Macular degeneration of both eyes 04/11/2019    Rectocele 07/15/2021    Nonrheumatic mitral valve stenosis 09/08/2021    Slow transit constipation 09/14/2021    Dizziness 09/27/2021    Daily headache 04/17/2022    Symptomatic bradycardia 09/11/2022    Closed displaced fracture of right femoral neck 11/13/2022    Paroxysmal atrial fibrillation 01/31/2023    Acute stroke due to ischemia 04/09/2023    Aortic valve disease 01/07/2024     Resolved Ambulatory Problems     Diagnosis Date Noted    Coronary artery disease involving native coronary artery with angina pectoris 04/11/2019    HARSHA (acute kidney injury) 09/14/2021    Exudative age-related macular degeneration, right eye, with active choroidal neovascularization 01/15/2022    Fall 11/13/2022    Atrial fibrillation, persistent 01/17/2023    Hip pain 01/17/2023    Shortness of breath 04/09/2023     Past Medical History:   Diagnosis Date    Allergic rhinitis     Anemia     Aortic root dilatation 10/02/2017    Aortic valve calcification 10/02/2017    Aortic valve insufficiency Dx in 07    Aortic valve prosthesis present 11/02/2018    Ascending aorta dilatation 10/02/2017    Asthma     Atypical chest pain     Breast pain, right Hx    CAD (coronary artery disease)     Cardiomegaly 2017    Cervicalgia     Chest pain due to CAD     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    DM (diabetes mellitus)     Esophagitis     GERD (gastroesophageal reflux disease)     Headache     Health care maintenance     Heart murmur     History of echocardiogram 10/2/17-[facility]    Hyperlipidemia     Hypertension     Insomnia     Macular degeneration, left eye     Mild aortic valve regurgitation 10/02/2017    Mild aortic valve stenosis 10/02/2017    Mild dilation of ascending aorta 10/02/2017    Moderate tricuspid valve  regurgitation 10/02/2017    Mood disorder in conditions classified elsewhere     Near syncope 03/2017-[facility] ER    Neuropathy     Osteoarthritis     RLS (restless legs syndrome)     Thoracic ascending aortic aneurysm Dx in 2007 @ 3.8CM & 1/02/2018    Visual impairment          PAST SURGICAL HISTORY  Past Surgical History:   Procedure Laterality Date    AORTIC VALVE REPAIR/REPLACEMENT  2007    Dr. Perry    APPENDECTOMY      AUGMENTATION MAMMAPLASTY  1976    BREAST AUGMENTATION  2014    BUNIONECTOMY      CARDIAC CATHETERIZATION  2007    CARDIAC VALVE REPLACEMENT  2007    porcine    COLONOSCOPY  2010    COLONOSCOPY  03/02/2012    EYE SURGERY      cataracts and ens implants    HYSTERECTOMY  1972    SIGMOIDOSCOPY  2001    TEMPORAL ARTERY BIOPSY Bilateral 4/14/2023    Procedure: BILATERAL TEMPORAL ARTERY BIOPSY;  Surgeon: Stewart Shepherd MD;  Location: [facility] MAIN OR;  Service: Vascular;  Laterality: Bilateral;    TOTAL HIP ARTHROPLASTY Right 11/15/2022    Procedure: Right anterior total hip arthroplasty;  Surgeon: Joao Martinez MD;  Location: [facility] MAIN OR;  Service: Orthopedics;  Laterality: Right;         FAMILY HISTORY  Family History   Problem Relation Age of Onset    Hypertension Mother     Stroke Mother     Cancer Mother     Diabetes Sister     Coronary artery disease Brother     Diabetes Brother     Valvular heart disease Brother         TAVR    Coronary artery disease Brother     Cancer Brother     Birth defects Daughter     Skin cancer Neg Hx     Malig Hyperthermia Neg Hx          SOCIAL HISTORY  Social History     Socioeconomic History    Marital status:    Tobacco Use    Smoking status: Never    Smokeless tobacco: Never    Tobacco comments:     caffeine use - 1 cup coffee daily    Vaping Use    Vaping status: Never Used   Substance and Sexual Activity    Alcohol use: Yes     Comment: less than 1 glass of wine    Drug use: Never    Sexual activity: Not Currently     Partners: Male     Birth  control/protection: Surgical     Comment: Hyst         ALLERGIES  Patient has no known allergies.        REVIEW OF SYSTEMS  Review of Systems  Included in HPI  All systems reviewed and negative except for those discussed in HPI.      PHYSICAL EXAM    I have reviewed the triage vital signs and nursing notes.    ED Triage Vitals   Temp Heart Rate Resp BP SpO2   05/10/24 0307 05/10/24 0307 05/10/24 0307 05/10/24 0307 05/10/24 0307   97.2 °F (36.2 °C) 61 16 154/77 97 %      Temp src Heart Rate Source Patient Position BP Location FiO2 (%)   05/10/24 0700 05/10/24 0700 05/10/24 0700 05/10/24 0700 --   Oral Monitor Sitting Left arm        Physical Exam  GENERAL: alert, no acute distress  SKIN: Warm, dry  HENT: Normocephalic, atraumatic  EYES: no scleral icterus  CV: regular rhythm, regular rate  RESPIRATORY: normal effort, coarse breath sounds bilaterally  ABDOMEN: soft, nontender, nondistended  MUSCULOSKELETAL: no deformity  NEURO: alert, moves all extremities, follows commands                                                               LAB RESULTS  No results found for this or any previous visit (from the past 24 hour(s)).    I ordered the above labs and independently reviewed the results.        RADIOLOGY  No Radiology Exams Resulted Within Past 24 Hours    I ordered the above noted radiological studies. Reviewed by me and discussed with radiologist.  See dictation for official radiology interpretation.      PROCEDURES    Procedures      MEDICATIONS GIVEN IN ER    Medications   magnesium sulfate 2g/50 mL (PREMIX) infusion (2 g Intravenous New Bag 5/10/24 1211)   magnesium oxide (MAG-OX) tablet 400 mg (400 mg Oral Given 5/10/24 0631)   furosemide (LASIX) injection 40 mg (40 mg Intravenous Given 5/10/24 0631)   perflutren (DEFINITY) 8.476 mg in Sodium Chloride (PF) 0.9 % 10 mL injection (2 mL Intravenous Given 5/10/24 0782)   sodium chloride 0.9 % infusion (0 mL/hr  Stopped 5/10/24 6688)   midazolam (VERSED) injection  (1 mg Intravenous Given 5/10/24 1326)   fentaNYL citrate (PF) (SUBLIMAZE) injection (25 mcg Intravenous Given 5/10/24 1320)   magnesium sulfate 2g/50 mL (PREMIX) infusion (0 g Intravenous Stopped 5/10/24 1616)   furosemide (LASIX) injection 40 mg (40 mg Intravenous Given 5/10/24 1552)   iopamidol (ISOVUE-370) 76 % injection 100 mL (95 mL Intravenous Given by Other 5/11/24 1837)         ORDERS PLACED DURING THIS VISIT:  Orders Placed This Encounter   Procedures    Respiratory Panel PCR w/COVID-19(SARS-CoV-2) MOOK/SHIVAM/GARRY/PAD/COR/JESSICA In-House, NP Swab in UTM/VTM, 2 HR TAT - Swab, Nasopharynx    XR Chest 1 View    CT Angio TAVR Chest Abdomen Pelvis    [place] Draw    Comprehensive Metabolic Panel    BNP    Single High Sensitivity Troponin T    CBC Auto Differential    Magnesium    Procalcitonin    CBC (No Diff)    Basic Metabolic Panel    Magnesium    Magnesium    Undress & Gown    Continuous Pulse Oximetry    Vital Signs    Place Sequential Compression Device    Maintain IV Access    Telemetry - Place Orders & Notify Provider of Results When Patient Experiences Acute Chest Pain, Dysrhythmia or Respiratory Distress    Obtain Informed Consent    Inpatient Cardiology Consult    ECG 12 Lead ED Triage Standing Order; SOA    ECG 12 Lead Dyspnea    Telemetry Scan    ECG 12 Lead Drug Monitoring    Telemetry Scan    Telemetry Scan    Telemetry Scan    Telemetry Scan    Telemetry Scan    ECG 12 Lead Drug Monitoring    Telemetry Scan    Adult Transthoracic Echo Complete w/ Color, Spectral and Contrast if Necessary Per Protocol    Adult Transesophageal Echo (BRAULIO) W/ Cont if Necessary Per Protocol    Initiate ED Observation Status    Inpatient Admission    Transfer Patient    Discharge patient    CBC & Differential    Green Top (Gel)    Lavender Top    Gold Top - SST    Light Blue Top         PROGRESS, DATA ANALYSIS, CONSULTS, AND MEDICAL DECISION MAKING    All labs have been independently interpreted by me.  All radiology studies  have been reviewed by me and discussed with radiologist dictating the report.   EKG's independently viewed and interpreted by me.  Discussion below represents my analysis of pertinent findings related to patient's condition, differential diagnosis, treatment plan and final disposition.    My differential diagnosis for dyspnea includes but is not limited to:  Asthma, COPD, pneumonia, pulmonary embolus, acute respiratory distress syndrome, pneumothorax, pleural effusion, pulmonary fibrosis, congestive heart failure, myocardial infarction, DKA, uremia, acidosis, sepsis, anemia, drug related, hyperventilation, CNS disease      Clinical Scores:              ED Course as of 05/14/24 1005   Fri May 10, 2024   0339 WBC: 7.17 [TJ]   0339 Hemoglobin(!): 10.6 [TJ]   0339 Platelets: 220 [TJ]   0428 Procalcitonin: 0.05 [TJ]   0429 proBNP(!): 2,180.0 [TJ]   0545 EKG          EKG time: 0429  Rhythm/Rate: Sinus rhythm rate 58  P waves and IA: Within normal limits  QRS, axis: LVH  ST and T waves: Nonspecific    Interpreted Contemporaneously by me, independently viewed [TJ]   0616 I have independently reviewed patient's chest x-ray; my interpretation is pulmonary edema [TJ]   0616 Procalcitonin: 0.05 [TJ]   0617 proBNP(!): 2,180.0 [TJ]   0617 Magnesium(!): 1.5  Discussed with observation unit.  Happy to admit the patient. [TJ]      ED Course User Index  [TJ] Joao Sharma MD               PPE: The patient wore a mask and I wore an N95 mask throughout the entire patient encounter.       AS OF 10:05 EDT VITALS:    BP - 103/87  HR - 69  TEMP - 97.8 °F (36.6 °C) (Oral)  O2 SATS - 100%        DIAGNOSIS  Final diagnoses:   Acute congestive heart failure, unspecified heart failure type   Acute pulmonary edema         DISPOSITION  ED Disposition       ED Disposition   Decision to Admit    Condition   --    Comment   --                  Note Disclaimer: At [facility], we believe that sharing information builds trust and better  relationships. You are receiving this note because you recently visited [facility]. It is possible you will see health information before a provider has talked with you about it. This kind of information can be easy to misunderstand. To help you fully understand what it means for your health, we urge you to discuss this note with your provider.         Joao Sharma MD  05/14/24 2472

## 2024-05-15 ENCOUNTER — TELEPHONE (OUTPATIENT)
Dept: CARDIOLOGY | Facility: HOSPITAL | Age: 84
End: 2024-05-15
Payer: MEDICARE

## 2024-05-15 NOTE — TELEPHONE ENCOUNTER
I spoke with Ms Pettit who still has concerns about the TAVR procedure I was able to answer her questions. She will see Dr Ellis 5/28 at 11am and I will discuss things further with her family per her request

## 2024-05-16 DIAGNOSIS — K12.1 MOUTH ULCER: ICD-10-CM

## 2024-05-16 RX ORDER — VALACYCLOVIR HYDROCHLORIDE 1 G/1
TABLET, FILM COATED ORAL
Qty: 16 TABLET | Refills: 1 | Status: SHIPPED | OUTPATIENT
Start: 2024-05-16

## 2024-05-16 NOTE — TELEPHONE ENCOUNTER
Caller: Dior Pettit    Relationship: Self    Best call back number: [phone]     Requested Prescriptions:   Requested Prescriptions     Pending Prescriptions Disp Refills    valACYclovir (VALTREX) 1000 MG tablet 16 tablet 4     Sig: TAKE 2 TABLETS 4 TIMES     DAILY FOR MOUTH ULCER        Pharmacy where request should be sent: [id number] - [address] - [phone] PH - [phone] FX     Last office visit with prescribing clinician: 4/5/2024   Last telemedicine visit with prescribing clinician: Visit date not found   Next office visit with prescribing clinician: 8/5/2024     Additional details provided by patient: WOULD LIKE TO BE ABLE TO  THIS MEDICATION ASAP    PATIENT IS OUT OF THE MEDICATION    Does the patient have less than a 3 day supply:  [x] Yes  [] No    Would you like a call back once the refill request has been completed: [] Yes [x] No    If the office needs to give you a call back, can they leave a voicemail: [x] Yes [] No    Ritesh Nino Rep   05/16/24 10:26 EDT

## 2024-05-26 DIAGNOSIS — E03.9 ACQUIRED HYPOTHYROIDISM: ICD-10-CM

## 2024-05-28 ENCOUNTER — OFFICE VISIT (OUTPATIENT)
Dept: CARDIAC SURGERY | Facility: CLINIC | Age: 84
End: 2024-05-28
Payer: MEDICARE

## 2024-05-28 VITALS
HEIGHT: 67 IN | SYSTOLIC BLOOD PRESSURE: 132 MMHG | RESPIRATION RATE: 18 BRPM | WEIGHT: 135 LBS | HEART RATE: 67 BPM | BODY MASS INDEX: 21.19 KG/M2 | TEMPERATURE: 97.3 F | DIASTOLIC BLOOD PRESSURE: 73 MMHG | OXYGEN SATURATION: 96 %

## 2024-05-28 DIAGNOSIS — I48.0 PAROXYSMAL ATRIAL FIBRILLATION: Chronic | ICD-10-CM

## 2024-05-28 DIAGNOSIS — E78.00 HYPERCHOLESTEREMIA: Chronic | ICD-10-CM

## 2024-05-28 DIAGNOSIS — R06.09 EXERTIONAL DYSPNEA: ICD-10-CM

## 2024-05-28 DIAGNOSIS — I10 ESSENTIAL HYPERTENSION: Primary | Chronic | ICD-10-CM

## 2024-05-28 DIAGNOSIS — R42 DIZZINESS: ICD-10-CM

## 2024-05-28 DIAGNOSIS — I34.2 NONRHEUMATIC MITRAL VALVE STENOSIS: Chronic | ICD-10-CM

## 2024-05-28 DIAGNOSIS — I77.810 DILATED AORTIC ROOT: ICD-10-CM

## 2024-05-28 DIAGNOSIS — I71.22 ANEURYSM OF AORTIC ARCH WITHOUT RUPTURE: Chronic | ICD-10-CM

## 2024-05-28 DIAGNOSIS — I35.9 AORTIC VALVE DISEASE: Chronic | ICD-10-CM

## 2024-05-28 DIAGNOSIS — Z95.2 S/P AVR: ICD-10-CM

## 2024-05-28 DIAGNOSIS — E11.8 CONTROLLED TYPE 2 DIABETES MELLITUS WITH COMPLICATION, WITHOUT LONG-TERM CURRENT USE OF INSULIN: Chronic | ICD-10-CM

## 2024-05-28 PROCEDURE — 99204 OFFICE O/P NEW MOD 45 MIN: CPT | Performed by: THORACIC SURGERY (CARDIOTHORACIC VASCULAR SURGERY)

## 2024-05-28 PROCEDURE — 3075F SYST BP GE 130 - 139MM HG: CPT | Performed by: THORACIC SURGERY (CARDIOTHORACIC VASCULAR SURGERY)

## 2024-05-28 PROCEDURE — 1160F RVW MEDS BY RX/DR IN RCRD: CPT | Performed by: THORACIC SURGERY (CARDIOTHORACIC VASCULAR SURGERY)

## 2024-05-28 PROCEDURE — 1159F MED LIST DOCD IN RCRD: CPT | Performed by: THORACIC SURGERY (CARDIOTHORACIC VASCULAR SURGERY)

## 2024-05-28 PROCEDURE — 3078F DIAST BP <80 MM HG: CPT | Performed by: THORACIC SURGERY (CARDIOTHORACIC VASCULAR SURGERY)

## 2024-05-28 RX ORDER — LEVOTHYROXINE SODIUM 0.07 MG/1
TABLET ORAL
Qty: 45 TABLET | Refills: 0 | Status: SHIPPED | OUTPATIENT
Start: 2024-05-28

## 2024-05-28 NOTE — LETTER
May 28, 2024     TOSHA Coto  [address] [facility]  [address]  [place] [address]    Patient: Dior Pettit   YOB: 1940   Date of Visit: 5/28/2024     Dear TOSHA Coto:       Thank you for referring Dior Pettit to me for evaluation. Below are the relevant portions of my assessment and plan of care.    If you have questions, please do not hesitate to call me. I look forward to following Dior along with you.         Sincerely,        Alphonso Ellis MD        CC: MD Caleb Wu Sebastian, MD  05/28/24 [id number]  Sign when Signing Visit  5/28/2024    Chief Complaint     Dyspnea exertion and fatigue    History of Present Illness:       Dear Colleagues,  It was nice to see Dior Pettit in consultation at your request. She is a 83 y.o. female with multiple medical problems including essential hypertension, GERD, hypercholesterolemia, diabetes, status post AVR, status post stroke, ascending aorta dilatation and who had recently progression of dyspnea of exertion and fatigue.  She has been very active for her age however lately over the last 6 months she has declined.  She was recently admitted to the hospital because heart failure symptoms and she was evaluated that time for possible prosthetic aortic valve stenosis.  She had a CT scan that showed the ascending aorta of 4.1 cm in April 2023.. She denies syncope, TIA, orthopnea or PND but she does have dizziness.  Her last echocardiogram showed a peak velocity of 4.7 m/s, mean gradient of 55 mmHg and aortic valve area less than 1 cm squire.  Ejection fraction was 53%.  It also showed a mitral valve mean gradient of 4.4 mmHg.  An repeat echo showed the peak gradient 73 mmHg with a mean gradient of 48 mmHg.  She had a TAVR CTA that showed feasibility for implantation and acceptable vascular access.  She is here for opinion regarding TAVR versus SAVR    Patient Active Problem List   Diagnosis   • Essential hypertension   •  Pulmonary hypertension   • NNEKA (obstructive sleep apnea)   • Gastroesophageal reflux disease   • Anxiety   • Restless leg syndrome   • Controlled diabetes mellitus type 2 with complications   • Hypothyroidism   • Hypercholesteremia   • Seasonal allergic rhinitis   • Mild intermittent asthma without complication   • S/P AVR   • Dilated aortic root   • Thoracic aortic aneurysm without rupture    • Primary insomnia   • Renal insufficiency   • Macular degeneration of both eyes   • Rectocele   • Nonrheumatic mitral valve stenosis   • Slow transit constipation   • Dizziness   • Daily headache   • Symptomatic bradycardia   • Closed displaced fracture of right femoral neck   • Paroxysmal atrial fibrillation   • Acute stroke due to ischemia   • Aortic valve disease   • Exertional dyspnea   • Aortic valve stenosis       Past Medical History:   Diagnosis Date   • Abnormal ECG    • Allergic rhinitis    • Anemia    • Anxiety     Controlled w/Meds   • Aortic root dilatation 10/02/2017    Borderline--Noted on Echo   • Aortic valve calcification 10/02/2017    Noted on Echo   • Aortic valve insufficiency Dx in 07    w/AVR    • Aortic valve prosthesis present 11/02/2018    Noted on CTA Chest   • Ascending aorta dilatation 10/02/2017    Borderline--Noted on Echo   • Asthma    • Atrial fibrillation     2020   • Atypical chest pain    • Breast pain, right Hx   • Bronchitis, chronic 2014   • CAD (coronary artery disease)    • Cardiomegaly 2017   • Cervicalgia    • Chest pain due to CAD    • CHF (congestive heart failure) 2024   • Claustrophobia 1940   • 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    Borderline--Noted on Echo & Measured @ 2.6CM and Mid Descending @ 2.5CM on CTA Chest-11/2/18   • Depression     Lifetime   • DM (diabetes mellitus)     T2   • Dyslipidemia 2014   • Esophagitis    • GERD (gastroesophageal reflux disease)     Controlled w/Meds   • Headache    • Health care maintenance    • Heart murmur    • History of  echocardiogram 10/2/17-[facility]    EF 66%; Borderline Concentric Hypertrophy; Mild Calcification in AV; Mild AVS; Mild AVR; Moderate TVR; Borderline Dilation of Aortic Root/Arch & Borderline Dilation of Ascending/Proximal Aorta Present   • History of transfusion 1960   • Hyperlipidemia     Controlled w/Meds   • Hypertension     Controlled w/Meds   • Hypothyroidism     Controlled w/Synthroid   • Infectious viral hepatitis 1950   • Insomnia    • Macular degeneration, left eye    • Mild aortic valve regurgitation 10/02/2017    Noted on Echo   • Mild aortic valve stenosis 10/02/2017    Noted on Echo   • Mild dilation of ascending aorta 10/02/2017    Borderline--Noted on Echo   • Mitral valve prolapse 2007   • Moderate tricuspid valve regurgitation 10/02/2017    Noted on Echo   • Mood disorder in conditions classified elsewhere     Controlled w/Meds   • Near syncope 03/2017-[facility]   • Neuropathy    • NNEKA (obstructive sleep apnea)     Untreated   • Osteoarthritis     Ankles/Feet   • Pulmonary hypertension 2017   • Renal insufficiency    • RLS (restless legs syndrome)    • Thoracic ascending aortic aneurysm Dx in 2007 @ 3.8CM & 1/02/2018    Noted @ 4CM on CTA Chest;   • Visual impairment     macular degeneratuin       Past Surgical History:   Procedure Laterality Date   • AORTIC VALVE REPAIR/REPLACEMENT  2007    Dr. Perry   • APPENDECTOMY     • AUGMENTATION MAMMAPLASTY  1976   • BREAST AUGMENTATION  2014   • BUNIONECTOMY     • CARDIAC CATHETERIZATION  2007   • CARDIAC VALVE REPLACEMENT  2007    porcine   • COLONOSCOPY  2010   • COLONOSCOPY  03/02/2012   • EYE SURGERY      cataracts and ens implants   • HYSTERECTOMY  1972   • SIGMOIDOSCOPY  2001   • TEMPORAL ARTERY BIOPSY Bilateral 04/14/2023    Procedure: BILATERAL TEMPORAL ARTERY BIOPSY;  Surgeon: Stewart Shepherd MD;  Location: [facility];  Service: Vascular;  Laterality: Bilateral;   • TOTAL HIP ARTHROPLASTY Right 11/15/2022    Procedure: Right anterior total hip  arthroplasty;  Surgeon: Joao Martinez MD;  Location: [facility];  Service: Orthopedics;  Laterality: Right;       No Known Allergies      Current Outpatient Medications:   •  acetaminophen (TYLENOL) 325 MG tablet, Take 2 tablets by mouth Every 4 (Four) Hours As Needed for Mild Pain., Disp: , Rfl:   •  Advair HFA 45-21 MCG/ACT inhaler, Inhale 2 puffs 2 (Two) Times a Day. As needed, Disp: , Rfl:   •  amitriptyline (ELAVIL) 50 MG tablet, Take 1 tablet by mouth Every Night., Disp: 90 tablet, Rfl: 1  •  amLODIPine (NORVASC) 5 MG tablet, Take 1 tablet by mouth Daily., Disp: 90 tablet, Rfl: 1  •  ascorbic acid (VITAMIN C) 1000 MG tablet, Take 1 tablet by mouth Daily. Indications: Inadequate Vitamin C, Disp: , Rfl:   •  atorvastatin (LIPITOR) 80 MG tablet, Take 1 tablet by mouth Every Night. Indications: High Amount of Fats in the Blood, Disp: 90 tablet, Rfl: 1  •  Blood Glucose Monitoring Suppl (Prodigy No Coding Blood Gluc) w/Device kit, 1 each Daily., Disp: 1 kit, Rfl: 0  •  dofetilide (TIKOSYN) 250 MCG capsule, TAKE 1 CAPSULE BY MOUTH EVERY 12 HOURS, Disp: 180 capsule, Rfl: 0  •  Eliquis 5 MG tablet tablet, TAKE 1 TABLET BY MOUTH EVERY 12 HOURS, Disp: 180 tablet, Rfl: 0  •  fenofibrate micronized (LOFIBRA) 134 MG capsule, TAKE 1 CAPSULE EVERY       MORNING BEFORE BREAKFAST, Disp: 90 capsule, Rfl: 3  •  furosemide (LASIX) 40 MG tablet, Take 1 tablet by mouth Daily., Disp: 30 tablet, Rfl: 2  •  glucosamine-chondroitin 500-400 MG capsule capsule, Take 2 capsules by mouth Daily., Disp: , Rfl:   •  glucose blood (Prodigy No Coding Blood Gluc) test strip, TEST BLOOD SUGAR DAILY, Disp: 50 each, Rfl: 0  •  ipratropium (ATROVENT) 0.06 % nasal spray, INSTILL 2 SPRAYS IN EACH NOSTRIL EVERY SIX HOURS AS NEEDED, Disp: 15 mL, Rfl: 0  •  levothyroxine (SYNTHROID, LEVOTHROID) 50 MCG tablet, Take 1 tablet by mouth Every Other Day. Indications: Underactive Thyroid, Disp: 45 tablet, Rfl: 1  •  levothyroxine (SYNTHROID, LEVOTHROID)  75 MCG tablet, TAKE 1 TABLET BY MOUTH EVERY OTHER DAY for underactive thyroid, Disp: 45 tablet, Rfl: 0  •  losartan (COZAAR) 100 MG tablet, Take 1 tablet by mouth Daily., Disp: 90 tablet, Rfl: 1  •  metFORMIN ER (GLUCOPHAGE-XR) 750 MG 24 hr tablet, TAKE 1 TABLET TWICE A DAY, Disp: 180 tablet, Rfl: 3  •  metoprolol tartrate (LOPRESSOR) 25 MG tablet, Take 1 tablet by mouth 2 (Two) Times a Day. Indications: High Blood Pressure Disorder, Disp: 30 tablet, Rfl: 2  •  Multiple Vitamins-Minerals (MULTIVITAL PO), Take 1 tablet by mouth Daily. Indications: potential deficiency, Disp: , Rfl:   •  omeprazole (priLOSEC) 40 MG capsule, TAKE 1 CAPSULE DAILY *DR LOERA MFR*, Disp: 90 capsule, Rfl: 0  •  Prodigy Safety Lancets 26G misc, CHECK BLOOD SUGAR ONE TIME PER DAY, Disp: 100 each, Rfl: 1  •  valACYclovir (VALTREX) 1000 MG tablet, TAKE 2 TABLETS 4 TIMES     DAILY FOR MOUTH ULCER, Disp: 16 tablet, Rfl: 1  •  venlafaxine XR (EFFEXOR-XR) 150 MG 24 hr capsule, Take 1 capsule by mouth Daily., Disp: 90 capsule, Rfl: 3    Social History     Socioeconomic History   • Marital status:    Tobacco Use   • Smoking status: Never     Passive exposure: Never   • Smokeless tobacco: Never   • Tobacco comments:     caffeine use - 1 cup coffee daily    Vaping Use   • Vaping status: Never Used   Substance and Sexual Activity   • Alcohol use: Yes     Comment: less than 1 glass of wine per week   • Drug use: Never   • Sexual activity: Not Currently     Partners: Male     Birth control/protection: Surgical     Comment: Hyst       Family History   Problem Relation Age of Onset   • Hypertension Mother    • Stroke Mother    • Cancer Mother    • Diabetes Sister    • Coronary artery disease Brother    • Diabetes Brother    • Valvular heart disease Brother         TAVR   • Cancer Brother         bladder cancer   • Coronary artery disease Brother    • Cancer Brother    • Birth defects Daughter    • Skin cancer Neg Hx    • Malig Hyperthermia Neg Hx     Sternal incision is well-healed  Review of Systems:  As HPI, otherwise noncontributory    Physical Exam:    Vital Signs:  Weight: 61.2 kg (135 lb)   Body mass index is 21.19 kg/m².  Temp: 97.3 °F (36.3 °C)   Heart Rate: 67   BP: 132/73     Constitutional:       Appearance: Well-developed.   Eyes:      Conjunctiva/sclera: Conjunctivae normal.      Pupils: Pupils are equal, round, and reactive to light.   HENT:      Head: Normocephalic and atraumatic.      Nose: Nose normal.   Neck:      Thyroid: No thyromegaly.      Vascular: No JVD.      Lymphadenopathy: No cervical adenopathy.   Pulmonary:      Effort: Pulmonary effort is normal.      Breath sounds: Normal breath sounds. No rales.   Cardiovascular:      Normal rate. Regular rhythm.      Murmurs: There is a grade 3/6 harsh midsystolic murmur at the URSB, radiating to the neck.      No gallop.    Pulses:     Intact distal pulses. No decreased pulses.   Edema:     Peripheral edema absent.   Abdominal:      General: Bowel sounds are normal. There is no distension.      Palpations: Abdomen is soft. There is no abdominal mass.      Tenderness: There is no abdominal tenderness.   Musculoskeletal: Normal range of motion.         General: No tenderness or deformity.      Cervical back: Normal range of motion and neck supple. Skin:     General: Skin is warm and dry.      Findings: No erythema or rash.   Neurological:      Mental Status: Alert and oriented to person, place, and time.      Deep Tendon Reflexes: Reflexes are normal and symmetric.   Psychiatric:         Behavior: Behavior normal.     Sternal incision is well-healed     Assessment:     Problems Addressed this Visit          Cardiac and Vasculature    Essential hypertension - Primary (Chronic)    Hypercholesteremia (Chronic)    Thoracic aortic aneurysm without rupture  (Chronic)    Nonrheumatic mitral valve stenosis (Chronic)    Paroxysmal atrial fibrillation (Chronic)    Aortic valve disease (Chronic)    S/P  AVR    Dilated aortic root    Exertional dyspnea       Endocrine and Metabolic    Controlled diabetes mellitus type 2 with complications (Chronic)       Symptoms and Signs    Dizziness     Diagnoses         Codes Comments    Essential hypertension    -  Primary ICD-10-CM: I10  ICD-9-CM: 401.9     Hypercholesteremia     ICD-10-CM: E78.00  ICD-9-CM: 272.0     Aneurysm of aortic arch without rupture     ICD-10-CM: I71.22  ICD-9-CM: 441.2     Nonrheumatic mitral valve stenosis     ICD-10-CM: I34.2  ICD-9-CM: 424.0     Paroxysmal atrial fibrillation     ICD-10-CM: I48.0  ICD-9-CM: 427.31     Aortic valve disease     ICD-10-CM: I35.9  ICD-9-CM: 424.1     S/P AVR     ICD-10-CM: Z95.2  ICD-9-CM: V43.3     Dilated aortic root     ICD-10-CM: I77.810  ICD-9-CM: 447.71     Exertional dyspnea     ICD-10-CM: R06.09  ICD-9-CM: 786.09     Controlled type 2 diabetes mellitus with complication, without long-term current use of insulin     ICD-10-CM: E11.8  ICD-9-CM: 250.90     Dizziness     ICD-10-CM: R42  ICD-9-CM: 780.4             Assessment/recommendation:     Severe nonrheumatic prosthetic aortic valve stenosis with documented gradients and progression of symptoms of heart failure class II/III.  She had previous prosthetic valve replacement 2007.  Her symptoms are progressive and she is declining.  I recommend percutaneous valve plantation to prolong survival, symptomatic relief and to prevent adverse events.  I had a long discussion with the patient and her daughter regarding the risk of the procedure and the true benefits.  She is not sure where she wants to proceed and she is concerned about expectancy of life.  They will think about it and then let us know to go to the neck step.  We discussed surgical rescue during TAVR and she does not want to have her chest open if need be she is open to a vascular access repair and cardiopulmonary bypass.    Thank you for allowing me to participate in her care.    Regards,    Alphonso  ROQUE Ellis.    I spent over 45 minutes before, during after the office visit and reviewing the records, examining the patient, reviewing interpreting myself the BRAULIO, echocardiogram, CT angiography for TAVR, spent time in discussing the findings and options on recommendation of TAVR over SAVR, discussing the low risk of complications and the option of rescue and spent time in documenting the electronic record

## 2024-05-28 NOTE — PROGRESS NOTES
5/28/2024    Chief Complaint     Dyspnea exertion and fatigue    History of Present Illness:       Dear Colleagues,  It was nice to see Dior Pettit in consultation at your request. She is a 83 y.o. female with multiple medical problems including essential hypertension, GERD, hypercholesterolemia, diabetes, status post AVR, status post stroke, ascending aorta dilatation and who had recently progression of dyspnea of exertion and fatigue.  She has been very active for her age however lately over the last 6 months she has declined.  She was recently admitted to the hospital because heart failure symptoms and she was evaluated that time for possible prosthetic aortic valve stenosis.  She had a CT scan that showed the ascending aorta of 4.1 cm in April 2023.. She denies syncope, TIA, orthopnea or PND but she does have dizziness.  Her last echocardiogram showed a peak velocity of 4.7 m/s, mean gradient of 55 mmHg and aortic valve area less than 1 cm squire.  Ejection fraction was 53%.  It also showed a mitral valve mean gradient of 4.4 mmHg.  An repeat echo showed the peak gradient 73 mmHg with a mean gradient of 48 mmHg.  She had a TAVR CTA that showed feasibility for implantation and acceptable vascular access.  She is here for opinion regarding TAVR versus SAVR    Patient Active Problem List   Diagnosis    Essential hypertension    Pulmonary hypertension    NNEKA (obstructive sleep apnea)    Gastroesophageal reflux disease    Anxiety    Restless leg syndrome    Controlled diabetes mellitus type 2 with complications    Hypothyroidism    Hypercholesteremia    Seasonal allergic rhinitis    Mild intermittent asthma without complication    S/P AVR    Dilated aortic root    Thoracic aortic aneurysm without rupture     Primary insomnia    Renal insufficiency    Macular degeneration of both eyes    Rectocele    Nonrheumatic mitral valve stenosis    Slow transit constipation    Dizziness    Daily headache    Symptomatic  bradycardia    Closed displaced fracture of right femoral neck    Paroxysmal atrial fibrillation    Acute stroke due to ischemia    Aortic valve disease    Exertional dyspnea    Aortic valve stenosis       Past Medical History:   Diagnosis Date    Abnormal ECG     Allergic rhinitis     Anemia     Anxiety     Controlled w/Meds    Aortic root dilatation 10/02/2017    Borderline--Noted on Echo    Aortic valve calcification 10/02/2017    Noted on Echo    Aortic valve insufficiency Dx in 07    w/AVR     Aortic valve prosthesis present 11/02/2018    Noted on CTA Chest    Ascending aorta dilatation 10/02/2017    Borderline--Noted on Echo    Asthma     Atrial fibrillation     2020    Atypical chest pain     Breast pain, right Hx    Bronchitis, chronic 2014    CAD (coronary artery disease)     Cardiomegaly 2017    Cervicalgia     Chest pain due to CAD     CHF (congestive heart failure) 2024    Claustrophobia 1938    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    Borderline--Noted on Echo & Measured @ 2.6CM and Mid Descending @ 2.5CM on CTA Chest-11/2/18    Depression     Lifetime    DM (diabetes mellitus)     T2    Dyslipidemia 2014    Esophagitis     GERD (gastroesophageal reflux disease)     Controlled w/Meds    Headache     Health care maintenance     Heart murmur     History of echocardiogram 10/2/17-[facility]    EF 66%; Borderline Concentric Hypertrophy; Mild Calcification in AV; Mild AVS; Mild AVR; Moderate TVR; Borderline Dilation of Aortic Root/Arch & Borderline Dilation of Ascending/Proximal Aorta Present    History of transfusion 1960    Hyperlipidemia     Controlled w/Meds    Hypertension     Controlled w/Meds    Hypothyroidism     Controlled w/Synthroid    Infectious viral hepatitis 1950    Insomnia     Macular degeneration, left eye     Mild aortic valve regurgitation 10/02/2017    Noted on Echo    Mild aortic valve stenosis 10/02/2017    Noted on Echo    Mild dilation of ascending aorta 10/02/2017     Borderline--Noted on Echo    Mitral valve prolapse 2007    Moderate tricuspid valve regurgitation 10/02/2017    Noted on Echo    Mood disorder in conditions classified elsewhere     Controlled w/Meds    Near syncope 03/2017-[facility]    Neuropathy     NNEKA (obstructive sleep apnea)     Untreated    Osteoarthritis     Ankles/Feet    Pulmonary hypertension 2017    Renal insufficiency     RLS (restless legs syndrome)     Thoracic ascending aortic aneurysm Dx in 2007 @ 3.8CM & 1/02/2018    Noted @ 4CM on CTA Chest;    Visual impairment     macular degeneratuin       Past Surgical History:   Procedure Laterality Date    AORTIC VALVE REPAIR/REPLACEMENT  2007    Dr. Perry    APPENDECTOMY      AUGMENTATION MAMMAPLASTY  1976    BREAST AUGMENTATION  2014    BUNIONECTOMY      CARDIAC CATHETERIZATION  2007    CARDIAC VALVE REPLACEMENT  2007    porcine    COLONOSCOPY  2010    COLONOSCOPY  03/02/2012    EYE SURGERY      cataracts and ens implants    HYSTERECTOMY  1972    SIGMOIDOSCOPY  2001    TEMPORAL ARTERY BIOPSY Bilateral 04/14/2023    Procedure: BILATERAL TEMPORAL ARTERY BIOPSY;  Surgeon: Stewart Shepherd MD;  Location: [facility] OR;  Service: Vascular;  Laterality: Bilateral;    TOTAL HIP ARTHROPLASTY Right 11/15/2022    Procedure: Right anterior total hip arthroplasty;  Surgeon: Joao Martinez MD;  Location: [facility] OR;  Service: Orthopedics;  Laterality: Right;       No Known Allergies      Current Outpatient Medications:     acetaminophen (TYLENOL) 325 MG tablet, Take 2 tablets by mouth Every 4 (Four) Hours As Needed for Mild Pain., Disp: , Rfl:     Advair HFA 45-21 MCG/ACT inhaler, Inhale 2 puffs 2 (Two) Times a Day. As needed, Disp: , Rfl:     amitriptyline (ELAVIL) 50 MG tablet, Take 1 tablet by mouth Every Night., Disp: 90 tablet, Rfl: 1    amLODIPine (NORVASC) 5 MG tablet, Take 1 tablet by mouth Daily., Disp: 90 tablet, Rfl: 1    ascorbic acid (VITAMIN C) 1000 MG tablet, Take 1 tablet by mouth Daily.  Indications: Inadequate Vitamin C, Disp: , Rfl:     atorvastatin (LIPITOR) 80 MG tablet, Take 1 tablet by mouth Every Night. Indications: High Amount of Fats in the Blood, Disp: 90 tablet, Rfl: 1    Blood Glucose Monitoring Suppl (Prodigy No Coding Blood Gluc) w/Device kit, 1 each Daily., Disp: 1 kit, Rfl: 0    dofetilide (TIKOSYN) 250 MCG capsule, TAKE 1 CAPSULE BY MOUTH EVERY 12 HOURS, Disp: 180 capsule, Rfl: 0    Eliquis 5 MG tablet tablet, TAKE 1 TABLET BY MOUTH EVERY 12 HOURS, Disp: 180 tablet, Rfl: 0    fenofibrate micronized (LOFIBRA) 134 MG capsule, TAKE 1 CAPSULE EVERY       MORNING BEFORE BREAKFAST, Disp: 90 capsule, Rfl: 3    furosemide (LASIX) 40 MG tablet, Take 1 tablet by mouth Daily., Disp: 30 tablet, Rfl: 2    glucosamine-chondroitin 500-400 MG capsule capsule, Take 2 capsules by mouth Daily., Disp: , Rfl:     glucose blood (Prodigy No Coding Blood Gluc) test strip, TEST BLOOD SUGAR DAILY, Disp: 50 each, Rfl: 0    ipratropium (ATROVENT) 0.06 % nasal spray, INSTILL 2 SPRAYS IN EACH NOSTRIL EVERY SIX HOURS AS NEEDED, Disp: 15 mL, Rfl: 0    levothyroxine (SYNTHROID, LEVOTHROID) 50 MCG tablet, Take 1 tablet by mouth Every Other Day. Indications: Underactive Thyroid, Disp: 45 tablet, Rfl: 1    levothyroxine (SYNTHROID, LEVOTHROID) 75 MCG tablet, TAKE 1 TABLET BY MOUTH EVERY OTHER DAY for underactive thyroid, Disp: 45 tablet, Rfl: 0    losartan (COZAAR) 100 MG tablet, Take 1 tablet by mouth Daily., Disp: 90 tablet, Rfl: 1    metFORMIN ER (GLUCOPHAGE-XR) 750 MG 24 hr tablet, TAKE 1 TABLET TWICE A DAY, Disp: 180 tablet, Rfl: 3    metoprolol tartrate (LOPRESSOR) 25 MG tablet, Take 1 tablet by mouth 2 (Two) Times a Day. Indications: High Blood Pressure Disorder, Disp: 30 tablet, Rfl: 2    Multiple Vitamins-Minerals (MULTIVITAL PO), Take 1 tablet by mouth Daily. Indications: potential deficiency, Disp: , Rfl:     omeprazole (priLOSEC) 40 MG capsule, TAKE 1 CAPSULE DAILY *DR LUZ MARIA BOSTON*, Disp: 90 capsule, Rfl:  0    Prodigy Safety Lancets 26G misc, CHECK BLOOD SUGAR ONE TIME PER DAY, Disp: 100 each, Rfl: 1    valACYclovir (VALTREX) 1000 MG tablet, TAKE 2 TABLETS 4 TIMES     DAILY FOR MOUTH ULCER, Disp: 16 tablet, Rfl: 1    venlafaxine XR (EFFEXOR-XR) 150 MG 24 hr capsule, Take 1 capsule by mouth Daily., Disp: 90 capsule, Rfl: 3    Social History     Socioeconomic History    Marital status:    Tobacco Use    Smoking status: Never     Passive exposure: Never    Smokeless tobacco: Never    Tobacco comments:     caffeine use - 1 cup coffee daily    Vaping Use    Vaping status: Never Used   Substance and Sexual Activity    Alcohol use: Yes     Comment: less than 1 glass of wine per week    Drug use: Never    Sexual activity: Not Currently     Partners: Male     Birth control/protection: Surgical     Comment: Hyst       Family History   Problem Relation Age of Onset    Hypertension Mother     Stroke Mother     Cancer Mother     Diabetes Sister     Coronary artery disease Brother     Diabetes Brother     Valvular heart disease Brother         TAVR    Cancer Brother         bladder cancer    Coronary artery disease Brother     Cancer Brother     Birth defects Daughter     Skin cancer Neg Hx     Malig Hyperthermia Neg Hx    Sternal incision is well-healed  Review of Systems:  As HPI, otherwise noncontributory    Physical Exam:    Vital Signs:  Weight: 61.2 kg (135 lb)   Body mass index is 21.19 kg/m².  Temp: 97.3 °F (36.3 °C)   Heart Rate: 67   BP: 132/73     Constitutional:       Appearance: Well-developed.   Eyes:      Conjunctiva/sclera: Conjunctivae normal.      Pupils: Pupils are equal, round, and reactive to light.   HENT:      Head: Normocephalic and atraumatic.      Nose: Nose normal.   Neck:      Thyroid: No thyromegaly.      Vascular: No JVD.      Lymphadenopathy: No cervical adenopathy.   Pulmonary:      Effort: Pulmonary effort is normal.      Breath sounds: Normal breath sounds. No rales.   Cardiovascular:       Normal rate. Regular rhythm.      Murmurs: There is a grade 3/6 harsh midsystolic murmur at the URSB, radiating to the neck.      No gallop.    Pulses:     Intact distal pulses. No decreased pulses.   Edema:     Peripheral edema absent.   Abdominal:      General: Bowel sounds are normal. There is no distension.      Palpations: Abdomen is soft. There is no abdominal mass.      Tenderness: There is no abdominal tenderness.   Musculoskeletal: Normal range of motion.         General: No tenderness or deformity.      Cervical back: Normal range of motion and neck supple. Skin:     General: Skin is warm and dry.      Findings: No erythema or rash.   Neurological:      Mental Status: Alert and oriented to person, place, and time.      Deep Tendon Reflexes: Reflexes are normal and symmetric.   Psychiatric:         Behavior: Behavior normal.     Sternal incision is well-healed     Assessment:     Problems Addressed this Visit          Cardiac and Vasculature    Essential hypertension - Primary (Chronic)    Hypercholesteremia (Chronic)    Thoracic aortic aneurysm without rupture  (Chronic)    Nonrheumatic mitral valve stenosis (Chronic)    Paroxysmal atrial fibrillation (Chronic)    Aortic valve disease (Chronic)    S/P AVR    Dilated aortic root    Exertional dyspnea       Endocrine and Metabolic    Controlled diabetes mellitus type 2 with complications (Chronic)       Symptoms and Signs    Dizziness     Diagnoses         Codes Comments    Essential hypertension    -  Primary ICD-10-CM: I10  ICD-9-CM: 401.9     Hypercholesteremia     ICD-10-CM: E78.00  ICD-9-CM: 272.0     Aneurysm of aortic arch without rupture     ICD-10-CM: I71.22  ICD-9-CM: 441.2     Nonrheumatic mitral valve stenosis     ICD-10-CM: I34.2  ICD-9-CM: 424.0     Paroxysmal atrial fibrillation     ICD-10-CM: I48.0  ICD-9-CM: 427.31     Aortic valve disease     ICD-10-CM: I35.9  ICD-9-CM: 424.1     S/P AVR     ICD-10-CM: Z95.2  ICD-9-CM: V43.3     Dilated  aortic root     ICD-10-CM: I77.810  ICD-9-CM: 447.71     Exertional dyspnea     ICD-10-CM: R06.09  ICD-9-CM: 786.09     Controlled type 2 diabetes mellitus with complication, without long-term current use of insulin     ICD-10-CM: E11.8  ICD-9-CM: 250.90     Dizziness     ICD-10-CM: R42  ICD-9-CM: 780.4             Assessment/recommendation:     Severe nonrheumatic prosthetic aortic valve stenosis with documented gradients and progression of symptoms of heart failure class II/III.  She had previous prosthetic valve replacement 2007.  Her symptoms are progressive and she is declining.  I recommend percutaneous valve plantation to prolong survival, symptomatic relief and to prevent adverse events.  I had a long discussion with the patient and her daughter regarding the risk of the procedure and the true benefits.  She is not sure where she wants to proceed and she is concerned about expectancy of life.  They will think about it and then let us know to go to the neck step.  We discussed surgical rescue during TAVR and she does not want to have her chest open if need be she is open to a vascular access repair and cardiopulmonary bypass.    Thank you for allowing me to participate in her care.    Regards,    Alphonso Ellis M.D.    I spent over 45 minutes before, during after the office visit and reviewing the records, examining the patient, reviewing interpreting myself the BRAULIO, echocardiogram, CT angiography for TAVR, spent time in discussing the findings and options on recommendation of TAVR over SAVR, discussing the low risk of complications and the option of rescue and spent time in documenting the electronic record

## 2024-05-28 NOTE — H&P (VIEW-ONLY)
5/28/2024    Chief Complaint     Dyspnea exertion and fatigue    History of Present Illness:       Dear Colleagues,  It was nice to see Dior Pettit in consultation at your request. She is a 83 y.o. female with multiple medical problems including essential hypertension, GERD, hypercholesterolemia, diabetes, status post AVR, status post stroke, ascending aorta dilatation and who had recently progression of dyspnea of exertion and fatigue.  She has been very active for her age however lately over the last 6 months she has declined.  She was recently admitted to the hospital because heart failure symptoms and she was evaluated that time for possible prosthetic aortic valve stenosis.  She had a CT scan that showed the ascending aorta of 4.1 cm in April 2023.. She denies syncope, TIA, orthopnea or PND but she does have dizziness.  Her last echocardiogram showed a peak velocity of 4.7 m/s, mean gradient of 55 mmHg and aortic valve area less than 1 cm squire.  Ejection fraction was 53%.  It also showed a mitral valve mean gradient of 4.4 mmHg.  An repeat echo showed the peak gradient 73 mmHg with a mean gradient of 48 mmHg.  She had a TAVR CTA that showed feasibility for implantation and acceptable vascular access.  She is here for opinion regarding TAVR versus SAVR    Patient Active Problem List   Diagnosis    Essential hypertension    Pulmonary hypertension    NNEKA (obstructive sleep apnea)    Gastroesophageal reflux disease    Anxiety    Restless leg syndrome    Controlled diabetes mellitus type 2 with complications    Hypothyroidism    Hypercholesteremia    Seasonal allergic rhinitis    Mild intermittent asthma without complication    S/P AVR    Dilated aortic root    Thoracic aortic aneurysm without rupture     Primary insomnia    Renal insufficiency    Macular degeneration of both eyes    Rectocele    Nonrheumatic mitral valve stenosis    Slow transit constipation    Dizziness    Daily headache    Symptomatic  bradycardia    Closed displaced fracture of right femoral neck    Paroxysmal atrial fibrillation    Acute stroke due to ischemia    Aortic valve disease    Exertional dyspnea    Aortic valve stenosis       Past Medical History:   Diagnosis Date    Abnormal ECG     Allergic rhinitis     Anemia     Anxiety     Controlled w/Meds    Aortic root dilatation 10/02/2017    Borderline--Noted on Echo    Aortic valve calcification 10/02/2017    Noted on Echo    Aortic valve insufficiency Dx in 07    w/AVR     Aortic valve prosthesis present 11/02/2018    Noted on CTA Chest    Ascending aorta dilatation 10/02/2017    Borderline--Noted on Echo    Asthma     Atrial fibrillation     2020    Atypical chest pain     Breast pain, right Hx    Bronchitis, chronic 2014    CAD (coronary artery disease)     Cardiomegaly 2017    Cervicalgia     Chest pain due to CAD     CHF (congestive heart failure) 2024    Claustrophobia 1938    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    Borderline--Noted on Echo & Measured @ 2.6CM and Mid Descending @ 2.5CM on CTA Chest-11/2/18    Depression     Lifetime    DM (diabetes mellitus)     T2    Dyslipidemia 2014    Esophagitis     GERD (gastroesophageal reflux disease)     Controlled w/Meds    Headache     Health care maintenance     Heart murmur     History of echocardiogram 10/2/17-[facility]    EF 66%; Borderline Concentric Hypertrophy; Mild Calcification in AV; Mild AVS; Mild AVR; Moderate TVR; Borderline Dilation of Aortic Root/Arch & Borderline Dilation of Ascending/Proximal Aorta Present    History of transfusion 1960    Hyperlipidemia     Controlled w/Meds    Hypertension     Controlled w/Meds    Hypothyroidism     Controlled w/Synthroid    Infectious viral hepatitis 1950    Insomnia     Macular degeneration, left eye     Mild aortic valve regurgitation 10/02/2017    Noted on Echo    Mild aortic valve stenosis 10/02/2017    Noted on Echo    Mild dilation of ascending aorta 10/02/2017     Borderline--Noted on Echo    Mitral valve prolapse 2007    Moderate tricuspid valve regurgitation 10/02/2017    Noted on Echo    Mood disorder in conditions classified elsewhere     Controlled w/Meds    Near syncope 03/2017-[facility]    Neuropathy     NNEKA (obstructive sleep apnea)     Untreated    Osteoarthritis     Ankles/Feet    Pulmonary hypertension 2017    Renal insufficiency     RLS (restless legs syndrome)     Thoracic ascending aortic aneurysm Dx in 2007 @ 3.8CM & 1/02/2018    Noted @ 4CM on CTA Chest;    Visual impairment     macular degeneratuin       Past Surgical History:   Procedure Laterality Date    AORTIC VALVE REPAIR/REPLACEMENT  2007    Dr. Perry    APPENDECTOMY      AUGMENTATION MAMMAPLASTY  1976    BREAST AUGMENTATION  2014    BUNIONECTOMY      CARDIAC CATHETERIZATION  2007    CARDIAC VALVE REPLACEMENT  2007    porcine    COLONOSCOPY  2010    COLONOSCOPY  03/02/2012    EYE SURGERY      cataracts and ens implants    HYSTERECTOMY  1972    SIGMOIDOSCOPY  2001    TEMPORAL ARTERY BIOPSY Bilateral 04/14/2023    Procedure: BILATERAL TEMPORAL ARTERY BIOPSY;  Surgeon: Stewart Shepherd MD;  Location: [facility] OR;  Service: Vascular;  Laterality: Bilateral;    TOTAL HIP ARTHROPLASTY Right 11/15/2022    Procedure: Right anterior total hip arthroplasty;  Surgeon: Joao Martinez MD;  Location: [facility] OR;  Service: Orthopedics;  Laterality: Right;       No Known Allergies      Current Outpatient Medications:     acetaminophen (TYLENOL) 325 MG tablet, Take 2 tablets by mouth Every 4 (Four) Hours As Needed for Mild Pain., Disp: , Rfl:     Advair HFA 45-21 MCG/ACT inhaler, Inhale 2 puffs 2 (Two) Times a Day. As needed, Disp: , Rfl:     amitriptyline (ELAVIL) 50 MG tablet, Take 1 tablet by mouth Every Night., Disp: 90 tablet, Rfl: 1    amLODIPine (NORVASC) 5 MG tablet, Take 1 tablet by mouth Daily., Disp: 90 tablet, Rfl: 1    ascorbic acid (VITAMIN C) 1000 MG tablet, Take 1 tablet by mouth Daily.  Indications: Inadequate Vitamin C, Disp: , Rfl:     atorvastatin (LIPITOR) 80 MG tablet, Take 1 tablet by mouth Every Night. Indications: High Amount of Fats in the Blood, Disp: 90 tablet, Rfl: 1    Blood Glucose Monitoring Suppl (Prodigy No Coding Blood Gluc) w/Device kit, 1 each Daily., Disp: 1 kit, Rfl: 0    dofetilide (TIKOSYN) 250 MCG capsule, TAKE 1 CAPSULE BY MOUTH EVERY 12 HOURS, Disp: 180 capsule, Rfl: 0    Eliquis 5 MG tablet tablet, TAKE 1 TABLET BY MOUTH EVERY 12 HOURS, Disp: 180 tablet, Rfl: 0    fenofibrate micronized (LOFIBRA) 134 MG capsule, TAKE 1 CAPSULE EVERY       MORNING BEFORE BREAKFAST, Disp: 90 capsule, Rfl: 3    furosemide (LASIX) 40 MG tablet, Take 1 tablet by mouth Daily., Disp: 30 tablet, Rfl: 2    glucosamine-chondroitin 500-400 MG capsule capsule, Take 2 capsules by mouth Daily., Disp: , Rfl:     glucose blood (Prodigy No Coding Blood Gluc) test strip, TEST BLOOD SUGAR DAILY, Disp: 50 each, Rfl: 0    ipratropium (ATROVENT) 0.06 % nasal spray, INSTILL 2 SPRAYS IN EACH NOSTRIL EVERY SIX HOURS AS NEEDED, Disp: 15 mL, Rfl: 0    levothyroxine (SYNTHROID, LEVOTHROID) 50 MCG tablet, Take 1 tablet by mouth Every Other Day. Indications: Underactive Thyroid, Disp: 45 tablet, Rfl: 1    levothyroxine (SYNTHROID, LEVOTHROID) 75 MCG tablet, TAKE 1 TABLET BY MOUTH EVERY OTHER DAY for underactive thyroid, Disp: 45 tablet, Rfl: 0    losartan (COZAAR) 100 MG tablet, Take 1 tablet by mouth Daily., Disp: 90 tablet, Rfl: 1    metFORMIN ER (GLUCOPHAGE-XR) 750 MG 24 hr tablet, TAKE 1 TABLET TWICE A DAY, Disp: 180 tablet, Rfl: 3    metoprolol tartrate (LOPRESSOR) 25 MG tablet, Take 1 tablet by mouth 2 (Two) Times a Day. Indications: High Blood Pressure Disorder, Disp: 30 tablet, Rfl: 2    Multiple Vitamins-Minerals (MULTIVITAL PO), Take 1 tablet by mouth Daily. Indications: potential deficiency, Disp: , Rfl:     omeprazole (priLOSEC) 40 MG capsule, TAKE 1 CAPSULE DAILY *DR LUZ MARIA BOSTON*, Disp: 90 capsule, Rfl:  0    Prodigy Safety Lancets 26G misc, CHECK BLOOD SUGAR ONE TIME PER DAY, Disp: 100 each, Rfl: 1    valACYclovir (VALTREX) 1000 MG tablet, TAKE 2 TABLETS 4 TIMES     DAILY FOR MOUTH ULCER, Disp: 16 tablet, Rfl: 1    venlafaxine XR (EFFEXOR-XR) 150 MG 24 hr capsule, Take 1 capsule by mouth Daily., Disp: 90 capsule, Rfl: 3    Social History     Socioeconomic History    Marital status:    Tobacco Use    Smoking status: Never     Passive exposure: Never    Smokeless tobacco: Never    Tobacco comments:     caffeine use - 1 cup coffee daily    Vaping Use    Vaping status: Never Used   Substance and Sexual Activity    Alcohol use: Yes     Comment: less than 1 glass of wine per week    Drug use: Never    Sexual activity: Not Currently     Partners: Male     Birth control/protection: Surgical     Comment: Hyst       Family History   Problem Relation Age of Onset    Hypertension Mother     Stroke Mother     Cancer Mother     Diabetes Sister     Coronary artery disease Brother     Diabetes Brother     Valvular heart disease Brother         TAVR    Cancer Brother         bladder cancer    Coronary artery disease Brother     Cancer Brother     Birth defects Daughter     Skin cancer Neg Hx     Malig Hyperthermia Neg Hx    Sternal incision is well-healed  Review of Systems:  As HPI, otherwise noncontributory    Physical Exam:    Vital Signs:  Weight: 61.2 kg (135 lb)   Body mass index is 21.19 kg/m².  Temp: 97.3 °F (36.3 °C)   Heart Rate: 67   BP: 132/73     Constitutional:       Appearance: Well-developed.   Eyes:      Conjunctiva/sclera: Conjunctivae normal.      Pupils: Pupils are equal, round, and reactive to light.   HENT:      Head: Normocephalic and atraumatic.      Nose: Nose normal.   Neck:      Thyroid: No thyromegaly.      Vascular: No JVD.      Lymphadenopathy: No cervical adenopathy.   Pulmonary:      Effort: Pulmonary effort is normal.      Breath sounds: Normal breath sounds. No rales.   Cardiovascular:       Normal rate. Regular rhythm.      Murmurs: There is a grade 3/6 harsh midsystolic murmur at the URSB, radiating to the neck.      No gallop.    Pulses:     Intact distal pulses. No decreased pulses.   Edema:     Peripheral edema absent.   Abdominal:      General: Bowel sounds are normal. There is no distension.      Palpations: Abdomen is soft. There is no abdominal mass.      Tenderness: There is no abdominal tenderness.   Musculoskeletal: Normal range of motion.         General: No tenderness or deformity.      Cervical back: Normal range of motion and neck supple. Skin:     General: Skin is warm and dry.      Findings: No erythema or rash.   Neurological:      Mental Status: Alert and oriented to person, place, and time.      Deep Tendon Reflexes: Reflexes are normal and symmetric.   Psychiatric:         Behavior: Behavior normal.     Sternal incision is well-healed     Assessment:     Problems Addressed this Visit          Cardiac and Vasculature    Essential hypertension - Primary (Chronic)    Hypercholesteremia (Chronic)    Thoracic aortic aneurysm without rupture  (Chronic)    Nonrheumatic mitral valve stenosis (Chronic)    Paroxysmal atrial fibrillation (Chronic)    Aortic valve disease (Chronic)    S/P AVR    Dilated aortic root    Exertional dyspnea       Endocrine and Metabolic    Controlled diabetes mellitus type 2 with complications (Chronic)       Symptoms and Signs    Dizziness     Diagnoses         Codes Comments    Essential hypertension    -  Primary ICD-10-CM: I10  ICD-9-CM: 401.9     Hypercholesteremia     ICD-10-CM: E78.00  ICD-9-CM: 272.0     Aneurysm of aortic arch without rupture     ICD-10-CM: I71.22  ICD-9-CM: 441.2     Nonrheumatic mitral valve stenosis     ICD-10-CM: I34.2  ICD-9-CM: 424.0     Paroxysmal atrial fibrillation     ICD-10-CM: I48.0  ICD-9-CM: 427.31     Aortic valve disease     ICD-10-CM: I35.9  ICD-9-CM: 424.1     S/P AVR     ICD-10-CM: Z95.2  ICD-9-CM: V43.3     Dilated  aortic root     ICD-10-CM: I77.810  ICD-9-CM: 447.71     Exertional dyspnea     ICD-10-CM: R06.09  ICD-9-CM: 786.09     Controlled type 2 diabetes mellitus with complication, without long-term current use of insulin     ICD-10-CM: E11.8  ICD-9-CM: 250.90     Dizziness     ICD-10-CM: R42  ICD-9-CM: 780.4             Assessment/recommendation:     Severe nonrheumatic prosthetic aortic valve stenosis with documented gradients and progression of symptoms of heart failure class II/III.  She had previous prosthetic valve replacement 2007.  Her symptoms are progressive and she is declining.  I recommend percutaneous valve plantation to prolong survival, symptomatic relief and to prevent adverse events.  I had a long discussion with the patient and her daughter regarding the risk of the procedure and the true benefits.  She is not sure where she wants to proceed and she is concerned about expectancy of life.  They will think about it and then let us know to go to the neck step.  We discussed surgical rescue during TAVR and she does not want to have her chest open if need be she is open to a vascular access repair and cardiopulmonary bypass.    Thank you for allowing me to participate in her care.    Regards,    Alphonso Ellis M.D.    I spent over 45 minutes before, during after the office visit and reviewing the records, examining the patient, reviewing interpreting myself the BRAULIO, echocardiogram, CT angiography for TAVR, spent time in discussing the findings and options on recommendation of TAVR over SAVR, discussing the low risk of complications and the option of rescue and spent time in documenting the electronic record

## 2024-05-30 ENCOUNTER — PATIENT ROUNDING (BHMG ONLY) (OUTPATIENT)
Dept: CARDIAC SURGERY | Facility: CLINIC | Age: 84
End: 2024-05-30
Payer: MEDICARE

## 2024-05-30 NOTE — PROGRESS NOTES
A My Chart message has been sent to the patient for [place] [facility] [place]

## 2024-06-01 DIAGNOSIS — E03.9 ACQUIRED HYPOTHYROIDISM: ICD-10-CM

## 2024-06-03 RX ORDER — LEVOTHYROXINE SODIUM 0.07 MG/1
TABLET ORAL
Qty: 45 TABLET | Refills: 0 | OUTPATIENT
Start: 2024-06-03

## 2024-06-04 ENCOUNTER — PREP FOR SURGERY (OUTPATIENT)
Dept: OTHER | Facility: HOSPITAL | Age: 84
End: 2024-06-04
Payer: MEDICARE

## 2024-06-04 DIAGNOSIS — I35.0 NONRHEUMATIC AORTIC VALVE STENOSIS: Primary | ICD-10-CM

## 2024-06-04 DIAGNOSIS — I11.0 HYPERTENSIVE HEART DISEASE WITH HEART FAILURE: ICD-10-CM

## 2024-06-04 DIAGNOSIS — E08.65 DIABETES MELLITUS DUE TO UNDERLYING CONDITION WITH HYPERGLYCEMIA, WITHOUT LONG-TERM CURRENT USE OF INSULIN: ICD-10-CM

## 2024-06-04 DIAGNOSIS — I35.0 AORTIC VALVE STENOSIS, ETIOLOGY OF CARDIAC VALVE DISEASE UNSPECIFIED: Primary | ICD-10-CM

## 2024-06-04 DIAGNOSIS — Z01.810 PRE-OPERATIVE CARDIOVASCULAR EXAMINATION, SYMPTOMATIC CONGESTIVE HEART FAILURE: ICD-10-CM

## 2024-06-04 DIAGNOSIS — I50.9 PRE-OPERATIVE CARDIOVASCULAR EXAMINATION, SYMPTOMATIC CONGESTIVE HEART FAILURE: ICD-10-CM

## 2024-06-04 RX ORDER — SODIUM CHLORIDE 9 MG/ML
30 INJECTION, SOLUTION INTRAVENOUS CONTINUOUS PRN
OUTPATIENT
Start: 2024-06-04

## 2024-06-04 RX ORDER — SODIUM CHLORIDE 0.9 % (FLUSH) 0.9 %
30 SYRINGE (ML) INJECTION ONCE AS NEEDED
OUTPATIENT
Start: 2024-06-04

## 2024-06-04 RX ORDER — NICOTINE POLACRILEX 4 MG
15 LOZENGE BUCCAL
OUTPATIENT
Start: 2024-06-04

## 2024-06-04 RX ORDER — CHLORHEXIDINE GLUCONATE ORAL RINSE 1.2 MG/ML
15 SOLUTION DENTAL EVERY 12 HOURS
OUTPATIENT
Start: 2024-06-04 | End: 2024-06-05

## 2024-06-04 RX ORDER — CHLORHEXIDINE GLUCONATE ORAL RINSE 1.2 MG/ML
15 SOLUTION DENTAL ONCE
OUTPATIENT
Start: 2024-06-04 | End: 2024-06-04

## 2024-06-04 RX ORDER — DEXTROSE MONOHYDRATE 25 G/50ML
10-50 INJECTION, SOLUTION INTRAVENOUS
OUTPATIENT
Start: 2024-06-04

## 2024-06-04 RX ORDER — IBUPROFEN 600 MG/1
1 TABLET ORAL
OUTPATIENT
Start: 2024-06-04

## 2024-06-05 ENCOUNTER — TELEPHONE (OUTPATIENT)
Dept: CARDIOLOGY | Facility: HOSPITAL | Age: 84
End: 2024-06-05
Payer: MEDICARE

## 2024-06-05 RX ORDER — DOFETILIDE 0.25 MG/1
250 CAPSULE ORAL EVERY 12 HOURS
Qty: 30 CAPSULE | Refills: 0 | Status: SHIPPED | OUTPATIENT
Start: 2024-06-05

## 2024-06-05 NOTE — TELEPHONE ENCOUNTER
I have spoken with Mrs Pettit and her daughter She has missed two doses of Tikosyn and this medication will be held until after the TAVR procedure Dr Jj is reluctant to admit her to the hospital now to restart the Tikosyn Justin GIVENS has also spoken with her and explained the plan Her TAVR has been scheduled for 6/25/24 with a 5am arrival time She has our contact information and will call with any further questions or if her condition changes

## 2024-06-06 ENCOUNTER — TELEPHONE (OUTPATIENT)
Dept: CARDIOLOGY | Facility: HOSPITAL | Age: 84
End: 2024-06-06
Payer: MEDICARE

## 2024-06-06 NOTE — TELEPHONE ENCOUNTER
Severiano patients daughter calls to clarify the plan for her mother's TAVR procedure We discussed the procedure at length and again discussed the plan concerning Tikosyn. She missed a coupe of doses and Dr Jj thinks it's best to not resume this until after the TAVR procedure She will see him in office 6/18/24 Severiano states she finds these pills on the floor and other places and thinks missed doses are probably not unusual. The TAVR is scheduled for 6/25/24 with a 6:30am arrival time Preadmission testing is scheduled for 6/21/24 at 7:30 and this is agreeable Severiano will again discuss this with her mother and call with any further questions or concerns

## 2024-06-18 ENCOUNTER — TELEPHONE (OUTPATIENT)
Dept: INTERNAL MEDICINE | Facility: CLINIC | Age: 84
End: 2024-06-18
Payer: MEDICARE

## 2024-06-18 NOTE — TELEPHONE ENCOUNTER
Spoke with patient. She states she is unable to come into the office today due to other appointments. Offered next day appointment - patient states she will call her daughter and get back with us. Went ahead and scheduled so the appointment wasn't taken - patient will call back if she needs to cancel.

## 2024-06-18 NOTE — TELEPHONE ENCOUNTER
Caller: Dior Pettit    Relationship: Self    Best call back number: [phone]     What medication are you requesting: ANTIBIOTIC    What are your current symptoms: FREQUENCY, SPASMS, URGENCY, BURNING, PAIN, PRESSURE,     How long have you been experiencing symptoms: 1 DAY    Have you had these symptoms before:    [x] Yes  [] No    Have you been treated for these symptoms before:   [x] Yes  [] No    If a prescription is needed, what is your preferred pharmacy and phone number: [id number] - [facility] [address] - [phone]  - [phone]     Additional notes: PATIENT IS HAVING SURGERY ON 06/25/24 AND IS NEEDING TO GET RID OF THIS INFECTION BEFORE THEN.     PLEASE CALL TO ADVISE IF PATIENT NEEDS TO BE SEEN IN OFFICE FIRST.

## 2024-06-19 ENCOUNTER — TELEPHONE (OUTPATIENT)
Dept: CARDIOLOGY | Facility: HOSPITAL | Age: 84
End: 2024-06-19
Payer: MEDICARE

## 2024-06-19 ENCOUNTER — OFFICE VISIT (OUTPATIENT)
Dept: INTERNAL MEDICINE | Facility: CLINIC | Age: 84
End: 2024-06-19
Payer: MEDICARE

## 2024-06-19 VITALS
HEIGHT: 67 IN | BODY MASS INDEX: 20.91 KG/M2 | WEIGHT: 133.2 LBS | DIASTOLIC BLOOD PRESSURE: 78 MMHG | SYSTOLIC BLOOD PRESSURE: 130 MMHG | TEMPERATURE: 97.4 F | OXYGEN SATURATION: 96 % | HEART RATE: 73 BPM

## 2024-06-19 DIAGNOSIS — N30.00 ACUTE CYSTITIS WITHOUT HEMATURIA: Primary | ICD-10-CM

## 2024-06-19 DIAGNOSIS — N30.00 ACUTE CYSTITIS WITHOUT HEMATURIA: ICD-10-CM

## 2024-06-19 DIAGNOSIS — R30.0 DYSURIA: Primary | ICD-10-CM

## 2024-06-19 DIAGNOSIS — R30.0 DYSURIA: ICD-10-CM

## 2024-06-19 DIAGNOSIS — I35.0 NONRHEUMATIC AORTIC VALVE STENOSIS: ICD-10-CM

## 2024-06-19 DIAGNOSIS — B00.2 ORAL HERPES: ICD-10-CM

## 2024-06-19 PROBLEM — I35.9 AORTIC VALVE DISEASE: Chronic | Status: RESOLVED | Noted: 2024-01-07 | Resolved: 2024-06-19

## 2024-06-19 LAB
BILIRUB BLD-MCNC: NEGATIVE MG/DL
CLARITY, POC: CLEAR
COLOR UR: ABNORMAL
EXPIRATION DATE: ABNORMAL
GLUCOSE UR STRIP-MCNC: NEGATIVE MG/DL
KETONES UR QL: NEGATIVE
LEUKOCYTE EST, POC: ABNORMAL
Lab: ABNORMAL
NITRITE UR-MCNC: POSITIVE MG/ML
PH UR: 5.5 [PH] (ref 5–8)
PROT UR STRIP-MCNC: NEGATIVE MG/DL
RBC # UR STRIP: ABNORMAL /UL
SP GR UR: 1 (ref 1–1.03)
UROBILINOGEN UR QL: ABNORMAL

## 2024-06-19 PROCEDURE — 1160F RVW MEDS BY RX/DR IN RCRD: CPT | Performed by: NURSE PRACTITIONER

## 2024-06-19 PROCEDURE — 1159F MED LIST DOCD IN RCRD: CPT | Performed by: NURSE PRACTITIONER

## 2024-06-19 PROCEDURE — 1126F AMNT PAIN NOTED NONE PRSNT: CPT | Performed by: NURSE PRACTITIONER

## 2024-06-19 PROCEDURE — 81003 URINALYSIS AUTO W/O SCOPE: CPT | Performed by: NURSE PRACTITIONER

## 2024-06-19 PROCEDURE — 99214 OFFICE O/P EST MOD 30 MIN: CPT | Performed by: NURSE PRACTITIONER

## 2024-06-19 PROCEDURE — G2211 COMPLEX E/M VISIT ADD ON: HCPCS | Performed by: NURSE PRACTITIONER

## 2024-06-19 RX ORDER — ACYCLOVIR 50 MG/G
1 OINTMENT TOPICAL
Qty: 15 G | Refills: 0 | Status: SHIPPED | OUTPATIENT
Start: 2024-06-19

## 2024-06-19 RX ORDER — CEFDINIR 300 MG/1
300 CAPSULE ORAL 2 TIMES DAILY
Qty: 14 CAPSULE | Refills: 0 | Status: SHIPPED | OUTPATIENT
Start: 2024-06-19 | End: 2024-06-26

## 2024-06-19 RX ORDER — PHENAZOPYRIDINE HYDROCHLORIDE 200 MG/1
200 TABLET, FILM COATED ORAL 3 TIMES DAILY PRN
Qty: 6 TABLET | Refills: 0 | Status: SHIPPED | OUTPATIENT
Start: 2024-06-19 | End: 2024-06-21

## 2024-06-19 NOTE — TELEPHONE ENCOUNTER
I spoke with Severiano Pettit's daughter She reports her mother has a UTI and has been started on an antibiotic She is agreeable to Preadmission testing at 7:30 6/21/24. I again provided our contact information should they have any further concerns or questions.

## 2024-06-20 NOTE — ASSESSMENT & PLAN NOTE
Hx bioprosthetic valve and subaortic membrane resection in 2007.  Subsequent echoes have shown residual mildly elevated gradient of 20 to 28 mmHg.  Echo 5/10/24: peak velocity of 4.7 m/s, mean gradient of 55 mmHg and aortic valve area less than 1 cm squire. Ejection fraction was 53%. It also showed a mitral valve mean gradient of 4.4 mmHg. An repeat echo showed the peak gradient 73 mmHg with a mean gradient of 48 mmHg.   She is scheduled for a TAVR 6/25/24.

## 2024-06-20 NOTE — PROGRESS NOTES
"Chief Complaint  Urinary Tract Infection (Frequency, burning)    Subjective        Dior Pettit presents to [facility] due to urinary symptoms, aortic valve stenosis and hx cold sores.    History of Present Illness    She presents urinary frequency and urgency since Monday morning. She woke up with suprapubic pressure and dysuria, started taking Azo with mild improvement in sx. Denies nausea and/or vomiting. No fever and/or chills.    She was hospitalized 5/10/24 due to shortness of breath without chest pain and/or fever. Her echo performed showed severe aortic stenosis. She has seen Dr. Ellis in consult (previous prosthetic valve replacement 2007). She has decided to pursue TAVR which is scheduled for 6/25/24. She remains active but has noticed a significant increase in fatigue over the past several months.    Objective   Vital Signs:  /78 (BP Location: Left arm, Patient Position: Sitting, Cuff Size: Adult)   Pulse 73   Temp 97.4 °F (36.3 °C)   Ht 170 cm (66.93\")   Wt 60.4 kg (133 lb 3.2 oz)   SpO2 96%   BMI 20.91 kg/m²   Estimated body mass index is 20.91 kg/m² as calculated from the following:    Height as of this encounter: 170 cm (66.93\").    Weight as of this encounter: 60.4 kg (133 lb 3.2 oz).       BMI is within normal parameters. No other follow-up for BMI required.      Physical Exam  Constitutional:       Appearance: She is well-developed. She is not ill-appearing.   HENT:      Head: Normocephalic.      Right Ear: Hearing, tympanic membrane and external ear normal.      Left Ear: Hearing, tympanic membrane and external ear normal.      Nose: Nose normal. No nasal deformity, mucosal edema or rhinorrhea.      Right Sinus: No maxillary sinus tenderness or frontal sinus tenderness.      Left Sinus: No maxillary sinus tenderness or frontal sinus tenderness.      Mouth/Throat:      Dentition: Normal dentition.   Eyes:      General: Lids are normal.         Right " eye: No discharge.         Left eye: No discharge.      Conjunctiva/sclera: Conjunctivae normal.      Right eye: No exudate.     Left eye: No exudate.  Neck:      Thyroid: No thyroid mass or thyromegaly.      Vascular: No carotid bruit.      Trachea: Trachea normal.   Cardiovascular:      Rate and Rhythm: Regular rhythm.      Pulses: Normal pulses.      Heart sounds: Murmur heard.      Systolic murmur is present with a grade of 2/6.   Pulmonary:      Effort: No respiratory distress.      Breath sounds: Normal breath sounds. No decreased breath sounds, wheezing, rhonchi or rales.   Abdominal:      General: Bowel sounds are normal.      Palpations: Abdomen is soft.      Tenderness: There is no abdominal tenderness.   Musculoskeletal:      Cervical back: Normal range of motion. No edema.   Lymphadenopathy:      Head:      Right side of head: No submental, submandibular, tonsillar, preauricular, posterior auricular or occipital adenopathy.      Left side of head: No submental, submandibular, tonsillar, preauricular, posterior auricular or occipital adenopathy.   Skin:     General: Skin is warm and dry.      Nails: There is no clubbing.   Neurological:      Mental Status: She is alert.   Psychiatric:         Behavior: Behavior is cooperative.        Result Review :    The following data was reviewed by: TOSHA Houser on 06/19/2024:  Common labs          3/6/2024    09:55 5/10/2024    03:24 5/11/2024    03:39   Common Labs   Glucose 117  123  100    BUN 25  20  23    Creatinine 0.97  1.00  0.96    Sodium 137  139  136    Potassium 4.6  4.6  4.0    Chloride 102  104  98    Calcium 9.8  9.6  9.9    Total Protein 7.0      Albumin 4.6  4.4     Total Bilirubin 0.4  0.4     Alkaline Phosphatase 62  65     AST (SGOT) 21  20     ALT (SGPT) 20  19     WBC 5.61  7.17  6.87    Hemoglobin 12.2  10.6  11.8    Hematocrit 37.4  33.0  36.2    Platelets 297  220  256    Total Cholesterol 152      Triglycerides 109      HDL  Cholesterol 50      LDL Cholesterol  82      Hemoglobin A1C 5.40        Data reviewed : Cardiology studies echo  5/10/24, Consultant notes cardiothoracic surgery 5/28/24, and Recent hospitalization notes 5/10/24             Assessment and Plan     Diagnoses and all orders for this visit:    1. Acute cystitis without hematuria (Primary)  Comments:  UA +nitrates & blood (although results may be affected by recent use of Azo), start antibiotic due to planned surgery & send urine for culture  Orders:  -     cefdinir (OMNICEF) 300 MG capsule; Take 1 capsule by mouth 2 (Two) Times a Day for 7 days.  Dispense: 14 capsule; Refill: 0  -     phenazopyridine (Pyridium) 200 MG tablet; Take 1 tablet by mouth 3 (Three) Times a Day As Needed for Bladder Spasms for up to 2 days.  Dispense: 6 tablet; Refill: 0    2. Dysuria  -     POC Urinalysis Dipstick, Automated    3. Nonrheumatic aortic valve stenosis  Assessment & Plan:  Hx bioprosthetic valve and subaortic membrane resection in 2007.  Subsequent echoes have shown residual mildly elevated gradient of 20 to 28 mmHg.  Echo 5/10/24: peak velocity of 4.7 m/s, mean gradient of 55 mmHg and aortic valve area less than 1 cm squire. Ejection fraction was 53%. It also showed a mitral valve mean gradient of 4.4 mmHg. An repeat echo showed the peak gradient 73 mmHg with a mean gradient of 48 mmHg.   She is scheduled for a TAVR 6/25/24.      4. Oral herpes  Assessment & Plan:  Sx improved with Valtrex as needed, continue to monitor.    Orders:  -     acyclovir (Zovirax) 5 % ointment; Apply 1 Application topically to the appropriate area as directed Every 3 (Three) Hours.  Dispense: 15 g; Refill: 0             Follow Up     Return if symptoms worsen or fail to improve, for Next scheduled follow up.  Patient was given instructions and counseling regarding her condition or for health maintenance advice. Please see specific information pulled into the AVS if appropriate.

## 2024-06-21 ENCOUNTER — DOCUMENTATION (OUTPATIENT)
Dept: CARDIOLOGY | Facility: HOSPITAL | Age: 84
End: 2024-06-21
Payer: MEDICARE

## 2024-06-21 ENCOUNTER — HOSPITAL ENCOUNTER (OUTPATIENT)
Dept: GENERAL RADIOLOGY | Facility: HOSPITAL | Age: 84
Discharge: HOME | End: 2024-06-21
Payer: MEDICARE

## 2024-06-21 ENCOUNTER — PRE-ADMISSION TESTING (OUTPATIENT)
Dept: PREADMISSION TESTING | Facility: HOSPITAL | Age: 84
End: 2024-06-21
Payer: MEDICARE

## 2024-06-21 VITALS
DIASTOLIC BLOOD PRESSURE: 67 MMHG | SYSTOLIC BLOOD PRESSURE: 127 MMHG | BODY MASS INDEX: 21.1 KG/M2 | TEMPERATURE: 97.1 F | WEIGHT: 131.3 LBS | HEART RATE: 70 BPM | HEIGHT: 66 IN | RESPIRATION RATE: 20 BRPM | OXYGEN SATURATION: 98 %

## 2024-06-21 DIAGNOSIS — I35.0 AORTIC VALVE STENOSIS, ETIOLOGY OF CARDIAC VALVE DISEASE UNSPECIFIED: ICD-10-CM

## 2024-06-21 DIAGNOSIS — I11.0 HYPERTENSIVE HEART DISEASE WITH HEART FAILURE: ICD-10-CM

## 2024-06-21 DIAGNOSIS — E08.65 DIABETES MELLITUS DUE TO UNDERLYING CONDITION WITH HYPERGLYCEMIA, WITHOUT LONG-TERM CURRENT USE OF INSULIN: ICD-10-CM

## 2024-06-21 DIAGNOSIS — I35.0 NONRHEUMATIC AORTIC VALVE STENOSIS: Primary | ICD-10-CM

## 2024-06-21 DIAGNOSIS — Z01.810 PRE-OPERATIVE CARDIOVASCULAR EXAMINATION, SYMPTOMATIC CONGESTIVE HEART FAILURE: ICD-10-CM

## 2024-06-21 DIAGNOSIS — I50.9 PRE-OPERATIVE CARDIOVASCULAR EXAMINATION, SYMPTOMATIC CONGESTIVE HEART FAILURE: ICD-10-CM

## 2024-06-21 LAB
ABO GROUP BLD: NORMAL
ALBUMIN SERPL-MCNC: 4.3 G/DL (ref 3.5–5.2)
ALBUMIN/GLOB SERPL: 1.7 G/DL
ALP SERPL-CCNC: 64 U/L (ref 39–117)
ALT SERPL W P-5'-P-CCNC: 19 U/L (ref 1–33)
ANION GAP SERPL CALCULATED.3IONS-SCNC: 11.1 MMOL/L (ref 5–15)
APTT PPP: 36.3 SECONDS (ref 22.7–35.4)
AST SERPL-CCNC: 22 U/L (ref 1–32)
BACTERIA UR QL AUTO: ABNORMAL /HPF
BASOPHILS # BLD AUTO: 0.04 10*3/MM3 (ref 0–0.2)
BASOPHILS NFR BLD AUTO: 0.7 % (ref 0–1.5)
BILIRUB SERPL-MCNC: 0.4 MG/DL (ref 0–1.2)
BILIRUB UR QL STRIP: NEGATIVE
BLD GP AB SCN SERPL QL: NEGATIVE
BUN SERPL-MCNC: 27 MG/DL (ref 8–23)
BUN/CREAT SERPL: 24.8 (ref 7–25)
CALCIUM SPEC-SCNC: 9.7 MG/DL (ref 8.6–10.5)
CHLORIDE SERPL-SCNC: 101 MMOL/L (ref 98–107)
CLARITY UR: CLEAR
CLOSE TME COLL+ADP + EPINEP PNL BLD: 78 % (ref 86–100)
CO2 SERPL-SCNC: 24.9 MMOL/L (ref 22–29)
COLOR UR: ABNORMAL
CREAT SERPL-MCNC: 1.09 MG/DL (ref 0.57–1)
DEPRECATED RDW RBC AUTO: 43 FL (ref 37–54)
EGFRCR SERPLBLD CKD-EPI 2021: 49.6 ML/MIN/1.73
EOSINOPHIL # BLD AUTO: 0.31 10*3/MM3 (ref 0–0.4)
EOSINOPHIL NFR BLD AUTO: 5.7 % (ref 0.3–6.2)
ERYTHROCYTE [DISTWIDTH] IN BLOOD BY AUTOMATED COUNT: 13.1 % (ref 12.3–15.4)
GLOBULIN UR ELPH-MCNC: 2.5 GM/DL
GLUCOSE SERPL-MCNC: 145 MG/DL (ref 65–99)
GLUCOSE UR STRIP-MCNC: ABNORMAL MG/DL
HBA1C MFR BLD: 6 % (ref 4.8–5.6)
HCT VFR BLD AUTO: 35.1 % (ref 34–46.6)
HGB BLD-MCNC: 11.3 G/DL (ref 12–15.9)
HGB UR QL STRIP.AUTO: NEGATIVE
HOLD SPECIMEN: NORMAL
HYALINE CASTS UR QL AUTO: ABNORMAL /LPF
IMM GRANULOCYTES # BLD AUTO: 0.01 10*3/MM3 (ref 0–0.05)
IMM GRANULOCYTES NFR BLD AUTO: 0.2 % (ref 0–0.5)
INR PPP: 1.27 (ref 0.9–1.1)
KETONES UR QL STRIP: ABNORMAL
LEUKOCYTE ESTERASE UR QL STRIP.AUTO: ABNORMAL
LYMPHOCYTES # BLD AUTO: 1.48 10*3/MM3 (ref 0.7–3.1)
LYMPHOCYTES NFR BLD AUTO: 27.1 % (ref 19.6–45.3)
MCH RBC QN AUTO: 29.4 PG (ref 26.6–33)
MCHC RBC AUTO-ENTMCNC: 32.2 G/DL (ref 31.5–35.7)
MCV RBC AUTO: 91.2 FL (ref 79–97)
MONOCYTES # BLD AUTO: 0.65 10*3/MM3 (ref 0.1–0.9)
MONOCYTES NFR BLD AUTO: 11.9 % (ref 5–12)
NEUTROPHILS NFR BLD AUTO: 2.98 10*3/MM3 (ref 1.7–7)
NEUTROPHILS NFR BLD AUTO: 54.4 % (ref 42.7–76)
NITRITE UR QL STRIP: POSITIVE
NRBC BLD AUTO-RTO: 0 /100 WBC (ref 0–0.2)
NT-PROBNP SERPL-MCNC: 855 PG/ML (ref 0–1800)
PH UR STRIP.AUTO: <=5 [PH] (ref 5–8)
PLATELET # BLD AUTO: 252 10*3/MM3 (ref 140–450)
PMV BLD AUTO: 9.4 FL (ref 6–12)
POTASSIUM SERPL-SCNC: 4.7 MMOL/L (ref 3.5–5.2)
PROT SERPL-MCNC: 6.8 G/DL (ref 6–8.5)
PROT UR QL STRIP: ABNORMAL
PROTHROMBIN TIME: 16.1 SECONDS (ref 11.7–14.2)
QT INTERVAL: 435 MS
QTC INTERVAL: 480 MS
RBC # BLD AUTO: 3.85 10*6/MM3 (ref 3.77–5.28)
RBC # UR STRIP: ABNORMAL /HPF
REF LAB TEST METHOD: ABNORMAL
RH BLD: POSITIVE
SARS-COV-2 RNA RESP QL NAA+PROBE: NOT DETECTED
SODIUM SERPL-SCNC: 137 MMOL/L (ref 136–145)
SP GR UR STRIP: 1.01 (ref 1–1.03)
SQUAMOUS #/AREA URNS HPF: ABNORMAL /HPF
T&S EXPIRATION DATE: NORMAL
UROBILINOGEN UR QL STRIP: ABNORMAL
WBC # UR STRIP: ABNORMAL /HPF
WBC NRBC COR # BLD AUTO: 5.47 10*3/MM3 (ref 3.4–10.8)

## 2024-06-21 PROCEDURE — 81001 URINALYSIS AUTO W/SCOPE: CPT

## 2024-06-21 PROCEDURE — 85576 BLOOD PLATELET AGGREGATION: CPT

## 2024-06-21 PROCEDURE — 85730 THROMBOPLASTIN TIME PARTIAL: CPT

## 2024-06-21 PROCEDURE — 86900 BLOOD TYPING SEROLOGIC ABO: CPT

## 2024-06-21 PROCEDURE — 85610 PROTHROMBIN TIME: CPT

## 2024-06-21 PROCEDURE — 36415 COLL VENOUS BLD VENIPUNCTURE: CPT

## 2024-06-21 PROCEDURE — 71046 X-RAY EXAM CHEST 2 VIEWS: CPT

## 2024-06-21 PROCEDURE — 85025 COMPLETE CBC W/AUTO DIFF WBC: CPT

## 2024-06-21 PROCEDURE — 93005 ELECTROCARDIOGRAM TRACING: CPT

## 2024-06-21 PROCEDURE — 86850 RBC ANTIBODY SCREEN: CPT

## 2024-06-21 PROCEDURE — 86901 BLOOD TYPING SEROLOGIC RH(D): CPT

## 2024-06-21 PROCEDURE — 80053 COMPREHEN METABOLIC PANEL: CPT

## 2024-06-21 PROCEDURE — 83036 HEMOGLOBIN GLYCOSYLATED A1C: CPT

## 2024-06-21 PROCEDURE — 87086 URINE CULTURE/COLONY COUNT: CPT

## 2024-06-21 PROCEDURE — 93010 ELECTROCARDIOGRAM REPORT: CPT | Performed by: INTERNAL MEDICINE

## 2024-06-21 PROCEDURE — 87635 SARS-COV-2 COVID-19 AMP PRB: CPT | Performed by: PHYSICIAN ASSISTANT

## 2024-06-21 PROCEDURE — 83880 ASSAY OF NATRIURETIC PEPTIDE: CPT

## 2024-06-21 RX ORDER — LATANOPROST 50 UG/ML
1 SOLUTION/ DROPS OPHTHALMIC NIGHTLY
COMMUNITY
Start: 2024-06-18

## 2024-06-21 RX ORDER — PUMPKIN SEED EXTRACT/SOY GERM 300 MG
1 CAPSULE ORAL DAILY
COMMUNITY

## 2024-06-21 RX ORDER — CHLORHEXIDINE GLUCONATE ORAL RINSE 1.2 MG/ML
15 SOLUTION DENTAL EVERY 12 HOURS
Status: DISPENSED | OUTPATIENT
Start: 2024-06-21 | End: 2024-06-22

## 2024-06-21 NOTE — NURSING NOTE
I met with Ms Pettit and her daughter Severiano this morning while she was here for preadmission testing She was provided oral and nasal medications and instructions for use prior to her TAVR procedure scheduled for 6/25/24 with a 9:30 arrival time. The [facility] and 15' walk test were completed.Labs were reviewed. Mr Pettit resides at home alone . She ambulates with a cane and uses 2L of oxygen at night.Her skin is dry and shows no signs of infection specifically in her groin/access site.She will discontinue her Eliquis with the last dose being taken 6/22/24. Ms Pettit has been hospitalized once during the last twelve months for heart failure We discussed the TAVR procedure in depth. I have provided our contact information and they will call with any further questions   No

## 2024-06-21 NOTE — DISCHARGE INSTRUCTIONS
Take the following medications the morning of surgery with a small sip of water:    AS DIRECTED BY CARDIAC NURSE      If you are on prescription narcotic pain medication to control your pain you may also take that medication the morning of surgery.    General Instructions:  Do not eat or drink anything after midnight the night before surgery.  Infants may have breast milk up to four hours before surgery.  Infants drinking formula may drink formula up to six hours before surgery.   Patients who avoid smoking, chewing tobacco and alcohol for 4 weeks prior to surgery have a reduced risk of post-operative complications.  Quit smoking as many days before surgery as you can.  Do not smoke, use chewing tobacco or drink alcohol the day of surgery.   If applicable bring your C-PAP/ BI-PAP machine in with you to preop day of surgery.  Bring any papers given to you in the doctor’s office.  Wear clean comfortable clothes.  Do not wear contact lenses, false eyelashes or make-up.  Bring a case for your glasses.   Bring crutches or walker if applicable.  Remove all piercings.  Leave jewelry and any other valuables at home.  Hair extensions with metal clips must be removed prior to surgery.  The Pre-Admission Testing nurse will instruct you to bring medications if unable to obtain an accurate list in Pre-Admission Testing.        If you were given a blood bank ID arm band remember to bring it with you the day of surgery.    Preventing a Surgical Site Infection:  For 2 to 3 days before surgery, avoid shaving with a razor because the razor can irritate skin and make it easier to develop an infection.    Any areas of open skin can increase the risk of a post-operative wound infection by allowing bacteria to enter and travel throughout the body.  Notify your surgeon if you have any skin wounds / rashes even if it is not near the expected surgical site.  The area will need assessed to determine if surgery should be delayed until it is  healed.  The night prior to surgery shower using a fresh bar of anti-bacterial soap (such as Dial) and clean washcloth.  Sleep in a clean bed with clean clothing.  Do not allow pets to sleep with you.  Shower on the morning of surgery using a fresh bar of anti-bacterial soap (such as Dial) and clean washcloth.  Dry with a clean towel and dress in clean clothing.  Ask your surgeon if you will be receiving antibiotics prior to surgery.  Make sure you, your family, and all healthcare providers clean their hands with soap and water or an alcohol based hand  before caring for you or your wound.    Day of surgery:  Your arrival time is approximately two hours before your scheduled surgery time.  Upon arrival, a Pre-op nurse and Anesthesiologist will review your health history, obtain vital signs, and answer questions you may have.  The only belongings needed at this time will be your home medications and if applicable your C-PAP/BI-PAP machine.  A Pre-op nurse will start an IV and you may receive medication in preparation for surgery, including something to help you relax.      Please be aware that surgery does come with discomfort.  We want to make every effort to control your discomfort so please discuss any uncontrolled symptoms with your nurse.   Your doctor will most likely have prescribed pain medications.      If you are going home after surgery you will receive individualized written care instructions before being discharged.  A responsible adult must drive you to and from the hospital on the day of your surgery and ideally stay with you through the night.  Discharge prescriptions can be filled by the hospital pharmacy during regular pharmacy hours.  If you are having surgery late in the day/evening your prescription may be e-prescribed to your pharmacy.  Please verify your pharmacy hours or chose a 24 hour pharmacy to avoid not having access to your prescription because your pharmacy has closed for the  day.    If you are staying overnight following surgery, you will be transported to your hospital room following the recovery period.  [facility] has all private rooms.    If you have any questions please call Pre-Admission Testing at [phone].  Deductibles and co-payments are collected on the day of service. Please be prepared to pay the required co-pay, deductible or deposit on the day of service as defined by your plan.    Call your surgeon immediately if you experience any of the following symptoms:  Sore Throat  Shortness of Breath or difficulty breathing  Cough  Chills  Body soreness or muscle pain  Headache  Fever  New loss of taste or smell  Do not arrive for your surgery ill.  Your procedure will need to be rescheduled to another time.  You will need to call your physician before the day of surgery to avoid any unnecessary exposure to hospital staff as well as other patients.        BACTROBAN NASAL OINTMENT  There are many germs normally in your nose. Bactroban is an ointment that will help reduce these germs. Please follow these instructions for Bactroban use:    ____The day before surgery in the evening        Date________    ____The day of surgery in the morning    Date________    **Squirt ½ package of Bactroban Ointment onto a cotton applicator and apply to inside of 1st nostril.  Squirt the remaining Bactroban and apply to the inside of the other nostril.    PERIDEX- ORAL:  Use only if your surgeon has ordered  Use the night before and morning of surgery - Swish, gargle, and spit - do not swallow.

## 2024-06-22 LAB
BACTERIA #/AREA URNS HPF: ABNORMAL /HPF
BACTERIA SPEC AEROBE CULT: NO GROWTH
BACTERIA UR CULT: ABNORMAL
BACTERIA UR CULT: ABNORMAL
CASTS URNS MICRO: ABNORMAL
EPI CELLS #/AREA URNS HPF: ABNORMAL /HPF
OTHER ANTIBIOTIC SUSC ISLT: ABNORMAL
RBC #/AREA URNS HPF: ABNORMAL /HPF
WBC #/AREA URNS HPF: ABNORMAL /HPF

## 2024-06-24 ENCOUNTER — ANESTHESIA EVENT (OUTPATIENT)
Dept: PERIOP | Facility: HOSPITAL | Age: 84
End: 2024-06-24
Payer: MEDICARE

## 2024-06-24 NOTE — PROGRESS NOTES
Patient advised and she states she is feeling better    FYI: Patient stated she is having surgery tomorrow but her cardiology said it is okay to continue on the cefdinir

## 2024-06-25 ENCOUNTER — HOSPITAL ENCOUNTER (INPATIENT)
Facility: HOSPITAL | Age: 84
LOS: 1 days | Discharge: HOME | End: 2024-06-26
Payer: MEDICARE

## 2024-06-25 ENCOUNTER — ANESTHESIA (OUTPATIENT)
Dept: PERIOP | Facility: HOSPITAL | Age: 84
End: 2024-06-25
Payer: MEDICARE

## 2024-06-25 ENCOUNTER — ANCILLARY PROCEDURE (OUTPATIENT)
Dept: PERIOP | Facility: HOSPITAL | Age: 84
End: 2024-06-25
Payer: MEDICARE

## 2024-06-25 DIAGNOSIS — I35.0 NONRHEUMATIC AORTIC VALVE STENOSIS: ICD-10-CM

## 2024-06-25 DIAGNOSIS — Z95.3 S/P TAVR (TRANSCATHETER AORTIC VALVE REPLACEMENT), BIOPROSTHETIC: ICD-10-CM

## 2024-06-25 DIAGNOSIS — I35.0 NONRHEUMATIC AORTIC VALVE STENOSIS: Primary | ICD-10-CM

## 2024-06-25 DIAGNOSIS — I50.32 CHRONIC DIASTOLIC CONGESTIVE HEART FAILURE: ICD-10-CM

## 2024-06-25 DIAGNOSIS — I35.0 AORTIC VALVE STENOSIS, ETIOLOGY OF CARDIAC VALVE DISEASE UNSPECIFIED: Primary | Chronic | ICD-10-CM

## 2024-06-25 LAB
ACT BLD: 140 SECONDS (ref 82–152)
ACT BLD: 287 SECONDS (ref 82–152)
BASE EXCESS BLDA CALC-SCNC: -4 MMOL/L (ref -5–5)
BH CV ECHO MEAS - AO MAX PG: 19 MMHG
BH CV ECHO MEAS - AO MEAN PG: 8.8 MMHG
BH CV ECHO MEAS - AO V2 MAX: 218.2 CM/SEC
BH CV ECHO MEAS - AO V2 VTI: 40.8 CM
BH CV ECHO MEAS - AVA(I,D): 1.45 CM2
BH CV ECHO MEAS - LV MAX PG: 6.6 MMHG
BH CV ECHO MEAS - LV MEAN PG: 3.2 MMHG
BH CV ECHO MEAS - LV V1 MAX: 128.4 CM/SEC
BH CV ECHO MEAS - LV V1 VTI: 27.9 CM
BH CV ECHO MEAS - LVOT AREA: 2.12 CM2
BH CV ECHO MEAS - LVOT DIAM: 1.64 CM
BH CV ECHO MEAS - SV(LVOT): 59.1 ML
CO2 BLDA-SCNC: 23 MMOL/L (ref 24–29)
GLUCOSE BLDC GLUCOMTR-MCNC: 105 MG/DL (ref 70–130)
GLUCOSE BLDC GLUCOMTR-MCNC: 98 MG/DL (ref 70–130)
GLUCOSE BLDC GLUCOMTR-MCNC: 99 MG/DL (ref 70–130)
HCO3 BLDA-SCNC: 21.5 MMOL/L (ref 22–26)
HCT VFR BLDA CALC: 32 % (ref 38–51)
HGB BLDA-MCNC: 10.9 G/DL (ref 12–17)
PCO2 BLDA: 36.8 MM HG (ref 35–45)
PH BLDA: 7.38 PH UNITS (ref 7.35–7.6)
PO2 BLDA: 121 MMHG (ref 80–105)
POTASSIUM BLDA-SCNC: 4.2 MMOL/L (ref 3.5–4.9)
SAO2 % BLDA: 99 % (ref 95–98)

## 2024-06-25 PROCEDURE — C1769 GUIDE WIRE: HCPCS

## 2024-06-25 PROCEDURE — 93321 DOPPLER ECHO F-UP/LMTD STD: CPT

## 2024-06-25 PROCEDURE — 25510000001 IOPAMIDOL PER 1 ML

## 2024-06-25 PROCEDURE — C1889 IMPLANT/INSERT DEVICE, NOC: HCPCS

## 2024-06-25 PROCEDURE — 93325 DOPPLER ECHO COLOR FLOW MAPG: CPT

## 2024-06-25 PROCEDURE — 0 DEXTROSE 5 % SOLUTION 250 ML FLEX CONT: Performed by: STUDENT IN AN ORGANIZED HEALTH CARE EDUCATION/TRAINING PROGRAM

## 2024-06-25 PROCEDURE — C1887 CATHETER, GUIDING: HCPCS

## 2024-06-25 PROCEDURE — 85014 HEMATOCRIT: CPT

## 2024-06-25 PROCEDURE — C1894 INTRO/SHEATH, NON-LASER: HCPCS

## 2024-06-25 PROCEDURE — 82947 ASSAY GLUCOSE BLOOD QUANT: CPT

## 2024-06-25 PROCEDURE — B24BZZ4 ULTRASONOGRAPHY OF HEART WITH AORTA, TRANSESOPHAGEAL: ICD-10-PCS

## 2024-06-25 PROCEDURE — 02RF3JZ REPLACEMENT OF AORTIC VALVE WITH SYNTHETIC SUBSTITUTE, PERCUTANEOUS APPROACH: ICD-10-PCS

## 2024-06-25 PROCEDURE — 25010000002 HEPARIN (PORCINE) PER 1000 UNITS: Performed by: STUDENT IN AN ORGANIZED HEALTH CARE EDUCATION/TRAINING PROGRAM

## 2024-06-25 PROCEDURE — 25810000003 LACTATED RINGERS PER 1000 ML: Performed by: ANESTHESIOLOGY

## 2024-06-25 PROCEDURE — 33361 REPLACE AORTIC VALVE PERQ: CPT | Performed by: INTERNAL MEDICINE

## 2024-06-25 PROCEDURE — 85347 COAGULATION TIME ACTIVATED: CPT

## 2024-06-25 PROCEDURE — 82948 REAGENT STRIP/BLOOD GLUCOSE: CPT

## 2024-06-25 PROCEDURE — 25010000002 CEFAZOLIN PER 500 MG: Performed by: PHYSICIAN ASSISTANT

## 2024-06-25 PROCEDURE — 82803 BLOOD GASES ANY COMBINATION: CPT

## 2024-06-25 PROCEDURE — 33361 REPLACE AORTIC VALVE PERQ: CPT

## 2024-06-25 PROCEDURE — 25010000002 FENTANYL CITRATE (PF) 50 MCG/ML SOLUTION: Performed by: ANESTHESIOLOGY

## 2024-06-25 PROCEDURE — 93308 TTE F-UP OR LMTD: CPT

## 2024-06-25 PROCEDURE — C1725 CATH, TRANSLUMIN NON-LASER: HCPCS

## 2024-06-25 PROCEDURE — 25010000002 PROTAMINE SULFATE PER 10 MG: Performed by: STUDENT IN AN ORGANIZED HEALTH CARE EDUCATION/TRAINING PROGRAM

## 2024-06-25 PROCEDURE — 25010000002 PROPOFOL 10 MG/ML EMULSION: Performed by: STUDENT IN AN ORGANIZED HEALTH CARE EDUCATION/TRAINING PROGRAM

## 2024-06-25 PROCEDURE — 25010000002 FENTANYL CITRATE (PF) 50 MCG/ML SOLUTION: Performed by: STUDENT IN AN ORGANIZED HEALTH CARE EDUCATION/TRAINING PROGRAM

## 2024-06-25 PROCEDURE — 85018 HEMOGLOBIN: CPT

## 2024-06-25 PROCEDURE — C1760 CLOSURE DEV, VASC: HCPCS

## 2024-06-25 DEVICE — VLV EVOLUTFX-23 COMM US
Type: IMPLANTABLE DEVICE | Site: HEART | Status: FUNCTIONAL
Brand: EVOLUT™ FX

## 2024-06-25 RX ORDER — LEVOTHYROXINE SODIUM 0.05 MG/1
50 TABLET ORAL EVERY OTHER DAY
Status: DISCONTINUED | OUTPATIENT
Start: 2024-06-25 | End: 2024-06-26 | Stop reason: HOSPADM

## 2024-06-25 RX ORDER — HYDROCODONE BITARTRATE AND ACETAMINOPHEN 7.5; 325 MG/1; MG/1
1 TABLET ORAL EVERY 4 HOURS PRN
Status: DISCONTINUED | OUTPATIENT
Start: 2024-06-25 | End: 2024-06-25 | Stop reason: HOSPADM

## 2024-06-25 RX ORDER — DROPERIDOL 2.5 MG/ML
0.62 INJECTION, SOLUTION INTRAMUSCULAR; INTRAVENOUS
Status: DISCONTINUED | OUTPATIENT
Start: 2024-06-25 | End: 2024-06-25 | Stop reason: HOSPADM

## 2024-06-25 RX ORDER — AMITRIPTYLINE HYDROCHLORIDE 50 MG/1
50 TABLET, FILM COATED ORAL NIGHTLY
Status: DISCONTINUED | OUTPATIENT
Start: 2024-06-25 | End: 2024-06-26 | Stop reason: HOSPADM

## 2024-06-25 RX ORDER — LEVOTHYROXINE SODIUM 0.07 MG/1
75 TABLET ORAL EVERY OTHER DAY
Status: DISCONTINUED | OUTPATIENT
Start: 2024-06-25 | End: 2024-06-25

## 2024-06-25 RX ORDER — ATORVASTATIN CALCIUM 80 MG/1
80 TABLET, FILM COATED ORAL NIGHTLY
Status: DISCONTINUED | OUTPATIENT
Start: 2024-06-25 | End: 2024-06-26 | Stop reason: HOSPADM

## 2024-06-25 RX ORDER — SODIUM CHLORIDE 9 MG/ML
INJECTION, SOLUTION INTRAVENOUS CONTINUOUS PRN
Status: DISCONTINUED | OUTPATIENT
Start: 2024-06-25 | End: 2024-06-25 | Stop reason: SURG

## 2024-06-25 RX ORDER — PHENAZOPYRIDINE HYDROCHLORIDE 100 MG/1
100 TABLET, FILM COATED ORAL 3 TIMES DAILY PRN
Status: DISCONTINUED | OUTPATIENT
Start: 2024-06-25 | End: 2024-06-26 | Stop reason: HOSPADM

## 2024-06-25 RX ORDER — DIPHENHYDRAMINE HYDROCHLORIDE 50 MG/ML
12.5 INJECTION INTRAMUSCULAR; INTRAVENOUS
Status: DISCONTINUED | OUTPATIENT
Start: 2024-06-25 | End: 2024-06-25 | Stop reason: HOSPADM

## 2024-06-25 RX ORDER — SODIUM CHLORIDE, SODIUM LACTATE, POTASSIUM CHLORIDE, CALCIUM CHLORIDE 600; 310; 30; 20 MG/100ML; MG/100ML; MG/100ML; MG/100ML
9 INJECTION, SOLUTION INTRAVENOUS CONTINUOUS
Status: DISCONTINUED | OUTPATIENT
Start: 2024-06-25 | End: 2024-06-26 | Stop reason: HOSPADM

## 2024-06-25 RX ORDER — AMLODIPINE BESYLATE 5 MG/1
5 TABLET ORAL DAILY
Status: DISCONTINUED | OUTPATIENT
Start: 2024-06-25 | End: 2024-06-26 | Stop reason: HOSPADM

## 2024-06-25 RX ORDER — MULTIPLE VITAMINS W/ MINERALS TAB 9MG-400MCG
1 TAB ORAL DAILY
Status: DISCONTINUED | OUTPATIENT
Start: 2024-06-25 | End: 2024-06-26 | Stop reason: HOSPADM

## 2024-06-25 RX ORDER — DEXTROSE MONOHYDRATE 25 G/50ML
10-50 INJECTION, SOLUTION INTRAVENOUS
Status: DISCONTINUED | OUTPATIENT
Start: 2024-06-25 | End: 2024-06-25 | Stop reason: HOSPADM

## 2024-06-25 RX ORDER — EPHEDRINE SULFATE 50 MG/ML
5 INJECTION, SOLUTION INTRAVENOUS ONCE AS NEEDED
Status: DISCONTINUED | OUTPATIENT
Start: 2024-06-25 | End: 2024-06-25 | Stop reason: HOSPADM

## 2024-06-25 RX ORDER — HEPARIN SODIUM 1000 [USP'U]/ML
INJECTION, SOLUTION INTRAVENOUS; SUBCUTANEOUS AS NEEDED
Status: DISCONTINUED | OUTPATIENT
Start: 2024-06-25 | End: 2024-06-25 | Stop reason: SURG

## 2024-06-25 RX ORDER — FENTANYL CITRATE 50 UG/ML
50 INJECTION, SOLUTION INTRAMUSCULAR; INTRAVENOUS
Status: DISCONTINUED | OUTPATIENT
Start: 2024-06-25 | End: 2024-06-25 | Stop reason: HOSPADM

## 2024-06-25 RX ORDER — SODIUM CHLORIDE 0.9 % (FLUSH) 0.9 %
30 SYRINGE (ML) INJECTION ONCE AS NEEDED
Status: DISCONTINUED | OUTPATIENT
Start: 2024-06-25 | End: 2024-06-25 | Stop reason: HOSPADM

## 2024-06-25 RX ORDER — PROTAMINE SULFATE 10 MG/ML
INJECTION, SOLUTION INTRAVENOUS AS NEEDED
Status: DISCONTINUED | OUTPATIENT
Start: 2024-06-25 | End: 2024-06-25 | Stop reason: SURG

## 2024-06-25 RX ORDER — SODIUM CHLORIDE 9 MG/ML
30 INJECTION, SOLUTION INTRAVENOUS CONTINUOUS PRN
Status: DISCONTINUED | OUTPATIENT
Start: 2024-06-25 | End: 2024-06-26 | Stop reason: HOSPADM

## 2024-06-25 RX ORDER — PROMETHAZINE HYDROCHLORIDE 25 MG/1
25 TABLET ORAL ONCE AS NEEDED
Status: DISCONTINUED | OUTPATIENT
Start: 2024-06-25 | End: 2024-06-25 | Stop reason: HOSPADM

## 2024-06-25 RX ORDER — HYDROCODONE BITARTRATE AND ACETAMINOPHEN 5; 325 MG/1; MG/1
1 TABLET ORAL EVERY 4 HOURS PRN
Status: DISCONTINUED | OUTPATIENT
Start: 2024-06-25 | End: 2024-06-26 | Stop reason: HOSPADM

## 2024-06-25 RX ORDER — IBUPROFEN 600 MG/1
1 TABLET ORAL
Status: DISCONTINUED | OUTPATIENT
Start: 2024-06-25 | End: 2024-06-25 | Stop reason: HOSPADM

## 2024-06-25 RX ORDER — SODIUM CHLORIDE 0.9 % (FLUSH) 0.9 %
3 SYRINGE (ML) INJECTION EVERY 12 HOURS SCHEDULED
Status: DISCONTINUED | OUTPATIENT
Start: 2024-06-25 | End: 2024-06-25 | Stop reason: HOSPADM

## 2024-06-25 RX ORDER — FUROSEMIDE 40 MG/1
40 TABLET ORAL DAILY
Status: DISCONTINUED | OUTPATIENT
Start: 2024-06-25 | End: 2024-06-26 | Stop reason: HOSPADM

## 2024-06-25 RX ORDER — HYDRALAZINE HYDROCHLORIDE 20 MG/ML
5 INJECTION INTRAMUSCULAR; INTRAVENOUS
Status: DISCONTINUED | OUTPATIENT
Start: 2024-06-25 | End: 2024-06-25 | Stop reason: HOSPADM

## 2024-06-25 RX ORDER — SODIUM CHLORIDE 9 MG/ML
40 INJECTION, SOLUTION INTRAVENOUS AS NEEDED
Status: DISCONTINUED | OUTPATIENT
Start: 2024-06-25 | End: 2024-06-25 | Stop reason: HOSPADM

## 2024-06-25 RX ORDER — HYDROMORPHONE HYDROCHLORIDE 1 MG/ML
0.25 INJECTION, SOLUTION INTRAMUSCULAR; INTRAVENOUS; SUBCUTANEOUS
Status: DISCONTINUED | OUTPATIENT
Start: 2024-06-25 | End: 2024-06-25 | Stop reason: HOSPADM

## 2024-06-25 RX ORDER — LOSARTAN POTASSIUM 100 MG/1
100 TABLET ORAL DAILY
Status: DISCONTINUED | OUTPATIENT
Start: 2024-06-25 | End: 2024-06-25

## 2024-06-25 RX ORDER — DEXMEDETOMIDINE HYDROCHLORIDE 4 UG/ML
INJECTION, SOLUTION INTRAVENOUS CONTINUOUS PRN
Status: DISCONTINUED | OUTPATIENT
Start: 2024-06-25 | End: 2024-06-25 | Stop reason: SURG

## 2024-06-25 RX ORDER — PROMETHAZINE HYDROCHLORIDE 25 MG/1
25 SUPPOSITORY RECTAL ONCE AS NEEDED
Status: DISCONTINUED | OUTPATIENT
Start: 2024-06-25 | End: 2024-06-25 | Stop reason: HOSPADM

## 2024-06-25 RX ORDER — FENTANYL CITRATE 50 UG/ML
INJECTION, SOLUTION INTRAMUSCULAR; INTRAVENOUS AS NEEDED
Status: DISCONTINUED | OUTPATIENT
Start: 2024-06-25 | End: 2024-06-25 | Stop reason: SURG

## 2024-06-25 RX ORDER — FAMOTIDINE 10 MG/ML
20 INJECTION, SOLUTION INTRAVENOUS ONCE
Status: COMPLETED | OUTPATIENT
Start: 2024-06-25 | End: 2024-06-25

## 2024-06-25 RX ORDER — CHLORHEXIDINE GLUCONATE ORAL RINSE 1.2 MG/ML
15 SOLUTION DENTAL ONCE
Status: COMPLETED | OUTPATIENT
Start: 2024-06-25 | End: 2024-06-25

## 2024-06-25 RX ORDER — ACETAMINOPHEN 325 MG/1
650 TABLET ORAL EVERY 4 HOURS PRN
Status: DISCONTINUED | OUTPATIENT
Start: 2024-06-25 | End: 2024-06-26 | Stop reason: HOSPADM

## 2024-06-25 RX ORDER — LABETALOL HYDROCHLORIDE 5 MG/ML
5 INJECTION, SOLUTION INTRAVENOUS
Status: DISCONTINUED | OUTPATIENT
Start: 2024-06-25 | End: 2024-06-25 | Stop reason: HOSPADM

## 2024-06-25 RX ORDER — NALOXONE HCL 0.4 MG/ML
0.2 VIAL (ML) INJECTION AS NEEDED
Status: DISCONTINUED | OUTPATIENT
Start: 2024-06-25 | End: 2024-06-25 | Stop reason: HOSPADM

## 2024-06-25 RX ORDER — HYDROCODONE BITARTRATE AND ACETAMINOPHEN 5; 325 MG/1; MG/1
1 TABLET ORAL ONCE AS NEEDED
Status: DISCONTINUED | OUTPATIENT
Start: 2024-06-25 | End: 2024-06-25 | Stop reason: HOSPADM

## 2024-06-25 RX ORDER — PANTOPRAZOLE SODIUM 40 MG/1
40 TABLET, DELAYED RELEASE ORAL
Status: DISCONTINUED | OUTPATIENT
Start: 2024-06-26 | End: 2024-06-26 | Stop reason: HOSPADM

## 2024-06-25 RX ORDER — LOSARTAN POTASSIUM 50 MG/1
50 TABLET ORAL DAILY
Status: DISCONTINUED | OUTPATIENT
Start: 2024-06-25 | End: 2024-06-26 | Stop reason: HOSPADM

## 2024-06-25 RX ORDER — PROPOFOL 10 MG/ML
VIAL (ML) INTRAVENOUS AS NEEDED
Status: DISCONTINUED | OUTPATIENT
Start: 2024-06-25 | End: 2024-06-25 | Stop reason: SURG

## 2024-06-25 RX ORDER — BUDESONIDE AND FORMOTEROL FUMARATE DIHYDRATE 160; 4.5 UG/1; UG/1
1 AEROSOL RESPIRATORY (INHALATION)
Status: DISCONTINUED | OUTPATIENT
Start: 2024-06-25 | End: 2024-06-26 | Stop reason: HOSPADM

## 2024-06-25 RX ORDER — SODIUM CHLORIDE 9 MG/ML
50 INJECTION, SOLUTION INTRAVENOUS CONTINUOUS
Status: ACTIVE | OUTPATIENT
Start: 2024-06-25 | End: 2024-06-25

## 2024-06-25 RX ORDER — VENLAFAXINE HYDROCHLORIDE 150 MG/1
150 CAPSULE, EXTENDED RELEASE ORAL DAILY
Status: DISCONTINUED | OUTPATIENT
Start: 2024-06-25 | End: 2024-06-26 | Stop reason: HOSPADM

## 2024-06-25 RX ORDER — IPRATROPIUM BROMIDE AND ALBUTEROL SULFATE 2.5; .5 MG/3ML; MG/3ML
3 SOLUTION RESPIRATORY (INHALATION) ONCE AS NEEDED
Status: DISCONTINUED | OUTPATIENT
Start: 2024-06-25 | End: 2024-06-25 | Stop reason: HOSPADM

## 2024-06-25 RX ORDER — FENTANYL CITRATE 50 UG/ML
25 INJECTION, SOLUTION INTRAMUSCULAR; INTRAVENOUS
Status: DISCONTINUED | OUTPATIENT
Start: 2024-06-25 | End: 2024-06-25 | Stop reason: HOSPADM

## 2024-06-25 RX ORDER — NICOTINE POLACRILEX 4 MG
15 LOZENGE BUCCAL
Status: DISCONTINUED | OUTPATIENT
Start: 2024-06-25 | End: 2024-06-25 | Stop reason: HOSPADM

## 2024-06-25 RX ORDER — LIDOCAINE HYDROCHLORIDE 20 MG/ML
INJECTION, SOLUTION INFILTRATION; PERINEURAL AS NEEDED
Status: DISCONTINUED | OUTPATIENT
Start: 2024-06-25 | End: 2024-06-25 | Stop reason: HOSPADM

## 2024-06-25 RX ORDER — ACETAMINOPHEN 500 MG
1000 TABLET ORAL ONCE
Status: COMPLETED | OUTPATIENT
Start: 2024-06-25 | End: 2024-06-25

## 2024-06-25 RX ORDER — ONDANSETRON 2 MG/ML
4 INJECTION INTRAMUSCULAR; INTRAVENOUS ONCE AS NEEDED
Status: DISCONTINUED | OUTPATIENT
Start: 2024-06-25 | End: 2024-06-25 | Stop reason: HOSPADM

## 2024-06-25 RX ORDER — SODIUM CHLORIDE 0.9 % (FLUSH) 0.9 %
3-10 SYRINGE (ML) INJECTION AS NEEDED
Status: DISCONTINUED | OUTPATIENT
Start: 2024-06-25 | End: 2024-06-25 | Stop reason: HOSPADM

## 2024-06-25 RX ORDER — ASPIRIN 81 MG/1
81 TABLET ORAL DAILY
Status: DISCONTINUED | OUTPATIENT
Start: 2024-06-25 | End: 2024-06-26 | Stop reason: HOSPADM

## 2024-06-25 RX ORDER — FLUMAZENIL 0.1 MG/ML
0.2 INJECTION INTRAVENOUS AS NEEDED
Status: DISCONTINUED | OUTPATIENT
Start: 2024-06-25 | End: 2024-06-25 | Stop reason: HOSPADM

## 2024-06-25 RX ORDER — ASCORBIC ACID 500 MG
1000 TABLET ORAL DAILY
Status: DISCONTINUED | OUTPATIENT
Start: 2024-06-25 | End: 2024-06-26 | Stop reason: HOSPADM

## 2024-06-25 RX ORDER — LIDOCAINE HYDROCHLORIDE 20 MG/ML
INJECTION, SOLUTION INFILTRATION; PERINEURAL AS NEEDED
Status: DISCONTINUED | OUTPATIENT
Start: 2024-06-25 | End: 2024-06-25 | Stop reason: SURG

## 2024-06-25 RX ADMIN — MUPIROCIN 1 APPLICATION: 20 OINTMENT TOPICAL at 10:39

## 2024-06-25 RX ADMIN — SODIUM CHLORIDE: 9 INJECTION, SOLUTION INTRAVENOUS at 12:00

## 2024-06-25 RX ADMIN — ACETAMINOPHEN 1000 MG: 500 TABLET ORAL at 11:13

## 2024-06-25 RX ADMIN — FUROSEMIDE 40 MG: 40 TABLET ORAL at 17:23

## 2024-06-25 RX ADMIN — PROPOFOL 40 MG: 10 INJECTION, EMULSION INTRAVENOUS at 12:11

## 2024-06-25 RX ADMIN — SODIUM CHLORIDE, POTASSIUM CHLORIDE, SODIUM LACTATE AND CALCIUM CHLORIDE 9 ML/HR: 600; 310; 30; 20 INJECTION, SOLUTION INTRAVENOUS at 11:08

## 2024-06-25 RX ADMIN — LOSARTAN POTASSIUM 50 MG: 50 TABLET, FILM COATED ORAL at 22:52

## 2024-06-25 RX ADMIN — SODIUM CHLORIDE 2000 MG: 900 INJECTION INTRAVENOUS at 11:52

## 2024-06-25 RX ADMIN — HEPARIN SODIUM 10000 UNITS: 1000 INJECTION, SOLUTION INTRAVENOUS; SUBCUTANEOUS at 12:45

## 2024-06-25 RX ADMIN — ACETAMINOPHEN 325MG 650 MG: 325 TABLET ORAL at 20:19

## 2024-06-25 RX ADMIN — ATORVASTATIN CALCIUM 80 MG: 80 TABLET, FILM COATED ORAL at 20:19

## 2024-06-25 RX ADMIN — FENTANYL CITRATE 50 MCG: 50 INJECTION, SOLUTION INTRAMUSCULAR; INTRAVENOUS at 10:48

## 2024-06-25 RX ADMIN — ASPIRIN 81 MG: 81 TABLET, COATED ORAL at 17:23

## 2024-06-25 RX ADMIN — Medication 1 TABLET: at 17:23

## 2024-06-25 RX ADMIN — 0.12% CHLORHEXIDINE GLUCONATE 15 ML: 1.2 RINSE ORAL at 10:39

## 2024-06-25 RX ADMIN — PROPOFOL 50 MCG/KG/MIN: 10 INJECTION, EMULSION INTRAVENOUS at 12:07

## 2024-06-25 RX ADMIN — NOREPINEPHRINE BITARTRATE 0.02 MCG/KG/MIN: 1 SOLUTION INTRAVENOUS at 12:34

## 2024-06-25 RX ADMIN — FAMOTIDINE 20 MG: 10 INJECTION INTRAVENOUS at 11:08

## 2024-06-25 RX ADMIN — AMLODIPINE BESYLATE 5 MG: 5 TABLET ORAL at 18:38

## 2024-06-25 RX ADMIN — AMITRIPTYLINE HYDROCHLORIDE 50 MG: 50 TABLET, FILM COATED ORAL at 20:19

## 2024-06-25 RX ADMIN — FENTANYL CITRATE 50 MCG: 50 INJECTION, SOLUTION INTRAMUSCULAR; INTRAVENOUS at 12:11

## 2024-06-25 RX ADMIN — VENLAFAXINE HYDROCHLORIDE 150 MG: 150 CAPSULE, EXTENDED RELEASE ORAL at 18:38

## 2024-06-25 RX ADMIN — DEXMEDETOMIDINE HYDROCHLORIDE 0.5 MCG/KG/HR: 4 INJECTION, SOLUTION INTRAVENOUS at 12:07

## 2024-06-25 RX ADMIN — LIDOCAINE HYDROCHLORIDE 40 MG: 20 INJECTION, SOLUTION INFILTRATION; PERINEURAL at 12:07

## 2024-06-25 RX ADMIN — METOPROLOL TARTRATE 25 MG: 25 TABLET, FILM COATED ORAL at 20:19

## 2024-06-25 RX ADMIN — PROTAMINE SULFATE 40 MG: 10 INJECTION, SOLUTION INTRAVENOUS at 13:33

## 2024-06-25 NOTE — ANESTHESIA PREPROCEDURE EVALUATION
Anesthesia Evaluation     Patient summary reviewed   NPO Solid Status: > 8 hours  NPO Liquid Status: > 2 hours           Airway   Mallampati: II  TM distance: >3 FB  Neck ROM: full  No difficulty expected  Dental - normal exam   (+) lower dentures and upper dentures    Pulmonary    (+) asthma,shortness of breath, sleep apnea  Cardiovascular   Exercise tolerance: poor (<4 METS)    ECG reviewed    (+) hypertension, valvular problems/murmurs AS, AI, MR and TI, dysrhythmias Atrial Fib, CHF , murmur, hyperlipidemia      Neuro/Psych  (+) CVA  GI/Hepatic/Renal/Endo    (+) GERD, renal disease- CRI, diabetes mellitus, thyroid problem hypothyroidism    Musculoskeletal     Abdominal    Substance History      OB/GYN          Other        ROS/Med Hx Other: S/P AVR in 2007.              Anesthesia Plan    ASA 4     MAC and Zoe       Anesthetic plan, risks, benefits, and alternatives have been provided, discussed and informed consent has been obtained with: patient.  Pre-procedure education provided  Use of blood products discussed with patient  Consented to blood products.      CODE STATUS:

## 2024-06-25 NOTE — ANESTHESIA POSTPROCEDURE EVALUATION
Patient: Dior Pettit    Procedure Summary       Date: 06/25/24 Room / Location: [address] CARDIOVASCULAR OPERATING ROOM    Anesthesia Start: 1200 Anesthesia Stop: 1410    Procedures:       TTE TRANSFEMORAL TRANSCATHETER AORTIC VALVE REPLACEMENT PERCUTANEOUS APPROACH (Chest)      Transfemoral Transcatheter Aortic Valve Replacement with intraoperative transthoracic echocardiogram and possible open surgical rescue Diagnosis:       Nonrheumatic aortic valve stenosis      (Nonrheumatic aortic valve stenosis [I35.0])    Surgeons: Seth Salinas MD; Ranjan Hernadez MD Provider: Ranjan Harvey MD    Anesthesia Type: MAC, [place] ASA Status: 4            Anesthesia Type: MAC, [place]    Vitals  Vitals Value Taken Time   /83 06/25/24 1415   Temp 36.4 °C (97.6 °F) 06/25/24 1405   Pulse 79 06/25/24 1425   Resp 16 06/25/24 1415   SpO2 100 % 06/25/24 1425   Vitals shown include unfiled device data.        Post Anesthesia Care and Evaluation    Patient location during evaluation: PACU  Patient participation: complete - patient participated  Level of consciousness: awake and alert  Pain management: adequate    Airway patency: patent  Anesthetic complications: No anesthetic complications  PONV Status: controlled  Cardiovascular status: acceptable and hemodynamically stable  Respiratory status: acceptable  Hydration status: acceptable    Comments: /83   Pulse 77   Temp 36.4 °C (97.6 °F) (Oral)   Resp 16   LMP  (LMP Unknown)   SpO2 100%

## 2024-06-25 NOTE — ANESTHESIA PROCEDURE NOTES
Arterial Line      Patient reassessed immediately prior to procedure    Patient location during procedure: OR   Line placed for hemodynamic monitoring and ABGs/Labs/ISTAT.  Performed By   Anesthesiologist: Otis Marrero MD   Preanesthetic Checklist  Completed: patient identified, IV checked, site marked, risks and benefits discussed, surgical consent, monitors and equipment checked, pre-op evaluation and timeout performed  Arterial Line Prep    Sterile Tech: cap, gloves, sterile barriers and mask  Prep: ChloraPrep  Patient monitoring: EKG, continuous pulse oximetry and blood pressure monitoring  Arterial Line Procedure   Laterality:left  Location:  radial artery  Catheter size: 20 G   Guidance: palpation technique  Number of attempts: 1  Successful placement: yes Images: still images not obtained  Post Assessment   Dressing Type: wrist guard applied, secured with tape and occlusive dressing applied.   Complications no  Circ/Move/Sens Assessment: normal and unchanged.   Patient Tolerance: patient tolerated the procedure well with no apparent complications

## 2024-06-25 NOTE — OP NOTE
TAVR OPERATIVE REPORT    CO-SURGEON: Seth Salinas MD  CO-SURGEON: Ranjan Hernadez MD    DIAGNOSIS: Severe symptomatic aortic stenosis.     POSTOP DIAGNOSIS: Severe symptomatic aortic stenosis.     PRESENT CO-MORBIDITIES: Severe Symptomatic AS, s/p AVR 2007, PAF, DM, CVA, HTN, NNEKA, Macular Degeneration    PROCEDURE: Elective procedure in hybrid operating room     1. Transcatheter aortic valve replacement (TAVR) utilizing a 14 Qatari Sheath and a 23 mm Medtronic transcatheter heart valve  2. Percutaneous left femoral arterial access and venous access  3. Percutaneous right femoral and right radial arterial access.  4. Abdominal aortography and bilateral lower extremity angiography  5. Transvenous pacing using a 5-Qatari balloon-tip pacer  6. Supravalvular aortogram  7. Aortic valvuloplasty using 20 mm x 4 cm balloon catheter  8. Transthoracic echocardiogram  9. Arterial line placement    INDICATIONS FOR OPERATION: The patient is a 84-year-old with New York Heart Association Class III symptoms and STS score of 6.2%. The patient was determined to be best suited for TAVR by the multidisciplinary heart team due to age, co morbidities.    FDA TAVR INDICATIONS: The patient was judged to be suitable for TAVR by the multidisciplinary team as reviewed by two cardiac surgeons, an interventional cardiologist, and the rest of the TAVR team.  The patient, family and team were in agreement that the case would not convert to an open surgical AVR in the event of unsuccessful TAVR. The patient’s co-morbidities would not preclude the expected benefit from correction of the aortic stenosis by TAVR based on the team discussion. The patient will be enrolled in the STS/ACC TAVR TVT registry.     DESCRIPTION: Dr. Hernadez (interventional cardiologist) and Dr. Salinas (cardiothoracic surgeon) performed the procedure together in all preoperative and operative aspects. The patient was taken to the hybrid OR. A radial art line  "was inserted preoperatively. Anesthesia was administered without apparent complication. The patient was prepped and draped in the usual sterile fashion.      Using a micropuncture technique, access was obtained in the right femoral artery and a microsheath was inserted.  Angiography through the microsheath confirmed good position of the arterial access point.  Two Perclose sutures were deployed in offset manner in the right femoral artery using the \"Preclose\" technique.  An 8 Georgian sheath was inserted.      A similar technique was used to obtain access in the left common femoral artery.    A 6 Georgian sheath was inserted.  Access was obtained in the left femoral vein and a 6 Georgian sheath was inserted.   A 5 Georgian pacing catheter was directed to the RV apex and was tested.  A 6 Georgian pigtail was directed to the right coronary cusp.  Angiography was performed using 20 cc of contrast.     A similar technique was used to obtain access in the left common femoral artery.    A 6 Georgian sheath was inserted. A guiding catheter was placed in the left main coronary artery in case of coronary entrapment after the TAVR.     We inserted an 5 Fr FR 4 catheter to the aortic arch.  Through this, we inserted a Lunderquist wire.  Over this we performed sequential dilation until a 14 Georgian E-sheath could be inserted to the abdominal aorta.  We exchanged for an 6 Georgian AL-1 catheter and crossed the valve easily using a straight wire.  We exchanged for a pigtail catheter in the LV, then optimized position in the LV apex.  We then inserted an Amplatz Extra Stiff guidewire.      TTE confirmed a good valvuloplasty result without any vascular or cardiac injury.  The orientation of a 23 mm Medtronic TAVR valve was confirmed by multiple physicians, then was loaded in the sheath and was advanced to the descending aorta.  Then was advanced across the aortic arch. The THV was then placed and repositioned in the aortic annulus, and placement " was confirmed by an aortogram. The co-operators were all in agreement for valve positioning prior to deployment. In a coordinated fashion, rapid ventricular pacing and the TAVR valve was deployed. Pacing was discontinued. We exchanged the TAVR delivery system for a pigtail catheter.  Proper valve placement, orientation and degree of regurgitation was then evaluated by transthoracic echocardiogram and aortography.     In a coordinated manner, we performed balloon aortic valvuloplasty using rapid ventriclar pacing and a 20 mm BAV catheter.      We then withdrew the Sheath to the distal external iliac artery with the wire still in place. From the contralateral side, the pigtail catheter was advanced into the distal abdomen and abdominal aortography was performed. All parties were in agreement that there was no flow-limiting vascular trauma or extravasation of dye, at which point closure of the arteriotomy was performed with our two Perclose devices.  The venous sheath and temporary pacemaker were then removed. Finally, our contralateral access was removed and closed using a Perclose device. The patient left in the care of the anesthesia team for hemodynamic monitoring. The patient was transported to the PACU for further monitoring and care.     1. Hemodynamics:  Post TAVR aortic gradient: 9mmHg.  Post TAVR AI: none . Post AUSTIN: 1.4cm2.     CONTRAST USED: 90 mL.     FLUROSCOPY TIME:  22.2 min    Air KERMA:  [address]    EBL: minimal    SPECIMENS: none    CONCLUSIONS:  Successful deployment of a 23 mm Medtronic transcatheter aortic heart valve.

## 2024-06-25 NOTE — PLAN OF CARE
Goal Outcome Evaluation:  Plan of Care Reviewed With: patient, family           Outcome Evaluation: S/p TAVR today, puncture sites clean, dry, intact, soft. HOB up at 1644. No c/o pain. Voiding with external catheter. NSR on tele, all other VSS. Continue with current POC and update as needed.

## 2024-06-26 ENCOUNTER — READMISSION MANAGEMENT (OUTPATIENT)
Dept: CALL CENTER | Facility: HOSPITAL | Age: 84
End: 2024-06-26
Payer: MEDICARE

## 2024-06-26 ENCOUNTER — APPOINTMENT (OUTPATIENT)
Dept: CARDIOLOGY | Facility: HOSPITAL | Age: 84
End: 2024-06-26
Payer: MEDICARE

## 2024-06-26 VITALS
WEIGHT: 131 LBS | DIASTOLIC BLOOD PRESSURE: 73 MMHG | OXYGEN SATURATION: 96 % | RESPIRATION RATE: 18 BRPM | SYSTOLIC BLOOD PRESSURE: 113 MMHG | HEIGHT: 66 IN | HEART RATE: 85 BPM | TEMPERATURE: 97.8 F | BODY MASS INDEX: 21.05 KG/M2

## 2024-06-26 LAB
ANION GAP SERPL CALCULATED.3IONS-SCNC: 7.6 MMOL/L (ref 5–15)
AORTIC ARCH: 2.6 CM
AORTIC DIMENSIONLESS INDEX: 0.6 (DI)
ASCENDING AORTA: 2.3 CM
BH CV ECHO AV AORTIC VALVE AT ACCEL TIME CALCULATED: 900 MSEC
BH CV ECHO MEAS - AO MAX PG: 20 MMHG
BH CV ECHO MEAS - AO MEAN PG: 10.4 MMHG
BH CV ECHO MEAS - AO V2 MAX: 223.9 CM/SEC
BH CV ECHO MEAS - AO V2 VTI: 39.5 CM
BH CV ECHO MEAS - AT: 0.9 SEC
BH CV ECHO MEAS - AVA(I,D): 1.93 CM2
BH CV ECHO MEAS - EDV(CUBED): 73.4 ML
BH CV ECHO MEAS - EDV(MOD-SP2): 58 ML
BH CV ECHO MEAS - EDV(MOD-SP4): 67 ML
BH CV ECHO MEAS - EF(MOD-BP): 64.8 %
BH CV ECHO MEAS - EF(MOD-SP2): 67.2 %
BH CV ECHO MEAS - EF(MOD-SP4): 62.7 %
BH CV ECHO MEAS - ESV(CUBED): 14.9 ML
BH CV ECHO MEAS - ESV(MOD-SP2): 19 ML
BH CV ECHO MEAS - ESV(MOD-SP4): 25 ML
BH CV ECHO MEAS - FS: 41.3 %
BH CV ECHO MEAS - IVS/LVPW: 0.88 CM
BH CV ECHO MEAS - IVSD: 1.11 CM
BH CV ECHO MEAS - LAT PEAK E' VEL: 5.9 CM/SEC
BH CV ECHO MEAS - LV DIASTOLIC VOL/BSA (35-75): 40.1 CM2
BH CV ECHO MEAS - LV MASS(C)D: 175.3 GRAMS
BH CV ECHO MEAS - LV MAX PG: 6 MMHG
BH CV ECHO MEAS - LV MEAN PG: 3.2 MMHG
BH CV ECHO MEAS - LV SYSTOLIC VOL/BSA (12-30): 15 CM2
BH CV ECHO MEAS - LV V1 MAX: 122.5 CM/SEC
BH CV ECHO MEAS - LV V1 VTI: 25.5 CM
BH CV ECHO MEAS - LVIDD: 4.2 CM
BH CV ECHO MEAS - LVIDS: 2.46 CM
BH CV ECHO MEAS - LVOT AREA: 3 CM2
BH CV ECHO MEAS - LVOT DIAM: 1.95 CM
BH CV ECHO MEAS - LVPWD: 1.27 CM
BH CV ECHO MEAS - MED PEAK E' VEL: 5.1 CM/SEC
BH CV ECHO MEAS - MV A DUR: 0.14 SEC
BH CV ECHO MEAS - MV A MAX VEL: 131.8 CM/SEC
BH CV ECHO MEAS - MV DEC SLOPE: 431.9 CM/SEC2
BH CV ECHO MEAS - MV DEC TIME: 368 SEC
BH CV ECHO MEAS - MV E MAX VEL: 109 CM/SEC
BH CV ECHO MEAS - MV E/A: 0.83
BH CV ECHO MEAS - MV MAX PG: 8.3 MMHG
BH CV ECHO MEAS - MV MEAN PG: 4.1 MMHG
BH CV ECHO MEAS - MV P1/2T: 91.1 MSEC
BH CV ECHO MEAS - MV V2 VTI: 37.8 CM
BH CV ECHO MEAS - MVA(P1/2T): 2.41 CM2
BH CV ECHO MEAS - MVA(VTI): 2.02 CM2
BH CV ECHO MEAS - PA ACC TIME: 0.1 SEC
BH CV ECHO MEAS - PA V2 MAX: 107.2 CM/SEC
BH CV ECHO MEAS - QP/QS: 1.07
BH CV ECHO MEAS - RAP SYSTOLE: 3 MMHG
BH CV ECHO MEAS - RV MAX PG: 2.46 MMHG
BH CV ECHO MEAS - RV V1 MAX: 78.4 CM/SEC
BH CV ECHO MEAS - RV V1 VTI: 15.1 CM
BH CV ECHO MEAS - RVDD: 2.07 CM
BH CV ECHO MEAS - RVOT DIAM: 2.6 CM
BH CV ECHO MEAS - RVSP: 55.1 MMHG
BH CV ECHO MEAS - SV(LVOT): 76.2 ML
BH CV ECHO MEAS - SV(MOD-SP2): 39 ML
BH CV ECHO MEAS - SV(MOD-SP4): 42 ML
BH CV ECHO MEAS - SV(RVOT): 81.7 ML
BH CV ECHO MEAS - SVI(LVOT): 45.6 ML/M2
BH CV ECHO MEAS - SVI(MOD-SP2): 23.3 ML/M2
BH CV ECHO MEAS - SVI(MOD-SP4): 25.1 ML/M2
BH CV ECHO MEAS - TAPSE (>1.6): 2.13 CM
BH CV ECHO MEAS - TR MAX PG: 52.1 MMHG
BH CV ECHO MEAS - TR MAX VEL: 360.9 CM/SEC
BH CV ECHO MEASUREMENTS AVERAGE E/E' RATIO: 19.82
BH CV XLRA - RV BASE: 3 CM
BH CV XLRA - RV LENGTH: 4.7 CM
BH CV XLRA - RV MID: 2.24 CM
BH CV XLRA - TDI S': 8.6 CM/SEC
BUN SERPL-MCNC: 20 MG/DL (ref 8–23)
BUN/CREAT SERPL: 23.3 (ref 7–25)
CALCIUM SPEC-SCNC: 9.8 MG/DL (ref 8.6–10.5)
CHLORIDE SERPL-SCNC: 103 MMOL/L (ref 98–107)
CO2 SERPL-SCNC: 29.4 MMOL/L (ref 22–29)
CREAT SERPL-MCNC: 0.86 MG/DL (ref 0.57–1)
DEPRECATED RDW RBC AUTO: 43.9 FL (ref 37–54)
EGFRCR SERPLBLD CKD-EPI 2021: 65.9 ML/MIN/1.73
ERYTHROCYTE [DISTWIDTH] IN BLOOD BY AUTOMATED COUNT: 13.4 % (ref 12.3–15.4)
GLUCOSE SERPL-MCNC: 88 MG/DL (ref 65–99)
HCT VFR BLD AUTO: 35.1 % (ref 34–46.6)
HGB BLD-MCNC: 11.1 G/DL (ref 12–15.9)
LEFT ATRIUM VOLUME INDEX: 27.1 ML/M2
MCH RBC QN AUTO: 28.7 PG (ref 26.6–33)
MCHC RBC AUTO-ENTMCNC: 31.6 G/DL (ref 31.5–35.7)
MCV RBC AUTO: 90.7 FL (ref 79–97)
PLATELET # BLD AUTO: 258 10*3/MM3 (ref 140–450)
PMV BLD AUTO: 9.4 FL (ref 6–12)
POTASSIUM SERPL-SCNC: 4.1 MMOL/L (ref 3.5–5.2)
RBC # BLD AUTO: 3.87 10*6/MM3 (ref 3.77–5.28)
SINUS: 2.34 CM
SODIUM SERPL-SCNC: 140 MMOL/L (ref 136–145)
STJ: 2.7 CM
WBC NRBC COR # BLD AUTO: 7.01 10*3/MM3 (ref 3.4–10.8)

## 2024-06-26 PROCEDURE — 85027 COMPLETE CBC AUTOMATED: CPT | Performed by: INTERNAL MEDICINE

## 2024-06-26 PROCEDURE — 80048 BASIC METABOLIC PNL TOTAL CA: CPT | Performed by: INTERNAL MEDICINE

## 2024-06-26 PROCEDURE — 93005 ELECTROCARDIOGRAM TRACING: CPT | Performed by: INTERNAL MEDICINE

## 2024-06-26 PROCEDURE — 99232 SBSQ HOSP IP/OBS MODERATE 35: CPT | Performed by: INTERNAL MEDICINE

## 2024-06-26 PROCEDURE — 93306 TTE W/DOPPLER COMPLETE: CPT

## 2024-06-26 PROCEDURE — 25510000001 PERFLUTREN (DEFINITY) 8.476 MG IN SODIUM CHLORIDE (PF) 0.9 % 10 ML INJECTION: Performed by: INTERNAL MEDICINE

## 2024-06-26 PROCEDURE — 93306 TTE W/DOPPLER COMPLETE: CPT | Performed by: INTERNAL MEDICINE

## 2024-06-26 PROCEDURE — 93010 ELECTROCARDIOGRAM REPORT: CPT | Performed by: INTERNAL MEDICINE

## 2024-06-26 RX ORDER — ASPIRIN 81 MG/1
81 TABLET ORAL DAILY
Qty: 30 TABLET | Refills: 1 | Status: SHIPPED | OUTPATIENT
Start: 2024-06-27

## 2024-06-26 RX ORDER — PHENAZOPYRIDINE HYDROCHLORIDE 100 MG/1
100 TABLET, FILM COATED ORAL 3 TIMES DAILY PRN
Qty: 15 TABLET | Refills: 0 | Status: SHIPPED | OUTPATIENT
Start: 2024-06-26

## 2024-06-26 RX ADMIN — AMLODIPINE BESYLATE 5 MG: 5 TABLET ORAL at 09:10

## 2024-06-26 RX ADMIN — ASPIRIN 81 MG: 81 TABLET, COATED ORAL at 09:10

## 2024-06-26 RX ADMIN — OXYCODONE HYDROCHLORIDE AND ACETAMINOPHEN 1000 MG: 500 TABLET ORAL at 09:10

## 2024-06-26 RX ADMIN — PERFLUTREN 2 ML: 6.52 INJECTION, SUSPENSION INTRAVENOUS at 08:26

## 2024-06-26 RX ADMIN — Medication 1 TABLET: at 09:10

## 2024-06-26 RX ADMIN — VENLAFAXINE HYDROCHLORIDE 150 MG: 150 CAPSULE, EXTENDED RELEASE ORAL at 09:10

## 2024-06-26 RX ADMIN — FUROSEMIDE 40 MG: 40 TABLET ORAL at 09:10

## 2024-06-26 RX ADMIN — ACETAMINOPHEN 325MG 650 MG: 325 TABLET ORAL at 06:37

## 2024-06-26 RX ADMIN — METOPROLOL TARTRATE 25 MG: 25 TABLET, FILM COATED ORAL at 09:10

## 2024-06-26 RX ADMIN — PANTOPRAZOLE SODIUM 40 MG: 40 TABLET, DELAYED RELEASE ORAL at 06:37

## 2024-06-26 RX ADMIN — LOSARTAN POTASSIUM 50 MG: 50 TABLET, FILM COATED ORAL at 09:10

## 2024-06-26 NOTE — CASE MANAGEMENT/SOCIAL WORK
Discharge Planning Assessment  [facility]     Patient Name: Dior Pettit  MRN: [id number]  Today's Date: 6/26/2024    Admit Date: 6/25/2024    Plan: Home; son transporting.  Denies needs.   Discharge Needs Assessment       Row Name 06/26/24 1453       Living Environment    People in Home alone    Current Living Arrangements home    Potentially Unsafe Housing Conditions none    Primary Care Provided by self    Provides Primary Care For no one    Family Caregiver if Needed child(nelia), adult    Family Caregiver Names Sons- Stephan and Jose Alejandro and daughters- Severiano and Abby    Quality of Family Relationships helpful;involved;supportive    Able to Return to Prior Arrangements yes       Resource/Environmental Concerns    Resource/Environmental Concerns none    Transportation Concerns none  children provide her transportation needs       Transportation Needs    In the past 12 months, has lack of transportation kept you from medical appointments or from getting medications? no    In the past 12 months, has lack of transportation kept you from meetings, work, or from getting things needed for daily living? No       Food Insecurity    Within the past 12 months, you worried that your food would run out before you got the money to buy more. Never true    Within the past 12 months, the food you bought just didn't last and you didn't have money to get more. Never true       Transition Planning    Patient/Family Anticipates Transition to home    Patient/Family Anticipated Services at Transition none    Transportation Anticipated family or friend will provide       Discharge Needs Assessment    Readmission Within the Last 30 Days no previous admission in last 30 days    Equipment Currently Used at Home bp cuff;grab bar;shower chair;pulse ox;glucometer    Concerns to be Addressed no discharge needs identified;denies needs/concerns at this time    Anticipated Changes Related to Illness none    Equipment Needed After Discharge  none    Provided Post Acute Provider List? N/A    N/A Provider List Comment no need for list identified                   Discharge Plan       Row Name 06/26/24 1454       Plan    Plan Home; son transporting.  Denies needs.    Plan Comments CCP spoke with patient and son at bedside.  CCP role explained and discharge planning discussed.  Face sheet verified.  Patient stated she is IADL's, retired and no longer drives.  Patient lives alone in a garden home with no entrance stair steps.  Patients PCP confirmed as, Ashanti Hong.  Patient's pharmacy confirmed as, [facility] [address]  Patient has the following DME- BP cuff, grab bars, shower chair, straight cane and glucometer.  Patient plans to return home at discharge.  Patient denies current discharge needs.  CCP will continue to follow….…Hattie VU /ELICAI.      Row Name 06/26/24 1405       Plan    Final Discharge Disposition Code 01 - home or self-care    Final Note Pt discharged home……..Hattie VU/RN CM                  Continued Care and Services - Discharged on 6/26/2024 Admission date: 6/25/2024 - Discharge disposition: Home or Self Care   No active coordination exists for this encounter.       Expected Discharge Date and Time       Expected Discharge Date Expected Discharge Time    Jun 26, 2024            Demographic Summary       Row Name 06/26/24 1452       General Information    Admission Type inpatient    Arrived From home    Required Notices Provided Important Message from Medicare    Referral Source admission list    Reason for Consult discharge planning    Preferred Language English       Contact Information    Permission Granted to Share Info With family/designee                   Functional Status       Row Name 06/26/24 1452       Functional Status    Usual Activity Tolerance good    Current Activity Tolerance moderate       Assessment of Health Literacy    How often do you have someone help you read hospital materials? Never    Health Literacy  Good       Functional Status, IADL    Medications independent    Meal Preparation independent    Housekeeping assistive equipment    Laundry assistive equipment    Shopping assistive equipment and person       Mental Status    General Appearance WDL WDL       Mental Status Summary    Recent Changes in Mental Status/Cognitive Functioning no changes       Employment/    Employment Status retired                   Psychosocial    No documentation.                  Abuse/Neglect    No documentation.                  Legal    No documentation.                  Substance Abuse    No documentation.                  Patient Forms    No documentation.                     Hattie Paul RN

## 2024-06-26 NOTE — PROGRESS NOTES
"Patient Care Team:  Ashanti Hong APRN as PCP - General (Internal Medicine)  Gerardo Rodriguez Jr., MD as Consulting Physician (Pulmonary Disease)  Abbi Mitchell MD as Consulting Physician (Cardiology)  Luis Miguel Parker MD as Consulting Physician (Ophthalmology)  Tae Burton MD as Consulting Physician (Dermatology)  Addis Gomse MD as Consulting Physician (Plastic Surgery)    Chief Complaint: Follow-up transcatheter aortic valve replacement.  Valve in valve.    Interval History:   No new complaints.  Resting comfortably.    Objective   Vital Signs  Temp:  [97.6 °F (36.4 °C)-98.8 °F (37.1 °C)] 97.8 °F (36.6 °C)  Heart Rate:  [77-97] 85  Resp:  [16-18] 18  BP: (104-161)/(70-95) 113/73  Arterial Line BP: (137-173)/() 150/91    Intake/Output Summary (Last 24 hours) at 6/26/2024 0946  Last data filed at 6/26/2024 0314  Gross per 24 hour   Intake 960 ml   Output 2500 ml   Net -1540 ml     Flowsheet Rows      Flowsheet Row First Filed Value   Admission Height 167.6 cm (66\") Documented at 06/26/2024 0754   Admission Weight 59.4 kg (131 lb) Documented at 06/26/2024 0754            Temp:  [97.6 °F (36.4 °C)-98.8 °F (37.1 °C)] 97.8 °F (36.6 °C)  Heart Rate:  [77-97] 85  Resp:  [16-18] 18  BP: (104-161)/(70-95) 113/73  Arterial Line BP: (137-173)/() 150/91    Intake/Output Summary (Last 24 hours) at 6/26/2024 0946  Last data filed at 6/26/2024 0314  Gross per 24 hour   Intake 960 ml   Output 2500 ml   Net -1540 ml     Flowsheet Rows      Flowsheet Row First Filed Value   Admission Height 167.6 cm (66\") Documented at 06/26/2024 0754   Admission Weight 59.4 kg (131 lb) Documented at 06/26/2024 0754            General Appearance:    Alert, cooperative, in no acute distress   Head:    Normocephalic, without obvious abnormality, atraumatic       Neck/Lymph   No adenopathy, supple, no thyromegaly, no carotid bruit, no    JVD   Lungs:     Clear to auscultation bilaterally, no wheezes, rales, or "     rhonchi    Cardiac:    Normal rate, regular rhythm, no murmur, no rub, no gallop   Chest Wall:    No abnormalities observed   GI:     Normal bowel sounds, soft, nontender, nondistended,            no rebound tenderness   Extremities:   No cyanosis, clubbing, or edema   Circulatory/Peripheral Vascular :   Pulses palpable and equal bilaterally   Integumentary:   No bleeding or rash. Normal temperature                amitriptyline, 50 mg, Oral, Nightly  amLODIPine, 5 mg, Oral, Daily  ascorbic acid, 1,000 mg, Oral, Daily  aspirin, 81 mg, Oral, Daily  atorvastatin, 80 mg, Oral, Nightly  budesonide-formoterol, 1 puff, Inhalation, BID - RT  furosemide, 40 mg, Oral, Daily  levothyroxine, 50 mcg, Oral, Every Other Day  losartan, 50 mg, Oral, Daily  metoprolol tartrate, 25 mg, Oral, BID  multivitamin with minerals, 1 tablet, Oral, Daily  pantoprazole, 40 mg, Oral, Q AM  venlafaxine XR, 150 mg, Oral, Daily        lactated ringers, 9 mL/hr, Last Rate: 9 mL/hr (06/25/24 1108)  sodium chloride, 30 mL/hr        Results Review:    Results from last 7 days   Lab Units 06/26/24  0514   SODIUM mmol/L 140   POTASSIUM mmol/L 4.1   CHLORIDE mmol/L 103   CO2 mmol/L 29.4*   BUN mg/dL 20   CREATININE mg/dL 0.86   GLUCOSE mg/dL 88   CALCIUM mg/dL 9.8         Results from last 7 days   Lab Units 06/26/24  0515   WBC 10*3/mm3 7.01   HEMOGLOBIN g/dL 11.1*   HEMATOCRIT % 35.1   PLATELETS 10*3/mm3 258     Results from last 7 days   Lab Units 06/21/24  0811   INR  1.27*   APTT seconds 36.3*                 @LABRCNT(bnp)@  I reviewed the patient's new clinical results.  I personally viewed and interpreted the patient's EKG/Telemetry data        Assessment & Plan   1.  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  Paroxysmal atrial fibrillation  3.  History of CVA  4.  Essential hypertension  5.  Diabetes mellitus    -Overall she looks very good today.  No new complaints.  EKG shows  sinus rhythm with IVCD.  -Transthoracic echocardiogram reviewed.  Mean pressure gradient across the aortic valve 10 mmHg.  No aortic valve regurgitation.  Functioning well  -Recommend that she goes home on aspirin and Eliquis.  Fine with discharge today.  -Restart direct oral anticoagulant Friday.

## 2024-06-26 NOTE — OUTREACH NOTE
Prep Survey      Flowsheet Row Responses   [facility] patient discharged from? [place]   Is LACE score < 7 ? No   Eligibility [facility]   Date of Admission 06/25/24   Date of Discharge 06/26/24   Discharge diagnosis TTE TRANSFEMORAL TRANSCATHETER AORTIC VALVE REPLACEMENT   Does the patient have one of the following disease processes/diagnoses(primary or secondary)? Other   Prep survey completed? Yes            Katlyn SCHAEFFER - Registered Nurse

## 2024-06-26 NOTE — DISCHARGE SUMMARY
Date of Admission: 6/25/2024  Date of Discharge:  6/26/2024    Discharge Diagnosis:   Severe bioprosthetic aortic valve stenosis now s/p TAVR  Paroxysmal atrial fibrillation  HTN  HLD  DM  Hx of CVA  GERD  NNEKA    Presenting Problem/History of Present Illness:  Severe bioprosthetic aortic valve stenosis   Paroxysmal atrial fibrillation  HTN  HLD  DM  Hx of CVA  GERD  NNEKA    Hospital Course:  Patient is a 84 y.o. female who was admitted to the hospital on 6/25/24 for elective same day surgery. That afternoon she was taken to the Operating Room and underwent transcatheter aortic valve replacement (TAVR) with a 23 mm Medtronic transcatheter heart valve by Dr. Salinas and Dr. Hernadez. Patient tolerated the procedure well and was transported to the PACU in stable condition. The following morning a transthoracic echocardiogram was performed showing the prosthetic aortic valve to be well seated and functioning well. She was tolerating an oral diet, on room air, and ambulating well. On 6/26/24 she was deemed stable for discharge home on Eliquis (resume on 6/28/24) and Aspirin per Cardiology.     Procedures Performed:  6/25/24 Dr. Salinas & Dr. Hernadez  1. Transcatheter aortic valve replacement (TAVR) utilizing a 14 Malay Sheath and a 23 mm Medtronic transcatheter heart valve  2. Percutaneous left femoral arterial access and venous access  3. Percutaneous right femoral and right radial arterial access.  4. Abdominal aortography and bilateral lower extremity angiography  5. Transvenous pacing using a 5-Malay balloon-tip pacer  6. Supravalvular aortogram  7. Aortic valvuloplasty using 20 mm x 4 cm balloon catheter  8. Transthoracic echocardiogram  9. Arterial line placement       Consults:   Consults       No orders found for last 30 day(s).            Pertinent Test Results:    Lab Results   Component Value Date    WBC 7.01 06/26/2024    HGB 11.1 (L) 06/26/2024    HCT 35.1 06/26/2024    MCV 90.7 06/26/2024    PLT  258 06/26/2024      Lab Results   Component Value Date    GLUCOSE 88 06/26/2024    CALCIUM 9.8 06/26/2024     06/26/2024    K 4.1 06/26/2024    CO2 29.4 (H) 06/26/2024     06/26/2024    BUN 20 06/26/2024    CREATININE 0.86 06/26/2024    EGFRIFAFRI 63 05/06/2021    EGFRIFNONA 63 12/11/2021    BCR 23.3 06/26/2024    ANIONGAP 7.6 06/26/2024     Lab Results   Component Value Date    INR 1.27 (H) 06/21/2024    PROTIME 16.1 (H) 06/21/2024       Condition on Discharge: Stable     Vital Signs  Temp:  [97.6 °F (36.4 °C)-98.8 °F (37.1 °C)] 97.8 °F (36.6 °C)  Heart Rate:  [77-97] 85  Resp:  [16-18] 18  BP: (104-161)/(70-95) 113/73  Arterial Line BP: (137-173)/() 150/91  Body mass index is 21.14 kg/m².    Discharge Disposition  Home or Self Care    Discharge Medications     Discharge Medications        New Medications        Instructions Start Date   aspirin 81 MG EC tablet   81 mg, Oral, Daily   Start Date: June 27, 2024     phenazopyridine 100 MG tablet  Commonly known as: PYRIDIUM   100 mg, Oral, 3 Times Daily PRN             Changes to Medications        Instructions Start Date   acetaminophen 325 MG tablet  Commonly known as: TYLENOL  What changed: when to take this   650 mg, Oral, Every 4 Hours PRN      acyclovir 5 % ointment  Commonly known as: Zovirax  What changed: additional instructions   1 Application, Topical, Every 3 Hours      apixaban 5 MG tablet tablet  Commonly known as: Eliquis  What changed:   how much to take  when to take this  These instructions start on June 28, 2024. If you are unsure what to do until then, ask your doctor or other care provider.   5 mg, Oral, 2 Times Daily   Start Date: June 28, 2024     cefdinir 300 MG capsule  Commonly known as: OMNICEF  What changed: additional instructions   300 mg, Oral, 2 Times Daily      fenofibrate micronized 134 MG capsule  Commonly known as: LOFIBRA  What changed: when to take this   TAKE 1 CAPSULE EVERY       MORNING BEFORE BREAKFAST       ipratropium 0.06 % nasal spray  Commonly known as: ATROVENT  What changed: See the new instructions.   INSTILL 2 SPRAYS IN EACH NOSTRIL EVERY SIX HOURS AS NEEDED      levothyroxine 50 MCG tablet  Commonly known as: SYNTHROID, LEVOTHROID  What changed: Another medication with the same name was changed. Make sure you understand how and when to take each.   50 mcg, Oral, Every Other Day      levothyroxine 75 MCG tablet  Commonly known as: SYNTHROID, LEVOTHROID  What changed:   how much to take  how to take this  when to take this   TAKE 1 TABLET BY MOUTH EVERY OTHER DAY for underactive thyroid      omeprazole 40 MG capsule  Commonly known as: priLOSEC  What changed: See the new instructions.   TAKE 1 CAPSULE DAILY *DR LOERA R*      valACYclovir 1000 MG tablet  Commonly known as: VALTREX  What changed:   how much to take  how to take this  when to take this  reasons to take this   TAKE 2 TABLETS 4 TIMES     DAILY FOR MOUTH ULCER             Continue These Medications        Instructions Start Date   Advair HFA 45-21 MCG/ACT inhaler  Generic drug: fluticasone-salmeterol   Inhale 2 puffs 2 (Two) Times a Day. As needed      amitriptyline 50 MG tablet  Commonly known as: ELAVIL   50 mg, Oral, Nightly      amLODIPine 5 MG tablet  Commonly known as: NORVASC   Take 1 tablet by mouth Daily.      ascorbic acid 1000 MG tablet  Commonly known as: VITAMIN C   1 tablet, Oral, Daily      atorvastatin 80 MG tablet  Commonly known as: LIPITOR   80 mg, Oral, Nightly      AZO Bladder Control/Go-Less capsule   1 capsule, Oral, Daily      furosemide 40 MG tablet  Commonly known as: LASIX   40 mg, Oral, Daily      glucosamine-chondroitin 500-400 MG capsule capsule   2 capsules, Oral, Daily      latanoprost 0.005 % ophthalmic solution  Commonly known as: XALATAN   1 drop, Both Eyes, Nightly      losartan 100 MG tablet  Commonly known as: COZAAR   Take 1 tablet by mouth Daily.      metFORMIN  MG 24 hr tablet  Commonly known as:  GLUCOPHAGE-XR   TAKE 1 TABLET TWICE A DAY      metoprolol tartrate 25 MG tablet  Commonly known as: LOPRESSOR   25 mg, Oral, 2 Times Daily      multivitamin with minerals tablet tablet   1 tablet, Oral, Daily      Prodigy No Coding Blood Gluc test strip  Generic drug: glucose blood   TEST BLOOD SUGAR DAILY      Prodigy No Coding Blood Gluc w/Device kit   1 each, Does not apply, Daily      Prodigy Safety Lancets 26G misc   CHECK BLOOD SUGAR ONE TIME PER DAY      venlafaxine  MG 24 hr capsule  Commonly known as: EFFEXOR-XR   150 mg, Oral, Daily               Discharge Diet: Healthy heart    Activity at Discharge:   Activity Instructions       Activity as Tolerated      Measure Weight      Daily weight, notify if over 3 lbs in one day            Follow-up Appointments  Future Appointments   Date Time Provider Department Center   7/12/2024 11:30 AM Aislinn Holder APRN [facility]   8/5/2024  3:30 PM Ashanti Hong APRN [facility]   8/29/2024 10:45 AM [facility] BACK [facility]  [facility]   8/29/2024 11:20 AM Ranjan Hernadez MD [facility]   5/5/2025 12:00 PM [facility]/VAS FRONT [facility]   5/5/2025 12:40 PM Ranjan Hernadez MD [facility]     Additional Instructions for the Follow-ups that You Need to Schedule       Ambulatory Referral to Cardiac Rehab   As directed      Discharge Follow-up with PCP   As directed       Currently Documented PCP:    Ashanti Hong APRN    PCP Phone Number:    [phone]     Follow Up Details: 1 month        Discharge Follow-up with Specialty: Cardiology; 1 Month   As directed      Specialty: Cardiology   Follow Up: 1 Month        Discharge Follow-up with Specified Provider: Cardiology NP; 1 Week   As directed      To: Cardiology NP   Follow Up: 1 Week                   PERI Garvey  06/26/24  09:56 EDT

## 2024-06-26 NOTE — PLAN OF CARE
Problem: Adult Inpatient Plan of Care  Goal: Plan of Care Review  Outcome: Ongoing, Progressing  Flowsheets (Taken 6/26/2024 0532)  Progress: improving  Plan of Care Reviewed With: patient   Goal Outcome Evaluation:  Plan of Care Reviewed With: patient        Progress: improving     Pt s/p TAVR on 6/25. Bilateral femoral/radial sites clean/dry/soft. NSR in the 80's. VSS on RA. External catheter in place. Pt voided 2500 mL overnight. Pain treated per MAR. Continue with plan of care.

## 2024-06-27 ENCOUNTER — TRANSITIONAL CARE MANAGEMENT TELEPHONE ENCOUNTER (OUTPATIENT)
Dept: CALL CENTER | Facility: HOSPITAL | Age: 84
End: 2024-06-27
Payer: MEDICARE

## 2024-06-27 LAB
QT INTERVAL: 400 MS
QTC INTERVAL: 470 MS

## 2024-06-27 NOTE — OUTREACH NOTE
Call Center TCM Note      Flowsheet Row Responses   [facility] patient discharged from? [place]   Does the patient have one of the following disease processes/diagnoses(primary or secondary)? Other   TCM attempt successful? Yes   Call start time 0959   Call end time 1004   Discharge diagnosis TTE TRANSFEMORAL TRANSCATHETER AORTIC VALVE REPLACEMENT   Meds reviewed with patient/caregiver? Yes   Is the patient having any side effects they believe may be caused by any medication additions or changes? No   Does the patient have all medications ordered at discharge? Yes   Is the patient taking all medications as directed (includes completed medication regime)? Yes   Comments Hospital d/c follow up 7/2/24@0945.   Does the patient have an appointment with their PCP within 7-14 days of discharge? Yes   Has home health visited the patient within 72 hours of discharge? N/A   Psychosocial issues? No   Did the patient receive a copy of their discharge instructions? Yes   Nursing interventions Reviewed instructions with patient   What is the patient's perception of their health status since discharge? Improving   Is the patient/caregiver able to teach back signs and symptoms related to disease process for when to call PCP? Yes   Is the patient/caregiver able to teach back signs and symptoms related to disease process for when to call 911? Yes   Is the patient/caregiver able to teach back the hierarchy of who to call/visit for symptoms/problems? PCP, Specialist, Home health nurse, Urgent Care, ED, 911 Yes   If the patient is a current smoker, are they able to teach back resources for cessation? Not a smoker   TCM call completed? Yes   Wrap up additional comments Patient doing well, denies any concerns today.   Call end time 1004   Would this patient benefit from a Referral to [facility] Social Work? No   Is the patient interested in additional calls from an ambulatory ? No            Salma Shepherd RN    6/27/2024,  10:04 EDT

## 2024-07-01 ENCOUNTER — TELEPHONE (OUTPATIENT)
Dept: CARDIAC REHAB | Facility: HOSPITAL | Age: 84
End: 2024-07-01
Payer: MEDICARE

## 2024-07-01 NOTE — TELEPHONE ENCOUNTER
I just spoke with patient re: recent referral to cardiac rehab following TAVR.  She told me that she would not have a way to get here and she is almost 88.  I did review a simple walking program that she can do at home.      Thank you for the referral!

## 2024-07-02 ENCOUNTER — TELEPHONE (OUTPATIENT)
Dept: INTERNAL MEDICINE | Facility: CLINIC | Age: 84
End: 2024-07-02

## 2024-07-02 DIAGNOSIS — E03.8 OTHER SPECIFIED HYPOTHYROIDISM: ICD-10-CM

## 2024-07-02 RX ORDER — LEVOTHYROXINE SODIUM 0.05 MG/1
TABLET ORAL
Qty: 45 TABLET | Refills: 1 | Status: SHIPPED | OUTPATIENT
Start: 2024-07-02

## 2024-07-02 NOTE — TELEPHONE ENCOUNTER
Caller: Lori Pettit    Relationship: Emergency Contact    Best call back number: [phone]     What was the call regarding: PATIENT WAS SUPPOSED TO HAVE AN APPOINTMENT ON 7/2/24 FOR A HOSPITAL FOLLOW UP VISIT BUT SHE CANCELED IT. LORI WOULD LIKE TO KNOW IF THIS NEEDS TO BE RESCHEDULED OR IF THEY CAN JUST WAIT UNTIL HER NEXT APPOINTMENT ON 8/5/24     PLEASE RESPOND ON MYCHART

## 2024-07-03 ENCOUNTER — TELEPHONE (OUTPATIENT)
Dept: INTERNAL MEDICINE | Facility: CLINIC | Age: 84
End: 2024-07-03
Payer: MEDICARE

## 2024-07-03 NOTE — TELEPHONE ENCOUNTER
Pt called xfer from [facility], sched pt a hosp f/u for 7/8/24 at 2p. This was a r/s from 7/2 appt being cancelled.

## 2024-07-08 ENCOUNTER — HOSPITAL ENCOUNTER (OUTPATIENT)
Facility: HOSPITAL | Age: 84
Discharge: HOME | End: 2024-07-08
Admitting: NURSE PRACTITIONER
Payer: MEDICARE

## 2024-07-08 ENCOUNTER — OFFICE VISIT (OUTPATIENT)
Dept: INTERNAL MEDICINE | Facility: CLINIC | Age: 84
End: 2024-07-08
Payer: MEDICARE

## 2024-07-08 VITALS
DIASTOLIC BLOOD PRESSURE: 84 MMHG | HEIGHT: 66 IN | BODY MASS INDEX: 21.44 KG/M2 | OXYGEN SATURATION: 99 % | HEART RATE: 75 BPM | SYSTOLIC BLOOD PRESSURE: 124 MMHG | WEIGHT: 133.4 LBS | TEMPERATURE: 97.4 F

## 2024-07-08 DIAGNOSIS — Z95.2 S/P TAVR (TRANSCATHETER AORTIC VALVE REPLACEMENT): ICD-10-CM

## 2024-07-08 DIAGNOSIS — H35.30 MACULAR DEGENERATION OF BOTH EYES, UNSPECIFIED TYPE: ICD-10-CM

## 2024-07-08 DIAGNOSIS — R06.09 DYSPNEA ON EXERTION: ICD-10-CM

## 2024-07-08 DIAGNOSIS — I35.0 AORTIC VALVE STENOSIS, ETIOLOGY OF CARDIAC VALVE DISEASE UNSPECIFIED: Primary | Chronic | ICD-10-CM

## 2024-07-08 PROCEDURE — 1126F AMNT PAIN NOTED NONE PRSNT: CPT | Performed by: NURSE PRACTITIONER

## 2024-07-08 PROCEDURE — 99495 TRANSJ CARE MGMT MOD F2F 14D: CPT | Performed by: NURSE PRACTITIONER

## 2024-07-08 PROCEDURE — 1111F DSCHRG MED/CURRENT MED MERGE: CPT | Performed by: NURSE PRACTITIONER

## 2024-07-08 PROCEDURE — 1159F MED LIST DOCD IN RCRD: CPT | Performed by: NURSE PRACTITIONER

## 2024-07-08 PROCEDURE — 71046 X-RAY EXAM CHEST 2 VIEWS: CPT

## 2024-07-08 PROCEDURE — 1160F RVW MEDS BY RX/DR IN RCRD: CPT | Performed by: NURSE PRACTITIONER

## 2024-07-09 LAB
BUN SERPL-MCNC: 27 MG/DL (ref 8–23)
BUN/CREAT SERPL: 22.5 (ref 7–25)
CALCIUM SERPL-MCNC: 10.2 MG/DL (ref 8.6–10.5)
CHLORIDE SERPL-SCNC: 100 MMOL/L (ref 98–107)
CO2 SERPL-SCNC: 27.2 MMOL/L (ref 22–29)
CREAT SERPL-MCNC: 1.2 MG/DL (ref 0.57–1)
EGFRCR SERPLBLD CKD-EPI 2021: 44.2 ML/MIN/1.73
ERYTHROCYTE [DISTWIDTH] IN BLOOD BY AUTOMATED COUNT: 13.3 % (ref 12.3–15.4)
GLUCOSE SERPL-MCNC: 131 MG/DL (ref 65–99)
HCT VFR BLD AUTO: 34.8 % (ref 34–46.6)
HGB BLD-MCNC: 11.4 G/DL (ref 12–15.9)
MCH RBC QN AUTO: 30 PG (ref 26.6–33)
MCHC RBC AUTO-ENTMCNC: 32.8 G/DL (ref 31.5–35.7)
MCV RBC AUTO: 91.6 FL (ref 79–97)
NT-PROBNP SERPL-MCNC: 731 PG/ML (ref 0–738)
PLATELET # BLD AUTO: 347 10*3/MM3 (ref 140–450)
POTASSIUM SERPL-SCNC: 4.7 MMOL/L (ref 3.5–5.2)
RBC # BLD AUTO: 3.8 10*6/MM3 (ref 3.77–5.28)
SODIUM SERPL-SCNC: 138 MMOL/L (ref 136–145)
WBC # BLD AUTO: 6.63 10*3/MM3 (ref 3.4–10.8)

## 2024-07-12 ENCOUNTER — OFFICE VISIT (OUTPATIENT)
Dept: CARDIOLOGY | Facility: CLINIC | Age: 84
End: 2024-07-12
Payer: MEDICARE

## 2024-07-12 VITALS
SYSTOLIC BLOOD PRESSURE: 100 MMHG | DIASTOLIC BLOOD PRESSURE: 70 MMHG | WEIGHT: 131.4 LBS | HEART RATE: 67 BPM | BODY MASS INDEX: 21.12 KG/M2 | HEIGHT: 66 IN

## 2024-07-12 DIAGNOSIS — Z95.2 S/P AVR: ICD-10-CM

## 2024-07-12 DIAGNOSIS — I35.0 AORTIC VALVE STENOSIS, ETIOLOGY OF CARDIAC VALVE DISEASE UNSPECIFIED: Chronic | ICD-10-CM

## 2024-07-12 DIAGNOSIS — I48.0 PAROXYSMAL ATRIAL FIBRILLATION: ICD-10-CM

## 2024-07-12 DIAGNOSIS — I35.0 NONRHEUMATIC AORTIC VALVE STENOSIS: Primary | ICD-10-CM

## 2024-07-12 DIAGNOSIS — Z95.2 S/P TAVR (TRANSCATHETER AORTIC VALVE REPLACEMENT): ICD-10-CM

## 2024-07-12 DIAGNOSIS — Z86.73 HISTORY OF CARDIOEMBOLIC CEREBROVASCULAR ACCIDENT (CVA): ICD-10-CM

## 2024-07-12 DIAGNOSIS — I10 ESSENTIAL HYPERTENSION: ICD-10-CM

## 2024-07-12 RX ORDER — LOSARTAN POTASSIUM 100 MG/1
50 TABLET ORAL DAILY
Start: 2024-07-12

## 2024-07-12 NOTE — PROGRESS NOTES
Date of Office Visit: 2024  Encounter Provider: TOSHA Juarez  Place of Service: [facility] CARDIOLOGY  Patient Name: Dior Pettit  :1940    No chief complaint on file.  : severe bioprosthetic arctic valve stenosis  Status post TAVR    HPI: Dior Pettit is a 84 y.o. female who is a patient of  Dr. Mitchell referred to Dr. Hernadez for severe aortic valve stenosis.  She has a history of prior bioprosthetic aortic valve replacement and subacute aortic membrane resection in , paroxysmal atrial fibrillation, prior CVA who presented to the emergency room in May with worsening shortness of breath and orthopnea.  She underwent a TTE and a BRAULIO.  Left ventricular size and systolic function is normal, grade 2 diastolic dysfunction.  She has severe bioprosthetic aortic valve stenosis with a mean gradient of 48 mmHg across the aortic valve.  She is also noted to have moderate mitral valve regurgitation and moderate to severe tricuspid valve regurgitation.  BRAULIO then confirmed severe tricuspid valve regurgitation and moderate mitral valve regurgitation.  Gradient on that echo was 55 mmHg across the bioprosthetic aortic valve.    Ms. Pettit underwent valve in valve transcatheter aortic valve replacement with 23 mm Medtronic evolute Pro transcatheter aortic valve by Maryann Hernadez and Rita on 2024.  Postprocedure echocardiogram showed mean gradient across the aortic valve 10 mmHg and no aortic regurgitation.  She was discharged on aspirin with recommendation to restart her Eliquis in a couple days.     She presents today feeling much better.  She states that she has gradually noticed improvement in her shortness of breath.  Appears euvolemic on physical exam.  Blood pressure well-controlled.  She is now on her aspirin and Eliquis.  EKG shows sinus rhythm with some left ventricular hypertrophy.  Bilateral groin sites look good with no issues.    Previous  testing and notes have been reviewed by me.   Past Medical History:   Diagnosis Date    Anxiety and depression     Aortic root dilatation 10/02/2017    Borderline--Noted on Echo    Aortic valve calcification 10/02/2017    Noted on Echo    Aortic valve insufficiency Dx in 07    w/AVR     Aortic valve prosthesis present 11/02/2018    Noted on CTA Chest    Ascending aorta dilatation 10/02/2017    Borderline--Noted on Echo    Asthma     Atrial fibrillation     2020    Atypical chest pain     Bronchitis, chronic 2014    CAD (coronary artery disease)     Cardiomegaly 2017    Cervicalgia     Chest pain due to CAD     CHF (congestive heart failure) 2024    Claustrophobia 1940    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    Borderline--Noted on Echo & Measured @ 2.6CM and Mid Descending @ 2.5CM on CTA Chest-11/2/18    DM (diabetes mellitus)     T2    Dyslipidemia 2014    Esophagitis     Fatigue     GERD (gastroesophageal reflux disease)     Controlled w/Meds    Headache     Heart murmur     History of echocardiogram 10/2/17-[facility]    EF 66%; Borderline Concentric Hypertrophy; Mild Calcification in AV; Mild AVS; Mild AVR; Moderate TVR; Borderline Dilation of Aortic Root/Arch & Borderline Dilation of Ascending/Proximal Aorta Present    History of hepatitis     AS A TEEN    History of transfusion 1960    Hyperlipidemia     Controlled w/Meds    Hypertension     Controlled w/Meds    Hypothyroidism     Controlled w/Synthroid    Insomnia     Macular degeneration, left eye     Mild aortic valve regurgitation 10/02/2017    Noted on Echo    Mild aortic valve stenosis 10/02/2017    Noted on Echo    Mild dilation of ascending aorta 10/02/2017    Borderline--Noted on Echo    Mitral valve prolapse 2007    Moderate tricuspid valve regurgitation 10/02/2017    Noted on Echo    Mood disorder in conditions classified elsewhere     Controlled w/Meds    Near syncope 03/2017-[facility] ER    Neuropathy     Osteoarthritis     Ankles/Feet    Pulmonary  hypertension 2017    Renal insufficiency     RLS (restless legs syndrome)     Short of breath on exertion     Sleep apnea     CPAP    Stroke     UNCLEAR:  DOES NOT SEE NEUROLOGIST    Thoracic ascending aortic aneurysm Dx in 2007 @ 3.8CM & 1/02/2018    Noted @ 4CM on CTA Chest;    Urinary incontinence     WEARS PAD    UTI (urinary tract infection)     RECENT:  DIAGNOSED 6-19-24       Past Surgical History:   Procedure Laterality Date    AORTIC VALVE REPAIR/REPLACEMENT  2007    Dr. Perry    AORTIC VALVE REPAIR/REPLACEMENT N/A 6/25/2024    Procedure: TTE TRANSFEMORAL TRANSCATHETER AORTIC VALVE REPLACEMENT PERCUTANEOUS APPROACH;  Surgeon: Seth Salinas MD;  Location: [facility];  Service: Cardiothoracic;  Laterality: N/A;    AORTIC VALVE REPAIR/REPLACEMENT N/A 6/25/2024    Procedure: Transfemoral Transcatheter Aortic Valve Replacement with intraoperative transthoracic echocardiogram and possible open surgical rescue;  Surgeon: Ranjan Hernadez MD;  Location: [facility];  Service: Cardiovascular;  Laterality: N/A;    APPENDECTOMY      AUGMENTATION MAMMAPLASTY  1976    BREAST AUGMENTATION  2014    BUNIONECTOMY Bilateral     CARDIAC ABLATION      CARDIAC CATHETERIZATION  2007    CARDIAC VALVE REPLACEMENT  2007    porcine    COLONOSCOPY  2010    COLONOSCOPY  03/02/2012    EYE SURGERY      cataracts and ens implants    HYSTERECTOMY  1972    SIGMOIDOSCOPY  2001    TEMPORAL ARTERY BIOPSY Bilateral 04/14/2023    Procedure: BILATERAL TEMPORAL ARTERY BIOPSY;  Surgeon: Stewart Shepherd MD;  Location: [facility];  Service: Vascular;  Laterality: Bilateral;    TOTAL HIP ARTHROPLASTY Right 11/15/2022    Procedure: Right anterior total hip arthroplasty;  Surgeon: Joao Martinez MD;  Location: [facility] OR;  Service: Orthopedics;  Laterality: Right;       Social History     Socioeconomic History    Marital status:    Tobacco Use    Smoking status: Never     Passive exposure: Never    Smokeless  tobacco: Never    Tobacco comments:     caffeine use - 1 cup coffee daily    Vaping Use    Vaping status: Never Used   Substance and Sexual Activity    Alcohol use: Yes     Comment: less than 1 glass of wine per week    Drug use: Never    Sexual activity: Not Currently     Partners: Male     Birth control/protection: Surgical     Comment: Hyst       Family History   Problem Relation Age of Onset    Hypertension Mother     Stroke Mother     Cancer Mother     Diabetes Sister     Coronary artery disease Brother     Diabetes Brother     Valvular heart disease Brother         TAVR    Cancer Brother         bladder cancer    Coronary artery disease Brother     Cancer Brother     Birth defects Daughter     Skin cancer Neg Hx     Malig Hyperthermia Neg Hx        Review of Systems   Constitutional: Negative.   HENT: Negative.     Eyes: Negative.    Cardiovascular: Negative.    Respiratory: Negative.     Endocrine: Negative.    Hematologic/Lymphatic:        Asa/ Eliquis   Skin: Negative.    Musculoskeletal: Negative.    Gastrointestinal: Negative.    Genitourinary: Negative.    Neurological: Negative.    Psychiatric/Behavioral: Negative.     Allergic/Immunologic: Negative.        No Known Allergies      Current Outpatient Medications:     acetaminophen (TYLENOL) 325 MG tablet, Take 2 tablets by mouth Every 4 (Four) Hours As Needed for Mild Pain., Disp: , Rfl:     acyclovir (Zovirax) 5 % ointment, Apply 1 Application topically to the appropriate area as directed Every 3 (Three) Hours., Disp: 15 g, Rfl: 0    Advair HFA 45-21 MCG/ACT inhaler, Inhale 2 puffs 2 (Two) Times a Day. As needed, Disp: , Rfl:     amitriptyline (ELAVIL) 50 MG tablet, Take 1 tablet by mouth Every Night., Disp: 90 tablet, Rfl: 1    amLODIPine (NORVASC) 5 MG tablet, Take 1 tablet by mouth Daily., Disp: 90 tablet, Rfl: 1    apixaban (Eliquis) 5 MG tablet tablet, Take 1 tablet by mouth 2 (Two) Times a Day., Disp: , Rfl:     ascorbic acid (VITAMIN C) 1000 MG  tablet, Take 1 tablet by mouth Daily. Indications: Inadequate Vitamin C, Disp: , Rfl:     aspirin 81 MG EC tablet, Take 1 tablet by mouth Daily., Disp: 30 tablet, Rfl: 1    atorvastatin (LIPITOR) 80 MG tablet, Take 1 tablet by mouth Every Night. Indications: High Amount of Fats in the Blood, Disp: 90 tablet, Rfl: 1    Blood Glucose Monitoring Suppl (Prodigy No Coding Blood Gluc) w/Device kit, 1 each Daily., Disp: 1 kit, Rfl: 0    fenofibrate micronized (LOFIBRA) 134 MG capsule, TAKE 1 CAPSULE EVERY       MORNING BEFORE BREAKFAST (Patient taking differently: Take 1 capsule by mouth Every Morning Before Breakfast.), Disp: 90 capsule, Rfl: 3    furosemide (LASIX) 40 MG tablet, Take 1 tablet by mouth Daily., Disp: 30 tablet, Rfl: 2    glucosamine-chondroitin 500-400 MG capsule capsule, Take 2 capsules by mouth Daily., Disp: , Rfl:     glucose blood (Prodigy No Coding Blood Gluc) test strip, TEST BLOOD SUGAR DAILY, Disp: 50 each, Rfl: 0    ipratropium (ATROVENT) 0.06 % nasal spray, INSTILL 2 SPRAYS IN EACH NOSTRIL EVERY SIX HOURS AS NEEDED (Patient taking differently: 1 spray into the nostril(s) as directed by provider As Needed.), Disp: 15 mL, Rfl: 0    latanoprost (XALATAN) 0.005 % ophthalmic solution, Administer 1 drop to both eyes Every Night., Disp: , Rfl:     levothyroxine (SYNTHROID, LEVOTHROID) 50 MCG tablet, TAKE 1 TABLET EVERY OTHER DAY FOR UNDERACTIVE THYROID, Disp: 45 tablet, Rfl: 1    levothyroxine (SYNTHROID, LEVOTHROID) 75 MCG tablet, TAKE 1 TABLET BY MOUTH EVERY OTHER DAY for underactive thyroid (Patient taking differently: Take 1 tablet by mouth Every Other Day. TAKE 1 TABLET BY MOUTH EVERY OTHER DAY for underactive thyroid), Disp: 45 tablet, Rfl: 0    losartan (COZAAR) 100 MG tablet, Take 0.5 tablets by mouth Daily. Take 1/2 tablet once daily, Disp: , Rfl:     metFORMIN ER (GLUCOPHAGE-XR) 750 MG 24 hr tablet, TAKE 1 TABLET TWICE A DAY (Patient taking differently: Take 1 tablet by mouth 2 (Two) Times a  "Day.), Disp: 180 tablet, Rfl: 3    metoprolol tartrate (LOPRESSOR) 25 MG tablet, Take 1 tablet by mouth 2 (Two) Times a Day. Indications: High Blood Pressure Disorder, Disp: 30 tablet, Rfl: 2    Multiple Vitamins-Minerals (MULTIVITAL PO), Take 1 tablet by mouth Daily. Indications: potential deficiency, Disp: , Rfl:     omeprazole (priLOSEC) 40 MG capsule, TAKE 1 CAPSULE DAILY *DR LOERA MFR* (Patient taking differently: Take 1 capsule by mouth Daily.), Disp: 90 capsule, Rfl: 0    phenazopyridine (PYRIDIUM) 100 MG tablet, Take 1 tablet by mouth 3 (Three) Times a Day As Needed for Dysuria (Pain with urination)., Disp: 15 tablet, Rfl: 0    Prodigy Safety Lancets 26G misc, CHECK BLOOD SUGAR ONE TIME PER DAY, Disp: 100 each, Rfl: 1    Pumpkin Seed-Soy Germ (AZO Bladder Control/Go-Less) capsule, Take 1 capsule by mouth Daily., Disp: , Rfl:     valACYclovir (VALTREX) 1000 MG tablet, TAKE 2 TABLETS 4 TIMES     DAILY FOR MOUTH ULCER (Patient taking differently: Take 1 tablet by mouth As Needed. TAKE 2 TABLETS 4 TIMES     DAILY FOR MOUTH ULCER), Disp: 16 tablet, Rfl: 1    venlafaxine XR (EFFEXOR-XR) 150 MG 24 hr capsule, Take 1 capsule by mouth Daily., Disp: 90 capsule, Rfl: 3      Objective:     Vitals:    07/12/24 1143   BP: 100/70   Pulse: 67   Weight: 59.6 kg (131 lb 6.4 oz)   Height: 167.6 cm (65.98\")     Body mass index is 21.22 kg/m².    PHYSICAL EXAM:    Constitutional:       Appearance: Healthy appearance. Not in distress.   Neck:      Vascular: No JVR. JVD normal.   Pulmonary:      Effort: Pulmonary effort is normal.      Breath sounds: Normal breath sounds. No wheezing. No rhonchi. No rales.   Chest:      Chest wall: Not tender to palpatation.   Cardiovascular:      PMI at left midclavicular line. Normal rate. Regular rhythm. Normal S1. Normal S2.       Murmurs: There is a systolic murmur.      No gallop.  No click. No rub.   Pulses:     Intact distal pulses.   Edema:     Peripheral edema absent.   Abdominal:     "  General: Bowel sounds are normal.      Palpations: Abdomen is soft.      Tenderness: There is no abdominal tenderness.   Musculoskeletal: Normal range of motion.         General: No tenderness. Skin:     General: Skin is warm and dry.   Neurological:      General: No focal deficit present.      Mental Status: Alert and oriented to person, place and time.           ECG 12 Lead    Date/Time: 7/12/2024 1:24 PM  Performed by: Aislinn Holder APRN    Authorized by: Aislinn Holder APRN  Comparison: compared with previous ECG from 6/26/2024  Rhythm: sinus rhythm  BPM: 67  Conduction: conduction normal  Q waves: aVL              Assessment:       Diagnosis Plan   1. Nonrheumatic aortic valve stenosis  ECG 12 Lead      2. Essential hypertension  losartan (COZAAR) 100 MG tablet    ECG 12 Lead      3. S/P TAVR (transcatheter aortic valve replacement)  ECG 12 Lead      4. History of cardioembolic cerebrovascular accident (CVA)  ECG 12 Lead      5. Paroxysmal atrial fibrillation  ECG 12 Lead      6. Aortic valve stenosis, etiology of cardiac valve disease unspecified        7. S/P AVR          Orders Placed This Encounter   Procedures    ECG 12 Lead     This order was created via procedure documentation     Order Specific Question:   Release to patient     Answer:   Routine Release [[id number]]          Plan:       1.  Aortic valve stenosis status post valve in valve transcatheter aortic valve replacement with 23 mm Medtronic Evolute Pro transcatheter aortic valve.  Post procedure TTE shows normal gradients. Previous AVR in 2007    2.  Paroxysmal atrial fibrillation: On beta-blocker and Eliquis.  Sinus rhythm on EKG today.    3.  History of CVA: on asa/ lipid    4.  Hypertension: controlled    5.  Diabetes type 2: HbA1C 6.0    Ms. Pettit has a month follow-up with Dr. Hernadez as well as cardiogram scheduled.  She will call sooner for any questions or concerns.         Your medication list            Accurate as of  July 12, 2024  1:28 PM. If you have any questions, ask your nurse or doctor.                CHANGE how you take these medications        Instructions Last Dose Given Next Dose Due   fenofibrate micronized 134 MG capsule  Commonly known as: LOFIBRA  What changed: when to take this      TAKE 1 CAPSULE EVERY       MORNING BEFORE BREAKFAST       ipratropium 0.06 % nasal spray  Commonly known as: ATROVENT  What changed: See the new instructions.      INSTILL 2 SPRAYS IN EACH NOSTRIL EVERY SIX HOURS AS NEEDED       levothyroxine 75 MCG tablet  Commonly known as: SYNTHROID, LEVOTHROID  What changed:   how much to take  how to take this  when to take this      TAKE 1 TABLET BY MOUTH EVERY OTHER DAY for underactive thyroid       levothyroxine 50 MCG tablet  Commonly known as: SYNTHROID, LEVOTHROID  What changed: Another medication with the same name was changed. Make sure you understand how and when to take each.      TAKE 1 TABLET EVERY OTHER DAY FOR UNDERACTIVE THYROID       losartan 100 MG tablet  Commonly known as: COZAAR  What changed:   how much to take  additional instructions  Changed by: TOSHA Juarez      Take 0.5 tablets by mouth Daily. Take 1/2 tablet once daily       omeprazole 40 MG capsule  Commonly known as: priLOSEC  What changed: See the new instructions.      TAKE 1 CAPSULE DAILY *DR LOERA MFR*       valACYclovir 1000 MG tablet  Commonly known as: VALTREX  What changed:   how much to take  how to take this  when to take this  reasons to take this      TAKE 2 TABLETS 4 TIMES     DAILY FOR MOUTH ULCER              CONTINUE taking these medications        Instructions Last Dose Given Next Dose Due   acetaminophen 325 MG tablet  Commonly known as: TYLENOL      Take 2 tablets by mouth Every 4 (Four) Hours As Needed for Mild Pain.       acyclovir 5 % ointment  Commonly known as: Zovirax      Apply 1 Application topically to the appropriate area as directed Every 3 (Three) Hours.       Advair HFA  45-21 MCG/ACT inhaler  Generic drug: fluticasone-salmeterol      Inhale 2 puffs 2 (Two) Times a Day. As needed       amitriptyline 50 MG tablet  Commonly known as: ELAVIL      Take 1 tablet by mouth Every Night.       amLODIPine 5 MG tablet  Commonly known as: NORVASC      Take 1 tablet by mouth Daily.       apixaban 5 MG tablet tablet  Commonly known as: Eliquis      Take 1 tablet by mouth 2 (Two) Times a Day.       ascorbic acid 1000 MG tablet  Commonly known as: VITAMIN C      Take 1 tablet by mouth Daily. Indications: Inadequate Vitamin C       aspirin 81 MG EC tablet      Take 1 tablet by mouth Daily.       atorvastatin 80 MG tablet  Commonly known as: LIPITOR      Take 1 tablet by mouth Every Night. Indications: High Amount of Fats in the Blood       AZO Bladder Control/Go-Less capsule      Take 1 capsule by mouth Daily.       furosemide 40 MG tablet  Commonly known as: LASIX      Take 1 tablet by mouth Daily.       glucosamine-chondroitin 500-400 MG capsule capsule      Take 2 capsules by mouth Daily.       latanoprost 0.005 % ophthalmic solution  Commonly known as: XALATAN      Administer 1 drop to both eyes Every Night.       metFORMIN  MG 24 hr tablet  Commonly known as: GLUCOPHAGE-XR      TAKE 1 TABLET TWICE A DAY       metoprolol tartrate 25 MG tablet  Commonly known as: LOPRESSOR      Take 1 tablet by mouth 2 (Two) Times a Day. Indications: High Blood Pressure Disorder       multivitamin with minerals tablet tablet      Take 1 tablet by mouth Daily. Indications: potential deficiency       phenazopyridine 100 MG tablet  Commonly known as: PYRIDIUM      Take 1 tablet by mouth 3 (Three) Times a Day As Needed for Dysuria (Pain with urination).       Prodigy No Coding Blood Gluc test strip  Generic drug: glucose blood      TEST BLOOD SUGAR DAILY       Prodigy No Coding Blood Gluc w/Device kit      1 each Daily.       Prodigy Safety Lancets 26G misc      CHECK BLOOD SUGAR ONE TIME PER DAY        venlafaxine  MG 24 hr capsule  Commonly known as: EFFEXOR-XR      Take 1 capsule by mouth Daily.                 Where to Get Your Medications        Information about where to get these medications is not yet available    Ask your nurse or doctor about these medications  losartan 100 MG tablet           As always, it has been a pleasure to participate in your patient's care.      Sincerely,       TOSHA Fu

## 2024-07-21 PROBLEM — I35.0 AORTIC STENOSIS: Status: RESOLVED | Noted: 2024-06-25 | Resolved: 2024-07-21

## 2024-07-21 PROBLEM — Z95.2 S/P AVR: Status: RESOLVED | Noted: 2017-03-05 | Resolved: 2024-07-21

## 2024-07-21 NOTE — ASSESSMENT & PLAN NOTE
She is controlling her symptoms by slowing her pace, appears euvolemic on exam today but will obtain CXR to further evaluate

## 2024-07-21 NOTE — PROGRESS NOTES
Transitional Care Follow Up Visit  Subjective     Dior Pettit is a 84 y.o. female who presents for a transitional care management visit. She is accompanied by her daughter.    Within 48 business hours after discharge our office contacted her via telephone to coordinate her care and needs.      I reviewed and discussed the details of that call along with the discharge summary, hospital problems, inpatient lab results, inpatient diagnostic studies, and consultation reports with Dior.    Current Outpatient Medications:     acetaminophen (TYLENOL) 325 MG tablet, Take 2 tablets by mouth Every 4 (Four) Hours As Needed for Mild Pain., Disp: , Rfl:     acyclovir (Zovirax) 5 % ointment, Apply 1 Application topically to the appropriate area as directed Every 3 (Three) Hours., Disp: 15 g, Rfl: 0    Advair HFA 45-21 MCG/ACT inhaler, Inhale 2 puffs 2 (Two) Times a Day. As needed, Disp: , Rfl:     amitriptyline (ELAVIL) 50 MG tablet, Take 1 tablet by mouth Every Night., Disp: 90 tablet, Rfl: 1    amLODIPine (NORVASC) 5 MG tablet, Take 1 tablet by mouth Daily., Disp: 90 tablet, Rfl: 1    apixaban (Eliquis) 5 MG tablet tablet, Take 1 tablet by mouth 2 (Two) Times a Day., Disp: , Rfl:     ascorbic acid (VITAMIN C) 1000 MG tablet, Take 1 tablet by mouth Daily. Indications: Inadequate Vitamin C, Disp: , Rfl:     aspirin 81 MG EC tablet, Take 1 tablet by mouth Daily., Disp: 30 tablet, Rfl: 1    atorvastatin (LIPITOR) 80 MG tablet, Take 1 tablet by mouth Every Night. Indications: High Amount of Fats in the Blood, Disp: 90 tablet, Rfl: 1    Blood Glucose Monitoring Suppl (Prodigy No Coding Blood Gluc) w/Device kit, 1 each Daily., Disp: 1 kit, Rfl: 0    fenofibrate micronized (LOFIBRA) 134 MG capsule, TAKE 1 CAPSULE EVERY       MORNING BEFORE BREAKFAST (Patient taking differently: Take 1 capsule by mouth Every Morning Before Breakfast.), Disp: 90 capsule, Rfl: 3    furosemide (LASIX) 40 MG tablet, Take 1 tablet by mouth Daily.,  Disp: 30 tablet, Rfl: 2    glucosamine-chondroitin 500-400 MG capsule capsule, Take 2 capsules by mouth Daily., Disp: , Rfl:     glucose blood (Prodigy No Coding Blood Gluc) test strip, TEST BLOOD SUGAR DAILY, Disp: 50 each, Rfl: 0    ipratropium (ATROVENT) 0.06 % nasal spray, INSTILL 2 SPRAYS IN EACH NOSTRIL EVERY SIX HOURS AS NEEDED (Patient taking differently: 1 spray into the nostril(s) as directed by provider As Needed.), Disp: 15 mL, Rfl: 0    latanoprost (XALATAN) 0.005 % ophthalmic solution, Administer 1 drop to both eyes Every Night., Disp: , Rfl:     levothyroxine (SYNTHROID, LEVOTHROID) 50 MCG tablet, TAKE 1 TABLET EVERY OTHER DAY FOR UNDERACTIVE THYROID, Disp: 45 tablet, Rfl: 1    levothyroxine (SYNTHROID, LEVOTHROID) 75 MCG tablet, TAKE 1 TABLET BY MOUTH EVERY OTHER DAY for underactive thyroid (Patient taking differently: Take 1 tablet by mouth Every Other Day. TAKE 1 TABLET BY MOUTH EVERY OTHER DAY for underactive thyroid), Disp: 45 tablet, Rfl: 0    metFORMIN ER (GLUCOPHAGE-XR) 750 MG 24 hr tablet, TAKE 1 TABLET TWICE A DAY (Patient taking differently: Take 1 tablet by mouth 2 (Two) Times a Day.), Disp: 180 tablet, Rfl: 3    metoprolol tartrate (LOPRESSOR) 25 MG tablet, Take 1 tablet by mouth 2 (Two) Times a Day. Indications: High Blood Pressure Disorder, Disp: 30 tablet, Rfl: 2    Multiple Vitamins-Minerals (MULTIVITAL PO), Take 1 tablet by mouth Daily. Indications: potential deficiency, Disp: , Rfl:     omeprazole (priLOSEC) 40 MG capsule, TAKE 1 CAPSULE DAILY *DR LOERA MFR* (Patient taking differently: Take 1 capsule by mouth Daily.), Disp: 90 capsule, Rfl: 0    phenazopyridine (PYRIDIUM) 100 MG tablet, Take 1 tablet by mouth 3 (Three) Times a Day As Needed for Dysuria (Pain with urination)., Disp: 15 tablet, Rfl: 0    Prodigy Safety Lancets 26G misc, CHECK BLOOD SUGAR ONE TIME PER DAY, Disp: 100 each, Rfl: 1    Pumpkin Seed-Soy Germ (AZO Bladder Control/Go-Less) capsule, Take 1 capsule by mouth  Daily., Disp: , Rfl:     valACYclovir (VALTREX) 1000 MG tablet, TAKE 2 TABLETS 4 TIMES     DAILY FOR MOUTH ULCER (Patient taking differently: Take 1 tablet by mouth As Needed. TAKE 2 TABLETS 4 TIMES     DAILY FOR MOUTH ULCER), Disp: 16 tablet, Rfl: 1    venlafaxine XR (EFFEXOR-XR) 150 MG 24 hr capsule, Take 1 capsule by mouth Daily., Disp: 90 capsule, Rfl: 3    losartan (COZAAR) 100 MG tablet, Take 0.5 tablets by mouth Daily. Take 1/2 tablet once daily, Disp: , Rfl:     Current outpatient and discharge medications have been reconciled for the patient.  Reviewed by: TOSHA Coto          6/26/2024     6:21 PM   Date of TCM Phone Call   [facility]   Date of Admission 6/25/2024   Date of Discharge 6/26/2024     Risk for Readmission (LACE) No data recorded    History of Present Illness   History of Present Illness    Course During Hospital Stay:      She was admitted 6/25/24 for a transcatheter aortic valve replacement (TAVR) with a 23 mm Medtronic transcatheter heart valve by Dr. Salinas and Dr. Hernadez. Her transthoracic echocardiogram performed 6/26/24 showed good function of her aortic valve. Her Eliquis was restarted on 6/28/24 and ASA which she is tolerating well and without any bleeding episodes.     She has continued to experience dyspnea which she feels has increased since surgery. She reports difficulty ambulating short distances without experiencing breathlessness. She denies experiencing any edema, chest tightness, or dyspnea at rest. Sx improve with rest. She used her duster yesterday without experiencing chest pain. She does feel her dyspnea is managed with a slower pace. She experiences mild dyspnea when lying down at night. Her bed is slightly elevated, and she denies any abdominal bloating. She has a history of constipation since childhood, which she attributes to irritable bowel syndrome.    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      The  following portions of the patient's history were reviewed and updated as appropriate: allergies, current medications, past family history, past medical history, past social history, past surgical history, and problem list.    Review of Systems   Constitutional:  Positive for fatigue. Negative for fever.   HENT:  Positive for postnasal drip. Negative for congestion.    Respiratory:  Positive for shortness of breath. Negative for cough and chest tightness.    Cardiovascular:  Negative for chest pain, palpitations and leg swelling.   Gastrointestinal:  Negative for abdominal pain.   Musculoskeletal:  Positive for arthralgias.       Objective   Physical Exam  Constitutional:       Appearance: She is well-developed. She is not ill-appearing.   HENT:      Head: Normocephalic.      Right Ear: Hearing, tympanic membrane and external ear normal.      Left Ear: Hearing, tympanic membrane and external ear normal.      Nose: Nose normal. No nasal deformity, mucosal edema or rhinorrhea.      Right Sinus: No maxillary sinus tenderness or frontal sinus tenderness.      Left Sinus: No maxillary sinus tenderness or frontal sinus tenderness.      Mouth/Throat:      Dentition: Normal dentition.      Pharynx: Posterior oropharyngeal erythema present.   Eyes:      General: Lids are normal.         Right eye: No discharge.         Left eye: No discharge.      Conjunctiva/sclera: Conjunctivae normal.      Right eye: No exudate.     Left eye: No exudate.  Neck:      Thyroid: No thyroid mass or thyromegaly.      Vascular: No carotid bruit.      Trachea: Trachea normal.   Cardiovascular:      Rate and Rhythm: Regular rhythm.      Pulses: Normal pulses.      Heart sounds: Murmur heard.      Systolic murmur is present with a grade of 2/6.   Pulmonary:      Effort: No respiratory distress.      Breath sounds: Normal breath sounds. No decreased breath sounds, wheezing, rhonchi or rales.   Abdominal:      General: Bowel sounds are normal.       Palpations: Abdomen is soft.      Tenderness: There is no abdominal tenderness.   Musculoskeletal:      Cervical back: Normal range of motion. No edema.   Lymphadenopathy:      Head:      Right side of head: No submental, submandibular, tonsillar, preauricular, posterior auricular or occipital adenopathy.      Left side of head: No submental, submandibular, tonsillar, preauricular, posterior auricular or occipital adenopathy.   Skin:     General: Skin is warm and dry.      Nails: There is no clubbing.   Neurological:      Mental Status: She is alert.   Psychiatric:         Behavior: Behavior is cooperative.       Physical Exam  Vital Signs  Heart rate is 77.    Results      Assessment & Plan   Diagnoses and all orders for this visit:    1. Aortic valve stenosis, etiology of cardiac valve disease unspecified (Primary)  Assessment & Plan:  Hx bioprosthetic valve and subaortic membrane resection in 2007.  Subsequent echoes have shown residual mildly elevated gradient of 20 to 28 mmHg.  Echo 5/10/24: peak velocity of 4.7 m/s, mean gradient of 55 mmHg and aortic valve area less than 1 cm squire. Ejection fraction was 53%. It also showed a mitral valve mean gradient of 4.4 mmHg. An repeat echo showed the peak gradient 73 mmHg with a mean gradient of 48 mmHg.   TAVR 6/25/24      2. Dyspnea on exertion  Assessment & Plan:  She is controlling her symptoms by slowing her pace, appears euvolemic on exam today but will obtain CXR to further evaluate    Orders:  -     CBC (No Diff)  -     Basic Metabolic Panel  -     proBNP  -     XR Chest 2 View    3. 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      4. S/P TAVR (transcatheter aortic valve replacement)  Assessment & Plan:  She is s/p TAVR and is overall doing well, has f/u appt with cardiology. Echo 6/26/24 showed good function of aortic  valve.        Assessment & Plan           Patient or patient representative verbalized consent for the use of Ambient Listening during the visit with  TOSHA Houser for chart documentation. 7/21/2024  19:47 EDT

## 2024-07-21 NOTE — ASSESSMENT & PLAN NOTE
Hx bioprosthetic valve and subaortic membrane resection in 2007.  Subsequent echoes have shown residual mildly elevated gradient of 20 to 28 mmHg.  Echo 5/10/24: peak velocity of 4.7 m/s, mean gradient of 55 mmHg and aortic valve area less than 1 cm squire. Ejection fraction was 53%. It also showed a mitral valve mean gradient of 4.4 mmHg. An repeat echo showed the peak gradient 73 mmHg with a mean gradient of 48 mmHg.   TAVR 6/25/24

## 2024-07-21 NOTE — ASSESSMENT & PLAN NOTE
She is s/p TAVR and is overall doing well, has f/u appt with cardiology. Echo 6/26/24 showed good function of aortic valve.

## 2024-07-22 RX ORDER — AMLODIPINE BESYLATE 5 MG/1
5 TABLET ORAL DAILY
Qty: 90 TABLET | Refills: 1 | Status: SHIPPED | OUTPATIENT
Start: 2024-07-22

## 2024-07-22 RX ORDER — ATORVASTATIN CALCIUM 80 MG/1
TABLET, FILM COATED ORAL
Qty: 90 TABLET | Refills: 1 | Status: SHIPPED | OUTPATIENT
Start: 2024-07-22

## 2024-07-30 NOTE — TELEPHONE ENCOUNTER
"Caller: Dior Pettit \"Citlaly\"    Relationship: Self    Best call back number:[phone]     Requested Prescriptions:   Requested Prescriptions     Pending Prescriptions Disp Refills    metoprolol tartrate (LOPRESSOR) 25 MG tablet 30 tablet 2     Sig: Take 1 tablet by mouth 2 (Two) Times a Day. Indications: High Blood Pressure Disorder        Pharmacy where request should be sent: [id number] - [address] - [phone]  - [phone] FX     Last office visit with prescribing clinician: 7/8/2024   Last telemedicine visit with prescribing clinician: Visit date not found   Next office visit with prescribing clinician: 10/18/2024     Additional details provided by patient: PATIENT HAS 3 DAYS LEFT    Does the patient have less than a 3 day supply:  [] Yes  [x] No    Would you like a call back once the refill request has been completed: [] Yes [x] No    If the office needs to give you a call back, can they leave a voicemail: [] Yes [x] No    Ritesh Low Rep   07/30/24 11:59 EDT         "

## 2024-07-31 RX ORDER — APIXABAN 5 MG/1
5 TABLET, FILM COATED ORAL EVERY 12 HOURS
Qty: 180 TABLET | Refills: 1 | Status: SHIPPED | OUTPATIENT
Start: 2024-07-31

## 2024-07-31 NOTE — TELEPHONE ENCOUNTER
Pt had AVR and following with Structural Team.  When do you want to see her back?  Oct 2024 as listed on the last office note ok?

## 2024-08-02 DIAGNOSIS — K21.9 GASTROESOPHAGEAL REFLUX DISEASE: ICD-10-CM

## 2024-08-02 RX ORDER — OMEPRAZOLE 40 MG/1
40 CAPSULE, DELAYED RELEASE ORAL DAILY
Qty: 90 CAPSULE | Refills: 1 | Status: SHIPPED | OUTPATIENT
Start: 2024-08-02

## 2024-08-02 RX ORDER — FUROSEMIDE 40 MG/1
40 TABLET ORAL DAILY
Qty: 30 TABLET | Refills: 0 | Status: SHIPPED | OUTPATIENT
Start: 2024-08-02

## 2024-08-02 NOTE — TELEPHONE ENCOUNTER
"  Caller: Dior Pettit \"Citlaly\"    Relationship: Self    Best call back number: [phone]     Requested Prescriptions:   Requested Prescriptions     Pending Prescriptions Disp Refills    omeprazole (priLOSEC) 40 MG capsule 90 capsule 0        Pharmacy where request should be sent: [id number] [place] [id number] [facility] [address] - [phone]  - [phone] FX     Last office visit with prescribing clinician: 7/8/2024   Last telemedicine visit with prescribing clinician: Visit date not found   Next office visit with prescribing clinician: 10/18/2024       Does the patient have less than a 3 day supply:  [] Yes  [x] No    "

## 2024-08-08 DIAGNOSIS — I10 ESSENTIAL HYPERTENSION: ICD-10-CM

## 2024-08-08 RX ORDER — LOSARTAN POTASSIUM 100 MG/1
100 TABLET ORAL DAILY
Qty: 90 TABLET | Refills: 1 | Status: SHIPPED | OUTPATIENT
Start: 2024-08-08

## 2024-08-08 RX ORDER — LOSARTAN POTASSIUM 100 MG/1
100 TABLET ORAL DAILY
Qty: 90 TABLET | Refills: 1 | OUTPATIENT
Start: 2024-08-08

## 2024-08-08 NOTE — TELEPHONE ENCOUNTER
"Caller: Dior Pettit \"Citlaly\"    Relationship: Self    Best call back number: [phone]    Requested Prescriptions:   Requested Prescriptions     Pending Prescriptions Disp Refills    losartan (COZAAR) 100 MG tablet       Sig: Take 0.5 tablets by mouth Daily. Take 1/2 tablet once daily        Pharmacy where request should be sent: [id number] [address] - [phone]  - [phone] FX     Last office visit with prescribing clinician: 7/8/2024   Last telemedicine visit with prescribing clinician: Visit date not found   Next office visit with prescribing clinician: 10/18/2024     Additional details provided by patient: PATIENT HAS NO MEDICATION LEFT CURRENTLY    Does the patient have less than a 3 day supply:  [x] Yes  [] No    Would you like a call back once the refill request has been completed: [x] Yes [] No    If the office needs to give you a call back, can they leave a voicemail: [x] Yes [] No    Ritesh Candelaria Rep   08/08/24 09:48 EDT   "

## 2024-08-08 NOTE — TELEPHONE ENCOUNTER
Spoke with patient - she confirms she is taking 100mg once a day. It looks like cardiology advised half tablets at last OV but patient states she is not aware of this and she takes a whole tablet daily.    Patient would like Rx sent [facility]

## 2024-08-15 DIAGNOSIS — E78.00 HYPERCHOLESTEREMIA: ICD-10-CM

## 2024-08-15 RX ORDER — FENOFIBRATE 134 MG/1
CAPSULE ORAL
Qty: 90 CAPSULE | Refills: 1 | Status: SHIPPED | OUTPATIENT
Start: 2024-08-15

## 2024-08-23 DIAGNOSIS — E03.9 ACQUIRED HYPOTHYROIDISM: ICD-10-CM

## 2024-08-23 RX ORDER — LEVOTHYROXINE SODIUM 0.07 MG/1
TABLET ORAL
Qty: 45 TABLET | Refills: 0 | Status: SHIPPED | OUTPATIENT
Start: 2024-08-23

## 2024-08-29 ENCOUNTER — OFFICE VISIT (OUTPATIENT)
Age: 84
End: 2024-08-29
Payer: MEDICARE

## 2024-08-29 ENCOUNTER — HOSPITAL ENCOUNTER (OUTPATIENT)
Dept: CARDIOLOGY | Facility: HOSPITAL | Age: 84
Discharge: HOME | End: 2024-08-29
Admitting: INTERNAL MEDICINE
Payer: MEDICARE

## 2024-08-29 VITALS
HEIGHT: 65 IN | BODY MASS INDEX: 21.83 KG/M2 | WEIGHT: 131 LBS | DIASTOLIC BLOOD PRESSURE: 60 MMHG | SYSTOLIC BLOOD PRESSURE: 106 MMHG

## 2024-08-29 VITALS
BODY MASS INDEX: 22.99 KG/M2 | HEART RATE: 68 BPM | DIASTOLIC BLOOD PRESSURE: 60 MMHG | WEIGHT: 138 LBS | HEIGHT: 65 IN | SYSTOLIC BLOOD PRESSURE: 106 MMHG

## 2024-08-29 DIAGNOSIS — I48.0 PAROXYSMAL ATRIAL FIBRILLATION: ICD-10-CM

## 2024-08-29 DIAGNOSIS — Z95.2 S/P TAVR (TRANSCATHETER AORTIC VALVE REPLACEMENT): Primary | ICD-10-CM

## 2024-08-29 DIAGNOSIS — I35.0 NONRHEUMATIC AORTIC VALVE STENOSIS: ICD-10-CM

## 2024-08-29 DIAGNOSIS — Z95.3 S/P TAVR (TRANSCATHETER AORTIC VALVE REPLACEMENT), BIOPROSTHETIC: ICD-10-CM

## 2024-08-29 DIAGNOSIS — I50.32 CHRONIC DIASTOLIC CONGESTIVE HEART FAILURE: ICD-10-CM

## 2024-08-29 DIAGNOSIS — I10 ESSENTIAL HYPERTENSION: ICD-10-CM

## 2024-08-29 LAB
AORTIC ARCH: 3.1 CM
AORTIC DIMENSIONLESS INDEX: 0.4 (DI)
BH CV ECHO MEAS - AO MAX PG: 29.6 MMHG
BH CV ECHO MEAS - AO MEAN PG: 19 MMHG
BH CV ECHO MEAS - AO ROOT DIAM: 2.7 CM
BH CV ECHO MEAS - AO V2 MAX: 271.8 CM/SEC
BH CV ECHO MEAS - AO V2 VTI: 70.9 CM
BH CV ECHO MEAS - AVA(I,D): 1.02 CM2
BH CV ECHO MEAS - EDV(CUBED): 47.5 ML
BH CV ECHO MEAS - EDV(MOD-SP2): 48 ML
BH CV ECHO MEAS - EDV(MOD-SP4): 54 ML
BH CV ECHO MEAS - EF(MOD-BP): 68.5 %
BH CV ECHO MEAS - EF(MOD-SP2): 68.8 %
BH CV ECHO MEAS - EF(MOD-SP4): 68.5 %
BH CV ECHO MEAS - ESV(CUBED): 10.6 ML
BH CV ECHO MEAS - ESV(MOD-SP2): 15 ML
BH CV ECHO MEAS - ESV(MOD-SP4): 17 ML
BH CV ECHO MEAS - FS: 39.4 %
BH CV ECHO MEAS - IVS/LVPW: 1 CM
BH CV ECHO MEAS - IVSD: 1.08 CM
BH CV ECHO MEAS - LAT PEAK E' VEL: 9.1 CM/SEC
BH CV ECHO MEAS - LV DIASTOLIC VOL/BSA (35-75): 32.7 CM2
BH CV ECHO MEAS - LV MASS(C)D: 122 GRAMS
BH CV ECHO MEAS - LV MAX PG: 4.5 MMHG
BH CV ECHO MEAS - LV MEAN PG: 2.6 MMHG
BH CV ECHO MEAS - LV SYSTOLIC VOL/BSA (12-30): 10.3 CM2
BH CV ECHO MEAS - LV V1 MAX: 106.6 CM/SEC
BH CV ECHO MEAS - LV V1 VTI: 25.9 CM
BH CV ECHO MEAS - LVIDD: 3.6 CM
BH CV ECHO MEAS - LVIDS: 2.2 CM
BH CV ECHO MEAS - LVOT AREA: 2.8 CM2
BH CV ECHO MEAS - LVOT DIAM: 1.88 CM
BH CV ECHO MEAS - LVPWD: 1.08 CM
BH CV ECHO MEAS - MED PEAK E' VEL: 6.9 CM/SEC
BH CV ECHO MEAS - MR MAX PG: 80.8 MMHG
BH CV ECHO MEAS - MR MAX VEL: 449.6 CM/SEC
BH CV ECHO MEAS - MV A DUR: 0.11 SEC
BH CV ECHO MEAS - MV A MAX VEL: 108.1 CM/SEC
BH CV ECHO MEAS - MV DEC SLOPE: 612.9 CM/SEC2
BH CV ECHO MEAS - MV DEC TIME: 0.25 SEC
BH CV ECHO MEAS - MV E MAX VEL: 151 CM/SEC
BH CV ECHO MEAS - MV E/A: 1.4
BH CV ECHO MEAS - MV MAX PG: 9.6 MMHG
BH CV ECHO MEAS - MV MEAN PG: 3.5 MMHG
BH CV ECHO MEAS - MV P1/2T: 74.7 MSEC
BH CV ECHO MEAS - MV V2 VTI: 44.8 CM
BH CV ECHO MEAS - MVA(P1/2T): 2.9 CM2
BH CV ECHO MEAS - MVA(VTI): 1.61 CM2
BH CV ECHO MEAS - PA ACC TIME: 0.08 SEC
BH CV ECHO MEAS - PA V2 MAX: 88.2 CM/SEC
BH CV ECHO MEAS - RAP SYSTOLE: 3 MMHG
BH CV ECHO MEAS - RV MAX PG: 1.56 MMHG
BH CV ECHO MEAS - RV V1 MAX: 62.4 CM/SEC
BH CV ECHO MEAS - RV V1 VTI: 14.7 CM
BH CV ECHO MEAS - RVSP: 54 MMHG
BH CV ECHO MEAS - SV(LVOT): 72.1 ML
BH CV ECHO MEAS - SV(MOD-SP2): 33 ML
BH CV ECHO MEAS - SV(MOD-SP4): 37 ML
BH CV ECHO MEAS - SVI(LVOT): 43.6 ML/M2
BH CV ECHO MEAS - SVI(MOD-SP2): 20 ML/M2
BH CV ECHO MEAS - SVI(MOD-SP4): 22.4 ML/M2
BH CV ECHO MEAS - TAPSE (>1.6): 1.65 CM
BH CV ECHO MEAS - TR MAX PG: 51.4 MMHG
BH CV ECHO MEAS - TR MAX VEL: 358.3 CM/SEC
BH CV ECHO MEASUREMENTS AVERAGE E/E' RATIO: 18.88
BH CV XLRA - TDI S': 10.1 CM/SEC
SINUS: 2.23 CM

## 2024-08-29 PROCEDURE — 93306 TTE W/DOPPLER COMPLETE: CPT

## 2024-08-29 NOTE — PROGRESS NOTES
Date of Office Visit: 24    Encounter Provider: Ranjan Hernadez MD  Place of Service: [facility] CARDIOLOGY  Patient Name: Dior Pettit  :1940    Chief Complaint   Patient presents with    30 Day Post TAVR     HPI: Dior Pettit is a 84 y.o. female who is a patient of  Dr. Mitchell with severe bioprosthetic aortic valve stenosis.  She has a history of prior bioprosthetic aortic valve replacement and subacute aortic membrane resection in , paroxysmal atrial fibrillation, prior CVA who presented to the emergency room in May with worsening shortness of breath and orthopnea.  She underwent a TTE and a BRAULIO.  Left ventricular size and systolic function is normal, grade 2 diastolic dysfunction.  She has severe bioprosthetic aortic valve stenosis with a mean gradient of 48 mmHg across the aortic valve.  She is also noted to have moderate mitral valve regurgitation and moderate to severe tricuspid valve regurgitation.  BRAULIO then confirmed severe tricuspid valve regurgitation and moderate mitral valve regurgitation.  Gradient on that echo was 55 mmHg across the bioprosthetic aortic valve.    Ms. Pettit underwent valve in valve transcatheter aortic valve replacement with 23 mm Medtronic Evolut Pro transcatheter aortic valve on 2024.  Postdilated with a 20 mm true balloon.  Postprocedure echocardiogram showed mean gradient across the aortic valve 10 mmHg and no aortic regurgitation.  She was discharged on aspirin with recommendation to restart her Eliquis in a couple days.     She has done well since that time.  She still has mild dyspnea on exertion but states that this is significantly improved.  Her transthoracic echocardiogram today looks good.  The valve is well-positioned.  The mean gradient is around 17 mmHg.      Previous testing and notes have been reviewed by me.   Past Medical History:   Diagnosis Date    Anxiety and depression     Aortic root  dilatation 10/02/2017    Borderline--Noted on Echo    Aortic valve calcification 10/02/2017    Noted on Echo    Aortic valve insufficiency Dx in 07    w/AVR     Aortic valve prosthesis present 11/02/2018    Noted on CTA Chest    Ascending aorta dilatation 10/02/2017    Borderline--Noted on Echo    Asthma     Atrial fibrillation     2020    Atypical chest pain     Bronchitis, chronic 2014    CAD (coronary artery disease)     Cardiomegaly 2017    Cervicalgia     Chest pain due to CAD     CHF (congestive heart failure) 2024    Claustrophobia 1940    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    Borderline--Noted on Echo & Measured @ 2.6CM and Mid Descending @ 2.5CM on CTA Chest-11/2/18    DM (diabetes mellitus)     T2    Dyslipidemia 2014    Esophagitis     Fatigue     GERD (gastroesophageal reflux disease)     Controlled w/Meds    Headache     Heart murmur     History of echocardiogram 10/2/17-[facility]    EF 66%; Borderline Concentric Hypertrophy; Mild Calcification in AV; Mild AVS; Mild AVR; Moderate TVR; Borderline Dilation of Aortic Root/Arch & Borderline Dilation of Ascending/Proximal Aorta Present    History of hepatitis     AS A TEEN    History of transfusion 1960    Hyperlipidemia     Controlled w/Meds    Hypertension     Controlled w/Meds    Hypothyroidism     Controlled w/Synthroid    Insomnia     Macular degeneration, left eye     Mild aortic valve regurgitation 10/02/2017    Noted on Echo    Mild aortic valve stenosis 10/02/2017    Noted on Echo    Mild dilation of ascending aorta 10/02/2017    Borderline--Noted on Echo    Mitral valve prolapse 2007    Moderate tricuspid valve regurgitation 10/02/2017    Noted on Echo    Mood disorder in conditions classified elsewhere     Controlled w/Meds    Near syncope 03/2017-[facility]    Neuropathy     Osteoarthritis     Ankles/Feet    Pulmonary hypertension 2017    Renal insufficiency     RLS (restless legs syndrome)     Short of breath on exertion     Sleep apnea      CPAP    Stroke     UNCLEAR:  DOES NOT SEE NEUROLOGIST    Thoracic ascending aortic aneurysm Dx in 2007 @ 3.8CM & 1/02/2018    Noted @ 4CM on CTA Chest;    Urinary incontinence     WEARS PAD    UTI (urinary tract infection)     RECENT:  DIAGNOSED 6-19-24       Past Surgical History:   Procedure Laterality Date    AORTIC VALVE REPAIR/REPLACEMENT  2007    Dr. Perry    AORTIC VALVE REPAIR/REPLACEMENT N/A 6/25/2024    Procedure: TTE TRANSFEMORAL TRANSCATHETER AORTIC VALVE REPLACEMENT PERCUTANEOUS APPROACH;  Surgeon: Seth Salinas MD;  Location: [facility];  Service: Cardiothoracic;  Laterality: N/A;    AORTIC VALVE REPAIR/REPLACEMENT N/A 6/25/2024    Procedure: Transfemoral Transcatheter Aortic Valve Replacement with intraoperative transthoracic echocardiogram and possible open surgical rescue;  Surgeon: Ranjan Hernadez MD;  Location: [facility];  Service: Cardiovascular;  Laterality: N/A;    APPENDECTOMY      AUGMENTATION MAMMAPLASTY  1976    BREAST AUGMENTATION  2014    BUNIONECTOMY Bilateral     CARDIAC ABLATION      CARDIAC CATHETERIZATION  2007    CARDIAC VALVE REPLACEMENT  2007    porcine    COLONOSCOPY  2010    COLONOSCOPY  03/02/2012    EYE SURGERY      cataracts and ens implants    HYSTERECTOMY  1972    SIGMOIDOSCOPY  2001    TEMPORAL ARTERY BIOPSY Bilateral 04/14/2023    Procedure: BILATERAL TEMPORAL ARTERY BIOPSY;  Surgeon: Stewart Shepherd MD;  Location: [facility];  Service: Vascular;  Laterality: Bilateral;    TOTAL HIP ARTHROPLASTY Right 11/15/2022    Procedure: Right anterior total hip arthroplasty;  Surgeon: Joao Martinez MD;  Location: [facility] OR;  Service: Orthopedics;  Laterality: Right;       Social History     Socioeconomic History    Marital status:    Tobacco Use    Smoking status: Never     Passive exposure: Never    Smokeless tobacco: Never    Tobacco comments:     caffeine use - 1 cup coffee daily    Vaping Use    Vaping status: Never Used    Substance and Sexual Activity    Alcohol use: Yes     Comment: less than 1 glass of wine per week    Drug use: Never    Sexual activity: Not Currently     Partners: Male     Birth control/protection: Surgical     Comment: Hyst       Family History   Problem Relation Age of Onset    Hypertension Mother     Stroke Mother     Cancer Mother     Diabetes Sister     Coronary artery disease Brother     Diabetes Brother     Valvular heart disease Brother         TAVR    Cancer Brother         bladder cancer    Coronary artery disease Brother     Cancer Brother     Birth defects Daughter     Skin cancer Neg Hx     Malig Hyperthermia Neg Hx        Review of Systems   Constitutional: Negative. Negative for fever and malaise/fatigue.   HENT: Negative.  Negative for nosebleeds and sore throat.    Eyes: Negative.  Negative for blurred vision and double vision.   Cardiovascular: Negative.  Negative for chest pain, claudication, palpitations and syncope.   Respiratory: Negative.  Negative for cough, shortness of breath and snoring.    Endocrine: Negative.  Negative for cold intolerance, heat intolerance and polydipsia.   Hematologic/Lymphatic:        Asa/ Eliquis   Skin: Negative.  Negative for itching, poor wound healing and rash.   Musculoskeletal: Negative.  Negative for joint pain, joint swelling, muscle weakness and myalgias.   Gastrointestinal: Negative.  Negative for abdominal pain, melena, nausea and vomiting.   Genitourinary: Negative.    Neurological: Negative.  Negative for light-headedness, loss of balance, seizures, vertigo and weakness.   Psychiatric/Behavioral: Negative.  Negative for altered mental status and depression.    Allergic/Immunologic: Negative.        No Known Allergies      Current Outpatient Medications:     acetaminophen (TYLENOL) 325 MG tablet, Take 2 tablets by mouth Every 4 (Four) Hours As Needed for Mild Pain., Disp: , Rfl:     acyclovir (Zovirax) 5 % ointment, Apply 1 Application topically to  the appropriate area as directed Every 3 (Three) Hours., Disp: 15 g, Rfl: 0    Advair HFA 45-21 MCG/ACT inhaler, Inhale 2 puffs 2 (Two) Times a Day. As needed, Disp: , Rfl:     amitriptyline (ELAVIL) 50 MG tablet, Take 1 tablet by mouth Every Night., Disp: 90 tablet, Rfl: 1    amLODIPine (NORVASC) 5 MG tablet, TAKE 1 TABLET DAILY, Disp: 90 tablet, Rfl: 1    apixaban (Eliquis) 5 MG tablet tablet, TAKE 1 TABLET BY MOUTH EVERY 12 HOURS, Disp: 180 tablet, Rfl: 1    ascorbic acid (VITAMIN C) 1000 MG tablet, Take 1 tablet by mouth Daily. Indications: Inadequate Vitamin C, Disp: , Rfl:     aspirin 81 MG EC tablet, Take 1 tablet by mouth Daily., Disp: 30 tablet, Rfl: 1    atorvastatin (LIPITOR) 80 MG tablet, TAKE 1 TABLET EVERY NIGHT FOR HIGH AMOUNT OF FATS IN THE BLOOD, Disp: 90 tablet, Rfl: 1    Blood Glucose Monitoring Suppl (Prodigy No Coding Blood Gluc) w/Device kit, 1 each Daily., Disp: 1 kit, Rfl: 0    fenofibrate micronized (LOFIBRA) 134 MG capsule, TAKE 1 CAPSULE EVERY       MORNING BEFORE BREAKFAST, Disp: 90 capsule, Rfl: 1    furosemide (LASIX) 40 MG tablet, TAKE 1 TABLET BY MOUTH EVERY DAY, Disp: 30 tablet, Rfl: 0    glucosamine-chondroitin 500-400 MG capsule capsule, Take 2 capsules by mouth Daily., Disp: , Rfl:     glucose blood (Prodigy No Coding Blood Gluc) test strip, TEST BLOOD SUGAR DAILY, Disp: 50 each, Rfl: 0    ipratropium (ATROVENT) 0.06 % nasal spray, INSTILL 2 SPRAYS IN EACH NOSTRIL EVERY SIX HOURS AS NEEDED (Patient taking differently: 1 spray into the nostril(s) as directed by provider As Needed.), Disp: 15 mL, Rfl: 0    latanoprost (XALATAN) 0.005 % ophthalmic solution, Administer 1 drop to both eyes Every Night., Disp: , Rfl:     levothyroxine (SYNTHROID, LEVOTHROID) 50 MCG tablet, TAKE 1 TABLET EVERY OTHER DAY FOR UNDERACTIVE THYROID, Disp: 45 tablet, Rfl: 1    levothyroxine (SYNTHROID, LEVOTHROID) 75 MCG tablet, TAKE 1 TABLET BY MOUTH EVERY OTHER DAY FOR UNDERACTIVE THYROID, Disp: 45 tablet,  "Rfl: 0    losartan (COZAAR) 100 MG tablet, Take 1 tablet by mouth Daily., Disp: 90 tablet, Rfl: 1    metFORMIN ER (GLUCOPHAGE-XR) 750 MG 24 hr tablet, TAKE 1 TABLET TWICE A DAY (Patient taking differently: Take 1 tablet by mouth 2 (Two) Times a Day.), Disp: 180 tablet, Rfl: 3    metoprolol tartrate (LOPRESSOR) 25 MG tablet, Take 1 tablet by mouth 2 (Two) Times a Day. Indications: High Blood Pressure Disorder, Disp: 180 tablet, Rfl: 1    Multiple Vitamins-Minerals (MULTIVITAL PO), Take 1 tablet by mouth Daily. Indications: potential deficiency, Disp: , Rfl:     omeprazole (priLOSEC) 40 MG capsule, Take 1 capsule by mouth Daily., Disp: 90 capsule, Rfl: 1    phenazopyridine (PYRIDIUM) 100 MG tablet, Take 1 tablet by mouth 3 (Three) Times a Day As Needed for Dysuria (Pain with urination)., Disp: 15 tablet, Rfl: 0    Prodigy Safety Lancets 26G misc, CHECK BLOOD SUGAR ONE TIME PER DAY, Disp: 100 each, Rfl: 1    Pumpkin Seed-Soy Germ (AZO Bladder Control/Go-Less) capsule, Take 1 capsule by mouth Daily., Disp: , Rfl:     valACYclovir (VALTREX) 1000 MG tablet, TAKE 2 TABLETS 4 TIMES     DAILY FOR MOUTH ULCER (Patient taking differently: Take 1 tablet by mouth As Needed. TAKE 2 TABLETS 4 TIMES     DAILY FOR MOUTH ULCER), Disp: 16 tablet, Rfl: 1    venlafaxine XR (EFFEXOR-XR) 150 MG 24 hr capsule, Take 1 capsule by mouth Daily., Disp: 90 capsule, Rfl: 3      Objective:     Vitals:    08/29/24 1133   BP: 106/60   Height: 165.1 cm (65\")     Body mass index is 21.8 kg/m².    PHYSICAL EXAM:    Constitutional:       Appearance: Healthy appearance. Not in distress.   Neck:      Vascular: No JVR. JVD normal.   Pulmonary:      Effort: Pulmonary effort is normal.      Breath sounds: Normal breath sounds. No wheezing. No rhonchi. No rales.   Chest:      Chest wall: Not tender to palpatation.   Cardiovascular:      PMI at left midclavicular line. Normal rate. Regular rhythm. Normal S1. Normal S2.       Murmurs: There is a systolic " murmur.      No gallop.  No click. No rub.   Pulses:     Intact distal pulses.   Edema:     Peripheral edema absent.   Abdominal:      General: Bowel sounds are normal.      Palpations: Abdomen is soft.      Tenderness: There is no abdominal tenderness.   Musculoskeletal: Normal range of motion.         General: No tenderness. Skin:     General: Skin is warm and dry.   Neurological:      General: No focal deficit present.      Mental Status: Alert and oriented to person, place and time.           ECG 12 Lead    Date/Time: 8/29/2024 12:09 PM  Performed by: Ranjan Hernadez MD    Authorized by: Ranjan Hernadez MD  Comparison: compared with previous ECG from 7/12/2024  Similar to previous ECG  Rhythm: sinus rhythm  Rate: normal  QRS axis: normal  Comments: Nonspecific T wave abnormalities inferior leads.          6/23/24  Successful deployment of a 23 mm Medtronic Evolut Pro transcatheter aortic heart valve as a valve in valve procedure.  Postdilated with a 20 mm true balloon.       Assessment:      Plan:       1.  Aortic valve stenosis status post valve in valve transcatheter aortic valve replacement with 23 mm Medtronic Evolut Pro transcatheter aortic valve.    -Transthoracic echocardiogram was performed today.  Valve is well-positioned.  I do not see any paravalvular regurgitation.  Mean gradient across the aortic valve is acceptable at 17 mmHg.  I will follow-up this with an echo at 1 year.  -Recommend continuing Eliquis therapy along with aspirin 81 mg p.o. daily.    2.  Paroxysmal atrial fibrillation: On beta-blocker and Eliquis.  Sinus rhythm on EKG today.    3.  History of CVA  -Currently on anticoagulation.  - Continue aspirin and statin at current dose.    4.  Hypertension: Low-normal.  No syncope.  She occasionally does get lightheaded when she is dehydrated and I have encouraged her to try to increase water intake.    5.  Diabetes type 2: HbA1C 6.0         Your medication list             Accurate as of August 29, 2024 11:43 AM. If you have any questions, ask your nurse or doctor.                CHANGE how you take these medications        Instructions Last Dose Given Next Dose Due   ipratropium 0.06 % nasal spray  Commonly known as: ATROVENT  What changed: See the new instructions.      INSTILL 2 SPRAYS IN EACH NOSTRIL EVERY SIX HOURS AS NEEDED       valACYclovir 1000 MG tablet  Commonly known as: VALTREX  What changed:   how much to take  how to take this  when to take this  reasons to take this      TAKE 2 TABLETS 4 TIMES     DAILY FOR MOUTH ULCER              CONTINUE taking these medications        Instructions Last Dose Given Next Dose Due   acetaminophen 325 MG tablet  Commonly known as: TYLENOL      Take 2 tablets by mouth Every 4 (Four) Hours As Needed for Mild Pain.       acyclovir 5 % ointment  Commonly known as: Zovirax      Apply 1 Application topically to the appropriate area as directed Every 3 (Three) Hours.       Advair HFA 45-21 MCG/ACT inhaler  Generic drug: fluticasone-salmeterol      Inhale 2 puffs 2 (Two) Times a Day. As needed       amitriptyline 50 MG tablet  Commonly known as: ELAVIL      Take 1 tablet by mouth Every Night.       amLODIPine 5 MG tablet  Commonly known as: NORVASC      TAKE 1 TABLET DAILY       ascorbic acid 1000 MG tablet  Commonly known as: VITAMIN C      Take 1 tablet by mouth Daily. Indications: Inadequate Vitamin C       aspirin 81 MG EC tablet      Take 1 tablet by mouth Daily.       atorvastatin 80 MG tablet  Commonly known as: LIPITOR      TAKE 1 TABLET EVERY NIGHT FOR HIGH AMOUNT OF FATS IN THE BLOOD       AZO Bladder Control/Go-Less capsule      Take 1 capsule by mouth Daily.       Eliquis 5 MG tablet tablet  Generic drug: apixaban      TAKE 1 TABLET BY MOUTH EVERY 12 HOURS       fenofibrate micronized 134 MG capsule  Commonly known as: LOFIBRA      TAKE 1 CAPSULE EVERY       MORNING BEFORE BREAKFAST       furosemide 40 MG tablet  Commonly known as:  LASIX      TAKE 1 TABLET BY MOUTH EVERY DAY       glucosamine-chondroitin 500-400 MG capsule capsule      Take 2 capsules by mouth Daily.       latanoprost 0.005 % ophthalmic solution  Commonly known as: XALATAN      Administer 1 drop to both eyes Every Night.       levothyroxine 50 MCG tablet  Commonly known as: SYNTHROID, LEVOTHROID      TAKE 1 TABLET EVERY OTHER DAY FOR UNDERACTIVE THYROID       levothyroxine 75 MCG tablet  Commonly known as: SYNTHROID, LEVOTHROID      TAKE 1 TABLET BY MOUTH EVERY OTHER DAY FOR UNDERACTIVE THYROID       losartan 100 MG tablet  Commonly known as: COZAAR      Take 1 tablet by mouth Daily.       metFORMIN  MG 24 hr tablet  Commonly known as: GLUCOPHAGE-XR      TAKE 1 TABLET TWICE A DAY       metoprolol tartrate 25 MG tablet  Commonly known as: LOPRESSOR      Take 1 tablet by mouth 2 (Two) Times a Day. Indications: High Blood Pressure Disorder       multivitamin with minerals tablet tablet      Take 1 tablet by mouth Daily. Indications: potential deficiency       omeprazole 40 MG capsule  Commonly known as: priLOSEC      Take 1 capsule by mouth Daily.       phenazopyridine 100 MG tablet  Commonly known as: PYRIDIUM      Take 1 tablet by mouth 3 (Three) Times a Day As Needed for Dysuria (Pain with urination).       Prodigy No Coding Blood Gluc test strip  Generic drug: glucose blood      TEST BLOOD SUGAR DAILY       Prodigy No Coding Blood Gluc w/Device kit      1 each Daily.       Prodigy Safety Lancets 26G misc      CHECK BLOOD SUGAR ONE TIME PER DAY       venlafaxine  MG 24 hr capsule  Commonly known as: EFFEXOR-XR      Take 1 capsule by mouth Daily.

## 2024-08-30 RX ORDER — FUROSEMIDE 40 MG
40 TABLET ORAL DAILY
Qty: 90 TABLET | Refills: 0 | Status: SHIPPED | OUTPATIENT
Start: 2024-08-30

## 2024-08-30 NOTE — TELEPHONE ENCOUNTER
"Caller: Dior Pettit \"Citlaly\"    Relationship: Self    Best call back number: [phone]    Requested Prescriptions:   Requested Prescriptions     Pending Prescriptions Disp Refills    furosemide (LASIX) 40 MG tablet [Pharmacy Med Name: Furosemide Oral Tablet 40 MG] 30 tablet 0     Sig: TAKE 1 TABLET BY MOUTH EVERY DAY        Pharmacy where request should be sent: [id number] - [phone] [facility] [phone]      Last office visit with prescribing clinician: Visit date not found   Last telemedicine visit with prescribing clinician: Visit date not found   Next office visit with prescribing clinician: Visit date not found     Additional details provided by patient: PATIENT WOULD LIKE 90 DAY SUPPLY IF POSSIBLE    Does the patient have less than a 3 day supply:  [] Yes  [x] No    Would you like a call back once the refill request has been completed: [] Yes [x] No    If the office needs to give you a call back, can they leave a voicemail: [] Yes [] No    Ritesh Candelaria Rep   08/30/24 12:24 EDT   "

## 2024-09-04 ENCOUNTER — HOSPITAL ENCOUNTER (INPATIENT)
Facility: HOSPITAL | Age: 84
LOS: 2 days | Discharge: HOME | DRG: 243 | End: 2024-09-07
Attending: EMERGENCY MEDICINE | Admitting: INTERNAL MEDICINE
Payer: MEDICARE

## 2024-09-04 ENCOUNTER — APPOINTMENT (OUTPATIENT)
Dept: GENERAL RADIOLOGY | Facility: HOSPITAL | Age: 84
DRG: 243 | End: 2024-09-04
Payer: MEDICARE

## 2024-09-04 DIAGNOSIS — I48.91 ATRIAL FIBRILLATION WITH RVR: ICD-10-CM

## 2024-09-04 DIAGNOSIS — I48.91 ATRIAL FIBRILLATION WITH RAPID VENTRICULAR RESPONSE: Primary | ICD-10-CM

## 2024-09-04 LAB
ALBUMIN SERPL-MCNC: 4.9 G/DL (ref 3.5–5.2)
ALBUMIN/GLOB SERPL: 1.8 G/DL
ALP SERPL-CCNC: 66 U/L (ref 39–117)
ALT SERPL W P-5'-P-CCNC: 19 U/L (ref 1–33)
ANION GAP SERPL CALCULATED.3IONS-SCNC: 12.7 MMOL/L (ref 5–15)
AST SERPL-CCNC: 25 U/L (ref 1–32)
BASOPHILS # BLD AUTO: 0.06 10*3/MM3 (ref 0–0.2)
BASOPHILS NFR BLD AUTO: 0.9 % (ref 0–1.5)
BILIRUB SERPL-MCNC: 0.3 MG/DL (ref 0–1.2)
BUN SERPL-MCNC: 26 MG/DL (ref 8–23)
BUN/CREAT SERPL: 24.3 (ref 7–25)
CALCIUM SPEC-SCNC: 10.1 MG/DL (ref 8.6–10.5)
CHLORIDE SERPL-SCNC: 101 MMOL/L (ref 98–107)
CO2 SERPL-SCNC: 26.3 MMOL/L (ref 22–29)
CREAT SERPL-MCNC: 1.07 MG/DL (ref 0.57–1)
DEPRECATED RDW RBC AUTO: 44.5 FL (ref 37–54)
EGFRCR SERPLBLD CKD-EPI 2021: 50.7 ML/MIN/1.73
EOSINOPHIL # BLD AUTO: 0.29 10*3/MM3 (ref 0–0.4)
EOSINOPHIL NFR BLD AUTO: 4.1 % (ref 0.3–6.2)
ERYTHROCYTE [DISTWIDTH] IN BLOOD BY AUTOMATED COUNT: 13.1 % (ref 12.3–15.4)
GLOBULIN UR ELPH-MCNC: 2.8 GM/DL
GLUCOSE SERPL-MCNC: 103 MG/DL (ref 65–99)
HCT VFR BLD AUTO: 39.3 % (ref 34–46.6)
HGB BLD-MCNC: 12.7 G/DL (ref 12–15.9)
HOLD SPECIMEN: NORMAL
HOLD SPECIMEN: NORMAL
IMM GRANULOCYTES # BLD AUTO: 0.03 10*3/MM3 (ref 0–0.05)
IMM GRANULOCYTES NFR BLD AUTO: 0.4 % (ref 0–0.5)
LYMPHOCYTES # BLD AUTO: 2.33 10*3/MM3 (ref 0.7–3.1)
LYMPHOCYTES NFR BLD AUTO: 33.2 % (ref 19.6–45.3)
MAGNESIUM SERPL-MCNC: 1.8 MG/DL (ref 1.6–2.4)
MCH RBC QN AUTO: 29.6 PG (ref 26.6–33)
MCHC RBC AUTO-ENTMCNC: 32.3 G/DL (ref 31.5–35.7)
MCV RBC AUTO: 91.6 FL (ref 79–97)
MONOCYTES # BLD AUTO: 0.78 10*3/MM3 (ref 0.1–0.9)
MONOCYTES NFR BLD AUTO: 11.1 % (ref 5–12)
NEUTROPHILS NFR BLD AUTO: 3.52 10*3/MM3 (ref 1.7–7)
NEUTROPHILS NFR BLD AUTO: 50.3 % (ref 42.7–76)
NRBC BLD AUTO-RTO: 0 /100 WBC (ref 0–0.2)
PLATELET # BLD AUTO: 307 10*3/MM3 (ref 140–450)
PMV BLD AUTO: 9.5 FL (ref 6–12)
POTASSIUM SERPL-SCNC: 4.3 MMOL/L (ref 3.5–5.2)
PROT SERPL-MCNC: 7.7 G/DL (ref 6–8.5)
RBC # BLD AUTO: 4.29 10*6/MM3 (ref 3.77–5.28)
SODIUM SERPL-SCNC: 140 MMOL/L (ref 136–145)
TROPONIN T SERPL HS-MCNC: 11 NG/L
WBC NRBC COR # BLD AUTO: 7.01 10*3/MM3 (ref 3.4–10.8)
WHOLE BLOOD HOLD COAG: NORMAL
WHOLE BLOOD HOLD SPECIMEN: NORMAL

## 2024-09-04 PROCEDURE — 80053 COMPREHEN METABOLIC PANEL: CPT | Performed by: EMERGENCY MEDICINE

## 2024-09-04 PROCEDURE — 93010 ELECTROCARDIOGRAM REPORT: CPT | Performed by: INTERNAL MEDICINE

## 2024-09-04 PROCEDURE — 84484 ASSAY OF TROPONIN QUANT: CPT | Performed by: EMERGENCY MEDICINE

## 2024-09-04 PROCEDURE — 71045 X-RAY EXAM CHEST 1 VIEW: CPT

## 2024-09-04 PROCEDURE — 83735 ASSAY OF MAGNESIUM: CPT | Performed by: EMERGENCY MEDICINE

## 2024-09-04 PROCEDURE — 93005 ELECTROCARDIOGRAM TRACING: CPT | Performed by: EMERGENCY MEDICINE

## 2024-09-04 PROCEDURE — 85025 COMPLETE CBC W/AUTO DIFF WBC: CPT | Performed by: EMERGENCY MEDICINE

## 2024-09-04 PROCEDURE — 99291 CRITICAL CARE FIRST HOUR: CPT

## 2024-09-04 PROCEDURE — 93005 ELECTROCARDIOGRAM TRACING: CPT

## 2024-09-04 RX ORDER — SODIUM CHLORIDE 0.9 % (FLUSH) 0.9 %
10 SYRINGE (ML) INJECTION AS NEEDED
Status: DISCONTINUED | OUTPATIENT
Start: 2024-09-04 | End: 2024-09-07 | Stop reason: HOSPADM

## 2024-09-04 RX ADMIN — METOPROLOL TARTRATE 5 MG: 1 INJECTION, SOLUTION INTRAVENOUS at 23:41

## 2024-09-04 RX ADMIN — METOPROLOL TARTRATE 5 MG: 1 INJECTION, SOLUTION INTRAVENOUS at 22:51

## 2024-09-04 NOTE — Clinical Note
A 7 fr sheath was successfully inserted using micropuncture technique with ultrasound guidance into the left axillary vein. Sheath insertion not delayed.

## 2024-09-05 PROBLEM — I48.91 ATRIAL FIBRILLATION WITH RAPID VENTRICULAR RESPONSE: Status: ACTIVE | Noted: 2024-09-05

## 2024-09-05 LAB
ANION GAP SERPL CALCULATED.3IONS-SCNC: 7.7 MMOL/L (ref 5–15)
BASOPHILS # BLD MANUAL: 0.08 10*3/MM3 (ref 0–0.2)
BASOPHILS NFR BLD MANUAL: 1 % (ref 0–1.5)
BUN SERPL-MCNC: 24 MG/DL (ref 8–23)
BUN/CREAT SERPL: 25.8 (ref 7–25)
CALCIUM SPEC-SCNC: 9.3 MG/DL (ref 8.6–10.5)
CHLORIDE SERPL-SCNC: 104 MMOL/L (ref 98–107)
CHOLEST SERPL-MCNC: 144 MG/DL (ref 0–200)
CO2 SERPL-SCNC: 23.3 MMOL/L (ref 22–29)
CREAT SERPL-MCNC: 0.93 MG/DL (ref 0.57–1)
DEPRECATED RDW RBC AUTO: 41.6 FL (ref 37–54)
EGFRCR SERPLBLD CKD-EPI 2021: 60 ML/MIN/1.73
EOSINOPHIL # BLD MANUAL: 0 10*3/MM3 (ref 0–0.4)
EOSINOPHIL NFR BLD MANUAL: 0 % (ref 0.3–6.2)
ERYTHROCYTE [DISTWIDTH] IN BLOOD BY AUTOMATED COUNT: 12.8 % (ref 12.3–15.4)
GEN 5 2HR TROPONIN T REFLEX: 24 NG/L
GLUCOSE BLDC GLUCOMTR-MCNC: 105 MG/DL (ref 70–130)
GLUCOSE BLDC GLUCOMTR-MCNC: 118 MG/DL (ref 70–130)
GLUCOSE BLDC GLUCOMTR-MCNC: 159 MG/DL (ref 70–130)
GLUCOSE BLDC GLUCOMTR-MCNC: 176 MG/DL (ref 70–130)
GLUCOSE SERPL-MCNC: 115 MG/DL (ref 65–99)
HBA1C MFR BLD: 5.6 % (ref 4.8–5.6)
HCT VFR BLD AUTO: 34.1 % (ref 34–46.6)
HDLC SERPL-MCNC: 46 MG/DL (ref 40–60)
HGB BLD-MCNC: 11.2 G/DL (ref 12–15.9)
LDLC SERPL CALC-MCNC: 76 MG/DL (ref 0–100)
LDLC/HDLC SERPL: 1.6 {RATIO}
LYMPHOCYTES # BLD MANUAL: 2.07 10*3/MM3 (ref 0.7–3.1)
LYMPHOCYTES NFR BLD MANUAL: 3 % (ref 5–12)
MAGNESIUM SERPL-MCNC: 1.6 MG/DL (ref 1.6–2.4)
MCH RBC QN AUTO: 29.6 PG (ref 26.6–33)
MCHC RBC AUTO-ENTMCNC: 32.8 G/DL (ref 31.5–35.7)
MCV RBC AUTO: 90.2 FL (ref 79–97)
MONOCYTES # BLD: 0.23 10*3/MM3 (ref 0.1–0.9)
NEUTROPHILS # BLD AUTO: 5.29 10*3/MM3 (ref 1.7–7)
NEUTROPHILS NFR BLD MANUAL: 69 % (ref 42.7–76)
NT-PROBNP SERPL-MCNC: 1085 PG/ML (ref 0–1800)
PLAT MORPH BLD: NORMAL
PLATELET # BLD AUTO: 253 10*3/MM3 (ref 140–450)
PMV BLD AUTO: 9.3 FL (ref 6–12)
POIKILOCYTOSIS BLD QL SMEAR: ABNORMAL
POTASSIUM SERPL-SCNC: 4.1 MMOL/L (ref 3.5–5.2)
QT INTERVAL: 308 MS
QT INTERVAL: 326 MS
QTC INTERVAL: 467 MS
QTC INTERVAL: 469 MS
RBC # BLD AUTO: 3.78 10*6/MM3 (ref 3.77–5.28)
SODIUM SERPL-SCNC: 135 MMOL/L (ref 136–145)
TRIGL SERPL-MCNC: 123 MG/DL (ref 0–150)
TROPONIN T DELTA: 3 NG/L
TROPONIN T SERPL HS-MCNC: 15 NG/L
TROPONIN T SERPL HS-MCNC: 21 NG/L
TSH SERPL DL<=0.05 MIU/L-ACNC: 4.72 UIU/ML (ref 0.27–4.2)
VARIANT LYMPHS NFR BLD MANUAL: 27 % (ref 19.6–45.3)
VLDLC SERPL-MCNC: 22 MG/DL (ref 5–40)
WBC MORPH BLD: NORMAL
WBC NRBC COR # BLD AUTO: 7.66 10*3/MM3 (ref 3.4–10.8)

## 2024-09-05 PROCEDURE — 25010000002 AMIODARONE IN DEXTROSE 5% 360-4.14 MG/200ML-% SOLUTION: Performed by: PHYSICIAN ASSISTANT

## 2024-09-05 PROCEDURE — 93010 ELECTROCARDIOGRAM REPORT: CPT | Performed by: INTERNAL MEDICINE

## 2024-09-05 PROCEDURE — 82948 REAGENT STRIP/BLOOD GLUCOSE: CPT

## 2024-09-05 PROCEDURE — 99222 1ST HOSP IP/OBS MODERATE 55: CPT

## 2024-09-05 PROCEDURE — 85027 COMPLETE CBC AUTOMATED: CPT | Performed by: PHYSICIAN ASSISTANT

## 2024-09-05 PROCEDURE — 36415 COLL VENOUS BLD VENIPUNCTURE: CPT

## 2024-09-05 PROCEDURE — 80048 BASIC METABOLIC PNL TOTAL CA: CPT | Performed by: PHYSICIAN ASSISTANT

## 2024-09-05 PROCEDURE — 85007 BL SMEAR W/DIFF WBC COUNT: CPT | Performed by: PHYSICIAN ASSISTANT

## 2024-09-05 PROCEDURE — 93005 ELECTROCARDIOGRAM TRACING: CPT | Performed by: PHYSICIAN ASSISTANT

## 2024-09-05 PROCEDURE — 25810000003 SODIUM CHLORIDE 0.9 % SOLUTION: Performed by: PHYSICIAN ASSISTANT

## 2024-09-05 PROCEDURE — 83880 ASSAY OF NATRIURETIC PEPTIDE: CPT | Performed by: PHYSICIAN ASSISTANT

## 2024-09-05 PROCEDURE — 63710000001 INSULIN LISPRO (HUMAN) PER 5 UNITS: Performed by: PHYSICIAN ASSISTANT

## 2024-09-05 PROCEDURE — 80061 LIPID PANEL: CPT | Performed by: PHYSICIAN ASSISTANT

## 2024-09-05 PROCEDURE — 25010000002 DIGOXIN PER 500 MCG

## 2024-09-05 PROCEDURE — 25010000002 AMIODARONE IN DEXTROSE 5% 150-4.21 MG/100ML-% SOLUTION: Performed by: PHYSICIAN ASSISTANT

## 2024-09-05 PROCEDURE — 84484 ASSAY OF TROPONIN QUANT: CPT | Performed by: STUDENT IN AN ORGANIZED HEALTH CARE EDUCATION/TRAINING PROGRAM

## 2024-09-05 PROCEDURE — 83735 ASSAY OF MAGNESIUM: CPT | Performed by: PHYSICIAN ASSISTANT

## 2024-09-05 PROCEDURE — 84484 ASSAY OF TROPONIN QUANT: CPT | Performed by: PHYSICIAN ASSISTANT

## 2024-09-05 PROCEDURE — 25810000003 SODIUM CHLORIDE 0.9 % SOLUTION: Performed by: STUDENT IN AN ORGANIZED HEALTH CARE EDUCATION/TRAINING PROGRAM

## 2024-09-05 PROCEDURE — 84443 ASSAY THYROID STIM HORMONE: CPT | Performed by: PHYSICIAN ASSISTANT

## 2024-09-05 PROCEDURE — 83036 HEMOGLOBIN GLYCOSYLATED A1C: CPT | Performed by: PHYSICIAN ASSISTANT

## 2024-09-05 RX ORDER — DILTIAZEM HYDROCHLORIDE 5 MG/ML
15 INJECTION INTRAVENOUS ONCE
Status: COMPLETED | OUTPATIENT
Start: 2024-09-05 | End: 2024-09-05

## 2024-09-05 RX ORDER — LEVOTHYROXINE SODIUM 25 UG/1
25 TABLET ORAL
Status: DISCONTINUED | OUTPATIENT
Start: 2024-09-05 | End: 2024-09-07 | Stop reason: HOSPADM

## 2024-09-05 RX ORDER — IBUPROFEN 600 MG/1
1 TABLET ORAL
Status: DISCONTINUED | OUTPATIENT
Start: 2024-09-05 | End: 2024-09-07 | Stop reason: HOSPADM

## 2024-09-05 RX ORDER — INSULIN LISPRO 100 [IU]/ML
2-9 INJECTION, SOLUTION INTRAVENOUS; SUBCUTANEOUS
Status: DISCONTINUED | OUTPATIENT
Start: 2024-09-05 | End: 2024-09-07 | Stop reason: HOSPADM

## 2024-09-05 RX ORDER — DILTIAZEM HYDROCHLORIDE 5 MG/ML
10 INJECTION INTRAVENOUS ONCE
Status: DISCONTINUED | OUTPATIENT
Start: 2024-09-05 | End: 2024-09-05

## 2024-09-05 RX ORDER — DIGOXIN 0.25 MG/ML
250 INJECTION INTRAMUSCULAR; INTRAVENOUS ONCE
Status: COMPLETED | OUTPATIENT
Start: 2024-09-05 | End: 2024-09-05

## 2024-09-05 RX ORDER — PANTOPRAZOLE SODIUM 40 MG/1
40 TABLET, DELAYED RELEASE ORAL
Status: DISCONTINUED | OUTPATIENT
Start: 2024-09-05 | End: 2024-09-07 | Stop reason: HOSPADM

## 2024-09-05 RX ORDER — ASCORBIC ACID 500 MG
1000 TABLET ORAL DAILY
Status: DISCONTINUED | OUTPATIENT
Start: 2024-09-05 | End: 2024-09-07 | Stop reason: HOSPADM

## 2024-09-05 RX ORDER — ATENOLOL 25 MG/1
25 TABLET ORAL EVERY 12 HOURS SCHEDULED
Status: DISCONTINUED | OUTPATIENT
Start: 2024-09-05 | End: 2024-09-07 | Stop reason: HOSPADM

## 2024-09-05 RX ORDER — DILTIAZEM HYDROCHLORIDE 120 MG/1
120 CAPSULE, COATED, EXTENDED RELEASE ORAL
Status: DISCONTINUED | OUTPATIENT
Start: 2024-09-05 | End: 2024-09-05

## 2024-09-05 RX ORDER — LOSARTAN POTASSIUM 100 MG/1
100 TABLET ORAL DAILY
Status: DISCONTINUED | OUTPATIENT
Start: 2024-09-05 | End: 2024-09-07 | Stop reason: HOSPADM

## 2024-09-05 RX ORDER — SODIUM CHLORIDE 0.9 % (FLUSH) 0.9 %
10 SYRINGE (ML) INJECTION EVERY 12 HOURS SCHEDULED
Status: DISCONTINUED | OUTPATIENT
Start: 2024-09-05 | End: 2024-09-07 | Stop reason: HOSPADM

## 2024-09-05 RX ORDER — POLYETHYLENE GLYCOL 3350 17 G/17G
17 POWDER, FOR SOLUTION ORAL DAILY PRN
Status: DISCONTINUED | OUTPATIENT
Start: 2024-09-05 | End: 2024-09-07 | Stop reason: HOSPADM

## 2024-09-05 RX ORDER — AMOXICILLIN 250 MG
2 CAPSULE ORAL 2 TIMES DAILY PRN
Status: DISCONTINUED | OUTPATIENT
Start: 2024-09-05 | End: 2024-09-07 | Stop reason: HOSPADM

## 2024-09-05 RX ORDER — LATANOPROST 50 UG/ML
1 SOLUTION/ DROPS OPHTHALMIC NIGHTLY
Status: DISCONTINUED | OUTPATIENT
Start: 2024-09-05 | End: 2024-09-05

## 2024-09-05 RX ORDER — DIGOXIN 0.25 MG/ML
500 INJECTION INTRAMUSCULAR; INTRAVENOUS ONCE
Status: COMPLETED | OUTPATIENT
Start: 2024-09-05 | End: 2024-09-05

## 2024-09-05 RX ORDER — ONDANSETRON 4 MG/1
4 TABLET, ORALLY DISINTEGRATING ORAL EVERY 6 HOURS PRN
Status: DISCONTINUED | OUTPATIENT
Start: 2024-09-05 | End: 2024-09-07 | Stop reason: HOSPADM

## 2024-09-05 RX ORDER — VENLAFAXINE HYDROCHLORIDE 150 MG/1
150 CAPSULE, EXTENDED RELEASE ORAL DAILY
Status: DISCONTINUED | OUTPATIENT
Start: 2024-09-05 | End: 2024-09-07 | Stop reason: HOSPADM

## 2024-09-05 RX ORDER — BUDESONIDE AND FORMOTEROL FUMARATE DIHYDRATE 160; 4.5 UG/1; UG/1
1 AEROSOL RESPIRATORY (INHALATION) 2 TIMES DAILY PRN
Status: DISCONTINUED | OUTPATIENT
Start: 2024-09-05 | End: 2024-09-07 | Stop reason: HOSPADM

## 2024-09-05 RX ORDER — ATORVASTATIN CALCIUM 80 MG/1
80 TABLET, FILM COATED ORAL DAILY
Status: DISCONTINUED | OUTPATIENT
Start: 2024-09-05 | End: 2024-09-07 | Stop reason: HOSPADM

## 2024-09-05 RX ORDER — MORPHINE SULFATE 2 MG/ML
2 INJECTION, SOLUTION INTRAMUSCULAR; INTRAVENOUS ONCE
Status: DISCONTINUED | OUTPATIENT
Start: 2024-09-05 | End: 2024-09-05

## 2024-09-05 RX ORDER — SODIUM CHLORIDE 0.9 % (FLUSH) 0.9 %
10 SYRINGE (ML) INJECTION AS NEEDED
Status: DISCONTINUED | OUTPATIENT
Start: 2024-09-05 | End: 2024-09-07 | Stop reason: HOSPADM

## 2024-09-05 RX ORDER — ONDANSETRON 2 MG/ML
4 INJECTION INTRAMUSCULAR; INTRAVENOUS EVERY 6 HOURS PRN
Status: DISCONTINUED | OUTPATIENT
Start: 2024-09-05 | End: 2024-09-07 | Stop reason: HOSPADM

## 2024-09-05 RX ORDER — BISACODYL 10 MG
10 SUPPOSITORY, RECTAL RECTAL DAILY PRN
Status: DISCONTINUED | OUTPATIENT
Start: 2024-09-05 | End: 2024-09-07 | Stop reason: HOSPADM

## 2024-09-05 RX ORDER — METOPROLOL TARTRATE 25 MG/1
25 TABLET, FILM COATED ORAL 2 TIMES DAILY
Status: DISCONTINUED | OUTPATIENT
Start: 2024-09-05 | End: 2024-09-05

## 2024-09-05 RX ORDER — AMITRIPTYLINE HYDROCHLORIDE 50 MG/1
50 TABLET ORAL NIGHTLY
Status: DISCONTINUED | OUTPATIENT
Start: 2024-09-05 | End: 2024-09-07 | Stop reason: HOSPADM

## 2024-09-05 RX ORDER — SODIUM CHLORIDE 9 MG/ML
40 INJECTION, SOLUTION INTRAVENOUS AS NEEDED
Status: DISCONTINUED | OUTPATIENT
Start: 2024-09-05 | End: 2024-09-07 | Stop reason: HOSPADM

## 2024-09-05 RX ORDER — HYDROCODONE BITARTRATE AND ACETAMINOPHEN 5; 325 MG/1; MG/1
1 TABLET ORAL ONCE
Status: COMPLETED | OUTPATIENT
Start: 2024-09-05 | End: 2024-09-05

## 2024-09-05 RX ORDER — NICOTINE POLACRILEX 4 MG
15 LOZENGE BUCCAL
Status: DISCONTINUED | OUTPATIENT
Start: 2024-09-05 | End: 2024-09-07 | Stop reason: HOSPADM

## 2024-09-05 RX ORDER — NITROGLYCERIN 0.4 MG/1
0.4 TABLET SUBLINGUAL
Status: DISCONTINUED | OUTPATIENT
Start: 2024-09-05 | End: 2024-09-07 | Stop reason: HOSPADM

## 2024-09-05 RX ORDER — ACETAMINOPHEN 325 MG/1
650 TABLET ORAL EVERY 4 HOURS PRN
Status: DISCONTINUED | OUTPATIENT
Start: 2024-09-05 | End: 2024-09-07 | Stop reason: HOSPADM

## 2024-09-05 RX ORDER — ASPIRIN 81 MG/1
81 TABLET ORAL DAILY
Status: DISCONTINUED | OUTPATIENT
Start: 2024-09-05 | End: 2024-09-07 | Stop reason: HOSPADM

## 2024-09-05 RX ORDER — LEVOTHYROXINE SODIUM 50 UG/1
50 TABLET ORAL
Status: DISCONTINUED | OUTPATIENT
Start: 2024-09-05 | End: 2024-09-07 | Stop reason: HOSPADM

## 2024-09-05 RX ORDER — DEXTROSE MONOHYDRATE 25 G/50ML
25 INJECTION, SOLUTION INTRAVENOUS
Status: DISCONTINUED | OUTPATIENT
Start: 2024-09-05 | End: 2024-09-07 | Stop reason: HOSPADM

## 2024-09-05 RX ORDER — FUROSEMIDE 40 MG
40 TABLET ORAL DAILY
Status: DISCONTINUED | OUTPATIENT
Start: 2024-09-05 | End: 2024-09-07 | Stop reason: HOSPADM

## 2024-09-05 RX ORDER — LATANOPROST 50 UG/ML
1 SOLUTION/ DROPS OPHTHALMIC NIGHTLY
Status: DISCONTINUED | OUTPATIENT
Start: 2024-09-05 | End: 2024-09-07 | Stop reason: HOSPADM

## 2024-09-05 RX ORDER — BISACODYL 5 MG/1
5 TABLET, DELAYED RELEASE ORAL DAILY PRN
Status: DISCONTINUED | OUTPATIENT
Start: 2024-09-05 | End: 2024-09-07 | Stop reason: HOSPADM

## 2024-09-05 RX ORDER — IPRATROPIUM BROMIDE 42 UG/1
1 SPRAY, METERED NASAL AS NEEDED
Status: DISCONTINUED | OUTPATIENT
Start: 2024-09-05 | End: 2024-09-07 | Stop reason: HOSPADM

## 2024-09-05 RX ADMIN — Medication 10 ML: at 08:16

## 2024-09-05 RX ADMIN — PANTOPRAZOLE SODIUM 40 MG: 40 TABLET, DELAYED RELEASE ORAL at 05:03

## 2024-09-05 RX ADMIN — ATENOLOL 25 MG: 25 TABLET ORAL at 20:19

## 2024-09-05 RX ADMIN — INSULIN LISPRO 2 UNITS: 100 INJECTION, SOLUTION INTRAVENOUS; SUBCUTANEOUS at 17:30

## 2024-09-05 RX ADMIN — DILTIAZEM HYDROCHLORIDE 120 MG: 120 CAPSULE, EXTENDED RELEASE ORAL at 04:57

## 2024-09-05 RX ADMIN — AMITRIPTYLINE HYDROCHLORIDE 50 MG: 50 TABLET, FILM COATED ORAL at 20:19

## 2024-09-05 RX ADMIN — Medication 10 ML: at 03:15

## 2024-09-05 RX ADMIN — LEVOTHYROXINE SODIUM 50 MCG: 50 TABLET ORAL at 05:02

## 2024-09-05 RX ADMIN — DILTIAZEM HYDROCHLORIDE 15 MG: 5 INJECTION, SOLUTION INTRAVENOUS at 01:41

## 2024-09-05 RX ADMIN — AMIODARONE HYDROCHLORIDE 150 MG: 1.5 INJECTION, SOLUTION INTRAVENOUS at 06:55

## 2024-09-05 RX ADMIN — VENLAFAXINE HYDROCHLORIDE 150 MG: 150 CAPSULE, EXTENDED RELEASE ORAL at 08:16

## 2024-09-05 RX ADMIN — HYDROCODONE BITARTRATE AND ACETAMINOPHEN 1 TABLET: 5; 325 TABLET ORAL at 03:33

## 2024-09-05 RX ADMIN — DIGOXIN 250 MCG: 0.25 INJECTION INTRAMUSCULAR; INTRAVENOUS at 22:00

## 2024-09-05 RX ADMIN — Medication 10 ML: at 20:19

## 2024-09-05 RX ADMIN — DIGOXIN 500 MCG: 0.25 INJECTION INTRAMUSCULAR; INTRAVENOUS at 12:37

## 2024-09-05 RX ADMIN — APIXABAN 5 MG: 5 TABLET, FILM COATED ORAL at 08:16

## 2024-09-05 RX ADMIN — LATANOPROST 1 DROP: 50 SOLUTION OPHTHALMIC at 22:00

## 2024-09-05 RX ADMIN — Medication 10 MG: at 03:33

## 2024-09-05 RX ADMIN — AMIODARONE HYDROCHLORIDE 1 MG/MIN: 1.8 INJECTION, SOLUTION INTRAVENOUS at 07:13

## 2024-09-05 RX ADMIN — ATORVASTATIN CALCIUM 80 MG: 80 TABLET, FILM COATED ORAL at 08:16

## 2024-09-05 RX ADMIN — SODIUM CHLORIDE 1000 ML: 9 INJECTION, SOLUTION INTRAVENOUS at 01:16

## 2024-09-05 RX ADMIN — ASPIRIN 81 MG: 81 TABLET, COATED ORAL at 08:16

## 2024-09-05 RX ADMIN — OXYCODONE HYDROCHLORIDE AND ACETAMINOPHEN 1000 MG: 500 TABLET ORAL at 08:16

## 2024-09-05 RX ADMIN — LEVOTHYROXINE SODIUM 25 MCG: 25 TABLET ORAL at 05:03

## 2024-09-05 RX ADMIN — DIGOXIN 250 MCG: 0.25 INJECTION INTRAMUSCULAR; INTRAVENOUS at 17:30

## 2024-09-05 RX ADMIN — SODIUM CHLORIDE 250 ML: 9 INJECTION, SOLUTION INTRAVENOUS at 04:15

## 2024-09-05 NOTE — PROGRESS NOTES
MD ATTESTATION NOTE    The BACILIO and I have discussed this patient's history, physical exam, and treatment plan.  I have reviewed the documentation and personally had a face to face interaction with the patient. I affirm the documentation and agree with the treatment and plan.  The attached note describes my personal findings.      I provided a substantive portion of the care of the patient.  I personally performed the physical exam in its entirety, and below are my findings.       Brief HPI: Patient admitted to ER with A-fib rapid ventricular response.  Patient started on Cardizem and amiodarone.    PHYSICAL EXAM  ED Triage Vitals   Temp Heart Rate Resp BP SpO2   09/04/24 2209 09/04/24 2204 09/04/24 2204 09/04/24 2206 09/04/24 2204   97 °F (36.1 °C) (!) 136 18 (!) 128/106 96 %      Temp src Heart Rate Source Patient Position BP Location FiO2 (%)   09/04/24 2209 09/04/24 2204 09/04/24 2206 09/04/24 2206 --   Tympanic Monitor Lying Left arm          GENERAL: no acute distress  HENT: nares patent  EYES: no scleral icterus  CV: regular rhythm, normal rate  RESPIRATORY: normal effort  ABDOMEN: soft  MUSCULOSKELETAL: no deformity  NEURO: alert, moves all extremities, follows commands  PSYCH:  calm, cooperative  SKIN: warm, dry    Vital signs and nursing notes reviewed.        Plan: Cardiology consult.  Continue amiodarone drip.

## 2024-09-05 NOTE — ED NOTES
Nursing report ED to floor  Dior Pettit  84 y.o.  female    Dior Pettit is a 84 y.o. female with a medical history of HTN, NNEKA, GERD, anxiety, diabetes, A-fib, CVA who presents to the ED c/o acute palpitations.  Patient states she felt her heart racing 2 days ago.  Patient states she does intermittently go in and out of A-fib and usually this improves after 1 or 2 days while taking her doses of atenolol.  Patient took 2 extra doses today and has not had any control of her heart rate.  Patient denies chest pain, shortness of breath, lightheadedness   HPI :  HPI (Adult)  Stated Reason for Visit: Pt presents to ED with rapid heart rate since 1830 tonight while coming back from bathroom. Hx of afib and valve replacement. Pt is on blood thinners, last dose was this morning. Pt denies chest pain  History Obtained From: patient  Precipitating Event(s): unknown  Onset of Symptoms: sudden  Duration (Hours): 3.5    Chief Complaint  Chief Complaint   Patient presents with    Rapid Heart Rate       Admitting doctor:   Joao NICOLE MD    Admitting diagnosis:   The encounter diagnosis was Atrial fibrillation with RVR.    Code status:   Current Code Status       Date Active Code Status Order ID Comments User Context       Prior            Allergies:   Patient has no known allergies.    Isolation:   No active isolations    Intake and Output  No intake or output data in the 24 hours ending 09/05/24 0211    Weight:       09/04/24 2204   Weight: 61.2 kg (135 lb)       Most recent vitals:   Vitals:    09/05/24 0109 09/05/24 0131 09/05/24 0141 09/05/24 0201   BP: 100/72 121/91 114/92 107/71   Pulse: 115 120 (!) 125 82   Resp:       Temp:       TempSrc:       SpO2: 97% 95%  96%   Weight:       Height:           Active LDAs/IV Access:   Lines, Drains & Airways       Active LDAs       Name Placement date Placement time Site Days    Peripheral IV 09/04/24 2215 Right Antecubital 09/04/24 2215  Antecubital  less than 1                     Labs (abnormal labs have a star):   Labs Reviewed   COMPREHENSIVE METABOLIC PANEL - Abnormal; Notable for the following components:       Result Value    Glucose 103 (*)     BUN 26 (*)     Creatinine 1.07 (*)     eGFR 50.7 (*)     All other components within normal limits    Narrative:     GFR Normal >60  Chronic Kidney Disease <60  Kidney Failure <15    The GFR formula is only valid for adults with stable renal function between ages 18 and 70.   SINGLE HS TROPONIN T - Abnormal; Notable for the following components:    HS Troponin T 15 (*)     All other components within normal limits    Narrative:     High Sensitive Troponin T Reference Range:  <14.0 ng/L- Negative Female for AMI  <22.0 ng/L- Negative Male for AMI  >=14 - Abnormal Female indicating possible myocardial injury.  >=22 - Abnormal Male indicating possible myocardial injury.   Clinicians would have to utilize clinical acumen, EKG, Troponin, and serial changes to determine if it is an Acute Myocardial Infarction or myocardial injury due to an underlying chronic condition.        TROPONIN - Normal    Narrative:     High Sensitive Troponin T Reference Range:  <14.0 ng/L- Negative Female for AMI  <22.0 ng/L- Negative Male for AMI  >=14 - Abnormal Female indicating possible myocardial injury.  >=22 - Abnormal Male indicating possible myocardial injury.   Clinicians would have to utilize clinical acumen, EKG, Troponin, and serial changes to determine if it is an Acute Myocardial Infarction or myocardial injury due to an underlying chronic condition.        CBC WITH AUTO DIFFERENTIAL - Normal   MAGNESIUM - Normal   RAINBOW DRAW    Narrative:     The following orders were created for panel order [place] Draw.  Procedure                               Abnormality         Status                     ---------                               -----------         ------                     Green Top (Gel)[[id number]]                                   Final result               Lavender Top[[id number]]                                     Final result               Gold Top - SST[[id number]]                                   Final result               Light Blue Top[[id number]]                                   Final result                 Please view results for these tests on the individual orders.   CBC AND DIFFERENTIAL    Narrative:     The following orders were created for panel order CBC & Differential.  Procedure                               Abnormality         Status                     ---------                               -----------         ------                     CBC Auto Differential[[id number]]        Normal              Final result                 Please view results for these tests on the individual orders.   GREEN TOP   LAVENDER TOP   GOLD TOP - SST   LIGHT BLUE TOP       EKG:   ECG 12 Lead ED Triage Standing Order; Chest Pain   Preliminary Result   HEART HAPO=810  bpm   RR Acqshugd=809  ms   CO Interval=  ms   P Horizontal Axis=  deg   P Front Axis=  deg   QRSD Atkxktao=106  ms   QT Oriuvzpg=892  ms   NLyH=594  ms   QRS Axis=-42  deg   T Wave Axis=139  deg   - ABNORMAL ECG -   Atrial fibrillation   Left anterior fascicular block   Abnormal R-wave progression, late transition   LVH with secondary repolarization abnormality   Date and Time of Study:2024-09-04 22:06:34          Meds given in ED:   Medications   sodium chloride 0.9 % flush 10 mL (has no administration in time range)   dilTIAZem CD (CARDIZEM CD) 24 hr capsule 120 mg (has no administration in time range)   metoprolol tartrate (LOPRESSOR) injection 5 mg (5 mg Intravenous Given 9/4/24 2251)   metoprolol tartrate (LOPRESSOR) injection 5 mg (5 mg Intravenous Given 9/4/24 2341)   sodium chloride 0.9 % bolus 1,000 mL (1,000 mL Intravenous New Bag 9/5/24 0116)   dilTIAZem (CARDIZEM) injection 15 mg (15 mg Intravenous Given 9/5/24 0141)       Imaging results:  XR Chest 1 View    Result  Date: 9/4/2024  No acute findings.  This report was finalized on 9/4/2024 11:36 PM by Dr. Milady Nunez M.D on Workstation: [id number]       Ambulatory status:   - with assistance    Social issues:   Social History     Socioeconomic History    Marital status:    Tobacco Use    Smoking status: Never     Passive exposure: Never    Smokeless tobacco: Never    Tobacco comments:     caffeine use - 1 cup coffee daily    Vaping Use    Vaping status: Never Used   Substance and Sexual Activity    Alcohol use: Yes     Comment: less than 1 glass of wine per week    Drug use: Never    Sexual activity: Not Currently     Partners: Male     Birth control/protection: Surgical     Comment: Hyst       Peripheral Neurovascular  Peripheral Neurovascular (Adult)  Peripheral Neurovascular WDL: WDL    Neuro Cognitive  Neuro Cognitive (Adult)  Cognitive/Neuro/Behavioral WDL: WDL    Learning  Learning Assessment (Adult)  Learning Readiness and Ability: no barriers identified  Education Provided  Person Taught: patient  Teaching Method: verbal instruction  Teaching Focus: symptom/problem overview  Education Outcome Evaluation: verbalizes understanding    Respiratory  Respiratory WDL  Respiratory WDL: WDL    Abdominal Pain       Pain Assessments  Pain (Adult)  (0-10) Pain Rating: Rest: 0    NIH Stroke Scale       Ning Nguyen RN  09/05/24 02:11 EDT

## 2024-09-05 NOTE — PLAN OF CARE
Goal Outcome Evaluation:              Outcome Evaluation: pt is AOX4, VSS, pt to be admitted to an inpatient cardiac unit, pt has been in afib with an uncontrolled rate all shift, pt has not complained of any chest pain or SOB, pts BP has been running soft all shift, cardiology is following, pt to be npo at midnight for surgery in the am, pt has no further questions at this time, pt is resting at this time

## 2024-09-05 NOTE — H&P
[facility]   HISTORY AND PHYSICAL    Patient Name: Dior Pettit  : 1940  MRN: [id number]  Primary Care Physician:  Ashanti Hong APRN  Date of admission: 2024    Subjective   Subjective     Chief Complaint:   Chief Complaint   Patient presents with    Rapid Heart Rate         HPI:    Dior Pettit is a 84 y.o. female who comes in complaining of palpitations.  Patient states that patient tried to rest to sleep but this did not help.  Patient states that 2 days ago she of feeling palpitations and her heart racing eventually go away.  Patient states she takes a beta-blocker that usually helps with this.  Patient denies any chest pain, shortness of breath or feeling like she is can pass out.  Patient states that she somewhat felt lightheaded was symptom onset earlier today.    In the ED, initial troponin 11, repeat troponin of 15, serum creatinine of 1.07 which is about baseline for patient.  Otherwise unremarkable CBC and CMP for acute findings.  Chest x-ray shows no acute findings.  EKG shows atrial fibrillation rate 139 bpm, no ST elevation apparent.  Patient is afebrile, pulse 130s, on room air oxygen and 96% SpO2 and blood pressure normotensive.  Patient was given Lopressor with no improvement, patient was given Cardizem IV with improvement.  Patient was given 1 L normal saline bolus in ED.      Review of Systems   All systems were reviewed and negative except for: as per HPI    Personal History     Past Medical History:   Diagnosis Date    Anxiety and depression     Aortic root dilatation 10/02/2017    Borderline--Noted on Echo    Aortic valve calcification 10/02/2017    Noted on Echo    Aortic valve insufficiency Dx in     w/AVR     Aortic valve prosthesis present 2018    Noted on CTA Chest    Ascending aorta dilatation 10/02/2017    Borderline--Noted on Echo    Asthma     Atrial fibrillation         Atypical chest pain     Bronchitis, chronic 2014    CAD (coronary artery  disease)     Cardiomegaly 2017    Cervicalgia     Chest pain due to CAD     CHF (congestive heart failure) 2024    Claustrophobia 1940    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    Borderline--Noted on Echo & Measured @ 2.6CM and Mid Descending @ 2.5CM on CTA Chest-11/2/18    DM (diabetes mellitus)     T2    Dyslipidemia 2014    Esophagitis     Fatigue     GERD (gastroesophageal reflux disease)     Controlled w/Meds    Headache     Heart murmur     History of echocardiogram 10/2/17-[facility]    EF 66%; Borderline Concentric Hypertrophy; Mild Calcification in AV; Mild AVS; Mild AVR; Moderate TVR; Borderline Dilation of Aortic Root/Arch & Borderline Dilation of Ascending/Proximal Aorta Present    History of hepatitis     AS A TEEN    History of transfusion 1960    Hyperlipidemia     Controlled w/Meds    Hypertension     Controlled w/Meds    Hypothyroidism     Controlled w/Synthroid    Insomnia     Macular degeneration, left eye     Mild aortic valve regurgitation 10/02/2017    Noted on Echo    Mild aortic valve stenosis 10/02/2017    Noted on Echo    Mild dilation of ascending aorta 10/02/2017    Borderline--Noted on Echo    Mitral valve prolapse 2007    Moderate tricuspid valve regurgitation 10/02/2017    Noted on Echo    Mood disorder in conditions classified elsewhere     Controlled w/Meds    Near syncope 03/2017-[facility] ER    Neuropathy     Osteoarthritis     Ankles/Feet    Pulmonary hypertension 2017    Renal insufficiency     RLS (restless legs syndrome)     Short of breath on exertion     Sleep apnea     CPAP    Stroke     UNCLEAR:  DOES NOT SEE NEUROLOGIST    Thoracic ascending aortic aneurysm Dx in 2007 @ 3.8CM & 1/02/2018    Noted @ 4CM on CTA Chest;    Urinary incontinence     WEARS PAD    UTI (urinary tract infection)     RECENT:  DIAGNOSED 6-19-24       Past Surgical History:   Procedure Laterality Date    AORTIC VALVE REPAIR/REPLACEMENT  2007    Dr. Perry    AORTIC VALVE REPAIR/REPLACEMENT N/A 6/25/2024     Procedure: TTE TRANSFEMORAL TRANSCATHETER AORTIC VALVE REPLACEMENT PERCUTANEOUS APPROACH;  Surgeon: Seth Salinas MD;  Location: [facility];  Service: Cardiothoracic;  Laterality: N/A;    AORTIC VALVE REPAIR/REPLACEMENT N/A 6/25/2024    Procedure: Transfemoral Transcatheter Aortic Valve Replacement with intraoperative transthoracic echocardiogram and possible open surgical rescue;  Surgeon: Ranjan Hernadez MD;  [facility];  Service: Cardiovascular;  Laterality: N/A;    APPENDECTOMY      AUGMENTATION MAMMAPLASTY  1976    BREAST AUGMENTATION  2014    BUNIONECTOMY Bilateral     CARDIAC ABLATION      CARDIAC CATHETERIZATION  2007    CARDIAC VALVE REPLACEMENT  2007    porcine    COLONOSCOPY  2010    COLONOSCOPY  03/02/2012    EYE SURGERY      cataracts and ens implants    HYSTERECTOMY  1972    SIGMOIDOSCOPY  2001    TEMPORAL ARTERY BIOPSY Bilateral 04/14/2023    Procedure: BILATERAL TEMPORAL ARTERY BIOPSY;  Surgeon: Stewart Shepherd MD;  Location: [facility] OR;  Service: Vascular;  Laterality: Bilateral;    TOTAL HIP ARTHROPLASTY Right 11/15/2022    Procedure: Right anterior total hip arthroplasty;  Surgeon: Joao Martinez MD;  Location: [facility] MAIN OR;  Service: Orthopedics;  Laterality: Right;       Family History: family history includes Birth defects in her daughter; Cancer in her brother, brother, and mother; Coronary artery disease in her brother and brother; Diabetes in her brother and sister; Hypertension in her mother; Stroke in her mother; Valvular heart disease in her brother. Otherwise pertinent FHx was reviewed and not pertinent to current issue.    Social History:  reports that she has never smoked. She has never been exposed to tobacco smoke. She has never used smokeless tobacco. She reports current alcohol use. She reports that she does not use drugs.    Home Medications:  AZO Bladder Control/Go-Less, Prodigy No Coding Blood Gluc, Prodigy Safety Lancets 26G,  acetaminophen, acyclovir, amLODIPine, amitriptyline, apixaban, ascorbic acid, aspirin, atorvastatin, fenofibrate micronized, fluticasone-salmeterol, furosemide, glucosamine-chondroitin, glucose blood, ipratropium, latanoprost, levothyroxine, losartan, metFORMIN ER, metoprolol tartrate, multivitamin with minerals, omeprazole, phenazopyridine, valACYclovir, and venlafaxine XR    Allergies:  No Known Allergies    Objective   Objective     Vitals:   Temp:  [97 °F (36.1 °C)-97.7 °F (36.5 °C)] 97.7 °F (36.5 °C)  Heart Rate:  [] 141  Resp:  [18-22] 22  BP: ()/() 90/67  Physical Exam    Constitutional: Awake, alert   Eyes: PERRLA, sclerae anicteric, no conjunctival injection   HENT: NCAT, mucous membranes moist   Neck: Supple, no thyromegaly, no lymphadenopathy, trachea midline   Respiratory: Clear to auscultation bilaterally, nonlabored respirations    Cardiovascular: RRR, no murmurs, rubs, or gallops, palpable pedal pulses bilaterally   Gastrointestinal: Positive bowel sounds, soft, nontender, nondistended   Musculoskeletal: No bilateral ankle edema, no clubbing or cyanosis to extremities   Psychiatric: Appropriate affect, cooperative   Neurologic: Oriented x 3, strength symmetric in all extremities, Cranial Nerves grossly intact to confrontation, speech clear   Skin: No rashes     Result Review    Result Review:  I have personally reviewed the results from the time of this admission to 9/5/2024 04:36 EDT and agree with these findings:  [x]  Laboratory list / accordion  []  Microbiology  [x]  Radiology  [x]  EKG/Telemetry   []  Cardiology/Vascular   []  Pathology  []  Old records  []  Other:  Most notable findings include: see above      Assessment & Plan   Assessment / Plan     Brief Patient Summary:  Dior Pettit is a 84 y.o. female who comes in complaining of chest pain    Active Hospital Problems:  Active Hospital Problems    Diagnosis     **Atrial fibrillation with rapid ventricular response       Plan:     Atrial fibrillation with RVR  History of paroxysmal atrial fibrillation  - initial troponin 11, repeat troponin of 15,   -serum creatinine of 1.07 which is about baseline for patient.  Otherwise unremarkable CBC and CMP for acute findings.    -Chest x-ray shows no acute findings.    -EKG shows atrial fibrillation rate 139 bpm, no ST elevation apparent.    -Patient is afebrile, pulse 130s, on room air oxygen and 96% SpO2 and blood pressure normotensive.   - Patient was given Lopressor with no improvement, patient was given Cardizem IV bolus with improvement.  -Patient was given 1 L normal saline bolus in ED.  -Last echo performed on 8/29/2024 showed LVEF of 68%.  TAVR valve present.  Left ventricular diastolic function is consistent with grade 2 with high LAP pseudonormalization.  -Cardiology consult  -Check repeat troponin, EKG, TSH, magnesium, BNP  -Continue home Eliquis  -Continuous cardiac monitoring  -N.p.o.    Chronic diastolic CHF, not in acute exacerbation  Status post TAVR, bioprosthetic valve in June 2024  -Continue home Lasix    Diabetes mellitus type 2  -SSI, Accu-Cheks 3 times daily AC  -Check A1c  -Hold home metformin    Hypothyroidism  -Check TSH, continue home Synthroid    Hyperlipidemia  -Check lipid panel, continue home statin    Essential hypertension, chronic, controlled  -Continue home Norvasc, losartan    Anxiety/depression  -Continue home Effexor    GERD  -Continue home GERD            VTE Prophylaxis: SCDs  Pharmacologic & mechanical VTE prophylaxis orders are present.        CODE STATUS:    Code Status (Patient has no pulse and is not breathing): CPR (Attempt to Resuscitate)  Medical Interventions (Patient has pulse or is breathing): Full Support    Admission Status:  I believe this patient meets observation status.    78 minutes have been spent by [facility] providers in the care of this patient while under observation status.      Appropriate  PPE worn during patient encounter.  Hand hygeine performed before and after seeing the patient.      Electronically signed by PERI Allison, 09/05/24, 1:57 AM EDT.

## 2024-09-05 NOTE — SIGNIFICANT NOTE
09/05/24 0630   OTHER   Discipline physical therapist   Therapy Assessment/Plan (PT)   Criteria for Skilled Interventions Met (PT) no problems identified which require skilled intervention  (pt is up adlib per nsg documentation with an ampac of 24.  No indication for acute PT.)

## 2024-09-05 NOTE — PLAN OF CARE
Goal Outcome Evaluation:  Plan of Care Reviewed With: patient        Progress: no change     Patient admitted to observation unit for evaluation and treatment of [intermittent palpitations since Sunday. Alert & oriented x 4. Denies chest pain or shortness of breath. Vital signs closely monitored. Cardiology consulted. Normal Saline bolus given. Plan of care ongoing. Will monitor.

## 2024-09-05 NOTE — PROGRESS NOTES
ED OBSERVATION PROGRESS/DISCHARGE SUMMARY    Date of Admission: 9/4/2024   LOS: 0 days   PCP: Ashanti Hong APRN    Final Diagnosis persistent A-fib with RVR    Subjective     Hospital Outcome: Admission    Dior Pettit is a 84 y.o. female who comes in complaining of palpitations.  Patient states that patient tried to rest to sleep but this did not help.  Patient states that 2 days ago she of feeling palpitations and her heart racing eventually go away.  Patient states she takes a beta-blocker that usually helps with this.  Patient denies any chest pain, shortness of breath or feeling like she is can pass out.  Patient states that she somewhat felt lightheaded was symptom onset earlier today.  Patient had a TAVR performed in April 2024.      In the ED, initial troponin 11, repeat troponin of 15, serum creatinine of 1.07 which is about baseline for patient.  Otherwise unremarkable CBC and CMP for acute findings.  Chest x-ray shows no acute findings.  EKG shows atrial fibrillation rate 139 bpm, no ST elevation apparent.  Patient is afebrile, pulse 130s, on room air oxygen and 96% SpO2 and blood pressure normotensive.  Patient was given Lopressor with no improvement, patient was given Cardizem IV with improvement.  Patient was given 1 L normal saline bolus in ED.        Review of Systems              All systems were reviewed and negative except for: as per HPI    ROS:  General: no fevers, chills  Respiratory: no cough, dyspnea  Cardiovascular: Palpitations and shortness of breath   abdomen: No abdominal pain, nausea, vomiting, or diarrhea  Neurologic: No focal weakness    9/5/2024.    1:04 PM.  Patient has been evaluated by cardiology.  There is a plan for ablation and pacemaker.  To admit to Dr. Carreno's care.      Objective   Physical Exam:  I have reviewed the vital signs.  Temp:  [97 °F (36.1 °C)-98.1 °F (36.7 °C)] 98.1 °F (36.7 °C)  Heart Rate:  [] 116  Resp:  [18-22] 18  BP: ()/()  97/69  General Appearance:    Alert, cooperative, no distress  Head:    Normocephalic, atraumatic  Eyes:    Sclerae anicteric  Neck:   Supple, no mass  Lungs: Clear to auscultation bilaterally, respirations unlabored  Heart: Regular rate and rhythm, S1 and S2 normal, no murmur, rub or gallop  Abdomen:  Soft, nontender, bowel sounds active, nondistended  Extremities: No clubbing, cyanosis, or edema to lower extremities  Pulses:  2+ and symmetric in distal lower extremities  Skin: No rashes   Neurologic: Oriented x3, Normal strength to extremities    Results Review:    I have reviewed the labs, radiology results and diagnostic studies.    Results from last 7 days   Lab Units 09/05/24  0544   WBC 10*3/mm3 7.66   HEMOGLOBIN g/dL 11.2*   HEMATOCRIT % 34.1   PLATELETS 10*3/mm3 253     Results from last 7 days   Lab Units 09/05/24  0544 09/04/24  2215   SODIUM mmol/L 135* 140   POTASSIUM mmol/L 4.1 4.3   CHLORIDE mmol/L 104 101   CO2 mmol/L 23.3 26.3   BUN mg/dL 24* 26*   CREATININE mg/dL 0.93 1.07*   CALCIUM mg/dL 9.3 10.1   BILIRUBIN mg/dL  --  0.3   ALK PHOS U/L  --  66   ALT (SGPT) U/L  --  19   AST (SGOT) U/L  --  25   GLUCOSE mg/dL 115* 103*     Imaging Results (Last 24 Hours)       Procedure Component Value Units Date/Time    XR Chest 1 View [[id number]] Collected: 09/04/24 2335     Updated: 09/04/24 2339    Narrative:      SINGLE VIEW OF THE CHEST     HISTORY: Chest pain     COMPARISON: July 8, 2024     FINDINGS:  Heart size is within normal limits. Patient status post median  sternotomy. Patient is also status post TAVR. No pneumothorax is  identified. Blunting of the right costophrenic angle is stable. There is  chronic scarring within the right lung. No acute infiltrates are seen.       Impression:      No acute findings.     This report was finalized on 9/4/2024 11:36 PM by Dr. Milady Nunez M.D on Workstation: [id number]               I have reviewed the  medications.  ---------------------------------------------------------------------------------------------  Assessment & Plan   Assessment/Problem List    Atrial fibrillation with rapid ventricular response      Plan:    Atrial fibrillation with RVR  History of paroxysmal atrial fibrillation  - initial troponin 11, repeat troponin of 15,   -serum creatinine of 1.07 which is about baseline for patient.  Otherwise unremarkable CBC and CMP for acute findings.    -Chest x-ray shows no acute findings.    -EKG shows atrial fibrillation rate 139 bpm, no ST elevation apparent.    -Patient is afebrile, pulse 130s, on room air oxygen and 96% SpO2 and blood pressure normotensive.   - Patient was given Lopressor with no improvement, patient was given Cardizem IV bolus with improvement.  -Patient was given 1 L normal saline bolus in ED.  -Last echo performed on 8/29/2024 showed LVEF of 68%.  TAVR valve present.  Left ventricular diastolic function is consistent with grade 2 with high LAP pseudonormalization.  -Cardiology consult  -Check repeat troponin, EKG, TSH, magnesium, BNP  -Continue home Eliquis  -Continuous cardiac monitoring  -N.p.o.     Chronic diastolic CHF, not in acute exacerbation  Status post TAVR, bioprosthetic valve in June 2024  -Continue home Lasix     Diabetes mellitus type 2  -SSI, Accu-Cheks 3 times daily AC  -Check A1c  -Hold home metformin     Hypothyroidism  -Check TSH, continue home Synthroid     Hyperlipidemia  -Check lipid panel, continue home statin     Essential hypertension, chronic, controlled  -Continue home Norvasc, losartan     Anxiety/depression  -Continue home Effexor     GERD  -Continue home GERD    Disposition: Admission    Follow-up after Discharge: pending    This note will serve as a progress note and admission note    Juan R Rivera III, PA 09/05/24 13:08 EDT    I have worn appropriate PPE during this patient encounter, sanitized my hands both with entering and exiting  patient's room.      51 minutes has been spent by [facility] providers in the care of this patient while under observation status

## 2024-09-05 NOTE — CONSULTS
Electrophysiology Hospital Consult            Patient Name: Dior Pettit  Age/Sex: 84 y.o. female  : 1940  MRN: [id number]    Date of Admission: 2024  Date of Encounter Visit: 24  Encounter Provider: TOSHA Veronica  Referring Provider: Joao NICOLE MD  Place of Service: [facility] CARDIOLOGY  Patient Care Team:  Ashanti Hong APRN as PCP - General (Internal Medicine)  Gerardo Rodriguez Jr., MD as Consulting Physician (Pulmonary Disease)  Abbi Mitchell MD as Consulting Physician (Cardiology)  Luis Miguel Parker MD as Consulting Physician (Ophthalmology)  Tae Burton MD as Consulting Physician (Dermatology)  Addis Gomes MD as Consulting Physician (Plastic Surgery)      Subjective:   EP Consultation for: atrial fibrillation     Chief Complaint: palpitations     History of Present Illness:  Dior Pettit is a 84 y.o. female who is followed by Dr. Jj.  She has a history of CAD, DM II,  AVR--s/p repair ()---s/p TAVR (), and persistent atrial fibrillation.      She was cardioverted in 2021.  She was started on amiodarone prior to the CV but felt like it was causing dizziness so this was stopped.       She saw Dr. Jj on 2022 and was back in AF.  She estimated that she had been in AF for about a week prior to her appt.  Since the CV lasted a year she elected to undergo CV again.      Patient underwent CV for persistent AF on 2022 she was successfully CV.       She presented to the ED on 2022 with complaints of weakness and shortness of breath. She was noted to be SB with heart rate of 40 bpm.  We stopped her digoxin and metoprolol. She called on  with complaints of elevated HR and HTN. She was started back on metoprolol 50mg BID.     We saw her in October when she presented with complaints of palpitations. She was noted to be in AF with RVR. She was started on  "dofetilide 250mcg BID.     She did well with only occasional episodes. However, a couple months ago she forget several doses of her dofetilide and was advised to discontinue.     She underwent TAVR on 5/11/2024. She did well. No AF during admission for this.    Saw Dr. Hernadez a week ago. Mild dyspnea but better. No mention of AF or palpitations.             She presented to ED last night with two days of palpitations. Extra beta blocker did not seem to help.     EKG showed atrial fibrillation with rates in the 130s.     She was given IV diltiazem but had hypotension.     She was started on amiodarone drip per hospitalist.     EP has been asked to see for recurrent atrial fibrillation.     Says she had been doing very well.     A couple of days ago she noted palpitations and \"fluttering\"    Yesterday it was becoming harder to breathe with exertion so she came in.     Says this afternoon she feels better, still some awareness of her AF.     She remains in AF with rates in the 120s at times.     Past Medical History:  Past Medical History:   Diagnosis Date    Anxiety and depression     Aortic root dilatation 10/02/2017    Borderline--Noted on Echo    Aortic valve calcification 10/02/2017    Noted on Echo    Aortic valve insufficiency Dx in 07    w/AVR     Aortic valve prosthesis present 11/02/2018    Noted on CTA Chest    Ascending aorta dilatation 10/02/2017    Borderline--Noted on Echo    Asthma     Atrial fibrillation     2020    Atypical chest pain     Bronchitis, chronic 2014    CAD (coronary artery disease)     Cardiomegaly 2017    Cervicalgia     Chest pain due to CAD     CHF (congestive heart failure) 2024    Claustrophobia 1940    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    Borderline--Noted on Echo & Measured @ 2.6CM and Mid Descending @ 2.5CM on CTA Chest-11/2/18    DM (diabetes mellitus)     T2    Dyslipidemia 2014    Esophagitis     Fatigue     GERD (gastroesophageal reflux disease)     Controlled " w/Meds    Headache     Heart murmur     History of echocardiogram 10/2/17-[facility]    EF 66%; Borderline Concentric Hypertrophy; Mild Calcification in AV; Mild AVS; Mild AVR; Moderate TVR; Borderline Dilation of Aortic Root/Arch & Borderline Dilation of Ascending/Proximal Aorta Present    History of hepatitis     AS A TEEN    History of transfusion 1960    Hyperlipidemia     Controlled w/Meds    Hypertension     Controlled w/Meds    Hypothyroidism     Controlled w/Synthroid    Insomnia     Macular degeneration, left eye     Mild aortic valve regurgitation 10/02/2017    Noted on Echo    Mild aortic valve stenosis 10/02/2017    Noted on Echo    Mild dilation of ascending aorta 10/02/2017    Borderline--Noted on Echo    Mitral valve prolapse 2007    Moderate tricuspid valve regurgitation 10/02/2017    Noted on Echo    Mood disorder in conditions classified elsewhere     Controlled w/Meds    Near syncope 03/2017-[facility] ER    Neuropathy     Osteoarthritis     Ankles/Feet    Pulmonary hypertension 2017    Renal insufficiency     RLS (restless legs syndrome)     Short of breath on exertion     Sleep apnea     CPAP    Stroke     UNCLEAR:  DOES NOT SEE NEUROLOGIST    Thoracic ascending aortic aneurysm Dx in 2007 @ 3.8CM & 1/02/2018    Noted @ 4CM on CTA Chest;    Urinary incontinence     WEARS PAD    UTI (urinary tract infection)     RECENT:  DIAGNOSED 6-19-24       Past Surgical History:   Procedure Laterality Date    AORTIC VALVE REPAIR/REPLACEMENT  2007    Dr. Perry    AORTIC VALVE REPAIR/REPLACEMENT N/A 6/25/2024    Procedure: TTE TRANSFEMORAL TRANSCATHETER AORTIC VALVE REPLACEMENT PERCUTANEOUS APPROACH;  Surgeon: Seth Salinas MD;  Location: [facility];  Service: Cardiothoracic;  Laterality: N/A;    AORTIC VALVE REPAIR/REPLACEMENT N/A 6/25/2024    Procedure: Transfemoral Transcatheter Aortic Valve Replacement with intraoperative transthoracic echocardiogram and possible open surgical rescue;  Surgeon: Satya  Ranjan DAILEY MD;  Location: [facility];  Service: Cardiovascular;  Laterality: N/A;    APPENDECTOMY      AUGMENTATION MAMMAPLASTY  1976    BREAST AUGMENTATION  2014    BUNIONECTOMY Bilateral     CARDIAC ABLATION      CARDIAC CATHETERIZATION  2007    CARDIAC VALVE REPLACEMENT  2007    porcine    COLONOSCOPY  2010    COLONOSCOPY  03/02/2012    EYE SURGERY      cataracts and ens implants    HYSTERECTOMY  1972    SIGMOIDOSCOPY  2001    TEMPORAL ARTERY BIOPSY Bilateral 04/14/2023    Procedure: BILATERAL TEMPORAL ARTERY BIOPSY;  Surgeon: Stewart Shepherd MD;  Location: [facility] MAIN OR;  Service: Vascular;  Laterality: Bilateral;    TOTAL HIP ARTHROPLASTY Right 11/15/2022    Procedure: Right anterior total hip arthroplasty;  Surgeon: Joao Martinez MD;  Location: [facility] OR;  Service: Orthopedics;  Laterality: Right;       Home Medications:   Medications Prior to Admission   Medication Sig Dispense Refill Last Dose    acetaminophen (TYLENOL) 325 MG tablet Take 2 tablets by mouth Every 4 (Four) Hours As Needed for Mild Pain.   9/4/2024    amitriptyline (ELAVIL) 50 MG tablet Take 1 tablet by mouth Every Night. 90 tablet 1 9/4/2024 at 2300    amLODIPine (NORVASC) 5 MG tablet TAKE 1 TABLET DAILY 90 tablet 1 9/4/2024 at 0900    apixaban (Eliquis) 5 MG tablet tablet TAKE 1 TABLET BY MOUTH EVERY 12 HOURS 180 tablet 1 9/4/2024 at 2100    ascorbic acid (VITAMIN C) 1000 MG tablet Take 1 tablet by mouth Daily. Indications: Inadequate Vitamin C   9/4/2024 at 0900    aspirin 81 MG EC tablet Take 1 tablet by mouth Daily. 30 tablet 1 9/3/2024    atorvastatin (LIPITOR) 80 MG tablet TAKE 1 TABLET EVERY NIGHT FOR HIGH AMOUNT OF FATS IN THE BLOOD 90 tablet 1 9/4/2024 at 0900    fenofibrate micronized (LOFIBRA) 134 MG capsule TAKE 1 CAPSULE EVERY       MORNING BEFORE BREAKFAST 90 capsule 1 9/4/2024 at 0900    furosemide (LASIX) 40 MG tablet TAKE 1 TABLET BY MOUTH EVERY DAY 90 tablet 0 9/4/2024 at 0900    glucosamine-chondroitin  500-400 MG capsule capsule Take 2 capsules by mouth Daily.   9/4/2024 at 0900    levothyroxine (SYNTHROID, LEVOTHROID) 50 MCG tablet TAKE 1 TABLET EVERY OTHER DAY FOR UNDERACTIVE THYROID 45 tablet 1 9/4/2024 at 0900    losartan (COZAAR) 100 MG tablet Take 1 tablet by mouth Daily. 90 tablet 1 9/4/2024 at 0900    metFORMIN ER (GLUCOPHAGE-XR) 750 MG 24 hr tablet TAKE 1 TABLET TWICE A DAY (Patient taking differently: Take 1 tablet by mouth 2 (Two) Times a Day.) 180 tablet 3 9/4/2024 at 0900    metoprolol tartrate (LOPRESSOR) 25 MG tablet Take 1 tablet by mouth 2 (Two) Times a Day. Indications: High Blood Pressure Disorder 180 tablet 1 9/4/2024 at 0900    Multiple Vitamins-Minerals (MULTIVITAL PO) Take 1 tablet by mouth Daily. Indications: potential deficiency   9/4/2024 at 0900    omeprazole (priLOSEC) 40 MG capsule Take 1 capsule by mouth Daily. 90 capsule 1 9/4/2024 at 0900    venlafaxine XR (EFFEXOR-XR) 150 MG 24 hr capsule Take 1 capsule by mouth Daily. 90 capsule 3 9/4/2024 at 0900    acyclovir (Zovirax) 5 % ointment Apply 1 Application topically to the appropriate area as directed Every 3 (Three) Hours. 15 g 0 More than a month    Advair HFA 45-21 MCG/ACT inhaler Inhale 2 puffs 2 (Two) Times a Day. As needed   More than a month    Blood Glucose Monitoring Suppl (Prodigy No Coding Blood Gluc) w/Device kit 1 each Daily. 1 kit 0     glucose blood (Prodigy No Coding Blood Gluc) test strip TEST BLOOD SUGAR DAILY 50 each 0     ipratropium (ATROVENT) 0.06 % nasal spray INSTILL 2 SPRAYS IN EACH NOSTRIL EVERY SIX HOURS AS NEEDED (Patient taking differently: 1 spray into the nostril(s) as directed by provider As Needed.) 15 mL 0 More than a month    latanoprost (XALATAN) 0.005 % ophthalmic solution Administer 1 drop to both eyes Every Night.   9/3/2024    levothyroxine (SYNTHROID, LEVOTHROID) 75 MCG tablet TAKE 1 TABLET BY MOUTH EVERY OTHER DAY FOR UNDERACTIVE THYROID 45 tablet 0 9/3/2024    phenazopyridine (PYRIDIUM) 100  MG tablet Take 1 tablet by mouth 3 (Three) Times a Day As Needed for Dysuria (Pain with urination). 15 tablet 0     Prodigy Safety Lancets 26G misc CHECK BLOOD SUGAR ONE TIME PER  each 1     Pumpkin Seed-Soy Germ (AZO Bladder Control/Go-Less) capsule Take 1 capsule by mouth Daily.       valACYclovir (VALTREX) 1000 MG tablet TAKE 2 TABLETS 4 TIMES     DAILY FOR MOUTH ULCER (Patient taking differently: Take 1 tablet by mouth As Needed. TAKE 2 TABLETS 4 TIMES     DAILY FOR MOUTH ULCER) 16 tablet 1 More than a month       Allergies:  No Known Allergies    Past Social History:  Social History     Socioeconomic History    Marital status:    Tobacco Use    Smoking status: Never     Passive exposure: Never    Smokeless tobacco: Never    Tobacco comments:     caffeine use - 1 cup coffee daily    Vaping Use    Vaping status: Never Used   Substance and Sexual Activity    Alcohol use: Yes     Comment: 2 oz a month    Drug use: Never    Sexual activity: Not Currently     Partners: Male     Birth control/protection: Surgical     Comment: Hyst       Past Family History:  Family History   Problem Relation Age of Onset    Hypertension Mother     Stroke Mother     Cancer Mother     Diabetes Sister     Coronary artery disease Brother     Diabetes Brother     Valvular heart disease Brother         TAVR    Cancer Brother         bladder cancer    Coronary artery disease Brother     Cancer Brother     Birth defects Daughter     Skin cancer Neg Hx     Malig Hyperthermia Neg Hx        Review of Systems: All systems reviewed. Pertinent positives identified in HPI. All other systems are negative.     14 point ROS was performed and is negative except as outlined in HPI.     Objective:     Objective:  Vital Signs (last 24 hours)         09/04 0700  09/05 0659 09/05 0700  09/05 1127   Most Recent      Temp (°F) 97 -  97.7    97.9 -  98.1     98.1 (36.7) 09/05 1124    Heart Rate 82 -  155    113 -  125     116 09/05 1124    Resp 18  -  22      18     18 09/05 1124    BP 90/67 -  140/87    89/62 -  101/68     97/69 09/05 1124    SpO2 (%) 93 -  99    97 -  98     98 09/05 1124          Temp:  [97 °F (36.1 °C)-98.1 °F (36.7 °C)] 98.1 °F (36.7 °C)  Heart Rate:  [] 116  Resp:  [18-22] 18  BP: ()/() 97/69  Body mass index is 22.08 kg/m².        Physical Exam:     General Appearance: No acute distress, well developed and well nourished.   Eyes: Conjunctiva and lids: No erythema, swelling, or discharge. Sclera non-icteric.   HENT: Atraumatic, normocephalic. External eyes, ears, and nose normal.   Respiratory: No signs of respiratory distress. Respiration rhythm and depth normal.   Clear to auscultation. No rales, crackles, rhonchi, or wheezing auscultated.   Cardiovascular:  Heart Rate and Rhythm: irregularly irregular, Heart Sounds: Normal S1 and S2. No S3 or S4 noted  Gastrointestinal:  Abdomen soft, non-distended, non-tender.  Musculoskeletal: Normal movement of extremities  Skin: Warm and dry.   Psychiatric: Patient alert and oriented to person, place, and time. Speech and behavior appropriate. Normal mood and affect.    Labs:   Lab Review:     Results from last 7 days   Lab Units 09/05/24  0544 09/04/24  2215   SODIUM mmol/L 135* 140   POTASSIUM mmol/L 4.1 4.3   CHLORIDE mmol/L 104 101   CO2 mmol/L 23.3 26.3   BUN mg/dL 24* 26*   CREATININE mg/dL 0.93 1.07*   GLUCOSE mg/dL 115* 103*   CALCIUM mg/dL 9.3 10.1   AST (SGOT) U/L  --  25   ALT (SGPT) U/L  --  19     Results from last 7 days   Lab Units 09/05/24  0825 09/05/24  0544 09/05/24  0106   HSTROP T ng/L 24* 21* 15*     Results from last 7 days   Lab Units 09/05/24  0544   WBC 10*3/mm3 7.66   HEMOGLOBIN g/dL 11.2*   HEMATOCRIT % 34.1   PLATELETS 10*3/mm3 253         Results from last 7 days   Lab Units 09/05/24  0544   CHOLESTEROL mg/dL 144     Results from last 7 days   Lab Units 09/05/24  0544   MAGNESIUM mg/dL 1.6     Results from last 7 days   Lab Units 09/05/24  0544    CHOLESTEROL mg/dL 144   TRIGLYCERIDES mg/dL 123   HDL CHOL mg/dL 46   LDL CHOL mg/dL 76     Results from last 7 days   Lab Units 09/05/24  0106   PROBNP pg/mL 1,085.0         Results from last 7 days   Lab Units 09/05/24  0544   TSH uIU/mL 4.720*     EKG:           I personally viewed and interpreted the patient's EKG/Telemetry tracings.    Assessment:       Atrial fibrillation with rapid ventricular response        Plan:   Dr. Carreno and I saw this patient.        She has persistent atrial fibrillation that had been treated with dofeteilide but some issues with compliance so this was stopped.     She is now back in AF with difficult to control rates. We recommended proceeding with pace and ablate strategy.     We discussed risks, benefits, and alternatives. She is agreeable.     Will plan to do pacemaker tomorrow. Will have to rate control a few weeks until AV node ablation.     Hold apixaban tonight.     Switch metoprolol to atenolol and will load with IV digoxin, monitor response     Stop diltiazem and amiodarone.       Thank you for allowing me to participate in the care of Dior Pettit. Feel free to contact me directly with any further questions or concerns.    TOSHA Veronica  [facility]  09/05/24  11:27 EDT

## 2024-09-06 ENCOUNTER — APPOINTMENT (OUTPATIENT)
Dept: GENERAL RADIOLOGY | Facility: HOSPITAL | Age: 84
DRG: 243 | End: 2024-09-06
Payer: MEDICARE

## 2024-09-06 LAB
ANION GAP SERPL CALCULATED.3IONS-SCNC: 9.6 MMOL/L (ref 5–15)
BUN SERPL-MCNC: 22 MG/DL (ref 8–23)
BUN/CREAT SERPL: 24.7 (ref 7–25)
CALCIUM SPEC-SCNC: 9.7 MG/DL (ref 8.6–10.5)
CHLORIDE SERPL-SCNC: 104 MMOL/L (ref 98–107)
CO2 SERPL-SCNC: 25.4 MMOL/L (ref 22–29)
CREAT SERPL-MCNC: 0.89 MG/DL (ref 0.57–1)
DEPRECATED RDW RBC AUTO: 41.7 FL (ref 37–54)
EGFRCR SERPLBLD CKD-EPI 2021: 63.2 ML/MIN/1.73
ERYTHROCYTE [DISTWIDTH] IN BLOOD BY AUTOMATED COUNT: 12.7 % (ref 12.3–15.4)
GLUCOSE BLDC GLUCOMTR-MCNC: 119 MG/DL (ref 70–130)
GLUCOSE BLDC GLUCOMTR-MCNC: 140 MG/DL (ref 70–130)
GLUCOSE BLDC GLUCOMTR-MCNC: 167 MG/DL (ref 70–130)
GLUCOSE SERPL-MCNC: 118 MG/DL (ref 65–99)
HCT VFR BLD AUTO: 37 % (ref 34–46.6)
HGB BLD-MCNC: 12 G/DL (ref 12–15.9)
MCH RBC QN AUTO: 29.3 PG (ref 26.6–33)
MCHC RBC AUTO-ENTMCNC: 32.4 G/DL (ref 31.5–35.7)
MCV RBC AUTO: 90.5 FL (ref 79–97)
PLATELET # BLD AUTO: 288 10*3/MM3 (ref 140–450)
PMV BLD AUTO: 9.5 FL (ref 6–12)
POTASSIUM SERPL-SCNC: 4.3 MMOL/L (ref 3.5–5.2)
RBC # BLD AUTO: 4.09 10*6/MM3 (ref 3.77–5.28)
SODIUM SERPL-SCNC: 139 MMOL/L (ref 136–145)
WBC NRBC COR # BLD AUTO: 6.77 10*3/MM3 (ref 3.4–10.8)

## 2024-09-06 PROCEDURE — 80048 BASIC METABOLIC PNL TOTAL CA: CPT

## 2024-09-06 PROCEDURE — 71045 X-RAY EXAM CHEST 1 VIEW: CPT

## 2024-09-06 PROCEDURE — 85027 COMPLETE CBC AUTOMATED: CPT

## 2024-09-06 PROCEDURE — 63710000001 INSULIN LISPRO (HUMAN) PER 5 UNITS

## 2024-09-06 PROCEDURE — 02H63JZ INSERTION OF PACEMAKER LEAD INTO RIGHT ATRIUM, PERCUTANEOUS APPROACH: ICD-10-PCS | Performed by: INTERNAL MEDICINE

## 2024-09-06 PROCEDURE — 02HK3JZ INSERTION OF PACEMAKER LEAD INTO RIGHT VENTRICLE, PERCUTANEOUS APPROACH: ICD-10-PCS | Performed by: INTERNAL MEDICINE

## 2024-09-06 PROCEDURE — C1894 INTRO/SHEATH, NON-LASER: HCPCS | Performed by: INTERNAL MEDICINE

## 2024-09-06 PROCEDURE — 33208 INSRT HEART PM ATRIAL & VENT: CPT | Performed by: INTERNAL MEDICINE

## 2024-09-06 PROCEDURE — 93005 ELECTROCARDIOGRAM TRACING: CPT | Performed by: PHYSICIAN ASSISTANT

## 2024-09-06 PROCEDURE — 93010 ELECTROCARDIOGRAM REPORT: CPT | Performed by: INTERNAL MEDICINE

## 2024-09-06 PROCEDURE — C1898 LEAD, PMKR, OTHER THAN TRANS: HCPCS | Performed by: INTERNAL MEDICINE

## 2024-09-06 PROCEDURE — 0JH606Z INSERTION OF PACEMAKER, DUAL CHAMBER INTO CHEST SUBCUTANEOUS TISSUE AND FASCIA, OPEN APPROACH: ICD-10-PCS | Performed by: INTERNAL MEDICINE

## 2024-09-06 PROCEDURE — 25010000002 MIDAZOLAM PER 1 MG: Performed by: INTERNAL MEDICINE

## 2024-09-06 PROCEDURE — 25010000002 CEFAZOLIN PER 500 MG: Performed by: INTERNAL MEDICINE

## 2024-09-06 PROCEDURE — C1887 CATHETER, GUIDING: HCPCS | Performed by: INTERNAL MEDICINE

## 2024-09-06 PROCEDURE — C1785 PMKR, DUAL, RATE-RESP: HCPCS | Performed by: INTERNAL MEDICINE

## 2024-09-06 PROCEDURE — 82948 REAGENT STRIP/BLOOD GLUCOSE: CPT

## 2024-09-06 PROCEDURE — 25010000002 FENTANYL CITRATE (PF) 50 MCG/ML SOLUTION: Performed by: INTERNAL MEDICINE

## 2024-09-06 DEVICE — LD PM CAPSUREFIX NOVUS5076 52CM: Type: IMPLANTABLE DEVICE | Site: CHEST WALL | Status: FUNCTIONAL

## 2024-09-06 DEVICE — IMPLANTABLE DEVICE: Type: IMPLANTABLE DEVICE | Site: CHEST WALL | Status: FUNCTIONAL

## 2024-09-06 DEVICE — GEN PM AZURE XT SURESCAN DR MRI: Type: IMPLANTABLE DEVICE | Site: CHEST WALL | Status: FUNCTIONAL

## 2024-09-06 RX ORDER — LIDOCAINE HYDROCHLORIDE 20 MG/ML
INJECTION, SOLUTION INFILTRATION; PERINEURAL
Status: DISCONTINUED | OUTPATIENT
Start: 2024-09-06 | End: 2024-09-06 | Stop reason: HOSPADM

## 2024-09-06 RX ORDER — FENTANYL CITRATE 50 UG/ML
INJECTION, SOLUTION INTRAMUSCULAR; INTRAVENOUS
Status: DISCONTINUED | OUTPATIENT
Start: 2024-09-06 | End: 2024-09-06 | Stop reason: HOSPADM

## 2024-09-06 RX ORDER — METHOHEXITAL IN WATER/PF 100MG/10ML
SYRINGE (ML) INTRAVENOUS
Status: DISCONTINUED | OUTPATIENT
Start: 2024-09-06 | End: 2024-09-06 | Stop reason: HOSPADM

## 2024-09-06 RX ORDER — MIDAZOLAM HYDROCHLORIDE 1 MG/ML
INJECTION INTRAMUSCULAR; INTRAVENOUS
Status: DISCONTINUED | OUTPATIENT
Start: 2024-09-06 | End: 2024-09-06 | Stop reason: HOSPADM

## 2024-09-06 RX ORDER — DIGOXIN 125 MCG
125 TABLET ORAL
Status: DISCONTINUED | OUTPATIENT
Start: 2024-09-06 | End: 2024-09-07 | Stop reason: HOSPADM

## 2024-09-06 RX ADMIN — AMITRIPTYLINE HYDROCHLORIDE 50 MG: 50 TABLET, FILM COATED ORAL at 21:22

## 2024-09-06 RX ADMIN — ATENOLOL 25 MG: 25 TABLET ORAL at 08:07

## 2024-09-06 RX ADMIN — Medication 10 ML: at 21:22

## 2024-09-06 RX ADMIN — INSULIN LISPRO 2 UNITS: 100 INJECTION, SOLUTION INTRAVENOUS; SUBCUTANEOUS at 21:22

## 2024-09-06 RX ADMIN — ACETAMINOPHEN 325MG 650 MG: 325 TABLET ORAL at 18:40

## 2024-09-06 RX ADMIN — ACETAMINOPHEN 325MG 650 MG: 325 TABLET ORAL at 00:26

## 2024-09-06 RX ADMIN — Medication 10 MG: at 23:57

## 2024-09-06 RX ADMIN — LOSARTAN POTASSIUM 100 MG: 100 TABLET, FILM COATED ORAL at 08:07

## 2024-09-06 RX ADMIN — VENLAFAXINE HYDROCHLORIDE 150 MG: 150 CAPSULE, EXTENDED RELEASE ORAL at 08:07

## 2024-09-06 RX ADMIN — OXYCODONE HYDROCHLORIDE AND ACETAMINOPHEN 1000 MG: 500 TABLET ORAL at 08:07

## 2024-09-06 RX ADMIN — ATENOLOL 25 MG: 25 TABLET ORAL at 21:29

## 2024-09-06 RX ADMIN — LATANOPROST 1 DROP: 50 SOLUTION OPHTHALMIC at 21:22

## 2024-09-06 RX ADMIN — Medication 10 ML: at 08:08

## 2024-09-06 RX ADMIN — ACETAMINOPHEN 325MG 650 MG: 325 TABLET ORAL at 23:57

## 2024-09-06 RX ADMIN — ATORVASTATIN CALCIUM 80 MG: 80 TABLET, FILM COATED ORAL at 08:07

## 2024-09-06 RX ADMIN — DIGOXIN 125 MCG: 125 TABLET ORAL at 10:49

## 2024-09-06 RX ADMIN — FUROSEMIDE 40 MG: 40 TABLET ORAL at 08:08

## 2024-09-06 RX ADMIN — Medication 10 MG: at 00:25

## 2024-09-06 NOTE — PROGRESS NOTES
HR significantly better this morning after switching to atenolol.     Will add digoxin 125mcg daily prior to AV node ablation.       Proceeding with permanent pacemaker placement today. Plans for AV node ablation as outpatient in a few weeks.     Likely discharge Saturday. If incision okay then resume apixaban on Sunday.     Send home on atenolol and digoxin.

## 2024-09-07 ENCOUNTER — READMISSION MANAGEMENT (OUTPATIENT)
Dept: CALL CENTER | Facility: HOSPITAL | Age: 84
End: 2024-09-07
Payer: MEDICARE

## 2024-09-07 VITALS
BODY MASS INDEX: 21.98 KG/M2 | DIASTOLIC BLOOD PRESSURE: 68 MMHG | OXYGEN SATURATION: 96 % | SYSTOLIC BLOOD PRESSURE: 101 MMHG | WEIGHT: 136.8 LBS | HEART RATE: 84 BPM | RESPIRATION RATE: 16 BRPM | HEIGHT: 66 IN | TEMPERATURE: 97.9 F

## 2024-09-07 LAB
GLUCOSE BLDC GLUCOMTR-MCNC: 109 MG/DL (ref 70–130)
GLUCOSE BLDC GLUCOMTR-MCNC: 116 MG/DL (ref 70–130)
QT INTERVAL: 361 MS
QT INTERVAL: 381 MS
QTC INTERVAL: 412 MS
QTC INTERVAL: 423 MS

## 2024-09-07 PROCEDURE — 93005 ELECTROCARDIOGRAM TRACING: CPT

## 2024-09-07 PROCEDURE — 99239 HOSP IP/OBS DSCHRG MGMT >30: CPT | Performed by: NURSE PRACTITIONER

## 2024-09-07 PROCEDURE — 93010 ELECTROCARDIOGRAM REPORT: CPT | Performed by: INTERNAL MEDICINE

## 2024-09-07 PROCEDURE — 82948 REAGENT STRIP/BLOOD GLUCOSE: CPT

## 2024-09-07 RX ORDER — ATENOLOL 25 MG/1
25 TABLET ORAL EVERY 12 HOURS SCHEDULED
Qty: 60 TABLET | Refills: 11 | Status: SHIPPED | OUTPATIENT
Start: 2024-09-07 | End: 2024-09-07

## 2024-09-07 RX ORDER — DIGOXIN 125 MCG
125 TABLET ORAL
Qty: 30 TABLET | Refills: 11 | Status: SHIPPED | OUTPATIENT
Start: 2024-09-08 | End: 2024-09-07

## 2024-09-07 RX ORDER — ATENOLOL 25 MG/1
25 TABLET ORAL EVERY 12 HOURS SCHEDULED
Qty: 60 TABLET | Refills: 11 | Status: SHIPPED | OUTPATIENT
Start: 2024-09-07

## 2024-09-07 RX ORDER — DIGOXIN 125 MCG
125 TABLET ORAL
Qty: 30 TABLET | Refills: 11 | Status: SHIPPED | OUTPATIENT
Start: 2024-09-08

## 2024-09-07 RX ADMIN — LEVOTHYROXINE SODIUM 25 MCG: 25 TABLET ORAL at 06:38

## 2024-09-07 RX ADMIN — ATORVASTATIN CALCIUM 80 MG: 80 TABLET, FILM COATED ORAL at 08:55

## 2024-09-07 RX ADMIN — DIGOXIN 125 MCG: 125 TABLET ORAL at 12:18

## 2024-09-07 RX ADMIN — Medication 10 ML: at 08:55

## 2024-09-07 RX ADMIN — LOSARTAN POTASSIUM 100 MG: 100 TABLET, FILM COATED ORAL at 08:55

## 2024-09-07 RX ADMIN — FUROSEMIDE 40 MG: 40 TABLET ORAL at 08:55

## 2024-09-07 RX ADMIN — LEVOTHYROXINE SODIUM 50 MCG: 50 TABLET ORAL at 06:39

## 2024-09-07 RX ADMIN — PANTOPRAZOLE SODIUM 40 MG: 40 TABLET, DELAYED RELEASE ORAL at 06:39

## 2024-09-07 RX ADMIN — OXYCODONE HYDROCHLORIDE AND ACETAMINOPHEN 1000 MG: 500 TABLET ORAL at 08:55

## 2024-09-07 RX ADMIN — ATENOLOL 25 MG: 25 TABLET ORAL at 08:55

## 2024-09-07 RX ADMIN — VENLAFAXINE HYDROCHLORIDE 150 MG: 150 CAPSULE, EXTENDED RELEASE ORAL at 08:55

## 2024-09-07 RX ADMIN — ASPIRIN 81 MG: 81 TABLET, COATED ORAL at 08:55

## 2024-09-07 NOTE — PLAN OF CARE
Goal Outcome Evaluation:  Plan of Care Reviewed With: patient        Progress: improving  Outcome Evaluation: rate controlled, incision site CDI, still SOB with excertion, plan for outpatient ablation in 2 weeks, discharge home with daughter

## 2024-09-07 NOTE — PROGRESS NOTES
"[facility]    Patient Name: Dior Pettit  :1940  84 y.o.  LOS: 2  Encounter Provider: TOSHA Carter      Patient Care Team:  Ashanti Hong APRN as PCP - General (Internal Medicine)  Gerardo Rodriguez Jr., MD as Consulting Physician (Pulmonary Disease)  Abbi Mitchell MD as Consulting Physician (Cardiology)  Luis Miguel Parker MD as Consulting Physician (Ophthalmology)  Tae Burton MD as Consulting Physician (Dermatology)  Addis Gomes MD as Consulting Physician (Plastic Surgery)    Chief Complaint: Follow-up atrial fibrillation with RVR with sinus bradycardia    Interval History: EP following, pace and ablate procedure.  Patient had PPM placed yesterday.  Patient reports dyspnea with mild exertion.  Reports that she lives at home alone and is concerned about her shortness of breath with exertion as well as generalized weakness.       Objective   Vital Signs  Temp:  [97.7 °F (36.5 °C)-98.6 °F (37 °C)] 97.7 °F (36.5 °C)  Heart Rate:  [74-86] 74  Resp:  [10-28] 16  BP: ()/(51-86) 121/59    Intake/Output Summary (Last 24 hours) at 2024 1022  Last data filed at 2024 1545  Gross per 24 hour   Intake 275 ml   Output --   Net 275 ml     Flowsheet Rows      Flowsheet Row First Filed Value   Admission Height 167.6 cm (66\") Documented at 2024   Admission Weight 61.2 kg (135 lb) Documented at 2024 220              Physical Exam      Pertinent Test Results:  Results from last 7 days   Lab Units 24  0424 24  0544 24  2215   SODIUM mmol/L 139 135* 140   POTASSIUM mmol/L 4.3 4.1 4.3   CHLORIDE mmol/L 104 104 101   CO2 mmol/L 25.4 23.3 26.3   BUN mg/dL 22 24* 26*   CREATININE mg/dL 0.89 0.93 1.07*   GLUCOSE mg/dL 118* 115* 103*   CALCIUM mg/dL 9.7 9.3 10.1   AST (SGOT) U/L  --   --  25   ALT (SGPT) U/L  --   --  19     Results from last 7 days   Lab Units 24  0825 24  0544 24  0106 24  2215 "   HSTROP T ng/L 24* 21* 15* 11     Results from last 7 days   Lab Units 09/06/24  0424 09/05/24  0544 09/04/24  2215   WBC 10*3/mm3 6.77 7.66 7.01   HEMOGLOBIN g/dL 12.0 11.2* 12.7   HEMATOCRIT % 37.0 34.1 39.3   PLATELETS 10*3/mm3 288 253 307         Results from last 7 days   Lab Units 09/05/24  0544 09/04/24  2215   MAGNESIUM mg/dL 1.6 1.8     Results from last 7 days   Lab Units 09/05/24  0544   CHOLESTEROL mg/dL 144   TRIGLYCERIDES mg/dL 123   HDL CHOL mg/dL 46     Results from last 7 days   Lab Units 09/05/24  0106   PROBNP pg/mL 1,085.0     Results from last 7 days   Lab Units 09/05/24  0544   TSH uIU/mL 4.720*           Medication Review:   amitriptyline, 50 mg, Oral, Nightly  [Held by provider] apixaban, 5 mg, Oral, Q12H  ascorbic acid, 1,000 mg, Oral, Daily  aspirin, 81 mg, Oral, Daily  atenolol, 25 mg, Oral, Q12H  atorvastatin, 80 mg, Oral, Daily  digoxin, 125 mcg, Oral, Daily  furosemide, 40 mg, Oral, Daily  insulin lispro, 2-9 Units, Subcutaneous, 4x Daily AC & at Bedtime  latanoprost, 1 drop, Both Eyes, Nightly  levothyroxine, 25 mcg, Oral, Q AM  levothyroxine, 50 mcg, Oral, Q AM  losartan, 100 mg, Oral, Daily  pantoprazole, 40 mg, Oral, Q AM  sodium chloride, 10 mL, Intravenous, Q12H  venlafaxine XR, 150 mg, Oral, Daily              Assessment & Plan     Active Hospital Problems    Diagnosis  POA    **Atrial fibrillation with rapid ventricular response [I48.91]  Yes      Resolved Hospital Problems   No resolved problems to display.        Atrial fibrillation with RVR  Patient now with pacemaker placement, plan for ablation in the outpatient setting.  Continue digoxin and atenolol at home  Resume apixiban 9/8/2024  Tachybradycardia syndrome  Status post pacemaker placement, incision healing well without signs of infection  FRAZIER  Likely related to A-fib with RVR.  Explained to patient that this may not improve until after her ablation.  She does have elevated heart rate when she is  exertional  Generalized weakness  Patient lives at home alone and is concerned about going home by herself.  PT assessment for possible placement  Case management referral to see if patient qualifies for SNF    If patient is not a candidate for SNF, please call and I will make arrangements for discharge.           TOSHA Carter  [facility]   [facility]  [address]  [place] [address]  Office: [phone]    09/07/24  10:22 EDT

## 2024-09-07 NOTE — CASE MANAGEMENT/SOCIAL WORK
Continued Stay Note  [facility]     Patient Name: Dior Pettit  MRN: [id number]  Today's Date: 9/7/2024    Admit Date: 9/4/2024    Plan: Home- no needs at this time   Discharge Plan       Row Name 09/07/24 1444       Plan    Plan Home- no needs at this time    Plan Comments CCP received a call from pts room, pt and daughter report that they woul like to go home and denies any CCP needs at this time. RN notified                   Discharge Codes    No documentation.                 Expected Discharge Date and Time       Expected Discharge Date Expected Discharge Time    Sep 7, 2024               Ira Mcneill RN

## 2024-09-07 NOTE — PLAN OF CARE
Goal Outcome Evaluation:  Plan of Care Reviewed With: patient        Progress: improving  Outcome Evaluation: A&O. RA. Afib, rate controlled. PPM site C/D/I. Tylenol PRN. Up with standby. Poss d/c today

## 2024-09-08 NOTE — DISCHARGE SUMMARY
[facility]      Patient Name: Dior Pettit  :1940  84 y.o.  LOS: 2  Encounter Provider: TOSHA Carter      Date of Admission: 2024    Date of Discharge:  2024    Treatment Team:  Patient Care Team:  Ashanti Hong APRN as PCP - General (Internal Medicine)  Gerardo Rodriguez Jr., MD as Consulting Physician (Pulmonary Disease)  Abbi Mitchell MD as Consulting Physician (Cardiology)  Luis Miguel Parker MD as Consulting Physician (Ophthalmology)  Tae Burton MD as Consulting Physician (Dermatology)  Addis Gomes MD as Consulting Physician (Plastic Surgery)    Discharge Condition: Good  Discharge Diagnosis:    Atrial fibrillation with rapid ventricular response      History of Present Illness:  Dior Pettit is a 84 y.o. female who was admitted on 2024 with complaints of heart palpitations.    Patient is followed by Maryann Hernadez and Parviz.  She has history significant for CAD, type 2 diabetes, aortic valve replacement in , 2024, persistent atrial fibrillation.    Patient has longstanding history of difficult control atrial fibrillation.  She had cardioversion in 2021 and was then started on amiodarone prior to cardioversion it was felt that it was causing dizziness so this was discontinued.    Patient was evaluated by Dr. Jj in  and was back in atrial fibrillation.  She had a repeat cardioversion in 2022.    Patient was evaluated in 2023 with complaints of palpitations and was again noted to be in atrial fibrillation with RVR.  She was started on dofetilide, unfortunately, patient missed doses and it was advised for her to discontinue this.    Patient had a TAVR in May 2024, no atrial fibrillation during that admission.    Hospital Course:   Patient presented to the emergency department with complaints of palpitations.  She was noted to be in atrial fibrillation with RVR with rates in the  130s.  She was given IV diltiazem but then had subsequent hypotension.  She was started on amiodarone drip per hospitalist service.  Patient has experienced dyspnea with exertion.  Electrophysiology evaluated patient and they have decided to proceed with a pace and ablate strategy.  Patient is s/p placement of a dual-chamber pacemaker 9/9/2024.  EP had recommended the patient be discharged with atenolol and digoxin, resume apixaban 9/8/2024.  They will plan for outpatient AV node ablation.  Patient states that she lives at home and was concerned about going home by herself.  She was evaluated by case management and does not seem to be a candidate for SNF.  She is ready for discharge to go home.      Objective:  Temp:  [97.7 °F (36.5 °C)-97.9 °F (36.6 °C)] 97.9 °F (36.6 °C)  Heart Rate:  [74-84] 84  Resp:  [16] 16  BP: (101-125)/(59-86) 101/68  No intake or output data in the 24 hours ending 09/07/24 2153  Body mass index is 22.08 kg/m².      09/04/24  2204 09/05/24  0245 09/05/24  1941   Weight: 61.2 kg (135 lb) 62.1 kg (136 lb 12.8 oz) 62.1 kg (136 lb 12.8 oz)     Weight change:     Vitals and nursing note reviewed.   Constitutional:       Appearance: Normal appearance. Well-developed.   Eyes:      Conjunctiva/sclera: Conjunctivae normal.   Neck:      Vascular: No carotid bruit.   Pulmonary:      Breath sounds: Normal breath sounds.   Cardiovascular:      Normal rate. Regular rhythm. Normal S1 with normal intensity. Normal S2 with normal intensity.       Murmurs: There is no murmur.      No gallop.  No click. No rub.   Edema:     Peripheral edema absent.   Musculoskeletal: Normal range of motion. Skin:     General: Skin is warm and dry.   Neurological:      Mental Status: Alert and oriented to person, place, and time.      GCS: GCS eye subscore is 4. GCS verbal subscore is 5. GCS motor subscore is 6.   Psychiatric:         Speech: Speech normal.         Behavior: Behavior normal.         Thought Content: Thought  content normal.         Judgment: Judgment normal.         Procedures Performed:  Procedure(s):  Pacemaker DC new [employer]         Pertinent Test Results:  Results from last 7 days   Lab Units 09/06/24 0424 09/05/24 0544 09/04/24  2215   SODIUM mmol/L 139 135* 140   POTASSIUM mmol/L 4.3 4.1 4.3   CHLORIDE mmol/L 104 104 101   CO2 mmol/L 25.4 23.3 26.3   BUN mg/dL 22 24* 26*   CREATININE mg/dL 0.89 0.93 1.07*   GLUCOSE mg/dL 118* 115* 103*   CALCIUM mg/dL 9.7 9.3 10.1   AST (SGOT) U/L  --   --  25   ALT (SGPT) U/L  --   --  19     Results from last 7 days   Lab Units 09/05/24 0825 09/05/24 0544 09/05/24  0106 09/04/24  2215   HSTROP T ng/L 24* 21* 15* 11     Results from last 7 days   Lab Units 09/06/24 0424 09/05/24 0544 09/04/24  2215   WBC 10*3/mm3 6.77 7.66 7.01   HEMOGLOBIN g/dL 12.0 11.2* 12.7   HEMATOCRIT % 37.0 34.1 39.3   PLATELETS 10*3/mm3 288 253 307         Results from last 7 days   Lab Units 09/05/24 0544 09/04/24  2215   MAGNESIUM mg/dL 1.6 1.8     Results from last 7 days   Lab Units 09/05/24  0544   CHOLESTEROL mg/dL 144   TRIGLYCERIDES mg/dL 123   HDL CHOL mg/dL 46     Results from last 7 days   Lab Units 09/05/24 0106   PROBNP pg/mL 1,085.0     Results from last 7 days   Lab Units 09/05/24  0544   TSH uIU/mL 4.720*       Discharge Diet:      Activity at Discharge:       Discharge disposition: home     Discharge Medications:     Discharge Medications        New Medications        Instructions Start Date   atenolol 25 MG tablet  Commonly known as: TENORMIN   25 mg, Oral, Every 12 Hours Scheduled      digoxin 125 MCG tablet  Commonly known as: LANOXIN   125 mcg, Oral, Daily Digoxin   Start Date: September 8, 2024            Changes to Medications        Instructions Start Date   ipratropium 0.06 % nasal spray  Commonly known as: ATROVENT  What changed: See the new instructions.   INSTILL 2 SPRAYS IN EACH NOSTRIL EVERY SIX HOURS AS NEEDED      valACYclovir 1000 MG tablet  Commonly known as:  MOODY  What changed:   how much to take  how to take this  when to take this  reasons to take this   TAKE 2 TABLETS 4 TIMES     DAILY FOR MOUTH ULCER             Continue These Medications        Instructions Start Date   acetaminophen 325 MG tablet  Commonly known as: TYLENOL   650 mg, Oral, Every 4 Hours PRN      acyclovir 5 % ointment  Commonly known as: Zovirax   1 Application, Topical, Every 3 Hours      Advair HFA 45-21 MCG/ACT inhaler  Generic drug: fluticasone-salmeterol   Inhale 2 puffs 2 (Two) Times a Day. As needed      amitriptyline 50 MG tablet  Commonly known as: ELAVIL   50 mg, Oral, Nightly      apixaban 5 MG tablet tablet  Commonly known as: Eliquis   5 mg, Oral, Every 12 Hours   Start Date: September 8, 2024     ascorbic acid 1000 MG tablet  Commonly known as: VITAMIN C   1 tablet, Oral, Daily      aspirin 81 MG EC tablet   81 mg, Oral, Daily      atorvastatin 80 MG tablet  Commonly known as: LIPITOR   TAKE 1 TABLET EVERY NIGHT FOR HIGH AMOUNT OF FATS IN THE BLOOD      AZO Bladder Control/Go-Less capsule   1 capsule, Oral, Daily      fenofibrate micronized 134 MG capsule  Commonly known as: LOFIBRA   TAKE 1 CAPSULE EVERY       MORNING BEFORE BREAKFAST      furosemide 40 MG tablet  Commonly known as: LASIX   40 mg, Oral, Daily      glucosamine-chondroitin 500-400 MG capsule capsule   2 capsules, Oral, Daily      latanoprost 0.005 % ophthalmic solution  Commonly known as: XALATAN   1 drop, Both Eyes, Nightly      levothyroxine 50 MCG tablet  Commonly known as: SYNTHROID, LEVOTHROID   TAKE 1 TABLET EVERY OTHER DAY FOR UNDERACTIVE THYROID      levothyroxine 75 MCG tablet  Commonly known as: SYNTHROID, LEVOTHROID   TAKE 1 TABLET BY MOUTH EVERY OTHER DAY FOR UNDERACTIVE THYROID      losartan 100 MG tablet  Commonly known as: COZAAR   100 mg, Oral, Daily      metFORMIN  MG 24 hr tablet  Commonly known as: GLUCOPHAGE-XR   TAKE 1 TABLET TWICE A DAY      multivitamin with minerals tablet tablet   1  tablet, Oral, Daily      omeprazole 40 MG capsule  Commonly known as: priLOSEC   40 mg, Oral, Daily      phenazopyridine 100 MG tablet  Commonly known as: PYRIDIUM   100 mg, Oral, 3 Times Daily PRN      Prodigy No Coding Blood Gluc test strip  Generic drug: glucose blood   TEST BLOOD SUGAR DAILY      Prodigy No Coding Blood Gluc w/Device kit   1 each, Does not apply, Daily      Prodigy Safety Lancets 26G misc   CHECK BLOOD SUGAR ONE TIME PER DAY      venlafaxine  MG 24 hr capsule  Commonly known as: EFFEXOR-XR   150 mg, Oral, Daily             Stop These Medications      amLODIPine 5 MG tablet  Commonly known as: NORVASC     metoprolol tartrate 25 MG tablet  Commonly known as: LOPRESSOR                Follow-up:   Follow-up Information       Ashanti Hong APRN .    Specialty: Internal Medicine  Contact information:  [address]  [place] [address]  [phone]                             Future Appointments   Date Time Provider Department Center   10/18/2024  2:45 PM Ashanti Hong APRN [facility]   5/5/2025 12:00 PM [facility] ECHO/VAS FRONT  [facility] E[facility]   5/5/2025 12:40 PM Ranjan Hernadez MD [id number] None   8/29/2025 11:30 AM Aislinn Holder APRN [facility]         Time Spent on Discharge:  Greater than 30 min         TOSHA Carter  [facility]   [facility]  [address] [place] [address] [address]  [place] [address]  Office: [phone]    09/07/24  21:53 EDT      EMR Dragon/Transcription disclaimer:   Parts of this note may be an electronic transcription/translation of spoken language to printed text using the Dragon dictation system

## 2024-09-09 ENCOUNTER — TRANSITIONAL CARE MANAGEMENT TELEPHONE ENCOUNTER (OUTPATIENT)
Dept: CALL CENTER | Facility: HOSPITAL | Age: 84
End: 2024-09-09
Payer: MEDICARE

## 2024-09-09 NOTE — OUTREACH NOTE
Call Center TCM Note      Flowsheet Row Responses   [facility] patient discharged from? [place]   Does the patient have one of the following disease processes/diagnoses(primary or secondary)? General Surgery   TCM attempt successful? No   Unsuccessful attempts Attempt 1            Leah Crenshaw LPN    9/9/2024, 09:08 EDT

## 2024-09-09 NOTE — OUTREACH NOTE
Call Center TCM Note      Flowsheet Row Responses   [facility] patient discharged from? [place]   Does the patient have one of the following disease processes/diagnoses(primary or secondary)? General Surgery   TCM attempt successful? Yes   Call start time 1146   Call end time 1148   Discharge diagnosis Atrial fibrillation with RVR-new pacemaker   Meds reviewed with patient/caregiver? Yes   Is the patient having any side effects they believe may be caused by any medication additions or changes? No   Does the patient have all medications related to this admission filled (includes all antibiotics, pain medications, etc.) Yes   Is the patient taking all medications as directed (includes completed medication regime)? Yes   Does the patient have an appointment with their PCP within 7-14 days of discharge? No   Nursing Interventions Patient declined scheduling/rescheduling appointment at this time   Has home health visited the patient within 72 hours of discharge? N/A   Psychosocial issues? No   Did the patient receive a copy of their discharge instructions? Yes   Nursing interventions Reviewed instructions with patient   What is the patient's perception of their health status since discharge? Improving   Is the patient /caregiver able to teach back basic post-op care? Take showers only when approved by MD-sponge bathe until then, Keep incision areas clean,dry and protected   Is the patient/caregiver able to teach back signs and symptoms of incisional infection? Increased redness, swelling or pain at the incisonal site, Increased drainage or bleeding, Incisional warmth, Pus or odor from incision, Fever   Is the patient/caregiver able to teach back steps to recovery at home? Set small, achievable goals for return to baseline health, Rest and rebuild strength, gradually increase activity   If the patient is a current smoker, are they able to teach back resources for cessation? Not a smoker   Is the patient/caregiver  able to teach back the hierarchy of who to call/visit for symptoms/problems? PCP, Specialist, Home health nurse, Urgent Care, ED, 911 Yes   TCM call completed? Yes   Call end time 1148   Would this patient benefit from a Referral to [facility] Social Work? No   Is the patient interested in additional calls from an ambulatory ? No            Leah Crenshaw LPN    9/9/2024, 11:56 EDT

## 2024-09-09 NOTE — CASE MANAGEMENT/SOCIAL WORK
Case Management Discharge Note      Final Note: home         Selected Continued Care - Discharged on 9/7/2024 Admission date: 9/4/2024 - Discharge disposition: Home or Self Care      Destination    No services have been selected for the patient.                Durable Medical Equipment    No services have been selected for the patient.                Dialysis/Infusion    No services have been selected for the patient.                Home Medical Care    No services have been selected for the patient.                Therapy    No services have been selected for the patient.                Community Resources    No services have been selected for the patient.                Community & DME    No services have been selected for the patient.                         Final Discharge Disposition Code: 01 - home or self-care

## 2024-09-10 ENCOUNTER — TELEPHONE (OUTPATIENT)
Age: 84
End: 2024-09-10
Payer: MEDICARE

## 2024-09-16 ENCOUNTER — READMISSION MANAGEMENT (OUTPATIENT)
Dept: CALL CENTER | Facility: HOSPITAL | Age: 84
End: 2024-09-16
Payer: MEDICARE

## 2024-09-16 ENCOUNTER — CLINICAL SUPPORT NO REQUIREMENTS (OUTPATIENT)
Age: 84
End: 2024-09-16
Payer: MEDICARE

## 2024-09-16 DIAGNOSIS — Z95.0 PRESENCE OF CARDIAC PACEMAKER: Primary | ICD-10-CM

## 2024-09-16 DIAGNOSIS — F41.9 ANXIETY: ICD-10-CM

## 2024-09-16 PROCEDURE — 93280 PM DEVICE PROGR EVAL DUAL: CPT | Performed by: INTERNAL MEDICINE

## 2024-09-16 RX ORDER — VENLAFAXINE HYDROCHLORIDE 150 MG/1
150 CAPSULE, EXTENDED RELEASE ORAL DAILY
Qty: 90 CAPSULE | Refills: 0 | Status: SHIPPED | OUTPATIENT
Start: 2024-09-16

## 2024-09-17 ENCOUNTER — TRANSCRIBE ORDERS (OUTPATIENT)
Dept: CARDIOLOGY | Facility: CLINIC | Age: 84
End: 2024-09-17
Payer: MEDICARE

## 2024-09-17 ENCOUNTER — TELEPHONE (OUTPATIENT)
Dept: INTERNAL MEDICINE | Facility: CLINIC | Age: 84
End: 2024-09-17

## 2024-09-17 DIAGNOSIS — Z01.810 PRE-OPERATIVE CARDIOVASCULAR EXAMINATION: Primary | ICD-10-CM

## 2024-09-17 DIAGNOSIS — Z13.6 SCREENING FOR ISCHEMIC HEART DISEASE: ICD-10-CM

## 2024-09-17 DIAGNOSIS — I48.0 PAROXYSMAL ATRIAL FIBRILLATION: Primary | Chronic | ICD-10-CM

## 2024-09-17 NOTE — TELEPHONE ENCOUNTER
[employer] Called pt and notified her she would need an appointment to be seen.  She would have to find a ride.  Told her to call back and ask to speak with Lisa.

## 2024-09-17 NOTE — TELEPHONE ENCOUNTER
"    Caller: Dior Pettit \"Citlaly\"    Relationship: Self    Best call back number:[phone]     What medication are you requesting:     What are your current symptoms: UNCOMFORTABLE, PAINFUL AND FREQUENT URINATION     How long have you been experiencing symptoms:THIS MORNING     Have you had these symptoms before:    [x] Yes  [] No    Have you been treated for these symptoms before:   [x] Yes  [] No    If a prescription is needed, what is your preferred pharmacy and phone number: [id number] - [place] [address] - [phone] [facility] [phone]      Additional notes:    PLEASE CALL AND ADVISE       "

## 2024-09-19 ENCOUNTER — TELEPHONE (OUTPATIENT)
Dept: CARDIOLOGY | Facility: CLINIC | Age: 84
End: 2024-09-19
Payer: MEDICARE

## 2024-09-20 ENCOUNTER — HOSPITAL ENCOUNTER (OUTPATIENT)
Facility: HOSPITAL | Age: 84
Setting detail: HOSPITAL OUTPATIENT SURGERY
Discharge: HOME | End: 2024-09-20
Attending: INTERNAL MEDICINE | Admitting: INTERNAL MEDICINE
Payer: MEDICARE

## 2024-09-20 VITALS
SYSTOLIC BLOOD PRESSURE: 127 MMHG | OXYGEN SATURATION: 99 % | BODY MASS INDEX: 21.35 KG/M2 | TEMPERATURE: 96.3 F | WEIGHT: 136 LBS | DIASTOLIC BLOOD PRESSURE: 90 MMHG | HEIGHT: 67 IN | HEART RATE: 83 BPM | RESPIRATION RATE: 18 BRPM

## 2024-09-20 DIAGNOSIS — I48.0 PAROXYSMAL ATRIAL FIBRILLATION: ICD-10-CM

## 2024-09-20 LAB
GLUCOSE BLDC GLUCOMTR-MCNC: 136 MG/DL (ref 70–130)
QT INTERVAL: 335 MS
QTC INTERVAL: 413 MS

## 2024-09-20 PROCEDURE — 93005 ELECTROCARDIOGRAM TRACING: CPT | Performed by: INTERNAL MEDICINE

## 2024-09-20 PROCEDURE — 25810000003 SODIUM CHLORIDE 0.9 % SOLUTION: Performed by: INTERNAL MEDICINE

## 2024-09-20 PROCEDURE — 93650 ICAR CATH ABLTJ AV NODE FUNC: CPT | Performed by: INTERNAL MEDICINE

## 2024-09-20 PROCEDURE — 25010000002 MIDAZOLAM PER 1 MG: Performed by: INTERNAL MEDICINE

## 2024-09-20 PROCEDURE — 93005 ELECTROCARDIOGRAM TRACING: CPT

## 2024-09-20 PROCEDURE — C1893 INTRO/SHEATH, FIXED,NON-PEEL: HCPCS | Performed by: INTERNAL MEDICINE

## 2024-09-20 PROCEDURE — C1894 INTRO/SHEATH, NON-LASER: HCPCS | Performed by: INTERNAL MEDICINE

## 2024-09-20 PROCEDURE — C1760 CLOSURE DEV, VASC: HCPCS | Performed by: INTERNAL MEDICINE

## 2024-09-20 PROCEDURE — 93010 ELECTROCARDIOGRAM REPORT: CPT | Performed by: INTERNAL MEDICINE

## 2024-09-20 PROCEDURE — 25010000002 FENTANYL CITRATE (PF) 50 MCG/ML SOLUTION: Performed by: INTERNAL MEDICINE

## 2024-09-20 PROCEDURE — C1732 CATH, EP, DIAG/ABL, 3D/VECT: HCPCS | Performed by: INTERNAL MEDICINE

## 2024-09-20 PROCEDURE — 82948 REAGENT STRIP/BLOOD GLUCOSE: CPT

## 2024-09-20 RX ORDER — FENTANYL CITRATE 50 UG/ML
INJECTION, SOLUTION INTRAMUSCULAR; INTRAVENOUS
Status: DISCONTINUED | OUTPATIENT
Start: 2024-09-20 | End: 2024-09-20 | Stop reason: HOSPADM

## 2024-09-20 RX ORDER — SODIUM CHLORIDE 0.9 % (FLUSH) 0.9 %
10 SYRINGE (ML) INJECTION EVERY 12 HOURS SCHEDULED
Status: DISCONTINUED | OUTPATIENT
Start: 2024-09-20 | End: 2024-09-20 | Stop reason: HOSPADM

## 2024-09-20 RX ORDER — MIDAZOLAM HYDROCHLORIDE 1 MG/ML
INJECTION INTRAMUSCULAR; INTRAVENOUS
Status: DISCONTINUED | OUTPATIENT
Start: 2024-09-20 | End: 2024-09-20 | Stop reason: HOSPADM

## 2024-09-20 RX ORDER — NITROGLYCERIN 0.4 MG/1
0.4 TABLET SUBLINGUAL
Status: DISCONTINUED | OUTPATIENT
Start: 2024-09-20 | End: 2024-09-20 | Stop reason: HOSPADM

## 2024-09-20 RX ORDER — SODIUM CHLORIDE 9 MG/ML
40 INJECTION, SOLUTION INTRAVENOUS AS NEEDED
Status: DISCONTINUED | OUTPATIENT
Start: 2024-09-20 | End: 2024-09-20 | Stop reason: HOSPADM

## 2024-09-20 RX ORDER — NITROFURANTOIN 25; 75 MG/1; MG/1
100 CAPSULE ORAL 2 TIMES DAILY
COMMUNITY

## 2024-09-20 RX ORDER — SODIUM CHLORIDE 0.9 % (FLUSH) 0.9 %
10 SYRINGE (ML) INJECTION AS NEEDED
Status: DISCONTINUED | OUTPATIENT
Start: 2024-09-20 | End: 2024-09-20 | Stop reason: HOSPADM

## 2024-09-20 RX ORDER — SODIUM CHLORIDE 9 MG/ML
75 INJECTION, SOLUTION INTRAVENOUS CONTINUOUS
Status: DISCONTINUED | OUTPATIENT
Start: 2024-09-20 | End: 2024-09-20 | Stop reason: HOSPADM

## 2024-09-20 RX ORDER — METHOHEXITAL IN WATER/PF 100MG/10ML
SYRINGE (ML) INTRAVENOUS
Status: DISCONTINUED | OUTPATIENT
Start: 2024-09-20 | End: 2024-09-20 | Stop reason: HOSPADM

## 2024-09-20 RX ADMIN — SODIUM CHLORIDE 75 ML/HR: 9 INJECTION, SOLUTION INTRAVENOUS at 13:13

## 2024-09-21 LAB
QT INTERVAL: 376 MS
QTC INTERVAL: 439 MS

## 2024-09-23 ENCOUNTER — TELEPHONE (OUTPATIENT)
Age: 84
End: 2024-09-23
Payer: MEDICARE

## 2024-09-23 ENCOUNTER — TELEPHONE (OUTPATIENT)
Dept: CARDIOLOGY | Facility: CLINIC | Age: 84
End: 2024-09-23

## 2024-09-23 NOTE — TELEPHONE ENCOUNTER
"     Caller: Dior Pettit \"Citlaly\"    Relationship to patient: Self    Best call back number:  [phone]    Patient is needing: PATIENT CALLED IN TO REPORT HEART RATE WAS 99 YESTERDAY THIS MORNING ITS RUNNING 72-99.        "

## 2024-09-23 NOTE — TELEPHONE ENCOUNTER
Pt remote was received 9/23/24 and alerted for RV impedance out of range 9/20/24 on day of AVN ablation and has since resulted back in normal range ~500 ohms.     No events other than ongoing AF, on apixaban     Presenting AF/BiVP 80s     100%    Histograms show V rates mostly 80s-100, small portion appears to be 110-120 likely ~5% based on graph.     LRL is programmed 80 post AVN ablation and rate response is on.

## 2024-10-03 RX ORDER — IPRATROPIUM BROMIDE 42 UG/1
SPRAY, METERED NASAL
Qty: 15 ML | Refills: 2 | Status: SHIPPED | OUTPATIENT
Start: 2024-10-03

## 2024-10-09 DIAGNOSIS — E11.8 CONTROLLED TYPE 2 DIABETES MELLITUS WITH COMPLICATION, WITHOUT LONG-TERM CURRENT USE OF INSULIN: ICD-10-CM

## 2024-10-09 RX ORDER — BLOOD SUGAR DIAGNOSTIC
STRIP MISCELLANEOUS
Qty: 50 EACH | Refills: 0 | Status: SHIPPED | OUTPATIENT
Start: 2024-10-09

## 2024-10-09 NOTE — TELEPHONE ENCOUNTER
"Caller: Dior Pettit \"Citlaly\"    Relationship: Self    Best call back number: [phone]     Requested Prescriptions:   Requested Prescriptions     Pending Prescriptions Disp Refills    glucose blood (Prodigy No Coding Blood Gluc) test strip 50 each 0     Sig: TEST BLOOD SUGAR DAILY        Pharmacy where request should be sent: [id number] - [place], [place] - [address] - [phone]  - [phone] FX     Last office visit with prescribing clinician: 7/8/2024   Last telemedicine visit with prescribing clinician: Visit date not found   Next office visit with prescribing clinician: 10/18/2024     Additional details provided by patient: PATIENT REQUESTS 2 MONTH SUPPLY    Does the patient have less than a 3 day supply:  [] Yes  [x] No    Would you like a call back once the refill request has been completed: [] Yes [x] No    If the office needs to give you a call back, can they leave a voicemail: [] Yes [] No    Ritesh Candelaria Rep   10/09/24 13:45 EDT   "

## 2024-10-14 ENCOUNTER — OFFICE VISIT (OUTPATIENT)
Age: 84
End: 2024-10-14
Payer: MEDICARE

## 2024-10-14 ENCOUNTER — CLINICAL SUPPORT NO REQUIREMENTS (OUTPATIENT)
Age: 84
End: 2024-10-14
Payer: MEDICARE

## 2024-10-14 VITALS
HEIGHT: 67 IN | DIASTOLIC BLOOD PRESSURE: 74 MMHG | SYSTOLIC BLOOD PRESSURE: 130 MMHG | WEIGHT: 138 LBS | BODY MASS INDEX: 21.66 KG/M2 | HEART RATE: 75 BPM

## 2024-10-14 DIAGNOSIS — R00.1 SYMPTOMATIC BRADYCARDIA: ICD-10-CM

## 2024-10-14 DIAGNOSIS — Z95.2 S/P TAVR (TRANSCATHETER AORTIC VALVE REPLACEMENT): ICD-10-CM

## 2024-10-14 DIAGNOSIS — I48.0 PAROXYSMAL ATRIAL FIBRILLATION: Primary | Chronic | ICD-10-CM

## 2024-10-14 DIAGNOSIS — Z95.0 PRESENCE OF CARDIAC PACEMAKER: Primary | ICD-10-CM

## 2024-10-14 PROCEDURE — 99214 OFFICE O/P EST MOD 30 MIN: CPT

## 2024-10-14 PROCEDURE — 93000 ELECTROCARDIOGRAM COMPLETE: CPT

## 2024-10-14 RX ORDER — DIGOXIN 125 MCG
125 TABLET ORAL
COMMUNITY
Start: 2024-10-04

## 2024-10-14 NOTE — PROGRESS NOTES
Date of Office Visit: 10/14/2024  Encounter Provider: TOSHA Veronica  Place of Service: [facility]  Patient Name: Dior Pettit  :1940    Chief Complaint   Patient presents with    paroxysmal AFIB    Pacemaker Check   :     HPI: Dior Pettit is a 84 y.o. female who is followed by Dr. Jj.  She has a history of CAD, DM II,  AVR--s/p repair ()---s/p TAVR (), and persistent atrial fibrillation.      She was cardioverted in 2021.  She was started on amiodarone prior to the CV but felt like it was causing dizziness so this was stopped.       She saw Dr. Jj on 2022 and was back in AF.  She estimated that she had been in AF for about a week prior to her appt.  Since the CV lasted a year she elected to undergo CV again.      Patient underwent CV for persistent AF on 2022 she was successfully CV.       We saw her in October when she presented with complaints of palpitations. She was noted to be in AF with RVR. She was started on dofetilide 250mcg BID.      She did well with only occasional episodes. However, a couple months ago she forget several doses of her dofetilide and was advised to discontinue.      She underwent TAVR on 2024. She did well. No AF during admission for this.     Saw Dr. Hernadez a week ago. Mild dyspnea but better. No mention of AF or palpitations.      Presented to ED  and was in Af with RVR. We discussed options and elected to proceed with pacemaker and AV node ablation.     She had pacemaker placed on  and AV node ablation on .                She presents today for follow up appt.     Since pacemaker and AV node ablation she says she feels about the same.     She does not really feel her AF but still has some dyspnea on exertion.     Patient also had TAVR in may.     Device testing and function is normal. : 100%. She is dependent.     Rate well controlled since ablation.      Remains on apixaban for AC.     Past Medical History:   Diagnosis Date    Anxiety and depression     Aortic root dilatation 10/02/2017    Borderline--Noted on Echo    Aortic valve calcification 10/02/2017    Noted on Echo    Aortic valve insufficiency Dx in 07    w/AVR     Aortic valve prosthesis present 11/02/2018    Noted on CTA Chest    Ascending aorta dilatation 10/02/2017    Borderline--Noted on Echo    Asthma     Atrial fibrillation     2020    Atypical chest pain     Bronchitis, chronic 2014    CAD (coronary artery disease)     Cardiomegaly 2017    Cervicalgia     Chest pain due to CAD     CHF (congestive heart failure) 2024    Claustrophobia 1940    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    Borderline--Noted on Echo & Measured @ 2.6CM and Mid Descending @ 2.5CM on CTA Chest-11/2/18    DM (diabetes mellitus)     T2    Dyslipidemia 2014    Esophagitis     Fatigue     GERD (gastroesophageal reflux disease)     Controlled w/Meds    Headache     Heart murmur     History of echocardiogram 10/2/17-[facility]    EF 66%; Borderline Concentric Hypertrophy; Mild Calcification in AV; Mild AVS; Mild AVR; Moderate TVR; Borderline Dilation of Aortic Root/Arch & Borderline Dilation of Ascending/Proximal Aorta Present    History of hepatitis     AS A TEEN    History of transfusion 1960    Hyperlipidemia     Controlled w/Meds    Hypertension     Controlled w/Meds    Hypothyroidism     Controlled w/Synthroid    Insomnia     Macular degeneration, left eye     Mild aortic valve regurgitation 10/02/2017    Noted on Echo    Mild aortic valve stenosis 10/02/2017    Noted on Echo    Mild dilation of ascending aorta 10/02/2017    Borderline--Noted on Echo    Mitral valve prolapse 2007    Moderate tricuspid valve regurgitation 10/02/2017    Noted on Echo    Mood disorder in conditions classified elsewhere     Controlled w/Meds    Near syncope 03/2017-[facility] ER    Neuropathy     Osteoarthritis     Ankles/Feet    Pulmonary hypertension  2017    Renal insufficiency     RLS (restless legs syndrome)     Short of breath on exertion     Sleep apnea     CPAP    Stroke     UNCLEAR:  DOES NOT SEE NEUROLOGIST    Thoracic ascending aortic aneurysm Dx in 2007 @ 3.8CM & 1/02/2018    Noted @ 4CM on CTA Chest;    Urinary incontinence     WEARS PAD    UTI (urinary tract infection)     RECENT:  DIAGNOSED 6-19-24       Past Surgical History:   Procedure Laterality Date    AORTIC VALVE REPAIR/REPLACEMENT  2007    Dr. Perry    AORTIC VALVE REPAIR/REPLACEMENT N/A 6/25/2024    Procedure: TTE TRANSFEMORAL TRANSCATHETER AORTIC VALVE REPLACEMENT PERCUTANEOUS APPROACH;  Surgeon: Seth Salinas MD;  Location: [facility];  Service: Cardiothoracic;  Laterality: N/A;    AORTIC VALVE REPAIR/REPLACEMENT N/A 6/25/2024    Procedure: Transfemoral Transcatheter Aortic Valve Replacement with intraoperative transthoracic echocardiogram and possible open surgical rescue;  Surgeon: Ranjan Hernadez MD;  Location[facility];  Service: Cardiovascular;  Laterality: N/A;    APPENDECTOMY      AUGMENTATION MAMMAPLASTY  1976    BREAST AUGMENTATION  2014    BUNIONECTOMY Bilateral     CARDIAC ABLATION      CARDIAC CATHETERIZATION  2007    CARDIAC ELECTROPHYSIOLOGY PROCEDURE N/A 9/6/2024    Procedure: Pacemaker DC new [employer];  Surgeon: Lazaro Jj MD;  Location:  [facility];  Service: Cardiovascular;  Laterality: N/A;    CARDIAC ELECTROPHYSIOLOGY PROCEDURE N/A 9/20/2024    Procedure: AV node ablation medt device;  Surgeon: Lazaro Jj MD;  Location:  [facility];  Service: Cardiovascular;  Laterality: N/A;    CARDIAC VALVE REPLACEMENT  2007    porcine    COLONOSCOPY  2010    COLONOSCOPY  03/02/2012    EYE SURGERY      cataracts and ens implants    HYSTERECTOMY  1972    SIGMOIDOSCOPY  2001    TEMPORAL ARTERY BIOPSY Bilateral 04/14/2023    Procedure: BILATERAL TEMPORAL ARTERY BIOPSY;  Surgeon: Stewart Shepherd MD;   Location: [facility] OR;  Service: Vascular;  Laterality: Bilateral;    TOTAL HIP ARTHROPLASTY Right 11/15/2022    Procedure: Right anterior total hip arthroplasty;  Surgeon: Joao Martinez MD;  Location: [facility] OR;  Service: Orthopedics;  Laterality: Right;       Social History     Socioeconomic History    Marital status:    Tobacco Use    Smoking status: Never     Passive exposure: Never    Smokeless tobacco: Never    Tobacco comments:     caffeine use - 1 cup coffee daily    Vaping Use    Vaping status: Never Used   Substance and Sexual Activity    Alcohol use: Yes     Comment: 2 oz a month    Drug use: Never    Sexual activity: Not Currently     Partners: Male     Birth control/protection: Surgical     Comment: Hyst       Family History   Problem Relation Age of Onset    Hypertension Mother     Stroke Mother     Cancer Mother     Diabetes Sister     Coronary artery disease Brother     Diabetes Brother     Valvular heart disease Brother         TAVR    Cancer Brother         bladder cancer    Coronary artery disease Brother     Cancer Brother     Birth defects Daughter     Skin cancer Neg Hx     Malig Hyperthermia Neg Hx        Review of Systems   Constitutional: Negative for chills, fever and malaise/fatigue.   Cardiovascular:  Positive for dyspnea on exertion. Negative for chest pain, leg swelling, near-syncope, orthopnea, palpitations, paroxysmal nocturnal dyspnea and syncope.   Respiratory:  Negative for cough and shortness of breath.    Hematologic/Lymphatic: Negative.    Musculoskeletal:  Negative for joint pain, joint swelling and myalgias.   Gastrointestinal:  Negative for abdominal pain, diarrhea, melena, nausea and vomiting.   Genitourinary:  Negative for frequency and hematuria.   Neurological:  Negative for light-headedness, numbness, paresthesias and seizures.   Allergic/Immunologic: Negative.    All other systems reviewed and are negative.      No Known Allergies      Current  Outpatient Medications:     acetaminophen (TYLENOL) 325 MG tablet, Take 2 tablets by mouth Every 4 (Four) Hours As Needed for Mild Pain., Disp: , Rfl:     acyclovir (Zovirax) 5 % ointment, Apply 1 Application topically to the appropriate area as directed Every 3 (Three) Hours., Disp: 15 g, Rfl: 0    Advair HFA 45-21 MCG/ACT inhaler, Inhale 2 puffs 2 (Two) Times a Day. As needed, Disp: , Rfl:     amitriptyline (ELAVIL) 50 MG tablet, Take 1 tablet by mouth Every Night., Disp: 90 tablet, Rfl: 1    apixaban (Eliquis) 5 MG tablet tablet, Take 1 tablet by mouth Every 12 (Twelve) Hours., Disp: 180 tablet, Rfl: 1    ascorbic acid (VITAMIN C) 1000 MG tablet, Take 1 tablet by mouth Daily. Indications: Inadequate Vitamin C, Disp: , Rfl:     aspirin 81 MG EC tablet, Take 1 tablet by mouth Daily., Disp: 30 tablet, Rfl: 1    atorvastatin (LIPITOR) 80 MG tablet, TAKE 1 TABLET EVERY NIGHT FOR HIGH AMOUNT OF FATS IN THE BLOOD, Disp: 90 tablet, Rfl: 1    Blood Glucose Monitoring Suppl (Prodigy No Coding Blood Gluc) w/Device kit, 1 each Daily., Disp: 1 kit, Rfl: 0    digoxin (LANOXIN) 125 MCG tablet, Take 1 tablet by mouth Daily., Disp: , Rfl:     fenofibrate micronized (LOFIBRA) 134 MG capsule, TAKE 1 CAPSULE EVERY       MORNING BEFORE BREAKFAST, Disp: 90 capsule, Rfl: 1    furosemide (LASIX) 40 MG tablet, TAKE 1 TABLET BY MOUTH EVERY DAY, Disp: 90 tablet, Rfl: 0    glucosamine-chondroitin 500-400 MG capsule capsule, Take 2 capsules by mouth Daily., Disp: , Rfl:     glucose blood (Prodigy No Coding Blood Gluc) test strip, TEST BLOOD SUGAR DAILY, Disp: 50 each, Rfl: 0    ipratropium (ATROVENT) 0.06 % nasal spray, INSTILL 2 SPRAYS IN EACH NOSTRIL EVERY SIX HOURS AS NEEDED, Disp: 15 mL, Rfl: 2    latanoprost (XALATAN) 0.005 % ophthalmic solution, Administer 1 drop to both eyes Every Night., Disp: , Rfl:     levothyroxine (SYNTHROID, LEVOTHROID) 50 MCG tablet, TAKE 1 TABLET EVERY OTHER DAY FOR UNDERACTIVE THYROID, Disp: 45 tablet, Rfl:  "1    levothyroxine (SYNTHROID, LEVOTHROID) 75 MCG tablet, TAKE 1 TABLET BY MOUTH EVERY OTHER DAY FOR UNDERACTIVE THYROID, Disp: 45 tablet, Rfl: 0    losartan (COZAAR) 100 MG tablet, Take 1 tablet by mouth Daily., Disp: 90 tablet, Rfl: 1    metFORMIN ER (GLUCOPHAGE-XR) 750 MG 24 hr tablet, TAKE 1 TABLET TWICE A DAY (Patient taking differently: Take 1 tablet by mouth 2 (Two) Times a Day.), Disp: 180 tablet, Rfl: 3    Multiple Vitamins-Minerals (MULTIVITAL PO), Take 1 tablet by mouth Daily. Indications: potential deficiency, Disp: , Rfl:     omeprazole (priLOSEC) 40 MG capsule, Take 1 capsule by mouth Daily., Disp: 90 capsule, Rfl: 1    Prodigy Safety Lancets 26G misc, CHECK BLOOD SUGAR ONE TIME PER DAY, Disp: 100 each, Rfl: 1    venlafaxine XR (EFFEXOR-XR) 150 MG 24 hr capsule, Take 1 capsule by mouth Daily., Disp: 90 capsule, Rfl: 0      Objective:     Vitals:    10/14/24 1245   BP: 130/74   Pulse: 75   Weight: 62.6 kg (138 lb)   Height: 170.2 cm (67\")     Body mass index is 21.61 kg/m².    PHYSICAL EXAM:    Vitals Reviewed.   General Appearance: No acute distress, well developed and well nourished.   Eyes: Conjunctiva and lids: No erythema, swelling, or discharge. Sclera non-icteric.   HENT: Atraumatic, normocephalic. External eyes, ears, and nose normal.   Respiratory: No signs of respiratory distress. Respiration rhythm and depth normal.   Clear to auscultation. No rales, crackles, rhonchi, or wheezing auscultated.   Cardiovascular:  Heart Rate and Rhythm: Normal, Heart Sounds: Normal S1 and S2. No S3 or S4 noted.  Gastrointestinal:  Abdomen soft, non-distended, non-tender.   Musculoskeletal: Normal movement of extremities  Skin: Warm and dry.   Psychiatric: Patient alert and oriented to person, place, and time. Speech and behavior appropriate. Normal mood and affect.       ECG 12 Lead    Date/Time: 10/14/2024 1:24 PM  Performed by: Justin Galloway APRN    Authorized by: Justin Galloway APRN  Comparison: compared " with previous ECG   Similar to previous ECG  Rhythm: atrial fibrillation and paced            Assessment:       Diagnosis Plan   1. Paroxysmal atrial fibrillation        2. Symptomatic bradycardia        3. S/P TAVR (transcatheter aortic valve replacement)               Plan:   1-2. Persistent AF, bradycardia---s/p DC PPM and AV node ablation----- her AF is significantly better than prior to AV node ablation. Her heart rate is controlled. She continues to have episodes of dyspnea on exertion, sounds like deconditioning. Device testing and function is normal. : 100%, she is dependent. For now we will monitor symptoms.         If continues to have dyspnea consider repeat echo.         Follow up in 3 months.           As always, it has been a pleasure to participate in your patient's care.      Sincerely,         TOSHA Veronica

## 2024-10-18 ENCOUNTER — OFFICE VISIT (OUTPATIENT)
Dept: INTERNAL MEDICINE | Facility: CLINIC | Age: 84
End: 2024-10-18
Payer: MEDICARE

## 2024-10-18 VITALS
BODY MASS INDEX: 21.19 KG/M2 | TEMPERATURE: 98.3 F | WEIGHT: 135 LBS | SYSTOLIC BLOOD PRESSURE: 150 MMHG | HEIGHT: 67 IN | OXYGEN SATURATION: 99 % | RESPIRATION RATE: 16 BRPM | HEART RATE: 85 BPM | DIASTOLIC BLOOD PRESSURE: 80 MMHG

## 2024-10-18 DIAGNOSIS — I10 ESSENTIAL HYPERTENSION: Chronic | ICD-10-CM

## 2024-10-18 DIAGNOSIS — F51.01 PRIMARY INSOMNIA: Chronic | ICD-10-CM

## 2024-10-18 DIAGNOSIS — I48.91 ATRIAL FIBRILLATION WITH RAPID VENTRICULAR RESPONSE: Primary | ICD-10-CM

## 2024-10-18 PROCEDURE — 1159F MED LIST DOCD IN RCRD: CPT | Performed by: NURSE PRACTITIONER

## 2024-10-18 PROCEDURE — 99214 OFFICE O/P EST MOD 30 MIN: CPT | Performed by: NURSE PRACTITIONER

## 2024-10-18 PROCEDURE — 1126F AMNT PAIN NOTED NONE PRSNT: CPT | Performed by: NURSE PRACTITIONER

## 2024-10-18 PROCEDURE — 1160F RVW MEDS BY RX/DR IN RCRD: CPT | Performed by: NURSE PRACTITIONER

## 2024-10-18 PROCEDURE — G2211 COMPLEX E/M VISIT ADD ON: HCPCS | Performed by: NURSE PRACTITIONER

## 2024-10-27 NOTE — ASSESSMENT & PLAN NOTE
She has been experiencing difficulty sleeping but has found some relief through meditation. She is currently sleeping from 2:00 AM to around 10:00 AM, getting 5-7 hours of sleep. If sleep issues persist, she may consider discussing further treatment options.

## 2024-10-27 NOTE — ASSESSMENT & PLAN NOTE
She experiences episodes of atrial fibrillation (A. fib), with her watch indicating she is in A. fib 2% of the week. She reports shortness of breath and lightheadedness during these episodes. The recent ablation procedure provided some relief, but the symptoms returned shortly after. She will continue to monitor sx and follow-up with cardiology as scheduled.

## 2024-10-27 NOTE — PROGRESS NOTES
"Chief Complaint  Atrial Fibrillation, Hypothyroidism, and Hypertension  Subjective        Dior Pettit presents to [facility]  History of Present Illness  History of Present Illness  The patient presents for f/u regarding HTN, atrial fibrillation and hypothyroidism.    She reports feeling well overall. She experienced an episode of atrial fibrillation (A. fib) from Thursday afternoon until Sunday morning, during which she felt her heart racing. Her watch indicated that she was in A. fib for 2% of the week. She experiences shortness of breath when walking to the door. This symptom has improved since her last visit. She occasionally feels lightheaded which she attributes to her vision issues. She does not experience significant pain.    She has been practicing meditation and has been able to sleep through the night for the past three nights. Her sleep schedule is from 2:00 AM to 10:00 or 11:00 AM, which she finds satisfactory.    She reports no heartburn or indigestion. She has noticed an increase in constipation since discontinuing metoprolol. She admits to not consuming enough fruits and vegetables but finds that apples help with her constipation. She also takes Colace for this issue. Denies rectal bleeding.    She recently fell while trying to  something for supper, resulting in soreness. She has been experiencing memory issues and has been doing crossword puzzles to keep her mind active. She does not engage in physical activity, but tries to stay active by moving around the house.    SOCIAL HISTORY  She never smoked.    IMMUNIZATIONS  She is up-to-date on her influenza vaccine.    Objective   Vital Signs:  /80 (BP Location: Right arm, Patient Position: Sitting, Cuff Size: Adult)   Pulse 85   Temp 98.3 °F (36.8 °C) (Oral)   Resp 16   Ht 170.2 cm (67.01\")   Wt 61.2 kg (135 lb)   SpO2 99%   BMI 21.14 kg/m²   Estimated body mass index is 21.14 kg/m² as calculated " "from the following:    Height as of this encounter: 170.2 cm (67.01\").    Weight as of this encounter: 61.2 kg (135 lb).    BMI is within normal parameters. No other follow-up for BMI required.      Physical Exam  Constitutional:       Appearance: She is well-developed. She is not ill-appearing.   HENT:      Head: Normocephalic.      Right Ear: Hearing, tympanic membrane and external ear normal.      Left Ear: Hearing, tympanic membrane and external ear normal.      Nose: Nose normal. No nasal deformity, mucosal edema or rhinorrhea.      Right Sinus: No maxillary sinus tenderness or frontal sinus tenderness.      Left Sinus: No maxillary sinus tenderness or frontal sinus tenderness.      Mouth/Throat:      Dentition: Normal dentition.   Eyes:      General: Lids are normal.         Right eye: No discharge.         Left eye: No discharge.      Conjunctiva/sclera: Conjunctivae normal.      Right eye: No exudate.     Left eye: No exudate.  Neck:      Thyroid: No thyroid mass or thyromegaly.      Vascular: No carotid bruit.      Trachea: Trachea normal.   Cardiovascular:      Rate and Rhythm: Regular rhythm.      Pulses: Normal pulses.      Heart sounds: Normal heart sounds. No murmur heard.      with a grade of 2/6.   Pulmonary:      Effort: No respiratory distress.      Breath sounds: Normal breath sounds. No decreased breath sounds, wheezing, rhonchi or rales.   Abdominal:      General: Bowel sounds are normal.      Palpations: Abdomen is soft.      Tenderness: There is no abdominal tenderness.   Musculoskeletal:      Cervical back: Normal range of motion. No edema.   Lymphadenopathy:      Head:      Right side of head: No submental, submandibular, tonsillar, preauricular, posterior auricular or occipital adenopathy.      Left side of head: No submental, submandibular, tonsillar, preauricular, posterior auricular or occipital adenopathy.   Skin:     General: Skin is warm and dry.      Nails: There is no clubbing. "   Neurological:      Mental Status: She is alert.   Psychiatric:         Behavior: Behavior is cooperative.        Physical Exam      Result Review :  The following data was reviewed by: TOSHA Houser on 10/18/2024:  Common labs          9/4/2024    22:15 9/5/2024    05:44 9/6/2024    04:24   Common Labs   Glucose 103  115  118    BUN 26  24  22    Creatinine 1.07  0.93  0.89    Sodium 140  135  139    Potassium 4.3  4.1  4.3    Chloride 101  104  104    Calcium 10.1  9.3  9.7    Albumin 4.9      Total Bilirubin 0.3      Alkaline Phosphatase 66      AST (SGOT) 25      ALT (SGPT) 19      WBC 7.01  7.66  6.77    Hemoglobin 12.7  11.2  12.0    Hematocrit 39.3  34.1  37.0    Platelets 307  253  288    Total Cholesterol  144     Triglycerides  123     HDL Cholesterol  46     LDL Cholesterol   76     Hemoglobin A1C  5.60       Data reviewed : Consultant notes cardiology 10/14/23      Results  Laboratory Studies  Blood sugar was excellent. Cholesterol is 144. Thyroid levels were normal. Magnesium levels were good. Blood count was okay.           Assessment and Plan   Diagnoses and all orders for this visit:    1. Atrial fibrillation with rapid ventricular response (Primary)  Assessment & Plan:  She experiences episodes of atrial fibrillation (A. fib), with her watch indicating she is in A. fib 2% of the week. She reports shortness of breath and lightheadedness during these episodes. The recent ablation procedure provided some relief, but the symptoms returned shortly after. She will continue to monitor sx and follow-up with cardiology as scheduled.      2. Essential hypertension  Assessment & Plan:  BP is mildly elevated in the office (130/74 at recent cardiology visit), continue losartan along with a low sodium diet.      3. Primary insomnia  Assessment & Plan:  She has been experiencing difficulty sleeping but has found some relief through meditation. She is currently sleeping from 2:00 AM to around 10:00 AM,  getting 5-7 hours of sleep. If sleep issues persist, she may consider discussing further treatment options.        Assessment & Plan    Constipation.  She reports constipation, which she attributes to a lack of fruits and vegetables in her diet. She finds that apples help and uses Colace for relief. She is advised to continue with these measures and to increase her intake of fruits and vegetables.     Memory Concerns.  She has been experiencing memory issues. It is recommended to keep her mind active with activities like crossword puzzles. Physical activity is also encouraged. Her B12 levels will be checked during the next blood work to rule out any deficiencies that could affect memory.    Health Maintenance.  Her most recent blood work on 09/05/2024 showed normal glucose levels, cholesterol at 144, normal thyroid function, adequate magnesium levels, and normal blood counts. She is up to date on her flu shot and other vaccinations.         Follow Up   Return in about 6 months (around 4/18/2025).  Patient was given instructions and counseling regarding her condition or for health maintenance advice. Please see specific information pulled into the AVS if appropriate.     Patient or patient representative verbalized consent for the use of Ambient Listening during the visit with  TOSHA Houser for chart documentation. 10/27/2024  15:09 EDT

## 2024-10-27 NOTE — ASSESSMENT & PLAN NOTE
BP is mildly elevated in the office (130/74 at recent cardiology visit), continue losartan along with a low sodium diet.

## 2024-11-07 DIAGNOSIS — E11.9 DIABETES MELLITUS WITHOUT COMPLICATION: ICD-10-CM

## 2024-11-07 DIAGNOSIS — F51.01 PRIMARY INSOMNIA: ICD-10-CM

## 2024-11-07 RX ORDER — AMITRIPTYLINE HYDROCHLORIDE 50 MG/1
50 TABLET ORAL
Qty: 90 TABLET | Refills: 0 | Status: SHIPPED | OUTPATIENT
Start: 2024-11-07

## 2024-11-08 RX ORDER — METFORMIN HYDROCHLORIDE 750 MG/1
TABLET, EXTENDED RELEASE ORAL
Qty: 180 TABLET | Refills: 1 | Status: SHIPPED | OUTPATIENT
Start: 2024-11-08

## 2024-11-09 DIAGNOSIS — F51.01 PRIMARY INSOMNIA: ICD-10-CM

## 2024-11-11 ENCOUNTER — PATIENT OUTREACH (OUTPATIENT)
Dept: CASE MANAGEMENT | Facility: OTHER | Age: 84
End: 2024-11-11
Payer: MEDICARE

## 2024-11-11 DIAGNOSIS — I10 ESSENTIAL HYPERTENSION: Primary | Chronic | ICD-10-CM

## 2024-11-11 DIAGNOSIS — E11.8 CONTROLLED TYPE 2 DIABETES MELLITUS WITH COMPLICATION, WITHOUT LONG-TERM CURRENT USE OF INSULIN: Chronic | ICD-10-CM

## 2024-11-11 RX ORDER — AMITRIPTYLINE HYDROCHLORIDE 50 MG/1
50 TABLET ORAL
Qty: 90 TABLET | Refills: 0 | OUTPATIENT
Start: 2024-11-11

## 2024-11-11 NOTE — OUTREACH NOTE
"AMBULATORY CASE MANAGEMENT NOTE    Names and Relationships of Patient/Support Persons: Contact: Dior Pettit \"Citlaly\"; Relationship: Self -     Patient Outreach    Spoke with patient. Introduced self and explained role. Offered patient CM assistance with managing her health. Discussed the purpose, goals, and expectations of the program. Patient agreed to enroll in the HRCM program.     Discussed diabetes management. Patient current A1c is within normal limits at 5.6%. She states she checks her blood glucose once a day in the evening. She reports her reading last night was 179. She admits to not watching what she eats. Encouraged to try to eat healthier.     Patient states she is doing well since her heart ablation last month. She states she is more concern about her vision. She reports having Macular Degeneration and recently being diagnosed with Glaucoma. Advised that elevated blood sugar can affect her vision.     Patient denies any other needs. Advise that ACM will send the phone numbers for her office and [facility] 24/7 nurse line via [employer]. Next outreach scheduled.        Adult Patient Profile  Questions/Answers      Flowsheet Row Most Recent Value   Symptoms/Conditions Managed at Home diabetes, type 2, cardiovascular, HEENT (head, eyes, ears, nose, throat)   Barriers to Managing Health stress of chronic illness   Cardiovascular Symptoms/Conditions hypertension, dysrhythmia   Cardiovascular Management Strategies adequate rest, medication therapy   Diabetes Management Strategies adequate rest, blood glucose testing, medication therapy   Frequency of Blood Glucose Testing other (see comments)   Last A1C Result 5.6   Diabetes Comment Checks blood glucose once a day in the evening.   HEENT Symptoms/Conditions vision problem(s)   Vision Problems glaucoma, other (see comments)   HEENT Management Strategies routine screening, coping strategies   HEENT Comment Diagnosed with Macular Degeneration            Education " Documentation  unresolved or worsening symptoms, taught by Bebe aBtres RN at 11/11/2024 12:39 PM.  Learner: Patient  Readiness: Acceptance  Method: Explanation  Response: Verbalizes Understanding    tobacco use/smoke exposure, taught by Bebe Batres RN at 11/11/2024 12:39 PM.  Learner: Patient  Readiness: Acceptance  Method: Explanation  Response: Verbalizes Understanding    safety, taught by Bebe Batres RN at 11/11/2024 12:39 PM.  Learner: Patient  Readiness: Acceptance  Method: Explanation  Response: Verbalizes Understanding    medication management, taught by Bebe Batres RN at 11/11/2024 12:39 PM.  Learner: Patient  Readiness: Acceptance  Method: Explanation  Response: Verbalizes Understanding    food/fluid intake, taught by Bebe Batres RN at 11/11/2024 12:39 PM.  Learner: Patient  Readiness: Acceptance  Method: Explanation  Response: Verbalizes Understanding    family planning overview, taught by Bebe Batres RN at 11/11/2024 12:39 PM.  Learner: Patient  Readiness: Acceptance  Method: Explanation  Response: Verbalizes Understanding    drug/alcohol use, taught by Bebe Batres RN at 11/11/2024 12:39 PM.  Learner: Patient  Readiness: Acceptance  Method: Explanation  Response: Verbalizes Understanding    coping strategies, taught by Bebe Batres RN at 11/11/2024 12:39 PM.  Learner: Patient  Readiness: Acceptance  Method: Explanation  Response: Verbalizes Understanding    activity, taught by Bebe Batres RN at 11/11/2024 12:39 PM.  Learner: Patient  Readiness: Acceptance  Method: Explanation  Response: Verbalizes Understanding    preventive care, taught by Bebe Batres RN at 11/11/2024 12:39 PM.  Learner: Patient  Readiness: Acceptance  Method: Explanation  Response: Verbalizes Understanding    practice overview, taught by Bebe Batres RN at 11/11/2024 12:39 PM.  Learner: Patient  Readiness: Acceptance  Method: Explanation  Response: Verbalizes Understanding    patient-centered medical home, taught by Bebe Batres RN at  11/11/2024 12:39 PM.  Learner: Patient  Readiness: Acceptance  Method: Explanation  Response: Verbalizes Understanding    Diabetes Sick Day Management, taught by Bebe Batres RN at 11/11/2024 12:39 PM.  Learner: Patient  Readiness: Acceptance  Method: Explanation  Response: Verbalizes Understanding    Type 2 Diabetes, taught by Bebe Batres RN at 11/11/2024 12:39 PM.  Learner: Patient  Readiness: Acceptance  Method: Explanation  Response: Verbalizes Understanding    Type 1 Diabetes, taught by Bebe Batres RN at 11/11/2024 12:39 PM.  Learner: Patient  Readiness: Acceptance  Method: Explanation  Response: Verbalizes Understanding    Unresolved/Worsening Symptoms, taught by Bebe Batres RN at 11/11/2024 12:39 PM.  Learner: Patient  Readiness: Acceptance  Method: Explanation  Response: Verbalizes Understanding    Prevention of Complications, taught by Bebe Batres RN at 11/11/2024 12:39 PM.  Learner: Patient  Readiness: Acceptance  Method: Explanation  Response: Verbalizes Understanding    Chronic Complications, taught by Bebe Batres RN at 11/11/2024 12:39 PM.  Learner: Patient  Readiness: Acceptance  Method: Explanation  Response: Verbalizes Understanding    Proper Disposal of Needles/Syringes, taught by Bebe Batres RN at 11/11/2024 12:39 PM.  Learner: Patient  Readiness: Acceptance  Method: Explanation  Response: Verbalizes Understanding    Glucagon Kit, taught by Bebe Batres RN at 11/11/2024 12:39 PM.  Learner: Patient  Readiness: Acceptance  Method: Explanation  Response: Verbalizes Understanding    Risks, taught by Bebe Batres RN at 11/11/2024 12:39 PM.  Learner: Patient  Readiness: Acceptance  Method: Explanation  Response: Verbalizes Understanding    Benefits, taught by Bebe Batres RN at 11/11/2024 12:39 PM.  Learner: Patient  Readiness: Acceptance  Method: Explanation  Response: Verbalizes Understanding    Adjustment to Diet/Medications, taught by Bebe Batres RN at 11/11/2024 12:39 PM.  Learner: Patient  Readiness:  Acceptance  Method: Explanation  Response: Verbalizes Understanding    Sweeteners, taught by Bebe Batres RN at 11/11/2024 12:39 PM.  Learner: Patient  Readiness: Acceptance  Method: Explanation  Response: Verbalizes Understanding    Sick-Day Management, taught by Bebe Batres RN at 11/11/2024 12:39 PM.  Learner: Patient  Readiness: Acceptance  Method: Explanation  Response: Verbalizes Understanding    Portion Management, taught by Bebe Batres RN at 11/11/2024 12:39 PM.  Learner: Patient  Readiness: Acceptance  Method: Explanation  Response: Verbalizes Understanding    Insulin Dose Matched to Carbohydrate Intake, taught by Bebe Batres RN at 11/11/2024 12:39 PM.  Learner: Patient  Readiness: Acceptance  Method: Explanation  Response: Verbalizes Understanding    Follow-Up Care, taught by Bebe Batres RN at 11/11/2024 12:39 PM.  Learner: Patient  Readiness: Acceptance  Method: Explanation  Response: Verbalizes Understanding    Emotions and Feelings, taught by Bebe Batres RN at 11/11/2024 12:39 PM.  Learner: Patient  Readiness: Acceptance  Method: Explanation  Response: Verbalizes Understanding    Consistent Eating Pattern, taught by Bebe Batres RN at 11/11/2024 12:39 PM.  Learner: Patient  Readiness: Acceptance  Method: Explanation  Response: Verbalizes Understanding    Carbohydrate Counting, taught by Bebe Batres RN at 11/11/2024 12:39 PM.  Learner: Patient  Readiness: Acceptance  Method: Explanation  Response: Verbalizes Understanding    Alcohol, taught by Bebe Batres RN at 11/11/2024 12:39 PM.  Learner: Patient  Readiness: Acceptance  Method: Explanation  Response: Verbalizes Understanding    Monitoring Carbohydrate Intake, taught by Bebe Batres RN at 11/11/2024 12:39 PM.  Learner: Patient  Readiness: Acceptance  Method: Explanation  Response: Verbalizes Understanding    Healthy Food Choices, taught by Bebe Batres RN at 11/11/2024 12:39 PM.  Learner: Patient  Readiness: Acceptance  Method: Explanation  Response:  Verbalizes Understanding    Carbohydrate-Containing Foods, taught by Bebe Batres RN at 11/11/2024 12:39 PM.  Learner: Patient  Readiness: Acceptance  Method: Explanation  Response: Verbalizes Understanding    Hypoglycemia Identification and Treatment, taught by Bebe Batres RN at 11/11/2024 12:39 PM.  Learner: Patient  Readiness: Acceptance  Method: Explanation  Response: Verbalizes Understanding    Hyperglycemia Identification and Treatment, taught by Bebe Batres RN at 11/11/2024 12:39 PM.  Learner: Patient  Readiness: Acceptance  Method: Explanation  Response: Verbalizes Understanding    Oral Medication, taught by Bebe Batres RN at 11/11/2024 12:39 PM.  Learner: Patient  Readiness: Acceptance  Method: Explanation  Response: Verbalizes Understanding    Importance of Glycemic Management, taught by Bebe Batres RN at 11/11/2024 12:39 PM.  Learner: Patient  Readiness: Acceptance  Method: Explanation  Response: Verbalizes Understanding    Blood Glucose Monitoring, taught by Bebe Batres RN at 11/11/2024 12:39 PM.  Learner: Patient  Readiness: Acceptance  Method: Explanation  Response: Verbalizes Understanding    Blood Glucose Goal, taught by Bebe Batres RN at 11/11/2024 12:39 PM.  Learner: Patient  Readiness: Acceptance  Method: Explanation  Response: Verbalizes Understanding    Diabetes, Type 2, taught by Bebe Batres RN at 11/11/2024 12:39 PM.  Learner: Patient  Readiness: Acceptance  Method: Explanation  Response: Verbalizes Understanding          Bebe ALLISON  Ambulatory Case Management    11/11/2024, 12:39 EST

## 2024-11-25 NOTE — PROGRESS NOTES
[facility]   [address], [address]  [place], [place], [address]    [phone]  [email or web address]           OUTPATIENT CLINIC FOLLOW UP NOTE    Patient Care Team:  Patient Care Team:  Ashanti Hong APRN as PCP - General (Internal Medicine)  Gerardo Rodriguez Jr., MD as Consulting Physician (Pulmonary Disease)  Abbi Mitchell MD as Consulting Physician (Cardiology)  Luis Miguel Parker MD as Consulting Physician (Ophthalmology)  Tae Burton MD as Consulting Physician (Dermatology)  Addis Gomes MD as Consulting Physician (Plastic Surgery)    Subjective:      Chief Complaint   Patient presents with   • Atrial Fibrillation   • Dizziness       HPI:    Dior Pettit is a 81 y.o. female.  Problem list:  1. Aortic valve disease  a. Status post aortic valve replacement with a porcine valve as well as subaortic membrane resection in 2007.  b. [facility] 2007: Negative for CAD.  c. TTE 10/2017: Normal systolic function, mild left ventricular hypertrophy, aortic valve calcification with mild stenosis, mild mitral regurgitation, moderate tricuspid regurgitation, RVSP 52 mmHg ascending aorta measured 3.9 cm.  d. BRAULIO 9/2021: Left ventricular hypertrophy with mild pulmonary hypertension and RVSP of 36 mmHg, left atrial volume was increased, there was no significant or subaortic valve disease noted.  e. Limited TTE 1/2022: EF 74%, 21 mm bovine Poon bioprosthetic aortic valve present with an increased gradient across the prosthetic valve consistent with prosthetic valve stenosis.  Severe MAC with mild mitral stenosis and trace mitral regurgitation.  Mild tricuspid regurgitation with an RVSP of 46.2 mm.  Borderline dilatation of the ascending aorta of 3.8 cm.  2. Pulmonary hypertension  a. TTE 2010: RVSP 44 mmHg.  3. Chest pain   a. Nuclear stress test 2/2015: Negative for ischemia.  b. Recurrent chest pain 2/2019 stress echocardiogram showed normal systolic function, grade  1 diastolic dysfunction, mild concentric LVH, mild to moderate bioprosthetic aortic valve stenosis, mild tricuspid regurgitation, RVSP of 36 mmHg.  There is no ischemia at 6 METS.  4. Thoracic aortic aneurysm  a. TTE 10/2017: Ascending aorta measuring 3.9 cm  b. CT of the chest without contrast 7/2021: Stable mildly dilated ascending aorta measuring 4 cm  5. Paroxysmal atrial fibrillation  a. Initially diagnosed 9/2021 underwent BRAULIO and cardioversion with resumption of sinus rhythm.  She was started on amiodarone and Eliquis.  b. Amiodarone was discontinued due to ongoing dizziness.  c. 24-hour Holter 10/2021: Rare PACs/PVCs, no significant tachycardia or bradycardic arrhythmia noted.  d. Was evaluated by Dr. Hernandez in 11/2021.  6. Hypertension  7. Hyperlipidemia  8. Diabetes mellitus  9. Dizziness  a. 9/2021 in the setting of atrial fibrillation with RVR.  b. Carotid duplex-/2021: Mild TATYANA stenosis  10. Obstructive sleep apnea intolerant to CPAP.    The patient presents today for follow-up.  Her primary cardiologist is Dr. Mitchell.    Since the patient was last seen she reports that she has continued to have dizziness.  She does note there was a period of approximately 2 weeks where her dizziness resolved entirely, but is subsequently returned.  She denies chest pain, shortness of breath, lower extremity edema, syncope.  She does have fleeting palpitations.  Did have an episode last week where he had vision changes.  She has since followed up with her eye specialist who noted her eyes were okay.  Pressure has been well controlled.    Review of Systems:  Positive for dizziness  Negative for exertional chest pain, dyspnea with exertion, lower extremity edema, palpitations, syncope.     PFSH:  Patient Active Problem List   Diagnosis   • Essential hypertension   • Pulmonary hypertension (HCC)   • NNEKA (obstructive sleep apnea)   • Gastric reflux   • Anxiety   • RLS (restless legs syndrome)   • Controlled diabetes mellitus  type 2 with complications ([facility])   • Hypothyroidism   • Hyperlipidemia   • Seasonal allergic rhinitis   • Mild intermittent asthma without complication   • S/P AVR   • Dilated aortic root ([facility])   • Thoracic aortic aneurysm without rupture    • Primary insomnia   • Renal insufficiency   • Coronary artery disease involving native coronary artery with angina pectoris ([facility])   • Rectocele   • Nonrheumatic mitral valve stenosis   • PAF (paroxysmal atrial fibrillation) ([facility])   • Slow transit constipation   • Lightheadedness   • Exudative age-related macular degeneration, right eye, with active choroidal neovascularization ([facility])         Current Outpatient Medications:   •  albuterol sulfate  (90 Base) MCG/ACT inhaler, Every 6 (Six) Hours As Needed., Disp: , Rfl:   •  ascorbic acid (VITAMIN C) 1000 MG tablet, Take 1,000 mg by mouth Daily., Disp: , Rfl:   •  atorvastatin (LIPITOR) 20 MG tablet, Take 1 tablet by mouth Every Night., Disp: 90 tablet, Rfl: 1  •  azelastine (ASTELIN) 0.1 % nasal spray, 2 sprays into the nostril(s) as directed by provider 2 (Two) Times a Day. Use in each nostril as directed, Disp: 3 each, Rfl: 3  •  baclofen (LIORESAL) 10 MG tablet, Take 0.5 tablets by mouth 3 (Three) Times a Day., Disp: 90 tablet, Rfl: 3  •  Blood Glucose Monitoring Suppl (Prodigy No Coding Blood Gluc) w/Device kit, 1 each Daily., Disp: 1 kit, Rfl: 0  •  doxepin (SINEquan) 50 MG capsule, TAKE 1 CAPSULE EVERY NIGHT, Disp: 90 capsule, Rfl: 2  •  Eliquis 5 MG tablet tablet, TAKE 1 TABLET EVERY 12     HOURS FOR ATRIAL           FIBRILLATION, Disp: 180 tablet, Rfl: 1  •  EPINEPHrine (EPIPEN) 0.3 MG/0.3ML solution auto-injector injection, , Disp: , Rfl:   •  fluticasone (Flonase) 50 MCG/ACT nasal spray, 2 sprays into the nostril(s) as directed by provider Daily As Needed for Rhinitis or Allergies., Disp: 16 g, Rfl: 3  •  fluticasone-salmeterol (Advair Diskus) 250-50 MCG/DOSE DISKUS, Inhale 1 puff 2 (Two) Times a Day., Disp: 14  "each, Rfl: 0  •  gabapentin (NEURONTIN) 600 MG tablet, TAKE 1/4 TABLET AT BEDTIME FOR 2 DAYS, THEN TAKE 1/2 TABLET AT BEDTIME FOR 1 WEEK, THEN TAKE 1 TABLET AT BEDTIME IF NEEDED, Disp: 90 tablet, Rfl: 1  •  glucosamine-chondroitin 500-400 MG capsule capsule, Take 2 capsules by mouth Daily., Disp: , Rfl:   •  glucose blood (Prodigy No Coding Blood Gluc) test strip, TEST BLOOD SUGAR DAILY, Disp: 50 each, Rfl: 3  •  levothyroxine (SYNTHROID, LEVOTHROID) 50 MCG tablet, TAKE 1 TABLET EVERY OTHER  DAY, Disp: 45 tablet, Rfl: 1  •  levothyroxine (SYNTHROID, LEVOTHROID) 75 MCG tablet, TAKE 1 TABLET EVERY OTHER  DAY. OVERDUE FOR           APPOINTMENT AND FASTING    LABS., Disp: 45 tablet, Rfl: 3  •  metFORMIN ER (GLUCOPHAGE-XR) 750 MG 24 hr tablet, Take 1 tablet by mouth 2 (Two) Times a Day., Disp: 180 tablet, Rfl: 3  •  metoprolol tartrate (LOPRESSOR) 50 MG tablet, Take 1 tablet by mouth Every 12 (Twelve) Hours., Disp: 180 tablet, Rfl: 1  •  Multiple Vitamins-Minerals (MULTIVITAL PO), Take  by mouth Daily., Disp: , Rfl:   •  Prodigy Safety Lancets 26G misc, CHECK BLOOD SUGAR ONE TIME PER DAY, Disp: 100 each, Rfl: 1  •  valACYclovir (Valtrex) 1000 MG tablet, 2 pills qid for mouth ulcer, Disp: 16 tablet, Rfl: 4  •  venlafaxine XR (EFFEXOR-XR) 150 MG 24 hr capsule, Take 1 capsule by mouth Daily., Disp: 90 capsule, Rfl: 3    No Known Allergies     reports that she has never smoked. She has never used smokeless tobacco.      Objective:   Physical exam:  /60 (BP Location: Left arm)   Pulse 69   Ht 170.2 cm (67\")   Wt 67 kg (147 lb 9.6 oz)   LMP  (LMP Unknown)   BMI 23.12 kg/m²   CONSTITUTIONAL: No acute distress  RESPIRATORY: Normal effort. Clear to auscultation bilaterally without wheezing or rales  CARDIOVASCULAR: Carotids with normal upstrokes without bruits.  Regular rate and rhythm with normal S1 and S2. Without gallop or rub.  Systolic murmur at right upper sternal border.  Normal radial pulse. There is no lower " extremity edema bilaterally.    Labs:    BUN   Date Value Ref Range Status   12/11/2021 21 8 - 23 mg/dL Final     Creatinine   Date Value Ref Range Status   12/11/2021 0.86 0.57 - 1.00 mg/dL Final   11/02/2018 1.00 0.60 - 1.30 mg/dL Final     Comment:     Serial Number: [id number]:  [id number]     Potassium   Date Value Ref Range Status   12/11/2021 4.8 3.5 - 5.2 mmol/L Final     ALT (SGPT)   Date Value Ref Range Status   12/11/2021 21 1 - 33 U/L Final     AST (SGOT)   Date Value Ref Range Status   12/11/2021 26 1 - 32 U/L Final       Lab Results   Component Value Date    CHOL 158 04/15/2019     Lab Results   Component Value Date    TRIG 146 05/06/2021     Lab Results   Component Value Date    HDL 50 05/06/2021     Lab Results   Component Value Date    LDL 94 05/06/2021     No components found for: LDLDIRECTC    Diagnostic Data:      ECG 12 Lead    Date/Time: 3/10/2022 1:07 PM  Performed by: Telma Quintanilla APRN  Authorized by: Telma Quintanilla APRN   Comparison: compared with previous ECG from 12/22/2021  Similar to previous ECG  Rhythm: sinus rhythm  Rate: normal  BPM: 69  Comments:  ms, QTC from 129 ms            Results for orders placed during the hospital encounter of 01/25/22    Adult Transthoracic Echo Limited W/ Cont if Necessary Per Protocol    Interpretation Summary  · Calculated left ventricular EF = 73.7% Estimated left ventricular EF = 74% Estimated left ventricular EF was in agreement with the calculated left ventricular EF. Left ventricular systolic function is hyperdynamic (EF > 70%). The left ventricular cavity is small in size. Left ventricular wall thickness is consistent with mild concentric hypertrophy. All left ventricular wall segments contract normally. Left ventricular diastolic function is consistent with (grade II w/high LAP) pseudonormalization.  · There is a 21 mm, bovine, Poon bioprosthetic aortic valve present. The aortic valve peak and mean gradients are elevated. There is  no significant prosthetic valve regurgitation. There is an increased gradient across the prosthetic valve consistent with prosthetic valve stenosis.  · Severe mitral annular calcification is present. There is mild, anterior mitral leaflet thickening present. There is severe, posterior mitral leaflet thickening present. Trace mitral valve regurgitation is present. Mild mitral valve stenosis is present. The mean gradient is 5 mmHg at a heart of 74 bpm  · Mild tricuspid valve regurgitation is present. Estimated right ventricular systolic pressure from tricuspid regurgitation is moderately elevated (45-55 mmHg). Calculated right ventricular systolic pressure from tricuspid regurgitation is 46.2 mmHg.  · Borderline dilation of the ascending aorta is present. Ascending aorta = 3.8 cm      Assessment and Plan:   Diagnoses and all orders for this visit:    Dizziness (Primary)  -Symptoms have persisted.  She does note that she had an approximately 2-week period where she was completely asymptomatic.  -Cardiac testing without new significant findings.  Orthostatic blood pressures were unremarkable.  -Discussed possible evaluation by neurology or ENT and the patient declined at this time.    S/P AVR  Nonrheumatic mitral valve stenosis  -Echo stable on 1/2022  -Continue to monitor clinically for now.    PAF (paroxysmal atrial fibrillation) (HCC)  -Maintaining sinus rhythm  -Continue Eliquis and metoprolol.    Pulmonary hypertension (HCC)  -RVSP was 46.2 mmHg on last echocardiogram.  -Continue to monitor clinically for now.    Thoracic aortic aneurysm without rupture   -Stable on most recent CT of the chest in 7/2021.  -We will continue to monitor clinically for now.  Will consider repeat CT of the chest at time of follow-up.    Essential hypertension  -Continue metoprolol.    - Return in about 6 months (around 9/10/2022) for Next scheduled follow up with Dr. Mitchell.    Electronically signed by TOSHA Jaramillo, 03/10/22, 12:55  PM EST.   VTE Assessment already completed for this visit

## 2024-11-27 DIAGNOSIS — F51.01 PRIMARY INSOMNIA: ICD-10-CM

## 2024-11-27 RX ORDER — AMITRIPTYLINE HYDROCHLORIDE 50 MG/1
50 TABLET ORAL
Qty: 90 TABLET | Refills: 0 | Status: SHIPPED | OUTPATIENT
Start: 2024-11-27

## 2024-11-27 RX ORDER — FUROSEMIDE 40 MG/1
40 TABLET ORAL DAILY
Qty: 90 TABLET | Refills: 0 | Status: SHIPPED | OUTPATIENT
Start: 2024-11-27

## 2024-11-30 DIAGNOSIS — E11.8 CONTROLLED TYPE 2 DIABETES MELLITUS WITH COMPLICATION, WITHOUT LONG-TERM CURRENT USE OF INSULIN: ICD-10-CM

## 2024-12-02 RX ORDER — CALCIUM CITRATE/VITAMIN D3 200MG-6.25
1 TABLET ORAL DAILY
Qty: 50 EACH | Refills: 0 | Status: SHIPPED | OUTPATIENT
Start: 2024-12-02

## 2024-12-05 DIAGNOSIS — E03.9 ACQUIRED HYPOTHYROIDISM: ICD-10-CM

## 2024-12-06 RX ORDER — LEVOTHYROXINE SODIUM 75 UG/1
TABLET ORAL
Qty: 45 TABLET | Refills: 1 | Status: SHIPPED | OUTPATIENT
Start: 2024-12-06

## 2024-12-11 ENCOUNTER — PATIENT OUTREACH (OUTPATIENT)
Dept: CASE MANAGEMENT | Facility: OTHER | Age: 84
End: 2024-12-11
Payer: MEDICARE

## 2024-12-11 DIAGNOSIS — E11.8 CONTROLLED TYPE 2 DIABETES MELLITUS WITH COMPLICATION, WITHOUT LONG-TERM CURRENT USE OF INSULIN: ICD-10-CM

## 2024-12-11 DIAGNOSIS — I10 ESSENTIAL HYPERTENSION: Primary | ICD-10-CM

## 2024-12-11 NOTE — OUTREACH NOTE
"AMBULATORY CASE MANAGEMENT NOTE    Names and Relationships of Patient/Support Persons: Contact: Dior Pettit \"Citlaly\"; Relationship: Self -     Patient Outreach    Called patient for scheduled outreach. She states she is doing well. She states she thinks she accidentally threw away a bottle of one of her prescription eye drops because she cannot find it. She states she plans on calling her pharmacy to see if they will replace it. Suggested that she also call her eye doctor's office to see if they have a sample bottle she can have.     Patient states she has not checked her blood sugar yet this morning because she just got up. She reports that her blood sugar levels are usually around the 130 range. Patient denies any further needs. Next outreach scheduled.     Bebe ALLISON  Ambulatory Case Management    12/11/2024, 11:40 EST  "

## 2024-12-17 ENCOUNTER — CLINICAL SUPPORT NO REQUIREMENTS (OUTPATIENT)
Age: 84
End: 2024-12-17
Payer: MEDICARE

## 2024-12-17 ENCOUNTER — OFFICE VISIT (OUTPATIENT)
Age: 84
End: 2024-12-17
Payer: MEDICARE

## 2024-12-17 VITALS
WEIGHT: 131 LBS | DIASTOLIC BLOOD PRESSURE: 64 MMHG | SYSTOLIC BLOOD PRESSURE: 100 MMHG | BODY MASS INDEX: 20.56 KG/M2 | HEIGHT: 67 IN | HEART RATE: 74 BPM

## 2024-12-17 DIAGNOSIS — I48.0 PAF (PAROXYSMAL ATRIAL FIBRILLATION): Primary | ICD-10-CM

## 2024-12-17 DIAGNOSIS — I48.0 PAROXYSMAL ATRIAL FIBRILLATION: Primary | Chronic | ICD-10-CM

## 2024-12-17 DIAGNOSIS — I35.0 AORTIC VALVE STENOSIS, ETIOLOGY OF CARDIAC VALVE DISEASE UNSPECIFIED: Chronic | ICD-10-CM

## 2024-12-17 PROCEDURE — 93000 ELECTROCARDIOGRAM COMPLETE: CPT

## 2024-12-17 PROCEDURE — 99214 OFFICE O/P EST MOD 30 MIN: CPT

## 2024-12-17 RX ORDER — AMLODIPINE BESYLATE 5 MG/1
5 TABLET ORAL DAILY
COMMUNITY
Start: 2024-10-20

## 2024-12-17 RX ORDER — NETARSUDIL AND LATANOPROST OPHTHALMIC SOLUTION, 0.02%/0.005% .2; .05 MG/ML; MG/ML
SOLUTION/ DROPS OPHTHALMIC; TOPICAL
COMMUNITY
Start: 2024-11-05

## 2024-12-17 NOTE — PROGRESS NOTES
Date of Office Visit: 2024  Encounter Provider: TOSHA Veronica  Place of Service: [facility] CARDIOLOGY  Patient Name: Dior Pettit  :1940    Chief Complaint   Patient presents with    paroxysmal AFIB    symptomatic bradycardia    Pacemaker Check   :     HPI: Dior Pettit is a 84 y.o. female who is followed by Dr. Jj.  She has a history of CAD, DM II,  AVR--s/p repair ()---s/p TAVR (), and persistent atrial fibrillation.      She was cardioverted in 2021.  She was started on amiodarone prior to the CV but felt like it was causing dizziness so this was stopped.       She saw Dr. Jj on 2022 and was back in AF.  She estimated that she had been in AF for about a week prior to her appt.  Since the CV lasted a year she elected to undergo CV again.      Patient underwent CV for persistent AF on 2022 she was successfully CV.       We saw her in October when she presented with complaints of palpitations. She was noted to be in AF with RVR. She was started on dofetilide 250mcg BID.      She did well with only occasional episodes. However, a couple months ago she forget several doses of her dofetilide and was advised to discontinue.      She underwent TAVR on 2024. She did well. No AF during admission for this.     Saw Dr. Hernadez a week ago. Mild dyspnea but better. No mention of AF or palpitations.       Presented to ED  and was in Af with RVR. We discussed options and elected to proceed with pacemaker and AV node ablation.      She had pacemaker placed on  and AV node ablation on .                  She presents today for follow-up appointment.    Since AV node ablation.  She has been doing very well.    She no longer feels her atrial fibrillation.  She has some minor dyspnea on exertion but this is unchanged.    She had TAVR in May.    Normal device testing and function.  Great left bundle capture.  V  pacing 100%.  She is dependent given AV node ablation.    She is on apixaban for AC.    She has no complaints or concerns.     Past Medical History:   Diagnosis Date    Anxiety and depression     Aortic root dilatation 10/02/2017    Borderline--Noted on Echo    Aortic valve calcification 10/02/2017    Noted on Echo    Aortic valve insufficiency Dx in 07    w/AVR     Aortic valve prosthesis present 11/02/2018    Noted on CTA Chest    Ascending aorta dilatation 10/02/2017    Borderline--Noted on Echo    Asthma     Atrial fibrillation     2020    Atypical chest pain     Bronchitis, chronic 2014    CAD (coronary artery disease)     Cardiomegaly 2017    Cervicalgia     Chest pain due to CAD     CHF (congestive heart failure) 2024    Claustrophobia 1940    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    Borderline--Noted on Echo & Measured @ 2.6CM and Mid Descending @ 2.5CM on CTA Chest-11/2/18    DM (diabetes mellitus)     T2    Dyslipidemia 2014    Esophagitis     Fatigue     GERD (gastroesophageal reflux disease)     Controlled w/Meds    Headache     Heart murmur     History of echocardiogram 10/2/17-[facility]    EF 66%; Borderline Concentric Hypertrophy; Mild Calcification in AV; Mild AVS; Mild AVR; Moderate TVR; Borderline Dilation of Aortic Root/Arch & Borderline Dilation of Ascending/Proximal Aorta Present    History of hepatitis     AS A TEEN    History of transfusion 1960    Hyperlipidemia     Controlled w/Meds    Hypertension     Controlled w/Meds    Hypothyroidism     Controlled w/Synthroid    Insomnia     Macular degeneration, left eye     Mild aortic valve regurgitation 10/02/2017    Noted on Echo    Mild aortic valve stenosis 10/02/2017    Noted on Echo    Mild dilation of ascending aorta 10/02/2017    Borderline--Noted on Echo    Mitral valve prolapse 2007    Moderate tricuspid valve regurgitation 10/02/2017    Noted on Echo    Mood disorder in conditions classified elsewhere     Controlled w/Meds    Near  syncope 03/2017-[facility] ER    Neuropathy     Osteoarthritis     Ankles/Feet    Pulmonary hypertension 2017    Renal insufficiency     RLS (restless legs syndrome)     Short of breath on exertion     Sleep apnea     CPAP    Stroke     UNCLEAR:  DOES NOT SEE NEUROLOGIST    Thoracic ascending aortic aneurysm Dx in 2007 @ 3.8CM & 1/02/2018    Noted @ 4CM on CTA Chest;    Urinary incontinence     WEARS PAD    UTI (urinary tract infection)     RECENT:  DIAGNOSED 6-19-24       Past Surgical History:   Procedure Laterality Date    AORTIC VALVE REPAIR/REPLACEMENT  2007    Dr. Perry    AORTIC VALVE REPAIR/REPLACEMENT N/A 6/25/2024    Procedure: TTE TRANSFEMORAL TRANSCATHETER AORTIC VALVE REPLACEMENT PERCUTANEOUS APPROACH;  Surgeon: Seth Salinas MD;  Location: [facility];  Service: Cardiothoracic;  Laterality: N/A;    AORTIC VALVE REPAIR/REPLACEMENT N/A 6/25/2024    Procedure: Transfemoral Transcatheter Aortic Valve Replacement with intraoperative transthoracic echocardiogram and possible open surgical rescue;  Surgeon: Ranjan Hernadez MD;  Location: [facility] CV[facility];  Service: Cardiovascular;  Laterality: N/A;    APPENDECTOMY      AUGMENTATION MAMMAPLASTY  1976    BREAST AUGMENTATION  2014    BUNIONECTOMY Bilateral     CARDIAC ABLATION      CARDIAC CATHETERIZATION  2007    CARDIAC ELECTROPHYSIOLOGY PROCEDURE N/A 9/6/2024    Procedure: Pacemaker DC new [employer];  Surgeon: Lazaro Jj MD;  Location: [facility] CATH INVASIVE LOCATION;  Service: Cardiovascular;  Laterality: N/A;    CARDIAC ELECTROPHYSIOLOGY PROCEDURE N/A 9/20/2024    Procedure: AV node ablation medt device;  Surgeon: Lazaro jJ MD;  Location:  [facility] CATH INVASIVE LOCATION;  Service: Cardiovascular;  Laterality: N/A;    CARDIAC VALVE REPLACEMENT  2007    porcine    COLONOSCOPY  2010    COLONOSCOPY  03/02/2012    EYE SURGERY      cataracts and ens implants    HYSTERECTOMY  1972    SIGMOIDOSCOPY  2001    TEMPORAL ARTERY BIOPSY Bilateral  04/14/2023    Procedure: BILATERAL TEMPORAL ARTERY BIOPSY;  Surgeon: Stewart Shepherd MD;  Location: [facility] MAIN OR;  Service: Vascular;  Laterality: Bilateral;    TOTAL HIP ARTHROPLASTY Right 11/15/2022    Procedure: Right anterior total hip arthroplasty;  Surgeon: Joao Martinez MD;  Location: [facility] MAIN OR;  Service: Orthopedics;  Laterality: Right;       Social History     Socioeconomic History    Marital status:    Tobacco Use    Smoking status: Never     Passive exposure: Never    Smokeless tobacco: Never    Tobacco comments:     caffeine use - 1 cup coffee daily    Vaping Use    Vaping status: Never Used   Substance and Sexual Activity    Alcohol use: Yes     Comment: 2 oz a month    Drug use: Never    Sexual activity: Not Currently     Partners: Male     Birth control/protection: Surgical     Comment: Hyst       Family History   Problem Relation Age of Onset    Hypertension Mother     Stroke Mother     Cancer Mother     Diabetes Sister     Coronary artery disease Brother     Diabetes Brother     Valvular heart disease Brother         TAVR    Cancer Brother         bladder cancer    Coronary artery disease Brother     Cancer Brother     Birth defects Daughter     Skin cancer Neg Hx     Malig Hyperthermia Neg Hx        Review of Systems   Constitutional: Negative for chills, fever and malaise/fatigue.   Cardiovascular:  Negative for chest pain, dyspnea on exertion, leg swelling, near-syncope, orthopnea, palpitations, paroxysmal nocturnal dyspnea and syncope.   Respiratory:  Negative for cough and shortness of breath.    Hematologic/Lymphatic: Negative.    Musculoskeletal:  Negative for joint pain, joint swelling and myalgias.   Gastrointestinal:  Negative for abdominal pain, diarrhea, melena, nausea and vomiting.   Genitourinary:  Negative for frequency and hematuria.   Neurological:  Negative for light-headedness, numbness, paresthesias and seizures.   Allergic/Immunologic: Negative.    All  other systems reviewed and are negative.      No Known Allergies      Current Outpatient Medications:     acetaminophen (TYLENOL) 325 MG tablet, Take 2 tablets by mouth Every 4 (Four) Hours As Needed for Mild Pain., Disp: , Rfl:     acyclovir (Zovirax) 5 % ointment, Apply 1 Application topically to the appropriate area as directed Every 3 (Three) Hours., Disp: 15 g, Rfl: 0    amitriptyline (ELAVIL) 50 MG tablet, TAKE 1 TABLET BY MOUTH AT NIGHT, Disp: 90 tablet, Rfl: 0    amLODIPine (NORVASC) 5 MG tablet, Take 1 tablet by mouth Daily., Disp: , Rfl:     apixaban (Eliquis) 5 MG tablet tablet, Take 1 tablet by mouth Every 12 (Twelve) Hours., Disp: 180 tablet, Rfl: 1    ascorbic acid (VITAMIN C) 1000 MG tablet, Take 1 tablet by mouth Daily. Indications: Inadequate Vitamin C, Disp: , Rfl:     aspirin 81 MG EC tablet, Take 1 tablet by mouth Daily., Disp: 30 tablet, Rfl: 1    atorvastatin (LIPITOR) 80 MG tablet, TAKE 1 TABLET EVERY NIGHT FOR HIGH AMOUNT OF FATS IN THE BLOOD, Disp: 90 tablet, Rfl: 1    Blood Glucose Monitoring Suppl (Prodigy No Coding Blood Gluc) w/Device kit, 1 each Daily., Disp: 1 kit, Rfl: 0    fenofibrate micronized (LOFIBRA) 134 MG capsule, TAKE 1 CAPSULE EVERY       MORNING BEFORE BREAKFAST, Disp: 90 capsule, Rfl: 1    furosemide (LASIX) 40 MG tablet, TAKE 1 TABLET BY MOUTH EVERY DAY, Disp: 90 tablet, Rfl: 0    glucosamine-chondroitin 500-400 MG capsule capsule, Take 2 capsules by mouth Daily., Disp: , Rfl:     latanoprost (XALATAN) 0.005 % ophthalmic solution, Administer 1 drop to both eyes Every Night., Disp: , Rfl:     levothyroxine (SYNTHROID, LEVOTHROID) 50 MCG tablet, TAKE 1 TABLET EVERY OTHER DAY FOR UNDERACTIVE THYROID, Disp: 45 tablet, Rfl: 1    levothyroxine (SYNTHROID, LEVOTHROID) 75 MCG tablet, TAKE 1 TABLET BY MOUTH EVERY OTHER DAY FOR UNDERACTIVE THYROID, Disp: 45 tablet, Rfl: 1    losartan (COZAAR) 100 MG tablet, Take 1 tablet by mouth Daily., Disp: 90 tablet, Rfl: 1    metFORMIN ER  "(GLUCOPHAGE-XR) 750 MG 24 hr tablet, TAKE 1 TABLET TWICE A DAY, Disp: 180 tablet, Rfl: 1    Multiple Vitamins-Minerals (MULTIVITAL PO), Take 1 tablet by mouth Daily. Indications: potential deficiency, Disp: , Rfl:     omeprazole (priLOSEC) 40 MG capsule, Take 1 capsule by mouth Daily., Disp: 90 capsule, Rfl: 1    Prodigy Safety Lancets 26G misc, CHECK BLOOD SUGAR ONE TIME PER DAY, Disp: 100 each, Rfl: 1    Rocklatan 0.02-0.005 % solution, INSTILL 1 DROP IN EACH EYE DAILY AT BEDTIME, Disp: , Rfl:     True Metrix Blood Glucose Test test strip, USE TO TEST BLOOD SUGAR ONCE DAILY, Disp: 50 each, Rfl: 0    venlafaxine XR (EFFEXOR-XR) 150 MG 24 hr capsule, Take 1 capsule by mouth Daily., Disp: 90 capsule, Rfl: 0      Objective:     Vitals:    12/17/24 1316   BP: 100/64   BP Location: Right arm   Patient Position: Sitting   Pulse: 74   Weight: 59.4 kg (131 lb)   Height: 170.2 cm (67\")     Body mass index is 20.52 kg/m².    PHYSICAL EXAM:    Vitals Reviewed.   General Appearance: No acute distress, well developed and well nourished.   Eyes: Conjunctiva and lids: No erythema, swelling, or discharge. Sclera non-icteric.   HENT: Atraumatic, normocephalic. External eyes, ears, and nose normal.   Respiratory: No signs of respiratory distress. Respiration rhythm and depth normal.   Clear to auscultation. No rales, crackles, rhonchi, or wheezing auscultated.   Cardiovascular:  Heart Rate and Rhythm: Normal, Heart Sounds: Normal S1 and S2. No S3 or S4 noted.  Gastrointestinal:  Abdomen soft, non-distended, non-tender.   Musculoskeletal: Normal movement of extremities  Skin: Warm and dry.   Psychiatric: Patient alert and oriented to person, place, and time. Speech and behavior appropriate. Normal mood and affect.       ECG 12 Lead    Date/Time: 12/17/2024 2:57 PM  Performed by: Justin Galloway APRN    Authorized by: Justin Galloway APRN  Comparison: compared with previous ECG   Similar to previous ECG  Rhythm: atrial fibrillation " and paced            Assessment:       Diagnosis Plan   1. Paroxysmal atrial fibrillation        2. Aortic valve stenosis, etiology of cardiac valve disease unspecified               Plan:   1-2. PAF----s/p pace and ablate-----she has done extremely well since pacemaker and AV node ablation.  She no longer has any symptoms associated with her atrial fibrillation.  She has great left bundle capture, normal device testing and function.  She is dependent.  Continue apixaban.            Will have her follow-up in 1 year.            As always, it has been a pleasure to participate in your patient's care.      Sincerely,         TOSHA Veronica

## 2024-12-19 DIAGNOSIS — F41.9 ANXIETY: ICD-10-CM

## 2024-12-20 RX ORDER — VENLAFAXINE HYDROCHLORIDE 150 MG/1
150 CAPSULE, EXTENDED RELEASE ORAL DAILY
Qty: 90 CAPSULE | Refills: 0 | Status: SHIPPED | OUTPATIENT
Start: 2024-12-20

## 2024-12-30 DIAGNOSIS — E03.8 OTHER SPECIFIED HYPOTHYROIDISM: ICD-10-CM

## 2024-12-30 RX ORDER — LEVOTHYROXINE SODIUM 50 UG/1
TABLET ORAL
Qty: 45 TABLET | Refills: 3 | Status: SHIPPED | OUTPATIENT
Start: 2024-12-30

## 2024-12-30 NOTE — TELEPHONE ENCOUNTER
Rx Refill Note  Requested Prescriptions     Pending Prescriptions Disp Refills    levothyroxine (SYNTHROID, LEVOTHROID) 50 MCG tablet [Pharmacy Med Name: L-THYROXINE (SYNTHROID) TABS 50MCG] 45 tablet 3     Sig: TAKE 1 TABLET EVERY OTHER DAY FOR UNDERACTIVE THYROID      Last office visit with prescribing clinician: 10/18/2024   Last telemedicine visit with prescribing clinician: Visit date not found   Next office visit with prescribing clinician: 4/25/2025                         Would you like a call back once the refill request has been completed: [] Yes [] No    If the office needs to give you a call back, can they leave a voicemail: [] Yes [] No    Lisa Mabry  12/30/24, 09:24 EST

## 2025-01-10 ENCOUNTER — PATIENT OUTREACH (OUTPATIENT)
Dept: CASE MANAGEMENT | Facility: OTHER | Age: 85
End: 2025-01-10
Payer: MEDICARE

## 2025-01-10 DIAGNOSIS — E11.8 CONTROLLED TYPE 2 DIABETES MELLITUS WITH COMPLICATION, WITHOUT LONG-TERM CURRENT USE OF INSULIN: ICD-10-CM

## 2025-01-10 DIAGNOSIS — I10 ESSENTIAL HYPERTENSION: Primary | ICD-10-CM

## 2025-01-10 NOTE — OUTREACH NOTE
"AMBULATORY CASE MANAGEMENT NOTE    Names and Relationships of Patient/Support Persons: Contact: Dior Pettit \"Citlaly\"; Relationship: Self -     Patient Outreach    Called patient for scheduled outreach. Patient states she is fine. She states she had a follow up appointment with the cardiologist to get her pacemaker checked. She states her pacemaker is working fine and the doctor wants to see her again in a year. She denies recent episodes of a-fib.    Patient states she forgets to check her blood sugar most mornings. She reports her blood sugar last night was 138. She states her blood sugar reading fluctuate throughout the day. Patient states she is very concerned about losing her vision. She states she is reluctant to go out because her vision is so bad.     Patient denies further needs. Next outreach scheduled.     Plan:  Blood sugars      Bebe ALLISON  Ambulatory Case Management    1/10/2025, 13:20 EST  "

## 2025-01-15 NOTE — PROGRESS NOTES
He will definitely need f/u. He was retaining when I saw him. Josh, can you schedule him with me next available? QUICK REFERENCE INFORMATION:  The ABCs of the Annual Wellness Visit    Subsequent Medicare Wellness Visit    HEALTH RISK ASSESSMENT    1940    Recent Hospitalizations:  Recently treated at the following:  [facility].        Current Medical Providers:  Patient Care Team:  Magdalena Joy MD as PCP - General  Magdalena Joy MD as PCP - Family Medicine  Luis Miguel Parker MD as PCP - Claims Attributed  Gerardo Rodriguez MD as Consulting Physician (Pulmonary Disease)  Abbi Mitchell MD as Consulting Physician (Cardiology)  Luis Miguel Parker MD as Consulting Physician (Ophthalmology)        Smoking Status:  History   Smoking Status   • Never Smoker   Smokeless Tobacco   • Never Used     Comment: daily caffeine use       Alcohol Consumption:  History   Alcohol Use   • Yes     Comment: social use       Depression Screen:   No flowsheet data found.    Health Habits and Functional and Cognitive Screening:  No flowsheet data found.        Does the patient have evidence of cognitive impairment? No    Aspirin use counseling: Taking ASA appropriately as indicated      Recent Lab Results:  CMP:  Lab Results   Component Value Date     (H) 07/25/2017    BUN 19 07/25/2017    CREATININE 0.95 07/25/2017    EGFRIFNONA 57 (L) 07/25/2017    EGFRIFAFRI 69 07/25/2017    BCR 20.0 07/25/2017     07/25/2017    K 4.6 07/25/2017    CO2 26.4 07/25/2017    CALCIUM 9.9 07/25/2017    PROTENTOTREF 7.5 07/25/2017    ALBUMIN 4.70 07/25/2017    LABGLOBREF 2.8 07/25/2017    LABIL2 1.7 07/25/2017    BILITOT 0.4 07/25/2017    ALKPHOS 65 07/25/2017    AST 24 07/25/2017    ALT 29 07/25/2017     Lipid Panel:  Lab Results   Component Value Date    CHOL 160 11/21/2016    TRIG 169 (H) 07/25/2017    HDL 47 07/25/2017    VLDL 33.8 07/25/2017    LDLHDL 1.72 11/21/2016     HbA1c:  Lab Results   Component Value Date    HGBA1C 6.0 07/25/2017       Visual Acuity:  No exam data present    Age-appropriate Screening Schedule:  Refer  to the list below for future screening recommendations based on patient's age, sex and/or medical conditions. Orders for these recommended tests are listed in the plan section. The patient has been provided with a written plan.    Health Maintenance   Topic Date Due   • TDAP/TD VACCINES (1 - Tdap) 01/02/1994   • PNEUMOCOCCAL VACCINES (65+ LOW/MEDIUM RISK) (1 of 2 - PCV13) 06/12/2003   • HEMOGLOBIN A1C  01/25/2018   • DIABETIC EYE EXAM  05/30/2018   • DIABETIC FOOT EXAM  06/30/2018   • LIPID PANEL  07/25/2018   • URINE MICROALBUMIN  07/25/2018   • MAMMOGRAM  01/09/2019   • COLONOSCOPY  06/04/2020   • INFLUENZA VACCINE  Completed   • ZOSTER VACCINE  Completed        Subjective   History of Present Illness    Dior Pettit is a 77 y.o. female who presents for an Subsequent Wellness Visit.    The following portions of the patient's history were reviewed and updated as appropriate: allergies, current medications, past family history, past medical history, past social history, past surgical history and problem list.    Outpatient Medications Prior to Visit   Medication Sig Dispense Refill   • ascorbic acid (EQL VITAMIN C) 1000 MG tablet Take 1,000 mg by mouth Daily.     • aspirin 81 MG EC tablet Take 81 mg by mouth Daily.     • baclofen (LIORESAL) 10 MG tablet TAKE ONE TABLET BY MOUTH EVERY 8 HOURS AS NEEDED FOR MUSCLE SPASMS 30 tablet 2   • clonazePAM (KlonoPIN) 2 MG tablet Take 1 tablet by mouth At Night As Needed for Anxiety. (Patient taking differently: Take 2 mg by mouth At Night As Needed for Anxiety. Takes 1/2 tablet every night) 90 tablet 1   • doxepin (SINEquan) 50 MG capsule Take 1 capsule by mouth Every Night. 90 capsule 3   • fenofibrate micronized (LOFIBRA) 134 MG capsule TAKE 1 CAPSULE EVERY       MORNING BEFORE BREAKFAST 90 capsule 3   • glimepiride (AMARYL) 2 MG tablet TAKE ONE TABLET BY MOUTH EVERY MORNING BEFORE BREAKFAST 30 tablet 2   • glucosamine-chondroitin 500-400 MG capsule capsule Take 2  capsules by mouth Daily.     • levothyroxine (SYNTHROID, LEVOTHROID) 50 MCG tablet TAKE ONE TABLET BY MOUTH EVERY OTHER DAY 45 tablet 1   • levothyroxine (SYNTHROID, LEVOTHROID) 75 MCG tablet TAKE ONE TABLET BY MOUTH EVERY OTHER DAY 30 tablet 5   • metFORMIN ER (GLUCOPHAGE-XR) 750 MG 24 hr tablet TAKE ONE TABLET BY MOUTH TWICE A DAY 60 tablet 0   • metoprolol tartrate (LOPRESSOR) 25 MG tablet TAKE ONE TABLET BY MOUTH TWICE A DAY 60 tablet 2   • montelukast (SINGULAIR) 10 MG tablet TAKE ONE TABLET BY MOUTH ONCE NIGHTLY 90 tablet 2   • Multiple Vitamins-Minerals (MULTIVITAL PO) Take  by mouth.     • Omega-3 Fatty Acids (FISH OIL) 1000 MG capsule capsule Take 1,000 mg by mouth Daily With Breakfast.     • omeprazole (priLOSEC) 40 MG capsule TAKE ONE CAPSULE BY MOUTH DAILY 90 capsule 1   • simvastatin (ZOCOR) 40 MG tablet TAKE ONE TABLET BY MOUTH EVERY NIGHT 30 tablet 3   • traMADol (ULTRAM) 50 MG tablet TAKE ONE TO TWO TABLETS BY MOUTH THREE TIMES A DAY AS NEEDED FOR PAIN 120 tablet 1   • venlafaxine XR (EFFEXOR-XR) 150 MG 24 hr capsule TAKE ONE CAPSULE BY MOUTH DAILY 90 capsule 1   • vitamin E 1000 UNIT capsule Take 1,000 Units by mouth Daily.       No facility-administered medications prior to visit.        Patient Active Problem List   Diagnosis   • Hypertension   • Pulmonary hypertension   • Gastric reflux   • Anxiety   • RLS (restless legs syndrome)   • Controlled diabetes mellitus type 2 with complications   • Hypothyroidism   • Hyperlipidemia   • Seasonal allergic rhinitis   • Mild intermittent asthma without complication   • S/P AVR   • Shortness of breath   • Dilated aortic root   • Thoracic aortic aneurysm without rupture        Advance Care Planning:  has an advance directive - a copy has been provided and is in file    Identification of Risk Factors:  Risk factors include: weight , unhealthy diet, cardiovascular risk, inactivity, chronic pain and vision limitations.    Review of Systems    Compared to one  year ago, the patient feels her physical health is worse.  Compared to one year ago, the patient feels her mental health is worse.    Objective     Physical Exam    There were no vitals filed for this visit.    There is no height or weight on file to calculate BMI.  Discussed the patient's BMI with her. BMI is above normal parameters. Follow-up plan includes:  exercise counseling and nutrition counseling.    Assessment/Plan   Patient Self-Management and Personalized Health Advice  The patient has been provided with information about: diet, exercise, weight management, prevention of cardiac or vascular disease, the relationship between weight and GERD, fall prevention and mental health concerns and preventive services including:   · Bone densitometry screening, Counseling for cardiovascular disease risk reduction, Diabetes screening, see lab orders, Exercise counseling provided, Fall Risk assessment done, Fall Risk plan of care done, Nutrition counseling provided, Pneumococcal vaccine , Screening for AAA, referral for ultrasound placed, TdaP vaccine, Zostavax vaccine (Herpes Zoster).    Visit Diagnoses:  No diagnosis found.    No orders of the defined types were placed in this encounter.      Outpatient Encounter Prescriptions as of 2/21/2018   Medication Sig Dispense Refill   • ascorbic acid (EQL VITAMIN C) 1000 MG tablet Take 1,000 mg by mouth Daily.     • aspirin 81 MG EC tablet Take 81 mg by mouth Daily.     • baclofen (LIORESAL) 10 MG tablet TAKE ONE TABLET BY MOUTH EVERY 8 HOURS AS NEEDED FOR MUSCLE SPASMS 30 tablet 2   • clonazePAM (KlonoPIN) 2 MG tablet Take 1 tablet by mouth At Night As Needed for Anxiety. (Patient taking differently: Take 2 mg by mouth At Night As Needed for Anxiety. Takes 1/2 tablet every night) 90 tablet 1   • doxepin (SINEquan) 50 MG capsule Take 1 capsule by mouth Every Night. 90 capsule 3   • fenofibrate micronized (LOFIBRA) 134 MG capsule TAKE 1 CAPSULE EVERY       MORNING BEFORE  BREAKFAST 90 capsule 3   • glimepiride (AMARYL) 2 MG tablet TAKE ONE TABLET BY MOUTH EVERY MORNING BEFORE BREAKFAST 30 tablet 2   • glucosamine-chondroitin 500-400 MG capsule capsule Take 2 capsules by mouth Daily.     • levothyroxine (SYNTHROID, LEVOTHROID) 50 MCG tablet TAKE ONE TABLET BY MOUTH EVERY OTHER DAY 45 tablet 1   • levothyroxine (SYNTHROID, LEVOTHROID) 75 MCG tablet TAKE ONE TABLET BY MOUTH EVERY OTHER DAY 30 tablet 5   • metFORMIN ER (GLUCOPHAGE-XR) 750 MG 24 hr tablet TAKE ONE TABLET BY MOUTH TWICE A DAY 60 tablet 0   • metoprolol tartrate (LOPRESSOR) 25 MG tablet TAKE ONE TABLET BY MOUTH TWICE A DAY 60 tablet 2   • montelukast (SINGULAIR) 10 MG tablet TAKE ONE TABLET BY MOUTH ONCE NIGHTLY 90 tablet 2   • Multiple Vitamins-Minerals (MULTIVITAL PO) Take  by mouth.     • Omega-3 Fatty Acids (FISH OIL) 1000 MG capsule capsule Take 1,000 mg by mouth Daily With Breakfast.     • omeprazole (priLOSEC) 40 MG capsule TAKE ONE CAPSULE BY MOUTH DAILY 90 capsule 1   • simvastatin (ZOCOR) 40 MG tablet TAKE ONE TABLET BY MOUTH EVERY NIGHT 30 tablet 3   • traMADol (ULTRAM) 50 MG tablet TAKE ONE TO TWO TABLETS BY MOUTH THREE TIMES A DAY AS NEEDED FOR PAIN 120 tablet 1   • venlafaxine XR (EFFEXOR-XR) 150 MG 24 hr capsule TAKE ONE CAPSULE BY MOUTH DAILY 90 capsule 1   • vitamin E 1000 UNIT capsule Take 1,000 Units by mouth Daily.       No facility-administered encounter medications on file as of 2/21/2018.        Reviewed use of high risk medication in the elderly: yes  Reviewed for potential of harmful drug interactions in the elderly: yes    Follow Up:  No Follow-up on file.     An After Visit Summary and PPPS with all of these plans were given to the patient.        She feels worse b/c she feels older - nothing more specific.

## 2025-01-18 DIAGNOSIS — E11.8 CONTROLLED TYPE 2 DIABETES MELLITUS WITH COMPLICATION, WITHOUT LONG-TERM CURRENT USE OF INSULIN: ICD-10-CM

## 2025-01-20 RX ORDER — CALCIUM CITRATE/VITAMIN D3 200MG-6.25
1 TABLET ORAL DAILY
Qty: 50 EACH | Refills: 0 | Status: SHIPPED | OUTPATIENT
Start: 2025-01-20

## 2025-01-20 RX ORDER — ATORVASTATIN CALCIUM 80 MG/1
TABLET, FILM COATED ORAL
Qty: 90 TABLET | Refills: 1 | Status: SHIPPED | OUTPATIENT
Start: 2025-01-20

## 2025-01-20 RX ORDER — AMLODIPINE BESYLATE 5 MG/1
5 TABLET ORAL DAILY
Qty: 90 TABLET | Refills: 1 | OUTPATIENT
Start: 2025-01-20

## 2025-01-28 RX ORDER — AMLODIPINE BESYLATE 5 MG/1
5 TABLET ORAL DAILY
OUTPATIENT
Start: 2025-01-28

## 2025-01-28 NOTE — TELEPHONE ENCOUNTER
Caller: [facility] - [place], [place] - [address] [place] [address] - [phone] [facility] [phone] FX    Relationship: Pharmacy    Best call back number: [phone]    Requested Prescriptions:   Requested Prescriptions     Pending Prescriptions Disp Refills    amLODIPine (NORVASC) 5 MG tablet       Sig: Take 1 tablet by mouth Daily.        Pharmacy where request should be sent: [facility] - [place], [place] - [address] [place] [address] - [phone] [facility] [phone] FX     Last office visit with prescribing clinician: 10/18/2024   Last telemedicine visit with prescribing clinician: Visit date not found   Next office visit with prescribing clinician: 4/25/2025     Additional details provided by patient:     Does the patient have less than a 3 day supply:  [] Yes  [x] No    Would you like a call back once the refill request has been completed: [] Yes [x] No    If the office needs to give you a call back, can they leave a voicemail: [] Yes [x] No    Ritesh Candelaria Rep   01/28/25 09:21 EST

## 2025-01-29 ENCOUNTER — TELEPHONE (OUTPATIENT)
Dept: INTERNAL MEDICINE | Facility: CLINIC | Age: 85
End: 2025-01-29
Payer: MEDICARE

## 2025-01-31 NOTE — PROGRESS NOTES
"DAILY PROGRESS NOTE  [facility]    Patient Identification:  Name: Dior Pettit  Age: 82 y.o.  Sex: female  :  1940  MRN: [id number]         Primary Care Physician: Ashanti Hong APRN    Subjective:  Interval History:She complains of pain.    Objective:    Scheduled Meds:amitriptyline, 50 mg, Oral, Nightly  apixaban, 5 mg, Oral, Q12H  baclofen, 10 mg, Oral, TID  docusate sodium, 100 mg, Oral, BID  dofetilide, 250 mcg, Oral, Q12H  insulin lispro, 0-7 Units, Subcutaneous, TID AC  levothyroxine, 50 mcg, Oral, Every Other Day  levothyroxine, 75 mcg, Oral, Every Other Day  metFORMIN, 500 mg, Oral, BID With Meals  metoprolol tartrate, 75 mg, Oral, BID  pantoprazole, 40 mg, Oral, QAM  polyethylene glycol, 17 g, Oral, BID  sodium chloride, 10 mL, Intravenous, Q12H  venlafaxine XR, 150 mg, Oral, Daily      Continuous Infusions:     Vital signs in last 24 hours:  Temp:  [97.8 °F (36.6 °C)-99.9 °F (37.7 °C)] 97.8 °F (36.6 °C)  Heart Rate:  [] 76  Resp:  [16-18] 17  BP: ()/(61-81) 98/61    Intake/Output:    Intake/Output Summary (Last 24 hours) at 2022 1319  Last data filed at 2022 1203  Gross per 24 hour   Intake 720 ml   Output 2000 ml   Net -1280 ml       Exam:  BP 98/61 (BP Location: Right arm, Patient Position: Lying)   Pulse 76   Temp 97.8 °F (36.6 °C) (Oral)   Resp 17   Ht 170.2 cm (67\")   Wt 64.9 kg (143 lb)   LMP  (LMP Unknown)   SpO2 100%   BMI 22.40 kg/m²     General Appearance:    Alert, cooperative, no distress   Head:    Normocephalic, without obvious abnormality, atraumatic   Eyes:       Throat:   Lips, tongue, gums normal   Neck:   Supple, symmetrical, trachea midline, no JVD   Lungs:     Clear to auscultation bilaterally, respirations unlabored   Chest Wall:    No tenderness or deformity    Heart:    Regular rate and rhythm, S1 and S2 normal, no murmur,no  Rub or gallop   Abdomen:     Soft, nontender, bowel sounds active, no masses, no organomegaly " "MDT LNQ22 ILR/ ACTIVE SYSTEM IS MRI CONDITIONAL   CARELINK TRANSMISSION: LOOP RECORDER. PRESENTING RHYTHM NSR @ 67 BPM. BATTERY STATUS \"OK.\" NO PATIENT OR DEVICE ACTIVATED EPISODES. NORMAL DEVICE FUNCTION. DL   "    Extremities:   Extremities normal, right hip with surgical changes., no cyanosis or edema   Pulses:      Skin:   Skin is warm and dry,  no rashes or palpable lesions   Neurologic:   no focal deficits noted      Lab Results (last 72 hours)     Procedure Component Value Units Date/Time    POC Glucose Once [[id number]]  (Normal) Collected: 11/14/22 1107    Specimen: Blood Updated: 11/14/22 1108     Glucose 120 mg/dL      Comment: Meter: [id number] : [id number] Shane GERONIMO       Basic Metabolic Panel [[id number]]  (Abnormal) Collected: 11/14/22 0430    Specimen: Blood Updated: 11/14/22 0625     Glucose 156 mg/dL      BUN 15 mg/dL      Creatinine 0.77 mg/dL      Sodium 132 mmol/L      Potassium 5.0 mmol/L      Chloride 94 mmol/L      CO2 29.8 mmol/L      Calcium 10.1 mg/dL      BUN/Creatinine Ratio 19.5     Anion Gap 8.2 mmol/L      eGFR 76.2 mL/min/1.73      Comment: National Kidney Foundation and American Society of Nephrology (ASN) Task Force recommended calculation based on the Chronic Kidney Disease Epidemiology Collaboration (CKD-EPI) equation refit without adjustment for race.       Narrative:      GFR Normal >60  Chronic Kidney Disease <60  Kidney Failure <15    The GFR formula is only valid for adults with stable renal function between ages 18 and 70.    POC Glucose Once [[id number]]  (Abnormal) Collected: 11/14/22 0615    Specimen: Blood Updated: 11/14/22 0619     Glucose 137 mg/dL      Comment: Meter: [id number] : [id number] King Yue TILLEY       TSH [[id number]]  (Normal) Collected: 11/14/22 0430    Specimen: Blood Updated: 11/14/22 0542     TSH 0.677 uIU/mL     T4, Free [[id number]]  (Normal) Collected: 11/14/22 0430    Specimen: Blood Updated: 11/14/22 0542     Free T4 1.22 ng/dL     Narrative:      Results may be falsely increased if patient taking Biotin.      CBC Auto Differential [[id number]]  (Abnormal) Collected: 11/14/22 0430    Specimen: Blood Updated: 11/14/22 0516     WBC 14.63 10*3/mm3       RBC 4.52 10*6/mm3      Hemoglobin 13.6 g/dL      Hematocrit 40.8 %      MCV 90.3 fL      MCH 30.1 pg      MCHC 33.3 g/dL      RDW 14.3 %      RDW-SD 46.9 fl      MPV 10.0 fL      Platelets 255 10*3/mm3      Neutrophil % 84.4 %      Lymphocyte % 6.8 %      Monocyte % 7.2 %      Eosinophil % 0.8 %      Basophil % 0.3 %      Immature Grans % 0.5 %      Neutrophils, Absolute 12.35 10*3/mm3      Lymphocytes, Absolute 0.99 10*3/mm3      Monocytes, Absolute 1.06 10*3/mm3      Eosinophils, Absolute 0.12 10*3/mm3      Basophils, Absolute 0.04 10*3/mm3      Immature Grans, Absolute 0.07 10*3/mm3      nRBC 0.0 /100 WBC     POC Glucose Once [[id number]]  (Abnormal) Collected: 11/13/22 2138    Specimen: Blood Updated: 11/13/22 2139     Glucose 168 mg/dL      Comment: Meter: [id number] : [id number] Ariela GERONIMO       POC Glucose Once [[id number]]  (Normal) Collected: 11/13/22 1629    Specimen: Blood Updated: 11/13/22 1630     Glucose 126 mg/dL      Comment: Meter: [id number] : [id number] Mason GERONIMO       Hemoglobin A1c [[id number]]  (Abnormal) Collected: 11/13/22 1122    Specimen: Blood Updated: 11/13/22 1455     Hemoglobin A1C 6.10 %     Narrative:      Hemoglobin A1C Ranges:    Increased Risk for Diabetes  5.7% to 6.4%  Diabetes                     >= 6.5%  Diabetic Goal                < 7.0%    Comprehensive Metabolic Panel [[id number]]  (Abnormal) Collected: 11/13/22 1346    Specimen: Blood from Arm, Right Updated: 11/13/22 1421     Glucose 136 mg/dL      BUN 18 mg/dL      Creatinine 0.82 mg/dL      Sodium 135 mmol/L      Potassium 4.6 mmol/L      Comment: Slight hemolysis detected by analyzer. Results may be affected.        Chloride 100 mmol/L      CO2 25.0 mmol/L      Calcium 9.6 mg/dL      Total Protein 7.2 g/dL      Albumin 4.60 g/dL      ALT (SGPT) 17 U/L      AST (SGOT) 28 U/L      Alkaline Phosphatase 58 U/L      Total Bilirubin 0.5 mg/dL      Globulin 2.6 gm/dL      A/G Ratio 1.8 g/dL       BUN/Creatinine Ratio 22.0     Anion Gap 10.0 mmol/L      eGFR 70.6 mL/min/1.73      Comment: National Kidney Foundation and American Society of Nephrology (ASN) Task Force recommended calculation based on the Chronic Kidney Disease Epidemiology Collaboration (CKD-EPI) equation refit without adjustment for race.       Narrative:      GFR Normal >60  Chronic Kidney Disease <60  Kidney Failure <15    The GFR formula is only valid for adults with stable renal function between ages 18 and 70.    Protime-INR [[id number]]  (Normal) Collected: 11/13/22 1346    Specimen: Blood from Arm, Right Updated: 11/13/22 1417     Protime 14.2 Seconds      INR 1.08    CBC & Differential [[id number]]  (Abnormal) Collected: 11/13/22 1122    Specimen: Blood Updated: 11/13/22 1140    Narrative:      The following orders were created for panel order CBC & Differential.  Procedure                               Abnormality         Status                     ---------                               -----------         ------                     CBC Auto Differential[[id number]]        Abnormal            Final result                 Please view results for these tests on the individual orders.    CBC Auto Differential [[id number]]  (Abnormal) Collected: 11/13/22 1122    Specimen: Blood Updated: 11/13/22 1140     WBC 10.53 10*3/mm3      RBC 4.34 10*6/mm3      Hemoglobin 13.2 g/dL      Hematocrit 40.1 %      MCV 92.4 fL      MCH 30.4 pg      MCHC 32.9 g/dL      RDW 14.4 %      RDW-SD 48.5 fl      MPV 9.8 fL      Platelets 233 10*3/mm3      Neutrophil % 81.5 %      Lymphocyte % 11.5 %      Monocyte % 5.7 %      Eosinophil % 0.5 %      Basophil % 0.5 %      Immature Grans % 0.3 %      Neutrophils, Absolute 8.59 10*3/mm3      Lymphocytes, Absolute 1.21 10*3/mm3      Monocytes, Absolute 0.60 10*3/mm3      Eosinophils, Absolute 0.05 10*3/mm3      Basophils, Absolute 0.05 10*3/mm3      Immature Grans, Absolute 0.03 10*3/mm3      nRBC 0.0 /100 WBC          Data Review:  Results from last 7 days   Lab Units 11/17/22  0503 11/16/22  0501 11/15/22  0505   SODIUM mmol/L 135* 130* 131*   POTASSIUM mmol/L 4.4 4.5 4.6   CHLORIDE mmol/L 96* 93* 95*   CO2 mmol/L 30.0* 26.8 28.0   BUN mg/dL 14 22 14   CREATININE mg/dL 0.68 0.87 0.67   GLUCOSE mg/dL 110* 134* 160*   CALCIUM mg/dL 9.2 8.8 9.5     Results from last 7 days   Lab Units 11/17/22  0503 11/16/22  0501 11/15/22  0505   WBC 10*3/mm3 8.54 10.05 13.15*   HEMOGLOBIN g/dL 10.8* 10.2* 12.8   HEMATOCRIT % 32.3* 30.6* 38.6   PLATELETS 10*3/mm3 226 188 218     Results from last 7 days   Lab Units 11/14/22  0430   TSH uIU/mL 0.677   FREE T4 ng/dL 1.22     Results from last 7 days   Lab Units 11/13/22  1122   HEMOGLOBIN A1C % 6.10*     Lab Results   Lab Value Date/Time    TROPONINT <0.010 10/03/2022 0805    TROPONINT <0.010 09/11/2022 1926    TROPONINT <0.010 09/11/2022 1648    TROPONINT <0.010 12/11/2021 1505    TROPONINT <0.010 01/31/2019 2331    TROPONINT <0.010 03/07/2017 2037         Results from last 7 days   Lab Units 11/13/22  1346   ALK PHOS U/L 58   BILIRUBIN mg/dL 0.5   ALT (SGPT) U/L 17   AST (SGOT) U/L 28     Results from last 7 days   Lab Units 11/14/22  0430   TSH uIU/mL 0.677   FREE T4 ng/dL 1.22     Results from last 7 days   Lab Units 11/13/22  1122   HEMOGLOBIN A1C % 6.10*     Glucose   Date/Time Value Ref Range Status   11/17/2022 1144 154 (H) 70 - 130 mg/dL Final     Comment:     Meter: [id number] : nicole Durbin RN   11/17/2022 0549 105 70 - 130 mg/dL Final     Comment:     Meter: [id number] : [id number] Robbie Leyva NA   11/16/2022 1616 132 (H) 70 - 130 mg/dL Final     Comment:     Meter: [id number] : [id number] Joe Ny RN   11/16/2022 1150 119 70 - 130 mg/dL Final     Comment:     Meter: [id number] : [id number] Celestino Vincent NA   11/16/2022 0607 129 70 - 130 mg/dL Final     Comment:     Meter: [id number] : [id number] Lisa Zuniga NA   11/15/2022 2124 159  (H) 70 - 130 mg/dL Final     Comment:     Meter: [id number] : [id number] Lisa Zuniga NA   11/15/2022 1628 187 (H) 70 - 130 mg/dL Final     Comment:     Meter: [id number] : [id number] Tera Rivera NA   11/15/2022 0629 152 (H) 70 - 130 mg/dL Final     Comment:     Meter: [id number] : [id number] Anabela Winter NA     Results from last 7 days   Lab Units 11/13/22  1346   INR  1.08       Past Medical History:   Diagnosis Date   • Allergic rhinitis    • Anemia    • Anxiety     Controlled w/Meds   • Aortic root dilatation (HCC) 10/02/2017    Borderline--Noted on Echo   • Aortic valve calcification 10/02/2017    Noted on Echo   • Aortic valve insufficiency Dx in 07    w/AVR    • Aortic valve prosthesis present 11/02/2018    Noted on CTA Chest   • Ascending aorta dilatation (HCC) 10/02/2017    Borderline--Noted on Echo   • Asthma    • Atypical chest pain    • Breast pain, right Hx   • CAD (coronary artery disease)    • Cardiomegaly 2017   • Cervicalgia    • Chest pain due to CAD (HCC)    • 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    Borderline--Noted on Echo & Measured @ 2.6CM and Mid Descending @ 2.5CM on CTA Chest-11/2/18   • DM (diabetes mellitus) ([facility])     T2   • Esophagitis    • GERD (gastroesophageal reflux disease)     Controlled w/Meds   • Headache    • Health care maintenance    • Heart murmur    • History of echocardiogram 10/2/17-[facility]    EF 66%; Borderline Concentric Hypertrophy; Mild Calcification in AV; Mild AVS; Mild AVR; Moderate TVR; Borderline Dilation of Aortic Root/Arch & Borderline Dilation of Ascending/Proximal Aorta Present   • Hyperlipidemia     Controlled w/Meds   • Hypertension     Controlled w/Meds   • Hypothyroidism     Controlled w/Synthroid   • Insomnia    • Macular degeneration, left eye    • Mild aortic valve regurgitation 10/02/2017    Noted on Echo   • Mild aortic valve stenosis 10/02/2017    Noted on Echo   • Mild dilation of ascending aorta (HCC) 10/02/2017     Borderline--Noted on Echo   • Moderate tricuspid valve regurgitation 10/02/2017    Noted on Echo   • Mood disorder in conditions classified elsewhere     Controlled w/Meds   • Near syncope 03/2017-[facility]   • Neuropathy    • NNEKA (obstructive sleep apnea)     Untreated   • Osteoarthritis     Ankles/Feet   • Pulmonary hypertension (HCC) 2017   • Renal insufficiency    • RLS (restless legs syndrome)    • Thoracic ascending aortic aneurysm Dx in 2007 @ 3.8CM & 1/02/2018    Noted @ 4CM on CTA Chest;   • Visual impairment     macular degeneratuin       Assessment:  Active Hospital Problems    Diagnosis  POA   • **Closed displaced fracture of right femoral neck ([facility]) [S72.001A]  Yes   • Fall [W19.XXXA]  Unknown   • PAF (paroxysmal atrial fibrillation) ([facility]) [I48.0]  Yes   • S/P AVR [Z95.2]  Not Applicable   • Mild intermittent asthma without complication [J45.20]  Yes   • Anxiety [F41.9]  Yes   • Restless leg syndrome [G25.81]  Yes   • Hypothyroidism [E03.9]  Yes   • Hypercholesteremia [E78.00]  Yes   • Gastroesophageal reflux disease [K21.9]  Yes   • Essential hypertension [I10]  Yes   • Pulmonary hypertension (HCC) [I27.20]  Yes   • NNEKA (obstructive sleep apnea) [G47.33]  Yes      Resolved Hospital Problems   No resolved problems to display.       Plan:  Post op care.  Follow lab.  Restarted anticoagulation today per surgery. DC planning. Will need SNU for rehab.  Probably go to [facility] tomorrow.  Mark Ruiz MD  11/17/2022  13:19 EST

## 2025-02-04 DIAGNOSIS — K21.9 GASTROESOPHAGEAL REFLUX DISEASE: ICD-10-CM

## 2025-02-04 RX ORDER — OMEPRAZOLE 40 MG/1
40 CAPSULE, DELAYED RELEASE ORAL DAILY
Qty: 90 CAPSULE | Refills: 0 | Status: SHIPPED | OUTPATIENT
Start: 2025-02-04

## 2025-02-05 DIAGNOSIS — E78.00 HYPERCHOLESTEREMIA: ICD-10-CM

## 2025-02-05 DIAGNOSIS — I10 ESSENTIAL HYPERTENSION: ICD-10-CM

## 2025-02-05 RX ORDER — LOSARTAN POTASSIUM 100 MG/1
100 TABLET ORAL DAILY
Qty: 90 TABLET | Refills: 0 | Status: SHIPPED | OUTPATIENT
Start: 2025-02-05

## 2025-02-05 RX ORDER — FENOFIBRATE 134 MG/1
CAPSULE ORAL
Qty: 90 CAPSULE | Refills: 3 | Status: SHIPPED | OUTPATIENT
Start: 2025-02-05

## 2025-02-11 ENCOUNTER — TELEPHONE (OUTPATIENT)
Dept: CASE MANAGEMENT | Facility: OTHER | Age: 85
End: 2025-02-11
Payer: MEDICARE

## 2025-02-11 ENCOUNTER — PATIENT OUTREACH (OUTPATIENT)
Dept: CASE MANAGEMENT | Facility: OTHER | Age: 85
End: 2025-02-11
Payer: MEDICARE

## 2025-02-11 DIAGNOSIS — E11.8 CONTROLLED TYPE 2 DIABETES MELLITUS WITH COMPLICATION, WITHOUT LONG-TERM CURRENT USE OF INSULIN: Primary | ICD-10-CM

## 2025-02-11 DIAGNOSIS — I10 ESSENTIAL HYPERTENSION: ICD-10-CM

## 2025-02-11 DIAGNOSIS — H35.30 MACULAR DEGENERATION OF BOTH EYES, UNSPECIFIED TYPE: Chronic | ICD-10-CM

## 2025-02-11 NOTE — OUTREACH NOTE
"AMBULATORY CASE MANAGEMENT NOTE    Names and Relationships of Patient/Support Persons: Contact: Dior Pettit \"Citlaly\"; Relationship: Self -     Patient Outreach    Patient called [facility] back. She states she has been doing better. She is trying to watch what she eats to keep her blood sugars down because of her eyes, but it has been hard. She admits her weakness is potatoes and breads. She states she was listening to a program last night and was surprised at the amount of sugar in some brands of bread. She states she is considering switching to one of the organic brands. Patient reports her most recent blood sugars have usually been 172-173. Advised that getting her blood sugar under control could slow the progression of her Macular Degeneration. Patient agreed. Patient thanked [facility] for checking on her. Next outreach scheduled.         Bebe ALLISON  Ambulatory Case Management    2/11/2025, 11:11 EST  "

## 2025-02-11 NOTE — PLAN OF CARE
Problem: Diabetes Type 2  Goal: Protect Myself From Diabetes Complications  Outcome: Progressing  Goal: Monitor My Blood Sugar  Outcome: Progressing  Goal: Manage My Stress  Outcome: Progressing  Goal: Optimal Care Coordination of a Patient Experiencing Diabetes, Type 2  Outcome: Progressing

## 2025-03-05 RX ORDER — FUROSEMIDE 40 MG/1
40 TABLET ORAL DAILY
Qty: 90 TABLET | Refills: 0 | Status: SHIPPED | OUTPATIENT
Start: 2025-03-05

## 2025-03-13 ENCOUNTER — TELEPHONE (OUTPATIENT)
Dept: CASE MANAGEMENT | Facility: OTHER | Age: 85
End: 2025-03-13
Payer: MEDICARE

## 2025-03-17 ENCOUNTER — PATIENT OUTREACH (OUTPATIENT)
Dept: CASE MANAGEMENT | Facility: OTHER | Age: 85
End: 2025-03-17
Payer: MEDICARE

## 2025-03-17 ENCOUNTER — TELEPHONE (OUTPATIENT)
Dept: CASE MANAGEMENT | Facility: OTHER | Age: 85
End: 2025-03-17
Payer: MEDICARE

## 2025-03-17 DIAGNOSIS — E11.8 CONTROLLED TYPE 2 DIABETES MELLITUS WITH COMPLICATION, WITHOUT LONG-TERM CURRENT USE OF INSULIN: Primary | ICD-10-CM

## 2025-03-17 DIAGNOSIS — H35.30 MACULAR DEGENERATION OF BOTH EYES, UNSPECIFIED TYPE: ICD-10-CM

## 2025-03-17 DIAGNOSIS — I10 ESSENTIAL HYPERTENSION: ICD-10-CM

## 2025-03-17 NOTE — OUTREACH NOTE
"AMBULATORY CASE MANAGEMENT NOTE    Names and Relationships of Patient/Support Persons: Contact: Dior Pettit \"Citlaly\"; Relationship: Self -     Patient Outreach    Patient called [facility] back. She report having intermittent episodes of dizziness upon standing. She states she has had similar episodes in the past and is using her walker when walking around. She denies signs and symptoms of ear and upper respiratory infection. She reports her blood pressure this morning was 140/84 and her blood sugar was 101. Advised to make an appointment to see PCP if the episodes continue. She verbalized understanding. Next outreach scheduled.     Plan:  BS levels  Dizziness?          Bebe ALLISON  Ambulatory Case Management    3/17/2025, 14:08 EDT  "

## 2025-03-18 RX ORDER — IPRATROPIUM BROMIDE 42 UG/1
SPRAY, METERED NASAL
Qty: 15 ML | Refills: 0 | OUTPATIENT
Start: 2025-03-18

## 2025-03-19 DIAGNOSIS — F41.9 ANXIETY: ICD-10-CM

## 2025-03-19 RX ORDER — VENLAFAXINE HYDROCHLORIDE 150 MG/1
150 CAPSULE, EXTENDED RELEASE ORAL DAILY
Qty: 90 CAPSULE | Refills: 0 | Status: SHIPPED | OUTPATIENT
Start: 2025-03-19

## 2025-03-20 DIAGNOSIS — E11.8 CONTROLLED TYPE 2 DIABETES MELLITUS WITH COMPLICATION, WITHOUT LONG-TERM CURRENT USE OF INSULIN: ICD-10-CM

## 2025-03-21 RX ORDER — CALCIUM CITRATE/VITAMIN D3 200MG-6.25
1 TABLET ORAL DAILY
Qty: 50 EACH | Refills: 2 | Status: SHIPPED | OUTPATIENT
Start: 2025-03-21

## 2025-04-10 RX ORDER — AMLODIPINE BESYLATE 5 MG/1
5 TABLET ORAL DAILY
Qty: 90 TABLET | Refills: 1 | Status: SHIPPED | OUTPATIENT
Start: 2025-04-10

## 2025-04-10 NOTE — TELEPHONE ENCOUNTER
"  Caller: Dior Pettit \"Citlaly\"    Relationship: Self    Best call back number: [phone]      Requested Prescriptions:   Requested Prescriptions     Pending Prescriptions Disp Refills    amLODIPine (NORVASC) 5 MG tablet       Sig: Take 1 tablet by mouth Daily.        Pharmacy where request should be sent: [id number] - [address] - [phone] PH - [phone] FX     Last office visit with prescribing clinician: 10/18/2024   Last telemedicine visit with prescribing clinician: Visit date not found   Next office visit with prescribing clinician: 4/25/2025     Additional details provided by patient: HAS A WEEK LEFT    Does the patient have less than a 3 day supply:  [] Yes  [x] No    Would you like a call back once the refill request has been completed: [] Yes [x] No    If the office needs to give you a call back, can they leave a voicemail: [] Yes [x] No    Ritesh Childs Rep   04/10/25 11:08 EDT         "

## 2025-04-16 ENCOUNTER — TELEPHONE (OUTPATIENT)
Dept: CASE MANAGEMENT | Facility: OTHER | Age: 85
End: 2025-04-16
Payer: MEDICARE

## 2025-04-17 ENCOUNTER — PATIENT OUTREACH (OUTPATIENT)
Dept: CASE MANAGEMENT | Facility: OTHER | Age: 85
End: 2025-04-17
Payer: MEDICARE

## 2025-04-17 DIAGNOSIS — H35.30 MACULAR DEGENERATION OF BOTH EYES, UNSPECIFIED TYPE: ICD-10-CM

## 2025-04-17 DIAGNOSIS — E11.8 CONTROLLED TYPE 2 DIABETES MELLITUS WITH COMPLICATION, WITHOUT LONG-TERM CURRENT USE OF INSULIN: Primary | ICD-10-CM

## 2025-04-17 DIAGNOSIS — I10 ESSENTIAL HYPERTENSION: ICD-10-CM

## 2025-04-17 NOTE — OUTREACH NOTE
"AMBULATORY CASE MANAGEMENT NOTE    Names and Relationships of Patient/Support Persons: Contact: Dior Pettit \"Citlaly\"; Relationship: Self -     Patient Outreach    Called patient for scheduled outreach. She states she has been doing well. She reports her blood sugar yesterday afternoon was 87. She states she checked it again yesterday evening and it was 149. She states she continues to struggle with limiting her carbohydrate intake. She feels it has a lot to do with how she was raised. Encouraged to continue to do the best that she can.     Patient has an appointment with PCP next week for routine check up. Next outreach scheduled.         Bebe ALLISON  Ambulatory Case Management    4/17/2025, 12:45 EDT  "

## 2025-04-25 ENCOUNTER — OFFICE VISIT (OUTPATIENT)
Dept: INTERNAL MEDICINE | Facility: CLINIC | Age: 85
End: 2025-04-25
Payer: MEDICARE

## 2025-04-25 VITALS
WEIGHT: 131.6 LBS | BODY MASS INDEX: 20.65 KG/M2 | DIASTOLIC BLOOD PRESSURE: 78 MMHG | HEART RATE: 78 BPM | SYSTOLIC BLOOD PRESSURE: 132 MMHG | OXYGEN SATURATION: 97 % | TEMPERATURE: 97.9 F | HEIGHT: 67 IN

## 2025-04-25 DIAGNOSIS — H40.9 GLAUCOMA OF BOTH EYES, UNSPECIFIED GLAUCOMA TYPE: ICD-10-CM

## 2025-04-25 DIAGNOSIS — E11.8 CONTROLLED TYPE 2 DIABETES MELLITUS WITH COMPLICATION, WITHOUT LONG-TERM CURRENT USE OF INSULIN: Chronic | ICD-10-CM

## 2025-04-25 DIAGNOSIS — J30.2 SEASONAL ALLERGIC RHINITIS, UNSPECIFIED TRIGGER: Chronic | ICD-10-CM

## 2025-04-25 DIAGNOSIS — Z00.00 MEDICARE ANNUAL WELLNESS VISIT, SUBSEQUENT: Primary | ICD-10-CM

## 2025-04-25 DIAGNOSIS — H35.30 MACULAR DEGENERATION OF BOTH EYES, UNSPECIFIED TYPE: Chronic | ICD-10-CM

## 2025-04-25 DIAGNOSIS — I48.0 PAROXYSMAL ATRIAL FIBRILLATION: Chronic | ICD-10-CM

## 2025-04-25 DIAGNOSIS — E03.9 ACQUIRED HYPOTHYROIDISM: Chronic | ICD-10-CM

## 2025-04-25 DIAGNOSIS — I10 ESSENTIAL HYPERTENSION: Chronic | ICD-10-CM

## 2025-04-25 DIAGNOSIS — R51.9 DAILY HEADACHE: ICD-10-CM

## 2025-04-26 LAB
ALBUMIN SERPL-MCNC: 4.8 G/DL (ref 3.5–5.2)
ALBUMIN/CREAT UR: <9 MG/G CREAT (ref 0–29)
ALBUMIN/GLOB SERPL: 1.7 G/DL
ALP SERPL-CCNC: 65 U/L (ref 39–117)
ALT SERPL-CCNC: 19 U/L (ref 1–33)
AST SERPL-CCNC: 28 U/L (ref 1–32)
BILIRUB SERPL-MCNC: 0.5 MG/DL (ref 0–1.2)
BUN SERPL-MCNC: 26 MG/DL (ref 8–23)
BUN/CREAT SERPL: 23.9 (ref 7–25)
CALCIUM SERPL-MCNC: 10 MG/DL (ref 8.6–10.5)
CHLORIDE SERPL-SCNC: 100 MMOL/L (ref 98–107)
CHOLEST SERPL-MCNC: 163 MG/DL (ref 0–200)
CO2 SERPL-SCNC: 26.5 MMOL/L (ref 22–29)
CREAT SERPL-MCNC: 1.09 MG/DL (ref 0.57–1)
CREAT UR-MCNC: 33 MG/DL
EGFRCR SERPLBLD CKD-EPI 2021: 49.6 ML/MIN/1.73
ERYTHROCYTE [DISTWIDTH] IN BLOOD BY AUTOMATED COUNT: 13.1 % (ref 12.3–15.4)
GLOBULIN SER CALC-MCNC: 2.9 GM/DL
GLUCOSE SERPL-MCNC: 92 MG/DL (ref 65–99)
HBA1C MFR BLD: 6 % (ref 4.8–5.6)
HCT VFR BLD AUTO: 38.9 % (ref 34–46.6)
HDLC SERPL-MCNC: 57 MG/DL (ref 40–60)
HGB BLD-MCNC: 12.3 G/DL (ref 12–15.9)
LDLC SERPL CALC-MCNC: 89 MG/DL (ref 0–100)
MCH RBC QN AUTO: 29 PG (ref 26.6–33)
MCHC RBC AUTO-ENTMCNC: 31.6 G/DL (ref 31.5–35.7)
MCV RBC AUTO: 91.7 FL (ref 79–97)
MICROALBUMIN UR-MCNC: <3 UG/ML
PLATELET # BLD AUTO: 321 10*3/MM3 (ref 140–450)
POTASSIUM SERPL-SCNC: 4.8 MMOL/L (ref 3.5–5.2)
PROT SERPL-MCNC: 7.7 G/DL (ref 6–8.5)
RBC # BLD AUTO: 4.24 10*6/MM3 (ref 3.77–5.28)
SODIUM SERPL-SCNC: 137 MMOL/L (ref 136–145)
TRIGL SERPL-MCNC: 95 MG/DL (ref 0–150)
TSH SERPL DL<=0.005 MIU/L-ACNC: 1.94 UIU/ML (ref 0.27–4.2)
VLDLC SERPL CALC-MCNC: 17 MG/DL (ref 5–40)
WBC # BLD AUTO: 6.63 10*3/MM3 (ref 3.4–10.8)

## 2025-05-02 ENCOUNTER — TELEPHONE (OUTPATIENT)
Dept: CARDIOLOGY | Age: 85
End: 2025-05-02
Payer: MEDICARE

## 2025-05-05 ENCOUNTER — HOSPITAL ENCOUNTER (OUTPATIENT)
Dept: CARDIOLOGY | Facility: HOSPITAL | Age: 85
Discharge: HOME | End: 2025-05-05
Admitting: INTERNAL MEDICINE
Payer: MEDICARE

## 2025-05-05 ENCOUNTER — OFFICE VISIT (OUTPATIENT)
Age: 85
End: 2025-05-05
Payer: MEDICARE

## 2025-05-05 VITALS
WEIGHT: 131 LBS | BODY MASS INDEX: 20.56 KG/M2 | HEIGHT: 67 IN | HEART RATE: 87 BPM | OXYGEN SATURATION: 96 % | SYSTOLIC BLOOD PRESSURE: 114 MMHG | DIASTOLIC BLOOD PRESSURE: 76 MMHG

## 2025-05-05 VITALS
DIASTOLIC BLOOD PRESSURE: 82 MMHG | HEIGHT: 67 IN | HEART RATE: 100 BPM | BODY MASS INDEX: 20.56 KG/M2 | SYSTOLIC BLOOD PRESSURE: 160 MMHG | OXYGEN SATURATION: 95 % | WEIGHT: 131 LBS

## 2025-05-05 DIAGNOSIS — I35.0 NONRHEUMATIC AORTIC VALVE STENOSIS: ICD-10-CM

## 2025-05-05 DIAGNOSIS — I50.32 CHRONIC DIASTOLIC CONGESTIVE HEART FAILURE: ICD-10-CM

## 2025-05-05 DIAGNOSIS — I48.0 PAF (PAROXYSMAL ATRIAL FIBRILLATION): Primary | ICD-10-CM

## 2025-05-05 DIAGNOSIS — Z95.3 S/P TAVR (TRANSCATHETER AORTIC VALVE REPLACEMENT), BIOPROSTHETIC: ICD-10-CM

## 2025-05-05 LAB
AORTIC ARCH: 3 CM
AORTIC DIMENSIONLESS INDEX: 0.3 (DI)
ASCENDING AORTA: 3.5 CM
AV MEAN PRESS GRAD SYS DOP V1V2: 21.5 MMHG
AV VMAX SYS DOP: 341.5 CM/SEC
BH CV ECHO AV AORTIC VALVE AT ACCEL TIME CALCULATED: 80 MSEC
BH CV ECHO MEAS - AO MAX PG: 46.6 MMHG
BH CV ECHO MEAS - AO ROOT AREA (BSA CORRECTED): 2.1 CM2
BH CV ECHO MEAS - AO ROOT DIAM: 3.6 CM
BH CV ECHO MEAS - AO V2 VTI: 63.4 CM
BH CV ECHO MEAS - AT: 0.08 SEC
BH CV ECHO MEAS - AVA(I,D): 0.8 CM2
BH CV ECHO MEAS - EDV(CUBED): 56.7 ML
BH CV ECHO MEAS - EDV(MOD-SP2): 65 ML
BH CV ECHO MEAS - EDV(MOD-SP4): 58 ML
BH CV ECHO MEAS - EF(MOD-SP2): 56.9 %
BH CV ECHO MEAS - EF(MOD-SP4): 58.6 %
BH CV ECHO MEAS - ESV(CUBED): 13 ML
BH CV ECHO MEAS - ESV(MOD-SP2): 28 ML
BH CV ECHO MEAS - ESV(MOD-SP4): 24 ML
BH CV ECHO MEAS - FS: 38.8 %
BH CV ECHO MEAS - IVS/LVPW: 1.3 CM
BH CV ECHO MEAS - IVSD: 1.09 CM
BH CV ECHO MEAS - LAT PEAK E' VEL: 7.5 CM/SEC
BH CV ECHO MEAS - LV DIASTOLIC VOL/BSA (35-75): 34.3 CM2
BH CV ECHO MEAS - LV MASS(C)D: 112.8 GRAMS
BH CV ECHO MEAS - LV MAX PG: 3.7 MMHG
BH CV ECHO MEAS - LV MEAN PG: 2 MMHG
BH CV ECHO MEAS - LV SYSTOLIC VOL/BSA (12-30): 14.2 CM2
BH CV ECHO MEAS - LV V1 MAX: 95.7 CM/SEC
BH CV ECHO MEAS - LV V1 VTI: 19.1 CM
BH CV ECHO MEAS - LVIDD: 3.8 CM
BH CV ECHO MEAS - LVIDS: 2.35 CM
BH CV ECHO MEAS - LVOT AREA: 2.6 CM2
BH CV ECHO MEAS - LVOT DIAM: 1.84 CM
BH CV ECHO MEAS - LVPWD: 0.84 CM
BH CV ECHO MEAS - MED PEAK E' VEL: 7 CM/SEC
BH CV ECHO MEAS - MV A DUR: 0.1 SEC
BH CV ECHO MEAS - MV DEC SLOPE: 828.7 CM/SEC2
BH CV ECHO MEAS - MV DEC TIME: 0.25 SEC
BH CV ECHO MEAS - MV E MAX VEL: 161.1 CM/SEC
BH CV ECHO MEAS - MV MAX PG: 18.4 MMHG
BH CV ECHO MEAS - MV MEAN PG: 6.9 MMHG
BH CV ECHO MEAS - MV P1/2T: 76.8 MSEC
BH CV ECHO MEAS - MV V2 VTI: 39.3 CM
BH CV ECHO MEAS - MVA(P1/2T): 2.9 CM2
BH CV ECHO MEAS - MVA(VTI): 1.29 CM2
BH CV ECHO MEAS - PA ACC TIME: 0.07 SEC
BH CV ECHO MEAS - PA V2 MAX: 116.9 CM/SEC
BH CV ECHO MEAS - PULM A REVS DUR: 0.1 SEC
BH CV ECHO MEAS - PULM A REVS VEL: 16.4 CM/SEC
BH CV ECHO MEAS - PULM DIAS VEL: 43.8 CM/SEC
BH CV ECHO MEAS - PULM S/D: 0.81
BH CV ECHO MEAS - PULM SYS VEL: 35.6 CM/SEC
BH CV ECHO MEAS - QP/QS: 1.35
BH CV ECHO MEAS - RAP SYSTOLE: 3 MMHG
BH CV ECHO MEAS - RV MAX PG: 3.8 MMHG
BH CV ECHO MEAS - RV V1 MAX: 97.9 CM/SEC
BH CV ECHO MEAS - RV V1 VTI: 15.5 CM
BH CV ECHO MEAS - RVOT DIAM: 2.37 CM
BH CV ECHO MEAS - RVSP: 48.1 MMHG
BH CV ECHO MEAS - SV(LVOT): 50.6 ML
BH CV ECHO MEAS - SV(MOD-SP2): 37 ML
BH CV ECHO MEAS - SV(MOD-SP4): 34 ML
BH CV ECHO MEAS - SV(RVOT): 68.3 ML
BH CV ECHO MEAS - SVI(LVOT): 29.9 ML/M2
BH CV ECHO MEAS - SVI(MOD-SP2): 21.9 ML/M2
BH CV ECHO MEAS - SVI(MOD-SP4): 20.1 ML/M2
BH CV ECHO MEAS - TAPSE (>1.6): 1.43 CM
BH CV ECHO MEAS - TR MAX PG: 45.1 MMHG
BH CV ECHO MEAS - TR MAX VEL: 335.8 CM/SEC
BH CV ECHO MEASUREMENTS AVERAGE E/E' RATIO: 22.22
BH CV XLRA - RV BASE: 3.8 CM
BH CV XLRA - RV LENGTH: 5.8 CM
BH CV XLRA - RV MID: 3.6 CM
BH CV XLRA - TDI S': 10.8 CM/SEC
LEFT ATRIUM VOLUME INDEX: 40.5 ML/M2
LV EF BIPLANE MOD: 57.3 %
SINUS: 3.2 CM
STJ: 3 CM

## 2025-05-05 PROCEDURE — 93000 ELECTROCARDIOGRAM COMPLETE: CPT | Performed by: INTERNAL MEDICINE

## 2025-05-05 PROCEDURE — 93306 TTE W/DOPPLER COMPLETE: CPT | Performed by: INTERNAL MEDICINE

## 2025-05-05 PROCEDURE — 25510000001 PERFLUTREN 6.52 MG/ML SUSPENSION 2 ML VIAL: Performed by: INTERNAL MEDICINE

## 2025-05-05 PROCEDURE — 93306 TTE W/DOPPLER COMPLETE: CPT

## 2025-05-05 PROCEDURE — 99214 OFFICE O/P EST MOD 30 MIN: CPT | Performed by: INTERNAL MEDICINE

## 2025-05-05 RX ADMIN — PERFLUTREN 2 ML: 6.52 INJECTION, SUSPENSION INTRAVENOUS at 12:43

## 2025-05-05 NOTE — PROGRESS NOTES
Date of Office Visit: 25    Encounter Provider: Ranjan Hernadez MD  Place of Service: [facility] CARDIOLOGY  Patient Name: Dior Pettit  :1940    Chief complaint  History of TAVR    HPI:   84 y.o. female who is a patient severe bioprosthetic aortic valve stenosis.  She has a history of prior bioprosthetic aortic valve replacement and subacute aortic membrane resection in , paroxysmal atrial fibrillation, prior CVA who presented to the emergency room in May with worsening shortness of breath and orthopnea.  She underwent a TTE and a BRAULIO.  Left ventricular size and systolic function is normal, grade 2 diastolic dysfunction.  She has severe bioprosthetic aortic valve stenosis with a mean gradient of 48 mmHg across the aortic valve.  She is also noted to have moderate mitral valve regurgitation and moderate to severe tricuspid valve regurgitation.  BRAULIO then confirmed severe tricuspid valve regurgitation and moderate mitral valve regurgitation.  Gradient on that echo was 55 mmHg across the bioprosthetic aortic valve.    Ms. Pettit underwent valve in valve transcatheter aortic valve replacement with 23 mm Medtronic Evolut Pro transcatheter aortic valve on 2024.  Postdilated with a 20 mm true balloon.  Postprocedure echocardiogram showed mean gradient across the aortic valve 10 mmHg and no aortic regurgitation.  She was discharged on aspirin with recommendation to restart her Eliquis in a couple days.     She continues to do very well.  Her echo today is unchanged.  She does have severe tricuspid valve regurgitation and a least moderate mitral valve regurgitation.  She does have mild FRAZIER but denies any orthopnea or PND.  No lower extremity edema.    Previous testing and notes have been reviewed by me.   Past Medical History:   Diagnosis Date    Anxiety and depression     Aortic root dilatation 10/02/2017    Borderline--Noted on Echo    Aortic valve  calcification 10/02/2017    Noted on Echo    Aortic valve insufficiency Dx in 07    w/AVR     Aortic valve prosthesis present 11/02/2018    Noted on CTA Chest    Ascending aorta dilatation 10/02/2017    Borderline--Noted on Echo    Asthma     Atrial fibrillation     2020    Atypical chest pain     Bronchitis, chronic 2014    CAD (coronary artery disease)     Cardiomegaly 2017    Cervicalgia     Chest pain due to CAD     CHF (congestive heart failure) 2024    Claustrophobia 1940    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    Borderline--Noted on Echo & Measured @ 2.6CM and Mid Descending @ 2.5CM on CTA Chest-11/2/18    DM (diabetes mellitus)     T2    Dyslipidemia 2014    Esophagitis     Fatigue     GERD (gastroesophageal reflux disease)     Controlled w/Meds    Headache     Heart murmur     History of echocardiogram 10/2/17-[facility]    EF 66%; Borderline Concentric Hypertrophy; Mild Calcification in AV; Mild AVS; Mild AVR; Moderate TVR; Borderline Dilation of Aortic Root/Arch & Borderline Dilation of Ascending/Proximal Aorta Present    History of hepatitis     AS A TEEN    History of transfusion 1960    Hyperlipidemia     Controlled w/Meds    Hypertension     Controlled w/Meds    Hypothyroidism     Controlled w/Synthroid    Insomnia     Macular degeneration, left eye     Mild aortic valve regurgitation 10/02/2017    Noted on Echo    Mild aortic valve stenosis 10/02/2017    Noted on Echo    Mild dilation of ascending aorta 10/02/2017    Borderline--Noted on Echo    Mitral valve prolapse 2007    Moderate tricuspid valve regurgitation 10/02/2017    Noted on Echo    Mood disorder in conditions classified elsewhere     Controlled w/Meds    Near syncope 03/2017-[facility] ER    Neuropathy     Osteoarthritis     Ankles/Feet    Pulmonary hypertension 2017    Renal insufficiency     RLS (restless legs syndrome)     Short of breath on exertion     Sleep apnea     CPAP    Stroke     UNCLEAR:  DOES NOT SEE NEUROLOGIST    Thoracic  ascending aortic aneurysm Dx in 2007 @ 3.8CM & 1/02/2018    Noted @ 4CM on CTA Chest;    Urinary incontinence     WEARS PAD    UTI (urinary tract infection)     RECENT:  DIAGNOSED 6-19-24       Past Surgical History:   Procedure Laterality Date    AORTIC VALVE REPAIR/REPLACEMENT  2007    Dr. Perry    AORTIC VALVE REPAIR/REPLACEMENT N/A 6/25/2024    Procedure: TTE TRANSFEMORAL TRANSCATHETER AORTIC VALVE REPLACEMENT PERCUTANEOUS APPROACH;  Surgeon: Seth Salinas MD;  Location: [facility];  Service: Cardiothoracic;  Laterality: N/A;    AORTIC VALVE REPAIR/REPLACEMENT N/A 6/25/2024    Procedure: Transfemoral Transcatheter Aortic Valve Replacement with intraoperative transthoracic echocardiogram and possible open surgical rescue;  Surgeon: Ranjan Hernadez MD;  Location: [facility];  Service: Cardiovascular;  Laterality: N/A;    APPENDECTOMY      AUGMENTATION MAMMAPLASTY  1976    BREAST AUGMENTATION  2014    BUNIONECTOMY Bilateral     CARDIAC ABLATION      CARDIAC CATHETERIZATION  2007    CARDIAC ELECTROPHYSIOLOGY PROCEDURE N/A 9/6/2024    Procedure: Pacemaker DC new Medtronic;  Surgeon: Lazaro Jj MD;  Location: [facility] CATH INVASIVE LOCATION;  Service: Cardiovascular;  Laterality: N/A;    CARDIAC ELECTROPHYSIOLOGY PROCEDURE N/A 9/20/2024    Procedure: AV node ablation medt device;  Surgeon: Lazaro Jj MD;  Location: [facility] CATH INVASIVE LOCATION;  Service: Cardiovascular;  Laterality: N/A;    CARDIAC VALVE REPLACEMENT  2007    porcine    COLONOSCOPY  2010    COLONOSCOPY  03/02/2012    EYE SURGERY      cataracts and ens implants    HYSTERECTOMY  1972    SIGMOIDOSCOPY  2001    TEMPORAL ARTERY BIOPSY Bilateral 04/14/2023    Procedure: BILATERAL TEMPORAL ARTERY BIOPSY;  Surgeon: Stewart Shepherd MD;  Location: [facility] OR;  Service: Vascular;  Laterality: Bilateral;    TOTAL HIP ARTHROPLASTY Right 11/15/2022    Procedure: Right anterior total hip arthroplasty;  Surgeon:  Joao Martinez MD;  Location: [facility] OR;  Service: Orthopedics;  Laterality: Right;       Social History     Socioeconomic History    Marital status:    Tobacco Use    Smoking status: Never     Passive exposure: Never    Smokeless tobacco: Never    Tobacco comments:     caffeine use - 1 cup coffee daily    Vaping Use    Vaping status: Never Used   Substance and Sexual Activity    Alcohol use: Yes     Comment: 2 oz a month    Drug use: Never    Sexual activity: Not Currently     Partners: Male     Birth control/protection: Surgical     Comment: Hyst       Family History   Problem Relation Age of Onset    Hypertension Mother     Stroke Mother     Cancer Mother     Diabetes Sister     Coronary artery disease Brother     Diabetes Brother     Valvular heart disease Brother         TAVR    Cancer Brother         bladder cancer    Coronary artery disease Brother     Cancer Brother     Birth defects Daughter     Skin cancer Neg Hx     Malig Hyperthermia Neg Hx        Review of Systems   Constitutional: Negative. Negative for fever and malaise/fatigue.   HENT: Negative.  Negative for nosebleeds and sore throat.    Eyes: Negative.  Negative for blurred vision and double vision.   Cardiovascular: Negative.  Negative for chest pain, claudication, palpitations and syncope.   Respiratory: Negative.  Negative for cough, shortness of breath and snoring.    Endocrine: Negative.  Negative for cold intolerance, heat intolerance and polydipsia.   Skin: Negative.  Negative for itching, poor wound healing and rash.   Musculoskeletal: Negative.  Negative for joint pain, joint swelling, muscle weakness and myalgias.   Gastrointestinal: Negative.  Negative for abdominal pain, melena, nausea and vomiting.   Genitourinary: Negative.    Neurological: Negative.  Negative for light-headedness, loss of balance, seizures, vertigo and weakness.   Psychiatric/Behavioral: Negative.  Negative for altered mental status and depression.     Allergic/Immunologic: Negative.        No Known Allergies      Current Outpatient Medications:     acetaminophen (TYLENOL) 325 MG tablet, Take 2 tablets by mouth Every 4 (Four) Hours As Needed for Mild Pain., Disp: , Rfl:     acyclovir (Zovirax) 5 % ointment, Apply 1 Application topically to the appropriate area as directed Every 3 (Three) Hours., Disp: 15 g, Rfl: 0    amitriptyline (ELAVIL) 50 MG tablet, TAKE 1 TABLET BY MOUTH AT NIGHT, Disp: 90 tablet, Rfl: 0    amLODIPine (NORVASC) 5 MG tablet, Take 1 tablet by mouth Daily., Disp: 90 tablet, Rfl: 1    apixaban (Eliquis) 5 MG tablet tablet, Take 1 tablet by mouth Every 12 (Twelve) Hours., Disp: 180 tablet, Rfl: 1    ascorbic acid (VITAMIN C) 1000 MG tablet, Take 1 tablet by mouth Daily. Indications: Inadequate Vitamin C, Disp: , Rfl:     aspirin 81 MG EC tablet, Take 1 tablet by mouth Daily., Disp: 30 tablet, Rfl: 1    atorvastatin (LIPITOR) 80 MG tablet, TAKE 1 TABLET EVERY NIGHT FOR HIGH AMOUNT OF FATS IN THE BLOOD, Disp: 90 tablet, Rfl: 1    Blood Glucose Monitoring Suppl (Prodigy No Coding Blood Gluc) w/Device kit, 1 each Daily., Disp: 1 kit, Rfl: 0    fenofibrate micronized (LOFIBRA) 134 MG capsule, TAKE 1 CAPSULE EVERY       MORNING BEFORE BREAKFAST, Disp: 90 capsule, Rfl: 3    furosemide (LASIX) 40 MG tablet, TAKE 1 TABLET BY MOUTH EVERY DAY, Disp: 90 tablet, Rfl: 0    glucosamine-chondroitin 500-400 MG capsule capsule, Take 2 capsules by mouth Daily., Disp: , Rfl:     glucose blood (True Metrix Blood Glucose Test) test strip, USE TO TEST BLOOD SUGAR ONCE DAILY, Disp: 50 each, Rfl: 2    levothyroxine (SYNTHROID, LEVOTHROID) 50 MCG tablet, TAKE 1 TABLET EVERY OTHER DAY FOR UNDERACTIVE THYROID, Disp: 45 tablet, Rfl: 3    levothyroxine (SYNTHROID, LEVOTHROID) 75 MCG tablet, TAKE 1 TABLET BY MOUTH EVERY OTHER DAY FOR UNDERACTIVE THYROID, Disp: 45 tablet, Rfl: 1    losartan (COZAAR) 100 MG tablet, TAKE 1 TABLET BY MOUTH EVERY DAY, Disp: 90 tablet, Rfl: 0     "metFORMIN ER (GLUCOPHAGE-XR) 750 MG 24 hr tablet, TAKE 1 TABLET TWICE A DAY, Disp: 180 tablet, Rfl: 1    Multiple Vitamins-Minerals (MULTIVITAL PO), Take 1 tablet by mouth Daily. Indications: potential deficiency, Disp: , Rfl:     Prodigy Safety Lancets 26G misc, CHECK BLOOD SUGAR ONE TIME PER DAY, Disp: 100 each, Rfl: 1    Rocklatan 0.02-0.005 % solution, INSTILL 1 DROP IN EACH EYE DAILY AT BEDTIME, Disp: , Rfl:     venlafaxine XR (EFFEXOR-XR) 150 MG 24 hr capsule, TAKE 1 CAPSULE BY MOUTH EVERY DAY, Disp: 90 capsule, Rfl: 0    omeprazole (priLOSEC) 40 MG capsule, TAKE 1 CAPSULE BY MOUTH EVERY DAY, Disp: 90 capsule, Rfl: 0  No current facility-administered medications for this visit.      Objective:     Vitals:    05/05/25 1253   BP: 114/76   Pulse: 87   SpO2: 96%   Weight: 59.4 kg (131 lb)   Height: 170.2 cm (67\")       Body mass index is 20.52 kg/m².    PHYSICAL EXAM:    Constitutional:       Appearance: Healthy appearance. Not in distress.   Neck:      Vascular: No JVR. JVD normal.   Pulmonary:      Effort: Pulmonary effort is normal.      Breath sounds: Normal breath sounds. No wheezing. No rhonchi. No rales.   Chest:      Chest wall: Not tender to palpatation.   Cardiovascular:      PMI at left midclavicular line. Normal rate. Regular rhythm. Normal S1. Normal S2.       Murmurs: There is a systolic murmur.      No gallop.  No click. No rub.   Pulses:     Intact distal pulses.   Edema:     Peripheral edema absent.   Abdominal:      General: Bowel sounds are normal.      Palpations: Abdomen is soft.      Tenderness: There is no abdominal tenderness.   Musculoskeletal: Normal range of motion.         General: No tenderness. Skin:     General: Skin is warm and dry.   Neurological:      General: No focal deficit present.      Mental Status: Alert and oriented to person, place and time.           ECG 12 Lead    Date/Time: 5/5/2025 1:54 PM  Performed by: Ranjan Hernadez MD    Authorized by: Ranjan Hernadez " MD ASHLIE  Comparison: compared with previous ECG from 12/17/2024  Similar to previous ECG  Rhythm: paced          6/23/24  Successful deployment of a 23 mm Medtronic Evolut Pro transcatheter aortic heart valve as a valve in valve procedure.  Postdilated with a 20 mm true balloon.       Assessment:      Plan:       1.  Aortic valve stenosis status post valve in valve transcatheter aortic valve replacement with 23 mm Medtronic Evolut Pro transcatheter aortic valve.    -Transthoracic echocardiogram was performed today.  Valve is well-positioned.  I do not see any paravalvular regurgitation.  Mean gradient across the aortic valve is acceptable at 19-20 mmHg.  Stable today.  -Recommend continuing Eliquis therapy along with aspirin 81 mg p.o. daily.    2.  Paroxysmal atrial fibrillation: On beta-blocker and Eliquis.  Sinus rhythm on EKG today.    3.  History of CVA  -Currently on anticoagulation.  - Continue aspirin and statin at current dose.    4.  Hypertension: Pressure currently low normal.  No lightheadedness.  Continue current medicine.    5.  Diabetes type 2: Management per primary    6.  Moderate mitral valve regurgitation and severe tricuspid valve regurgitation.  Asymptomatic in my opinion.  No evidence of right heart failure clinically       Your medication list            Accurate as of May 5, 2025  1:53 PM. If you have any questions, ask your nurse or doctor.                CONTINUE taking these medications        Instructions Last Dose Given Next Dose Due   acetaminophen 325 MG tablet  Commonly known as: TYLENOL      Take 2 tablets by mouth Every 4 (Four) Hours As Needed for Mild Pain.       acyclovir 5 % ointment  Commonly known as: Zovirax      Apply 1 Application topically to the appropriate area as directed Every 3 (Three) Hours.       amitriptyline 50 MG tablet  Commonly known as: ELAVIL      TAKE 1 TABLET BY MOUTH AT NIGHT       amLODIPine 5 MG tablet  Commonly known as: NORVASC      Take 1 tablet by  mouth Daily.       apixaban 5 MG tablet tablet  Commonly known as: Eliquis      Take 1 tablet by mouth Every 12 (Twelve) Hours.       ascorbic acid 1000 MG tablet  Commonly known as: VITAMIN C      Take 1 tablet by mouth Daily. Indications: Inadequate Vitamin C       aspirin 81 MG EC tablet      Take 1 tablet by mouth Daily.       atorvastatin 80 MG tablet  Commonly known as: LIPITOR      TAKE 1 TABLET EVERY NIGHT FOR HIGH AMOUNT OF FATS IN THE BLOOD       fenofibrate micronized 134 MG capsule  Commonly known as: LOFIBRA      TAKE 1 CAPSULE EVERY       MORNING BEFORE BREAKFAST       furosemide 40 MG tablet  Commonly known as: LASIX      TAKE 1 TABLET BY MOUTH EVERY DAY       glucosamine-chondroitin 500-400 MG capsule capsule      Take 2 capsules by mouth Daily.       levothyroxine 75 MCG tablet  Commonly known as: SYNTHROID, LEVOTHROID      TAKE 1 TABLET BY MOUTH EVERY OTHER DAY FOR UNDERACTIVE THYROID       levothyroxine 50 MCG tablet  Commonly known as: SYNTHROID, LEVOTHROID      TAKE 1 TABLET EVERY OTHER DAY FOR UNDERACTIVE THYROID       losartan 100 MG tablet  Commonly known as: COZAAR      TAKE 1 TABLET BY MOUTH EVERY DAY       metFORMIN  MG 24 hr tablet  Commonly known as: GLUCOPHAGE-XR      TAKE 1 TABLET TWICE A DAY       multivitamin with minerals tablet tablet      Take 1 tablet by mouth Daily. Indications: potential deficiency       omeprazole 40 MG capsule  Commonly known as: priLOSEC      TAKE 1 CAPSULE BY MOUTH EVERY DAY       Prodigy No Coding Blood Gluc w/Device kit      1 each Daily.       Prodigy Safety Lancets 26G misc      CHECK BLOOD SUGAR ONE TIME PER DAY       Rocklatan 0.02-0.005 % solution  Generic drug: Netarsudil-Latanoprost      INSTILL 1 DROP IN EACH EYE DAILY AT BEDTIME       True Metrix Blood Glucose Test test strip  Generic drug: glucose blood      USE TO TEST BLOOD SUGAR ONCE DAILY       venlafaxine  MG 24 hr capsule  Commonly known as: EFFEXOR-XR      TAKE 1 CAPSULE BY  MOUTH EVERY DAY

## 2025-05-06 DIAGNOSIS — E11.9 DIABETES MELLITUS WITHOUT COMPLICATION: ICD-10-CM

## 2025-05-06 RX ORDER — METFORMIN HYDROCHLORIDE 750 MG/1
750 TABLET, EXTENDED RELEASE ORAL 2 TIMES DAILY
Qty: 180 TABLET | Refills: 1 | Status: SHIPPED | OUTPATIENT
Start: 2025-05-06

## 2025-05-07 DIAGNOSIS — F51.01 PRIMARY INSOMNIA: ICD-10-CM

## 2025-05-07 DIAGNOSIS — I10 ESSENTIAL HYPERTENSION: ICD-10-CM

## 2025-05-07 DIAGNOSIS — K21.9 GASTROESOPHAGEAL REFLUX DISEASE: ICD-10-CM

## 2025-05-08 RX ORDER — APIXABAN 5 MG/1
5 TABLET, FILM COATED ORAL
Qty: 180 TABLET | Refills: 3 | Status: SHIPPED | OUTPATIENT
Start: 2025-05-08

## 2025-05-08 NOTE — TELEPHONE ENCOUNTER
Rx Refill Note  Requested Prescriptions     Pending Prescriptions Disp Refills    omeprazole (priLOSEC) 40 MG capsule [Pharmacy Med Name: Omeprazole Oral Capsule Delayed Release 40 MG] 90 capsule 0     Sig: TAKE 1 CAPSULE BY MOUTH EVERY DAY    losartan (COZAAR) 100 MG tablet [Pharmacy Med Name: Losartan Potassium Oral Tablet 100 MG] 90 tablet 0     Sig: TAKE 1 TABLET BY MOUTH EVERY DAY    amitriptyline (ELAVIL) 50 MG tablet [Pharmacy Med Name: Amitriptyline HCl Oral Tablet 50 MG] 90 tablet 0     Sig: TAKE 1 TABLET BY MOUTH AT NIGHT      Last office visit with prescribing clinician: 4/25/2025  Next office visit with prescribing clinician: 9/24/2025         Paulina Elliott MA  05/08/25, 12:50 EDT

## 2025-05-09 RX ORDER — IPRATROPIUM BROMIDE 42 UG/1
SPRAY, METERED NASAL
Qty: 15 ML | Refills: 2 | Status: SHIPPED | OUTPATIENT
Start: 2025-05-09

## 2025-05-09 RX ORDER — LOSARTAN POTASSIUM 100 MG/1
100 TABLET ORAL DAILY
Qty: 90 TABLET | Refills: 1 | Status: SHIPPED | OUTPATIENT
Start: 2025-05-09

## 2025-05-09 RX ORDER — AMITRIPTYLINE HYDROCHLORIDE 50 MG/1
50 TABLET ORAL
Qty: 90 TABLET | Refills: 1 | Status: SHIPPED | OUTPATIENT
Start: 2025-05-09

## 2025-05-09 RX ORDER — OMEPRAZOLE 40 MG/1
40 CAPSULE, DELAYED RELEASE ORAL DAILY
Qty: 90 CAPSULE | Refills: 1 | Status: SHIPPED | OUTPATIENT
Start: 2025-05-09

## 2025-05-09 NOTE — TELEPHONE ENCOUNTER
Rx Refill Note  Requested Prescriptions     Pending Prescriptions Disp Refills    ipratropium (ATROVENT) 0.06 % nasal spray [Pharmacy Med Name: Ipratropium Bromide Nasal Solution 0.06 %] 15 mL 0     Sig: INSTILL 2 SPRAYS INTO EACH NOSTRIL EVERY 6 HOURS AS NEEDED      Last office visit with prescribing clinician: 4/25/2025   Last telemedicine visit with prescribing clinician: Visit date not found   Next office visit with prescribing clinician: 5/7/2025                         Would you like a call back once the refill request has been completed: [] Yes [] No    If the office needs to give you a call back, can they leave a voicemail: [] Yes [] No    Lisa Mabry  05/09/25, 08:16 EDT

## 2025-05-10 NOTE — ASSESSMENT & PLAN NOTE
- Hemoglobin A1c 5.6, well-controlled.  - Occasionally takes glimepiride with high-carb meals, no hypoglycemia.  - Aim for fasting blood sugar <120 to 130 and postprandial <160.  - Blood work and urine sample to assess kidney function.

## 2025-05-10 NOTE — ASSESSMENT & PLAN NOTE
- Blood pressure today 132/78, acceptable.  - Reported 151/87 last night.  - Advised to monitor blood pressure at night for nocturnal hypertension.  - Continue amlodipine an losartan daily.  - Blood work to be conducted.

## 2025-05-10 NOTE — ASSESSMENT & PLAN NOTE
- Heart rate 78, within normal range.  - Occasional shortness of breath, managed.  - Managed on Eliquis, denies bleeding episodes.

## 2025-05-10 NOTE — PATIENT INSTRUCTIONS
Medicare Wellness  Personal Prevention Plan of Service     Date of Office Visit:    Encounter Provider:  TOSHA Houser  Place of Service:  [facility] PRIMARY CARE  Patient Name: Dior Pettit  :  1940    As part of the Medicare Wellness portion of your visit today, we are providing you with this personalized preventive plan of services (PPPS). This plan is based upon recommendations of the [place] Preventive Services Task Force (USPSTF) and the Advisory Committee on Immunization Practices (ACIP).    This lists the preventive care services that should be considered, and provides dates of when you are due. Items listed as completed are up-to-date and do not require any further intervention.    Health Maintenance   Topic Date Due    TDAP/TD VACCINES (2 - Tdap) 2004    ZOSTER VACCINE (2 of 3) 2012    RSV Vaccine - Adults (1 - 1-dose 75+ series) Never done    ANNUAL WELLNESS VISIT  2024    COVID-19 Vaccine (2024- season) 10/01/2025 (Originally 2025)    INFLUENZA VACCINE  2025    HEMOGLOBIN A1C  10/25/2025    DIABETIC EYE EXAM  2026    DIABETIC FOOT EXAM  2026    LIPID PANEL  2026    URINE MICROALBUMIN-CREATININE RATIO (uACR)  2026    Pneumococcal Vaccine 50+  Completed    MAMMOGRAM  Discontinued    DXA SCAN  Discontinued    COLORECTAL CANCER SCREENING  Discontinued       Orders Placed This Encounter   Procedures    CBC (No Diff)     Release to patient:   Routine Release [[id number]]     [facility] Has the patient fasted?:   No    Comprehensive Metabolic Panel     Release to patient:   Routine Release [[id number]]     [facility] Has the patient fasted?:   No    Lipid Panel     Release to patient:   Routine Release [[id number]]     [facility] Has the patient fasted?:   No    TSH     Release to patient:   Routine Release [[id number]]     [facility] Has the patient fasted?:   No    Hemoglobin A1c     Release to patient:   Routine  Release [[id number]]     [facility] Has the patient fasted?:   No    Microalbumin / Creatinine Urine Ratio - Urine, Clean Catch     Release to patient:   Routine Release [[id number]]     [facility] Has the patient fasted?:   No       Return in about 6 months (around 10/25/2025).

## 2025-05-10 NOTE — ASSESSMENT & PLAN NOTE
- Receives injections in both eyes every 2 months, uses nightly eye drops.  - Vision fluctuates throughout the day.  - Continue current treatment and follow up with ophthalmologist every 2 months.

## 2025-05-10 NOTE — PROGRESS NOTES
Subjective   The ABCs of the Annual Wellness Visit  Medicare Wellness Visit      Dior Pettit is a 84 y.o. patient who presents for a Medicare Wellness Visit.    The following portions of the patient's history were reviewed and   updated as appropriate: allergies, current medications, past family history, past medical history, past social history, past surgical history, and problem list.    Compared to one year ago, the patient's physical   health is the same.  Compared to one year ago, the patient's mental   health is the same.    Recent Hospitalizations:  This patient has had a [facility] admission record on file within the last 365 days.  Current Medical Providers:  Patient Care Team:  Ashanti Hong APRN as PCP - General (Internal Medicine)  Gerardo Rodriguez Jr., MD as Consulting Physician (Pulmonary Disease)  Abbi Mitchell MD as Consulting Physician (Cardiology)  Luis Miguel Parker MD as Consulting Physician (Ophthalmology)  Tae Burton MD as Consulting Physician (Dermatology)  Addis Gomes MD as Consulting Physician (Plastic Surgery)  Bebe Batres RN as Ambulatory  ([facility])    Outpatient Medications Prior to Visit   Medication Sig Dispense Refill    acetaminophen (TYLENOL) 325 MG tablet Take 2 tablets by mouth Every 4 (Four) Hours As Needed for Mild Pain.      acyclovir (Zovirax) 5 % ointment Apply 1 Application topically to the appropriate area as directed Every 3 (Three) Hours. 15 g 0    amLODIPine (NORVASC) 5 MG tablet Take 1 tablet by mouth Daily. 90 tablet 1    ascorbic acid (VITAMIN C) 1000 MG tablet Take 1 tablet by mouth Daily. Indications: Inadequate Vitamin C      aspirin 81 MG EC tablet Take 1 tablet by mouth Daily. 30 tablet 1    atorvastatin (LIPITOR) 80 MG tablet TAKE 1 TABLET EVERY NIGHT FOR HIGH AMOUNT OF FATS IN THE BLOOD 90 tablet 1    fenofibrate micronized (LOFIBRA) 134 MG capsule TAKE 1 CAPSULE EVERY       MORNING BEFORE BREAKFAST  90 capsule 3    furosemide (LASIX) 40 MG tablet TAKE 1 TABLET BY MOUTH EVERY DAY 90 tablet 0    glucosamine-chondroitin 500-400 MG capsule capsule Take 2 capsules by mouth Daily.      glucose blood (True Metrix Blood Glucose Test) test strip USE TO TEST BLOOD SUGAR ONCE DAILY 50 each 2    levothyroxine (SYNTHROID, LEVOTHROID) 50 MCG tablet TAKE 1 TABLET EVERY OTHER DAY FOR UNDERACTIVE THYROID 45 tablet 3    levothyroxine (SYNTHROID, LEVOTHROID) 75 MCG tablet TAKE 1 TABLET BY MOUTH EVERY OTHER DAY FOR UNDERACTIVE THYROID 45 tablet 1    Multiple Vitamins-Minerals (MULTIVITAL PO) Take 1 tablet by mouth Daily. Indications: potential deficiency      Prodigy Safety Lancets 26G misc CHECK BLOOD SUGAR ONE TIME PER  each 1    Rocklatan 0.02-0.005 % solution INSTILL 1 DROP IN EACH EYE DAILY AT BEDTIME      venlafaxine XR (EFFEXOR-XR) 150 MG 24 hr capsule TAKE 1 CAPSULE BY MOUTH EVERY DAY 90 capsule 0    amitriptyline (ELAVIL) 50 MG tablet TAKE 1 TABLET BY MOUTH AT NIGHT 90 tablet 0    apixaban (Eliquis) 5 MG tablet tablet Take 1 tablet by mouth Every 12 (Twelve) Hours. 180 tablet 1    losartan (COZAAR) 100 MG tablet TAKE 1 TABLET BY MOUTH EVERY DAY 90 tablet 0    metFORMIN ER (GLUCOPHAGE-XR) 750 MG 24 hr tablet TAKE 1 TABLET TWICE A  tablet 1    omeprazole (priLOSEC) 40 MG capsule TAKE 1 CAPSULE BY MOUTH EVERY DAY 90 capsule 0    Blood Glucose Monitoring Suppl (Prodigy No Coding Blood Gluc) w/Device kit 1 each Daily. 1 kit 0    latanoprost (XALATAN) 0.005 % ophthalmic solution Administer 1 drop to both eyes Every Night. (Patient not taking: Reported on 4/25/2025)       No facility-administered medications prior to visit.     No opioid medication identified on active medication list. I have reviewed chart for other potential  high risk medication/s and harmful drug interactions in the elderly.      Aspirin is on active medication list. Aspirin use is indicated based on review of current medical condition/s. Pros  "and cons of this therapy have been discussed today. Benefits of this medication outweigh potential harm.  Patient has been encouraged to continue taking this medication.  .      Patient Active Problem List   Diagnosis    Essential hypertension    Pulmonary hypertension    NNEKA (obstructive sleep apnea)    Gastroesophageal reflux disease    Anxiety    Restless leg syndrome    Controlled diabetes mellitus type 2 with complications    Hypothyroidism    Hypercholesteremia    Seasonal allergic rhinitis    Mild intermittent asthma without complication    Dilated aortic root    Thoracic aortic aneurysm without rupture     Primary insomnia    Renal insufficiency    Macular degeneration of both eyes    Rectocele    Nonrheumatic mitral valve stenosis    Slow transit constipation    Dizziness    Daily headache    Symptomatic bradycardia    Closed displaced fracture of right femoral neck    Paroxysmal atrial fibrillation    Acute stroke due to ischemia    Dyspnea on exertion    Aortic valve stenosis    Oral herpes    S/P TAVR (transcatheter aortic valve replacement)    Atrial fibrillation with rapid ventricular response    Glaucoma of both eyes     Advance Care Planning Advance Directive is on file.  ACP discussion was held with the patient during this visit. Patient has an advance directive in EMR which is still valid.             Objective   Vitals:    04/25/25 1431   BP: 132/78   BP Location: Left arm   Patient Position: Sitting   Cuff Size: Adult   Pulse: 78   Temp: 97.9 °F (36.6 °C)   SpO2: 97%   Weight: 59.7 kg (131 lb 9.6 oz)   Height: 170.2 cm (67\")   PainSc: 0-No pain       Estimated body mass index is 20.61 kg/m² as calculated from the following:    Height as of this encounter: 170.2 cm (67\").    Weight as of this encounter: 59.7 kg (131 lb 9.6 oz).    BMI is within normal parameters. No other follow-up for BMI required.           Does the patient have evidence of cognitive impairment? No  Lab Results   Component Value " Date    CHLPL 163 2025    TRIG 95 2025    HDL 57 2025    LDL 89 2025    VLDL 17 2025    HGBA1C 6.00 (H) 2025                                                                                                Health  Risk Assessment    Smoking Status:  Social History     Tobacco Use   Smoking Status Never    Passive exposure: Never   Smokeless Tobacco Never   Tobacco Comments    caffeine use - 1 cup coffee daily      Alcohol Consumption:  Social History     Substance and Sexual Activity   Alcohol Use Yes    Comment: 2 oz a month       Fall Risk Screen  STEADI Fall Risk Assessment was completed, and patient is at MODERATE risk for falls. Assessment completed on:2025    Depression Screening   Little interest or pleasure in doing things? Not at all   Feeling down, depressed, or hopeless? Not at all   PHQ-2 Total Score 0      Health Habits and Functional and Cognitive Screenin/25/2025     2:36 PM   Functional & Cognitive Status   Do you have difficulty preparing food and eating? Yes   Do you have difficulty bathing yourself, getting dressed or grooming yourself? No   Do you have difficulty using the toilet? No   Do you have difficulty moving around from place to place? No   Do you have trouble with steps or getting out of a bed or a chair? No   Current Diet Well Balanced Diet   Dental Exam Up to date   Eye Exam Up to date   Exercise (times per week) 0 times per week   Current Exercises Include No Regular Exercise   Do you need help using the phone?  No   Are you deaf or do you have serious difficulty hearing?  No   Do you need help to go to places out of walking distance? Yes   Do you need help shopping? No   Do you need help preparing meals?  No   Do you need help with housework?  No   Do you need help with laundry? No   Do you need help taking your medications? No   Do you need help managing money? No   Do you ever drive or ride in a car without wearing a seat belt? No    Have you felt unusual stress, anger or loneliness in the last month? No   Who do you live with? Alone   If you need help, do you have trouble finding someone available to you? No   Have you been bothered in the last four weeks by sexual problems? No   Do you have difficulty concentrating, remembering or making decisions? Yes           Age-appropriate Screening Schedule:  Refer to the list below for future screening recommendations based on patient's age, sex and/or medical conditions. Orders for these recommended tests are listed in the plan section. The patient has been provided with a written plan.    Health Maintenance List  Health Maintenance   Topic Date Due    TDAP/TD VACCINES (2 - Tdap) 01/01/2004    ZOSTER VACCINE (2 of 3) 02/26/2012    RSV Vaccine - Adults (1 - 1-dose 75+ series) Never done    DIABETIC FOOT EXAM  06/30/2018    ANNUAL WELLNESS VISIT  09/01/2024    COVID-19 Vaccine (6 - 2024-25 season) 10/01/2025 (Originally 4/2/2025)    INFLUENZA VACCINE  07/01/2025    HEMOGLOBIN A1C  10/25/2025    DIABETIC EYE EXAM  01/21/2026    LIPID PANEL  04/25/2026    URINE MICROALBUMIN-CREATININE RATIO (uACR)  04/25/2026    Pneumococcal Vaccine 50+  Completed    MAMMOGRAM  Discontinued    DXA SCAN  Discontinued    COLORECTAL CANCER SCREENING  Discontinued                                                                                                                                                CMS Preventative Services Quick Reference  Risk Factors Identified During Encounter  Immunizations Discussed/Encouraged: Tdap, Shingrix, and RSV (Respiratory Syncytial Virus)    The above risks/problems have been discussed with the patient.  Pertinent information has been shared with the patient in the After Visit Summary.  An After Visit Summary and PPPS were made available to the patient.    Follow Up:   Next Medicare Wellness visit to be scheduled in 1 year.         Additional E&M Note during same encounter  follows:  Patient has additional, significant, and separately identifiable condition(s)/problem(s) that require work above and beyond the Medicare Wellness Visit     Chief Complaint  Medicare Wellness-subsequent    Subjective    HPI  Citlaly is also being seen today for additional medical problem/s.       The patient presents for evaluation of atrial fibrillation, hypertension, macular degeneration, glaucoma, diabetes mellitus, allergies, and headaches. She presents with her daughter whom she has given permission to be present.    Chief complaint includes intermittent shortness of breath, with heart rate fluctuating between the 70s and 80s. Recorded blood pressure was 151/87 during recent home BP.  She attributes elevated blood pressure to tension and worry, especially with morning coffee and computer use.    Diagnosed with macular degeneration, receiving injections in both eyes every 2 months, and using nightly eye drops. Vision fluctuates throughout the day. Diagnosed with glaucoma, resulting in peripheral vision loss over the past 6 to 8 months. Injections increase eye pressure, requiring medication to reduce it.     Concern about blood sugar levels, particularly in relation to eye health. Occasionally takes glimepiride with high-carb meals, no hypoglycemia reported. Maintains good hydration and healthy appetite. Weight loss attributed to muscle mass reduction in legs.    Mild allergies managed with ipratropium nasal drops. Headaches occur almost every night, managed with Tylenol before bedtime. Reports feeling well and living independently. Sleeps 6 to 7 hours per night, no daytime napping.    Reports difficulty walking and maintaining balance, often using a cane. No falls in the past year. Corns present on feet.  Review of Systems   HENT:  Positive for congestion and postnasal drip.    Respiratory:  Negative for cough, chest tightness and shortness of breath.    Cardiovascular:  Negative for chest pain, palpitations  "and leg swelling.   Gastrointestinal:  Negative for abdominal pain.   Musculoskeletal:  Positive for arthralgias.   Psychiatric/Behavioral:  Positive for sleep disturbance.           Objective   Vital Signs:  /78 (BP Location: Left arm, Patient Position: Sitting, Cuff Size: Adult)   Pulse 78   Temp 97.9 °F (36.6 °C)   Ht 170.2 cm (67\")   Wt 59.7 kg (131 lb 9.6 oz)   SpO2 97%   BMI 20.61 kg/m²   Physical Exam  Constitutional:       Appearance: She is well-developed. She is not ill-appearing.   HENT:      Head: Normocephalic.      Right Ear: Hearing, tympanic membrane and external ear normal.      Left Ear: Hearing, tympanic membrane and external ear normal.      Nose: Nose normal. No nasal deformity, mucosal edema or rhinorrhea.      Right Sinus: No maxillary sinus tenderness or frontal sinus tenderness.      Left Sinus: No maxillary sinus tenderness or frontal sinus tenderness.      Mouth/Throat:      Dentition: Normal dentition.   Eyes:      General: Lids are normal.         Right eye: No discharge.         Left eye: No discharge.      Conjunctiva/sclera: Conjunctivae normal.      Right eye: No exudate.     Left eye: No exudate.  Neck:      Thyroid: No thyroid mass or thyromegaly.      Vascular: No carotid bruit.      Trachea: Trachea normal.   Cardiovascular:      Rate and Rhythm: Regular rhythm.      Pulses: Normal pulses.           Dorsalis pedis pulses are 2+ on the right side and 2+ on the left side.        Posterior tibial pulses are 2+ on the right side and 2+ on the left side.      Heart sounds: Murmur heard.      Systolic murmur is present with a grade of 2/6.   Pulmonary:      Effort: No respiratory distress.      Breath sounds: Normal breath sounds. No decreased breath sounds, wheezing, rhonchi or rales.   Abdominal:      General: Bowel sounds are normal.      Palpations: Abdomen is soft.      Tenderness: There is no abdominal tenderness.   Musculoskeletal:      Cervical back: Normal range of " motion. No edema.      Right foot: Normal range of motion.      Left foot: Normal range of motion.   Feet:      Right foot:      Protective Sensation: 10 sites tested.  10 sites sensed.      Skin integrity: Skin integrity normal. No ulcer, blister or skin breakdown.      Toenail Condition: Right toenails are normal.      Left foot:      Protective Sensation: 10 sites tested.  10 sites sensed.      Skin integrity: Skin integrity normal. No ulcer, blister or skin breakdown.      Toenail Condition: Left toenails are normal.      Comments: Diabetic Foot Exam Performed and Monofilament Test Performed    Lymphadenopathy:      Head:      Right side of head: No submental, submandibular, tonsillar, preauricular, posterior auricular or occipital adenopathy.      Left side of head: No submental, submandibular, tonsillar, preauricular, posterior auricular or occipital adenopathy.   Skin:     General: Skin is warm and dry.      Nails: There is no clubbing.   Neurological:      Mental Status: She is alert.   Psychiatric:         Behavior: Behavior is cooperative.               The following data was reviewed by: TOSHA Houser on 04/25/2025:  Data reviewed : Consultant notes cardiology 12/17/24  Common labs          9/5/2024    05:44 9/6/2024    04:24 4/25/2025    15:39   Common Labs   Glucose 115  118  92    BUN 24  22  26    Creatinine 0.93  0.89  1.09    Sodium 135  139  137    Potassium 4.1  4.3  4.8    Chloride 104  104  100    Calcium 9.3  9.7  10.0    Albumin   4.8    Total Bilirubin   0.5    Alkaline Phosphatase   65    AST (SGOT)   28    ALT (SGPT)   19    WBC 7.66  6.77  6.63    Hemoglobin 11.2  12.0  12.3    Hematocrit 34.1  37.0  38.9    Platelets 253  288  321    Total Cholesterol 144      Total Cholesterol   163    Triglycerides 123   95    HDL Cholesterol 46   57    LDL Cholesterol  76   89    Hemoglobin A1C 5.60   6.00    Microalbumin, Urine   <3.0        Results  Labs   - Hemoglobin A1c: 5.6            Assessment and Plan Additional age appropriate preventative wellness advice topics were discussed during today's preventative wellness exam(some topics already addressed during AWV portion of the note above):   Nutrition: Discussed nutrition plan with patient. Information shared in after visit summary. Goal is for a well balanced diet to enhance overall health.            Diagnoses and all orders for this visit:    1. Medicare annual wellness visit, subsequent (Primary)    2. Glaucoma of both eyes, unspecified glaucoma type  Assessment & Plan:  - Diagnosed with glaucoma, injections for macular degeneration increase eye pressure.  - Takes medication after injections to manage this.  - Continue current treatment and follow up with ophthalmologist every 2 months.      3. Essential hypertension  Assessment & Plan:  - Blood pressure today 132/78, acceptable.  - Reported 151/87 last night.  - Advised to monitor blood pressure at night for nocturnal hypertension.  - Continue amlodipine an losartan daily.  - Blood work to be conducted.    Orders:  -     CBC (No Diff)  -     Comprehensive Metabolic Panel  -     Lipid Panel    4. Seasonal allergic rhinitis, unspecified trigger  Assessment & Plan:  - Uses ipratropium nasal drops for relief.  - Continue using nasal drops as needed.      5. Paroxysmal atrial fibrillation  Assessment & Plan:  - Heart rate 78, within normal range.  - Occasional shortness of breath, managed.  - Managed on Eliquis, denies bleeding episodes.      6. Controlled type 2 diabetes mellitus with complication, without long-term current use of insulin  Assessment & Plan:  - Hemoglobin A1c 5.6, well-controlled.  - Occasionally takes glimepiride with high-carb meals, no hypoglycemia.  - Aim for fasting blood sugar <120 to 130 and postprandial <160.  - Blood work and urine sample to assess kidney function.    Orders:  -     Hemoglobin A1c  -     Microalbumin / Creatinine Urine Ratio - Urine, Clean Catch    7.  Acquired hypothyroidism  Assessment & Plan:  - Alternates Synthroid 50 mcg and 75 mcg daily for replacement.  - Recheck TSH.    Orders:  -     TSH    8. Macular degeneration of both eyes, unspecified type  Assessment & Plan:  - Receives injections in both eyes every 2 months, uses nightly eye drops.  - Vision fluctuates throughout the day.  - Continue current treatment and follow up with ophthalmologist every 2 months.      9. Daily headache  Assessment & Plan:  - Headaches almost every night, takes Tylenol before bed.  - R/o nocturnal hypertension.  - Advised to monitor blood pressure during episodes.               Follow Up   Return in about 6 months (around 10/25/2025).  Patient was given instructions and counseling regarding her condition or for health maintenance advice. Please see specific information pulled into the AVS if appropriate.  Patient or patient representative verbalized consent for the use of Ambient Listening during the visit with  TOSHA Houser for chart documentation. 5/10/2025  08:48 EDT

## 2025-05-10 NOTE — ASSESSMENT & PLAN NOTE
- Diagnosed with glaucoma, injections for macular degeneration increase eye pressure.  - Takes medication after injections to manage this.  - Continue current treatment and follow up with ophthalmologist every 2 months.

## 2025-05-10 NOTE — ASSESSMENT & PLAN NOTE
- Headaches almost every night, takes Tylenol before bed.  - R/o nocturnal hypertension.  - Advised to monitor blood pressure during episodes.

## 2025-05-19 ENCOUNTER — PATIENT OUTREACH (OUTPATIENT)
Dept: CASE MANAGEMENT | Facility: OTHER | Age: 85
End: 2025-05-19
Payer: MEDICARE

## 2025-05-19 DIAGNOSIS — I10 ESSENTIAL HYPERTENSION: ICD-10-CM

## 2025-05-19 DIAGNOSIS — E11.8 CONTROLLED TYPE 2 DIABETES MELLITUS WITH COMPLICATION, WITHOUT LONG-TERM CURRENT USE OF INSULIN: Primary | ICD-10-CM

## 2025-05-19 DIAGNOSIS — H35.30 MACULAR DEGENERATION OF BOTH EYES, UNSPECIFIED TYPE: ICD-10-CM

## 2025-05-19 NOTE — OUTREACH NOTE
"AMBULATORY CASE MANAGEMENT NOTE    Names and Relationships of Patient/Support Persons: Contact: Dior Pettit \"Citlaly\"; Relationship: Self -     Patient Outreach    Spoke with patient for scheduled outreach. She states she is doing fine. She complained of soreness in both feet. Denies injury. Advised to make an appointment with PCP if her symptoms worsen.     Chart review performed. Diabetes well controlled per PCP. Current A1c is 6%. Cardiac status stable per Cardiologist. Discussed graduation and is agreeable.  Patient has met care plan goals, all care gaps have been addressed, and patient has a strong sense of health self-management. Instructed to call ACM if needed. Program status change from engaged to monitoring.       Bebe ALLISON  Ambulatory Case Management    5/19/2025, 11:04 EDT  "

## 2025-05-19 NOTE — PLAN OF CARE
Problem: Diabetes Type 2  Goal: Protect Myself From Diabetes Complications  Outcome: Met  Intervention: My Protect Myself From Diabetes Complications To Do List  Description:   Why is this important?  Having diabetes puts you at risk for complications, such as kidney disease, heart disease, nerve damage and eye or dental problems.  You can manage your risk by making healthy lifestyle choices and getting regular checkups.    Goal: Monitor My Blood Sugar  Outcome: Met  Intervention: My Blood Sugar Management To Do List  Description:   Why is this important?  Checking your blood sugar (glucose) at home helps to keep it from getting very high or very low.  Writing the results in a diary or log, or using an rosangela, helps your diabetes care provider know how to care for you.  Your blood sugar (glucose) log should have the time, date and results.  Also write down the amount of insulin or other medicine that you take.    Goal: Manage My Stress  Outcome: Met  Intervention: My Stress Management To Do List  Description:   Why is this important?  When you are stressed down or upset your body reacts too.    Goal: Optimal Care Coordination of a Patient Experiencing Diabetes, Type 2  Outcome: Met  Intervention: Alleviate Barriers to Glycemic Management  Intervention: Monitor and Manage Follow-Up for Comorbidities

## 2025-05-27 DIAGNOSIS — E03.9 ACQUIRED HYPOTHYROIDISM: ICD-10-CM

## 2025-05-28 RX ORDER — LEVOTHYROXINE SODIUM 75 UG/1
TABLET ORAL
Qty: 45 TABLET | Refills: 0 | Status: SHIPPED | OUTPATIENT
Start: 2025-05-28

## 2025-06-04 RX ORDER — FUROSEMIDE 40 MG/1
40 TABLET ORAL DAILY
Qty: 90 TABLET | Refills: 1 | Status: SHIPPED | OUTPATIENT
Start: 2025-06-04

## 2025-06-18 ENCOUNTER — TELEPHONE (OUTPATIENT)
Dept: CASE MANAGEMENT | Facility: OTHER | Age: 85
End: 2025-06-18
Payer: MEDICARE

## 2025-06-18 DIAGNOSIS — F41.9 ANXIETY: ICD-10-CM

## 2025-06-18 NOTE — TELEPHONE ENCOUNTER
30 day chart review performed. No hospitalizations or ED visits noted this past month. Chronic conditions remain stable. Targets and goals of HRCM program completed. No changes or new needs identified. Continue HRCM monitoring to graduate.

## 2025-06-19 RX ORDER — VENLAFAXINE HYDROCHLORIDE 150 MG/1
150 CAPSULE, EXTENDED RELEASE ORAL DAILY
Qty: 90 CAPSULE | Refills: 0 | Status: SHIPPED | OUTPATIENT
Start: 2025-06-19

## 2025-07-08 LAB
MC_CV_MDC_IDC_RATE_1: 150
MC_CV_MDC_IDC_RATE_1: 171
MC_CV_MDC_IDC_THERAPIES: NORMAL
MC_CV_MDC_IDC_ZONE_ID: 2
MC_CV_MDC_IDC_ZONE_ID: 6
MDC_IDC_MSMT_BATTERY_REMAINING_LONGEVITY: 147 MO
MDC_IDC_MSMT_BATTERY_RRT_TRIGGER: 2.62
MDC_IDC_MSMT_BATTERY_STATUS: NORMAL
MDC_IDC_MSMT_BATTERY_VOLTAGE: 3.05
MDC_IDC_MSMT_LEADCHNL_RA_IMPEDANCE_VALUE: 551
MDC_IDC_MSMT_LEADCHNL_RA_SENSING_INTR_AMPL: 2.25
MDC_IDC_MSMT_LEADCHNL_RV_DTM: NORMAL
MDC_IDC_MSMT_LEADCHNL_RV_IMPEDANCE_VALUE: 570
MDC_IDC_MSMT_LEADCHNL_RV_PACING_THRESHOLD_AMPLITUDE: 0.62
MDC_IDC_MSMT_LEADCHNL_RV_PACING_THRESHOLD_POLARITY: NORMAL
MDC_IDC_MSMT_LEADCHNL_RV_PACING_THRESHOLD_PULSEWIDTH: 0.4
MDC_IDC_MSMT_LEADCHNL_RV_SENSING_INTR_AMPL: 10.75
MDC_IDC_PG_IMPLANT_DTM: NORMAL
MDC_IDC_PG_MFG: NORMAL
MDC_IDC_PG_MODEL: NORMAL
MDC_IDC_PG_SERIAL: NORMAL
MDC_IDC_PG_TYPE: NORMAL
MDC_IDC_SESS_DTM: NORMAL
MDC_IDC_SESS_TYPE: NORMAL
MDC_IDC_SET_BRADY_LOWRATE: 70
MDC_IDC_SET_BRADY_MAX_SENSOR_RATE: 110
MDC_IDC_SET_BRADY_MODE: NORMAL
MDC_IDC_SET_LEADCHNL_RA_SENSING_POLARITY: NORMAL
MDC_IDC_SET_LEADCHNL_RA_SENSING_SENSITIVITY: 0.3
MDC_IDC_SET_LEADCHNL_RV_PACING_AMPLITUDE: 2
MDC_IDC_SET_LEADCHNL_RV_PACING_POLARITY: NORMAL
MDC_IDC_SET_LEADCHNL_RV_PACING_PULSEWIDTH: 0.4
MDC_IDC_SET_LEADCHNL_RV_SENSING_POLARITY: NORMAL
MDC_IDC_SET_LEADCHNL_RV_SENSING_SENSITIVITY: 0.9
MDC_IDC_SET_ZONE_STATUS: NORMAL
MDC_IDC_SET_ZONE_STATUS: NORMAL
MDC_IDC_SET_ZONE_TYPE: NORMAL
MDC_IDC_SET_ZONE_TYPE: NORMAL
MDC_IDC_STAT_AT_BURDEN_PERCENT: 100
MDC_IDC_STAT_BRADY_RA_PERCENT_PACED: 0
MDC_IDC_STAT_BRADY_RV_PERCENT_PACED: 99.99

## 2025-07-17 RX ORDER — ATORVASTATIN CALCIUM 80 MG/1
TABLET, FILM COATED ORAL
Qty: 90 TABLET | Refills: 1 | Status: SHIPPED | OUTPATIENT
Start: 2025-07-17

## 2025-07-18 ENCOUNTER — TELEPHONE (OUTPATIENT)
Dept: CASE MANAGEMENT | Facility: OTHER | Age: 85
End: 2025-07-18
Payer: MEDICARE

## 2025-07-18 NOTE — TELEPHONE ENCOUNTER
60 day chart review performed. No hospitalizations or ED visits noted this past month. Chronic conditions remain stable. Targets and goals of HRCM program completed. No changes or new needs identified. Continue HRCM monitoring to graduate.

## 2025-08-06 ENCOUNTER — TELEPHONE (OUTPATIENT)
Dept: INTERNAL MEDICINE | Facility: CLINIC | Age: 85
End: 2025-08-06
Payer: MEDICARE

## 2025-08-13 DIAGNOSIS — E03.9 ACQUIRED HYPOTHYROIDISM: ICD-10-CM

## 2025-08-13 RX ORDER — LEVOTHYROXINE SODIUM 75 UG/1
TABLET ORAL
Qty: 45 TABLET | Refills: 0 | Status: SHIPPED | OUTPATIENT
Start: 2025-08-13

## (undated) DEVICE — DISPOSABLE ADAPTER

## (undated) DEVICE — ZIP 16 SURGICAL SKIN CLOSURE DEVICE, PSA: Brand: ZIP 16 SURGICAL SKIN CLOSURE DEVICE

## (undated) DEVICE — Device: Brand: SMARTABLATE

## (undated) DEVICE — MAT FLR ABSORBENT LG 4FT 10 2.5FT

## (undated) DEVICE — DRAPE,REIN 53X77,STERILE: Brand: MEDLINE

## (undated) DEVICE — 3M™ IOBAN™ 2 ANTIMICROBIAL INCISE DRAPE 6648EZ: Brand: IOBAN™ 2

## (undated) DEVICE — SOL IRR NACL 0.9PCT BT 1000ML

## (undated) DEVICE — ANTIBACTERIAL UNDYED BRAIDED (POLYGLACTIN 910), SYNTHETIC ABSORBABLE SUTURE: Brand: COATED VICRYL

## (undated) DEVICE — INTRO SHEATH TRNSEP .032 SL0 8.5F 63CM

## (undated) DEVICE — ADAPT SEAL SAFESHEATH

## (undated) DEVICE — LOU PACE DEFIB: Brand: MEDLINE INDUSTRIES, INC.

## (undated) DEVICE — BIT DRL RNGLC QC 3.2X30MM

## (undated) DEVICE — Device

## (undated) DEVICE — SPNG LAP 18X18IN LF STRL PK/5

## (undated) DEVICE — SPNG GZ WOVN 4X4IN 12PLY 10/BX STRL

## (undated) DEVICE — 6F .070 XB 3 100CM: Brand: VISTA BRITE TIP

## (undated) DEVICE — SUT ETHIB 2 CV V37 MS/4 30IN MX69G

## (undated) DEVICE — GLV SURG BIOGEL LTX PF 8

## (undated) DEVICE — CATH DIAG IMPULSE AL1 6F 100CM

## (undated) DEVICE — TRY INTRO PERC 6F

## (undated) DEVICE — DRSNG SURESITE WNDW 2.38X2.75

## (undated) DEVICE — SOL ISO/ALC 70PCT 4OZ

## (undated) DEVICE — Device: Brand: REFERENCE PATCH CARTO 3

## (undated) DEVICE — MEDI-VAC YANKAUER SUCTION HANDLE W/BULBOUS TIP: Brand: CARDINAL HEALTH

## (undated) DEVICE — SOL IRR H2O BTL 1000ML STRL

## (undated) DEVICE — TBG INJ CONTRL PVCCLR RIGD RA 1200PSI 10

## (undated) DEVICE — GLV SURG BIOGEL LTX PF 7

## (undated) DEVICE — PINNACLE INTRODUCER SHEATH: Brand: PINNACLE

## (undated) DEVICE — CATH ELECTRD PACE TEMP BI NONHEP 5F110CM

## (undated) DEVICE — PERCLOSE™ PROSTYLE™ SUTURE-MEDIATED CLOSURE AND REPAIR SYSTEM: Brand: PERCLOSE™ PROSTYLE™

## (undated) DEVICE — CATH DIAG IMPULSE FR4 6F 100CM

## (undated) DEVICE — INTRO SHEATH PRELUDE SNAP .038 7F 13CM W/SDPRT

## (undated) DEVICE — PREMIUM WET SKIN PREP TRAY: Brand: MEDLINE INDUSTRIES, INC.

## (undated) DEVICE — 3M™ IOBAN™ 2 ANTIMICROBIAL INCISE DRAPE 6650EZ: Brand: IOBAN™ 2

## (undated) DEVICE — SUT GORE TT13 CV5 5N02A

## (undated) DEVICE — GW INQWIRE FC PTFE STR .035IN 150

## (undated) DEVICE — RADIFOCUS GLIDEWIRE: Brand: GLIDEWIRE

## (undated) DEVICE — SOL ISO/ALC RUB 70PCT 4OZ

## (undated) DEVICE — CONTAINER,SPECIMEN,OR STERILE,4OZ: Brand: MEDLINE

## (undated) DEVICE — GLV SURG BIOGEL LTX PF 7 1/2

## (undated) DEVICE — LOU EP: Brand: MEDLINE INDUSTRIES, INC.

## (undated) DEVICE — NEEDLE, QUINCKE, 18GX3.5": Brand: MEDLINE

## (undated) DEVICE — TRUE™ DILATATION BALLOON VALVULOPLASTY CATHETER, 20 MM X 4.5 CM, 110 CM CATHETER: Brand: TRUE DILATATION

## (undated) DEVICE — APPL DURAPREP IODOPHOR APL 26ML

## (undated) DEVICE — SLITTER CATH GUIDE ATTAIN ADJ

## (undated) DEVICE — CATH DIAG IMPULSE PIG145 6F 110CM

## (undated) DEVICE — GOWN,NON-REINFORCED,SIRUS,SET IN SLV,XL: Brand: MEDLINE

## (undated) DEVICE — PK ANT HIP 40

## (undated) DEVICE — INTRO SHEATH DRYSEAL FLEX 18F 6.0TO6.7MM 33CM

## (undated) DEVICE — LABEL SHEET CUSTOM 2X2 YELLOW: Brand: MEDLINE INDUSTRIES, INC.

## (undated) DEVICE — PICO 7 10CM X 30CM: Brand: PICO™ 7

## (undated) DEVICE — SNAP KAP: Brand: UNBRANDED

## (undated) DEVICE — TBG PENCL TELESCP MEGADYNE SMOKE EVAC 10FT

## (undated) DEVICE — ADHS SKIN SURG TISS VISC PREMIERPRO EXOFIN HI/VISC FAST/DRY

## (undated) DEVICE — PK ENT 40

## (undated) DEVICE — GC 7F 078 XB 3: Brand: VISTA BRITE TIP

## (undated) DEVICE — CVR HNDL LT SURG ACCSSRY BLU STRL

## (undated) DEVICE — STERILE ULTRASOUND GEL, SAFEWRAP: Brand: ECOVUE

## (undated) DEVICE — SUT SILK 3/0 TIES 18IN A184H

## (undated) DEVICE — INTRO SHEATH ART/FEM ENGAGE .035 4F12CM

## (undated) DEVICE — HEMOCONCENTRATOR PERFUS LPS06

## (undated) DEVICE — PK PERFUS CUST W/CARDIOPLEGIA

## (undated) DEVICE — INTRO SHEATH PRELUDE SNAP .038 9F 13CM W/SDPRT BLK

## (undated) DEVICE — SUT VIC 5/0 P3 18IN J493G

## (undated) DEVICE — INTRO SHEATH ART/FEM ENGAGE .035 6F12CM

## (undated) DEVICE — CATH DEFLECT SELECTSITE 9F 43CM W/ART HNDL

## (undated) DEVICE — EQUIPMENT COVER BAG TYPE 48” X 36” (122CM X 91CM): Brand: EQUIPMENT COVER BAG TYPE

## (undated) DEVICE — BIPOLAR SEALER 23-112-1 AQM 6.0: Brand: AQUAMANTYS™

## (undated) DEVICE — DGW .035 FC J3MM 260CM TEF: Brand: EMERALD

## (undated) DEVICE — STERILE COTTON BALLS LARGE 5/P: Brand: MEDLINE

## (undated) DEVICE — SUT SILK 2 SUTUPAK TIE 60IN SA8H 2STRAND

## (undated) DEVICE — GLV SURG SENSICARE PI LF PF 8 GRN STRL

## (undated) DEVICE — TBG PRESS 96IN M/F ROT BRAID: Brand: MEDLINE INDUSTRIES, INC.

## (undated) DEVICE — TAVR: Brand: MEDLINE INDUSTRIES, INC.

## (undated) DEVICE — SWABSTK SKINPREP PVPI LF PK/3

## (undated) DEVICE — SENSR CERBRL O2 PK/2

## (undated) DEVICE — SHORT SPIKE VENTED W/3-WAY SC: Brand: MEDLINE INDUSTRIES, INC.

## (undated) DEVICE — PENCL SMOKE/EVAC MEGADYNE TELESCP 10FT

## (undated) DEVICE — DRAPE,U/ SHT,SPLIT,PLAS,STERIL: Brand: MEDLINE

## (undated) DEVICE — SYS CLS VASC/VENI VASCADE MVP 6TO12F

## (undated) DEVICE — SOL NS 500ML

## (undated) DEVICE — DELIV SYS D-EVOLUTFX-2329: Brand: EVOLUT™ FX

## (undated) DEVICE — 3M™ TEGADERM™ CHG DRESSING 25/CARTON 4 CARTONS/CASE 1658: Brand: TEGADERM™

## (undated) DEVICE — DRP INCISE CARDIO W/ADHS 115X85X151IN LG STRL

## (undated) DEVICE — SUT SILK 2/0 TIES 18IN A185H

## (undated) DEVICE — Device: Brand: THERMOCOOL SMARTTOUCH SF

## (undated) DEVICE — DECANTER BAG 9": Brand: MEDLINE INDUSTRIES, INC.

## (undated) DEVICE — BLD DEBAKY BEAVER MAMMATOME 8MM

## (undated) DEVICE — INTENDED FOR TISSUE SEPARATION, AND OTHER PROCEDURES THAT REQUIRE A SHARP SURGICAL BLADE TO PUNCTURE OR CUT.: Brand: BARD-PARKER ® CARBON RIB-BACK BLADES

## (undated) DEVICE — TRAP FLD MINIVAC MEGADYNE 100ML

## (undated) DEVICE — SOL NACL 0.9PCT 1000ML

## (undated) DEVICE — GW XCHG AMPLTZ XSTIF PTFE CRV .035 3X260

## (undated) DEVICE — CVR PROB 96IN LF STRL

## (undated) DEVICE — 3M™ BAIR HUGGER® UNDERBODY BLANKET, FULL ACCESS, 10 PER CASE 63500: Brand: BAIR HUGGER™

## (undated) DEVICE — ST. SORBAVIEW ULTIMATE IJ SYSTEM A,C: Brand: CENTURION

## (undated) DEVICE — DRSNG WND GZ PAD BORDERED 4X8IN STRL

## (undated) DEVICE — ST INTRO PERFORMER W/GW J/TP .038IN 12F

## (undated) DEVICE — DUAL CUT SAGITTAL BLADE

## (undated) DEVICE — PREP SOL POVIDONE/IODINE BT 4OZ

## (undated) DEVICE — DRSNG BURN ACTICOAT FLEX 7 1X24IN

## (undated) DEVICE — TOWEL,OR,DSP,ST,BLUE,STD,4/PK,20PK/CS: Brand: MEDLINE

## (undated) DEVICE — LOADING SYS L-EVOLUTFX-2329: Brand: EVOLUT™ FX

## (undated) DEVICE — ELECTRD NDL EZ CLN STD 2.75IN

## (undated) DEVICE — GLIDESHEATH BASIC HYDROPHILIC COATED INTRODUCER SHEATH: Brand: GLIDESHEATH

## (undated) DEVICE — KT MANIFLD CARDIAC

## (undated) DEVICE — 1010 S-DRAPE TOWEL DRAPE 10/BX: Brand: STERI-DRAPE™

## (undated) DEVICE — TR BAND RADIAL ARTERY COMPRESSION DEVICE: Brand: TR BAND